# Patient Record
Sex: MALE | Race: WHITE | NOT HISPANIC OR LATINO | Employment: OTHER | URBAN - NONMETROPOLITAN AREA
[De-identification: names, ages, dates, MRNs, and addresses within clinical notes are randomized per-mention and may not be internally consistent; named-entity substitution may affect disease eponyms.]

---

## 2018-08-06 PROCEDURE — 99284 EMERGENCY DEPT VISIT MOD MDM: CPT

## 2018-08-06 PROCEDURE — 96361 HYDRATE IV INFUSION ADD-ON: CPT

## 2018-08-06 PROCEDURE — 96365 THER/PROPH/DIAG IV INF INIT: CPT

## 2018-08-07 ENCOUNTER — APPOINTMENT (OUTPATIENT)
Dept: GENERAL RADIOLOGY | Facility: HOSPITAL | Age: 68
End: 2018-08-07

## 2018-08-07 ENCOUNTER — HOSPITAL ENCOUNTER (EMERGENCY)
Facility: HOSPITAL | Age: 68
Discharge: HOME | End: 2018-08-07
Attending: EMERGENCY MEDICINE | Admitting: EMERGENCY MEDICINE

## 2018-08-07 VITALS
TEMPERATURE: 98.2 F | WEIGHT: 200 LBS | SYSTOLIC BLOOD PRESSURE: 130 MMHG | RESPIRATION RATE: 18 BRPM | DIASTOLIC BLOOD PRESSURE: 74 MMHG | HEART RATE: 82 BPM | BODY MASS INDEX: 29.62 KG/M2 | HEIGHT: 69 IN | OXYGEN SATURATION: 97 %

## 2018-08-07 DIAGNOSIS — J18.9 PNEUMONIA OF LEFT LOWER LOBE DUE TO INFECTIOUS ORGANISM: Primary | ICD-10-CM

## 2018-08-07 LAB
ALBUMIN SERPL-MCNC: 4.4 G/DL (ref 3.4–4.8)
ALBUMIN/GLOB SERPL: 1.6 G/DL (ref 1.5–2.5)
ALP SERPL-CCNC: 67 U/L (ref 40–129)
ALT SERPL W P-5'-P-CCNC: 18 U/L (ref 10–44)
ANION GAP SERPL CALCULATED.3IONS-SCNC: 10.5 MMOL/L (ref 3.6–11.2)
AST SERPL-CCNC: 19 U/L (ref 10–34)
BASOPHILS # BLD AUTO: 0.02 10*3/MM3 (ref 0–0.3)
BASOPHILS NFR BLD AUTO: 0.1 % (ref 0–2)
BILIRUB SERPL-MCNC: 0.5 MG/DL (ref 0.2–1.8)
BILIRUB UR QL STRIP: NEGATIVE
BNP SERPL-MCNC: 29 PG/ML (ref 0–100)
BUN BLD-MCNC: 18 MG/DL (ref 7–21)
BUN/CREAT SERPL: 15.8 (ref 7–25)
CALCIUM SPEC-SCNC: 9.8 MG/DL (ref 7.7–10)
CHLORIDE SERPL-SCNC: 104 MMOL/L (ref 99–112)
CLARITY UR: CLEAR
CO2 SERPL-SCNC: 23.5 MMOL/L (ref 24.3–31.9)
COLOR UR: YELLOW
CREAT BLD-MCNC: 1.14 MG/DL (ref 0.43–1.29)
DEPRECATED RDW RBC AUTO: 39.5 FL (ref 37–54)
EOSINOPHIL # BLD AUTO: 0.05 10*3/MM3 (ref 0–0.7)
EOSINOPHIL NFR BLD AUTO: 0.3 % (ref 0–7)
ERYTHROCYTE [DISTWIDTH] IN BLOOD BY AUTOMATED COUNT: 12.9 % (ref 11.5–14.5)
FLUAV AG NPH QL: NEGATIVE
FLUBV AG NPH QL IA: NEGATIVE
GFR SERPL CREATININE-BSD FRML MDRD: 64 ML/MIN/1.73
GLOBULIN UR ELPH-MCNC: 2.8 GM/DL
GLUCOSE BLD-MCNC: 229 MG/DL (ref 70–110)
GLUCOSE UR STRIP-MCNC: ABNORMAL MG/DL
HCT VFR BLD AUTO: 40 % (ref 42–52)
HGB BLD-MCNC: 13.7 G/DL (ref 14–18)
HGB UR QL STRIP.AUTO: NEGATIVE
IMM GRANULOCYTES # BLD: 0.05 10*3/MM3 (ref 0–0.03)
IMM GRANULOCYTES NFR BLD: 0.3 % (ref 0–0.5)
KETONES UR QL STRIP: ABNORMAL
LEUKOCYTE ESTERASE UR QL STRIP.AUTO: NEGATIVE
LYMPHOCYTES # BLD AUTO: 2.37 10*3/MM3 (ref 1–3)
LYMPHOCYTES NFR BLD AUTO: 15.2 % (ref 16–46)
MAGNESIUM SERPL-MCNC: 1.7 MG/DL (ref 1.7–2.6)
MCH RBC QN AUTO: 29.5 PG (ref 27–33)
MCHC RBC AUTO-ENTMCNC: 34.3 G/DL (ref 33–37)
MCV RBC AUTO: 86 FL (ref 80–94)
MONOCYTES # BLD AUTO: 1.4 10*3/MM3 (ref 0.1–0.9)
MONOCYTES NFR BLD AUTO: 9 % (ref 0–12)
NEUTROPHILS # BLD AUTO: 11.7 10*3/MM3 (ref 1.4–6.5)
NEUTROPHILS NFR BLD AUTO: 75.1 % (ref 40–75)
NITRITE UR QL STRIP: NEGATIVE
OSMOLALITY SERPL CALC.SUM OF ELEC: 284.8 MOSM/KG (ref 273–305)
PH UR STRIP.AUTO: <=5 [PH] (ref 5–8)
PLATELET # BLD AUTO: 315 10*3/MM3 (ref 130–400)
PMV BLD AUTO: 8.8 FL (ref 6–10)
POTASSIUM BLD-SCNC: 4.6 MMOL/L (ref 3.5–5.3)
PROT SERPL-MCNC: 7.2 G/DL (ref 6–8)
PROT UR QL STRIP: NEGATIVE
RBC # BLD AUTO: 4.65 10*6/MM3 (ref 4.7–6.1)
SODIUM BLD-SCNC: 138 MMOL/L (ref 135–153)
SP GR UR STRIP: >1.03 (ref 1–1.03)
TROPONIN I SERPL-MCNC: <0.006 NG/ML
UROBILINOGEN UR QL STRIP: ABNORMAL
WBC NRBC COR # BLD: 15.59 10*3/MM3 (ref 4.5–12.5)

## 2018-08-07 PROCEDURE — 81003 URINALYSIS AUTO W/O SCOPE: CPT | Performed by: FAMILY MEDICINE

## 2018-08-07 PROCEDURE — 83735 ASSAY OF MAGNESIUM: CPT | Performed by: EMERGENCY MEDICINE

## 2018-08-07 PROCEDURE — 83880 ASSAY OF NATRIURETIC PEPTIDE: CPT | Performed by: EMERGENCY MEDICINE

## 2018-08-07 PROCEDURE — 71046 X-RAY EXAM CHEST 2 VIEWS: CPT | Performed by: RADIOLOGY

## 2018-08-07 PROCEDURE — 71046 X-RAY EXAM CHEST 2 VIEWS: CPT

## 2018-08-07 PROCEDURE — 25010000002 CEFTRIAXONE: Performed by: EMERGENCY MEDICINE

## 2018-08-07 PROCEDURE — 80053 COMPREHEN METABOLIC PANEL: CPT | Performed by: EMERGENCY MEDICINE

## 2018-08-07 PROCEDURE — 87804 INFLUENZA ASSAY W/OPTIC: CPT | Performed by: EMERGENCY MEDICINE

## 2018-08-07 PROCEDURE — 84484 ASSAY OF TROPONIN QUANT: CPT | Performed by: EMERGENCY MEDICINE

## 2018-08-07 PROCEDURE — 72072 X-RAY EXAM THORAC SPINE 3VWS: CPT | Performed by: RADIOLOGY

## 2018-08-07 PROCEDURE — 85025 COMPLETE CBC W/AUTO DIFF WBC: CPT | Performed by: EMERGENCY MEDICINE

## 2018-08-07 PROCEDURE — 96361 HYDRATE IV INFUSION ADD-ON: CPT

## 2018-08-07 PROCEDURE — 72072 X-RAY EXAM THORAC SPINE 3VWS: CPT

## 2018-08-07 PROCEDURE — 96365 THER/PROPH/DIAG IV INF INIT: CPT

## 2018-08-07 RX ORDER — ACETAMINOPHEN 500 MG
1000 TABLET ORAL ONCE
Status: COMPLETED | OUTPATIENT
Start: 2018-08-07 | End: 2018-08-07

## 2018-08-07 RX ORDER — LEVOFLOXACIN 500 MG/1
500 TABLET, FILM COATED ORAL DAILY
Qty: 7 TABLET | Refills: 0 | Status: SHIPPED | OUTPATIENT
Start: 2018-08-07 | End: 2020-11-09

## 2018-08-07 RX ADMIN — CEFTRIAXONE 1 G: 1 INJECTION, POWDER, FOR SOLUTION INTRAMUSCULAR; INTRAVENOUS at 01:15

## 2018-08-07 RX ADMIN — ACETAMINOPHEN 1000 MG: 500 TABLET ORAL at 00:37

## 2018-08-07 RX ADMIN — SODIUM CHLORIDE 1000 ML: 9 INJECTION, SOLUTION INTRAVENOUS at 00:39

## 2018-08-07 RX ADMIN — SODIUM CHLORIDE 500 ML: 9 INJECTION, SOLUTION INTRAVENOUS at 01:16

## 2018-08-07 NOTE — ED PROVIDER NOTES
Subjective   Patient presents to ER with weakness, chills and fever.        History provided by:  Patient  Weakness - Generalized   Severity:  Moderate  Onset quality:  Gradual  Timing:  Constant  Progression:  Worsening  Chronicity:  New  Context: dehydration    Relieved by:  Nothing  Worsened by:  Nothing  Ineffective treatments:  None tried  Associated symptoms: cough and shortness of breath        Review of Systems   Constitutional: Positive for activity change and appetite change.   HENT: Negative.    Eyes: Negative.    Respiratory: Positive for cough and shortness of breath.    Cardiovascular: Negative.    Gastrointestinal: Negative.    Endocrine: Negative.    Genitourinary: Negative.    Musculoskeletal: Negative.    Skin: Negative.    Allergic/Immunologic: Negative.    Hematological: Negative.    Psychiatric/Behavioral: Negative.    All other systems reviewed and are negative.      History reviewed. No pertinent past medical history.    No Known Allergies    History reviewed. No pertinent surgical history.    History reviewed. No pertinent family history.    Social History     Social History   • Marital status:      Social History Main Topics   • Drug use: Unknown     Other Topics Concern   • Not on file           Objective   Physical Exam   Constitutional: He appears well-developed and well-nourished.   HENT:   Head: Normocephalic and atraumatic.   Eyes: EOM are normal.   Neck: Normal range of motion.   Cardiovascular: Normal rate and regular rhythm.    Pulmonary/Chest: He has wheezes.   Musculoskeletal: Normal range of motion.   Neurological: He is alert.   Skin: Skin is warm.   Psychiatric: He has a normal mood and affect.   Nursing note and vitals reviewed.      Procedures           ED Course  ED Course as of Aug 07 0153   Tue Aug 07, 2018   0057 CXR LLL infiltrate  [OBI]      ED Course User Index  [OBI] Washington Velasco MD                  [facility]      Final diagnoses:   Pneumonia of left lower lobe  due to infectious organism (CMS/[facility])            Washington Velasco MD  08/07/18 0153

## 2020-11-09 ENCOUNTER — TELEPHONE (OUTPATIENT)
Dept: FAMILY MEDICINE CLINIC | Facility: CLINIC | Age: 70
End: 2020-11-09

## 2020-11-09 ENCOUNTER — OFFICE VISIT (OUTPATIENT)
Dept: FAMILY MEDICINE CLINIC | Facility: CLINIC | Age: 70
End: 2020-11-09

## 2020-11-09 VITALS
TEMPERATURE: 96.9 F | SYSTOLIC BLOOD PRESSURE: 140 MMHG | OXYGEN SATURATION: 98 % | HEART RATE: 70 BPM | DIASTOLIC BLOOD PRESSURE: 79 MMHG | HEIGHT: 69 IN | WEIGHT: 216.6 LBS | BODY MASS INDEX: 32.08 KG/M2

## 2020-11-09 DIAGNOSIS — K64.8 OTHER HEMORRHOIDS: ICD-10-CM

## 2020-11-09 DIAGNOSIS — M1A.09X0 IDIOPATHIC CHRONIC GOUT OF MULTIPLE SITES WITHOUT TOPHUS: Primary | ICD-10-CM

## 2020-11-09 DIAGNOSIS — E11.69 HYPERLIPIDEMIA ASSOCIATED WITH TYPE 2 DIABETES MELLITUS (HCC): ICD-10-CM

## 2020-11-09 DIAGNOSIS — N40.0 BENIGN PROSTATIC HYPERPLASIA WITHOUT LOWER URINARY TRACT SYMPTOMS: ICD-10-CM

## 2020-11-09 DIAGNOSIS — E78.5 HYPERLIPIDEMIA ASSOCIATED WITH TYPE 2 DIABETES MELLITUS (HCC): ICD-10-CM

## 2020-11-09 DIAGNOSIS — K21.9 GASTROESOPHAGEAL REFLUX DISEASE WITHOUT ESOPHAGITIS: ICD-10-CM

## 2020-11-09 DIAGNOSIS — I15.2 HYPERTENSION ASSOCIATED WITH DIABETES (HCC): ICD-10-CM

## 2020-11-09 DIAGNOSIS — E11.59 HYPERTENSION ASSOCIATED WITH DIABETES (HCC): ICD-10-CM

## 2020-11-09 DIAGNOSIS — E11.42 TYPE 2 DIABETES MELLITUS WITH DIABETIC POLYNEUROPATHY, WITHOUT LONG-TERM CURRENT USE OF INSULIN (HCC): ICD-10-CM

## 2020-11-09 DIAGNOSIS — J30.89 SEASONAL ALLERGIC RHINITIS DUE TO OTHER ALLERGIC TRIGGER: ICD-10-CM

## 2020-11-09 PROCEDURE — 84550 ASSAY OF BLOOD/URIC ACID: CPT | Performed by: FAMILY MEDICINE

## 2020-11-09 PROCEDURE — 82043 UR ALBUMIN QUANTITATIVE: CPT | Performed by: FAMILY MEDICINE

## 2020-11-09 PROCEDURE — 83036 HEMOGLOBIN GLYCOSYLATED A1C: CPT | Performed by: FAMILY MEDICINE

## 2020-11-09 PROCEDURE — 80053 COMPREHEN METABOLIC PANEL: CPT | Performed by: FAMILY MEDICINE

## 2020-11-09 PROCEDURE — 99204 OFFICE O/P NEW MOD 45 MIN: CPT | Performed by: FAMILY MEDICINE

## 2020-11-09 RX ORDER — LUTEIN 10 MG
10 TABLET ORAL 2 TIMES DAILY
COMMUNITY
End: 2021-03-15 | Stop reason: HOSPADM

## 2020-11-09 RX ORDER — GABAPENTIN 300 MG/1
300 CAPSULE ORAL 2 TIMES DAILY
COMMUNITY
End: 2020-12-29 | Stop reason: SDUPTHER

## 2020-11-09 RX ORDER — CYANOCOBALAMIN (VITAMIN B-12) 500 MCG
LOZENGE ORAL
COMMUNITY
End: 2021-03-14

## 2020-11-09 RX ORDER — FOLIC ACID 1 MG/1
800 TABLET ORAL 2 TIMES DAILY
COMMUNITY
End: 2021-09-10

## 2020-11-09 RX ORDER — DAPAGLIFLOZIN 10 MG/1
1 TABLET, FILM COATED ORAL DAILY
COMMUNITY
End: 2021-03-15 | Stop reason: HOSPADM

## 2020-11-09 RX ORDER — FINASTERIDE 5 MG/1
5 TABLET, FILM COATED ORAL DAILY
COMMUNITY
End: 2021-01-06 | Stop reason: SDUPTHER

## 2020-11-09 RX ORDER — AMLODIPINE BESYLATE AND ATORVASTATIN CALCIUM 10; 10 MG/1; MG/1
1 TABLET, FILM COATED ORAL DAILY
COMMUNITY
End: 2021-01-06 | Stop reason: SDUPTHER

## 2020-11-09 RX ORDER — POTASSIUM GLUCONATE 595(99)MG
1 TABLET ORAL 2 TIMES DAILY
COMMUNITY
End: 2021-03-15 | Stop reason: HOSPADM

## 2020-11-09 RX ORDER — ASPIRIN 81 MG/1
81 TABLET ORAL DAILY
COMMUNITY

## 2020-11-09 RX ORDER — LORATADINE 10 MG/1
10 CAPSULE, LIQUID FILLED ORAL
COMMUNITY
End: 2021-03-15 | Stop reason: HOSPADM

## 2020-11-09 RX ORDER — TRIAMCINOLONE ACETONIDE 1 MG/G
1 CREAM TOPICAL 2 TIMES DAILY PRN
COMMUNITY
End: 2021-03-15 | Stop reason: HOSPADM

## 2020-11-09 RX ORDER — ACETAMINOPHEN 500 MG
500 TABLET ORAL EVERY 6 HOURS PRN
COMMUNITY
End: 2022-06-24

## 2020-11-09 RX ORDER — OMEPRAZOLE 20 MG/1
20 CAPSULE, DELAYED RELEASE ORAL DAILY
COMMUNITY
End: 2021-01-06 | Stop reason: SDUPTHER

## 2020-11-09 RX ORDER — ATORVASTATIN CALCIUM 10 MG/1
10 TABLET, FILM COATED ORAL DAILY
COMMUNITY
End: 2021-01-06 | Stop reason: SDUPTHER

## 2020-11-09 RX ORDER — CHLORAL HYDRATE 500 MG
1000 CAPSULE ORAL 2 TIMES DAILY
COMMUNITY
End: 2021-03-15 | Stop reason: HOSPADM

## 2020-11-09 RX ORDER — GLIPIZIDE 5 MG/1
5 TABLET ORAL
COMMUNITY
End: 2020-11-27 | Stop reason: SDUPTHER

## 2020-11-09 RX ORDER — YOHIMBE BARK 500 MG
100 CAPSULE ORAL DAILY
COMMUNITY
End: 2021-03-27 | Stop reason: HOSPADM

## 2020-11-09 RX ORDER — AMPICILLIN TRIHYDRATE 250 MG
500 CAPSULE ORAL 2 TIMES DAILY
COMMUNITY
End: 2021-03-15 | Stop reason: HOSPADM

## 2020-11-09 RX ORDER — CEPHRADINE 500 MG
1 CAPSULE ORAL 2 TIMES DAILY
COMMUNITY
End: 2021-03-15 | Stop reason: HOSPADM

## 2020-11-09 RX ORDER — ALLOPURINOL 300 MG/1
300 TABLET ORAL DAILY
COMMUNITY
End: 2021-01-04 | Stop reason: SDUPTHER

## 2020-11-09 RX ORDER — FLUCONAZOLE 200 MG/1
200 TABLET ORAL DAILY
Qty: 90 TABLET | Refills: 1 | Status: SHIPPED | OUTPATIENT
Start: 2020-11-09 | End: 2021-03-15 | Stop reason: HOSPADM

## 2020-11-09 RX ORDER — DOXAZOSIN MESYLATE 4 MG/1
4 TABLET ORAL DAILY
COMMUNITY
End: 2021-01-06 | Stop reason: SDUPTHER

## 2020-11-09 RX ORDER — UBIDECARENONE 100 MG
100 CAPSULE ORAL DAILY
COMMUNITY
End: 2021-03-15 | Stop reason: HOSPADM

## 2020-11-09 RX ORDER — ENALAPRIL MALEATE 20 MG/1
20 TABLET ORAL DAILY
COMMUNITY
End: 2021-01-06 | Stop reason: SDUPTHER

## 2020-11-09 RX ORDER — PRAVASTATIN SODIUM 40 MG
40 TABLET ORAL DAILY
COMMUNITY
End: 2021-01-28 | Stop reason: DRUGHIGH

## 2020-11-09 RX ORDER — FLUCONAZOLE 200 MG/1
200 TABLET ORAL DAILY
COMMUNITY
End: 2020-11-09 | Stop reason: SDUPTHER

## 2020-11-10 LAB
ALBUMIN SERPL-MCNC: 4.1 G/DL (ref 3.5–5.2)
ALBUMIN UR-MCNC: 1.9 MG/DL
ALBUMIN/GLOB SERPL: 1.5 G/DL
ALP SERPL-CCNC: 57 U/L (ref 39–117)
ALT SERPL W P-5'-P-CCNC: 17 U/L (ref 1–41)
ANION GAP SERPL CALCULATED.3IONS-SCNC: 11.8 MMOL/L (ref 5–15)
AST SERPL-CCNC: 16 U/L (ref 1–40)
BILIRUB SERPL-MCNC: 0.3 MG/DL (ref 0–1.2)
BUN SERPL-MCNC: 15 MG/DL (ref 8–23)
BUN/CREAT SERPL: 15.6 (ref 7–25)
CALCIUM SPEC-SCNC: 9 MG/DL (ref 8.6–10.5)
CHLORIDE SERPL-SCNC: 101 MMOL/L (ref 98–107)
CO2 SERPL-SCNC: 23.2 MMOL/L (ref 22–29)
CREAT SERPL-MCNC: 0.96 MG/DL (ref 0.76–1.27)
GFR SERPL CREATININE-BSD FRML MDRD: 77 ML/MIN/1.73
GLOBULIN UR ELPH-MCNC: 2.7 GM/DL
GLUCOSE SERPL-MCNC: 187 MG/DL (ref 65–99)
HBA1C MFR BLD: 8.5 % (ref 4.8–5.6)
POTASSIUM SERPL-SCNC: 4.5 MMOL/L (ref 3.5–5.2)
PROT SERPL-MCNC: 6.8 G/DL (ref 6–8.5)
SODIUM SERPL-SCNC: 136 MMOL/L (ref 136–145)
URATE SERPL-MCNC: 4 MG/DL (ref 3.4–7)

## 2020-11-23 ENCOUNTER — TELEPHONE (OUTPATIENT)
Dept: FAMILY MEDICINE CLINIC | Facility: CLINIC | Age: 70
End: 2020-11-23

## 2020-11-23 NOTE — TELEPHONE ENCOUNTER
----- Message from Jacqueline Gatica MD sent at 11/22/2020 10:47 PM EST -----  Please call William. Labs are okay, but his A1C remains a little high. He is on glipizide 5 mg BID. Would he be able to do glipizide 10 mg in the AM And 5 mg in the PM? Thanks.    MADE PATIENT AWARE AND HE VOICED UNDERSTANDING, HE IS AGREEABLE  TO INCREASING GLIPIZIDE.

## 2020-11-27 RX ORDER — GLIPIZIDE 5 MG/1
TABLET ORAL
Qty: 270 TABLET | Refills: 1 | Status: SHIPPED | OUTPATIENT
Start: 2020-11-27 | End: 2021-01-06 | Stop reason: SDUPTHER

## 2020-12-04 ENCOUNTER — OFFICE VISIT (OUTPATIENT)
Dept: FAMILY MEDICINE CLINIC | Facility: CLINIC | Age: 70
End: 2020-12-04

## 2020-12-04 VITALS
BODY MASS INDEX: 32.08 KG/M2 | HEIGHT: 69 IN | OXYGEN SATURATION: 96 % | DIASTOLIC BLOOD PRESSURE: 76 MMHG | TEMPERATURE: 96.6 F | HEART RATE: 75 BPM | WEIGHT: 216.6 LBS | SYSTOLIC BLOOD PRESSURE: 132 MMHG

## 2020-12-04 DIAGNOSIS — E11.42 TYPE 2 DIABETES MELLITUS WITH DIABETIC POLYNEUROPATHY, WITHOUT LONG-TERM CURRENT USE OF INSULIN (HCC): Primary | ICD-10-CM

## 2020-12-04 DIAGNOSIS — E11.59 HYPERTENSION ASSOCIATED WITH DIABETES (HCC): ICD-10-CM

## 2020-12-04 DIAGNOSIS — E11.69 HYPERLIPIDEMIA ASSOCIATED WITH TYPE 2 DIABETES MELLITUS (HCC): ICD-10-CM

## 2020-12-04 DIAGNOSIS — I15.2 HYPERTENSION ASSOCIATED WITH DIABETES (HCC): ICD-10-CM

## 2020-12-04 DIAGNOSIS — M1A.09X0 IDIOPATHIC CHRONIC GOUT OF MULTIPLE SITES WITHOUT TOPHUS: ICD-10-CM

## 2020-12-04 DIAGNOSIS — E78.5 HYPERLIPIDEMIA ASSOCIATED WITH TYPE 2 DIABETES MELLITUS (HCC): ICD-10-CM

## 2020-12-04 PROCEDURE — 99214 OFFICE O/P EST MOD 30 MIN: CPT | Performed by: FAMILY MEDICINE

## 2020-12-04 NOTE — PATIENT INSTRUCTIONS
Increase the glipizide to 10 mg twice a day.     Add an extra enalapril to the other side of the day (for example if you are taking it in the AM, take another one in the PM or vice versa). You can also try 1/2 tablet of the enalapril.

## 2020-12-07 PROBLEM — E78.5 HYPERLIPIDEMIA ASSOCIATED WITH TYPE 2 DIABETES MELLITUS: Status: ACTIVE | Noted: 2020-12-07

## 2020-12-07 PROBLEM — M1A.09X0 IDIOPATHIC CHRONIC GOUT OF MULTIPLE SITES WITHOUT TOPHUS: Status: ACTIVE | Noted: 2020-12-07

## 2020-12-07 PROBLEM — E11.69 HYPERLIPIDEMIA ASSOCIATED WITH TYPE 2 DIABETES MELLITUS (HCC): Status: ACTIVE | Noted: 2020-12-07

## 2020-12-07 PROBLEM — E11.59 HYPERTENSION ASSOCIATED WITH DIABETES: Status: ACTIVE | Noted: 2020-12-07

## 2020-12-07 PROBLEM — N40.0 BENIGN PROSTATIC HYPERPLASIA WITHOUT LOWER URINARY TRACT SYMPTOMS: Status: ACTIVE | Noted: 2020-12-07

## 2020-12-07 PROBLEM — I15.2 HYPERTENSION ASSOCIATED WITH DIABETES: Status: ACTIVE | Noted: 2020-12-07

## 2020-12-07 PROBLEM — E11.65 TYPE 2 DIABETES MELLITUS WITH HYPERGLYCEMIA, WITHOUT LONG-TERM CURRENT USE OF INSULIN: Status: ACTIVE | Noted: 2020-12-07

## 2020-12-07 NOTE — PROGRESS NOTES
"William Villasenor     VITALS: Blood pressure 140/79, pulse 70, temperature 96.9 °F (36.1 °C), temperature source Temporal, height 175.3 cm (69\"), weight 98.2 kg (216 lb 9.6 oz), SpO2 98 %.    Subjective  Chief Complaint:   Chief Complaint   Patient presents with   • Fatigue   • Hernia        History of Present Illness:  Patient is a 70 y.o.  male with chronic medical conditions significant for type 2 diabetes, hypertension, hyperlipidemia, and gout who presents to clinic secondary to establishment of care.  No new or acute concerns today.  Patient is doing well.  Of note, he comes in with a suitcase of medications, 18 of which are vitamins.  He takes these regularly.  Patient also brings in his last labs that were done in August 2020.  PSA was within normal limits at 0.1.  Hemoglobin A1c was 9.4.  Lipid panel had total cholesterol 261, HDL 33, LDL too high, and triglycerides 610.  CMP was within normal limits.    Patient has gout and is currently on allopurinol 300 mg orally daily without any side effects.  He denies any exacerbations.    Patient has type 2 diabetes and is currently on metformin 1000 mg orally twice a day, glipizide 5 mg orally twice a day, and Farxiga 10 mg orally daily without any side effects.  He denies any polydipsia, polyuria, or hypoglycemic episodes.  He does have neuropathy and is currently on gabapentin 300 mg orally twice a day for it.  He denies any retinopathy or nephropathy.  He is on enalapril for kidney protection and aspirin for CV protection.  He tries to follow a diabetic diet, but he states that his wife does not like to cook and they eat out more often than they eat in.    Patient has hypertension and is currently on enalapril 20 mg orally twice a day along with amlodipine 10 mg orally daily and doxazosin 4 mg orally daily.  He denies any side effects of the medications.  He does check his blood pressures regularly and they are within normal limits at home.  He states that they are in " the 120s/90s.  He tries to follow a low-salt diet.    Patient has hyperlipidemia and is currently on atorvastatin 10 mg orally daily and pravastatin 40 mg orally daily.  He denies any side effects of the medications.  He is not sure why he is on both statins.  He denies any abdominal pain, nausea, or vomiting.  He tries to follow a low-cholesterol diet.    Patient has BPH and is currently on doxazosin 4 mg orally daily and finasteride 5 mg orally daily without any side effects.  He continues to have some symptoms, but the symptoms have greatly improved.    Patient has GERD and is currently on omeprazole 20 mg orally twice a day.  He denies any side effects of the medications.  He denies any abdominal pain, nausea, or vomiting.  He denies any nighttime coughing.    Patient has allergic rhinitis and is currently on Claritin 10 mg orally daily.  He denies any side effects of the medication.  He denies any exacerbations.  He denies any congestion, rhinorrhea, or coughing.    Patient has hemorrhoids and utilizes Proctofoam when he needs to.  He has not had an outbreak in quite a while.    Patient has chronic back pain and he states it is because of degenerative disc disease.  He has had this for years.  He currently is not on any prescription medications.  He does take NSAIDs, Tylenol, and glucoasmine as needed.  He tries to decrease the pain by being as active as he can.    Last colonoscopy was 3 to 4 years ago by Dr. Garduno.  He did have polyps.  He declines a flu shot.  Last PSA was checked August 2020.    No complaints about any of the medications.    The following portions of the patient's history were reviewed and updated as appropriate: allergies, current medications, past family history, past medical history, past social history, past surgical history and problem list.    Past Medical History  Past Medical History:   Diagnosis Date   • Arthritis    • Back pain    • Diabetes mellitus (CMS/HCC)    • Erectile  dysfunction    • GERD (gastroesophageal reflux disease)    • High cholesterol    • Hypertension        Review of Systems   Constitutional: Positive for fatigue. Negative for chills and fever.   HENT: Negative for congestion and rhinorrhea.    Eyes: Negative for discharge and itching.   Respiratory: Negative for shortness of breath and wheezing.    Cardiovascular: Negative for chest pain and palpitations.   Gastrointestinal: Negative for abdominal pain, constipation, diarrhea, nausea and vomiting.   Endocrine: Negative for cold intolerance and heat intolerance.   Genitourinary: Negative for dysuria and hematuria.   Musculoskeletal: Positive for arthralgias, back pain and myalgias.   Skin: Negative for rash and wound.   Neurological: Negative for dizziness and light-headedness.   Psychiatric/Behavioral: Negative for suicidal ideas. The patient is nervous/anxious.        Surgical History  Past Surgical History:   Procedure Laterality Date   • COLONOSCOPY W/ BIOPSIES         Family History  Family History   Problem Relation Age of Onset   • Heart disease Mother    • Hypertension Mother    • Depression Mother    • Alcohol abuse Maternal Uncle    • Heart disease Maternal Grandmother    • Hypertension Maternal Grandmother    • Diabetes Maternal Grandmother    • Diabetes Paternal Grandmother        Social History  Social History     Socioeconomic History   • Marital status:      Spouse name: Not on file   • Number of children: Not on file   • Years of education: Not on file   • Highest education level: Not on file   Tobacco Use   • Smoking status: Former Smoker     Packs/day: 1.00     Years: 5.00     Pack years: 5.00     Quit date: 1974     Years since quittin.8   • Smokeless tobacco: Never Used   Substance and Sexual Activity   • Alcohol use: Never     Frequency: Never     Binge frequency: Never   • Drug use: Never   • Sexual activity: Defer       Objective  Physical Exam  Cardiovascular:      Pulses:            Dorsalis pedis pulses are 2+ on the right side and 2+ on the left side.        Posterior tibial pulses are 1+ on the right side and 1+ on the left side.   Musculoskeletal:        Feet:    Feet:      Right foot:      Protective Sensation: 5 sites tested. 2 sites sensed.      Skin integrity: Callus and dry skin present. No warmth.      Toenail Condition: Right toenails are normal.      Left foot:      Protective Sensation: 5 sites tested. 3 sites sensed.      Skin integrity: Callus and dry skin present. No warmth.      Toenail Condition: Left toenails are normal.      Comments: Diabetic Foot Exam Performed and Monofilament Test Performed          Gen: Patient in NAD. Pleasant and answers appropriately. A&Ox3.    Skin: Warm and dry with normal turgor. No purpura, rashes, or unusual pigmentation noted. Hair is normal in appearance and distribution.    HEENT: NC/AT. No lesions noted. Conjunctiva clear, sclera nonicteric. PERRL. EOMI without nystagmus or strabismus. Fundi appear benign. No hemorrhages or exudates of eyes. Auditory canals are patent bilaterally without lesions. TMs intact,  nonerythematous, nonbulging without lesions. Nasal mucosa pink, nonerythematous, and nonedematous. Frontal and maxillary sinuses are nontender. O/P nonerythematous and moist without exudate.    Neck: Supple without lymph nodes palpated. FROM.     Lungs: Slightly decreased B/L without rales, rhonchi, crackles, or wheezes.    Heart: RRR. S1 and S2 normal. No S3 or S4. No MRGT.    Abd: Soft, nontender, obese. (+)BSx4 quadrants.  No HSM or masses.    Extrem: No CC.  Trace edema in bilateral lower extremities.  Radial pulses 2+/4 and equal B/L. FROMx4.    Neuro: No focal motor/sensory deficits.    Procedures    Assessment/Plan  William Villasenor is a 70 y.o. here for establishment of care.    Diagnoses and all orders for this visit:    1. Idiopathic chronic gout of multiple sites without tophus (Primary)  -     Uric Acid; Future  -     Uric  Acid  Will check levels.  Patient currently on allopurinol 300 mg orally daily.    2. Type 2 diabetes mellitus with diabetic polyneuropathy, without long-term current use of insulin (CMS/[facility])  -     Comprehensive Metabolic Panel; Future  -     MicroAlbumin, Urine, Random - Urine, Clean Catch; Future  -     Hemoglobin A1c; Future  -     Comprehensive Metabolic Panel  -     MicroAlbumin, Urine, Random - Urine, Clean Catch  -     Hemoglobin A1c  Check levels.  Patient currently on metformin 1000 mg orally twice a day, glipizide 5 mg orally twice a day, Farxiga 10 mg orally daily.    3. Hypertension associated with diabetes (CMS/HCC)  Elevated today.  Will need to monitor.  Patient currently on enalapril 20 mg orally twice a day and amlodipine 10 mg orally daily.    4. Hyperlipidemia associated with type 2 diabetes mellitus (CMS/[facility])  We will discontinue pravastatin as his lipid panel is elevated.  Continue atorvastatin 10 mg orally daily.  May need to increase atorvastatin.  Recheck in February 2020.    5. Benign prostatic hyperplasia without lower urinary tract symptoms  Currently on finasteride 5 mg orally daily and doxazosin 4 mg orally daily.    6. Gastroesophageal reflux disease without esophagitis  Patient currently on omeprazole 20 mg orally twice a day.    7. Seasonal allergic rhinitis due to other allergic trigger  Patient currently on Claritin 10 mg orally daily.    8. Other hemorrhoids  Patient has Proctofoam at home.    Other orders  -     fluconazole (DIFLUCAN) 200 MG tablet; Take 1 tablet by mouth Daily.  Dispense: 90 tablet; Refill: 1        Patient's Body mass index is 31.99 kg/m². BMI is above normal parameters. Recommendations include: exercise counseling and nutrition counseling.     Findings and plans discussed with patient who verbalizes understanding and agreement. Will followup with patient once results are in. Patient to followup at clinic PRN or in 3 weeks for further medical  followup.    MD KENJI Chan/Transcription Disclaimer:  Much of this encounter note is an electronic transcription/translation of spoken language to printed text.  The electronic translation of spoken language may permit erroneous, or at times, nonsensical words or phrases to be inadvertently transcribed.  Although I have reviewed the note for such errors, some may still exist.

## 2020-12-18 ENCOUNTER — TELEPHONE (OUTPATIENT)
Dept: FAMILY MEDICINE CLINIC | Facility: CLINIC | Age: 70
End: 2020-12-18

## 2020-12-18 NOTE — TELEPHONE ENCOUNTER
Caller: William Villasenor    Relationship: Self    Best call back number: [phone]     THE PATIENT STATES THAT HE WENT TO [facility] AND THEY STATE THEY DID NOT GET A COPY OF WHERE HE GOT HIS FEET EXAMINED. HE REPORTS THEY NEED THIS INFORMATION BEFORE HE CAN GET HIS DIABETIC SHOES.  THE PATIENT IS REQUESTING THE OFFICE VISIT CLINICAL NOTES, WHERE THE DR EXAMINED HIS FEET, TO BE FAXED TO   [facility] AT Fax: [phone]      THE PHONE NUMBER FOR [facility] [phone].    PLEASE CALL PATIENT [phone]  TO CONFIRM WHEN SENT PLEASE.

## 2020-12-18 NOTE — TELEPHONE ENCOUNTER
Caller: Hamilton William    Relationship: Self    Best call back number: [phone]     THE PATIENT STATES THAT HE WENT TO [facility] AND THEY STATE THEY DID NOT GET A COPY OF WHERE HE GOT HIS FEET EXAMINED. HE REPORTS THEY NEED THIS INFORMATION BEFORE HE CAN GET HIS DIABETIC SHOES.  THE PATIENT IS REQUESTING THE OFFICE VISIT CLINICAL NOTES, WHERE THE DR EXAMINED HIS FEET, TO BE FAXED TO   [facility] AT Fax: [phone]      THE PHONE NUMBER FOR [facility] [phone].    PLEASE CALL PATIENT [phone]  TO CONFIRM WHEN SENT PLEASE.    Foot exam faxed & left a detailed message for him.

## 2020-12-21 NOTE — PROGRESS NOTES
"William Villasenor     VITALS: Blood pressure 132/76, pulse 75, temperature 96.6 °F (35.9 °C), height 175.3 cm (69.02\"), weight 98.2 kg (216 lb 9.6 oz), SpO2 96 %.    Subjective  Chief Complaint:   Chief Complaint   Patient presents with   • Gout     follow up   • Diabetes     follow up        History of Present Illness:  Patient is a 70 y.o.  male with chronic medical conditions significant for gout, hypertension, type 2 diabetes, and BPH who presents to clinic secondary to medical followup.  No new or acute concerns.  Patient is doing well.  He comes in today after establishing care to review his labs.    Patient has chronic conditions including type 2 diabetes, gout, hypertension, and hyperlipidemia.  His chronic conditions are stable and unchanged.  Medications associated with his chronic conditions are not giving him any side effects.    No complaints about any of the medications.    The following portions of the patient's history were reviewed and updated as appropriate: allergies, current medications, past family history, past medical history, past social history, past surgical history and problem list.    Past Medical History  Past Medical History:   Diagnosis Date   • Arthritis    • Back pain    • Diabetes mellitus (CMS/HCC)    • Erectile dysfunction    • GERD (gastroesophageal reflux disease)    • High cholesterol    • Hypertension        Review of Systems   Respiratory: Negative for shortness of breath and wheezing.    Cardiovascular: Negative for chest pain and palpitations.       Surgical History  Past Surgical History:   Procedure Laterality Date   • COLONOSCOPY W/ BIOPSIES  2015       Family History  Family History   Problem Relation Age of Onset   • Heart disease Mother    • Hypertension Mother    • Depression Mother    • Alcohol abuse Maternal Uncle    • Heart disease Maternal Grandmother    • Hypertension Maternal Grandmother    • Diabetes Maternal Grandmother    • Diabetes Paternal Grandmother  "       Social History  Social History     Socioeconomic History   • Marital status:      Spouse name: Not on file   • Number of children: Not on file   • Years of education: Not on file   • Highest education level: Not on file   Tobacco Use   • Smoking status: Former Smoker     Packs/day: 1.00     Years: 5.00     Pack years: 5.00     Quit date: 1974     Years since quittin.9   • Smokeless tobacco: Never Used   Substance and Sexual Activity   • Alcohol use: Never     Frequency: Never     Binge frequency: Never   • Drug use: Never   • Sexual activity: Defer       Objective  Physical Exam    Gen: Patient in NAD. Pleasant and answers appropriately. A&Ox3.    Skin: Warm and dry with normal turgor. No purpura, rashes, or unusual pigmentation noted. Hair is normal in appearance and distribution.    HEENT: NC/AT. No lesions noted. Conjunctiva clear, sclera nonicteric. PERRL. EOMI without nystagmus or strabismus. Fundi appear benign. No hemorrhages or exudates of eyes. Auditory canals are patent bilaterally without lesions. TMs intact,  nonerythematous, nonbulging without lesions. Nasal mucosa pink, nonerythematous, and nonedematous. Frontal and maxillary sinuses are nontender. O/P nonerythematous and moist without exudate.    Neck: Supple without lymph nodes palpated. FROM.     Lungs: Coarse B/L without rales, rhonchi, crackles, or wheezes.    Heart: RRR. S1 and S2 normal. No S3 or S4. No MRGT.    Abd: Soft, nontender,nondistended. (+)BSx4 quadrants.     Extrem: No CCE. Radial pulses 2+/4 and equal B/L. FROMx4.     Neuro: No focal motor/sensory deficits.    Procedures    Assessment/Plan  William Villasenor is a 70 y.o. here for medical followup.    Diagnoses and all orders for this visit:    1. Type 2 diabetes mellitus with diabetic polyneuropathy, without long-term current use of insulin (CMS/[facility]) (Primary)  Secondary to elevated A1c, will increase glipizide to 10 mg orally twice a day.  Patient to continue Farxiga  10 mg orally daily along with Metformin 1000 mg orally twice a day.    2. Idiopathic chronic gout of multiple sites without tophus  Within normal limits.  Patient to continue allopurinol 300 mg orally daily.    3. Hypertension associated with diabetes (CMS/HCC)  Slightly elevated today.  Will need to monitor.  Patient currently on amlodipine 10 mg orally daily doxazosin 4 mg orally daily, enalapril 20 mg orally daily.      4. Hyperlipidemia associated with type 2 diabetes mellitus (CMS/HCC)  Discussed at length with patient.  On his medication reconciliation sheet, he is on both atorvastatin 20 mg orally daily and pravastatin 40 mg orally daily.  He is taking both.  Discussed with patient that he needs to discontinue the pravastatin.  We will continue to monitor.      Patient's Body mass index is 31.97 kg/m². BMI is above normal parameters. Recommendations include: exercise counseling and nutrition counseling.             Findings and plans discussed with patient who verbalizes understanding and agreement. Will followup with patient once results are in. Patient to followup at clinic PRN or in three months for further medical followup.    Jacqueline Gatica MD    EMR Dragon/Transcription Disclaimer:  Much of this encounter note is an electronic transcription/translation of spoken language to printed text.  The electronic translation of spoken language may permit erroneous, or at times, nonsensical words or phrases to be inadvertently transcribed.  Although I have reviewed the note for such errors, some may still exist.

## 2020-12-28 ENCOUNTER — TELEPHONE (OUTPATIENT)
Dept: FAMILY MEDICINE CLINIC | Facility: CLINIC | Age: 70
End: 2020-12-28

## 2020-12-29 DIAGNOSIS — E11.42 TYPE 2 DIABETES MELLITUS WITH DIABETIC POLYNEUROPATHY, WITHOUT LONG-TERM CURRENT USE OF INSULIN (HCC): ICD-10-CM

## 2020-12-29 RX ORDER — GABAPENTIN 300 MG/1
300 CAPSULE ORAL 2 TIMES DAILY
Qty: 60 CAPSULE | Refills: 0 | Status: SHIPPED | OUTPATIENT
Start: 2020-12-29 | End: 2021-02-03 | Stop reason: SDUPTHER

## 2020-12-29 NOTE — PROGRESS NOTES
Benjamin # in epic  Reviewed and is consistent.   UDS 11/2020 reviewed and is consistent.  Patient comfort assessment guide reviewed and updated today.    As part of the patient's treatment plan they are being prescribed a controlled substance/ substances with potential for abuse.  This patient has been made aware of the appropriate use of such medications, including potential risk of somnolence, limited ability to drive and/or work safely, and potential for overdose.  It has also been made clear these medications are for use by the patient only, without concomitant use of alcohol or other substances unless prescribed/advised by medical provider.    Patient has completed prescribing agreement detailing terms of continued prescribing of controlled substances including monitoring BENJAMIN reports, urine drug screens, and pill counts.  The patient is aware BENJAMIN reports are reviewed on a regular basis and scanned into the chart.    History and physical exam exhibit continued safe and appropriate use of controlled substances.

## 2021-01-04 DIAGNOSIS — M1A.09X0 IDIOPATHIC CHRONIC GOUT OF MULTIPLE SITES WITHOUT TOPHUS: ICD-10-CM

## 2021-01-04 DIAGNOSIS — E78.5 HYPERLIPIDEMIA ASSOCIATED WITH TYPE 2 DIABETES MELLITUS (HCC): ICD-10-CM

## 2021-01-04 DIAGNOSIS — E11.69 HYPERLIPIDEMIA ASSOCIATED WITH TYPE 2 DIABETES MELLITUS (HCC): ICD-10-CM

## 2021-01-04 DIAGNOSIS — N40.0 BENIGN PROSTATIC HYPERPLASIA WITHOUT LOWER URINARY TRACT SYMPTOMS: ICD-10-CM

## 2021-01-04 DIAGNOSIS — E11.42 TYPE 2 DIABETES MELLITUS WITH DIABETIC POLYNEUROPATHY, WITHOUT LONG-TERM CURRENT USE OF INSULIN (HCC): ICD-10-CM

## 2021-01-04 DIAGNOSIS — I15.2 HYPERTENSION ASSOCIATED WITH DIABETES (HCC): ICD-10-CM

## 2021-01-04 DIAGNOSIS — E11.59 HYPERTENSION ASSOCIATED WITH DIABETES (HCC): ICD-10-CM

## 2021-01-04 DIAGNOSIS — K21.9 GASTROESOPHAGEAL REFLUX DISEASE WITHOUT ESOPHAGITIS: ICD-10-CM

## 2021-01-04 RX ORDER — ALLOPURINOL 300 MG/1
300 TABLET ORAL DAILY
Qty: 90 TABLET | Refills: 1 | Status: SHIPPED | OUTPATIENT
Start: 2021-01-04 | End: 2021-06-08

## 2021-01-04 NOTE — TELEPHONE ENCOUNTER
----- Message from Jacqueline Gatica MD sent at 1/4/2021  1:47 PM EST -----  Please let William know that this has been sent to [facility]. We never got a request for an allopurinol request - wonder if it was sent to Dr. Monge's office?      Spoke with patient he reports he needs all maintenance meds sent to [facility].

## 2021-01-07 RX ORDER — AMLODIPINE BESYLATE AND ATORVASTATIN CALCIUM 10; 10 MG/1; MG/1
1 TABLET, FILM COATED ORAL DAILY
Qty: 90 TABLET | Refills: 1 | Status: SHIPPED | OUTPATIENT
Start: 2021-01-07 | End: 2021-01-28 | Stop reason: DRUGHIGH

## 2021-01-07 RX ORDER — DOXAZOSIN MESYLATE 4 MG/1
4 TABLET ORAL DAILY
Qty: 90 TABLET | Refills: 1 | Status: ON HOLD | OUTPATIENT
Start: 2021-01-07 | End: 2021-03-23

## 2021-01-07 RX ORDER — ENALAPRIL MALEATE 20 MG/1
20 TABLET ORAL DAILY
Qty: 90 TABLET | Refills: 1 | Status: SHIPPED | OUTPATIENT
Start: 2021-01-07 | End: 2021-03-22 | Stop reason: HOSPADM

## 2021-01-07 RX ORDER — FINASTERIDE 5 MG/1
5 TABLET, FILM COATED ORAL DAILY
Qty: 90 TABLET | Refills: 1 | Status: SHIPPED | OUTPATIENT
Start: 2021-01-07 | End: 2021-06-16 | Stop reason: SDUPTHER

## 2021-01-07 RX ORDER — OMEPRAZOLE 20 MG/1
20 CAPSULE, DELAYED RELEASE ORAL DAILY
Qty: 90 CAPSULE | Refills: 1 | Status: SHIPPED | OUTPATIENT
Start: 2021-01-07 | End: 2021-03-05 | Stop reason: SDUPTHER

## 2021-01-07 RX ORDER — GLIPIZIDE 5 MG/1
TABLET ORAL
Qty: 270 TABLET | Refills: 1 | Status: SHIPPED | OUTPATIENT
Start: 2021-01-07 | End: 2021-03-14

## 2021-01-07 RX ORDER — PRAVASTATIN SODIUM 40 MG
40 TABLET ORAL DAILY
Qty: 90 TABLET | Refills: 1 | OUTPATIENT
Start: 2021-01-07

## 2021-01-07 RX ORDER — ATORVASTATIN CALCIUM 10 MG/1
10 TABLET, FILM COATED ORAL DAILY
Qty: 90 TABLET | Refills: 1 | Status: SHIPPED | OUTPATIENT
Start: 2021-01-07 | End: 2021-01-28 | Stop reason: DRUGHIGH

## 2021-01-19 ENCOUNTER — TELEPHONE (OUTPATIENT)
Dept: FAMILY MEDICINE CLINIC | Facility: CLINIC | Age: 71
End: 2021-01-19

## 2021-01-19 NOTE — TELEPHONE ENCOUNTER
Called [facility] pharmacy and spoke with Rena (pharmacist) I advised that Dr. Gatica stated pt is on both Amlodipine 10mg and Amlodipine-atorvastatin 10/10mg also. She wants to know is pt can have script for Amlodipine-atorvastatin 10/20mg? She stated its less pills for the pt to take and cheaper co pays on the patient. Is script for this ok?  If so 90 supply to [facility] pharmacy.

## 2021-01-19 NOTE — TELEPHONE ENCOUNTER
BARTOLOME CALLED FROM [facility] REGARDING NEEDING CLARIFICATION ON PATIENTS AMLODIPINE-ATORVASTAIN; SHE SAID THEY ALSO RECEIVED A FAX FOR JUST THE ATORVASTATIN; PLEASE CALL TO ADVISE    [facility]: [phone]

## 2021-01-28 RX ORDER — AMLODIPINE BESYLATE AND ATORVASTATIN CALCIUM 10; 20 MG/1; MG/1
1 TABLET, FILM COATED ORAL DAILY
Qty: 90 TABLET | Refills: 1 | Status: SHIPPED | OUTPATIENT
Start: 2021-01-28 | End: 2021-03-05 | Stop reason: SDUPTHER

## 2021-01-28 NOTE — TELEPHONE ENCOUNTER
Amlodipine/Atorvastatin 10/20 sent to pharmacy. Please make sure that William is aware of this change. Thanks. Also make sure he has d/ranjith the pravastatin.

## 2021-01-28 NOTE — TELEPHONE ENCOUNTER
Amlodipine/Atorvastatin 10/20 sent to pharmacy. Please make sure that William is aware of this change. Thanks. Also make sure he has d/ranjith the pravastatin.    Patient notified & verbalized understanding.

## 2021-02-01 ENCOUNTER — TELEPHONE (OUTPATIENT)
Dept: FAMILY MEDICINE CLINIC | Facility: CLINIC | Age: 71
End: 2021-02-01

## 2021-02-01 NOTE — TELEPHONE ENCOUNTER
Patient called for a refill on his Gabapentin,also is out of [facility] samples needs this sent to the pharmacy  as well if covered.

## 2021-02-03 DIAGNOSIS — E11.42 TYPE 2 DIABETES MELLITUS WITH DIABETIC POLYNEUROPATHY, WITHOUT LONG-TERM CURRENT USE OF INSULIN (HCC): ICD-10-CM

## 2021-02-03 RX ORDER — GABAPENTIN 300 MG/1
300 CAPSULE ORAL 2 TIMES DAILY
Qty: 60 CAPSULE | Refills: 0 | Status: SHIPPED | OUTPATIENT
Start: 2021-02-03 | End: 2021-03-05 | Stop reason: SDUPTHER

## 2021-02-03 NOTE — TELEPHONE ENCOUNTER
I need a clarification. Gabapentin is going to be sent to [facility]. We have no idea what is going to be covered or not - does he want the Farxiga to be sent to [facility] too or does he want [facility] for that? He can call them and if it is too expensive, can deny it. I have samples of Farxiga waiting for him up front.    Spoke with patient,he wants the Gabapentin sent to [facility],he called them the Farxiga is $500.00 so he will  the samples.

## 2021-02-03 NOTE — TELEPHONE ENCOUNTER
I need a clarification. Gabapentin is going to be sent to [facility]. We have no idea what is going to be covered or not - does he want the Farxiga to be sent to [facility] too or does he want [employer] for that? He can call them and if it is too expensive, can deny it. I have samples of Farxiga waiting for him up front.

## 2021-03-03 ENCOUNTER — TELEPHONE (OUTPATIENT)
Dept: FAMILY MEDICINE CLINIC | Facility: CLINIC | Age: 71
End: 2021-03-03

## 2021-03-05 ENCOUNTER — OFFICE VISIT (OUTPATIENT)
Dept: FAMILY MEDICINE CLINIC | Facility: CLINIC | Age: 71
End: 2021-03-05

## 2021-03-05 VITALS
OXYGEN SATURATION: 98 % | TEMPERATURE: 96.9 F | BODY MASS INDEX: 32.73 KG/M2 | HEIGHT: 69 IN | WEIGHT: 221 LBS | HEART RATE: 69 BPM | SYSTOLIC BLOOD PRESSURE: 128 MMHG | DIASTOLIC BLOOD PRESSURE: 78 MMHG

## 2021-03-05 DIAGNOSIS — K21.9 GASTROESOPHAGEAL REFLUX DISEASE WITHOUT ESOPHAGITIS: ICD-10-CM

## 2021-03-05 DIAGNOSIS — E11.42 TYPE 2 DIABETES MELLITUS WITH DIABETIC POLYNEUROPATHY, WITHOUT LONG-TERM CURRENT USE OF INSULIN (HCC): ICD-10-CM

## 2021-03-05 DIAGNOSIS — M1A.09X0 IDIOPATHIC CHRONIC GOUT OF MULTIPLE SITES WITHOUT TOPHUS: ICD-10-CM

## 2021-03-05 DIAGNOSIS — R07.89 MIDSTERNAL CHEST PAIN: Primary | ICD-10-CM

## 2021-03-05 DIAGNOSIS — E11.69 HYPERLIPIDEMIA ASSOCIATED WITH TYPE 2 DIABETES MELLITUS (HCC): ICD-10-CM

## 2021-03-05 DIAGNOSIS — E78.5 HYPERLIPIDEMIA ASSOCIATED WITH TYPE 2 DIABETES MELLITUS (HCC): ICD-10-CM

## 2021-03-05 PROCEDURE — 86677 HELICOBACTER PYLORI ANTIBODY: CPT | Performed by: FAMILY MEDICINE

## 2021-03-05 PROCEDURE — 80061 LIPID PANEL: CPT | Performed by: FAMILY MEDICINE

## 2021-03-05 PROCEDURE — 84484 ASSAY OF TROPONIN QUANT: CPT | Performed by: FAMILY MEDICINE

## 2021-03-05 PROCEDURE — 83735 ASSAY OF MAGNESIUM: CPT | Performed by: FAMILY MEDICINE

## 2021-03-05 PROCEDURE — 84550 ASSAY OF BLOOD/URIC ACID: CPT | Performed by: FAMILY MEDICINE

## 2021-03-05 PROCEDURE — 99214 OFFICE O/P EST MOD 30 MIN: CPT | Performed by: FAMILY MEDICINE

## 2021-03-05 PROCEDURE — 83036 HEMOGLOBIN GLYCOSYLATED A1C: CPT | Performed by: FAMILY MEDICINE

## 2021-03-05 PROCEDURE — 36415 COLL VENOUS BLD VENIPUNCTURE: CPT | Performed by: FAMILY MEDICINE

## 2021-03-05 PROCEDURE — 80053 COMPREHEN METABOLIC PANEL: CPT | Performed by: FAMILY MEDICINE

## 2021-03-05 PROCEDURE — 93005 ELECTROCARDIOGRAM TRACING: CPT | Performed by: FAMILY MEDICINE

## 2021-03-05 RX ORDER — OMEPRAZOLE 20 MG/1
20 CAPSULE, DELAYED RELEASE ORAL 2 TIMES DAILY
Qty: 180 CAPSULE | Refills: 1 | Status: SHIPPED | OUTPATIENT
Start: 2021-03-05 | End: 2021-03-15 | Stop reason: HOSPADM

## 2021-03-05 RX ORDER — ATORVASTATIN CALCIUM 10 MG/1
10 TABLET, FILM COATED ORAL DAILY
Qty: 30 TABLET | Refills: 3 | Status: CANCELLED | OUTPATIENT
Start: 2021-03-05

## 2021-03-05 RX ORDER — ATORVASTATIN CALCIUM 10 MG/1
10 TABLET, FILM COATED ORAL DAILY
COMMUNITY
End: 2021-03-14

## 2021-03-05 RX ORDER — AMLODIPINE BESYLATE AND ATORVASTATIN CALCIUM 10; 20 MG/1; MG/1
1 TABLET, FILM COATED ORAL DAILY
Qty: 90 TABLET | Refills: 1 | Status: SHIPPED | OUTPATIENT
Start: 2021-03-05 | End: 2021-03-15 | Stop reason: HOSPADM

## 2021-03-05 RX ORDER — GABAPENTIN 300 MG/1
300 CAPSULE ORAL 2 TIMES DAILY
Qty: 60 CAPSULE | Refills: 0 | Status: SHIPPED | OUTPATIENT
Start: 2021-03-05 | End: 2021-05-28 | Stop reason: SDUPTHER

## 2021-03-05 NOTE — PATIENT INSTRUCTIONS
Try Trelegy.     Double the omeprazole.    You should be on caduet - it is amlodipine/atorvastatin (blood pressure/high cholesterol).    Will call you with lab results.

## 2021-03-06 LAB
ALBUMIN SERPL-MCNC: 4.4 G/DL (ref 3.5–5.2)
ALBUMIN/GLOB SERPL: 1.4 G/DL
ALP SERPL-CCNC: 72 U/L (ref 39–117)
ALT SERPL W P-5'-P-CCNC: 8 U/L (ref 1–41)
ANION GAP SERPL CALCULATED.3IONS-SCNC: 11.4 MMOL/L (ref 5–15)
AST SERPL-CCNC: 14 U/L (ref 1–40)
BILIRUB SERPL-MCNC: 0.5 MG/DL (ref 0–1.2)
BUN SERPL-MCNC: 14 MG/DL (ref 8–23)
BUN/CREAT SERPL: 13.7 (ref 7–25)
CALCIUM SPEC-SCNC: 9.6 MG/DL (ref 8.6–10.5)
CHLORIDE SERPL-SCNC: 103 MMOL/L (ref 98–107)
CHOLEST SERPL-MCNC: 215 MG/DL (ref 0–200)
CO2 SERPL-SCNC: 24.6 MMOL/L (ref 22–29)
CREAT SERPL-MCNC: 1.02 MG/DL (ref 0.76–1.27)
GFR SERPL CREATININE-BSD FRML MDRD: 72 ML/MIN/1.73
GLOBULIN UR ELPH-MCNC: 3.1 GM/DL
GLUCOSE SERPL-MCNC: 198 MG/DL (ref 65–99)
HBA1C MFR BLD: 8.75 % (ref 4.8–5.6)
HDLC SERPL-MCNC: 37 MG/DL (ref 40–60)
LDLC SERPL CALC-MCNC: 118 MG/DL (ref 0–100)
LDLC/HDLC SERPL: 2.97 {RATIO}
MAGNESIUM SERPL-MCNC: 2.1 MG/DL (ref 1.6–2.4)
POTASSIUM SERPL-SCNC: 5 MMOL/L (ref 3.5–5.2)
PROT SERPL-MCNC: 7.5 G/DL (ref 6–8.5)
SODIUM SERPL-SCNC: 139 MMOL/L (ref 136–145)
TRIGL SERPL-MCNC: 340 MG/DL (ref 0–150)
TROPONIN T SERPL-MCNC: <0.01 NG/ML (ref 0–0.03)
URATE SERPL-MCNC: 3.8 MG/DL (ref 3.4–7)
VLDLC SERPL-MCNC: 60 MG/DL (ref 5–40)

## 2021-03-08 LAB — H PYLORI IGM SER-ACNC: <9 UNITS (ref 0–8.9)

## 2021-03-13 ENCOUNTER — APPOINTMENT (OUTPATIENT)
Dept: GENERAL RADIOLOGY | Facility: HOSPITAL | Age: 71
End: 2021-03-13

## 2021-03-13 ENCOUNTER — HOSPITAL ENCOUNTER (OUTPATIENT)
Facility: HOSPITAL | Age: 71
Discharge: ANOTHER HOSPITAL | End: 2021-03-15
Attending: FAMILY MEDICINE | Admitting: HOSPITALIST

## 2021-03-13 DIAGNOSIS — R07.9 CHEST PAIN, UNSPECIFIED TYPE: Primary | ICD-10-CM

## 2021-03-13 LAB
ALBUMIN SERPL-MCNC: 4.04 G/DL (ref 3.5–5.2)
ALBUMIN/GLOB SERPL: 1.5 G/DL
ALP SERPL-CCNC: 74 U/L (ref 39–117)
ALT SERPL W P-5'-P-CCNC: 8 U/L (ref 1–41)
ANION GAP SERPL CALCULATED.3IONS-SCNC: 16.6 MMOL/L (ref 5–15)
AST SERPL-CCNC: 16 U/L (ref 1–40)
BASOPHILS # BLD AUTO: 0.04 10*3/MM3 (ref 0–0.2)
BASOPHILS NFR BLD AUTO: 0.5 % (ref 0–1.5)
BILIRUB SERPL-MCNC: 0.2 MG/DL (ref 0–1.2)
BUN SERPL-MCNC: 21 MG/DL (ref 8–23)
BUN/CREAT SERPL: 16.7 (ref 7–25)
CALCIUM SPEC-SCNC: 9.2 MG/DL (ref 8.6–10.5)
CHLORIDE SERPL-SCNC: 103 MMOL/L (ref 98–107)
CO2 SERPL-SCNC: 20.4 MMOL/L (ref 22–29)
CREAT SERPL-MCNC: 1.26 MG/DL (ref 0.76–1.27)
D DIMER PPP FEU-MCNC: 0.29 MCGFEU/ML (ref 0–0.5)
DEPRECATED RDW RBC AUTO: 40.5 FL (ref 37–54)
EOSINOPHIL # BLD AUTO: 0.21 10*3/MM3 (ref 0–0.4)
EOSINOPHIL NFR BLD AUTO: 2.6 % (ref 0.3–6.2)
ERYTHROCYTE [DISTWIDTH] IN BLOOD BY AUTOMATED COUNT: 12.7 % (ref 12.3–15.4)
GFR SERPL CREATININE-BSD FRML MDRD: 56 ML/MIN/1.73
GLOBULIN UR ELPH-MCNC: 2.8 GM/DL
GLUCOSE SERPL-MCNC: 248 MG/DL (ref 65–99)
HCT VFR BLD AUTO: 42.4 % (ref 37.5–51)
HGB BLD-MCNC: 14.3 G/DL (ref 13–17.7)
HOLD SPECIMEN: NORMAL
HOLD SPECIMEN: NORMAL
IMM GRANULOCYTES # BLD AUTO: 0.05 10*3/MM3 (ref 0–0.05)
IMM GRANULOCYTES NFR BLD AUTO: 0.6 % (ref 0–0.5)
LYMPHOCYTES # BLD AUTO: 2.72 10*3/MM3 (ref 0.7–3.1)
LYMPHOCYTES NFR BLD AUTO: 33.3 % (ref 19.6–45.3)
MCH RBC QN AUTO: 29.6 PG (ref 26.6–33)
MCHC RBC AUTO-ENTMCNC: 33.7 G/DL (ref 31.5–35.7)
MCV RBC AUTO: 87.8 FL (ref 79–97)
MONOCYTES # BLD AUTO: 0.62 10*3/MM3 (ref 0.1–0.9)
MONOCYTES NFR BLD AUTO: 7.6 % (ref 5–12)
NEUTROPHILS NFR BLD AUTO: 4.54 10*3/MM3 (ref 1.7–7)
NEUTROPHILS NFR BLD AUTO: 55.4 % (ref 42.7–76)
NRBC BLD AUTO-RTO: 0 /100 WBC (ref 0–0.2)
NT-PROBNP SERPL-MCNC: 140.1 PG/ML (ref 0–900)
PLATELET # BLD AUTO: 310 10*3/MM3 (ref 140–450)
PMV BLD AUTO: 8.7 FL (ref 6–12)
POTASSIUM SERPL-SCNC: 4.1 MMOL/L (ref 3.5–5.2)
PROT SERPL-MCNC: 6.8 G/DL (ref 6–8.5)
RBC # BLD AUTO: 4.83 10*6/MM3 (ref 4.14–5.8)
SODIUM SERPL-SCNC: 140 MMOL/L (ref 136–145)
TROPONIN T SERPL-MCNC: <0.01 NG/ML (ref 0–0.03)
WBC # BLD AUTO: 8.18 10*3/MM3 (ref 3.4–10.8)
WHOLE BLOOD HOLD SPECIMEN: NORMAL
WHOLE BLOOD HOLD SPECIMEN: NORMAL

## 2021-03-13 PROCEDURE — 84443 ASSAY THYROID STIM HORMONE: CPT | Performed by: PHYSICIAN ASSISTANT

## 2021-03-13 PROCEDURE — 99284 EMERGENCY DEPT VISIT MOD MDM: CPT

## 2021-03-13 PROCEDURE — 85025 COMPLETE CBC W/AUTO DIFF WBC: CPT | Performed by: FAMILY MEDICINE

## 2021-03-13 PROCEDURE — 93010 ELECTROCARDIOGRAM REPORT: CPT | Performed by: INTERNAL MEDICINE

## 2021-03-13 PROCEDURE — 83735 ASSAY OF MAGNESIUM: CPT | Performed by: PHYSICIAN ASSISTANT

## 2021-03-13 PROCEDURE — 93005 ELECTROCARDIOGRAM TRACING: CPT | Performed by: PHYSICIAN ASSISTANT

## 2021-03-13 PROCEDURE — 87636 SARSCOV2 & INF A&B AMP PRB: CPT | Performed by: FAMILY MEDICINE

## 2021-03-13 PROCEDURE — C9803 HOPD COVID-19 SPEC COLLECT: HCPCS

## 2021-03-13 PROCEDURE — 71046 X-RAY EXAM CHEST 2 VIEWS: CPT

## 2021-03-13 PROCEDURE — 85379 FIBRIN DEGRADATION QUANT: CPT | Performed by: FAMILY MEDICINE

## 2021-03-13 PROCEDURE — 84484 ASSAY OF TROPONIN QUANT: CPT | Performed by: FAMILY MEDICINE

## 2021-03-13 PROCEDURE — 83880 ASSAY OF NATRIURETIC PEPTIDE: CPT | Performed by: FAMILY MEDICINE

## 2021-03-13 PROCEDURE — 80053 COMPREHEN METABOLIC PANEL: CPT | Performed by: FAMILY MEDICINE

## 2021-03-13 RX ORDER — ASPIRIN 81 MG/1
324 TABLET, CHEWABLE ORAL ONCE
Status: COMPLETED | OUTPATIENT
Start: 2021-03-13 | End: 2021-03-14

## 2021-03-14 ENCOUNTER — APPOINTMENT (OUTPATIENT)
Dept: ULTRASOUND IMAGING | Facility: HOSPITAL | Age: 71
End: 2021-03-14

## 2021-03-14 ENCOUNTER — APPOINTMENT (OUTPATIENT)
Dept: NUCLEAR MEDICINE | Facility: HOSPITAL | Age: 71
End: 2021-03-14

## 2021-03-14 ENCOUNTER — APPOINTMENT (OUTPATIENT)
Dept: CARDIOLOGY | Facility: HOSPITAL | Age: 71
End: 2021-03-14

## 2021-03-14 PROBLEM — K21.9 GERD (GASTROESOPHAGEAL REFLUX DISEASE): Chronic | Status: ACTIVE | Noted: 2021-03-14

## 2021-03-14 PROBLEM — Z87.39 HISTORY OF GOUT: Chronic | Status: ACTIVE | Noted: 2021-03-14

## 2021-03-14 PROBLEM — I15.2 HYPERTENSION ASSOCIATED WITH DIABETES (HCC): Chronic | Status: ACTIVE | Noted: 2020-12-07

## 2021-03-14 PROBLEM — E11.65 TYPE 2 DIABETES MELLITUS WITH HYPERGLYCEMIA, WITHOUT LONG-TERM CURRENT USE OF INSULIN: Chronic | Status: ACTIVE | Noted: 2020-12-07

## 2021-03-14 PROBLEM — E11.40 DIABETIC NEUROPATHY (HCC): Chronic | Status: ACTIVE | Noted: 2021-03-14

## 2021-03-14 PROBLEM — Z87.891 FORMER SMOKER: Chronic | Status: ACTIVE | Noted: 2021-03-14

## 2021-03-14 PROBLEM — E11.69 HYPERLIPIDEMIA ASSOCIATED WITH TYPE 2 DIABETES MELLITUS: Chronic | Status: ACTIVE | Noted: 2020-12-07

## 2021-03-14 PROBLEM — E66.9 OBESITY (BMI 30-39.9): Chronic | Status: ACTIVE | Noted: 2021-03-14

## 2021-03-14 PROBLEM — E78.5 HYPERLIPIDEMIA ASSOCIATED WITH TYPE 2 DIABETES MELLITUS: Chronic | Status: ACTIVE | Noted: 2020-12-07

## 2021-03-14 PROBLEM — E11.59 HYPERTENSION ASSOCIATED WITH DIABETES (HCC): Chronic | Status: ACTIVE | Noted: 2020-12-07

## 2021-03-14 PROBLEM — N40.0 BENIGN PROSTATIC HYPERPLASIA WITHOUT LOWER URINARY TRACT SYMPTOMS: Chronic | Status: ACTIVE | Noted: 2020-12-07

## 2021-03-14 PROBLEM — M1A.09X0 IDIOPATHIC CHRONIC GOUT OF MULTIPLE SITES WITHOUT TOPHUS: Chronic | Status: ACTIVE | Noted: 2020-12-07

## 2021-03-14 PROBLEM — R07.9 CHEST PAIN: Status: ACTIVE | Noted: 2021-03-14

## 2021-03-14 LAB
6-ACETYL MORPHINE: NEGATIVE
AMPHET+METHAMPHET UR QL: NEGATIVE
BARBITURATES UR QL SCN: NEGATIVE
BASOPHILS # BLD AUTO: 0.06 10*3/MM3 (ref 0–0.2)
BASOPHILS NFR BLD AUTO: 0.7 % (ref 0–1.5)
BENZODIAZ UR QL SCN: NEGATIVE
BH CV ECHO MEAS - % IVS THICK: 55.3 %
BH CV ECHO MEAS - % LVPW THICK: 42.3 %
BH CV ECHO MEAS - ACS: 1.8 CM
BH CV ECHO MEAS - AO MAX PG: 5.4 MMHG
BH CV ECHO MEAS - AO MEAN PG: 3 MMHG
BH CV ECHO MEAS - AO ROOT AREA (BSA CORRECTED): 1.8
BH CV ECHO MEAS - AO ROOT AREA: 11.9 CM^2
BH CV ECHO MEAS - AO ROOT DIAM: 3.9 CM
BH CV ECHO MEAS - AO V2 MAX: 116 CM/SEC
BH CV ECHO MEAS - AO V2 MEAN: 83.4 CM/SEC
BH CV ECHO MEAS - AO V2 VTI: 25.2 CM
BH CV ECHO MEAS - BSA(HAYCOCK): 2.2 M^2
BH CV ECHO MEAS - BSA: 2.1 M^2
BH CV ECHO MEAS - BZI_BMI: 31.6 KILOGRAMS/M^2
BH CV ECHO MEAS - BZI_METRIC_HEIGHT: 175.3 CM
BH CV ECHO MEAS - BZI_METRIC_WEIGHT: 97.1 KG
BH CV ECHO MEAS - EDV(CUBED): 94.2 ML
BH CV ECHO MEAS - EDV(MOD-SP4): 62.6 ML
BH CV ECHO MEAS - EDV(TEICH): 94.9 ML
BH CV ECHO MEAS - EF(CUBED): 75.3 %
BH CV ECHO MEAS - EF(MOD-SP4): 57.8 %
BH CV ECHO MEAS - EF(TEICH): 67.3 %
BH CV ECHO MEAS - ESV(CUBED): 23.3 ML
BH CV ECHO MEAS - ESV(MOD-SP4): 26.4 ML
BH CV ECHO MEAS - ESV(TEICH): 31 ML
BH CV ECHO MEAS - FS: 37.3 %
BH CV ECHO MEAS - IVS/LVPW: 0.7
BH CV ECHO MEAS - IVSD: 0.86 CM
BH CV ECHO MEAS - IVSS: 1.3 CM
BH CV ECHO MEAS - LA DIMENSION: 4 CM
BH CV ECHO MEAS - LA/AO: 1
BH CV ECHO MEAS - LV DIASTOLIC VOL/BSA (35-75): 29.4 ML/M^2
BH CV ECHO MEAS - LV MASS(C)D: 166.1 GRAMS
BH CV ECHO MEAS - LV MASS(C)DI: 78.1 GRAMS/M^2
BH CV ECHO MEAS - LV MASS(C)S: 156.1 GRAMS
BH CV ECHO MEAS - LV MASS(C)SI: 73.4 GRAMS/M^2
BH CV ECHO MEAS - LV SYSTOLIC VOL/BSA (12-30): 12.4 ML/M^2
BH CV ECHO MEAS - LVIDD: 4.6 CM
BH CV ECHO MEAS - LVIDS: 2.9 CM
BH CV ECHO MEAS - LVLD AP4: 6.8 CM
BH CV ECHO MEAS - LVLS AP4: 6.8 CM
BH CV ECHO MEAS - LVOT AREA (M): 3.1 CM^2
BH CV ECHO MEAS - LVOT AREA: 3.1 CM^2
BH CV ECHO MEAS - LVOT DIAM: 2 CM
BH CV ECHO MEAS - LVPWD: 1.2 CM
BH CV ECHO MEAS - LVPWS: 1.8 CM
BH CV ECHO MEAS - MV A MAX VEL: 75 CM/SEC
BH CV ECHO MEAS - MV E MAX VEL: 76.7 CM/SEC
BH CV ECHO MEAS - MV E/A: 1
BH CV ECHO MEAS - PA ACC TIME: 0.11 SEC
BH CV ECHO MEAS - PA PR(ACCEL): 28.2 MMHG
BH CV ECHO MEAS - RAP SYSTOLE: 10 MMHG
BH CV ECHO MEAS - RVSP: 21.8 MMHG
BH CV ECHO MEAS - SI(AO): 141.6 ML/M^2
BH CV ECHO MEAS - SI(CUBED): 33.4 ML/M^2
BH CV ECHO MEAS - SI(MOD-SP4): 17 ML/M^2
BH CV ECHO MEAS - SI(TEICH): 30 ML/M^2
BH CV ECHO MEAS - SV(AO): 301 ML
BH CV ECHO MEAS - SV(CUBED): 70.9 ML
BH CV ECHO MEAS - SV(MOD-SP4): 36.2 ML
BH CV ECHO MEAS - SV(TEICH): 63.9 ML
BH CV ECHO MEAS - TR MAX VEL: 172 CM/SEC
BH CV NUCLEAR PRIOR STUDY: 2
BH CV REST NUCLEAR ISOTOPE DOSE: 10.9 MCI
BH CV STRESS BP STAGE 1: NORMAL
BH CV STRESS BP STAGE 2: NORMAL
BH CV STRESS COMMENTS STAGE 1: NORMAL
BH CV STRESS COMMENTS STAGE 2: NORMAL
BH CV STRESS DOSE REGADENOSON STAGE 1: 0.4
BH CV STRESS DURATION MIN STAGE 1: 0
BH CV STRESS DURATION MIN STAGE 2: 4
BH CV STRESS DURATION SEC STAGE 1: 10
BH CV STRESS DURATION SEC STAGE 2: 0
BH CV STRESS HR STAGE 1: 101
BH CV STRESS HR STAGE 2: 90
BH CV STRESS NUCLEAR ISOTOPE DOSE: 33 MCI
BH CV STRESS PROTOCOL 1: NORMAL
BH CV STRESS RECOVERY BP: NORMAL MMHG
BH CV STRESS RECOVERY HR: 81 BPM
BH CV STRESS STAGE 1: 1
BH CV STRESS STAGE 2: 2
BILIRUB UR QL STRIP: NEGATIVE
BUPRENORPHINE SERPL-MCNC: NEGATIVE NG/ML
CANNABINOIDS SERPL QL: NEGATIVE
CLARITY UR: CLEAR
COCAINE UR QL: NEGATIVE
COLOR UR: YELLOW
DEPRECATED RDW RBC AUTO: 42.1 FL (ref 37–54)
EOSINOPHIL # BLD AUTO: 0.22 10*3/MM3 (ref 0–0.4)
EOSINOPHIL NFR BLD AUTO: 2.6 % (ref 0.3–6.2)
ERYTHROCYTE [DISTWIDTH] IN BLOOD BY AUTOMATED COUNT: 12.7 % (ref 12.3–15.4)
FLUAV RNA RESP QL NAA+PROBE: NOT DETECTED
FLUBV RNA RESP QL NAA+PROBE: NOT DETECTED
GLUCOSE BLDC GLUCOMTR-MCNC: 194 MG/DL (ref 70–130)
GLUCOSE BLDC GLUCOMTR-MCNC: 224 MG/DL (ref 70–130)
GLUCOSE BLDC GLUCOMTR-MCNC: 246 MG/DL (ref 70–130)
GLUCOSE BLDC GLUCOMTR-MCNC: 256 MG/DL (ref 70–130)
GLUCOSE BLDC GLUCOMTR-MCNC: 273 MG/DL (ref 70–130)
GLUCOSE UR STRIP-MCNC: ABNORMAL MG/DL
HCT VFR BLD AUTO: 43.6 % (ref 37.5–51)
HGB BLD-MCNC: 14.1 G/DL (ref 13–17.7)
HGB UR QL STRIP.AUTO: NEGATIVE
IMM GRANULOCYTES # BLD AUTO: 0.05 10*3/MM3 (ref 0–0.05)
IMM GRANULOCYTES NFR BLD AUTO: 0.6 % (ref 0–0.5)
KETONES UR QL STRIP: ABNORMAL
LEUKOCYTE ESTERASE UR QL STRIP.AUTO: NEGATIVE
LIPASE SERPL-CCNC: 37 U/L (ref 13–60)
LV EF NUC BP: 57 %
LYMPHOCYTES # BLD AUTO: 3.13 10*3/MM3 (ref 0.7–3.1)
LYMPHOCYTES NFR BLD AUTO: 36.4 % (ref 19.6–45.3)
MAGNESIUM SERPL-MCNC: 1.8 MG/DL (ref 1.6–2.4)
MAGNESIUM SERPL-MCNC: 1.9 MG/DL (ref 1.6–2.4)
MAXIMAL PREDICTED HEART RATE: 149 BPM
MAXIMAL PREDICTED HEART RATE: 149 BPM
MCH RBC QN AUTO: 29.5 PG (ref 26.6–33)
MCHC RBC AUTO-ENTMCNC: 32.3 G/DL (ref 31.5–35.7)
MCV RBC AUTO: 91.2 FL (ref 79–97)
METHADONE UR QL SCN: NEGATIVE
MONOCYTES # BLD AUTO: 0.66 10*3/MM3 (ref 0.1–0.9)
MONOCYTES NFR BLD AUTO: 7.7 % (ref 5–12)
NEUTROPHILS NFR BLD AUTO: 4.47 10*3/MM3 (ref 1.7–7)
NEUTROPHILS NFR BLD AUTO: 52 % (ref 42.7–76)
NITRITE UR QL STRIP: NEGATIVE
NRBC BLD AUTO-RTO: 0 /100 WBC (ref 0–0.2)
OPIATES UR QL: NEGATIVE
OXYCODONE UR QL SCN: NEGATIVE
PCP UR QL SCN: NEGATIVE
PERCENT MAX PREDICTED HR: 67.79 %
PH UR STRIP.AUTO: 5.5 [PH] (ref 5–8)
PLATELET # BLD AUTO: 332 10*3/MM3 (ref 140–450)
PMV BLD AUTO: 9 FL (ref 6–12)
PROT UR QL STRIP: NEGATIVE
QT INTERVAL: 354 MS
QT INTERVAL: 368 MS
QT INTERVAL: 380 MS
QTC INTERVAL: 437 MS
QTC INTERVAL: 447 MS
QTC INTERVAL: 452 MS
RBC # BLD AUTO: 4.78 10*6/MM3 (ref 4.14–5.8)
SARS-COV-2 RNA RESP QL NAA+PROBE: NOT DETECTED
SP GR UR STRIP: >=1.03 (ref 1–1.03)
STRESS BASELINE BP: NORMAL MMHG
STRESS BASELINE HR: 76 BPM
STRESS PERCENT HR: 80 %
STRESS POST PEAK BP: NORMAL MMHG
STRESS POST PEAK HR: 101 BPM
STRESS TARGET HR: 127 BPM
STRESS TARGET HR: 127 BPM
TROPONIN T SERPL-MCNC: <0.01 NG/ML (ref 0–0.03)
TSH SERPL DL<=0.05 MIU/L-ACNC: 2.4 UIU/ML (ref 0.27–4.2)
UROBILINOGEN UR QL STRIP: ABNORMAL
WBC # BLD AUTO: 8.59 10*3/MM3 (ref 3.4–10.8)

## 2021-03-14 PROCEDURE — 85025 COMPLETE CBC W/AUTO DIFF WBC: CPT | Performed by: PHYSICIAN ASSISTANT

## 2021-03-14 PROCEDURE — 99220 PR INITIAL OBSERVATION CARE/DAY 70 MINUTES: CPT | Performed by: PHYSICIAN ASSISTANT

## 2021-03-14 PROCEDURE — G0378 HOSPITAL OBSERVATION PER HR: HCPCS

## 2021-03-14 PROCEDURE — 0 TECHNETIUM SESTAMIBI: Performed by: STUDENT IN AN ORGANIZED HEALTH CARE EDUCATION/TRAINING PROGRAM

## 2021-03-14 PROCEDURE — 99204 OFFICE O/P NEW MOD 45 MIN: CPT | Performed by: INTERNAL MEDICINE

## 2021-03-14 PROCEDURE — 80307 DRUG TEST PRSMV CHEM ANLYZR: CPT | Performed by: PHYSICIAN ASSISTANT

## 2021-03-14 PROCEDURE — 83735 ASSAY OF MAGNESIUM: CPT | Performed by: PHYSICIAN ASSISTANT

## 2021-03-14 PROCEDURE — 81003 URINALYSIS AUTO W/O SCOPE: CPT | Performed by: PHYSICIAN ASSISTANT

## 2021-03-14 PROCEDURE — 93005 ELECTROCARDIOGRAM TRACING: CPT | Performed by: FAMILY MEDICINE

## 2021-03-14 PROCEDURE — 82962 GLUCOSE BLOOD TEST: CPT

## 2021-03-14 PROCEDURE — 63710000001 INSULIN ASPART PER 5 UNITS: Performed by: PHYSICIAN ASSISTANT

## 2021-03-14 PROCEDURE — 83690 ASSAY OF LIPASE: CPT | Performed by: PHYSICIAN ASSISTANT

## 2021-03-14 PROCEDURE — 93005 ELECTROCARDIOGRAM TRACING: CPT | Performed by: PHYSICIAN ASSISTANT

## 2021-03-14 PROCEDURE — A9500 TC99M SESTAMIBI: HCPCS | Performed by: STUDENT IN AN ORGANIZED HEALTH CARE EDUCATION/TRAINING PROGRAM

## 2021-03-14 PROCEDURE — 25010000002 REGADENOSON 0.4 MG/5ML SOLUTION: Performed by: STUDENT IN AN ORGANIZED HEALTH CARE EDUCATION/TRAINING PROGRAM

## 2021-03-14 PROCEDURE — 94799 UNLISTED PULMONARY SVC/PX: CPT

## 2021-03-14 PROCEDURE — 93306 TTE W/DOPPLER COMPLETE: CPT | Performed by: INTERNAL MEDICINE

## 2021-03-14 PROCEDURE — 76700 US EXAM ABDOM COMPLETE: CPT | Performed by: RADIOLOGY

## 2021-03-14 PROCEDURE — 93018 CV STRESS TEST I&R ONLY: CPT | Performed by: INTERNAL MEDICINE

## 2021-03-14 PROCEDURE — 78452 HT MUSCLE IMAGE SPECT MULT: CPT

## 2021-03-14 PROCEDURE — 93017 CV STRESS TEST TRACING ONLY: CPT

## 2021-03-14 PROCEDURE — 78452 HT MUSCLE IMAGE SPECT MULT: CPT | Performed by: INTERNAL MEDICINE

## 2021-03-14 PROCEDURE — 76700 US EXAM ABDOM COMPLETE: CPT

## 2021-03-14 PROCEDURE — 93010 ELECTROCARDIOGRAM REPORT: CPT | Performed by: INTERNAL MEDICINE

## 2021-03-14 PROCEDURE — 93306 TTE W/DOPPLER COMPLETE: CPT

## 2021-03-14 PROCEDURE — 84484 ASSAY OF TROPONIN QUANT: CPT | Performed by: FAMILY MEDICINE

## 2021-03-14 RX ORDER — PANTOPRAZOLE SODIUM 40 MG/1
40 TABLET, DELAYED RELEASE ORAL EVERY MORNING
Refills: 1 | Status: CANCELLED | OUTPATIENT
Start: 2021-03-14

## 2021-03-14 RX ORDER — ASPIRIN 81 MG/1
81 TABLET, CHEWABLE ORAL DAILY
Status: DISCONTINUED | OUTPATIENT
Start: 2021-03-14 | End: 2021-03-15 | Stop reason: SDUPTHER

## 2021-03-14 RX ORDER — YOHIMBE BARK 500 MG
100 CAPSULE ORAL DAILY
Status: CANCELLED | OUTPATIENT
Start: 2021-03-14

## 2021-03-14 RX ORDER — DEXTROSE MONOHYDRATE 25 G/50ML
25 INJECTION, SOLUTION INTRAVENOUS
Status: DISCONTINUED | OUTPATIENT
Start: 2021-03-14 | End: 2021-03-15 | Stop reason: HOSPADM

## 2021-03-14 RX ORDER — NITROGLYCERIN 0.4 MG/1
0.4 TABLET SUBLINGUAL
Status: DISCONTINUED | OUTPATIENT
Start: 2021-03-14 | End: 2021-03-15 | Stop reason: HOSPADM

## 2021-03-14 RX ORDER — FINASTERIDE 5 MG/1
5 TABLET, FILM COATED ORAL DAILY
Status: DISCONTINUED | OUTPATIENT
Start: 2021-03-14 | End: 2021-03-15 | Stop reason: HOSPADM

## 2021-03-14 RX ORDER — ACETAMINOPHEN 325 MG/1
650 TABLET ORAL EVERY 6 HOURS PRN
Status: DISCONTINUED | OUTPATIENT
Start: 2021-03-14 | End: 2021-03-15 | Stop reason: SDUPTHER

## 2021-03-14 RX ORDER — L.ACID,PARA/B.BIFIDUM/S.THERM 8B CELL
1 CAPSULE ORAL DAILY
Status: DISCONTINUED | OUTPATIENT
Start: 2021-03-14 | End: 2021-03-15 | Stop reason: HOSPADM

## 2021-03-14 RX ORDER — PANTOPRAZOLE SODIUM 40 MG/1
40 TABLET, DELAYED RELEASE ORAL
Status: DISCONTINUED | OUTPATIENT
Start: 2021-03-14 | End: 2021-03-15 | Stop reason: HOSPADM

## 2021-03-14 RX ORDER — GLIPIZIDE 5 MG/1
5 TABLET ORAL EVERY EVENING
Status: CANCELLED | OUTPATIENT
Start: 2021-03-14

## 2021-03-14 RX ORDER — ENALAPRIL MALEATE 10 MG/1
20 TABLET ORAL DAILY
Status: DISCONTINUED | OUTPATIENT
Start: 2021-03-14 | End: 2021-03-15 | Stop reason: HOSPADM

## 2021-03-14 RX ORDER — ACETAMINOPHEN 500 MG
500 TABLET ORAL EVERY 6 HOURS PRN
Status: CANCELLED | OUTPATIENT
Start: 2021-03-14

## 2021-03-14 RX ORDER — SODIUM CHLORIDE 0.9 % (FLUSH) 0.9 %
3 SYRINGE (ML) INJECTION EVERY 12 HOURS SCHEDULED
Status: DISCONTINUED | OUTPATIENT
Start: 2021-03-14 | End: 2021-03-15 | Stop reason: HOSPADM

## 2021-03-14 RX ORDER — NICOTINE POLACRILEX 4 MG
15 LOZENGE BUCCAL
Status: DISCONTINUED | OUTPATIENT
Start: 2021-03-14 | End: 2021-03-15 | Stop reason: HOSPADM

## 2021-03-14 RX ORDER — CETIRIZINE HYDROCHLORIDE 10 MG/1
10 TABLET ORAL DAILY
Status: DISCONTINUED | OUTPATIENT
Start: 2021-03-14 | End: 2021-03-15 | Stop reason: HOSPADM

## 2021-03-14 RX ORDER — GABAPENTIN 300 MG/1
300 CAPSULE ORAL 2 TIMES DAILY
Status: DISCONTINUED | OUTPATIENT
Start: 2021-03-14 | End: 2021-03-15 | Stop reason: HOSPADM

## 2021-03-14 RX ORDER — AMLODIPINE BESYLATE AND ATORVASTATIN CALCIUM 10; 20 MG/1; MG/1
1 TABLET, FILM COATED ORAL DAILY
Status: CANCELLED | OUTPATIENT
Start: 2021-03-14

## 2021-03-14 RX ORDER — ASPIRIN 81 MG/1
81 TABLET ORAL DAILY
Status: CANCELLED | OUTPATIENT
Start: 2021-03-14

## 2021-03-14 RX ORDER — TERAZOSIN 5 MG/1
5 CAPSULE ORAL NIGHTLY
Status: DISCONTINUED | OUTPATIENT
Start: 2021-03-14 | End: 2021-03-15 | Stop reason: HOSPADM

## 2021-03-14 RX ORDER — FOLIC ACID 1 MG/1
1 TABLET ORAL 2 TIMES DAILY
Status: DISCONTINUED | OUTPATIENT
Start: 2021-03-14 | End: 2021-03-15 | Stop reason: HOSPADM

## 2021-03-14 RX ORDER — ATORVASTATIN CALCIUM 40 MG/1
40 TABLET, FILM COATED ORAL NIGHTLY
Status: DISCONTINUED | OUTPATIENT
Start: 2021-03-14 | End: 2021-03-15 | Stop reason: HOSPADM

## 2021-03-14 RX ORDER — GLIPIZIDE 10 MG/1
10 TABLET ORAL EVERY MORNING
COMMUNITY
End: 2021-03-15 | Stop reason: HOSPADM

## 2021-03-14 RX ORDER — DIPHENOXYLATE HYDROCHLORIDE AND ATROPINE SULFATE 2.5; .025 MG/1; MG/1
1 TABLET ORAL DAILY
COMMUNITY
End: 2021-03-15 | Stop reason: HOSPADM

## 2021-03-14 RX ORDER — DIPHENOXYLATE HYDROCHLORIDE AND ATROPINE SULFATE 2.5; .025 MG/1; MG/1
1 TABLET ORAL DAILY
Status: DISCONTINUED | OUTPATIENT
Start: 2021-03-14 | End: 2021-03-15 | Stop reason: HOSPADM

## 2021-03-14 RX ORDER — SODIUM CHLORIDE 0.9 % (FLUSH) 0.9 %
3-10 SYRINGE (ML) INJECTION AS NEEDED
Status: DISCONTINUED | OUTPATIENT
Start: 2021-03-14 | End: 2021-03-15 | Stop reason: HOSPADM

## 2021-03-14 RX ORDER — GLIPIZIDE 5 MG/1
5 TABLET ORAL EVERY EVENING
COMMUNITY
End: 2021-03-15 | Stop reason: HOSPADM

## 2021-03-14 RX ORDER — GLIPIZIDE 5 MG/1
10 TABLET ORAL EVERY MORNING
Status: CANCELLED | OUTPATIENT
Start: 2021-03-14

## 2021-03-14 RX ORDER — ALLOPURINOL 300 MG/1
300 TABLET ORAL DAILY
Status: DISCONTINUED | OUTPATIENT
Start: 2021-03-14 | End: 2021-03-15 | Stop reason: HOSPADM

## 2021-03-14 RX ORDER — FLUCONAZOLE 200 MG/1
200 TABLET ORAL DAILY
Status: CANCELLED | OUTPATIENT
Start: 2021-03-14 | End: 2022-03-14

## 2021-03-14 RX ORDER — VITAMIN E 268 MG
400 CAPSULE ORAL DAILY
Status: DISCONTINUED | OUTPATIENT
Start: 2021-03-14 | End: 2021-03-15 | Stop reason: HOSPADM

## 2021-03-14 RX ORDER — AMLODIPINE BESYLATE 10 MG/1
10 TABLET ORAL
Status: DISCONTINUED | OUTPATIENT
Start: 2021-03-14 | End: 2021-03-15 | Stop reason: HOSPADM

## 2021-03-14 RX ORDER — TRIAMCINOLONE ACETONIDE 1 MG/G
1 CREAM TOPICAL 2 TIMES DAILY PRN
Status: CANCELLED | OUTPATIENT
Start: 2021-03-14

## 2021-03-14 RX ADMIN — TECHNETIUM TC 99M SESTAMIBI 1 DOSE: 1 INJECTION INTRAVENOUS at 09:27

## 2021-03-14 RX ADMIN — SODIUM CHLORIDE, PRESERVATIVE FREE 3 ML: 5 INJECTION INTRAVENOUS at 20:58

## 2021-03-14 RX ADMIN — Medication 1 TABLET: at 11:37

## 2021-03-14 RX ADMIN — REGADENOSON 0.4 MG: 0.08 INJECTION, SOLUTION INTRAVENOUS at 09:27

## 2021-03-14 RX ADMIN — FINASTERIDE 5 MG: 5 TABLET, FILM COATED ORAL at 11:35

## 2021-03-14 RX ADMIN — ENALAPRIL MALEATE 20 MG: 10 TABLET ORAL at 11:34

## 2021-03-14 RX ADMIN — CETIRIZINE HYDROCHLORIDE 10 MG: 10 TABLET ORAL at 11:34

## 2021-03-14 RX ADMIN — ASPIRIN 81 MG: 81 TABLET, CHEWABLE ORAL at 11:33

## 2021-03-14 RX ADMIN — ATORVASTATIN CALCIUM 40 MG: 40 TABLET, FILM COATED ORAL at 04:03

## 2021-03-14 RX ADMIN — ALLOPURINOL 300 MG: 300 TABLET ORAL at 11:31

## 2021-03-14 RX ADMIN — ASPIRIN 324 MG: 81 TABLET, CHEWABLE ORAL at 00:10

## 2021-03-14 RX ADMIN — METOPROLOL TARTRATE 25 MG: 25 TABLET, FILM COATED ORAL at 20:57

## 2021-03-14 RX ADMIN — Medication 1 CAPSULE: at 11:37

## 2021-03-14 RX ADMIN — TERAZOSIN HYDROCHLORIDE 5 MG: 5 CAPSULE ORAL at 20:57

## 2021-03-14 RX ADMIN — METOPROLOL TARTRATE 12.5 MG: 25 TABLET, FILM COATED ORAL at 13:54

## 2021-03-14 RX ADMIN — GABAPENTIN 300 MG: 300 CAPSULE ORAL at 20:57

## 2021-03-14 RX ADMIN — INSULIN ASPART 3 UNITS: 100 INJECTION, SOLUTION INTRAVENOUS; SUBCUTANEOUS at 17:08

## 2021-03-14 RX ADMIN — TECHNETIUM TC 99M SESTAMIBI 1 DOSE: 1 INJECTION INTRAVENOUS at 08:07

## 2021-03-14 RX ADMIN — GABAPENTIN 300 MG: 300 CAPSULE ORAL at 11:35

## 2021-03-14 RX ADMIN — ATORVASTATIN CALCIUM 40 MG: 40 TABLET, FILM COATED ORAL at 20:57

## 2021-03-14 RX ADMIN — FOLIC ACID 1 MG: 1 TABLET ORAL at 11:35

## 2021-03-14 RX ADMIN — FOLIC ACID 1 MG: 1 TABLET ORAL at 20:57

## 2021-03-14 RX ADMIN — VITAMIN E CAP 400 UNIT 400 UNITS: 400 CAP at 11:37

## 2021-03-14 RX ADMIN — AMLODIPINE BESYLATE 10 MG: 10 TABLET ORAL at 11:31

## 2021-03-14 RX ADMIN — PANTOPRAZOLE SODIUM 40 MG: 40 TABLET, DELAYED RELEASE ORAL at 04:03

## 2021-03-14 NOTE — H&P
[facility] Medicine Services  HISTORY & PHYSICAL    Patient Identification:  Name:  William Villasenor  Age:  70 y.o.  Sex:  male  :  1950  MRN:  [id number]   Visit Number:  [id number]  Admit Date: 3/13/2021   Primary Care Physician:  Jacqueline Gatica MD     Subjective     Chief complaint:   Chief Complaint   Patient presents with   • Chest Pain     History of presenting illness:   Patient is a 70 y.o. male with past medical history significant for essential hypertension, hyperlipidemia, non insulin dependent type II diabetes mellitus, diabetic neuropathy, hx of gout, GERD, BPH, former tobacco use, and obesity that presented to the [facility] emergency department for evaluation of chest pain.  Patient states onset of symptoms was approximately 1 week ago.  When asked to locate pain, he points to the epigastric region.  Patient denies any radiating pain.  Patient describes the pain as heaviness.  Patient reports pain worse with exertion or lying flat.  Patient reports associated dyspnea.  Dyspnea also worse with exertion, significantly worse with lying flat positive for orthopnea.  He denies any associated nausea, vomiting or diaphoresis.  He states he took over-the-counter antacids and ibuprofen that did give him some minimal relief.  He denies any increase in abdominal distention or lower extremity edema.  He denies similar symptoms in the past.  Denies personal history of cardiac history, no stenting or left heart catheterizations in the past.  He does report having a stress test in the past, unsure of the results.  He does report longstanding history of gastroesophageal reflux disease.  He reports having endoscopic evaluation in the past by Dr. Garduno with gastroenterology, was told that he had a hiatal hernia and esophageal tear.  He denies having to have any surgical intervention.  He denies any headache or dizziness, no loss of consciousness or syncope.  He denies any  urinary symptoms.  Denies any fevers or chills, no cough.    Upon arrival to the ED, vitals were temperature 98.3, heart rate 98, respiratory rate 18, blood pressure 147/81, and oxygen saturation 95% on room air.  Troponin T negative.  proBNP 140.1.  CMP with glucose 241, CO2 20.4, anion gap 16.6, and EGFR 56.  D-dimer negative.  CBC grossly unremarkable.  COVID-19 and influenza screen negative.  Chest x-ray with no evidence of acute cardiopulmonary disease.  While in the emergency department, patient was administered 324 mg p.o. aspirin.    Patient has been placed in observation status on the telemetry floor for further evaluation and treatment.     Present during exam: Kelly ARMENDARIZ   ---------------------------------------------------------------------------------------------------------------------   Review of Systems   Constitutional: Negative for activity change, appetite change, chills, diaphoresis, fatigue and fever.   HENT: Negative for congestion, postnasal drip, rhinorrhea, sinus pain, sore throat and trouble swallowing.    Eyes: Negative for discharge and visual disturbance.   Respiratory: Positive for shortness of breath. Negative for cough, chest tightness and wheezing.    Cardiovascular: Positive for chest pain. Negative for palpitations and leg swelling.   Gastrointestinal: Negative for abdominal pain, constipation, diarrhea, nausea and vomiting.   Endocrine: Negative for cold intolerance and heat intolerance.   Genitourinary: Negative for decreased urine volume, dysuria, frequency and urgency.   Musculoskeletal: Negative for arthralgias, gait problem and myalgias.   Skin: Negative for color change, rash and wound.   Allergic/Immunologic: Negative for environmental allergies and immunocompromised state.   Neurological: Negative for dizziness, syncope, weakness, light-headedness and headaches.   Hematological: Negative for adenopathy. Does not bruise/bleed easily.   Psychiatric/Behavioral: Negative for  confusion and decreased concentration. The patient is not nervous/anxious.       ---------------------------------------------------------------------------------------------------------------------   Past Medical History:   Diagnosis Date   • Arthritis    • Back pain    • BPH (benign prostatic hyperplasia)    • Diabetes mellitus (CMS/HCC)    • Diabetic peripheral neuropathy (CMS/HCC)    • Erectile dysfunction    • Former smoker    • GERD (gastroesophageal reflux disease)    • GERD (gastroesophageal reflux disease)    • Gout    • High cholesterol    • Hypertension    • Obesity      Past Surgical History:   Procedure Laterality Date   • COLONOSCOPY W/ BIOPSIES       Family History   Problem Relation Age of Onset   • Heart disease Mother    • Hypertension Mother    • Depression Mother    • Alcohol abuse Maternal Uncle    • Heart disease Maternal Grandmother    • Hypertension Maternal Grandmother    • Diabetes Maternal Grandmother    • Diabetes Paternal Grandmother      Social History     Socioeconomic History   • Marital status:      Spouse name: Not on file   • Number of children: Not on file   • Years of education: Not on file   • Highest education level: Not on file   Tobacco Use   • Smoking status: Former Smoker     Packs/day: 1.00     Years: 5.00     Pack years: 5.00     Quit date: 1974     Years since quittin.1   • Smokeless tobacco: Never Used   Substance and Sexual Activity   • Alcohol use: Never   • Drug use: Never   • Sexual activity: Defer     ---------------------------------------------------------------------------------------------------------------------   Allergies:  Patient has no known allergies.  ---------------------------------------------------------------------------------------------------------------------   Medications below are reported home medications pulling from within the system; at this time, these medications have not been reconciled unless otherwise specified and  are in the verification process for further verifcation as current home medications.    Prior to Admission Medications     Prescriptions Last Dose Informant Patient Reported? Taking?    acetaminophen (TYLENOL) 500 MG tablet   Yes No    Take 500 mg by mouth Every 6 (Six) Hours As Needed for Mild Pain .    allopurinol (ZYLOPRIM) 300 MG tablet   No No    Take 1 tablet by mouth Daily.    Alpha-Lipoic Acid 200 MG capsule   Yes No    Take  by mouth.    amLODIPine-atorvastatin (CADUET) 10-20 MG per tablet   No No    Take 1 tablet by mouth Daily.    Apple Cider Vinegar 300 MG tablet   Yes No    Take  by mouth.    aspirin (Aspirin Adult Low Dose) 81 MG EC tablet   Yes No    Take 81 mg by mouth Daily.    Calcium-Magnesium-Zinc 333-133-5 MG tablet   Yes No    Take  by mouth.    Cinnamon 500 MG capsule   Yes No    Take 500 mg by mouth Daily.    Cod Liver Oil 10 MINIM capsule   Yes No    Take  by mouth.    coenzyme Q10 100 MG capsule   Yes No    Take 100 mg by mouth 2 (two) times a day.    Cranberry 1000 MG capsule   Yes No    Take  by mouth.    Dapagliflozin Propanediol (Farxiga) 10 MG tablet   Yes No    Take  by mouth Daily.    doxazosin (CARDURA) 4 MG tablet   No No    Take 1 tablet by mouth Daily.    enalapril (VASOTEC) 20 MG tablet   No No    Take 1 tablet by mouth Daily.    finasteride (PROSCAR) 5 MG tablet   No No    Take 1 tablet by mouth Daily.    fluconazole (DIFLUCAN) 200 MG tablet   No No    Take 1 tablet by mouth Daily.    folic acid (FOLVITE) 1 MG tablet   Yes No    Take 1 mg by mouth Daily.    gabapentin (NEURONTIN) 300 MG capsule   No No    Take 1 capsule by mouth 2 (two) times a day.    Garlic 1000 MG capsule   Yes No    Take 1,000 mg by mouth Daily.    glipizide (GLUCOTROL) 5 MG tablet   No No    Take 10 mg (2 tablets) orally in the AM and 5 mg (1 tablet) orally in the PM.    Glucosamine-Chondroit-Vit C-Mn (GLUCOSAMINE 1500 COMPLEX PO)   Yes No    Take  by mouth.    Hydrocort-Pramoxine, Perianal,  (PROCTOFOAM-HS) 1-1 % rectal foam   Yes No    Insert 1 applicator into the rectum As Needed for Hemorrhoids.    Lactobacillus (Acidophilus) 100 MG capsule   Yes No    Take 100 mg by mouth Daily.    Loratadine (Claritin) 10 MG capsule   Yes No    Take 10 mg by mouth.    Lutein 10 MG tablet   Yes No    Take 10 mg by mouth Daily.    metFORMIN (GLUCOPHAGE) 1000 MG tablet   No No    Take 1 tablet by mouth 2 (Two) Times a Day With Meals.    methylcellulose, Laxative, (CITRUCEL) 500 MG tablet tablet   Yes No    Take 2 tablets by mouth Every 4 (Four) Hours As Needed.    Omega-3 Fatty Acids (fish oil) 1000 MG capsule capsule   Yes No    Take 1,000 mg by mouth Daily.    omeprazole (priLOSEC) 20 MG capsule   No No    Take 1 capsule by mouth 2 (two) times a day.    Ped Multivitamins-Fl-Iron (multivitamin with fluoride/iron) 0.25-10 MG/ML solution solution   Yes No    Take  by mouth Daily.    saw palmetto 500 MG capsule   Yes No    Take 500 mg by mouth Daily.    Sennosides 15 MG tablet   Yes No    Take 15 mg by mouth As Needed.    triamcinolone (KENALOG) 0.1 % cream   Yes No    Apply  topically to the appropriate area as directed 2 (Two) Times a Day.    vitamin E 100 UNIT capsule   Yes No    Take 100 Units by mouth Daily.    Vitamin E 400 units tablet   Yes No    Take  by mouth.        ---------------------------------------------------------------------------------------------------------------------    Objective     Hospital Scheduled Meds:    No current facility-administered medications for this encounter.      Current listed hospital scheduled medications may not yet reflect those currently placed in orders that are signed and held, awaiting patient's arrival to floor/unit.    ---------------------------------------------------------------------------------------------------------------------   Vital Signs:  Temp:  [98.3 °F (36.8 °C)] 98.3 °F (36.8 °C)  Heart Rate:  [98] 98  Resp:  [18] 18  BP: (142-156)/(81-95) 151/95  Mean  Arterial Pressure (Non-Invasive) for the past 24 hrs (Last 3 readings):   Noninvasive MAP (mmHg)   03/14/21 0103 109   03/14/21 0046 103   03/14/21 0032 106     SpO2 Percentage    03/14/21 0032 03/14/21 0046 03/14/21 0103   SpO2: 97% 95% 94%     SpO2:  [94 %-99 %] 94 %  on   ;   Device (Oxygen Therapy): room air    Body mass index is 31.43 kg/m².  Wt Readings from Last 3 Encounters:   03/13/21 96.5 kg (212 lb 12.8 oz)   03/05/21 100 kg (221 lb)   12/04/20 98.2 kg (216 lb 9.6 oz)       ---------------------------------------------------------------------------------------------------------------------   Physical Exam:  Physical Exam  Nursing note reviewed. Exam conducted with a chaperone present (RN).   Constitutional:       General: He is awake. He is not in acute distress.     Appearance: He is well-developed. He is obese. He is not toxic-appearing.      Comments: Sitting up in bed, no acute distress noted.  On room air.  No family present at bedside.  RN present.   HENT:      Head: Normocephalic and atraumatic.      Right Ear: External ear normal.      Left Ear: External ear normal.      Nose: Nose normal.      Mouth/Throat:      Mouth: Mucous membranes are moist.      Pharynx: Oropharynx is clear.   Eyes:      Extraocular Movements: Extraocular movements intact.      Conjunctiva/sclera: Conjunctivae normal.      Pupils: Pupils are equal, round, and reactive to light.   Neck:      Vascular: No carotid bruit or JVD.   Cardiovascular:      Rate and Rhythm: Normal rate and regular rhythm.      Pulses:           Dorsalis pedis pulses are 2+ on the right side and 2+ on the left side.        Posterior tibial pulses are 2+ on the right side and 2+ on the left side.      Heart sounds: Normal heart sounds. No murmur. No friction rub. No gallop.    Pulmonary:      Effort: Pulmonary effort is normal. No tachypnea, accessory muscle usage or respiratory distress.      Breath sounds: Normal breath sounds and air entry. No  wheezing, rhonchi or rales.      Comments: Speaks in full sentences without dyspnea, on room air.  Chest:      Chest wall: No tenderness.   Abdominal:      General: Abdomen is protuberant. Bowel sounds are normal. There is distension.      Palpations: Abdomen is soft. There is no hepatomegaly or splenomegaly.      Tenderness: There is abdominal tenderness in the epigastric area. There is no guarding or rebound.      Hernia: No hernia is present.   Genitourinary:     Comments: No stewart catheter in place.  Musculoskeletal:      Cervical back: Normal range of motion and neck supple.      Right lower leg: Edema (Trace ) present.      Left lower leg: Edema (Trace) present.   Skin:     General: Skin is warm and dry.      Capillary Refill: Capillary refill takes less than 2 seconds.      Findings: No abscess, erythema, lesion or wound.   Neurological:      General: No focal deficit present.      Mental Status: He is alert and oriented to person, place, and time.      Cranial Nerves: Cranial nerves are intact.      Sensory: Sensation is intact.      Motor: Motor function is intact.      Comments: Awake and alert. Follows commands. Answers questions appropriately. Moves all extremities equally. Strength and sensation intact. No focal neuro deficit on exam.   Psychiatric:         Attention and Perception: Attention normal.         Mood and Affect: Mood and affect normal.         Speech: Speech normal.         Behavior: Behavior normal. Behavior is cooperative.         Thought Content: Thought content normal.         Cognition and Memory: Cognition and memory normal.         Judgment: Judgment normal.     ---------------------------------------------------------------------------------------------------------------------  EKG:  Pending cardiology read. Per my interpretation: Sinus rhythm, PAC present, HR 96 BPM, QTc stable at 447 m/s, poor R wave progression noted. There is some possible mild ST elevation in III and aVF, ? In  II. Compared to EKG on file from 3/5/2021 no changes noted. No reciprocal changes appreciated. Await final cardiology read.     Telemetry:    Sinus 80s    I have personally reviewed the EKG/Telemetry strip  ---------------------------------------------------------------------------------------------------------------------   Results from last 7 days   Lab Units 03/13/21 2239   TROPONIN T ng/mL <0.010         Results from last 7 days   Lab Units 03/13/21 2239   PROBNP pg/mL 140.1         Results from last 7 days   Lab Units 03/13/21 2239   WBC 10*3/mm3 8.18   HEMOGLOBIN g/dL 14.3   HEMATOCRIT % 42.4   MCV fL 87.8   MCHC g/dL 33.7   PLATELETS 10*3/mm3 310     Results from last 7 days   Lab Units 03/13/21 2239   SODIUM mmol/L 140   POTASSIUM mmol/L 4.1   CHLORIDE mmol/L 103   CO2 mmol/L 20.4*   BUN mg/dL 21   CREATININE mg/dL 1.26   EGFR IF NONAFRICN AM mL/min/1.73 56*   CALCIUM mg/dL 9.2   GLUCOSE mg/dL 248*   ALBUMIN g/dL 4.04   BILIRUBIN mg/dL 0.2   ALK PHOS U/L 74   AST (SGOT) U/L 16   ALT (SGPT) U/L 8   Estimated Creatinine Clearance: 61.6 mL/min (by C-G formula based on SCr of 1.26 mg/dL).    Lab Results   Component Value Date    HGBA1C 8.75 (H) 03/05/2021     Microbiology Results (last 10 days)     Procedure Component Value - Date/Time    COVID-19 and FLU A/B PCR - Swab, Nasopharynx [[id number]]  (Normal) Collected: 03/13/21 2209    Lab Status: Final result Specimen: Swab from Nasopharynx Updated: 03/14/21 0006     COVID19 Not Detected     Influenza A PCR Not Detected     Influenza B PCR Not Detected    Narrative:      Fact sheet for providers: https://www.fda.gov/media/666502/download    Fact sheet for patients: https://www.fda.gov/media/588621/download    Test performed by PCR.         Pain Management Panel    There is no flowsheet data to display.       I have personally reviewed the above laboratory results.      ---------------------------------------------------------------------------------------------------------------------  Imaging Results (Last 7 Days)     Procedure Component Value Units Date/Time    XR Chest 2 View [[id number]] Collected: 03/13/21 2256     Updated: 03/13/21 2258    Narrative:      FINDINGS:   PA and lateral views of the chest.  Heart and mediastinal contours are normal. The lungs are clear. No pneumothorax or pleural effusion.      Impression:      No acute cardiopulmonary findings.    Signer Name: Boaz Bowman MD   Signed: 3/13/2021 10:56 PM   Workstation Name: [email or web address]    [facility]      I have personally reviewed the above radiology results.   ---------------------------------------------------------------------------------------------------------------------    Assessment & Plan      -Chest pain, rule out MI, mixed features   -Orthopnea, dyspnea on exertion  -Essential hypertension   -Hyperlipidemia   • Troponin T negative   • EKG with questionable ST elevation in 3 and aVF.  Stable compared to previous EKG on file.  No reciprocal changes appreciated.  • HEART Score = 3-4   • Continue aspirin daily   • Patient with lipid panel recently, pt with elevated total cholesterol and significant hypertriglyceridemia. Start statin.   • Trend troponin T, serial EKG   • Continuous telemetry monitoring.  • Obtain transthoracic echocardiogram to evaluate LVEF and cardiac wall motion   • NPO in preparation for stress test with myocardial perfusion imaging   • Systolic blood pressure appears slightly above goal, plan to resume home antihypertensive regimen once reconciled per pharmacy.  Closely monitor blood pressure hospital protocol and titrate medications as necessary.  • Patient does have some epigastric abdominal tenderness, reports history of hiatal hernia.  Reports history of EGD/colon with gastroenterology in the past.  His symptoms did slightly improve with antacids and  ibuprofen.  Will obtain a lipase.  Continue Protonix. Concern for underlying GI etiology. Obtain abdominal US.    -Type II diabetes mellitus, non insulin dependent  -Diabetic polyneuropathy  • HgbA1C recently 8.7  • Review home medication regimen once reconciled per pharmacy   • Hold home oral DM medications for now.  • Start low dose SSI, titrate dosage as necessary   • Closely monitor blood glucose levels with accuchecks QAC and QHS  • Hypoglycemia protocol in place should it be necessary  • Resume home Neurontin once reconciled per pharmacy.     -GERD  -History of hiatal hernia   • PPI    -History of gout   · Resume home medication once reconciled per pharmacy     -BPH  · Supportive care   · Monitor urine output   · Resume home medication once reconciled per pharmacy     -Obesity, BMI 31.43  · Affecting all aspects of care     -Former smoker     -F/E/N  • No IV fluids. Replace electrolytes per protocol as necessary. NPO diet.     ---------------------------------------------------  DVT Prophylaxis: Lovenox   GI Prophylaxis: PPI  Activity: Up with assistance as tolerated     The patient is considered to be a high risk patient due to: Chest pain, essential hypertension, hyperlipidemia, diabetes mellitus, obesity, and former tobacco abuse    OBSERVATION status, however if further evaluation or treatment plans warrant, status may change.  Based upon current information, I predict patient's care encounter to be less than or equal to 2 midnights.    Code Status: FULL CODE     I have discussed the patient's assessment and plan with the patient, nursing staff, and attending physician Dr. Nikunj Sanches MD.     Mirna Stark PA-C  Hospitalist Service -- [facility]   Pager: [phone]    03/14/21  01:17 EST    Attending Physician: Nikunj Sanches MD      ---------------------------------------------------------------------------------------------------------------------

## 2021-03-14 NOTE — PLAN OF CARE
Problem: Adult Inpatient Plan of Care  Goal: Plan of Care Review  Outcome: Ongoing, Progressing  Flowsheets (Taken 3/14/2021 0152)  Plan of Care Reviewed With: patient  Goal: Patient-Specific Goal (Individualized)  Outcome: Ongoing, Progressing  Goal: Absence of Hospital-Acquired Illness or Injury  Outcome: Ongoing, Progressing  Intervention: Identify and Manage Fall Risk  Recent Flowsheet Documentation  Taken 3/14/2021 0135 by Kelly Bello RN  Safety Promotion/Fall Prevention:   room organization consistent   safety round/check completed   assistive device/personal items within reach   clutter free environment maintained   nonskid shoes/slippers when out of bed  Goal: Optimal Comfort and Wellbeing  Outcome: Ongoing, Progressing  Intervention: Provide Person-Centered Care  Recent Flowsheet Documentation  Taken 3/14/2021 0135 by Kelly Belol RN  Trust Relationship/Rapport:   care explained   emotional support provided   choices provided   empathic listening provided   questions answered   questions encouraged   reassurance provided   thoughts/feelings acknowledged  Goal: Readiness for Transition of Care  Outcome: Ongoing, Progressing  Intervention: Mutually Develop Transition Plan  Recent Flowsheet Documentation  Taken 3/14/2021 0125 by Klely Bello RN  Equipment Currently Used at Home: none  Taken 3/14/2021 0124 by Kelly Bello RN  Transportation Anticipated: family or friend will provide  Patient/Family Anticipated Services at Transition: none  Patient/Family Anticipates Transition to: home with family  Taken 3/14/2021 0123 by Kelly Bello RN  Equipment Currently Used at Home: none     Problem: Chest Pain  Goal: Resolution of Chest Pain Symptoms  Outcome: Ongoing, Progressing     Problem: Fall Injury Risk  Goal: Absence of Fall and Fall-Related Injury  Outcome: Ongoing, Progressing  Intervention: Promote Injury-Free Environment  Recent Flowsheet Documentation  Taken 3/14/2021 0135 by Kelly Bello  RN  Safety Promotion/Fall Prevention:   room organization consistent   safety round/check completed   assistive device/personal items within reach   clutter free environment maintained   nonskid shoes/slippers when out of bed     Problem: Diabetes Comorbidity  Goal: Blood Glucose Level Within Desired Range  Outcome: Ongoing, Progressing     Problem: Hypertension Comorbidity  Goal: Blood Pressure in Desired Range  Outcome: Ongoing, Progressing

## 2021-03-14 NOTE — CONSULTS
Consults    Patient Identification:    Name:  William Villasenor  Age:  70 y.o.  Sex:  male  :  1950  MRN:  [id number]  Visit Number:  [id number]  Primary care provider:  Jacqueline Gatica MD    Chief complaint:   Chest pain    History of presenting illness:   Patient is a 70-year-old gentleman with known history of hypertension, diabetes mellitus type 2, dyslipidemia, obesity, gastroesophageal reflux disease, presented to [facility] with chest pain, onset about a week ago with intermittent worsening episodes, he presented to the hospital yesterday, on presentation his EKG showed sinus rhythm with nonspecific ST-T changes with occasional PACs and rare PVCs, his troponin has been negative x2, he then subsequently had a stress test which showed ischemia involving the entire apex also extending into anteroapical and inferoapical territory.  She has been relatively chest pain-free.  He did had an episode of her typical presenting type chest pain during the stress test.  He has been hemodynamically stable.  Reviewed echocardiogram showed overall preserved LV systolic function and no significant valvular abnormalities.  ---------------------------------------------------------------------------------------------------------------------  Review of Systems   Constitutional: Negative for activity change, appetite change, diaphoresis and fever.   HENT: Negative for congestion, nosebleeds and sore throat.    Respiratory: Positive for shortness of breath. Negative for cough.    Cardiovascular: Positive for chest pain. Negative for palpitations and leg swelling.   Gastrointestinal: Negative for abdominal distention, abdominal pain, blood in stool, constipation, diarrhea and vomiting.   Endocrine: Negative for cold intolerance and heat intolerance.   Genitourinary: Negative for hematuria.   Musculoskeletal: Negative for back pain and myalgias.   Neurological: Negative for dizziness, syncope and weakness.      Psychiatric/Behavioral: Negative for confusion and suicidal ideas. The patient is not nervous/anxious.       ---------------------------------------------------------------------------------------------------------------------   Past History:   Family History   Problem Relation Age of Onset   • Heart disease Mother    • Hypertension Mother    • Depression Mother    • Alcohol abuse Maternal Uncle    • Heart disease Maternal Grandmother    • Hypertension Maternal Grandmother    • Diabetes Maternal Grandmother    • Diabetes Paternal Grandmother      Past Medical History:   Diagnosis Date   • Arthritis    • Back pain    • BPH (benign prostatic hyperplasia)    • Diabetes mellitus (CMS/HCC)    • Diabetic peripheral neuropathy (CMS/HCC)    • Erectile dysfunction    • Former smoker    • GERD (gastroesophageal reflux disease)    • GERD (gastroesophageal reflux disease)    • Gout    • High cholesterol    • Hypertension    • Obesity      Past Surgical History:   Procedure Laterality Date   • COLONOSCOPY W/ 2015     Social History     Socioeconomic History   • Marital status:      Spouse name: Not on file   • Number of children: Not on file   • Years of education: Not on file   • Highest education level: Not on file   Tobacco Use   • Smoking status: Former Smoker     Packs/day: 1.00     Years: 5.00     Pack years: 5.00     Quit date: 1974     Years since quittin.1   • Smokeless tobacco: Never Used   Substance and Sexual Activity   • Alcohol use: Never   • Drug use: Never   • Sexual activity: Defer     ---------------------------------------------------------------------------------------------------------------------   Allergies:  Patient has no known allergies.  ---------------------------------------------------------------------------------------------------------------------   Prior to Admission Medications     Prescriptions Last Dose Informant Patient Reported? Taking?    allopurinol (ZYLOPRIM)  300 MG tablet 3/13/2021 Self No Yes    Take 1 tablet by mouth Daily.    Alpha-Lipoic Acid 200 MG capsule 3/13/2021 Self Yes Yes    Take 1 capsule by mouth 2 (two) times a day.    amLODIPine-atorvastatin (CADUET) 10-20 MG per tablet 3/13/2021 Self No Yes    Take 1 tablet by mouth Daily.    Apple Cider Vinegar 300 MG tablet 3/13/2021 Self Yes Yes    Take 1 tablet by mouth 2 (two) times a day.    aspirin (Aspirin Adult Low Dose) 81 MG EC tablet 3/13/2021 Self Yes Yes    Take 81 mg by mouth Daily.    Calcium-Magnesium-Zinc 333-133-5 MG tablet 3/13/2021 Self Yes Yes    Take 1 tablet by mouth Daily.    Cinnamon 500 MG capsule 3/13/2021 Self Yes Yes    Take 500 mg by mouth 2 (two) times a day.    Cod Liver Oil 10 MINIM capsule 3/13/2021 Self Yes Yes    Take 1 tablet by mouth 2 (two) times a day.    coenzyme Q10 100 MG capsule 3/13/2021 Self Yes Yes    Take 100 mg by mouth Daily.    Cranberry 1000 MG capsule 3/13/2021 Self Yes Yes    Take 1 capsule by mouth 2 (two) times a day.    Dapagliflozin Propanediol (Farxiga) 10 MG tablet 3/13/2021 Self Yes Yes    Take 1 tablet by mouth Daily.    doxazosin (CARDURA) 4 MG tablet 3/13/2021 Self No Yes    Take 1 tablet by mouth Daily.    enalapril (VASOTEC) 20 MG tablet 3/13/2021 Self No Yes    Take 1 tablet by mouth Daily.    finasteride (PROSCAR) 5 MG tablet 3/13/2021 Self No Yes    Take 1 tablet by mouth Daily.    fluconazole (DIFLUCAN) 200 MG tablet 3/13/2021 Self No Yes    Take 1 tablet by mouth Daily.    Fluticasone-Umeclidin-Vilant (TRELEGY ELLIPTA IN) 3/13/2021 Self Yes Yes    Inhale 1 puff Daily.    folic acid (FOLVITE) 1 MG tablet 3/13/2021 Self Yes Yes    Take 1 mg by mouth 2 (two) times a day.    gabapentin (NEURONTIN) 300 MG capsule 3/13/2021 Self No Yes    Take 1 capsule by mouth 2 (two) times a day.    Garlic 1000 MG capsule 3/13/2021 Self Yes Yes    Take 1,000 mg by mouth 2 (two) times a day.    glipizide (GLUCOTROL) 10 MG tablet 3/13/2021 Self Yes Yes    Take 10 mg by  mouth Every Morning.    glipizide (GLUCOTROL) 5 MG tablet 3/13/2021 Self Yes Yes    Take 5 mg by mouth Every Evening.    Glucosamine-Chondroit-Vit C-Mn (GLUCOSAMINE 1500 COMPLEX PO) 3/13/2021 Self Yes Yes    Take 1 tablet by mouth 2 (two) times a day.    Lactobacillus (Acidophilus) 100 MG capsule 3/13/2021 Self Yes Yes    Take 100 mg by mouth Daily.    Loratadine (Claritin) 10 MG capsule 3/13/2021 Self Yes Yes    Take 10 mg by mouth.    Lutein 10 MG tablet 3/13/2021 Self Yes Yes    Take 10 mg by mouth 2 (two) times a day.    metFORMIN (GLUCOPHAGE) 1000 MG tablet 3/13/2021 Self No Yes    Take 1 tablet by mouth 2 (Two) Times a Day With Meals.    methylcellulose, Laxative, (CITRUCEL) 500 MG tablet tablet 3/13/2021 Self Yes Yes    Take 1 tablet by mouth 2 (two) times a day.    multivitamin (multivitamin) tablet tablet 3/13/2021 Self Yes Yes    Take 1 tablet by mouth Daily.    Omega-3 Fatty Acids (fish oil) 1000 MG capsule capsule 3/13/2021 Self Yes Yes    Take 1,000 mg by mouth 2 (two) times a day.    omeprazole (priLOSEC) 20 MG capsule 3/13/2021 Self No Yes    Take 1 capsule by mouth 2 (two) times a day.    saw palmetto 500 MG capsule 3/13/2021 Self Yes Yes    Take 500 mg by mouth 2 (Two) Times a Day.    vitamin E 100 UNIT capsule 3/13/2021 Self Yes Yes    Take 100 Units by mouth 2 (two) times a day.    acetaminophen (TYLENOL) 500 MG tablet Unknown Self Yes No    Take 500 mg by mouth Every 6 (Six) Hours As Needed for Mild Pain .    Hydrocort-Pramoxine, Perianal, (PROCTOFOAM-HS) 1-1 % rectal foam Unknown Self Yes No    Insert 1 applicator into the rectum As Needed for Hemorrhoids.    Sennosides 15 MG tablet Unknown Self Yes No    Take 15 mg by mouth Daily As Needed (Constipation).    triamcinolone (KENALOG) 0.1 % cream Unknown Self Yes No    Apply 1 application topically to the appropriate area as directed 2 (Two) Times a Day As Needed for Rash.        Hospital Meds:  allopurinol, 300 mg, Oral, Daily  amLODIPine, 10 mg,  Oral, Q24H  aspirin, 81 mg, Oral, Daily  atorvastatin, 40 mg, Oral, Nightly  cetirizine, 10 mg, Oral, Daily  enalapril, 20 mg, Oral, Daily  enoxaparin, 40 mg, Subcutaneous, Daily  finasteride, 5 mg, Oral, Daily  folic acid, 1 mg, Oral, BID  gabapentin, 300 mg, Oral, BID  insulin aspart, 0-7 Units, Subcutaneous, TID AC  lactobacillus acidophilus, 1 capsule, Oral, Daily  metoprolol tartrate, 25 mg, Oral, Q12H  multivitamin, 1 tablet, Oral, Daily  pantoprazole, 40 mg, Oral, Q AM  sodium chloride, 3 mL, Intravenous, Q12H  terazosin, 5 mg, Oral, Nightly  vitamin E, 400 Units, Oral, Daily      Pharmacy to Dose enoxaparin (LOVENOX),       ---------------------------------------------------------------------------------------------------------------------   Vital Signs:  Temp:  [97.8 °F (36.6 °C)-98.3 °F (36.8 °C)] 97.8 °F (36.6 °C)  Heart Rate:  [71-98] 71  Resp:  [17-20] 18  BP: (134-156)/(69-95) 136/84      03/13/21  2221 03/14/21  0130   Weight: 96.5 kg (212 lb 12.8 oz) 97.4 kg (214 lb 12.8 oz)     Body mass index is 31.72 kg/m².  ---------------------------------------------------------------------------------------------------------------------   Physical exam:   Constitutional:  Well-developed and well-nourished.  No respiratory distress.      HENT:  Head: Normocephalic and atraumatic.  Mouth:  Moist mucous membranes.    Eyes:  Conjunctivae and EOM are normal.  Pupils are equal, round, and reactive to light.  No scleral icterus.  Neck:  Neck supple.  No JVD present.    Cardiovascular:  Normal rate, regular rhythm and normal heart sounds with no murmur.  Pulmonary/Chest:  No respiratory distress, no wheezes, no crackles, with normal breath sounds and good air movement.  Abdominal:  Soft.  Bowel sounds are normal.  No distension and no tenderness.   Musculoskeletal:  No edema, no tenderness, and no deformity.  No red or swollen joints anywhere.    Neurological:  Alert and oriented to person, place, and time.  No  cranial nerve deficit.  No tongue deviation.  No facial droop.  No slurred speech.   Skin:  Skin is warm and dry.  No rash noted.  No pallor.   Psychiatric:  Normal mood and affect.  Behavior is normal.  Judgment and thought content normal.   Peripheral vascular:  No edema and strong pulses on all 4 extremities.    ---------------------------------------------------------------------------------------------------------------------   EKG: Sinus rhythm, nonspecific ST-T changes  Telemetry: Sinus, occasional PACs, rare PVCs  I have personally looked at both the EKG and the telemetry strips.  Echo:  Results for orders placed during the hospital encounter of 03/13/21    Adult Transthoracic Echo Complete W/ Cont if Necessary Per Protocol    Interpretation Summary  · Left ventricular ejection fraction appears to be 51 - 55%. Left ventricular systolic function is normal. Wall motion not assessed due to poor endocardial visualization  · Left ventricular diastolic function was normal.  · There is no evidence of pericardial effusion  · No significant functional valvular abnormalities    ---------------------------------------------------------------------------------------------------------------------   Results from last 7 days   Lab Units 03/14/21 0144 03/13/21  2239   WBC 10*3/mm3 8.59 8.18   HEMOGLOBIN g/dL 14.1 14.3   HEMATOCRIT % 43.6 42.4   MCV fL 91.2 87.8   MCHC g/dL 32.3 33.7   PLATELETS 10*3/mm3 332 310         Results from last 7 days   Lab Units 03/14/21 0144 03/13/21  2239   SODIUM mmol/L  --  140   POTASSIUM mmol/L  --  4.1   MAGNESIUM mg/dL 1.9 1.8   CHLORIDE mmol/L  --  103   CO2 mmol/L  --  20.4*   BUN mg/dL  --  21   CREATININE mg/dL  --  1.26   EGFR IF NONAFRICN AM mL/min/1.73  --  56*   CALCIUM mg/dL  --  9.2   GLUCOSE mg/dL  --  248*   ALBUMIN g/dL  --  4.04   BILIRUBIN mg/dL  --  0.2   ALK PHOS U/L  --  74   AST (SGOT) U/L  --  16   ALT (SGPT) U/L  --  8   Estimated Creatinine Clearance: 61.9 mL/min  (by C-G formula based on SCr of 1.26 mg/dL).  No results found for: AMMONIA  Results from last 7 days   Lab Units 03/14/21  0144 03/13/21 2239   TROPONIN T ng/mL <0.010 <0.010     Results from last 7 days   Lab Units 03/13/21 2239   PROBNP pg/mL 140.1     Lab Results   Component Value Date    HGBA1C 8.75 (H) 03/05/2021     Lab Results   Component Value Date    TSH 2.400 03/13/2021     No results found for: PREGTESTUR, PREGSERUM, HCG, HCGQUANT  Pain Management Panel     Pain Management Panel Latest Ref Rng & Units 3/14/2021    AMPHETAMINES SCREEN, URINE Negative Negative    BARBITURATES SCREEN Negative Negative    BENZODIAZEPINE SCREEN, URINE Negative Negative    BUPRENORPHINEUR Negative Negative    COCAINE SCREEN, URINE Negative Negative    METHADONE SCREEN, URINE Negative Negative        No results found for: BLOODCX  No results found for: URINECX  No results found for: WOUNDCX  No results found for: STOOLCX        ---------------------------------------------------------------------------------------------------------------------   Imaging Results (Last 7 Days)     Procedure Component Value Units Date/Time    US Abdomen Complete [[id number]] Collected: 03/14/21 1109     Updated: 03/14/21 1112    Narrative:      EXAM:    US Abdomen Complete     EXAM DATE:    3/14/2021 7:50 AM     CLINICAL HISTORY:    epigastric abdominal pain; R07.9-Chest pain, unspecified     TECHNIQUE:    Real-time ultrasound of the abdomen with image documentation.     COMPARISON:    No relevant prior studies available.     FINDINGS:    Liver:  Unremarkable.  No mass.  No intrahepatic bile duct dilation.    Gallbladder:  Unremarkable.  No gallstones.    Common bile duct:  The CBD measures 3.97 mm  No stones.  No dilation.    Pancreas:  Unremarkable as visualized.    Kidneys:  RIGHT kidney measures  10.66 cm  in length.  LEFT kidney  measures  11.57 cm in length.  No stones.  No hydronephrosis.    Spleen:  Spleen measures  9.97 cm in length.     Aorta:  Unremarkable.  No aneurysm.    Inferior vena cava:  Unremarkable.       Impression:        No acute findings in the abdomen.     This report was finalized on 3/14/2021 11:10 AM by Dr. Catrachito Majano MD.       XR Chest 2 View [[id number]] Collected: 03/13/21 2256     Updated: 03/13/21 2258    Narrative:      CR Chest 2 Vws    INDICATION:    Chest pain tonight    COMPARISON:    None.    FINDINGS:   PA and lateral views of the chest.  Heart and mediastinal contours are normal. The lungs are clear. No pneumothorax or pleural effusion.        Impression:      No acute cardiopulmonary findings.    Signer Name: Boaz Bowman MD   Signed: 3/13/2021 10:56 PM   Workstation Name: [email or web address]    [facility]        ----------------------------------------------------------------------------------------------------------------------  Assessment:   Chest pain, typical suggestive of CCS class II-III angina, abnormal stress test with apical partially reversible perfusion defect suggestive of moderate ischemia in the anterior apex with mild infarction.  Diabetes mellitus type 2  Hypertension  Dyslipidemia  Gastroesophageal reflux disease  Obesity        Plan:   Have discussed the findings of the stress test with the patient, given his multiple risk factors and ongoing anginal type chest pain recommend further evaluation with coronary angiogram and patient was agreeable.  Patient is currently on aspirin, low-dose beta-blocker, amlodipine, ACE inhibitor.  N.p.o. after midnight  Plan for cath in a.m.  I have explained the risks associated with the procedure to the patient including but not limited to an allergic reaction to the contrast material or medications used during the procedure bleeding, infection, and bruising at the catheter insertion site blood clots, which may trigger heart attack, stroke,   damage to the artery where the catheter was inserted, or damage to the arteries as the catheter travels  through your body, irregular heart rhythm arrhythmias, kidney damage caused by the contrast material.      Thank you for the consult.          Tariq Dallas MD, [facility]  Interventional Cardiology      03/14/21  14:26 EDT

## 2021-03-14 NOTE — PLAN OF CARE
Goal Outcome Evaluation:        Patient resting in bed with no complaints, patient refused insulin and lovenox injection.  Patient back from stress and awaiting interventional cardiology recs regarding plan.  Will monitor and follow plan of care.

## 2021-03-14 NOTE — PROGRESS NOTES
[facility] HOSPITALIST PROGRESS NOTE     Patient Identification:  Name:  William Villasenor  Age:  70 y.o.  Sex:  male  :  1950  MRN:  [id number]  Visit Number:  [id number]  ROOM: [address] [place] [address]     Primary Care Provider:  Jacqueline Gatica MD    Length of stay in inpatient status:  0    Subjective     Chief Compliant:    Chief Complaint   Patient presents with   • Chest Pain       History of Presenting Illness: Patient seen and evaluated in follow-up for chest pain with associated orthopnea and dyspnea on exertion admitted earlier this morning.  Patient this morning with stress testing revealing large area of ischemia involving the entire apex and extending into the anterior apical inferior and apical territories.  Cardiology was consulted and patient is tentatively planned for left heart cath tomorrow.    Objective     Current Hospital Meds:allopurinol, 300 mg, Oral, Daily  amLODIPine, 10 mg, Oral, Q24H  aspirin, 81 mg, Oral, Daily  atorvastatin, 40 mg, Oral, Nightly  cetirizine, 10 mg, Oral, Daily  enalapril, 20 mg, Oral, Daily  enoxaparin, 40 mg, Subcutaneous, Daily  finasteride, 5 mg, Oral, Daily  folic acid, 1 mg, Oral, BID  gabapentin, 300 mg, Oral, BID  insulin aspart, 0-7 Units, Subcutaneous, TID AC  lactobacillus acidophilus, 1 capsule, Oral, Daily  metoprolol tartrate, 25 mg, Oral, Q12H  multivitamin, 1 tablet, Oral, Daily  pantoprazole, 40 mg, Oral, Q AM  sodium chloride, 3 mL, Intravenous, Q12H  terazosin, 5 mg, Oral, Nightly  vitamin E, 400 Units, Oral, Daily    Pharmacy to Dose enoxaparin (LOVENOX),         Current Antimicrobial Therapy:  Anti-Infectives (From admission, onward)    None        Current Diuretic Therapy:  Diuretics (From admission, onward)    None        ----------------------------------------------------------------------------------------------------------------------  Vital Signs:  Temp:  [97.8 °F (36.6 °C)-98.3 °F (36.8 °C)] 98 °F (36.7 °C)  Heart Rate:  [62-98]  62  Resp:  [17-20] 18  BP: (120-156)/(69-95) 120/74  SpO2:  [94 %-99 %] 97 %  on   ;   Device (Oxygen Therapy): room air  Body mass index is 31.72 kg/m².    Wt Readings from Last 3 Encounters:   03/14/21 97.4 kg (214 lb 12.8 oz)   03/05/21 100 kg (221 lb)   12/04/20 98.2 kg (216 lb 9.6 oz)     Intake & Output (last 3 days)       03/11 0701 - 03/12 0700 03/12 0701 - 03/13 0700 03/13 0701 - 03/14 0700 03/14 0701 - 03/15 0700    P.O.    480    Total Intake(mL/kg)    480 (4.9)    Urine (mL/kg/hr)   550     Total Output   550     Net   -550 +480            Urine Unmeasured Occurrence    2 x        Diet Regular; Cardiac, Consistent Carbohydrate  NPO Diet  ----------------------------------------------------------------------------------------------------------------------  Physical exam:  Constitutional:  Well-developed and well-nourished.  No acute distress.      HENT:  Head:  Normocephalic and atraumatic.  Mouth:  Moist mucous membranes.    Eyes:  Conjunctivae and EOM are normal. No scleral icterus.    Neck:  Neck supple.  No JVD present.    Cardiovascular:  Normal rate, regular rhythm and normal heart sounds with no murmur.  Pulmonary/Chest:  No respiratory distress, no wheezes, there are faint bibasilar crackles, with normal breath sounds and good air movement.  Abdominal:  Soft.  Bowel sounds are normal.  No distension and no tenderness.   Musculoskeletal:  No edema, no tenderness, and no deformity.  No red or swollen joints anywhere.  Functional ROM intact.   Neurological:  Alert and oriented to person, place, and time.  No cranial nerve deficit.  No tongue deviation.  No facial droop.  No slurred speech. Intact Sensation throughout  Skin:  Skin is warm and dry. No rash or lesion noted. No pallor.   Peripheral vascular:  Pulses in all 4 extremities with no clubbing, no cyanosis, but there is trace edema of the lower extremities.  Psychiatric: Appropriate mood and affect, pleasant.    ----------------------------------------------------------------------------------------------------------------------  Tele:    ----------------------------------------------------------------------------------------------------------------------  Results from last 7 days   Lab Units 03/14/21 0144 03/13/21 2239   WBC 10*3/mm3 8.59 8.18   HEMOGLOBIN g/dL 14.1 14.3   HEMATOCRIT % 43.6 42.4   MCV fL 91.2 87.8   MCHC g/dL 32.3 33.7   PLATELETS 10*3/mm3 332 310         Results from last 7 days   Lab Units 03/14/21 0144 03/13/21 2239   SODIUM mmol/L  --  140   POTASSIUM mmol/L  --  4.1   MAGNESIUM mg/dL 1.9 1.8   CHLORIDE mmol/L  --  103   CO2 mmol/L  --  20.4*   BUN mg/dL  --  21   CREATININE mg/dL  --  1.26   EGFR IF NONAFRICN AM mL/min/1.73  --  56*   CALCIUM mg/dL  --  9.2   GLUCOSE mg/dL  --  248*   ALBUMIN g/dL  --  4.04   BILIRUBIN mg/dL  --  0.2   ALK PHOS U/L  --  74   AST (SGOT) U/L  --  16   ALT (SGPT) U/L  --  8   Estimated Creatinine Clearance: 61.9 mL/min (by C-G formula based on SCr of 1.26 mg/dL).  No results found for: AMMONIA  Results from last 7 days   Lab Units 03/14/21  0144 03/13/21  2239   TROPONIN T ng/mL <0.010 <0.010     Results from last 7 days   Lab Units 03/13/21  2239   PROBNP pg/mL 140.1         Glucose   Date/Time Value Ref Range Status   03/14/2021 1114 256 (H) 70 - 130 mg/dL Final   03/14/2021 0653 194 (H) 70 - 130 mg/dL Final   03/14/2021 0130 273 (H) 70 - 130 mg/dL Final     Lab Results   Component Value Date    TSH 2.400 03/13/2021     No results found for: PREGTESTUR, PREGSERUM, HCG, HCGQUANT  Pain Management Panel     Pain Management Panel Latest Ref Rng & Units 3/14/2021    AMPHETAMINES SCREEN, URINE Negative Negative    BARBITURATES SCREEN Negative Negative    BENZODIAZEPINE SCREEN, URINE Negative Negative    BUPRENORPHINEUR Negative Negative    COCAINE SCREEN, URINE Negative Negative    METHADONE SCREEN, URINE Negative Negative        Brief Urine Lab Results  (Last result  in the past 365 days)      Color   Clarity   Blood   Leuk Est   Nitrite   Protein   CREAT   Urine HCG        03/14/21 0445 Yellow Clear Negative Negative Negative Negative             No results found for: BLOODCX  No results found for: URINECX  No results found for: WOUNDCX  No results found for: STOOLCX  No results found for: RESPCX  No results found for: AFBCX        I have personally looked at the labs and they are summarized above.  ----------------------------------------------------------------------------------------------------------------------  Detailed radiology reports for the last 24 hours:    Imaging Results (Last 24 Hours)     Procedure Component Value Units Date/Time    US Abdomen Complete [[id number]] Collected: 03/14/21 1109     Updated: 03/14/21 1112    Narrative:      EXAM:    US Abdomen Complete     EXAM DATE:    3/14/2021 7:50 AM     CLINICAL HISTORY:    epigastric abdominal pain; R07.9-Chest pain, unspecified     TECHNIQUE:    Real-time ultrasound of the abdomen with image documentation.     COMPARISON:    No relevant prior studies available.     FINDINGS:    Liver:  Unremarkable.  No mass.  No intrahepatic bile duct dilation.    Gallbladder:  Unremarkable.  No gallstones.    Common bile duct:  The CBD measures 3.97 mm  No stones.  No dilation.    Pancreas:  Unremarkable as visualized.    Kidneys:  RIGHT kidney measures  10.66 cm  in length.  LEFT kidney  measures  11.57 cm in length.  No stones.  No hydronephrosis.    Spleen:  Spleen measures  9.97 cm in length.    Aorta:  Unremarkable.  No aneurysm.    Inferior vena cava:  Unremarkable.       Impression:        No acute findings in the abdomen.     This report was finalized on 3/14/2021 11:10 AM by Dr. Catrachito Majano MD.       XR Chest 2 View [[id number]] Collected: 03/13/21 2256     Updated: 03/13/21 2258    Narrative:      CR Chest 2 Vws    INDICATION:    Chest pain tonight    COMPARISON:    None.    FINDINGS:   PA and lateral views of the  chest.  Heart and mediastinal contours are normal. The lungs are clear. No pneumothorax or pleural effusion.        Impression:      No acute cardiopulmonary findings.    Signer Name: Boaz Bowman MD   Signed: 3/13/2021 10:56 PM   Workstation Name: [email or web address]    [facility]        Assessment & Plan      Chest pain  Abnormal stress test  Orthopnea  Hypertension  Hyperlipidemia  Type 2 diabetes mellitus  Diabetic neuropathy  GERD  Hiatal hernia  History of gout  BPH  Obesity, BMI 31  Former smoker    Patient admitted earlier this morning by overnight hospitalist for chest pain in the setting of chronic history of hypertension, hyperlipidemia and type 2 diabetes mellitus.  Patient underwent stress test this morning which showed a medium sized moderately severe area of ischemia located already at the apex and distal anterior apical area with mild infarction in the apex.  Patient also had echocardiogram which revealed an EF of 51 to 55% normal LV systolic function but normal LV diastolic function with no evidence of pericardial effusion and no significant valvular abnormalities.  Cardiology was consulted given abnormal stress test and has seen the patient is planning for left heart cath tomorrow.  Patient started on low-dose beta-blockade, already on aspirin, statin, and ACE inhibitor.  No Plavix loading at this time as patient is high risk for likely multivessel disease and may need CABG if left heart cath concerning for this.  Patient will be n.p.o. after midnight for heart cath.      VTE Prophylaxis:   Mechanical Order History:     None      Pharmalogical Order History:      Ordered     Dose Route Frequency Stop    03/14/21 0148  enoxaparin (LOVENOX) syringe 40 mg      40 mg SC Daily --    03/14/21 0132  Pharmacy to Dose enoxaparin (LOVENOX)     Question:  Indication of use  Answer:  Prophylaxis    -- XX Continuous PRN --                Disposition pending left heart cath results.    Dany  DO Ryan  [facility] Hospitalist  03/14/21  16:43 EDT

## 2021-03-14 NOTE — ED PROVIDER NOTES
Subjective   70-year-old male with a history of hypertension hyperlipidemia diabetes presents the emergency room complaints of shortness of breath and chest pain for the past week.  Patient reports that he has shortness of breath is made worse with activity as well as lying flat.  He states his chest discomfort described as heaviness occurs as well.  He states that occasionally takes antacids that minimally improves symptoms.  He states that he had a stress test multiple years ago but does not know where it was performed and is unable to recall the results.  He states that symptoms progressed getting worse and more frequent he therefore came to emergency room for evaluation.      Chest Pain  Pain location:  Substernal area  Pain quality: pressure    Pain radiates to:  Does not radiate  Timing:  Intermittent  Progression:  Waxing and waning  Relieved by:  Rest and certain positions  Worsened by:  Exertion, movement and certain positions  Associated symptoms: fatigue, nausea and shortness of breath    Associated symptoms: no abdominal pain, no anxiety, no back pain, no cough, no dysphagia, no fever, no headache, no heartburn, no lower extremity edema, no palpitations, no syncope and no vomiting    Risk factors: diabetes mellitus, high cholesterol, hypertension, male sex and obesity        Review of Systems   Constitutional: Positive for fatigue. Negative for fever.   HENT: Negative for trouble swallowing.    Respiratory: Positive for shortness of breath. Negative for cough.    Cardiovascular: Positive for chest pain. Negative for palpitations and syncope.   Gastrointestinal: Positive for nausea. Negative for abdominal pain, heartburn and vomiting.   Musculoskeletal: Negative for back pain.   Neurological: Negative for headaches.   All other systems reviewed and are negative.      Past Medical History:   Diagnosis Date   • Arthritis    • Back pain    • BPH (benign prostatic hyperplasia)    • Diabetes mellitus (CMS/[facility])     • Diabetic peripheral neuropathy (CMS/HCC)    • Erectile dysfunction    • Former smoker    • GERD (gastroesophageal reflux disease)    • GERD (gastroesophageal reflux disease)    • Gout    • High cholesterol    • Hypertension    • Obesity        No Known Allergies    Past Surgical History:   Procedure Laterality Date   • COLONOSCOPY W/ BIOPSIES         Family History   Problem Relation Age of Onset   • Heart disease Mother    • Hypertension Mother    • Depression Mother    • Alcohol abuse Maternal Uncle    • Heart disease Maternal Grandmother    • Hypertension Maternal Grandmother    • Diabetes Maternal Grandmother    • Diabetes Paternal Grandmother        Social History     Socioeconomic History   • Marital status:      Spouse name: Not on file   • Number of children: Not on file   • Years of education: Not on file   • Highest education level: Not on file   Tobacco Use   • Smoking status: Former Smoker     Packs/day: 1.00     Years: 5.00     Pack years: 5.00     Quit date: 1974     Years since quittin.1   • Smokeless tobacco: Never Used   Substance and Sexual Activity   • Alcohol use: Never   • Drug use: Never   • Sexual activity: Defer           Objective   Physical Exam  Vitals and nursing note reviewed.   Constitutional:       Appearance: He is obese. He is not ill-appearing.   HENT:      Head: Normocephalic and atraumatic.      Comments: Moist mucous membranes  Eyes:      Extraocular Movements: Extraocular movements intact.      Pupils: Pupils are equal, round, and reactive to light.   Neck:      Comments: No JVD  Cardiovascular:      Rate and Rhythm: Normal rate and regular rhythm.      Pulses:           Radial pulses are 2+ on the right side and 2+ on the left side.      Heart sounds: No murmur.      Comments: No murmur rubs or gallops.  2+ radial pulse bilaterally.  No extrasystoles.  Pulmonary:      Effort: Pulmonary effort is normal.      Breath sounds: Normal breath sounds.       Comments: No accessory muscle use unlabored breathing.  Abdominal:      General: Bowel sounds are normal.      Palpations: Abdomen is soft.      Tenderness: There is no abdominal tenderness. There is no guarding.      Comments: Obese.  Normoactive bowel sounds no abdominal bruit.     Musculoskeletal:      Cervical back: Neck supple.      Right lower leg: No tenderness.      Left lower leg: No tenderness.   Skin:     General: Skin is warm and dry.   Neurological:      General: No focal deficit present.      Mental Status: He is alert.      Cranial Nerves: No cranial nerve deficit.      Motor: No weakness.   Psychiatric:         Mood and Affect: Mood normal.         Procedures           ED Course  ED Course as of Mar 14 0645   Sat Mar 13, 2021   2313 Patient CMP unremarkable troponin negative.    [BB]   2313 EKG shows normal sinus rhythm ventricular 88  QRS 1224  left axis deviation.    [BB]   2326 XR Chest 2 View    Result Date: 3/13/2021  No acute cardiopulmonary findings. Signer Name: Boaz Bowman MD  Signed: 3/13/2021 10:56 PM  Workstation Name: [facility]  [facility]        [BB]   2337 D-dimer unremarkable    [BB]   2342 BNP unremarkable    [BB]   2347 XR Chest 2 View    Result Date: 3/13/2021  No acute cardiopulmonary findings. Signer Name: Boaz Bowman MD  Signed: 3/13/2021 10:56 PM  Workstation Name: [facility]  [facility]        [BB]      ED Course User Index  [BB] Bairon Caraballo MD                                           [facility]    Final diagnoses:   Chest pain, unspecified type            Bairon Caraballo MD  03/14/21 0645

## 2021-03-14 NOTE — H&P (VIEW-ONLY)
Consults    Patient Identification:    Name:  William Villasenor  Age:  70 y.o.  Sex:  male  :  1950  MRN:  [id number]  Visit Number:  [id number]  Primary care provider:  Jacqueline Gatica MD    Chief complaint:   Chest pain    History of presenting illness:   Patient is a 70-year-old gentleman with known history of hypertension, diabetes mellitus type 2, dyslipidemia, obesity, gastroesophageal reflux disease, presented to [facility] with chest pain, onset about a week ago with intermittent worsening episodes, he presented to the hospital yesterday, on presentation his EKG showed sinus rhythm with nonspecific ST-T changes with occasional PACs and rare PVCs, his troponin has been negative x2, he then subsequently had a stress test which showed ischemia involving the entire apex also extending into anteroapical and inferoapical territory.  She has been relatively chest pain-free.  He did had an episode of her typical presenting type chest pain during the stress test.  He has been hemodynamically stable.  Reviewed echocardiogram showed overall preserved LV systolic function and no significant valvular abnormalities.  ---------------------------------------------------------------------------------------------------------------------  Review of Systems   Constitutional: Negative for activity change, appetite change, diaphoresis and fever.   HENT: Negative for congestion, nosebleeds and sore throat.    Respiratory: Positive for shortness of breath. Negative for cough.    Cardiovascular: Positive for chest pain. Negative for palpitations and leg swelling.   Gastrointestinal: Negative for abdominal distention, abdominal pain, blood in stool, constipation, diarrhea and vomiting.   Endocrine: Negative for cold intolerance and heat intolerance.   Genitourinary: Negative for hematuria.   Musculoskeletal: Negative for back pain and myalgias.   Neurological: Negative for dizziness, syncope and weakness.      Psychiatric/Behavioral: Negative for confusion and suicidal ideas. The patient is not nervous/anxious.       ---------------------------------------------------------------------------------------------------------------------   Past History:   Family History   Problem Relation Age of Onset   • Heart disease Mother    • Hypertension Mother    • Depression Mother    • Alcohol abuse Maternal Uncle    • Heart disease Maternal Grandmother    • Hypertension Maternal Grandmother    • Diabetes Maternal Grandmother    • Diabetes Paternal Grandmother      Past Medical History:   Diagnosis Date   • Arthritis    • Back pain    • BPH (benign prostatic hyperplasia)    • Diabetes mellitus (CMS/HCC)    • Diabetic peripheral neuropathy (CMS/HCC)    • Erectile dysfunction    • Former smoker    • GERD (gastroesophageal reflux disease)    • GERD (gastroesophageal reflux disease)    • Gout    • High cholesterol    • Hypertension    • Obesity      Past Surgical History:   Procedure Laterality Date   • COLONOSCOPY W/ 2015     Social History     Socioeconomic History   • Marital status:      Spouse name: Not on file   • Number of children: Not on file   • Years of education: Not on file   • Highest education level: Not on file   Tobacco Use   • Smoking status: Former Smoker     Packs/day: 1.00     Years: 5.00     Pack years: 5.00     Quit date: 1974     Years since quittin.1   • Smokeless tobacco: Never Used   Substance and Sexual Activity   • Alcohol use: Never   • Drug use: Never   • Sexual activity: Defer     ---------------------------------------------------------------------------------------------------------------------   Allergies:  Patient has no known allergies.  ---------------------------------------------------------------------------------------------------------------------   Prior to Admission Medications     Prescriptions Last Dose Informant Patient Reported? Taking?    allopurinol (ZYLOPRIM)  300 MG tablet 3/13/2021 Self No Yes    Take 1 tablet by mouth Daily.    Alpha-Lipoic Acid 200 MG capsule 3/13/2021 Self Yes Yes    Take 1 capsule by mouth 2 (two) times a day.    amLODIPine-atorvastatin (CADUET) 10-20 MG per tablet 3/13/2021 Self No Yes    Take 1 tablet by mouth Daily.    Apple Cider Vinegar 300 MG tablet 3/13/2021 Self Yes Yes    Take 1 tablet by mouth 2 (two) times a day.    aspirin (Aspirin Adult Low Dose) 81 MG EC tablet 3/13/2021 Self Yes Yes    Take 81 mg by mouth Daily.    Calcium-Magnesium-Zinc 333-133-5 MG tablet 3/13/2021 Self Yes Yes    Take 1 tablet by mouth Daily.    Cinnamon 500 MG capsule 3/13/2021 Self Yes Yes    Take 500 mg by mouth 2 (two) times a day.    Cod Liver Oil 10 MINIM capsule 3/13/2021 Self Yes Yes    Take 1 tablet by mouth 2 (two) times a day.    coenzyme Q10 100 MG capsule 3/13/2021 Self Yes Yes    Take 100 mg by mouth Daily.    Cranberry 1000 MG capsule 3/13/2021 Self Yes Yes    Take 1 capsule by mouth 2 (two) times a day.    Dapagliflozin Propanediol (Farxiga) 10 MG tablet 3/13/2021 Self Yes Yes    Take 1 tablet by mouth Daily.    doxazosin (CARDURA) 4 MG tablet 3/13/2021 Self No Yes    Take 1 tablet by mouth Daily.    enalapril (VASOTEC) 20 MG tablet 3/13/2021 Self No Yes    Take 1 tablet by mouth Daily.    finasteride (PROSCAR) 5 MG tablet 3/13/2021 Self No Yes    Take 1 tablet by mouth Daily.    fluconazole (DIFLUCAN) 200 MG tablet 3/13/2021 Self No Yes    Take 1 tablet by mouth Daily.    Fluticasone-Umeclidin-Vilant (TRELEGY ELLIPTA IN) 3/13/2021 Self Yes Yes    Inhale 1 puff Daily.    folic acid (FOLVITE) 1 MG tablet 3/13/2021 Self Yes Yes    Take 1 mg by mouth 2 (two) times a day.    gabapentin (NEURONTIN) 300 MG capsule 3/13/2021 Self No Yes    Take 1 capsule by mouth 2 (two) times a day.    Garlic 1000 MG capsule 3/13/2021 Self Yes Yes    Take 1,000 mg by mouth 2 (two) times a day.    glipizide (GLUCOTROL) 10 MG tablet 3/13/2021 Self Yes Yes    Take 10 mg by  mouth Every Morning.    glipizide (GLUCOTROL) 5 MG tablet 3/13/2021 Self Yes Yes    Take 5 mg by mouth Every Evening.    Glucosamine-Chondroit-Vit C-Mn (GLUCOSAMINE 1500 COMPLEX PO) 3/13/2021 Self Yes Yes    Take 1 tablet by mouth 2 (two) times a day.    Lactobacillus (Acidophilus) 100 MG capsule 3/13/2021 Self Yes Yes    Take 100 mg by mouth Daily.    Loratadine (Claritin) 10 MG capsule 3/13/2021 Self Yes Yes    Take 10 mg by mouth.    Lutein 10 MG tablet 3/13/2021 Self Yes Yes    Take 10 mg by mouth 2 (two) times a day.    metFORMIN (GLUCOPHAGE) 1000 MG tablet 3/13/2021 Self No Yes    Take 1 tablet by mouth 2 (Two) Times a Day With Meals.    methylcellulose, Laxative, (CITRUCEL) 500 MG tablet tablet 3/13/2021 Self Yes Yes    Take 1 tablet by mouth 2 (two) times a day.    multivitamin (multivitamin) tablet tablet 3/13/2021 Self Yes Yes    Take 1 tablet by mouth Daily.    Omega-3 Fatty Acids (fish oil) 1000 MG capsule capsule 3/13/2021 Self Yes Yes    Take 1,000 mg by mouth 2 (two) times a day.    omeprazole (priLOSEC) 20 MG capsule 3/13/2021 Self No Yes    Take 1 capsule by mouth 2 (two) times a day.    saw palmetto 500 MG capsule 3/13/2021 Self Yes Yes    Take 500 mg by mouth 2 (Two) Times a Day.    vitamin E 100 UNIT capsule 3/13/2021 Self Yes Yes    Take 100 Units by mouth 2 (two) times a day.    acetaminophen (TYLENOL) 500 MG tablet Unknown Self Yes No    Take 500 mg by mouth Every 6 (Six) Hours As Needed for Mild Pain .    Hydrocort-Pramoxine, Perianal, (PROCTOFOAM-HS) 1-1 % rectal foam Unknown Self Yes No    Insert 1 applicator into the rectum As Needed for Hemorrhoids.    Sennosides 15 MG tablet Unknown Self Yes No    Take 15 mg by mouth Daily As Needed (Constipation).    triamcinolone (KENALOG) 0.1 % cream Unknown Self Yes No    Apply 1 application topically to the appropriate area as directed 2 (Two) Times a Day As Needed for Rash.        Hospital Meds:  allopurinol, 300 mg, Oral, Daily  amLODIPine, 10 mg,  Oral, Q24H  aspirin, 81 mg, Oral, Daily  atorvastatin, 40 mg, Oral, Nightly  cetirizine, 10 mg, Oral, Daily  enalapril, 20 mg, Oral, Daily  enoxaparin, 40 mg, Subcutaneous, Daily  finasteride, 5 mg, Oral, Daily  folic acid, 1 mg, Oral, BID  gabapentin, 300 mg, Oral, BID  insulin aspart, 0-7 Units, Subcutaneous, TID AC  lactobacillus acidophilus, 1 capsule, Oral, Daily  metoprolol tartrate, 25 mg, Oral, Q12H  multivitamin, 1 tablet, Oral, Daily  pantoprazole, 40 mg, Oral, Q AM  sodium chloride, 3 mL, Intravenous, Q12H  terazosin, 5 mg, Oral, Nightly  vitamin E, 400 Units, Oral, Daily      Pharmacy to Dose enoxaparin (LOVENOX),       ---------------------------------------------------------------------------------------------------------------------   Vital Signs:  Temp:  [97.8 °F (36.6 °C)-98.3 °F (36.8 °C)] 97.8 °F (36.6 °C)  Heart Rate:  [71-98] 71  Resp:  [17-20] 18  BP: (134-156)/(69-95) 136/84      03/13/21  2221 03/14/21  0130   Weight: 96.5 kg (212 lb 12.8 oz) 97.4 kg (214 lb 12.8 oz)     Body mass index is 31.72 kg/m².  ---------------------------------------------------------------------------------------------------------------------   Physical exam:   Constitutional:  Well-developed and well-nourished.  No respiratory distress.      HENT:  Head: Normocephalic and atraumatic.  Mouth:  Moist mucous membranes.    Eyes:  Conjunctivae and EOM are normal.  Pupils are equal, round, and reactive to light.  No scleral icterus.  Neck:  Neck supple.  No JVD present.    Cardiovascular:  Normal rate, regular rhythm and normal heart sounds with no murmur.  Pulmonary/Chest:  No respiratory distress, no wheezes, no crackles, with normal breath sounds and good air movement.  Abdominal:  Soft.  Bowel sounds are normal.  No distension and no tenderness.   Musculoskeletal:  No edema, no tenderness, and no deformity.  No red or swollen joints anywhere.    Neurological:  Alert and oriented to person, place, and time.  No  cranial nerve deficit.  No tongue deviation.  No facial droop.  No slurred speech.   Skin:  Skin is warm and dry.  No rash noted.  No pallor.   Psychiatric:  Normal mood and affect.  Behavior is normal.  Judgment and thought content normal.   Peripheral vascular:  No edema and strong pulses on all 4 extremities.    ---------------------------------------------------------------------------------------------------------------------   EKG: Sinus rhythm, nonspecific ST-T changes  Telemetry: Sinus, occasional PACs, rare PVCs  I have personally looked at both the EKG and the telemetry strips.  Echo:  Results for orders placed during the hospital encounter of 03/13/21    Adult Transthoracic Echo Complete W/ Cont if Necessary Per Protocol    Interpretation Summary  · Left ventricular ejection fraction appears to be 51 - 55%. Left ventricular systolic function is normal. Wall motion not assessed due to poor endocardial visualization  · Left ventricular diastolic function was normal.  · There is no evidence of pericardial effusion  · No significant functional valvular abnormalities    ---------------------------------------------------------------------------------------------------------------------   Results from last 7 days   Lab Units 03/14/21 0144 03/13/21  2239   WBC 10*3/mm3 8.59 8.18   HEMOGLOBIN g/dL 14.1 14.3   HEMATOCRIT % 43.6 42.4   MCV fL 91.2 87.8   MCHC g/dL 32.3 33.7   PLATELETS 10*3/mm3 332 310         Results from last 7 days   Lab Units 03/14/21 0144 03/13/21  2239   SODIUM mmol/L  --  140   POTASSIUM mmol/L  --  4.1   MAGNESIUM mg/dL 1.9 1.8   CHLORIDE mmol/L  --  103   CO2 mmol/L  --  20.4*   BUN mg/dL  --  21   CREATININE mg/dL  --  1.26   EGFR IF NONAFRICN AM mL/min/1.73  --  56*   CALCIUM mg/dL  --  9.2   GLUCOSE mg/dL  --  248*   ALBUMIN g/dL  --  4.04   BILIRUBIN mg/dL  --  0.2   ALK PHOS U/L  --  74   AST (SGOT) U/L  --  16   ALT (SGPT) U/L  --  8   Estimated Creatinine Clearance: 61.9 mL/min  (by C-G formula based on SCr of 1.26 mg/dL).  No results found for: AMMONIA  Results from last 7 days   Lab Units 03/14/21  0144 03/13/21 2239   TROPONIN T ng/mL <0.010 <0.010     Results from last 7 days   Lab Units 03/13/21 2239   PROBNP pg/mL 140.1     Lab Results   Component Value Date    HGBA1C 8.75 (H) 03/05/2021     Lab Results   Component Value Date    TSH 2.400 03/13/2021     No results found for: PREGTESTUR, PREGSERUM, HCG, HCGQUANT  Pain Management Panel     Pain Management Panel Latest Ref Rng & Units 3/14/2021    AMPHETAMINES SCREEN, URINE Negative Negative    BARBITURATES SCREEN Negative Negative    BENZODIAZEPINE SCREEN, URINE Negative Negative    BUPRENORPHINEUR Negative Negative    COCAINE SCREEN, URINE Negative Negative    METHADONE SCREEN, URINE Negative Negative        No results found for: BLOODCX  No results found for: URINECX  No results found for: WOUNDCX  No results found for: STOOLCX        ---------------------------------------------------------------------------------------------------------------------   Imaging Results (Last 7 Days)     Procedure Component Value Units Date/Time    US Abdomen Complete [[id number]] Collected: 03/14/21 1109     Updated: 03/14/21 1112    Narrative:      EXAM:    US Abdomen Complete     EXAM DATE:    3/14/2021 7:50 AM     CLINICAL HISTORY:    epigastric abdominal pain; R07.9-Chest pain, unspecified     TECHNIQUE:    Real-time ultrasound of the abdomen with image documentation.     COMPARISON:    No relevant prior studies available.     FINDINGS:    Liver:  Unremarkable.  No mass.  No intrahepatic bile duct dilation.    Gallbladder:  Unremarkable.  No gallstones.    Common bile duct:  The CBD measures 3.97 mm  No stones.  No dilation.    Pancreas:  Unremarkable as visualized.    Kidneys:  RIGHT kidney measures  10.66 cm  in length.  LEFT kidney  measures  11.57 cm in length.  No stones.  No hydronephrosis.    Spleen:  Spleen measures  9.97 cm in length.     Aorta:  Unremarkable.  No aneurysm.    Inferior vena cava:  Unremarkable.       Impression:        No acute findings in the abdomen.     This report was finalized on 3/14/2021 11:10 AM by Dr. Catrachito Majano MD.       XR Chest 2 View [[id number]] Collected: 03/13/21 2256     Updated: 03/13/21 2258    Narrative:      CR Chest 2 Vws    INDICATION:    Chest pain tonight    COMPARISON:    None.    FINDINGS:   PA and lateral views of the chest.  Heart and mediastinal contours are normal. The lungs are clear. No pneumothorax or pleural effusion.        Impression:      No acute cardiopulmonary findings.    Signer Name: Boaz Bowman MD   Signed: 3/13/2021 10:56 PM   Workstation Name: [email or web address]    [facility]        ----------------------------------------------------------------------------------------------------------------------  Assessment:   Chest pain, typical suggestive of CCS class II-III angina, abnormal stress test with apical partially reversible perfusion defect suggestive of moderate ischemia in the anterior apex with mild infarction.  Diabetes mellitus type 2  Hypertension  Dyslipidemia  Gastroesophageal reflux disease  Obesity        Plan:   Have discussed the findings of the stress test with the patient, given his multiple risk factors and ongoing anginal type chest pain recommend further evaluation with coronary angiogram and patient was agreeable.  Patient is currently on aspirin, low-dose beta-blocker, amlodipine, ACE inhibitor.  N.p.o. after midnight  Plan for cath in a.m.  I have explained the risks associated with the procedure to the patient including but not limited to an allergic reaction to the contrast material or medications used during the procedure bleeding, infection, and bruising at the catheter insertion site blood clots, which may trigger heart attack, stroke,   damage to the artery where the catheter was inserted, or damage to the arteries as the catheter travels  through your body, irregular heart rhythm arrhythmias, kidney damage caused by the contrast material.      Thank you for the consult.          Tariq Dallas MD, [facility]  Interventional Cardiology      03/14/21  14:26 EDT

## 2021-03-15 ENCOUNTER — HOSPITAL ENCOUNTER (INPATIENT)
Facility: HOSPITAL | Age: 71
LOS: 7 days | Discharge: HOME HEALTH | End: 2021-03-22
Attending: THORACIC SURGERY (CARDIOTHORACIC VASCULAR SURGERY) | Admitting: THORACIC SURGERY (CARDIOTHORACIC VASCULAR SURGERY)

## 2021-03-15 ENCOUNTER — APPOINTMENT (OUTPATIENT)
Dept: GENERAL RADIOLOGY | Facility: HOSPITAL | Age: 71
End: 2021-03-15

## 2021-03-15 ENCOUNTER — PREP FOR SURGERY (OUTPATIENT)
Dept: OTHER | Facility: HOSPITAL | Age: 71
End: 2021-03-15

## 2021-03-15 VITALS
OXYGEN SATURATION: 97 % | RESPIRATION RATE: 18 BRPM | WEIGHT: 213.8 LBS | TEMPERATURE: 97.8 F | BODY MASS INDEX: 31.67 KG/M2 | HEART RATE: 66 BPM | HEIGHT: 69 IN | SYSTOLIC BLOOD PRESSURE: 110 MMHG | DIASTOLIC BLOOD PRESSURE: 59 MMHG

## 2021-03-15 DIAGNOSIS — I25.119 CORONARY ARTERY DISEASE INVOLVING NATIVE HEART WITH ANGINA PECTORIS, UNSPECIFIED VESSEL OR LESION TYPE (HCC): ICD-10-CM

## 2021-03-15 DIAGNOSIS — N40.0 BENIGN PROSTATIC HYPERPLASIA WITHOUT LOWER URINARY TRACT SYMPTOMS: ICD-10-CM

## 2021-03-15 DIAGNOSIS — K21.9 GASTROESOPHAGEAL REFLUX DISEASE WITHOUT ESOPHAGITIS: ICD-10-CM

## 2021-03-15 DIAGNOSIS — M1A.09X0 IDIOPATHIC CHRONIC GOUT OF MULTIPLE SITES WITHOUT TOPHUS: ICD-10-CM

## 2021-03-15 DIAGNOSIS — J30.89 SEASONAL ALLERGIC RHINITIS DUE TO OTHER ALLERGIC TRIGGER: ICD-10-CM

## 2021-03-15 DIAGNOSIS — E11.59 HYPERTENSION ASSOCIATED WITH DIABETES (HCC): ICD-10-CM

## 2021-03-15 DIAGNOSIS — E11.69 HYPERLIPIDEMIA ASSOCIATED WITH TYPE 2 DIABETES MELLITUS (HCC): ICD-10-CM

## 2021-03-15 DIAGNOSIS — N17.9 AKI (ACUTE KIDNEY INJURY) (HCC): ICD-10-CM

## 2021-03-15 DIAGNOSIS — E66.9 OBESITY (BMI 30-39.9): ICD-10-CM

## 2021-03-15 DIAGNOSIS — E78.5 HYPERLIPIDEMIA ASSOCIATED WITH TYPE 2 DIABETES MELLITUS (HCC): ICD-10-CM

## 2021-03-15 DIAGNOSIS — Z74.09 IMPAIRED MOBILITY AND ACTIVITIES OF DAILY LIVING: ICD-10-CM

## 2021-03-15 DIAGNOSIS — I15.2 HYPERTENSION ASSOCIATED WITH DIABETES (HCC): ICD-10-CM

## 2021-03-15 DIAGNOSIS — Z78.9 IMPAIRED MOBILITY AND ACTIVITIES OF DAILY LIVING: ICD-10-CM

## 2021-03-15 DIAGNOSIS — E11.65 TYPE 2 DIABETES MELLITUS WITH HYPERGLYCEMIA, WITHOUT LONG-TERM CURRENT USE OF INSULIN (HCC): ICD-10-CM

## 2021-03-15 DIAGNOSIS — I25.119 CORONARY ARTERY DISEASE INVOLVING NATIVE CORONARY ARTERY OF NATIVE HEART WITH ANGINA PECTORIS (HCC): Primary | ICD-10-CM

## 2021-03-15 DIAGNOSIS — E11.49 OTHER DIABETIC NEUROLOGICAL COMPLICATION ASSOCIATED WITH TYPE 2 DIABETES MELLITUS (HCC): ICD-10-CM

## 2021-03-15 DIAGNOSIS — Z87.39 HISTORY OF GOUT: ICD-10-CM

## 2021-03-15 PROBLEM — I25.10 CAD (CORONARY ARTERY DISEASE): Status: ACTIVE | Noted: 2021-03-15

## 2021-03-15 LAB
ABO GROUP BLD: NORMAL
ABO GROUP BLD: NORMAL
ALBUMIN SERPL-MCNC: 3.49 G/DL (ref 3.5–5.2)
ALBUMIN SERPL-MCNC: 4 G/DL (ref 3.5–5.2)
ALBUMIN/GLOB SERPL: 1.4 G/DL
ALBUMIN/GLOB SERPL: 1.5 G/DL
ALP SERPL-CCNC: 66 U/L (ref 39–117)
ALP SERPL-CCNC: 69 U/L (ref 39–117)
ALT SERPL W P-5'-P-CCNC: 11 U/L (ref 1–41)
ALT SERPL W P-5'-P-CCNC: 12 U/L (ref 1–41)
AMPHET+METHAMPHET UR QL: NEGATIVE
AMPHETAMINES UR QL: NEGATIVE
ANION GAP SERPL CALCULATED.3IONS-SCNC: 14 MMOL/L (ref 5–15)
ANION GAP SERPL CALCULATED.3IONS-SCNC: 9 MMOL/L (ref 5–15)
APTT PPP: 24.1 SECONDS (ref 24–37)
AST SERPL-CCNC: 15 U/L (ref 1–40)
AST SERPL-CCNC: 17 U/L (ref 1–40)
BARBITURATES UR QL SCN: NEGATIVE
BASOPHILS # BLD AUTO: 0.05 10*3/MM3 (ref 0–0.2)
BASOPHILS NFR BLD AUTO: 0.6 % (ref 0–1.5)
BENZODIAZ UR QL SCN: NEGATIVE
BILIRUB SERPL-MCNC: 0.2 MG/DL (ref 0–1.2)
BILIRUB SERPL-MCNC: 0.3 MG/DL (ref 0–1.2)
BILIRUB UR QL STRIP: NEGATIVE
BLD GP AB SCN SERPL QL: NEGATIVE
BUN SERPL-MCNC: 20 MG/DL (ref 8–23)
BUN SERPL-MCNC: 20 MG/DL (ref 8–23)
BUN/CREAT SERPL: 14.8 (ref 7–25)
BUN/CREAT SERPL: 15.4 (ref 7–25)
BUPRENORPHINE SERPL-MCNC: NEGATIVE NG/ML
CALCIUM SPEC-SCNC: 8.7 MG/DL (ref 8.6–10.5)
CALCIUM SPEC-SCNC: 8.8 MG/DL (ref 8.6–10.5)
CANNABINOIDS SERPL QL: NEGATIVE
CHLORIDE SERPL-SCNC: 101 MMOL/L (ref 98–107)
CHLORIDE SERPL-SCNC: 99 MMOL/L (ref 98–107)
CHOLEST SERPL-MCNC: 223 MG/DL (ref 0–200)
CLARITY UR: CLEAR
CO2 SERPL-SCNC: 20 MMOL/L (ref 22–29)
CO2 SERPL-SCNC: 25 MMOL/L (ref 22–29)
COCAINE UR QL: NEGATIVE
COLOR UR: YELLOW
CREAT SERPL-MCNC: 1.3 MG/DL (ref 0.76–1.27)
CREAT SERPL-MCNC: 1.35 MG/DL (ref 0.76–1.27)
DEPRECATED RDW RBC AUTO: 40.5 FL (ref 37–54)
DEPRECATED RDW RBC AUTO: 41.4 FL (ref 37–54)
EOSINOPHIL # BLD AUTO: 0.26 10*3/MM3 (ref 0–0.4)
EOSINOPHIL NFR BLD AUTO: 3.1 % (ref 0.3–6.2)
ERYTHROCYTE [DISTWIDTH] IN BLOOD BY AUTOMATED COUNT: 12.4 % (ref 12.3–15.4)
ERYTHROCYTE [DISTWIDTH] IN BLOOD BY AUTOMATED COUNT: 12.6 % (ref 12.3–15.4)
GFR SERPL CREATININE-BSD FRML MDRD: 52 ML/MIN/1.73
GFR SERPL CREATININE-BSD FRML MDRD: 54 ML/MIN/1.73
GLOBULIN UR ELPH-MCNC: 2.4 GM/DL
GLOBULIN UR ELPH-MCNC: 2.6 GM/DL
GLUCOSE BLDC GLUCOMTR-MCNC: 241 MG/DL (ref 70–130)
GLUCOSE BLDC GLUCOMTR-MCNC: 259 MG/DL (ref 70–130)
GLUCOSE BLDC GLUCOMTR-MCNC: 392 MG/DL (ref 70–130)
GLUCOSE SERPL-MCNC: 231 MG/DL (ref 65–99)
GLUCOSE SERPL-MCNC: 323 MG/DL (ref 65–99)
GLUCOSE UR STRIP-MCNC: ABNORMAL MG/DL
HBA1C MFR BLD: 8.7 % (ref 4.8–5.6)
HCT VFR BLD AUTO: 39.6 % (ref 37.5–51)
HCT VFR BLD AUTO: 43.2 % (ref 37.5–51)
HDLC SERPL-MCNC: 30 MG/DL (ref 40–60)
HGB BLD-MCNC: 13 G/DL (ref 13–17.7)
HGB BLD-MCNC: 14.1 G/DL (ref 13–17.7)
HGB UR QL STRIP.AUTO: NEGATIVE
IMM GRANULOCYTES # BLD AUTO: 0.05 10*3/MM3 (ref 0–0.05)
IMM GRANULOCYTES NFR BLD AUTO: 0.6 % (ref 0–0.5)
INR PPP: 0.93 (ref 0.85–1.16)
KETONES UR QL STRIP: NEGATIVE
LDLC SERPL CALC-MCNC: 91 MG/DL (ref 0–100)
LDLC/HDLC SERPL: 2.31 {RATIO}
LEUKOCYTE ESTERASE UR QL STRIP.AUTO: NEGATIVE
LYMPHOCYTES # BLD AUTO: 3.34 10*3/MM3 (ref 0.7–3.1)
LYMPHOCYTES NFR BLD AUTO: 40.3 % (ref 19.6–45.3)
MCH RBC QN AUTO: 29.2 PG (ref 26.6–33)
MCH RBC QN AUTO: 29.4 PG (ref 26.6–33)
MCHC RBC AUTO-ENTMCNC: 32.6 G/DL (ref 31.5–35.7)
MCHC RBC AUTO-ENTMCNC: 32.8 G/DL (ref 31.5–35.7)
MCV RBC AUTO: 89.4 FL (ref 79–97)
MCV RBC AUTO: 89.6 FL (ref 79–97)
METHADONE UR QL SCN: NEGATIVE
MONOCYTES # BLD AUTO: 0.62 10*3/MM3 (ref 0.1–0.9)
MONOCYTES NFR BLD AUTO: 7.5 % (ref 5–12)
NEUTROPHILS NFR BLD AUTO: 3.96 10*3/MM3 (ref 1.7–7)
NEUTROPHILS NFR BLD AUTO: 47.9 % (ref 42.7–76)
NITRITE UR QL STRIP: NEGATIVE
NRBC BLD AUTO-RTO: 0 /100 WBC (ref 0–0.2)
OPIATES UR QL: NEGATIVE
OXYCODONE UR QL SCN: NEGATIVE
PA ADP PRP-ACNC: 218 PRU
PCP UR QL SCN: NEGATIVE
PH UR STRIP.AUTO: <=5 [PH] (ref 5–8)
PHOSPHATE SERPL-MCNC: 3.3 MG/DL (ref 2.5–4.5)
PLATELET # BLD AUTO: 297 10*3/MM3 (ref 140–450)
PLATELET # BLD AUTO: 307 10*3/MM3 (ref 140–450)
PMV BLD AUTO: 9 FL (ref 6–12)
PMV BLD AUTO: 9 FL (ref 6–12)
POTASSIUM SERPL-SCNC: 4.1 MMOL/L (ref 3.5–5.2)
POTASSIUM SERPL-SCNC: 4.5 MMOL/L (ref 3.5–5.2)
PROPOXYPH UR QL: NEGATIVE
PROT SERPL-MCNC: 5.9 G/DL (ref 6–8.5)
PROT SERPL-MCNC: 6.6 G/DL (ref 6–8.5)
PROT UR QL STRIP: NEGATIVE
PROTHROMBIN TIME: 12.2 SECONDS (ref 11.5–14)
RBC # BLD AUTO: 4.42 10*6/MM3 (ref 4.14–5.8)
RBC # BLD AUTO: 4.83 10*6/MM3 (ref 4.14–5.8)
RH BLD: POSITIVE
RH BLD: POSITIVE
SARS-COV-2 RDRP RESP QL NAA+PROBE: NORMAL
SODIUM SERPL-SCNC: 133 MMOL/L (ref 136–145)
SODIUM SERPL-SCNC: 135 MMOL/L (ref 136–145)
SP GR UR STRIP: >=1.03 (ref 1–1.03)
T&S EXPIRATION DATE: NORMAL
TRICYCLICS UR QL SCN: NEGATIVE
TRIGL SERPL-MCNC: 618 MG/DL (ref 0–150)
UROBILINOGEN UR QL STRIP: ABNORMAL
VLDLC SERPL-MCNC: 102 MG/DL (ref 5–40)
WBC # BLD AUTO: 7.34 10*3/MM3 (ref 3.4–10.8)
WBC # BLD AUTO: 8.28 10*3/MM3 (ref 3.4–10.8)

## 2021-03-15 PROCEDURE — 25010000002 FENTANYL CITRATE (PF) 100 MCG/2ML SOLUTION: Performed by: INTERNAL MEDICINE

## 2021-03-15 PROCEDURE — 63710000001 INSULIN LISPRO (HUMAN) PER 5 UNITS: Performed by: PHYSICIAN ASSISTANT

## 2021-03-15 PROCEDURE — 93005 ELECTROCARDIOGRAM TRACING: CPT | Performed by: THORACIC SURGERY (CARDIOTHORACIC VASCULAR SURGERY)

## 2021-03-15 PROCEDURE — 63710000001 INSULIN ASPART PER 5 UNITS: Performed by: INTERNAL MEDICINE

## 2021-03-15 PROCEDURE — 80306 DRUG TEST PRSMV INSTRMNT: CPT | Performed by: PHYSICIAN ASSISTANT

## 2021-03-15 PROCEDURE — 82962 GLUCOSE BLOOD TEST: CPT

## 2021-03-15 PROCEDURE — 0 IOPAMIDOL PER 1 ML: Performed by: INTERNAL MEDICINE

## 2021-03-15 PROCEDURE — 99223 1ST HOSP IP/OBS HIGH 75: CPT | Performed by: PHYSICIAN ASSISTANT

## 2021-03-15 PROCEDURE — 85025 COMPLETE CBC W/AUTO DIFF WBC: CPT | Performed by: STUDENT IN AN ORGANIZED HEALTH CARE EDUCATION/TRAINING PROGRAM

## 2021-03-15 PROCEDURE — A9270 NON-COVERED ITEM OR SERVICE: HCPCS | Performed by: INTERNAL MEDICINE

## 2021-03-15 PROCEDURE — 85576 BLOOD PLATELET AGGREGATION: CPT | Performed by: PHYSICIAN ASSISTANT

## 2021-03-15 PROCEDURE — 99217 PR OBSERVATION CARE DISCHARGE MANAGEMENT: CPT | Performed by: HOSPITALIST

## 2021-03-15 PROCEDURE — 93458 L HRT ARTERY/VENTRICLE ANGIO: CPT | Performed by: INTERNAL MEDICINE

## 2021-03-15 PROCEDURE — 63710000001 ENALAPRIL 10 MG TABLET: Performed by: INTERNAL MEDICINE

## 2021-03-15 PROCEDURE — 80061 LIPID PANEL: CPT | Performed by: PHYSICIAN ASSISTANT

## 2021-03-15 PROCEDURE — 86901 BLOOD TYPING SEROLOGIC RH(D): CPT | Performed by: PHYSICIAN ASSISTANT

## 2021-03-15 PROCEDURE — 85027 COMPLETE CBC AUTOMATED: CPT | Performed by: PHYSICIAN ASSISTANT

## 2021-03-15 PROCEDURE — 93010 ELECTROCARDIOGRAM REPORT: CPT | Performed by: INTERNAL MEDICINE

## 2021-03-15 PROCEDURE — 80053 COMPREHEN METABOLIC PANEL: CPT | Performed by: PHYSICIAN ASSISTANT

## 2021-03-15 PROCEDURE — 86923 COMPATIBILITY TEST ELECTRIC: CPT

## 2021-03-15 PROCEDURE — 86900 BLOOD TYPING SEROLOGIC ABO: CPT | Performed by: PHYSICIAN ASSISTANT

## 2021-03-15 PROCEDURE — 63710000001 METOPROLOL TARTRATE 25 MG TABLET: Performed by: INTERNAL MEDICINE

## 2021-03-15 PROCEDURE — C1769 GUIDE WIRE: HCPCS | Performed by: INTERNAL MEDICINE

## 2021-03-15 PROCEDURE — 63710000001 AMLODIPINE 10 MG TABLET: Performed by: INTERNAL MEDICINE

## 2021-03-15 PROCEDURE — 87635 SARS-COV-2 COVID-19 AMP PRB: CPT | Performed by: PHYSICIAN ASSISTANT

## 2021-03-15 PROCEDURE — 85730 THROMBOPLASTIN TIME PARTIAL: CPT | Performed by: PHYSICIAN ASSISTANT

## 2021-03-15 PROCEDURE — 86850 RBC ANTIBODY SCREEN: CPT | Performed by: PHYSICIAN ASSISTANT

## 2021-03-15 PROCEDURE — 84100 ASSAY OF PHOSPHORUS: CPT | Performed by: PHYSICIAN ASSISTANT

## 2021-03-15 PROCEDURE — 63710000001 GABAPENTIN 300 MG CAPSULE: Performed by: INTERNAL MEDICINE

## 2021-03-15 PROCEDURE — 85610 PROTHROMBIN TIME: CPT | Performed by: PHYSICIAN ASSISTANT

## 2021-03-15 PROCEDURE — G0378 HOSPITAL OBSERVATION PER HR: HCPCS

## 2021-03-15 PROCEDURE — 83036 HEMOGLOBIN GLYCOSYLATED A1C: CPT | Performed by: PHYSICIAN ASSISTANT

## 2021-03-15 PROCEDURE — 86900 BLOOD TYPING SEROLOGIC ABO: CPT

## 2021-03-15 PROCEDURE — 81003 URINALYSIS AUTO W/O SCOPE: CPT | Performed by: PHYSICIAN ASSISTANT

## 2021-03-15 PROCEDURE — 86901 BLOOD TYPING SEROLOGIC RH(D): CPT

## 2021-03-15 PROCEDURE — 25010000002 MIDAZOLAM PER 1 MG: Performed by: INTERNAL MEDICINE

## 2021-03-15 PROCEDURE — 71045 X-RAY EXAM CHEST 1 VIEW: CPT

## 2021-03-15 PROCEDURE — C1894 INTRO/SHEATH, NON-LASER: HCPCS | Performed by: INTERNAL MEDICINE

## 2021-03-15 PROCEDURE — 25010000002 HEPARIN (PORCINE) PER 1000 UNITS: Performed by: INTERNAL MEDICINE

## 2021-03-15 RX ORDER — DEXTROSE MONOHYDRATE 25 G/50ML
25 INJECTION, SOLUTION INTRAVENOUS
Status: DISCONTINUED | OUTPATIENT
Start: 2021-03-15 | End: 2021-03-22 | Stop reason: HOSPADM

## 2021-03-15 RX ORDER — SODIUM CHLORIDE 0.9 % (FLUSH) 0.9 %
10 SYRINGE (ML) INJECTION EVERY 12 HOURS SCHEDULED
Status: DISCONTINUED | OUTPATIENT
Start: 2021-03-15 | End: 2021-03-22 | Stop reason: HOSPADM

## 2021-03-15 RX ORDER — NITROGLYCERIN 0.4 MG/1
0.4 TABLET SUBLINGUAL
Status: DISCONTINUED | OUTPATIENT
Start: 2021-03-15 | End: 2021-03-22 | Stop reason: HOSPADM

## 2021-03-15 RX ORDER — AMLODIPINE BESYLATE 10 MG/1
10 TABLET ORAL
Status: DISCONTINUED | OUTPATIENT
Start: 2021-03-16 | End: 2021-03-17

## 2021-03-15 RX ORDER — SODIUM CHLORIDE 0.9 % (FLUSH) 0.9 %
10 SYRINGE (ML) INJECTION AS NEEDED
Status: DISCONTINUED | OUTPATIENT
Start: 2021-03-15 | End: 2021-03-20

## 2021-03-15 RX ORDER — SODIUM CHLORIDE 0.9 % (FLUSH) 0.9 %
3 SYRINGE (ML) INJECTION EVERY 12 HOURS SCHEDULED
Status: DISCONTINUED | OUTPATIENT
Start: 2021-03-15 | End: 2021-03-20

## 2021-03-15 RX ORDER — ACETAMINOPHEN 325 MG/1
650 TABLET ORAL EVERY 4 HOURS PRN
Status: DISCONTINUED | OUTPATIENT
Start: 2021-03-15 | End: 2021-03-15 | Stop reason: HOSPADM

## 2021-03-15 RX ORDER — ASPIRIN 81 MG/1
81 TABLET ORAL DAILY
Status: DISCONTINUED | OUTPATIENT
Start: 2021-03-16 | End: 2021-03-17

## 2021-03-15 RX ORDER — ATORVASTATIN CALCIUM 40 MG/1
40 TABLET, FILM COATED ORAL NIGHTLY
Status: DISCONTINUED | OUTPATIENT
Start: 2021-03-15 | End: 2021-03-22 | Stop reason: HOSPADM

## 2021-03-15 RX ORDER — PANTOPRAZOLE SODIUM 40 MG/1
40 TABLET, DELAYED RELEASE ORAL DAILY
Status: DISCONTINUED | OUTPATIENT
Start: 2021-03-16 | End: 2021-03-22 | Stop reason: HOSPADM

## 2021-03-15 RX ORDER — CHLORHEXIDINE GLUCONATE 0.12 MG/ML
15 RINSE ORAL EVERY 12 HOURS
Status: COMPLETED | OUTPATIENT
Start: 2021-03-15 | End: 2021-03-16

## 2021-03-15 RX ORDER — ENALAPRIL MALEATE 10 MG/1
20 TABLET ORAL DAILY
Status: DISCONTINUED | OUTPATIENT
Start: 2021-03-16 | End: 2021-03-17

## 2021-03-15 RX ORDER — LIDOCAINE HYDROCHLORIDE 20 MG/ML
INJECTION, SOLUTION INFILTRATION; PERINEURAL AS NEEDED
Status: DISCONTINUED | OUTPATIENT
Start: 2021-03-15 | End: 2021-03-15 | Stop reason: HOSPADM

## 2021-03-15 RX ORDER — FOLIC ACID 1 MG/1
1 TABLET ORAL 2 TIMES DAILY
Status: DISCONTINUED | OUTPATIENT
Start: 2021-03-15 | End: 2021-03-22 | Stop reason: HOSPADM

## 2021-03-15 RX ORDER — VITAMIN E 268 MG
400 CAPSULE ORAL DAILY
Start: 2021-03-15

## 2021-03-15 RX ORDER — DIPHENOXYLATE HYDROCHLORIDE AND ATROPINE SULFATE 2.5; .025 MG/1; MG/1
1 TABLET ORAL DAILY
Status: DISCONTINUED | OUTPATIENT
Start: 2021-03-16 | End: 2021-03-22 | Stop reason: HOSPADM

## 2021-03-15 RX ORDER — AMLODIPINE BESYLATE 10 MG/1
10 TABLET ORAL
Status: ON HOLD
Start: 2021-03-16 | End: 2021-03-16

## 2021-03-15 RX ORDER — VITAMIN E 268 MG
400 CAPSULE ORAL DAILY
Status: DISCONTINUED | OUTPATIENT
Start: 2021-03-16 | End: 2021-03-22 | Stop reason: HOSPADM

## 2021-03-15 RX ORDER — FINASTERIDE 5 MG/1
5 TABLET, FILM COATED ORAL DAILY
Status: DISCONTINUED | OUTPATIENT
Start: 2021-03-16 | End: 2021-03-22 | Stop reason: HOSPADM

## 2021-03-15 RX ORDER — NITROGLYCERIN 0.4 MG/1
0.4 TABLET SUBLINGUAL
Refills: 12 | Status: ON HOLD
Start: 2021-03-15 | End: 2021-03-16

## 2021-03-15 RX ORDER — ATORVASTATIN CALCIUM 40 MG/1
40 TABLET, FILM COATED ORAL NIGHTLY
Status: ON HOLD
Start: 2021-03-15 | End: 2021-03-16

## 2021-03-15 RX ORDER — MIDAZOLAM HYDROCHLORIDE 1 MG/ML
INJECTION INTRAMUSCULAR; INTRAVENOUS AS NEEDED
Status: DISCONTINUED | OUTPATIENT
Start: 2021-03-15 | End: 2021-03-15 | Stop reason: HOSPADM

## 2021-03-15 RX ORDER — ACETAMINOPHEN 325 MG/1
650 TABLET ORAL EVERY 4 HOURS PRN
Status: DISCONTINUED | OUTPATIENT
Start: 2021-03-15 | End: 2021-03-22 | Stop reason: HOSPADM

## 2021-03-15 RX ORDER — SODIUM CHLORIDE 9 MG/ML
INJECTION, SOLUTION INTRAVENOUS CONTINUOUS PRN
Status: DISCONTINUED | OUTPATIENT
Start: 2021-03-15 | End: 2021-03-15 | Stop reason: HOSPADM

## 2021-03-15 RX ORDER — TERAZOSIN 5 MG/1
5 CAPSULE ORAL NIGHTLY
Status: DISCONTINUED | OUTPATIENT
Start: 2021-03-15 | End: 2021-03-22 | Stop reason: HOSPADM

## 2021-03-15 RX ORDER — ASPIRIN 81 MG/1
81 TABLET ORAL ONCE
Status: COMPLETED | OUTPATIENT
Start: 2021-03-15 | End: 2021-03-15

## 2021-03-15 RX ORDER — SODIUM CHLORIDE 9 MG/ML
100 INJECTION, SOLUTION INTRAVENOUS ONCE
Status: COMPLETED | OUTPATIENT
Start: 2021-03-15 | End: 2021-03-15

## 2021-03-15 RX ORDER — SODIUM CHLORIDE 0.9 % (FLUSH) 0.9 %
3-10 SYRINGE (ML) INJECTION AS NEEDED
Status: DISCONTINUED | OUTPATIENT
Start: 2021-03-15 | End: 2021-03-17

## 2021-03-15 RX ORDER — FENTANYL CITRATE 50 UG/ML
INJECTION, SOLUTION INTRAMUSCULAR; INTRAVENOUS AS NEEDED
Status: DISCONTINUED | OUTPATIENT
Start: 2021-03-15 | End: 2021-03-15 | Stop reason: HOSPADM

## 2021-03-15 RX ORDER — CETIRIZINE HYDROCHLORIDE 10 MG/1
10 TABLET ORAL DAILY
Status: DISCONTINUED | OUTPATIENT
Start: 2021-03-16 | End: 2021-03-22 | Stop reason: HOSPADM

## 2021-03-15 RX ORDER — CHLORHEXIDINE GLUCONATE 500 MG/1
1 CLOTH TOPICAL EVERY 12 HOURS PRN
Status: DISCONTINUED | OUTPATIENT
Start: 2021-03-15 | End: 2021-03-16

## 2021-03-15 RX ORDER — PANTOPRAZOLE SODIUM 40 MG/1
40 TABLET, DELAYED RELEASE ORAL DAILY
Status: ON HOLD
Start: 2021-03-15 | End: 2021-03-16

## 2021-03-15 RX ORDER — NICOTINE POLACRILEX 4 MG
15 LOZENGE BUCCAL
Status: DISCONTINUED | OUTPATIENT
Start: 2021-03-15 | End: 2021-03-22 | Stop reason: HOSPADM

## 2021-03-15 RX ORDER — ASPIRIN 81 MG/1
81 TABLET ORAL DAILY
Status: DISCONTINUED | OUTPATIENT
Start: 2021-03-15 | End: 2021-03-15 | Stop reason: HOSPADM

## 2021-03-15 RX ORDER — IPRATROPIUM BROMIDE AND ALBUTEROL SULFATE 2.5; .5 MG/3ML; MG/3ML
3 SOLUTION RESPIRATORY (INHALATION) EVERY 4 HOURS PRN
Status: DISCONTINUED | OUTPATIENT
Start: 2021-03-15 | End: 2021-03-22 | Stop reason: HOSPADM

## 2021-03-15 RX ORDER — ALLOPURINOL 300 MG/1
300 TABLET ORAL DAILY
Status: DISCONTINUED | OUTPATIENT
Start: 2021-03-16 | End: 2021-03-22 | Stop reason: HOSPADM

## 2021-03-15 RX ORDER — L.ACID,PARA/B.BIFIDUM/S.THERM 8B CELL
1 CAPSULE ORAL DAILY
Status: DISCONTINUED | OUTPATIENT
Start: 2021-03-16 | End: 2021-03-22 | Stop reason: HOSPADM

## 2021-03-15 RX ADMIN — GABAPENTIN 300 MG: 300 CAPSULE ORAL at 10:22

## 2021-03-15 RX ADMIN — AMLODIPINE BESYLATE 10 MG: 10 TABLET ORAL at 10:22

## 2021-03-15 RX ADMIN — ENALAPRIL MALEATE 20 MG: 10 TABLET ORAL at 10:22

## 2021-03-15 RX ADMIN — MUPIROCIN 1 APPLICATION: 20 OINTMENT TOPICAL at 23:05

## 2021-03-15 RX ADMIN — METOPROLOL TARTRATE 25 MG: 25 TABLET, FILM COATED ORAL at 20:38

## 2021-03-15 RX ADMIN — ATORVASTATIN CALCIUM 40 MG: 40 TABLET, FILM COATED ORAL at 20:38

## 2021-03-15 RX ADMIN — INSULIN ASPART 6 UNITS: 100 INJECTION, SOLUTION INTRAVENOUS; SUBCUTANEOUS at 12:11

## 2021-03-15 RX ADMIN — FOLIC ACID 1 MG: 1 TABLET ORAL at 20:38

## 2021-03-15 RX ADMIN — METOPROLOL TARTRATE 25 MG: 25 TABLET, FILM COATED ORAL at 10:22

## 2021-03-15 RX ADMIN — SODIUM CHLORIDE, PRESERVATIVE FREE 10 ML: 5 INJECTION INTRAVENOUS at 20:40

## 2021-03-15 RX ADMIN — SODIUM CHLORIDE 100 ML/HR: 9 INJECTION, SOLUTION INTRAVENOUS at 10:23

## 2021-03-15 RX ADMIN — ASPIRIN 81 MG: 81 TABLET, COATED ORAL at 20:38

## 2021-03-15 RX ADMIN — TERAZOSIN HYDROCHLORIDE 5 MG: 5 CAPSULE ORAL at 20:38

## 2021-03-15 RX ADMIN — INSULIN LISPRO 3 UNITS: 100 INJECTION, SOLUTION INTRAVENOUS; SUBCUTANEOUS at 23:31

## 2021-03-15 RX ADMIN — CHLORHEXIDINE GLUCONATE 0.12% ORAL RINSE 15 ML: 1.2 LIQUID ORAL at 20:37

## 2021-03-15 RX ADMIN — SODIUM CHLORIDE, PRESERVATIVE FREE 3 ML: 5 INJECTION INTRAVENOUS at 20:40

## 2021-03-15 NOTE — RESEARCH
[facility] Adult Cardiac Surgery Database Version 4.20  RISK SCORES  Procedure: Isolated CAB  Risk of Mortality:  0.951%  Renal Failure:  1.347%  Permanent Stroke:  1.295%  Prolonged Ventilation:  3.865%  DSW Infection:  0.226%  Reoperation:  1.338%  Morbidity or Mortality:  6.418%  Short Length of Stay:  54.010%  Long Length of Stay:  2.571%

## 2021-03-15 NOTE — PLAN OF CARE
Goal Outcome Evaluation:   Patient resting well during shift. He has no complaints or concerns at this time. He remains NPO at midnight for heart cath in the AM. No distress noted will continue to follow plan of care.

## 2021-03-15 NOTE — NURSING NOTE
Patient just arrived. Patient settled afebrile, room air, VSS, NSR on tele.  No chest pain or SOA.  Paged on call for admission orders.

## 2021-03-15 NOTE — DISCHARGE SUMMARY
[facility] MEDICINE DISCHARGE SUMMARY    Patient Identification:  Name:  William Villasenor  Age:  70 y.o.  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    HOSPITAL COURSE  For detailed history and physical exam I would refer you to the admission note that was done on 3/14/2021.  Patient presented emergency room with chief complaints of increasing shortness of breath and chest pain for the past 1 week chest pain described as heaviness epigastric can occur with rest or activity.  Because of his risk factors he was admitted, after ruling out for MI by cardiac enzymes he underwent stress test which was grossly abnormal.  After the abnormal stress test patient proceed with diagnostic cardiac cath with three-vessel disease noted, for detailed result I would refer you to the cardiac cath report.  Because of this Dr. Zaragoza recommended the patient to have CABG, he was able to discuss the findings with Dr. Simms at [facility] in [place] and agreed to accept the patient hence this transfer.  The rest of his stay was uneventful, telemetry failed to reveal arrhythmia, he was chest pain-free during his entire stay treated medically.    VITAL SIGNS:      21  2221 21  0130 03/15/21  0503   Weight: 96.5 kg (212 lb 12.8 oz) 97.4 kg (214 lb 12.8 oz) 97 kg (213 lb 12.8 oz)     Body mass index is 31.57 kg/m².  Vitals:    03/15/21 1150   BP: 109/58   Pulse: 64   Resp: 18   Temp:    SpO2: 96%     PHYSICAL EXAM:  General: Comfortable,awake, alert, oriented to self, place, and  time, well-developed, he is sitting on his bed.  No respiratory distress.    Skin:  Skin is warm and dry. No rash noted. No pallor.    HENT:  Head:  Normocephalic and atraumatic.  Mouth:  Moist mucous membranes.    Eyes:  Conjunctivae and EOM are normal.  Pupils are equal, round, and reactive to light.  No scleral icterus.    Neck:  Neck supple.  No JVD present.    No bruit  Pulmonary/Chest:  No respiratory distress, no wheezes, no crackles, with normal breath sounds and good air movement.  Cardiovascular:  Normal rate, regular rhythm and normal heart sounds with no murmur.  Abdominal:  Soft.  Bowel sounds are normal.  No distension and no tenderness.   Extremities:  No edema, no tenderness, and no deformity.  No red or swollen joints anywhere.  Strong pulses in all 4 extremities with no clubbing, no cyanosis, no edema.  Neurological:  Motor strength equal no obvious deficit, sensory grossly intact.   No cranial nerve deficit.  No tongue deviation.  No facial droop.  No slurred speech.    Genitourinary: No Santos catheter    ----------    DISCHARGE MEDICATIONS:     Discharge Medications      New Medications      Instructions Start Date   amLODIPine 10 MG tablet  Commonly known as: NORVASC   10 mg, Oral, Every 24 Hours Scheduled   Start Date: March 16, 2021     atorvastatin 40 MG tablet  Commonly known as: LIPITOR   40 mg, Oral, Nightly      metoprolol tartrate 25 MG tablet  Commonly known as: LOPRESSOR   25 mg, Oral, Every 12 Hours Scheduled      nitroglycerin 0.4 MG SL tablet  Commonly known as: NITROSTAT   0.4 mg, Sublingual, Every 5 Minutes PRN, Take no more than 3 doses in 15 minutes.      pantoprazole 40 MG EC tablet  Commonly known as: PROTONIX   40 mg, Oral, Daily      PHARMACY TO DOSE ENOXAPARIN (LOVENOX)   Does not apply, Daily         Changes to Medications      Instructions Start Date   vitamin E 400 UNIT capsule  What changed:   · medication strength  · how much to take  · when to take this   400 Units,  Oral, Daily         Continue These Medications      Instructions Start Date   acetaminophen 500 MG tablet  Commonly known as: TYLENOL   500 mg, Oral, Every 6 Hours PRN      Acidophilus 100 MG capsule   100 mg, Oral, Daily      allopurinol 300 MG tablet  Commonly known as: ZYLOPRIM   300 mg, Oral, Daily      Aspirin Adult Low Dose 81 MG EC tablet  Generic drug: aspirin   81 mg, Oral, Daily      doxazosin 4 MG tablet  Commonly known as: CARDURA   4 mg, Oral, Daily      enalapril 20 MG tablet  Commonly known as: VASOTEC   20 mg, Oral, Daily      finasteride 5 MG tablet  Commonly known as: PROSCAR   5 mg, Oral, Daily      folic acid 1 MG tablet  Commonly known as: FOLVITE   1 mg, Oral, 2 times daily      gabapentin 300 MG capsule  Commonly known as: NEURONTIN   300 mg, Oral, 2 times daily         Stop These Medications    Alpha-Lipoic Acid 200 MG capsule     amLODIPine-atorvastatin 10-20 MG per tablet  Commonly known as: CADUET     Apple Cider Vinegar 300 MG tablet     Calcium-Magnesium-Zinc 333-133-5 MG tablet     Cinnamon 500 MG capsule     Claritin 10 MG capsule  Generic drug: Loratadine     Cod Liver Oil 10 MINIM capsule     coenzyme Q10 100 MG capsule     Cranberry 1000 MG capsule     Farxiga 10 MG tablet  Generic drug: Dapagliflozin Propanediol     fish oil 1000 MG capsule capsule     fluconazole 200 MG tablet  Commonly known as: DIFLUCAN     Garlic 1000 MG capsule     glipizide 10 MG tablet  Commonly known as: GLUCOTROL     glipizide 5 MG tablet  Commonly known as: GLUCOTROL     GLUCOSAMINE 1500 COMPLEX PO     Hydrocort-Pramoxine (Perianal) 1-1 % rectal foam  Commonly known as: PROCTOFOAM-HS     Lutein 10 MG tablet     metFORMIN 1000 MG tablet  Commonly known as: GLUCOPHAGE     methylcellulose (Laxative) 500 MG tablet tablet  Commonly known as: CITRUCEL     multivitamin tablet tablet     omeprazole 20 MG capsule  Commonly known as: priLOSEC     saw palmetto 500 MG capsule     Sennosides 15 MG tablet     TRELEGY  ELLIPTA IN     triamcinolone 0.1 % cream  Commonly known as: KENALOG              Your Scheduled Appointments    Apr 02, 2021  9:45 AM  Office Visit with Jacqueline Gatica MD  [facility] FAMILY [facility] ([place]) [address]  [facility] [address]  [phone]   Arrive 15 minutes prior to appointment.  If you are in need of a language or hearing  please call the Department.            Follow-up Information     Jacqueline Gatica MD .    Specialty: Family Medicine  Contact information:  [address]  [address]  [facility] [address]  [phone]                   Priya Ruiz MD  03/15/21  15:21 EDT    Please note that this discharge summary required more than 30 minutes to complete.

## 2021-03-15 NOTE — PROGRESS NOTES
Discharge Planning Assessment  [facility]     Patient Name: William Villasenor  MRN: [id number]  Today's Date: 3/15/2021    Admit Date: 3/13/2021        Discharge Plan     Row Name 03/15/21 1209       Plan    Plan  Pt admitted on 3/13/21.  SS recieved consult per nursing for discharge planning.  SS spoke with pt on this date.  Pt refused [facility]  consult.  SS can be reconsulted if pt requests.        Yrsi Kennedy, KEVINW

## 2021-03-15 NOTE — PLAN OF CARE
Goal Outcome Evaluation:    Patient has been very pleasant today. Compliant with all medication and care today, however did refuse a full skin assessment at shift change. Patient did has a Heart Cath this AM. Returned to floor with TR Band in place, removed air per protocol, see charting. Patient remains on room air, saturations maintaining well above 90%. Patient is to be transferred to [facility] in [place] on this date. Bed is ready and report given to KALA Hernandez. Family has been at bedside during visiting hours. Updated on patient status and plan of care. Informed family that patient would be going to room 626 on [place]. MD Ruiz made aware that MD Simms accepted patient. Patient currently resting in bed. No complaints or concerns at this time. Will continue to monitor and follow plan of care.

## 2021-03-15 NOTE — PROGRESS NOTES
[facility] HOSPITALIST PROGRESS NOTE     Patient Identification:  Name:  William Villasenor  Age:  70 y.o.  Sex:  male  :  1950  MRN:  [id number]  Visit Number:  [id number]  Primary Care Provider:  Jacqueline Gatica MD    Length of stay:  0    Subjective: Patient seen and examined assisted by Vernell Byers RN.  Patient with daughter at bedside, no chest pain, no complaints at this time, plan to transfer to [facility] for possible CABG noted.  Also help appreciated.  Cardiac cath report noted.    Chief Complaint: Chest pain  ----------------------------------------------------------------------------------------------------------------------  Current Hospital Meds:  allopurinol, 300 mg, Oral, Daily  amLODIPine, 10 mg, Oral, Q24H  aspirin, 81 mg, Oral, Daily  atorvastatin, 40 mg, Oral, Nightly  cetirizine, 10 mg, Oral, Daily  enalapril, 20 mg, Oral, Daily  enoxaparin, 40 mg, Subcutaneous, Daily  finasteride, 5 mg, Oral, Daily  folic acid, 1 mg, Oral, BID  gabapentin, 300 mg, Oral, BID  insulin aspart, 0-7 Units, Subcutaneous, TID AC  lactobacillus acidophilus, 1 capsule, Oral, Daily  metoprolol tartrate, 25 mg, Oral, Q12H  multivitamin, 1 tablet, Oral, Daily  pantoprazole, 40 mg, Oral, Q AM  sodium chloride, 3 mL, Intravenous, Q12H  terazosin, 5 mg, Oral, Nightly  vitamin E, 400 Units, Oral, Daily      Pharmacy to Dose enoxaparin (LOVENOX),       ----------------------------------------------------------------------------------------------------------------------  Vital Signs:  Temp:  [98 °F (36.7 °C)-99.1 °F (37.3 °C)] 99 °F (37.2 °C)  Heart Rate:  [59-82] 64  Resp:  [1422] 18  BP: ()/(55-87) 109/58       Tele: Sinus rhythm 81 bpm      21  2221 21  0130 03/15/21  0503   Weight: 96.5 kg (212 lb 12.8 oz) 97.4 kg (214 lb 12.8 oz) 97 kg (213 lb 12.8 oz)     Body mass index is 31.57 kg/m².    Intake/Output Summary (Last 24 hours) at 3/15/2021 1335  Last data filed at 3/15/2021  0934  Gross per 24 hour   Intake 1300 ml   Output 975 ml   Net 325 ml     Diet Regular; Cardiac  ----------------------------------------------------------------------------------------------------------------------  Physical exam:  General: Comfortable,awake, alert, oriented to self, place, and time, well-developed, he is sitting on his bed.  No respiratory distress.    Skin:  Skin is warm and dry. No rash noted. No pallor.    HENT:  Head:  Normocephalic and atraumatic.  Mouth:  Moist mucous membranes.    Eyes:  Conjunctivae and EOM are normal.  Pupils are equal, round, and reactive to light.  No scleral icterus.    Neck:  Neck supple.  No JVD present.    No bruit  Pulmonary/Chest:  No respiratory distress, no wheezes, no crackles, with normal breath sounds and good air movement.  Cardiovascular:  Normal rate, regular rhythm and normal heart sounds with no murmur.  Abdominal:  Soft.  Bowel sounds are normal.  No distension and no tenderness.   Extremities:  No edema, no tenderness, and no deformity.  No red or swollen joints anywhere.  Strong pulses in all 4 extremities with no clubbing, no cyanosis, no edema.  Neurological:  Motor strength equal no obvious deficit, sensory grossly intact.   No cranial nerve deficit.  No tongue deviation.  No facial droop.  No slurred speech.    Genitourinary: No Santos catheter  Back:  ----------------------------------------------------------------------------------------------------------------------  ----------------------------------------------------------------------------------------------------------------------  Results from last 7 days   Lab Units 03/14/21  0144 03/13/21 2239   TROPONIN T ng/mL <0.010 <0.010     Results from last 7 days   Lab Units 03/15/21  0043 03/14/21 0144 03/13/21 2239   WBC 10*3/mm3 8.28 8.59 8.18   HEMOGLOBIN g/dL 13.0 14.1 14.3   HEMATOCRIT % 39.6 43.6 42.4   MCV fL 89.6 91.2 87.8   MCHC g/dL 32.8 32.3 33.7   PLATELETS 10*3/mm3 297 332 310          Results from last 7 days   Lab Units 03/15/21  0043 03/14/21  0144 03/13/21  2239   SODIUM mmol/L 133*  --  140   POTASSIUM mmol/L 4.1  --  4.1   MAGNESIUM mg/dL  --  1.9 1.8   CHLORIDE mmol/L 99  --  103   CO2 mmol/L 20.0*  --  20.4*   BUN mg/dL 20  --  21   CREATININE mg/dL 1.30*  --  1.26   EGFR IF NONAFRICN AM mL/min/1.73 54*  --  56*   CALCIUM mg/dL 8.7  --  9.2   GLUCOSE mg/dL 323*  --  248*   ALBUMIN g/dL 3.49*  --  4.04   BILIRUBIN mg/dL 0.2  --  0.2   ALK PHOS U/L 66  --  74   AST (SGOT) U/L 17  --  16   ALT (SGPT) U/L 11  --  8   Estimated Creatinine Clearance: 59.9 mL/min (A) (by C-G formula based on SCr of 1.3 mg/dL (H)).    No results found for: AMMONIA      No results found for: BLOODCX  No results found for: URINECX  No results found for: WOUNDCX  No results found for: STOOLCX    I have personally looked at the labs and they are summarized above.  ----------------------------------------------------------------------------------------------------------------------  Imaging Results (Last 24 Hours)     ** No results found for the last 24 hours. **        ----------------------------------------------------------------------------------------------------------------------  Assessment and Plan:  -Chest pain MI was ruled out  -Diabetes type 2 with hyperglycemia  -Essential hypertension  -Obesity with a BMI of 32  -Dyslipidemia  -History of GERD  -Atherosclerotic heart disease with findings as above  -Acute kidney injury  -BPH    Continue Accu-Chek and sliding scale, monitor renal function, hydration, continue beta-blocker, aspirin, Norvasc, statins.  Patient is for transfer to [facility] for CABG      Disposition transfer to [facility] per cardiology      Priya Ruiz MD  03/15/21  13:35 EDT

## 2021-03-15 NOTE — CONSULTS
"Diabetes Education  Assessment/Teaching    Patient Name:  William Villasenor  YOB: 1950  MRN: [id number]  Admit Date:  3/13/2021      Assessment Date:  3/15/2021    Most Recent Value   General Information    Height  175.3 cm (69\")   Height Method  Stated   Weight  97 kg (213 lb 12.8 oz)   Weight Method  Bed scale   Pregnancy Assessment   Diabetes History   Education Preferences   Nutrition Information   Assessment Topics   DM Goals            Most Recent Value   DM Education Needs   Meter  Has own   Frequency of Testing  Daily   Medication  Oral   Problem Solving  Hypoglycemia, Hyperglycemia, Sick days, Signs, Symptoms, Treatment   Reducing Risks  Retinopathy, Neuropathy, Cardiovascular, Immunizations, Foot care, Eye exam, Blood pressure, Lipids, A1C testing   Healthy Eating  Basic meal plan provided   Physical Activity  Walking   Physical Activity Frequency  Occasionally   Healthy Coping  Appropriate   Discharge Plan  Home, Follow-up with PCP   Motivation  Moderate   Teaching Method  Explanation, Discussion, Demonstration, Handouts   Patient Response  Verbalized understanding            Other Comments:  A1C 8.75 patient did not wear mask, but wife wore mask. Patient received education on diet, activity, blood glucose monitoring, activity, taking medication as prescribed, and checking feet daily. Patient stated that he has not been as active as usual due to heart problems. Patient and wife stated once that is taking care of than he will be more active. Patient stated that he has been trying to watch what he eats, but sometimes its hard being on the road a lot. Patient received handouts and all his questions were answered about diet and the importance of checking his blood glucose regularly. If any questions or concerns please re-consult or call. Thank you.         Electronically signed by:  Melissa Wu RN  03/15/21 11:12 EDT  "

## 2021-03-15 NOTE — PROGRESS NOTES
Discharge Planning Assessment  [facility]     Patient Name: William Villasenor  MRN: [id number]  Today's Date: 3/15/2021    Admit Date: 3/13/2021        Discharge Plan     Row Name 03/15/21 1529       Plan    Final Discharge Disposition Code  02 - short term hospital for  care    Final Note  Pt to be transferred to [facility] for further intervention.        RUSLAN Rojo

## 2021-03-15 NOTE — NURSING NOTE
[facility] contacted for transportation to [facility].  MD states patient can travel via BLS, and [facility] states they will be here in about 30-45 minutes.

## 2021-03-15 NOTE — NURSING NOTE
"  ACC REVIEW REPORT: [facility]        PATIENT NAME: William Villasenor    PATIENT ID: [id number]        COVID-19 ACC SCREENING       DOES THE PATIENT HAVE A FEVER GREATER THAN OR EQUAL .4: NO    IS THE PATIENT EXPERIENCING SHORTNESS OF BREATH: NO    DOES THE PATIENT HAVE A COUGH: NO  DOES THE PATIENT HAVE ANY OF THE FOLLOWING RISK FACTORS:    EXPOSURE TO SUSPECTED OR KNOWN COVID-19: NO    RECENT TRAVEL HISTORY TO ENDEMIC AREA (DOMESTIC/LOCAL): NO    IS THE PATIENT A HEALTHCARE WORKER: NO    HAS THE PATIENT EXPERIENCED A LOSS OF SENSE OF TASTE OR SMELL: NO    HAS THE PATIENT BEEN TESTED FOR COVID-19: YES, IT WAS NEGATIVE    DATE TESTED: 03/13/2021    LAB TESTING SENT TO: \"IN HOUSE\"          BED: N626    BED TYPE: TELE    BED GIVEN TO: MARLY SORIANO RN     TIME BED GIVEN: 1445    TODAY'S DATE: 3/15/2021    TRANSFER DATE: 03/15/2021    ETA:     TRANSFERRING FACILITY: [facility]    TRANSFERRING FACILITY PHONE # : [phone]    TRANSFERRING MD:     DATE/TIME REQUEST RECEIVED: 03/15/2021 @ [facility]    [facility] RN: DION ARMENDARIZ    REPORT FROM:     TIME REPORT TAKEN: 1445    DIAGNOSIS: CAD    REASON FOR TRANSFER TO [facility]: CABG    TRANSPORTATION: GROUND    CLINICAL REASON FOR TRANSFER TO [facility]:  ADMITTED ON THE 13TH WITH CHEST PAIN.  HE HAD A STRESS TEST YESTERDAY.  HIS EF IS BETWEEN 51-55%.  HE HAD A HEART CATH TODAY AND RESULTS ARE LISTED BELOW.  HE HAD A TR BAND THAT WAS REMOVED AT 1400.  THEY WENT THROUGH HIS RIGHT RADIAL.   THE SITE LOOKS GOOD, IT'S COVERED WITH A BAND-AID AND HIS PULSES ARE GOOD.  HE'S 96% ON RMA.     HE IS A&O X 3, AMBULATORY AND A FULL CODE.        CLINICAL INFORMATION    HEIGHT:     WEIGHT:     ALLERGIES: NKDA    INFECTIOUS DISEASE:     ISOLATION:     VITAL SIGNS:   TIME: 1150  TEMP: 99.0  PULSE: 64  B/P: 109/58  RESP: 18  96% ON RMA        LAB INFORMATION: SODIUM-133, GLUCOSE-392, CREAT-1.30    CULTURE INFORMATION:     MEDS/IV FLUIDS: # 20 G LEFT UPPER ARM WITH NS @ 100 ML/HR      CARDIAC " SYSTEM:    CHEST PAIN:     RATE:     SCALE:     RHYTHM: SR WITH A RATE BETWEEN 60'S AND 70'S    Is patient taking or has patient been given any drugs that could increase bleeding?     DRUG:  ASA 81 MG HE'S NOT HAD IT FOR THE PAST                                LOVENOX SQ  NOT HAD FOR THE PAST TWO DAYS  TROPONIN:    DATE:   TIME:   TROP:     DATE:   TIME:   TROP:       HEART CATH: YES    HEART CATH DATE: 03/15/2021    SITE: RIGHT RADIAL, TR BAND WAS REMOVED AT 1400. SITE LOOKS CLEAN, DRY AND INTACT.  PULSES ARE GOOD.      HEART CATH RESULTS:       · Prox RCA lesion is 99% stenosed.  · Mid LAD lesion is 100% stenosed.  · Ost Cx lesion is 50% stenosed.  · Prox LAD lesion is 85% stenosed.  · Mid Cx lesion is 75% stenosed.  · 3rd Mrg lesion is 90% stenosed.  · 1st Diag lesion is 100% stenosed.  · 1st LPL lesion is 90% stenosed.  · Normal systolic function.  · The ejection fraction was greater than 55% by visual estimate.  · LV pressures (S/D/E) : 120/80/15 mmHg  · THREE VESSEL CAD WITH PRESERVED LV FUNCTION  · CT SURGERY CONSULT FOR CABG           CARDIAC NOTES:   ECHO FINDINGS  Echocardiogram Findings    Left Ventricle Left ventricular ejection fraction appears to be 51 - 55%. Left ventricular systolic function is normal.   Septal wall motion is normal. Normal left ventricular cavity size and wall thickness noted. Left ventricular diastolic function was normal.   Right Ventricle Normal right ventricular cavity size noted.   Left Atrium Normal left atrial cavity size noted.   Right Atrium Normal right atrial cavity size noted.   Aortic Valve The aortic valve is not well visualized. The aortic valve is abnormal in structure. The aortic valve exhibits sclerosis. No significant aortic valve regurgitation is present. No hemodynamically significant aortic valve stenosis is present.   Mitral Valve The mitral valve is grossly normal in structure. Trace mitral valve regurgitation is present. No significant mitral valve stenosis  is present.   Tricuspid Valve Estimated right ventricular systolic pressure from tricuspid regurgitation is normal (<35 mmHg). No evidence of pulmonary hypertension is present.   Pulmonic Valve The pulmonic valve is not well visualized. The pulmonic valve is grossly normal in structure. There is no significant pulmonic valve regurgitation present.   Greater Vessels No dilation of the aortic root is present. No dilation of the sinuses of Valsalva is present.   Pericardium There is no evidence of pericardial effusion. .      LV Measurements          RESPIRATORY SYSTEM:    LUNG SOUNDS: CLEAR     ABG DATE:         ABG TIME:     ABG RESULTS:    PH:   PCO2:   PO2:   HCO3:   O2 SAT:       OXYGEN:     O2 SAT:     CXR  FINDINGS:   PA and lateral views of the chest.  Heart and mediastinal contours are normal. The lungs are clear. No pneumothorax or pleural effusion.      PNEUMO CHEST TUBE SEAL TYPE:     RESPIRATORY STATUS:       CNS/MUSCULOSKELETAL    ALERT AND ORIENTED:YES X 3     PERSON:   PLACE:   TIME:   CAT SCAN RESULTS:     MRI RESULTS:     CNS/MUSCULOSKELETAL NOTES:       GI//GY    LAST BM- THIS MORNING, 03/15.2021    ABDOMINAL PAIN:     VOMITING:     DIARRHEA:     NAUSEA:     BOWEL SOUNDS:     OCCULT STOOL:     HEMATURIA:     NG TUBE:    SIZE:     DATE INSERTED:       ULTRASOUND RESULTS:     ACUTE ABDOMEN RESULTS:     CT SCAN RESULTS:       GI//GY NOTES:     US ABDOMEN  IMPRESSION:    No acute findings in the abdomen.         TOUSSAINT:     PAST MEDICAL HISTORY:   Coronary artery disease involving native coronary artery of native heart with angina pectoris   Noted: 3/15/2021   Non-Hospital Problem List    Chronic gout (Chronic)   Noted: 12/7/2020   Type 2 diabetes mellitus with hyperglycemia, without long-term current use of insulin (Chronic)   Noted: 12/7/2020   High blood pressure associated with diabetes (Chronic)   Noted: 12/7/2020   Hyperlipidemia associated with type 2 diabetes mellitus (Chronic)   Noted:  12/7/2020   Enlarged prostate (Chronic)   Noted: 12/7/2020   Chest pain   Noted: 3/14/2021   Obesity (BMI 30-39.9) (Chronic)   Noted: 3/14/2021   Acid reflux disease (Chronic)   Noted: 3/14/2021   Diabetic nerve disorder (Chronic)   Noted: 3/14/2021   Former smoker (Chronic)   Noted: 3/14/2021   Personal history of gout (Chronic)   Noted: 3/14/2021         OTHER SYMPTOM NOTES:     ADDITIONAL NOTES:           Mary Sanders RN  3/15/2021  14:35 EDT

## 2021-03-15 NOTE — NURSING NOTE
Thank you for your referral to Cardiac Rehab.   Referral noted.  Will see patient today when Cardiac Rehab patient load allows.  If patient was to be discharged before being seen we will follow up with them at home.    Patient to be transferred to [facility] if not seen we will wait for referral from [facility] if patient qualifies for cardiac rehab

## 2021-03-16 ENCOUNTER — ANESTHESIA (OUTPATIENT)
Dept: PERIOP | Facility: HOSPITAL | Age: 71
End: 2021-03-16

## 2021-03-16 ENCOUNTER — APPOINTMENT (OUTPATIENT)
Dept: CARDIOLOGY | Facility: HOSPITAL | Age: 71
End: 2021-03-16

## 2021-03-16 ENCOUNTER — ANESTHESIA EVENT (OUTPATIENT)
Dept: PERIOP | Facility: HOSPITAL | Age: 71
End: 2021-03-16

## 2021-03-16 ENCOUNTER — APPOINTMENT (OUTPATIENT)
Dept: GENERAL RADIOLOGY | Facility: HOSPITAL | Age: 71
End: 2021-03-16

## 2021-03-16 ENCOUNTER — APPOINTMENT (OUTPATIENT)
Dept: PULMONOLOGY | Facility: HOSPITAL | Age: 71
End: 2021-03-16

## 2021-03-16 LAB
ABO GROUP BLD: NORMAL
ACT BLD: 109 SECONDS (ref 82–152)
ALBUMIN SERPL-MCNC: 4.1 G/DL (ref 3.5–5.2)
ALBUMIN SERPL-MCNC: 4.3 G/DL (ref 3.5–5.2)
ANION GAP SERPL CALCULATED.3IONS-SCNC: 10 MMOL/L (ref 5–15)
ANION GAP SERPL CALCULATED.3IONS-SCNC: 10 MMOL/L (ref 5–15)
ANION GAP SERPL CALCULATED.3IONS-SCNC: 9 MMOL/L (ref 5–15)
APTT PPP: 28.2 SECONDS (ref 24–37)
ARTERIAL PATENCY WRIST A: ABNORMAL
ARTERIAL PATENCY WRIST A: ABNORMAL
ATMOSPHERIC PRESS: ABNORMAL MM[HG]
ATMOSPHERIC PRESS: ABNORMAL MM[HG]
BASE EXCESS BLDA CALC-SCNC: 0.8 MMOL/L (ref 0–2)
BASE EXCESS BLDA CALC-SCNC: 1.4 MMOL/L (ref 0–2)
BASOPHILS # BLD AUTO: 0.03 10*3/MM3 (ref 0–0.2)
BASOPHILS NFR BLD AUTO: 0.4 % (ref 0–1.5)
BDY SITE: ABNORMAL
BDY SITE: ABNORMAL
BH CV ECHO MEAS - BSA(HAYCOCK): 2.1 M^2
BH CV ECHO MEAS - BSA: 2.1 M^2
BH CV ECHO MEAS - BZI_BMI: 29.7 KILOGRAMS/M^2
BH CV ECHO MEAS - BZI_METRIC_HEIGHT: 175.3 CM
BH CV ECHO MEAS - BZI_METRIC_WEIGHT: 91.2 KG
BH CV XLRA MEAS LEFT DIST CCA EDV: 12.6 CM/SEC
BH CV XLRA MEAS LEFT DIST CCA PSV: 67.6 CM/SEC
BH CV XLRA MEAS LEFT DIST ICA EDV: 13 CM/SEC
BH CV XLRA MEAS LEFT DIST ICA PSV: 37.7 CM/SEC
BH CV XLRA MEAS LEFT ICA/CCA RATIO: 0.79
BH CV XLRA MEAS LEFT MID CCA EDV: 12.6 CM/SEC
BH CV XLRA MEAS LEFT MID CCA PSV: 89.6 CM/SEC
BH CV XLRA MEAS LEFT MID ICA EDV: 17.1 CM/SEC
BH CV XLRA MEAS LEFT MID ICA PSV: 58.7 CM/SEC
BH CV XLRA MEAS LEFT PROX CCA EDV: 14.1 CM/SEC
BH CV XLRA MEAS LEFT PROX CCA PSV: 103.9 CM/SEC
BH CV XLRA MEAS LEFT PROX ECA EDV: 8.6 CM/SEC
BH CV XLRA MEAS LEFT PROX ECA PSV: 99 CM/SEC
BH CV XLRA MEAS LEFT PROX ICA EDV: 21.2 CM/SEC
BH CV XLRA MEAS LEFT PROX ICA PSV: 70.7 CM/SEC
BH CV XLRA MEAS LEFT PROX SCLA EDV: 0.61 CM/SEC
BH CV XLRA MEAS LEFT PROX SCLA PSV: 100.8 CM/SEC
BH CV XLRA MEAS LEFT VERTEBRAL A EDV: 9.8 CM/SEC
BH CV XLRA MEAS LEFT VERTEBRAL A PSV: 40.3 CM/SEC
BH CV XLRA MEAS RIGHT DIST CCA EDV: 11.9 CM/SEC
BH CV XLRA MEAS RIGHT DIST CCA PSV: 64.2 CM/SEC
BH CV XLRA MEAS RIGHT DIST ICA EDV: 18.2 CM/SEC
BH CV XLRA MEAS RIGHT DIST ICA PSV: 61.5 CM/SEC
BH CV XLRA MEAS RIGHT ICA/CCA RATIO: 1.21
BH CV XLRA MEAS RIGHT MID CCA EDV: 13.4 CM/SEC
BH CV XLRA MEAS RIGHT MID CCA PSV: 80.9 CM/SEC
BH CV XLRA MEAS RIGHT MID ICA EDV: 16.1 CM/SEC
BH CV XLRA MEAS RIGHT MID ICA PSV: 97.8 CM/SEC
BH CV XLRA MEAS RIGHT PROX CCA EDV: 11 CM/SEC
BH CV XLRA MEAS RIGHT PROX CCA PSV: 87.2 CM/SEC
BH CV XLRA MEAS RIGHT PROX ECA EDV: 7 CM/SEC
BH CV XLRA MEAS RIGHT PROX ECA PSV: 115.9 CM/SEC
BH CV XLRA MEAS RIGHT PROX ICA EDV: 17.5 CM/SEC
BH CV XLRA MEAS RIGHT PROX ICA PSV: 86.6 CM/SEC
BH CV XLRA MEAS RIGHT PROX SCLA EDV: 1.1 CM/SEC
BH CV XLRA MEAS RIGHT PROX SCLA PSV: 114.5 CM/SEC
BH CV XLRA MEAS RIGHT VERTEBRAL A EDV: 10.5 CM/SEC
BH CV XLRA MEAS RIGHT VERTEBRAL A PSV: 36 CM/SEC
BLD GP AB SCN SERPL QL: NEGATIVE
BODY TEMPERATURE: 37 C
BODY TEMPERATURE: 37 C
BUN SERPL-MCNC: 15 MG/DL (ref 8–23)
BUN SERPL-MCNC: 15 MG/DL (ref 8–23)
BUN SERPL-MCNC: 18 MG/DL (ref 8–23)
BUN/CREAT SERPL: 14.3 (ref 7–25)
BUN/CREAT SERPL: 15.6 (ref 7–25)
BUN/CREAT SERPL: 18.6 (ref 7–25)
CA-I SERPL ISE-MCNC: 1.36 MMOL/L (ref 1.12–1.32)
CALCIUM SPEC-SCNC: 8.3 MG/DL (ref 8.6–10.5)
CALCIUM SPEC-SCNC: 8.5 MG/DL (ref 8.6–10.5)
CALCIUM SPEC-SCNC: 9.1 MG/DL (ref 8.6–10.5)
CHLORIDE SERPL-SCNC: 104 MMOL/L (ref 98–107)
CHLORIDE SERPL-SCNC: 109 MMOL/L (ref 98–107)
CHLORIDE SERPL-SCNC: 110 MMOL/L (ref 98–107)
CO2 BLDA-SCNC: 24.3 MMOL/L (ref 22–33)
CO2 BLDA-SCNC: 27.9 MMOL/L (ref 22–33)
CO2 SERPL-SCNC: 20 MMOL/L (ref 22–29)
CO2 SERPL-SCNC: 23 MMOL/L (ref 22–29)
CO2 SERPL-SCNC: 25 MMOL/L (ref 22–29)
COHGB MFR BLD: 0.3 % (ref 0–2)
COHGB MFR BLD: 1 % (ref 0–2)
CREAT SERPL-MCNC: 0.96 MG/DL (ref 0.76–1.27)
CREAT SERPL-MCNC: 0.97 MG/DL (ref 0.76–1.27)
CREAT SERPL-MCNC: 1.05 MG/DL (ref 0.76–1.27)
DEPRECATED RDW RBC AUTO: 39.4 FL (ref 37–54)
DEPRECATED RDW RBC AUTO: 40.1 FL (ref 37–54)
DEPRECATED RDW RBC AUTO: 41.5 FL (ref 37–54)
EOSINOPHIL # BLD AUTO: 0.19 10*3/MM3 (ref 0–0.4)
EOSINOPHIL NFR BLD AUTO: 2.8 % (ref 0.3–6.2)
EPAP: 0
EPAP: 0
ERYTHROCYTE [DISTWIDTH] IN BLOOD BY AUTOMATED COUNT: 12.4 % (ref 12.3–15.4)
ERYTHROCYTE [DISTWIDTH] IN BLOOD BY AUTOMATED COUNT: 12.6 % (ref 12.3–15.4)
ERYTHROCYTE [DISTWIDTH] IN BLOOD BY AUTOMATED COUNT: 12.8 % (ref 12.3–15.4)
GFR SERPL CREATININE-BSD FRML MDRD: 70 ML/MIN/1.73
GFR SERPL CREATININE-BSD FRML MDRD: 76 ML/MIN/1.73
GFR SERPL CREATININE-BSD FRML MDRD: 77 ML/MIN/1.73
GLUCOSE BLDC GLUCOMTR-MCNC: 117 MG/DL (ref 70–130)
GLUCOSE BLDC GLUCOMTR-MCNC: 120 MG/DL (ref 70–130)
GLUCOSE BLDC GLUCOMTR-MCNC: 135 MG/DL (ref 70–130)
GLUCOSE BLDC GLUCOMTR-MCNC: 142 MG/DL (ref 70–130)
GLUCOSE BLDC GLUCOMTR-MCNC: 146 MG/DL (ref 70–130)
GLUCOSE BLDC GLUCOMTR-MCNC: 203 MG/DL (ref 70–130)
GLUCOSE BLDC GLUCOMTR-MCNC: 210 MG/DL (ref 70–130)
GLUCOSE SERPL-MCNC: 126 MG/DL (ref 65–99)
GLUCOSE SERPL-MCNC: 144 MG/DL (ref 65–99)
GLUCOSE SERPL-MCNC: 208 MG/DL (ref 65–99)
HCO3 BLDA-SCNC: 23.3 MMOL/L (ref 20–26)
HCO3 BLDA-SCNC: 26.6 MMOL/L (ref 20–26)
HCT VFR BLD AUTO: 31.3 % (ref 37.5–51)
HCT VFR BLD AUTO: 36.3 % (ref 37.5–51)
HCT VFR BLD AUTO: 40.1 % (ref 37.5–51)
HCT VFR BLD CALC: 36.3 %
HCT VFR BLD CALC: 43.5 %
HGB BLD-MCNC: 10.4 G/DL (ref 13–17.7)
HGB BLD-MCNC: 12.2 G/DL (ref 13–17.7)
HGB BLD-MCNC: 13.3 G/DL (ref 13–17.7)
HGB BLDA-MCNC: 11.8 G/DL (ref 13.5–17.5)
HGB BLDA-MCNC: 14.2 G/DL (ref 13.5–17.5)
IMM GRANULOCYTES # BLD AUTO: 0.05 10*3/MM3 (ref 0–0.05)
IMM GRANULOCYTES NFR BLD AUTO: 0.7 % (ref 0–0.5)
INHALED O2 CONCENTRATION: 100 %
INHALED O2 CONCENTRATION: 32 %
INR PPP: 1.41 (ref 0.85–1.16)
IPAP: 0
IPAP: 0
LYMPHOCYTES # BLD AUTO: 2.61 10*3/MM3 (ref 0.7–3.1)
LYMPHOCYTES NFR BLD AUTO: 38.5 % (ref 19.6–45.3)
MAGNESIUM SERPL-MCNC: 1.8 MG/DL (ref 1.6–2.4)
MCH RBC QN AUTO: 29.1 PG (ref 26.6–33)
MCH RBC QN AUTO: 29.4 PG (ref 26.6–33)
MCH RBC QN AUTO: 29.5 PG (ref 26.6–33)
MCHC RBC AUTO-ENTMCNC: 33.2 G/DL (ref 31.5–35.7)
MCHC RBC AUTO-ENTMCNC: 33.2 G/DL (ref 31.5–35.7)
MCHC RBC AUTO-ENTMCNC: 33.6 G/DL (ref 31.5–35.7)
MCV RBC AUTO: 87.7 FL (ref 79–97)
MCV RBC AUTO: 87.9 FL (ref 79–97)
MCV RBC AUTO: 88.4 FL (ref 79–97)
METHGB BLD QL: 0.5 % (ref 0–1.5)
METHGB BLD QL: 0.9 % (ref 0–1.5)
MODALITY: ABNORMAL
MODALITY: ABNORMAL
MONOCYTES # BLD AUTO: 0.52 10*3/MM3 (ref 0.1–0.9)
MONOCYTES NFR BLD AUTO: 7.7 % (ref 5–12)
NEUTROPHILS NFR BLD AUTO: 3.38 10*3/MM3 (ref 1.7–7)
NEUTROPHILS NFR BLD AUTO: 49.9 % (ref 42.7–76)
NOTE: ABNORMAL
NOTE: ABNORMAL
NRBC BLD AUTO-RTO: 0 /100 WBC (ref 0–0.2)
OXYHGB MFR BLDV: 94.9 % (ref 94–99)
OXYHGB MFR BLDV: 98 % (ref 94–99)
PA ADP PRP-ACNC: 216 PRU
PAW @ PEAK INSP FLOW SETTING VENT: 0 CMH2O
PAW @ PEAK INSP FLOW SETTING VENT: 0 CMH2O
PCO2 BLDA: 30.1 MM HG (ref 35–45)
PCO2 BLDA: 43.2 MM HG (ref 35–45)
PCO2 TEMP ADJ BLD: 30.1 MM HG (ref 35–48)
PCO2 TEMP ADJ BLD: 43.2 MM HG (ref 35–48)
PH BLDA: 7.4 PH UNITS (ref 7.35–7.45)
PH BLDA: 7.5 PH UNITS (ref 7.35–7.45)
PH, TEMP CORRECTED: 7.4 PH UNITS
PH, TEMP CORRECTED: 7.5 PH UNITS
PHOSPHATE SERPL-MCNC: 2.3 MG/DL (ref 2.5–4.5)
PHOSPHATE SERPL-MCNC: 2.7 MG/DL (ref 2.5–4.5)
PLATELET # BLD AUTO: 201 10*3/MM3 (ref 140–450)
PLATELET # BLD AUTO: 201 10*3/MM3 (ref 140–450)
PLATELET # BLD AUTO: 263 10*3/MM3 (ref 140–450)
PMV BLD AUTO: 8.7 FL (ref 6–12)
PMV BLD AUTO: 8.8 FL (ref 6–12)
PMV BLD AUTO: 9 FL (ref 6–12)
PO2 BLDA: 216 MM HG (ref 83–108)
PO2 BLDA: 83.1 MM HG (ref 83–108)
PO2 TEMP ADJ BLD: 216 MM HG (ref 83–108)
PO2 TEMP ADJ BLD: 83.1 MM HG (ref 83–108)
POTASSIUM SERPL-SCNC: 3.6 MMOL/L (ref 3.5–5.2)
POTASSIUM SERPL-SCNC: 4 MMOL/L (ref 3.5–5.2)
POTASSIUM SERPL-SCNC: 4.4 MMOL/L (ref 3.5–5.2)
PROTHROMBIN TIME: 16.9 SECONDS (ref 11.5–14)
QT INTERVAL: 360 MS
QTC INTERVAL: 396 MS
RBC # BLD AUTO: 3.54 10*6/MM3 (ref 4.14–5.8)
RBC # BLD AUTO: 4.13 10*6/MM3 (ref 4.14–5.8)
RBC # BLD AUTO: 4.57 10*6/MM3 (ref 4.14–5.8)
RH BLD: POSITIVE
RIGHT ARM BP: NORMAL MMHG
SODIUM SERPL-SCNC: 134 MMOL/L (ref 136–145)
SODIUM SERPL-SCNC: 143 MMOL/L (ref 136–145)
SODIUM SERPL-SCNC: 143 MMOL/L (ref 136–145)
T&S EXPIRATION DATE: NORMAL
TOTAL RATE: 0 BREATHS/MINUTE
TOTAL RATE: 0 BREATHS/MINUTE
VENTILATOR MODE: ABNORMAL
WBC # BLD AUTO: 11.14 10*3/MM3 (ref 3.4–10.8)
WBC # BLD AUTO: 6.78 10*3/MM3 (ref 3.4–10.8)
WBC # BLD AUTO: 9.53 10*3/MM3 (ref 3.4–10.8)

## 2021-03-16 PROCEDURE — 86900 BLOOD TYPING SEROLOGIC ABO: CPT

## 2021-03-16 PROCEDURE — 25010000002 FENTANYL CITRATE (PF) 100 MCG/2ML SOLUTION: Performed by: THORACIC SURGERY (CARDIOTHORACIC VASCULAR SURGERY)

## 2021-03-16 PROCEDURE — 36430 TRANSFUSION BLD/BLD COMPNT: CPT

## 2021-03-16 PROCEDURE — 83735 ASSAY OF MAGNESIUM: CPT | Performed by: PHYSICIAN ASSISTANT

## 2021-03-16 PROCEDURE — C1894 INTRO/SHEATH, NON-LASER: HCPCS | Performed by: THORACIC SURGERY (CARDIOTHORACIC VASCULAR SURGERY)

## 2021-03-16 PROCEDURE — 25010000002 HEPARIN (PORCINE) PER 1000 UNITS: Performed by: ANESTHESIOLOGY

## 2021-03-16 PROCEDURE — 25010000002 PROPOFOL 10 MG/ML EMULSION: Performed by: THORACIC SURGERY (CARDIOTHORACIC VASCULAR SURGERY)

## 2021-03-16 PROCEDURE — 94010 BREATHING CAPACITY TEST: CPT

## 2021-03-16 PROCEDURE — C1751 CATH, INF, PER/CENT/MIDLINE: HCPCS | Performed by: THORACIC SURGERY (CARDIOTHORACIC VASCULAR SURGERY)

## 2021-03-16 PROCEDURE — 99291 CRITICAL CARE FIRST HOUR: CPT | Performed by: INTERNAL MEDICINE

## 2021-03-16 PROCEDURE — 85347 COAGULATION TIME ACTIVATED: CPT

## 2021-03-16 PROCEDURE — 93880 EXTRACRANIAL BILAT STUDY: CPT | Performed by: INTERNAL MEDICINE

## 2021-03-16 PROCEDURE — 33508 ENDOSCOPIC VEIN HARVEST: CPT | Performed by: PHYSICIAN ASSISTANT

## 2021-03-16 PROCEDURE — 86901 BLOOD TYPING SEROLOGIC RH(D): CPT | Performed by: THORACIC SURGERY (CARDIOTHORACIC VASCULAR SURGERY)

## 2021-03-16 PROCEDURE — 94799 UNLISTED PULMONARY SVC/PX: CPT

## 2021-03-16 PROCEDURE — 93005 ELECTROCARDIOGRAM TRACING: CPT | Performed by: PHYSICIAN ASSISTANT

## 2021-03-16 PROCEDURE — 82803 BLOOD GASES ANY COMBINATION: CPT

## 2021-03-16 PROCEDURE — 25010000003 CEFUROXIME SODIUM 1.5 G RECONSTITUTED SOLUTION: Performed by: THORACIC SURGERY (CARDIOTHORACIC VASCULAR SURGERY)

## 2021-03-16 PROCEDURE — 80048 BASIC METABOLIC PNL TOTAL CA: CPT | Performed by: PHYSICIAN ASSISTANT

## 2021-03-16 PROCEDURE — 85730 THROMBOPLASTIN TIME PARTIAL: CPT | Performed by: PHYSICIAN ASSISTANT

## 2021-03-16 PROCEDURE — 25010000002 ALBUMIN HUMAN 5% PER 50 ML: Performed by: PHYSICIAN ASSISTANT

## 2021-03-16 PROCEDURE — 33519 CABG ARTERY-VEIN THREE: CPT | Performed by: PHYSICIAN ASSISTANT

## 2021-03-16 PROCEDURE — 63710000001 INSULIN REGULAR HUMAN PER 5 UNITS: Performed by: ANESTHESIOLOGY

## 2021-03-16 PROCEDURE — 25010000002 CEFUROXIME: Performed by: PHYSICIAN ASSISTANT

## 2021-03-16 PROCEDURE — 33519 CABG ARTERY-VEIN THREE: CPT | Performed by: THORACIC SURGERY (CARDIOTHORACIC VASCULAR SURGERY)

## 2021-03-16 PROCEDURE — 82330 ASSAY OF CALCIUM: CPT

## 2021-03-16 PROCEDURE — 85576 BLOOD PLATELET AGGREGATION: CPT | Performed by: PHYSICIAN ASSISTANT

## 2021-03-16 PROCEDURE — 84295 ASSAY OF SERUM SODIUM: CPT

## 2021-03-16 PROCEDURE — 25010000003 MAGNESIUM SULFATE 4 GM/100ML SOLUTION: Performed by: THORACIC SURGERY (CARDIOTHORACIC VASCULAR SURGERY)

## 2021-03-16 PROCEDURE — C1751 CATH, INF, PER/CENT/MIDLINE: HCPCS | Performed by: ANESTHESIOLOGY

## 2021-03-16 PROCEDURE — 85610 PROTHROMBIN TIME: CPT | Performed by: PHYSICIAN ASSISTANT

## 2021-03-16 PROCEDURE — 06BP4ZZ EXCISION OF RIGHT SAPHENOUS VEIN, PERCUTANEOUS ENDOSCOPIC APPROACH: ICD-10-PCS | Performed by: THORACIC SURGERY (CARDIOTHORACIC VASCULAR SURGERY)

## 2021-03-16 PROCEDURE — 85027 COMPLETE CBC AUTOMATED: CPT | Performed by: PHYSICIAN ASSISTANT

## 2021-03-16 PROCEDURE — 94002 VENT MGMT INPAT INIT DAY: CPT

## 2021-03-16 PROCEDURE — 33508 ENDOSCOPIC VEIN HARVEST: CPT | Performed by: THORACIC SURGERY (CARDIOTHORACIC VASCULAR SURGERY)

## 2021-03-16 PROCEDURE — 93880 EXTRACRANIAL BILAT STUDY: CPT

## 2021-03-16 PROCEDURE — 85014 HEMATOCRIT: CPT

## 2021-03-16 PROCEDURE — 82947 ASSAY GLUCOSE BLOOD QUANT: CPT

## 2021-03-16 PROCEDURE — 82805 BLOOD GASES W/O2 SATURATION: CPT

## 2021-03-16 PROCEDURE — 25010000002 PROTAMINE SULFATE PER 10 MG: Performed by: ANESTHESIOLOGY

## 2021-03-16 PROCEDURE — 021209W BYPASS CORONARY ARTERY, THREE ARTERIES FROM AORTA WITH AUTOLOGOUS VENOUS TISSUE, OPEN APPROACH: ICD-10-PCS | Performed by: THORACIC SURGERY (CARDIOTHORACIC VASCULAR SURGERY)

## 2021-03-16 PROCEDURE — 33533 CABG ARTERIAL SINGLE: CPT | Performed by: THORACIC SURGERY (CARDIOTHORACIC VASCULAR SURGERY)

## 2021-03-16 PROCEDURE — 25010000002 PROTAMINE SULFATE PER 10 MG

## 2021-03-16 PROCEDURE — 84132 ASSAY OF SERUM POTASSIUM: CPT

## 2021-03-16 PROCEDURE — 25010000002 PAPAVERINE PER 60 MG: Performed by: THORACIC SURGERY (CARDIOTHORACIC VASCULAR SURGERY)

## 2021-03-16 PROCEDURE — P9016 RBC LEUKOCYTES REDUCED: HCPCS

## 2021-03-16 PROCEDURE — P9041 ALBUMIN (HUMAN),5%, 50ML: HCPCS | Performed by: PHYSICIAN ASSISTANT

## 2021-03-16 PROCEDURE — 85025 COMPLETE CBC W/AUTO DIFF WBC: CPT | Performed by: PHYSICIAN ASSISTANT

## 2021-03-16 PROCEDURE — 36600 WITHDRAWAL OF ARTERIAL BLOOD: CPT

## 2021-03-16 PROCEDURE — 83050 HGB METHEMOGLOBIN QUAN: CPT

## 2021-03-16 PROCEDURE — 80069 RENAL FUNCTION PANEL: CPT | Performed by: PHYSICIAN ASSISTANT

## 2021-03-16 PROCEDURE — P9041 ALBUMIN (HUMAN),5%, 50ML: HCPCS

## 2021-03-16 PROCEDURE — 25010000002 HEPARIN (PORCINE) PER 1000 UNITS: Performed by: THORACIC SURGERY (CARDIOTHORACIC VASCULAR SURGERY)

## 2021-03-16 PROCEDURE — 25010000002 MIDAZOLAM PER 1 MG: Performed by: ANESTHESIOLOGY

## 2021-03-16 PROCEDURE — 82330 ASSAY OF CALCIUM: CPT | Performed by: PHYSICIAN ASSISTANT

## 2021-03-16 PROCEDURE — 82375 ASSAY CARBOXYHB QUANT: CPT

## 2021-03-16 PROCEDURE — 99024 POSTOP FOLLOW-UP VISIT: CPT | Performed by: PHYSICIAN ASSISTANT

## 2021-03-16 PROCEDURE — 5A1221Z PERFORMANCE OF CARDIAC OUTPUT, CONTINUOUS: ICD-10-PCS | Performed by: THORACIC SURGERY (CARDIOTHORACIC VASCULAR SURGERY)

## 2021-03-16 PROCEDURE — 86850 RBC ANTIBODY SCREEN: CPT | Performed by: THORACIC SURGERY (CARDIOTHORACIC VASCULAR SURGERY)

## 2021-03-16 PROCEDURE — 33533 CABG ARTERIAL SINGLE: CPT | Performed by: PHYSICIAN ASSISTANT

## 2021-03-16 PROCEDURE — 86900 BLOOD TYPING SEROLOGIC ABO: CPT | Performed by: THORACIC SURGERY (CARDIOTHORACIC VASCULAR SURGERY)

## 2021-03-16 PROCEDURE — 25810000003 DEXTROSE 5 % WITH KCL 20 MEQ 20-5 MEQ/L-% SOLUTION: Performed by: PHYSICIAN ASSISTANT

## 2021-03-16 PROCEDURE — 86923 COMPATIBILITY TEST ELECTRIC: CPT

## 2021-03-16 PROCEDURE — 94010 BREATHING CAPACITY TEST: CPT | Performed by: INTERNAL MEDICINE

## 2021-03-16 PROCEDURE — 25010000003 MEPERIDINE PER 100 MG: Performed by: THORACIC SURGERY (CARDIOTHORACIC VASCULAR SURGERY)

## 2021-03-16 PROCEDURE — P9035 PLATELET PHERES LEUKOREDUCED: HCPCS

## 2021-03-16 PROCEDURE — 25010000003 POTASSIUM CHLORIDE PER 2 MEQ: Performed by: PHYSICIAN ASSISTANT

## 2021-03-16 PROCEDURE — 71045 X-RAY EXAM CHEST 1 VIEW: CPT

## 2021-03-16 PROCEDURE — 25010000002 ALBUMIN HUMAN 5% PER 50 ML

## 2021-03-16 PROCEDURE — 02100Z9 BYPASS CORONARY ARTERY, ONE ARTERY FROM LEFT INTERNAL MAMMARY, OPEN APPROACH: ICD-10-PCS | Performed by: THORACIC SURGERY (CARDIOTHORACIC VASCULAR SURGERY)

## 2021-03-16 RX ORDER — PROTAMINE SULFATE 10 MG/ML
INJECTION, SOLUTION INTRAVENOUS AS NEEDED
Status: DISCONTINUED | OUTPATIENT
Start: 2021-03-16 | End: 2021-03-16 | Stop reason: SURG

## 2021-03-16 RX ORDER — FAMOTIDINE 10 MG/ML
20 INJECTION, SOLUTION INTRAVENOUS EVERY 12 HOURS SCHEDULED
Status: DISCONTINUED | OUTPATIENT
Start: 2021-03-16 | End: 2021-03-16

## 2021-03-16 RX ORDER — MIDAZOLAM HYDROCHLORIDE 1 MG/ML
INJECTION INTRAMUSCULAR; INTRAVENOUS AS NEEDED
Status: DISCONTINUED | OUTPATIENT
Start: 2021-03-16 | End: 2021-03-16 | Stop reason: SURG

## 2021-03-16 RX ORDER — DOPAMINE HYDROCHLORIDE 160 MG/100ML
2-20 INJECTION, SOLUTION INTRAVENOUS CONTINUOUS PRN
Status: DISCONTINUED | OUTPATIENT
Start: 2021-03-16 | End: 2021-03-17

## 2021-03-16 RX ORDER — AMINOCAPROIC ACID 250 MG/ML
INJECTION, SOLUTION INTRAVENOUS AS NEEDED
Status: DISCONTINUED | OUTPATIENT
Start: 2021-03-16 | End: 2021-03-16 | Stop reason: SURG

## 2021-03-16 RX ORDER — MAGNESIUM SULFATE HEPTAHYDRATE 40 MG/ML
2 INJECTION, SOLUTION INTRAVENOUS AS NEEDED
Status: DISCONTINUED | OUTPATIENT
Start: 2021-03-16 | End: 2021-03-22 | Stop reason: HOSPADM

## 2021-03-16 RX ORDER — PAPAVERINE HYDROCHLORIDE 30 MG/ML
INJECTION INTRAMUSCULAR; INTRAVENOUS AS NEEDED
Status: DISCONTINUED | OUTPATIENT
Start: 2021-03-16 | End: 2021-03-16 | Stop reason: HOSPADM

## 2021-03-16 RX ORDER — DEXMEDETOMIDINE HYDROCHLORIDE 4 UG/ML
.2-1.5 INJECTION, SOLUTION INTRAVENOUS CONTINUOUS PRN
Status: DISCONTINUED | OUTPATIENT
Start: 2021-03-16 | End: 2021-03-17

## 2021-03-16 RX ORDER — OXYCODONE HYDROCHLORIDE AND ACETAMINOPHEN 5; 325 MG/1; MG/1
2 TABLET ORAL EVERY 4 HOURS PRN
Status: DISCONTINUED | OUTPATIENT
Start: 2021-03-16 | End: 2021-03-22 | Stop reason: HOSPADM

## 2021-03-16 RX ORDER — POTASSIUM CHLORIDE 750 MG/1
40 CAPSULE, EXTENDED RELEASE ORAL AS NEEDED
Status: DISCONTINUED | OUTPATIENT
Start: 2021-03-16 | End: 2021-03-22 | Stop reason: HOSPADM

## 2021-03-16 RX ORDER — ALBUTEROL SULFATE 2.5 MG/3ML
2.5 SOLUTION RESPIRATORY (INHALATION) EVERY 4 HOURS PRN
Status: ACTIVE | OUTPATIENT
Start: 2021-03-16 | End: 2021-03-17

## 2021-03-16 RX ORDER — MAGNESIUM SULFATE HEPTAHYDRATE 40 MG/ML
4 INJECTION, SOLUTION INTRAVENOUS AS NEEDED
Status: DISCONTINUED | OUTPATIENT
Start: 2021-03-16 | End: 2021-03-22 | Stop reason: HOSPADM

## 2021-03-16 RX ORDER — WATER 1000 ML/1000ML
INJECTION, SOLUTION INTRAVENOUS
Status: COMPLETED
Start: 2021-03-16 | End: 2021-03-16

## 2021-03-16 RX ORDER — SODIUM CHLORIDE, SODIUM LACTATE, POTASSIUM CHLORIDE, CALCIUM CHLORIDE 600; 310; 30; 20 MG/100ML; MG/100ML; MG/100ML; MG/100ML
INJECTION, SOLUTION INTRAVENOUS CONTINUOUS PRN
Status: DISCONTINUED | OUTPATIENT
Start: 2021-03-16 | End: 2021-03-16 | Stop reason: SURG

## 2021-03-16 RX ORDER — POTASSIUM CHLORIDE 29.8 MG/ML
20 INJECTION INTRAVENOUS
Status: DISCONTINUED | OUTPATIENT
Start: 2021-03-16 | End: 2021-03-20

## 2021-03-16 RX ORDER — POTASSIUM CHLORIDE, DEXTROSE MONOHYDRATE 150; 5 MG/100ML; G/100ML
30 INJECTION, SOLUTION INTRAVENOUS CONTINUOUS
Status: DISCONTINUED | OUTPATIENT
Start: 2021-03-16 | End: 2021-03-17

## 2021-03-16 RX ORDER — NITROGLYCERIN 20 MG/100ML
5-200 INJECTION INTRAVENOUS CONTINUOUS PRN
Status: DISCONTINUED | OUTPATIENT
Start: 2021-03-16 | End: 2021-03-20

## 2021-03-16 RX ORDER — POTASSIUM CHLORIDE 1.5 G/1.77G
40 POWDER, FOR SOLUTION ORAL AS NEEDED
Status: DISCONTINUED | OUTPATIENT
Start: 2021-03-16 | End: 2021-03-22 | Stop reason: HOSPADM

## 2021-03-16 RX ORDER — HYDROCODONE BITARTRATE AND ACETAMINOPHEN 7.5; 325 MG/1; MG/1
1 TABLET ORAL EVERY 4 HOURS PRN
Status: DISCONTINUED | OUTPATIENT
Start: 2021-03-16 | End: 2021-03-22 | Stop reason: HOSPADM

## 2021-03-16 RX ORDER — OMEPRAZOLE 20 MG/1
20 CAPSULE, DELAYED RELEASE ORAL 2 TIMES DAILY
COMMUNITY
End: 2021-04-28 | Stop reason: SDUPTHER

## 2021-03-16 RX ORDER — ONDANSETRON 2 MG/ML
4 INJECTION INTRAMUSCULAR; INTRAVENOUS EVERY 6 HOURS PRN
Status: DISCONTINUED | OUTPATIENT
Start: 2021-03-16 | End: 2021-03-22 | Stop reason: HOSPADM

## 2021-03-16 RX ORDER — SODIUM CHLORIDE 0.9 % (FLUSH) 0.9 %
10 SYRINGE (ML) INJECTION EVERY 12 HOURS SCHEDULED
Status: DISCONTINUED | OUTPATIENT
Start: 2021-03-16 | End: 2021-03-16 | Stop reason: HOSPADM

## 2021-03-16 RX ORDER — SODIUM CHLORIDE 0.9 % (FLUSH) 0.9 %
30 SYRINGE (ML) INJECTION ONCE AS NEEDED
Status: DISCONTINUED | OUTPATIENT
Start: 2021-03-16 | End: 2021-03-17

## 2021-03-16 RX ORDER — SUFENTANIL CITRATE 50 UG/ML
INJECTION EPIDURAL; INTRAVENOUS AS NEEDED
Status: DISCONTINUED | OUTPATIENT
Start: 2021-03-16 | End: 2021-03-16 | Stop reason: SURG

## 2021-03-16 RX ORDER — VECURONIUM BROMIDE 1 MG/ML
INJECTION, POWDER, LYOPHILIZED, FOR SOLUTION INTRAVENOUS AS NEEDED
Status: DISCONTINUED | OUTPATIENT
Start: 2021-03-16 | End: 2021-03-16 | Stop reason: SURG

## 2021-03-16 RX ORDER — ETOMIDATE 2 MG/ML
INJECTION INTRAVENOUS AS NEEDED
Status: DISCONTINUED | OUTPATIENT
Start: 2021-03-16 | End: 2021-03-16 | Stop reason: SURG

## 2021-03-16 RX ORDER — PROTAMINE SULFATE 10 MG/ML
50 INJECTION, SOLUTION INTRAVENOUS ONCE
Status: COMPLETED | OUTPATIENT
Start: 2021-03-16 | End: 2021-03-16

## 2021-03-16 RX ORDER — SODIUM CHLORIDE 9 MG/ML
9 INJECTION, SOLUTION INTRAVENOUS CONTINUOUS PRN
Status: DISCONTINUED | OUTPATIENT
Start: 2021-03-16 | End: 2021-03-16 | Stop reason: HOSPADM

## 2021-03-16 RX ORDER — NOREPINEPHRINE BIT/0.9 % NACL 8 MG/250ML
.02-.3 INFUSION BOTTLE (ML) INTRAVENOUS CONTINUOUS PRN
Status: DISCONTINUED | OUTPATIENT
Start: 2021-03-16 | End: 2021-03-20

## 2021-03-16 RX ORDER — CHLORHEXIDINE GLUCONATE 0.12 MG/ML
15 RINSE ORAL EVERY 12 HOURS SCHEDULED
Status: DISCONTINUED | OUTPATIENT
Start: 2021-03-16 | End: 2021-03-17

## 2021-03-16 RX ORDER — PROTAMINE SULFATE 10 MG/ML
INJECTION, SOLUTION INTRAVENOUS
Status: COMPLETED
Start: 2021-03-16 | End: 2021-03-16

## 2021-03-16 RX ORDER — FENTANYL CITRATE 50 UG/ML
25 INJECTION, SOLUTION INTRAMUSCULAR; INTRAVENOUS
Status: DISCONTINUED | OUTPATIENT
Start: 2021-03-16 | End: 2021-03-17

## 2021-03-16 RX ORDER — PHENYLEPHRINE HCL IN 0.9% NACL 0.5 MG/5ML
.5-3 SYRINGE (ML) INTRAVENOUS CONTINUOUS PRN
Status: DISCONTINUED | OUTPATIENT
Start: 2021-03-16 | End: 2021-03-20

## 2021-03-16 RX ORDER — SODIUM CHLORIDE 9 MG/ML
30 INJECTION, SOLUTION INTRAVENOUS CONTINUOUS PRN
Status: DISCONTINUED | OUTPATIENT
Start: 2021-03-16 | End: 2021-03-20

## 2021-03-16 RX ORDER — ALBUMIN, HUMAN INJ 5% 5 %
SOLUTION INTRAVENOUS
Status: COMPLETED
Start: 2021-03-16 | End: 2021-03-16

## 2021-03-16 RX ORDER — VECURONIUM BROMIDE 1 MG/ML
10 INJECTION, POWDER, LYOPHILIZED, FOR SOLUTION INTRAVENOUS ONCE
Status: COMPLETED | OUTPATIENT
Start: 2021-03-16 | End: 2021-03-16

## 2021-03-16 RX ORDER — CALCIUM CHLORIDE 100 MG/ML
INJECTION INTRAVENOUS; INTRAVENTRICULAR AS NEEDED
Status: DISCONTINUED | OUTPATIENT
Start: 2021-03-16 | End: 2021-03-16 | Stop reason: SURG

## 2021-03-16 RX ORDER — HEPARIN SODIUM 1000 [USP'U]/ML
INJECTION, SOLUTION INTRAVENOUS; SUBCUTANEOUS AS NEEDED
Status: DISCONTINUED | OUTPATIENT
Start: 2021-03-16 | End: 2021-03-16 | Stop reason: SURG

## 2021-03-16 RX ORDER — ALBUMIN, HUMAN INJ 5% 5 %
500 SOLUTION INTRAVENOUS AS NEEDED
Status: COMPLETED | OUTPATIENT
Start: 2021-03-16 | End: 2021-03-16

## 2021-03-16 RX ORDER — THROMBIN HUMAN AND FIBRINOGEN 2; 5.5 [USP'U]/1; MG/1
PATCH TOPICAL AS NEEDED
Status: DISCONTINUED | OUTPATIENT
Start: 2021-03-16 | End: 2021-03-16 | Stop reason: HOSPADM

## 2021-03-16 RX ORDER — MORPHINE SULFATE 2 MG/ML
2 INJECTION, SOLUTION INTRAMUSCULAR; INTRAVENOUS
Status: DISCONTINUED | OUTPATIENT
Start: 2021-03-16 | End: 2021-03-17

## 2021-03-16 RX ORDER — METOPROLOL TARTRATE 5 MG/5ML
2.5 INJECTION INTRAVENOUS EVERY 6 HOURS SCHEDULED
Status: DISPENSED | OUTPATIENT
Start: 2021-03-16 | End: 2021-03-17

## 2021-03-16 RX ORDER — DOBUTAMINE HYDROCHLORIDE 100 MG/100ML
2-20 INJECTION INTRAVENOUS CONTINUOUS PRN
Status: DISCONTINUED | OUTPATIENT
Start: 2021-03-16 | End: 2021-03-17

## 2021-03-16 RX ORDER — ROCURONIUM BROMIDE 10 MG/ML
INJECTION, SOLUTION INTRAVENOUS AS NEEDED
Status: DISCONTINUED | OUTPATIENT
Start: 2021-03-16 | End: 2021-03-16 | Stop reason: SURG

## 2021-03-16 RX ORDER — MEPERIDINE HYDROCHLORIDE 25 MG/ML
25 INJECTION INTRAMUSCULAR; INTRAVENOUS; SUBCUTANEOUS EVERY 4 HOURS PRN
Status: DISCONTINUED | OUTPATIENT
Start: 2021-03-16 | End: 2021-03-17

## 2021-03-16 RX ORDER — FAMOTIDINE 10 MG/ML
20 INJECTION, SOLUTION INTRAVENOUS EVERY 12 HOURS SCHEDULED
Status: DISCONTINUED | OUTPATIENT
Start: 2021-03-16 | End: 2021-03-17

## 2021-03-16 RX ORDER — ASPIRIN 325 MG
325 TABLET ORAL ONCE
Status: DISCONTINUED | OUTPATIENT
Start: 2021-03-16 | End: 2021-03-17

## 2021-03-16 RX ORDER — SODIUM CHLORIDE 9 MG/ML
INJECTION, SOLUTION INTRAVENOUS AS NEEDED
Status: DISCONTINUED | OUTPATIENT
Start: 2021-03-16 | End: 2021-03-16 | Stop reason: HOSPADM

## 2021-03-16 RX ORDER — GLIPIZIDE 5 MG/1
10 TABLET ORAL EVERY MORNING
COMMUNITY
End: 2021-03-27 | Stop reason: HOSPADM

## 2021-03-16 RX ORDER — GLIPIZIDE 5 MG/1
5 TABLET ORAL EVERY EVENING
COMMUNITY
End: 2021-03-27 | Stop reason: HOSPADM

## 2021-03-16 RX ORDER — NALOXONE HCL 0.4 MG/ML
0.4 VIAL (ML) INJECTION
Status: DISCONTINUED | OUTPATIENT
Start: 2021-03-16 | End: 2021-03-22 | Stop reason: HOSPADM

## 2021-03-16 RX ORDER — NOREPINEPHRINE BITARTRATE 1 MG/ML
INJECTION, SOLUTION INTRAVENOUS CONTINUOUS PRN
Status: DISCONTINUED | OUTPATIENT
Start: 2021-03-16 | End: 2021-03-16 | Stop reason: SURG

## 2021-03-16 RX ADMIN — Medication 400 UNITS: at 08:16

## 2021-03-16 RX ADMIN — SODIUM CHLORIDE, POTASSIUM CHLORIDE, SODIUM LACTATE AND CALCIUM CHLORIDE: 600; 310; 30; 20 INJECTION, SOLUTION INTRAVENOUS at 14:41

## 2021-03-16 RX ADMIN — ALLOPURINOL 300 MG: 300 TABLET ORAL at 08:17

## 2021-03-16 RX ADMIN — AMINOCAPROIC ACID 5 G: 250 INJECTION, SOLUTION INTRAVENOUS at 15:06

## 2021-03-16 RX ADMIN — POTASSIUM CHLORIDE AND DEXTROSE MONOHYDRATE 30 ML/HR: 150; 5 INJECTION, SOLUTION INTRAVENOUS at 19:02

## 2021-03-16 RX ADMIN — MUPIROCIN 1 APPLICATION: 20 OINTMENT TOPICAL at 08:15

## 2021-03-16 RX ADMIN — MULTIVITAMIN TABLET 1 TABLET: TABLET at 08:18

## 2021-03-16 RX ADMIN — CALCIUM CHLORIDE 1 G: 100 INJECTION INTRAVENOUS; INTRAVENTRICULAR at 18:01

## 2021-03-16 RX ADMIN — PROTAMINE SULFATE 450 MG: 10 INJECTION, SOLUTION INTRAVENOUS at 18:01

## 2021-03-16 RX ADMIN — PROTAMINE SULFATE 50 MG: 10 INJECTION, SOLUTION INTRAVENOUS at 19:34

## 2021-03-16 RX ADMIN — SUFENTANIL CITRATE 100 MCG: 50 INJECTION EPIDURAL; INTRAVENOUS at 14:41

## 2021-03-16 RX ADMIN — ENALAPRIL MALEATE 20 MG: 10 TABLET ORAL at 08:17

## 2021-03-16 RX ADMIN — FENTANYL CITRATE 25 MCG: 50 INJECTION, SOLUTION INTRAMUSCULAR; INTRAVENOUS at 21:27

## 2021-03-16 RX ADMIN — PROTAMINE SULFATE 50 MG: 10 INJECTION, SOLUTION INTRAVENOUS at 18:18

## 2021-03-16 RX ADMIN — VECURONIUM BROMIDE 10 MG: 1 INJECTION, POWDER, LYOPHILIZED, FOR SOLUTION INTRAVENOUS at 23:02

## 2021-03-16 RX ADMIN — MEPERIDINE HYDROCHLORIDE 25 MG: 25 INJECTION INTRAMUSCULAR; INTRAVENOUS; SUBCUTANEOUS at 21:00

## 2021-03-16 RX ADMIN — ALBUMIN HUMAN 500 ML: 0.05 INJECTION, SOLUTION INTRAVENOUS at 19:10

## 2021-03-16 RX ADMIN — PANTOPRAZOLE SODIUM 40 MG: 40 TABLET, DELAYED RELEASE ORAL at 08:17

## 2021-03-16 RX ADMIN — ROCURONIUM BROMIDE 100 MG: 10 INJECTION INTRAVENOUS at 14:41

## 2021-03-16 RX ADMIN — SUFENTANIL CITRATE 50 MCG: 50 INJECTION EPIDURAL; INTRAVENOUS at 15:12

## 2021-03-16 RX ADMIN — FENTANYL CITRATE 25 MCG: 50 INJECTION, SOLUTION INTRAMUSCULAR; INTRAVENOUS at 22:10

## 2021-03-16 RX ADMIN — WATER 10 ML: 1 INJECTION INTRAMUSCULAR; INTRAVENOUS; SUBCUTANEOUS at 23:02

## 2021-03-16 RX ADMIN — HEPARIN SODIUM 36000 UNITS: 1000 INJECTION, SOLUTION INTRAVENOUS; SUBCUTANEOUS at 15:56

## 2021-03-16 RX ADMIN — VECURONIUM BROMIDE 10 MG: 1 INJECTION, POWDER, LYOPHILIZED, FOR SOLUTION INTRAVENOUS at 17:38

## 2021-03-16 RX ADMIN — VECURONIUM BROMIDE 10 MG: 1 INJECTION, POWDER, LYOPHILIZED, FOR SOLUTION INTRAVENOUS at 16:20

## 2021-03-16 RX ADMIN — NOREPINEPHRINE BITARTRATE 0.02 MCG/KG/MIN: 1 INJECTION, SOLUTION, CONCENTRATE INTRAVENOUS at 15:51

## 2021-03-16 RX ADMIN — SODIUM CHLORIDE 2 UNITS/HR: 9 INJECTION, SOLUTION INTRAVENOUS at 15:06

## 2021-03-16 RX ADMIN — PROPOFOL 25 MCG/KG/MIN: 10 INJECTION, EMULSION INTRAVENOUS at 22:26

## 2021-03-16 RX ADMIN — MAGNESIUM SULFATE HEPTAHYDRATE 4 G: 40 INJECTION, SOLUTION INTRAVENOUS at 21:01

## 2021-03-16 RX ADMIN — CEFUROXIME SODIUM 1.5 G: 1.5 INJECTION, POWDER, FOR SOLUTION INTRAVENOUS at 23:51

## 2021-03-16 RX ADMIN — AMLODIPINE BESYLATE 10 MG: 10 TABLET ORAL at 08:18

## 2021-03-16 RX ADMIN — SUFENTANIL CITRATE 100 MCG: 50 INJECTION EPIDURAL; INTRAVENOUS at 17:38

## 2021-03-16 RX ADMIN — MIDAZOLAM 3 MG: 1 INJECTION INTRAMUSCULAR; INTRAVENOUS at 14:41

## 2021-03-16 RX ADMIN — MIDAZOLAM 2 MG: 1 INJECTION INTRAMUSCULAR; INTRAVENOUS at 17:38

## 2021-03-16 RX ADMIN — SODIUM CHLORIDE, PRESERVATIVE FREE 10 ML: 5 INJECTION INTRAVENOUS at 08:16

## 2021-03-16 RX ADMIN — CHLORHEXIDINE GLUCONATE 0.12% ORAL RINSE 15 ML: 1.2 LIQUID ORAL at 08:14

## 2021-03-16 RX ADMIN — SODIUM CHLORIDE, PRESERVATIVE FREE 10 ML: 5 INJECTION INTRAVENOUS at 20:29

## 2021-03-16 RX ADMIN — SODIUM CHLORIDE 9 ML/HR: 9 INJECTION, SOLUTION INTRAVENOUS at 14:28

## 2021-03-16 RX ADMIN — SODIUM CHLORIDE, PRESERVATIVE FREE 3 ML: 5 INJECTION INTRAVENOUS at 20:29

## 2021-03-16 RX ADMIN — ALBUMIN HUMAN 500 ML: 0.05 INJECTION, SOLUTION INTRAVENOUS at 20:15

## 2021-03-16 RX ADMIN — POTASSIUM CHLORIDE 20 MEQ: 29.8 INJECTION, SOLUTION INTRAVENOUS at 22:10

## 2021-03-16 RX ADMIN — METOPROLOL TARTRATE 25 MG: 25 TABLET, FILM COATED ORAL at 08:16

## 2021-03-16 RX ADMIN — FINASTERIDE 5 MG: 5 TABLET, FILM COATED ORAL at 08:17

## 2021-03-16 RX ADMIN — POTASSIUM CHLORIDE 20 MEQ: 29.8 INJECTION, SOLUTION INTRAVENOUS at 21:01

## 2021-03-16 RX ADMIN — ETOMIDATE 24 MG: 2 INJECTION, SOLUTION INTRAVENOUS at 14:41

## 2021-03-16 RX ADMIN — CHLORHEXIDINE GLUCONATE 15 ML: 1.2 SOLUTION ORAL at 20:28

## 2021-03-16 RX ADMIN — Medication 1 CAPSULE: at 08:17

## 2021-03-16 RX ADMIN — FOLIC ACID 1 MG: 1 TABLET ORAL at 08:17

## 2021-03-16 RX ADMIN — CETIRIZINE HYDROCHLORIDE 10 MG: 10 TABLET, FILM COATED ORAL at 08:18

## 2021-03-16 RX ADMIN — FAMOTIDINE 20 MG: 10 INJECTION, SOLUTION INTRAVENOUS at 20:33

## 2021-03-16 RX ADMIN — CEFUROXIME 1.5 G: 1.5 INJECTION, POWDER, FOR SOLUTION INTRAVENOUS at 15:05

## 2021-03-16 NOTE — ANESTHESIA POSTPROCEDURE EVALUATION
Patient: William Villasenor    Procedure Summary     Date: 03/16/21 Room / Location:  [facility] 14 /  [facility]    Anesthesia Start: 1430 Anesthesia Stop: 1902    Procedure: MEDIAN STERNOTOMY, CORONARY ARTERY BYPASS GRAFT X4, UTILIZATION OF LEFT INTERNAL MAMMARY ARTERY, AND ENDOSCOPIC VEIN HARVEST OF RIGHT GREATER SAPHENOUS VEIN (N/A Chest) Diagnosis:       Coronary artery disease involving native coronary artery of native heart with angina pectoris (CMS/HCC)      (Coronary artery disease involving native coronary artery of native heart with angina pectoris (CMS/HCC) [I25.119])    Surgeons: Jorge Simms MD Provider: Gage Hutton MD    Anesthesia Type: general ASA Status: 4          Anesthesia Type: general    Vitals  No vitals data found for the desired time range.          Post Anesthesia Care and Evaluation    Patient location during evaluation: ICU  Patient participation: complete - patient cannot participate  Level of consciousness: obtunded/minimal responses  Anesthetic complications: No anesthetic complications  PONV Status: none  Cardiovascular status: stable  Respiratory status: ETT

## 2021-03-16 NOTE — ANESTHESIA PROCEDURE NOTES
Central Line      Patient reassessed immediately prior to procedure    Patient location during procedure: OR  Indications: vascular access  Preanesthetic Checklist  Completed: patient identified, IV checked, site marked, risks and benefits discussed, surgical consent, monitors and equipment checked, pre-op evaluation and timeout performed  Central Line Prep  Sterile Tech:cap, gloves, gown, mask and sterile barriers  Prep: chloraprep  Patient monitoring: blood pressure monitoring, continuous pulse oximetry and EKG  Central Line Procedure  Laterality:right  Location:internal jugular  Catheter Type:MAC  Catheter Size:9 Fr  Guidance:ultrasound guided  PROCEDURE NOTE/ULTRASOUND INTERPRETATION.  Using ultrasound guidance the potential vascular sites for insertion of the catheter were visualized to determine the patency of the vessel to be used for vascular access.  After selecting the appropriate site for insertion, the needle was visualized under ultrasound being inserted into the internal jugular vein, followed by ultrasound confirmation of wire and catheter placement. There were no abnormalities seen on ultrasound; an image was taken; and the patient tolerated the procedure with no complications. Images: still images obtained, printed/placed on chart  Assessment  Post procedure:biopatch applied, line sutured, occlusive dressing applied and secured with tape  Assessement:blood return through all ports, free fluid flow, chest x-ray ordered and [place] Test  Complications:no  Patient Tolerance:patient tolerated the procedure well with no apparent complications

## 2021-03-16 NOTE — PLAN OF CARE
Goal Outcome Evaluation:  Plan of Care Reviewed With: patient     Outcome Summary: Pt's VSS. On room air while awake, needed 3L NC when sleeping. The pt. has been prepared for the possibility of having a CABG today. Consents have been signed and are in his chart. 18g PIV in R. forearm. Blood braclet is on and 2 units have been prepared. NPO since midnight. Pt has had 2 chlorihexidine baths. Pt. has been very agitated having to have COVID swab and labs collected.    Kevin Casanova RN

## 2021-03-16 NOTE — CONSULTS
Chart reviewed for diabetes education. Noted CABG potentially this afternoons, we will continue to follow. Thank you for this referral

## 2021-03-16 NOTE — ANESTHESIA PROCEDURE NOTES
Arterial Line      Patient reassessed immediately prior to procedure    Patient location during procedure: pre-op   Line placed for hemodynamic monitoring.  Preanesthetic Checklist  Completed: patient identified, IV checked, site marked, risks and benefits discussed, surgical consent, monitors and equipment checked, pre-op evaluation and timeout performed  Arterial Line Prep   Sterile Tech: cap, gloves and sterile barriers  Prep: ChloraPrep  Patient monitoring: blood pressure monitoring, continuous pulse oximetry and EKG  Arterial Line Procedure   Laterality:left  Location:  radial artery  Catheter size: 20 G   Guidance: ultrasound guided  Number of attempts: 1  Successful placement: yes  Post Assessment   Dressing Type: line sutured, occlusive dressing applied, secured with tape and wrist guard applied.   Complications no  Circ/Move/Sens Assessment: normal and unchanged.   Patient Tolerance: patient tolerated the procedure well with no apparent complications

## 2021-03-16 NOTE — H&P
2        CTS Consult    Patient Care Team:  Jacqueline Gatica MD as PCP - General (Family Medicine)      Reason for Admission: Severe multivessel coronary disease    Chief complaint: Chest pain    HPI  The patient is a 70-year-old male with a past medical history significant for hypertension, type 2 diabetes mellitus with peripheral neuropathy, dyslipidemia, obesity and a hiatal hernia who presented to [facility] on 3/13/2021 with a one week history of progressively worsening substernal chest pain with associated dyspnea.  It was determined the patient was experiencing what appeared to be unstable angina.  He was admitted and underwent a stress test which was abnormal.  The patient subsequently underwent a left heart catheterization which revealed severe multivessel coronary disease with preserved left ventricular function with a left ventricular ejection fraction of approximately 55%.  The patient was subsequently transferred to [facility] for further evaluation and possible surgical revascularization.  Currently, the patient denies any chest pain or dyspnea.      Review of Systems    A comprehensive review of systems was negative except for:   Constitutional: Denies fever, chills or recent weight loss or weight gain  Respiratory: Has recent cough, pneumonia, bronchitis or history of chronic obstructive pulmonary disease  Cardiovascular: Denies pedal edema, orthopnea, paroxysmal nocturnal dyspnea or palpitations  Gastrointestinal: Denies abdominal pain, nausea or vomiting, hematemesis, hematochezia, melena history peptic ulcer disease  Hematologic/lymphatic: Denies history of coagulopathy, thrombogenic disorder or history of deep vein thrombosis or pulmonary embolus  Neurological: Denies history of headache, cerebrovascular accident, transient ischemic attack or seizure disorder      History  Past Medical History:   Diagnosis Date   • Arthritis    • Back pain    • BPH (benign prostatic  hyperplasia)    • Diabetes mellitus (CMS/HCC)    • Diabetic peripheral neuropathy (CMS/HCC)    • Erectile dysfunction    • Former smoker    • GERD (gastroesophageal reflux disease)    • GERD (gastroesophageal reflux disease)    • Gout    • High cholesterol    • Hypertension    • Obesity      Past Surgical History:   Procedure Laterality Date   • COLONOSCOPY W/ 2015     Family History   Problem Relation Age of Onset   • Heart disease Mother    • Hypertension Mother    • Depression Mother    • Alcohol abuse Maternal Uncle    • Heart disease Maternal Grandmother    • Hypertension Maternal Grandmother    • Diabetes Maternal Grandmother    • Diabetes Paternal Grandmother      Social History     Tobacco Use   • Smoking status: Former Smoker     Packs/day: 1.00     Years: 5.00     Pack years: 5.00     Quit date: 1974     Years since quittin.1   • Smokeless tobacco: Never Used   Substance Use Topics   • Alcohol use: Never   • Drug use: Never     Medications Prior to Admission   Medication Sig Dispense Refill Last Dose   • acetaminophen (TYLENOL) 500 MG tablet Take 500 mg by mouth Every 6 (Six) Hours As Needed for Mild Pain .      • allopurinol (ZYLOPRIM) 300 MG tablet Take 1 tablet by mouth Daily. 90 tablet 1    • [START ON 3/16/2021] amLODIPine (NORVASC) 10 MG tablet Take 1 tablet by mouth Daily.      • aspirin (Aspirin Adult Low Dose) 81 MG EC tablet Take 81 mg by mouth Daily.      • atorvastatin (LIPITOR) 40 MG tablet Take 1 tablet by mouth Every Night.      • doxazosin (CARDURA) 4 MG tablet Take 1 tablet by mouth Daily. 90 tablet 1    • enalapril (VASOTEC) 20 MG tablet Take 1 tablet by mouth Daily. 90 tablet 1    • Enoxaparin Sodium (PHARMACY TO DOSE ENOXAPARIN, LOVENOX,) Daily. Indications: Prophylaxis of Venous Thromboembolism      • finasteride (PROSCAR) 5 MG tablet Take 1 tablet by mouth Daily. 90 tablet 1    • folic acid (FOLVITE) 1 MG tablet Take 1 mg by mouth 2 (two) times a day.      •  gabapentin (NEURONTIN) 300 MG capsule Take 1 capsule by mouth 2 (two) times a day. 60 capsule 0    • Lactobacillus (Acidophilus) 100 MG capsule Take 100 mg by mouth Daily.      • metoprolol tartrate (LOPRESSOR) 25 MG tablet Take 1 tablet by mouth Every 12 (Twelve) Hours.      • nitroglycerin (NITROSTAT) 0.4 MG SL tablet Place 1 tablet under the tongue Every 5 (Five) Minutes As Needed for Chest Pain (Only if SBP Greater Than 100). Take no more than 3 doses in 15 minutes.  12    • pantoprazole (PROTONIX) 40 MG EC tablet Take 1 tablet by mouth Daily.      • vitamin E 400 UNIT capsule Take 1 capsule by mouth Daily.        Allergies:  Patient has no known allergies.    Objective    Vital Signs  Temp:  [96.9 °F (36.1 °C)-99 °F (37.2 °C)] 96.9 °F (36.1 °C)  Heart Rate:  [59-82] 68  Resp:  [14-22] 18  BP: ()/(55-87) 136/81    Physical Exam:  General Appearance: Well-developed, well-nourished no acute distress  HEENT: Normocephalic, atraumatic, PERRLA, EOMs intact mucous membranes moist no scleral icterus  Neck: Supple without bruit no thyromegaly or lymphadenopathy  Lungs: Respirations even and unlabored and clear to auscultation bilaterally no wheezes, rales or rhonchi  Heart: Regular rate and rhythm no murmurs, rubs or gallops.  Normal S1-S2.  Abdomen: Soft, nontender/nondistended with normoactive bowel sounds no rebound or guarding no organomegaly appreciated  Extremities: Warm with good color well-perfused no bilateral lower extremity edema  Pulses: 2+ carotid pulses bilaterally, 2+ radial pulses bilaterally, 2+ femoral pulses bilaterally, 2+ dorsalis pedis and posterior tibialis pulses bilaterally  Skin: No clubbing cyanosis no lesions or rashes appreciated  Neurologic: Alert and orient x3 with cranial nerves II through XII grossly intact no motor or sensory deficits appreciated          Data Review:  Results from last 7 days   Lab Units 03/15/21  0043   WBC 10*3/mm3 8.28   HEMOGLOBIN g/dL 13.0   HEMATOCRIT %  39.6   PLATELETS 10*3/mm3 297     Results from last 7 days   Lab Units 03/15/21  0043   SODIUM mmol/L 133*   POTASSIUM mmol/L 4.1   CHLORIDE mmol/L 99   CO2 mmol/L 20.0*   BUN mg/dL 20   CREATININE mg/dL 1.30*   GLUCOSE mg/dL 323*   CALCIUM mg/dL 8.7     Coagulation: No results found for: INR, APTT      Left heart catheterization:  Conclusion    · Prox RCA lesion is 99% stenosed.  · Mid LAD lesion is 100% stenosed.  · Ost Cx lesion is 50% stenosed.  · Prox LAD lesion is 85% stenosed.  · Mid Cx lesion is 75% stenosed.  · 3rd Mrg lesion is 90% stenosed.  · 1st Diag lesion is 100% stenosed.  · 1st LPL lesion is 90% stenosed.  · Normal systolic function.  · The ejection fraction was greater than 55% by visual estimate.  · LV pressures (S/D/E) : 120/80/15 mmHg           Imaging Results (Last 72 Hours)     Procedure Component Value Units Date/Time    XR Chest 1 View [[id number]] Resulted: 03/15/21 1944     Updated: 03/15/21 1944            Assessment:        Coronary artery disease involving native coronary artery of native heart with angina pectoris (CMS/[facility])    CAD (coronary artery disease)  Severe multivessel coronary disease  Hypertension  Type 2 diabetes mellitus  Hiatal hernia  GERD  Obesity  Dyslipidemia  History of gout  History tobacco abuse        Plan:  The patient would greatly benefit from surgical revascularization.  We will order routine preoperative testing in addition to a bilateral carotid artery duplex and pulmonary function testing.  There is a question as to whether the patient received a bolus of Plavix at the outside facility, therefore we will closely monitor platelet function with a P2Y12 inhibition assay and allow for platelet normalization if there is evidence of the patient receiving a P2Y12 inhibitor.  If platelet function is within normal limits, we will tentatively plan on proceeding with coronary artery bypass grafting tomorrow, Tuesday, 3/16/2021.  The surgery has been discussed in detail  with the patient who understands and wishes to proceed.    I saw the patient myself today and examined his films and had a long discussion with him.  We will plan to proceed with coronary bypass grafting surgery today.  He is aware of surgery has a risk of stroke bleeding infection death renal failure and he is agreeable to proceed.    JADA Andrade  03/15/21  20:08 EDT

## 2021-03-16 NOTE — OP NOTE
Operative Report    Preop Diagnosis: Coronary artery disease.  Diabetes.  Hypertension.  Hyperlipidemia.  Diabetic neuropathy.      Postoperative Diagnosis: Same      Procedure: Coronary artery bypass grafting x4 with endoscopic harvesting of the right great saphenous vein.  Left internal mammary artery to left anterior descending.  Saphenous vein graft to the diagonal branch of the LAD.    saphenous vein graft to obtuse marginal 1.  In the third saphenous vein graft to the second obtuse marginal branch which was a posterior lateral branch equivalent on the inferior surface of the heart.          Surgeons: Jorge Simms MD      Assistant: DAISY Hobbs PA-C    The Assistant provided services of suctioning, irrigation and retraction.  Also, assisted in suture closure of parts of the skin incision.      Indication: Referred with the above listed medical problems.  He understood surgery had risk of stroke bleeding infection death and no guarantees were made as to outcome.  He was transferred here from the hospital.        Description: Supine position sterile prep and drape and antibiotics given.  General endotracheal anesthesia.  Right great saphenous vein was harvested endoscopically and it was very satisfactory quality conduit.  Median sternotomy performed in the left internal mammary artery was harvested as a pedicle graft.  The patient fully heparinized.  Cannulas the ascending aorta and right atrial appendage and the patient begun on cardiopulmonary bypass.  Aorta crossclamped and antegrade cardioplegia given.  For saphenous vein graft was sutured to the diagonal branch of the LAD which was a heavily calcified vessel.  All of the vessels on this patient's heart were extremely calcified and diffusely diseased.  Second saphenous vein graft sutured to the obtuse marginal branch of the circumflex.  Third saphenous vein graft sutured to the second obtuse marginal branch which was actually a  posterior lateral branch equivalent on the inferior surface of the heart.  The true right itself had a posterior descending coronary that was diffusely diseased heavily calcified and no discernible lumen and nongraftable.  The left internal mammary was sutured to the similarly heavily calcified left anterior descending coronary which was occluded proximally.  Reoperation on any of this patient's target vessel would be treacherous if at all doable.  3 proximal vein graft sutured to the ascending aorta patient was weaned from bypass with out difficulty protamine was given reverse the heparin the sternum was closed with wire the fashion skin was suture and the sponge and needle count reported as correct      Please note that portions of this note were completed with a voice recognition program. Efforts were made to edit the dictations, but occasionally words are mistranscribed.

## 2021-03-16 NOTE — PROGRESS NOTES
CTS Progress Note    Preop      Chief Complaint: Severe multivessel coronary disease    Subjective  No acute events overnight.  Patient is doing well and denies complaints of chest pain or dyspnea.      Objective    Physical Exam:   Vital Signs   Temp:  [96.8 °F (36 °C)-97.8 °F (36.6 °C)] 96.8 °F (36 °C)  Heart Rate:  [56-85] 65  Resp:  [14-20] 18  BP: (109-172)/(58-87) 134/71   GEN: NAD   RESP: Respirations even and unlabored and clear to auscultation bilaterally no wheezes, rales or rhonchi    CV: Regular rate and rhythm no murmurs, rubs or gallops   ABD: Soft, nontender/nondistended with normoactive bowel sounds    EXT: Warm with good color and well-perfused and no bilateral lower extremity edema   INT: No clubbing cyanosis no lesions or rashes appreciated      Intake/Output Summary (Last 24 hours) at 3/16/2021 0838  Last data filed at 3/16/2021 0429  Gross per 24 hour   Intake --   Output 1550 ml   Net -1550 ml     Results     Results from last 7 days   Lab Units 03/16/21  0719   WBC 10*3/mm3 6.78   HEMOGLOBIN g/dL 13.3   HEMATOCRIT % 40.1   PLATELETS 10*3/mm3 263     Results from last 7 days   Lab Units 03/16/21  0719   SODIUM mmol/L 134*   POTASSIUM mmol/L 4.0   CHLORIDE mmol/L 104   CO2 mmol/L 20.0*   BUN mg/dL 18   CREATININE mg/dL 0.97   GLUCOSE mg/dL 208*   CALCIUM mg/dL 8.3*     Results from last 7 days   Lab Units 03/15/21  2037   INR  0.93   APTT seconds 24.1       P2Y12: 216      Assessment/Plan       Type 2 diabetes mellitus with hyperglycemia, without long-term current use of insulin (CMS/[facility])    Hypertension associated with diabetes (CMS/[facility])    Hyperlipidemia associated with type 2 diabetes mellitus (CMS/[facility])    Benign prostatic hyperplasia without lower urinary tract symptoms    GERD (gastroesophageal reflux disease)    Diabetic neuropathy (CMS/[facility])    Former smoker    History of gout    Coronary artery disease involving native coronary artery of native heart with angina pectoris (CMS/[facility])    CAD  (coronary artery disease)        Plan   Continue with cardiac work-up and medical optimization  Plan is for coronary artery bypass grafting later this afternoon.  Patient is well apprised of the risk  JADA Andrade  03/16/21  08:38 EDT

## 2021-03-16 NOTE — CONSULTS
[facility] Medicine Services  CONSULT NOTE      Patient Name: William Villasenor  : 1950  MRN: [id number]    Primary Care Physician: Jacqueline Gatica MD  Provider requesting consultation: Jorge Simms MD    Subjective   Subjective     Reason for Consultation:  hyperglycemia    HPI:  William Villasenor is a 70 y.o. male with a history of HLD, HTN, GERD, DM2 with neuropathy, gout, former tobacco abuse, obesity who was transferred from [facility] to [facility] to be evaluated for possible CABG.   He was admitted to the outside facility after presenting with one week of chest pain. At that time, he had a abnormal stress test and subsequent left heart catheterization showing severe multivessel coronary disease with preserved left ventricular function.      Pt has a longstanding history of diabetes (over 30 years) and has been on Farxiga, glucotrol and neurontin.  His HgA1c is currently 8.75. He states he has had good control of his diabetes until recently.  He is on home oral medication was held on admission and he has been on low dose SSI.      Pt currently denies chest pain and SOB.  Hospital medicine has been consulted for diabetes management prior to surgery.     Review of Systems   Constitutional: Negative.    HENT: Negative.    Eyes: Negative.    Respiratory: Positive for shortness of breath.    Cardiovascular: Positive for chest pain.   Gastrointestinal: Negative.    Endocrine: Negative.    Genitourinary: Negative.    Musculoskeletal: Negative.    Skin: Negative.    Allergic/Immunologic: Negative.    Neurological: Negative.    Hematological: Negative.    Psychiatric/Behavioral: Negative.          Otherwise complete ROS reviewed and is negative except as mentioned in the HPI.    Past Medical History:   Diagnosis Date   • Arthritis    • Back pain    • BPH (benign prostatic hyperplasia)    • Diabetes mellitus (CMS/HCC)    • Diabetic peripheral neuropathy (CMS/HCC)    • Erectile dysfunction    •  Former smoker    • GERD (gastroesophageal reflux disease)    • GERD (gastroesophageal reflux disease)    • Gout    • High cholesterol    • Hypertension    • Obesity        Past Surgical History:   Procedure Laterality Date   • COLONOSCOPY W/ BIOPSIES 2015       Family History: family history includes Alcohol abuse in his maternal uncle; Depression in his mother; Diabetes in his maternal grandmother and paternal grandmother; Heart disease in his maternal grandmother and mother; Hypertension in his maternal grandmother and mother. Otherwise pertinent FHx was reviewed and unremarkable.     Social History:  reports that he quit smoking about 47 years ago. He has a 5.00 pack-year smoking history. He has never used smokeless tobacco. He reports that he does not drink alcohol and does not use drugs.    Medications:  Medications Prior to Admission   Medication Sig Dispense Refill Last Dose   • acetaminophen (TYLENOL) 500 MG tablet Take 500 mg by mouth Every 6 (Six) Hours As Needed for Mild Pain .      • allopurinol (ZYLOPRIM) 300 MG tablet Take 1 tablet by mouth Daily. 90 tablet 1    • [START ON 3/16/2021] amLODIPine (NORVASC) 10 MG tablet Take 1 tablet by mouth Daily.      • aspirin (Aspirin Adult Low Dose) 81 MG EC tablet Take 81 mg by mouth Daily.      • atorvastatin (LIPITOR) 40 MG tablet Take 1 tablet by mouth Every Night.      • doxazosin (CARDURA) 4 MG tablet Take 1 tablet by mouth Daily. 90 tablet 1    • enalapril (VASOTEC) 20 MG tablet Take 1 tablet by mouth Daily. 90 tablet 1    • Enoxaparin Sodium (PHARMACY TO DOSE ENOXAPARIN, LOVENOX,) Daily. Indications: Prophylaxis of Venous Thromboembolism      • finasteride (PROSCAR) 5 MG tablet Take 1 tablet by mouth Daily. 90 tablet 1    • folic acid (FOLVITE) 1 MG tablet Take 1 mg by mouth 2 (two) times a day.      • gabapentin (NEURONTIN) 300 MG capsule Take 1 capsule by mouth 2 (two) times a day. 60 capsule 0    • Lactobacillus (Acidophilus) 100 MG capsule Take 100 mg  by mouth Daily.      • metoprolol tartrate (LOPRESSOR) 25 MG tablet Take 1 tablet by mouth Every 12 (Twelve) Hours.      • nitroglycerin (NITROSTAT) 0.4 MG SL tablet Place 1 tablet under the tongue Every 5 (Five) Minutes As Needed for Chest Pain (Only if SBP Greater Than 100). Take no more than 3 doses in 15 minutes.  12    • pantoprazole (PROTONIX) 40 MG EC tablet Take 1 tablet by mouth Daily.      • vitamin E 400 UNIT capsule Take 1 capsule by mouth Daily.          Scheduled Meds:[START ON 3/16/2021] allopurinol, 300 mg, Oral, Daily  [START ON 3/16/2021] amLODIPine, 10 mg, Oral, Q24H  [START ON 3/16/2021] aspirin, 81 mg, Oral, Daily  atorvastatin, 40 mg, Oral, Nightly  [START ON 3/16/2021] cefuroxime, 1.5 g, Intravenous, Once  [START ON 3/16/2021] cetirizine, 10 mg, Oral, Daily  chlorhexidine, 15 mL, Mouth/Throat, Q12H  [START ON 3/16/2021] enalapril, 20 mg, Oral, Daily  [START ON 3/16/2021] finasteride, 5 mg, Oral, Daily  folic acid, 1 mg, Oral, BID  [START ON 3/16/2021] insulin lispro, 0-7 Units, Subcutaneous, 4x Daily With Meals & Nightly  [START ON 3/16/2021] lactobacillus acidophilus, 1 capsule, Oral, Daily  metoprolol tartrate, 25 mg, Oral, Q12H  [START ON 3/16/2021] multivitamin, 1 tablet, Oral, Daily  mupirocin, 1 application, Each Nare, Q12H  [START ON 3/16/2021] pantoprazole, 40 mg, Oral, Daily  sodium chloride, 10 mL, Intravenous, Q12H  sodium chloride, 3 mL, Intravenous, Q12H  terazosin, 5 mg, Oral, Nightly  [START ON 3/16/2021] vitamin E, 400 Units, Oral, Daily      Continuous Infusions:   PRN Meds:.•  acetaminophen  •  Chlorhexidine Gluconate Cloth  •  dextrose  •  dextrose  •  glucagon (human recombinant)  •  ipratropium-albuterol  •  nitroglycerin  •  sodium chloride  •  sodium chloride    No Known Allergies    Objective   Objective     Vital Signs:   Temp:  [96.9 °F (36.1 °C)-99 °F (37.2 °C)] 97.7 °F (36.5 °C)  Heart Rate:  [59-85] 78  Resp:  [14-22] 18  BP: ()/(55-87) 132/83     Physical  Exam  Constitutional: sleeping but arousable  Eyes: PERRLA, sclerae anicteric, no conjunctival injection  HENT: NCAT, mucous membranes moist  Neck: Supple, no thyromegaly, no lymphadenopathy, trachea midline  Respiratory: Clear to auscultation bilaterally, nonlabored respirations   Cardiovascular: RRR, no murmurs, rubs, or gallops, palpable pedal pulses bilaterally  Gastrointestinal: Positive bowel sounds, soft, nontender, nondistended  Musculoskeletal: No bilateral ankle edema, no clubbing or cyanosis to extremities  Psychiatric: Appropriate affect, cooperative  Neurologic: Oriented x 3, strength symmetric in all extremities, Cranial Nerves grossly intact to confrontation, speech clear  Skin: No rashes      Results Reviewed:  I have personally reviewed current lab, radiology, and data and agree.    Results from last 7 days   Lab Units 03/15/21  2037   WBC 10*3/mm3 7.34   HEMOGLOBIN g/dL 14.1   HEMATOCRIT % 43.2   PLATELETS 10*3/mm3 307   INR  0.93     Results from last 7 days   Lab Units 03/15/21  2037   SODIUM mmol/L 135*   POTASSIUM mmol/L 4.5   CHLORIDE mmol/L 101   CO2 mmol/L 25.0   BUN mg/dL 20   CREATININE mg/dL 1.35*   GLUCOSE mg/dL 231*   CALCIUM mg/dL 8.8   ALT (SGPT) U/L 12   AST (SGOT) U/L 15     Estimated Creatinine Clearance: 56.1 mL/min (A) (by C-G formula based on SCr of 1.35 mg/dL (H)).  Brief Urine Lab Results  (Last result in the past 365 days)      Color   Clarity   Blood   Leuk Est   Nitrite   Protein   CREAT   Urine HCG        03/14/21 0445 Yellow Clear Negative Negative Negative Negative             No results found for: BNP    Microbiology Results Abnormal     Procedure Component Value - Date/Time    COVID PRE-OP / PRE-PROCEDURE SCREENING ORDER (NO ISOLATION) - Swab, Nasopharynx [[id number]]  (Normal) Collected: 03/15/21 2039    Lab Status: Final result Specimen: Swab from Nasopharynx Updated: 03/15/21 2127    Narrative:      The following orders were created for panel order COVID PRE-OP /  PRE-PROCEDURE SCREENING ORDER (NO ISOLATION) - Swab, Nasopharynx.  Procedure                               Abnormality         Status                     ---------                               -----------         ------                     COVID-19, ABBOTT IN-HOUS...[[id number]]  Normal              Final result                 Please view results for these tests on the individual orders.    COVID-19, ABBOTT IN-HOUSE,NASAL Swab (NO TRANSPORT MEDIA) 2 HR TAT - Swab, Nasopharynx [[id number]]  (Normal) Collected: 03/15/21 2039    Lab Status: Final result Specimen: Swab from Nasopharynx Updated: 03/15/21 2127     COVID19 Presumptive Negative    Narrative:      Fact sheet for providers: https://www.fda.gov/media/019404/download     Fact sheet for patients: https://www.fda.gov/media/163517/download    Test performed by PCR.  If inconsistent with clinical signs and symptoms patient should be tested with different authorized molecular test.          Imaging Results (Last 24 Hours)     Procedure Component Value Units Date/Time    XR Chest 1 View [[id number]] Collected: 03/15/21 2038     Updated: 03/15/21 2040    Narrative:      CR Chest 1 Vw    INDICATION:   Preoperative     COMPARISON:    Chest x-ray from 5/13/2021, Chest x-ray from 8/7/2018    FINDINGS:  Two portable AP view(s) of the chest.    The heart and mediastinal contours are normal. The lungs are clear apart from tiny nodular densities in the right lung base that are stable since 2018 and are thought to most likely represent small granuloma. No pneumothorax or pleural effusion.       Impression:      No acute cardiopulmonary findings.    Signer Name: Roseann Feliz MD   Signed: 3/15/2021 8:38 PM   Workstation Name: [id number]    [facility]        Results for orders placed during the hospital encounter of 03/13/21    Adult Transthoracic Echo Complete W/ Cont if Necessary Per Protocol    Interpretation Summary  · Left ventricular ejection fraction  appears to be 51 - 55%. Left ventricular systolic function is normal. Wall motion not assessed due to poor endocardial visualization  · Left ventricular diastolic function was normal.  · There is no evidence of pericardial effusion  · No significant functional valvular abnormalities      Assessment/Plan   Assessment / Plan     Active Hospital Problems    Diagnosis  POA   • **Type 2 diabetes mellitus with hyperglycemia, without long-term current use of insulin (CMS/[facility]) [E11.65]  Yes   • Coronary artery disease involving native coronary artery of native heart with angina pectoris (CMS/[facility]) [I25.119]  Unknown     Added automatically from request for surgery [id number]     • CAD (coronary artery disease) [I25.10]  Yes   • Diabetic neuropathy (CMS/[facility]) [E11.40]  Yes   • GERD (gastroesophageal reflux disease) [K21.9]  Yes   • History of gout [Z87.39]  Yes   • Former smoker [Z87.891]  Not Applicable   • Benign prostatic hyperplasia without lower urinary tract symptoms [N40.0]  Yes   • Hypertension associated with diabetes (CMS/[facility]) [E11.59, I15.2]  Yes   • Hyperlipidemia associated with type 2 diabetes mellitus (CMS/[facility]) [E11.69, E78.5]  Yes         1. CAD, severe multivessel  - per CT surgery, going to OR in AM  - currently NPO    2. DM2  - HgA1c 8.70  - low dose SSI with accuchecks AC/HS  - hold PO meds  - will need tight glycemic control post operatively, probably start with levemir 10U BID initially to avoid hypoglycemia    3. IZA  - Creatinine 1.35  - avoid nephrotoxins  - renally dose medications  - consider holding ACEI    4. HLD  - lipitor 40 mg per CTSx    5. GERD  - PPI    6. BPH  - cont proscar, hytrin    7. Gout  - cont allopurinol    8. HTN  -  Stable  - norvasc, lopressor, vasotec per CTSx      Thank you for allowing [facility] Service to provide consultative care for your patient, we will continue to follow while clinically appropriate.    Electronically signed by JADA Wilcox, 03/15/21,  10:51 PM EDT.    Seen and billed independently by apc.

## 2021-03-16 NOTE — ANESTHESIA PROCEDURE NOTES
Airway  Urgency: elective    Date/Time: 3/16/2021 2:41 PM  Airway not difficult    General Information and Staff    Patient location during procedure: OR    Indications and Patient Condition  Indications for airway management: airway protection    Preoxygenated: yes  MILS not maintained throughout  Mask difficulty assessment: 1 - vent by mask    Final Airway Details  Final airway type: endotracheal airway      Successful airway: ETT  Cuffed: yes   Successful intubation technique: direct laryngoscopy  Endotracheal tube insertion site: oral  Blade: Yanira  Blade size: 4  ETT size (mm): 8.0  Cormack-Lehane Classification: grade I - full view of glottis  Placement verified by: chest auscultation and capnometry   Measured from: lips  ETT/EBT  to lips (cm): 20  Number of attempts at approach: 1  Assessment: lips, teeth, and gum same as pre-op and atraumatic intubation    Additional Comments  Negative epigastric sounds, Breath sound equal bilaterally with symmetric chest rise and fall

## 2021-03-16 NOTE — ANESTHESIA PREPROCEDURE EVALUATION
Anesthesia Evaluation     Patient summary reviewed and Nursing notes reviewed                Airway   Mallampati: II  TM distance: >3 FB  Neck ROM: full  No difficulty expected  Dental          Pulmonary     breath sounds clear to auscultation  Cardiovascular     Rhythm: regular  Rate: normal    (+) hypertension, CAD, angina, hyperlipidemia,       Neuro/Psych  (+) numbness,     GI/Hepatic/Renal/Endo    (+) obesity,   diabetes mellitus,     Musculoskeletal     (+) back pain,   Abdominal    Substance History      OB/GYN          Other   arthritis,      ROS/Med Hx Other: · Left ventricular ejection fraction appears to be 51 - 55%. Left ventricular systolic function is normal. Wall motion not assessed due to poor endocardial visualization  · Left ventricular diastolic function was normal.  · There is no evidence of pericardial effusion  · No significant functional valvular abnormalities                Anesthesia Plan    ASA 4     general   ([place], PAC,SAVANNAH)  intravenous induction     Anesthetic plan, all risks, benefits, and alternatives have been provided, discussed and informed consent has been obtained with: patient.    Plan discussed with CRNA.

## 2021-03-16 NOTE — PROGRESS NOTES
Critical Care Note     LOS: 1 day   Patient Care Team:  Jacqueline Gatica MD as PCP - General (Family Medicine)    Chief Complaint/Reason for visit:  No chief complaint on file.      Subjective      William Villasenor is a 70 year old male, former smoker,  with PMH significant for DMT2 with diabetic neuropathy (HgbA1C 8.7 3/15), HTN, HLD, Gout, CAD transferred to [facility] on 3/15. He was admitted to [facility] on 3/14 with complaints of intermittent, worsening episodes of chest pain that had been occurring for approximately one week. EKG and troponins were negative but a stress test showed ischemia into the entire apex extending into anterolateral and inferoapical territory. He did have the same type of chest pain during the stress test. Heart cath on 3/15 revealed severe multivessel CAD with preserved EF of 55%.     Upon arrival to [facility] he was evaluated by Dr. Simms who had previously looked at his films and agreed that the patient would benefit from surgical revascularization. Carotid duplex was negative for stenosis. PFT's on 3/16: FVC 2.74 (65%), FEV1 2.33 (76%), FEV1/FVC 84.79%.      Interval History:     Patient underwent CABG x4, LIMA to LAD, saphenous vein to diagonal, saphenous vein to OM1 and saphenous vein to OM 2.  I am verbally told that he received platelets and packed cells in surgery but I cannot confirm in epic.  He arrived in the ICU on some low-dose norepinephrine.  This has been discontinued since arrival.  He was also sedated on propofol and on an insulin drip at 4 units/h.  He has a right IJ introducer and a left radial arterial line in place.  Noninvasive monitoring indicates his cardiac output is 6.3, cardiac index 3, stroke-volume 70.  He is currently on a rate of 16 tidal volume 700 PEEP of 5 pressure support 10 and 100% FiO2 with a saturation of 100%.    Review of Systems:    All systems were reviewed and negative except as noted in subjective.    Medical history, surgical history, social history, family  "history reviewed    Objective     Intake/Output:    Intake/Output Summary (Last 24 hours) at 3/16/2021 1932  Last data filed at 3/16/2021 1902  Gross per 24 hour   Intake 563 ml   Output 2370 ml   Net -1807 ml       Nutrition:  NPO Diet    Infusions:  dexmedetomidine, 0.2-1.5 mcg/kg/hr  dextrose 5 % with KCl 20 mEq, 30 mL/hr  DOBUTamine, 2-20 mcg/kg/min  DOPamine, 2-20 mcg/kg/min  EPINEPHrine, 0.02-0.3 mcg/kg/min  insulin, 0-50 Units/hr, Last Rate: 4 Units/hr (03/16/21 1902)  niCARdipine, 5-15 mg/hr  nitroglycerin, 5-200 mcg/min  norepinephrine, 0.02-0.3 mcg/kg/min, Last Rate: 0.02 mcg/kg/min (03/16/21 1902)  phenylephrine, 0.5-3 mcg/kg/min  propofol, 5-50 mcg/kg/min  sodium chloride, 30 mL/hr        Mechanical Ventilator Settings:            Resp Rate (Set): 16  Pressure Support (cm H2O): 10 cm H20  FiO2 (%): 100 %  PEEP/CPAP (cm H2O): 5 cm H20    Minute Ventilation (L/min) (Obs): 12.3 L/min  Resp Rate (Observed) Vent: 18  I:E Ratio (Set): 1:2.61  I:E Ratio (Obs): 1:2.20    PIP Observed (cm H2O): 26 cm H2O  Plateau Pressure (cm H2O): 24 cm H2O    Telemetry: Sinus rhythm             Vital Signs  Blood pressure 124/76, pulse 86, temperature 95.4 °F (35.2 °C), temperature source Core, resp. rate 16, height 175.3 cm (69.02\"), weight 91.4 kg (201 lb 8 oz), SpO2 100 %.    Physical Exam:  General Appearance:   Overweight older gentleman sedated   Head:   Normocephalic, atraumatic   Eyes:          Pupils are small and equal   Ears:     Throat:  Orally intubated   Neck:  Trachea midline, right IJ line in place.  No crepitus   Back:      Lungs:    Sternotomy incision intact.  Mediastinal tubes with scant bloody output.  Breath sounds are bilateral without wheeze or rhonchi    Heart:   Regular rhythm, S1, S2 auscultated, no rub   Abdomen:    Protuberant, hypoactive bowel sounds, slightly distended but soft   Rectal:   Deferred   Extremities:  Ace wrap right lower extremity.  No pretibial edema left leg.  Left radial arterial " line in place.   Pulses:    Skin:  Cool and dry   Lymph nodes:    Neurologic:  Sedated      Results Review:     I reviewed the patient's new clinical results.   Results from last 7 days   Lab Units 03/16/21  0719 03/15/21  2037 03/15/21  0043 03/13/21 2239   SODIUM mmol/L 134* 135* 133* 140   POTASSIUM mmol/L 4.0 4.5 4.1 4.1   CHLORIDE mmol/L 104 101 99 103   CO2 mmol/L 20.0* 25.0 20.0* 20.4*   BUN mg/dL 18 20 20 21   CREATININE mg/dL 0.97 1.35* 1.30* 1.26   CALCIUM mg/dL 8.3* 8.8 8.7 9.2   BILIRUBIN mg/dL  --  0.3 0.2 0.2   ALK PHOS U/L  --  69 66 74   ALT (SGPT) U/L  --  12 11 8   AST (SGOT) U/L  --  15 17 16   GLUCOSE mg/dL 208* 231* 323* 248*     Results from last 7 days   Lab Units 03/16/21  0719 03/15/21  2037 03/15/21  0043   WBC 10*3/mm3 6.78 7.34 8.28   HEMOGLOBIN g/dL 13.3 14.1 13.0   HEMATOCRIT % 40.1 43.2 39.6   PLATELETS 10*3/mm3 263 307 297     Results from last 7 days   Lab Units 03/16/21  0801   PH, ARTERIAL pH units 7.398   PO2 ART mm Hg 83.1   PCO2, ARTERIAL mm Hg 43.2   HCO3 ART mmol/L 26.6*     No results found for: BLOODCX  No results found for: URINECX    I reviewed the patient's new imaging including images and reports.    Postoperative x-ray not completed yet    All medications reviewed.   albumin human, , ,   allopurinol, 300 mg, Oral, Daily  amLODIPine, 10 mg, Oral, Q24H  aspirin, 81 mg, Oral, Daily  aspirin, 325 mg, Oral, Once  atorvastatin, 40 mg, Oral, Nightly  cefuroxime, 1.5 g, Intravenous, Q8H  cetirizine, 10 mg, Oral, Daily  chlorhexidine, 15 mL, Mouth/Throat, Q12H  enalapril, 20 mg, Oral, Daily  finasteride, 5 mg, Oral, Daily  folic acid, 1 mg, Oral, BID  insulin regular infusion 1 unit/mL, 1-20 Units/hr, Intravenous, Once  lactobacillus acidophilus, 1 capsule, Oral, Daily  metoprolol tartrate, 2.5 mg, Intravenous, Q6H  [START ON 3/17/2021] metoprolol tartrate, 25 mg, Oral, Q12H  multivitamin, 1 tablet, Oral, Daily  norepinephrine (LEVOPHED) 8 mg in 250 mL infusion, 0.02-0.3  mcg/kg/min, Intravenous, Once  pantoprazole, 40 mg, Oral, Daily  pharmacy consult - MTM, , Does not apply, Daily  protamine, , ,   protamine, 50 mg, Intravenous, Once  sodium chloride, 10 mL, Intravenous, Q12H  sodium chloride, 3 mL, Intravenous, Q12H  terazosin, 5 mg, Oral, Nightly  vitamin E, 400 Units, Oral, Daily          Assessment/Plan       Type 2 diabetes mellitus with hyperglycemia, without long-term current use of insulin (CMS/[facility])    Hypertension associated with diabetes (CMS/[facility])    Hyperlipidemia associated with type 2 diabetes mellitus (CMS/[facility])    Benign prostatic hyperplasia without lower urinary tract symptoms    GERD (gastroesophageal reflux disease)    Diabetic neuropathy (CMS/[facility])    Former smoker    History of gout    Coronary artery disease involving native coronary artery of native heart with angina pectoris (CMS/[facility])    CAD (coronary artery disease)      #1 coronary artery disease with angina and multivessel disease now status post CABG x4 today.  He initially was on some low-dose norepinephrine but that is currently been discontinued.  Cardiac output is excellent at 6.3 and he is in sinus rhythm.  Mediastinal tubes have scant output.  He is making clear urine.    #2 diabetes mellitus with no neuropathy.  He is currently on an insulin drip at 4 units/h    #3 underlying hypertension and dyslipidemia, risk factors for his coronary disease.  Blood pressure is 124/76 and heart rate is 86.    #4 previous tobacco use with near normal PFTs preoperatively.  He has no wheezing on examination    PLAN:    Wean FiO2 as able  Follow-up ABG and postoperative labs  Monitor cardiac output  Monitor rhythm  Monitor hourly urine output  Monitor hourly mediastinal tube output  Tight glycemic control with insulin drip  Aspirin, statin, beta-blocker  Home medications already ordered, finasteride, Hytrin, enalapril, amlodipine  Anticipate that he will wean per protocol  Start Pepcid    VTE Prophylaxis: Foot  pumps    Stress Ulcer Prophylaxis: None        Nina Brown MD  03/16/21  19:32 EDT      Time: Critical care 32 min  I personally provided care to this critically ill patient as documented above.  Critical care time does not include time spent on separately billed procedures.  Non of my critical care time was concurrent with other critical care providers.

## 2021-03-16 NOTE — PROGRESS NOTES
Discharge Planning Assessment  [facility]     Patient Name: William Villasenor  MRN: [id number]  Today's Date: 3/16/2021    Admit Date: 3/15/2021    Discharge Needs Assessment     Row Name 03/16/21 1122       Living Environment    Lives With  spouse    Current Living Arrangements  home/apartment/condo    Primary Care Provided by  self    Provides Primary Care For  no one    Family Caregiver if Needed  spouse    Quality of Family Relationships  helpful;involved    Able to Return to Prior Arrangements  yes       Resource/Environmental Concerns    Resource/Environmental Concerns  none    Transportation Concerns  car, none       Transition Planning    Patient/Family Anticipates Transition to  home    Patient/Family Anticipated Services at Transition  none    Transportation Anticipated  family or friend will provide       Discharge Needs Assessment    Readmission Within the Last 30 Days  no previous admission in last 30 days    Equipment Currently Used at Home  none    Concerns to be Addressed  discharge planning    Anticipated Changes Related to Illness  none    Equipment Needed After Discharge  none        Discharge Plan     Row Name 03/16/21 1122       Plan    Plan  Initial CM eval    Patient/Family in Agreement with Plan  yes    Plan Comments  Talked to patient briefly at bedside regarding discharge planning - patient says he lives in [facility] with his spouse. States he is independent of ADLs and denies the use of any DME at home. Will have a CABG later today, CM will continue to follow for discharge planning - goal is to return home upon discharge - diony [phone]    Final Discharge Disposition Code  30 - still a patient        Continued Care and Services - Admitted Since 3/15/2021    Coordination has not been started for this encounter.         Demographic Summary     Row Name 03/16/21 1120       General Information    Admission Type  inpatient    Arrived From  home    Referral Source  admission list    Reason for  Consult  discharge planning    Preferred Language  English     Used During This Interaction  no       Contact Information    Permission Granted to Share Info With          Functional Status     Row Name 03/16/21 1122       Functional Status    Usual Activity Tolerance  good    Current Activity Tolerance  moderate       Functional Status, IADL    Medications  independent    Meal Preparation  independent    Housekeeping  independent    Laundry  independent    Shopping  independent        Psychosocial    No documentation.       Abuse/Neglect    No documentation.       Legal    No documentation.       Substance Abuse    No documentation.       Patient Forms    No documentation.           Fatimah Hill RN

## 2021-03-17 ENCOUNTER — APPOINTMENT (OUTPATIENT)
Dept: GENERAL RADIOLOGY | Facility: HOSPITAL | Age: 71
End: 2021-03-17

## 2021-03-17 LAB
ALBUMIN SERPL-MCNC: 4.4 G/DL (ref 3.5–5.2)
ANION GAP SERPL CALCULATED.3IONS-SCNC: 11 MMOL/L (ref 5–15)
ARTERIAL PATENCY WRIST A: ABNORMAL
ATMOSPHERIC PRESS: ABNORMAL MM[HG]
BASE EXCESS BLDA CALC-SCNC: -2 MMOL/L (ref -5–5)
BASE EXCESS BLDA CALC-SCNC: -2 MMOL/L (ref 0–2)
BASE EXCESS BLDA CALC-SCNC: -3 MMOL/L (ref -5–5)
BASE EXCESS BLDA CALC-SCNC: -6 MMOL/L (ref -5–5)
BASE EXCESS BLDA CALC-SCNC: 0 MMOL/L (ref -5–5)
BASE EXCESS BLDA CALC-SCNC: 0 MMOL/L (ref -5–5)
BASE EXCESS BLDA CALC-SCNC: 2 MMOL/L (ref -5–5)
BASE EXCESS BLDA CALC-SCNC: 3 MMOL/L (ref -5–5)
BASE EXCESS BLDA CALC-SCNC: 4 MMOL/L (ref -5–5)
BASOPHILS # BLD AUTO: 0.04 10*3/MM3 (ref 0–0.2)
BASOPHILS NFR BLD AUTO: 0.3 % (ref 0–1.5)
BDY SITE: ABNORMAL
BH BB BLOOD EXPIRATION DATE: NORMAL
BH BB BLOOD TYPE BARCODE: 6200
BH BB BLOOD TYPE BARCODE: 7300
BH BB BLOOD TYPE BARCODE: 7300
BH BB DISPENSE STATUS: NORMAL
BH BB PRODUCT CODE: NORMAL
BH BB UNIT NUMBER: NORMAL
BODY TEMPERATURE: 37 C
BUN SERPL-MCNC: 15 MG/DL (ref 8–23)
BUN/CREAT SERPL: 13.2 (ref 7–25)
CA-I BLDA-SCNC: 1 MMOL/L (ref 1.2–1.32)
CA-I BLDA-SCNC: 1.01 MMOL/L (ref 1.2–1.32)
CA-I BLDA-SCNC: 1.02 MMOL/L (ref 1.2–1.32)
CA-I BLDA-SCNC: 1.04 MMOL/L (ref 1.2–1.32)
CA-I BLDA-SCNC: 1.05 MMOL/L (ref 1.2–1.32)
CA-I BLDA-SCNC: 1.12 MMOL/L (ref 1.2–1.32)
CA-I BLDA-SCNC: 1.13 MMOL/L (ref 1.2–1.32)
CA-I BLDA-SCNC: 1.37 MMOL/L (ref 1.2–1.32)
CALCIUM SPEC-SCNC: 8.6 MG/DL (ref 8.6–10.5)
CHLORIDE SERPL-SCNC: 109 MMOL/L (ref 98–107)
CO2 BLDA-SCNC: 21 MMOL/L (ref 24–29)
CO2 BLDA-SCNC: 23 MMOL/L (ref 24–29)
CO2 BLDA-SCNC: 24.6 MMOL/L (ref 22–33)
CO2 BLDA-SCNC: 25 MMOL/L (ref 24–29)
CO2 BLDA-SCNC: 27 MMOL/L (ref 24–29)
CO2 BLDA-SCNC: 28 MMOL/L (ref 24–29)
CO2 BLDA-SCNC: 29 MMOL/L (ref 24–29)
CO2 BLDA-SCNC: 30 MMOL/L (ref 24–29)
CO2 BLDA-SCNC: 30 MMOL/L (ref 24–29)
CO2 SERPL-SCNC: 22 MMOL/L (ref 22–29)
COHGB MFR BLD: 1 % (ref 0–2)
CREAT SERPL-MCNC: 1.14 MG/DL (ref 0.76–1.27)
CROSSMATCH INTERPRETATION: NORMAL
CROSSMATCH INTERPRETATION: NORMAL
DEPRECATED RDW RBC AUTO: 43.1 FL (ref 37–54)
EOSINOPHIL # BLD AUTO: 0.02 10*3/MM3 (ref 0–0.4)
EOSINOPHIL NFR BLD AUTO: 0.2 % (ref 0.3–6.2)
EPAP: 0
ERYTHROCYTE [DISTWIDTH] IN BLOOD BY AUTOMATED COUNT: 13.2 % (ref 12.3–15.4)
GFR SERPL CREATININE-BSD FRML MDRD: 63 ML/MIN/1.73
GLUCOSE BLDC GLUCOMTR-MCNC: 101 MG/DL (ref 70–130)
GLUCOSE BLDC GLUCOMTR-MCNC: 110 MG/DL (ref 70–130)
GLUCOSE BLDC GLUCOMTR-MCNC: 114 MG/DL (ref 70–130)
GLUCOSE BLDC GLUCOMTR-MCNC: 114 MG/DL (ref 70–130)
GLUCOSE BLDC GLUCOMTR-MCNC: 116 MG/DL (ref 70–130)
GLUCOSE BLDC GLUCOMTR-MCNC: 120 MG/DL (ref 70–130)
GLUCOSE BLDC GLUCOMTR-MCNC: 126 MG/DL (ref 70–130)
GLUCOSE BLDC GLUCOMTR-MCNC: 126 MG/DL (ref 70–130)
GLUCOSE BLDC GLUCOMTR-MCNC: 127 MG/DL (ref 70–130)
GLUCOSE BLDC GLUCOMTR-MCNC: 129 MG/DL (ref 70–130)
GLUCOSE BLDC GLUCOMTR-MCNC: 131 MG/DL (ref 70–130)
GLUCOSE BLDC GLUCOMTR-MCNC: 133 MG/DL (ref 70–130)
GLUCOSE BLDC GLUCOMTR-MCNC: 137 MG/DL (ref 70–130)
GLUCOSE BLDC GLUCOMTR-MCNC: 139 MG/DL (ref 70–130)
GLUCOSE BLDC GLUCOMTR-MCNC: 143 MG/DL (ref 70–130)
GLUCOSE BLDC GLUCOMTR-MCNC: 144 MG/DL (ref 70–130)
GLUCOSE BLDC GLUCOMTR-MCNC: 155 MG/DL (ref 70–130)
GLUCOSE BLDC GLUCOMTR-MCNC: 157 MG/DL (ref 70–130)
GLUCOSE BLDC GLUCOMTR-MCNC: 160 MG/DL (ref 70–130)
GLUCOSE BLDC GLUCOMTR-MCNC: 162 MG/DL (ref 70–130)
GLUCOSE BLDC GLUCOMTR-MCNC: 164 MG/DL (ref 70–130)
GLUCOSE BLDC GLUCOMTR-MCNC: 164 MG/DL (ref 70–130)
GLUCOSE BLDC GLUCOMTR-MCNC: 167 MG/DL (ref 70–130)
GLUCOSE BLDC GLUCOMTR-MCNC: 172 MG/DL (ref 70–130)
GLUCOSE BLDC GLUCOMTR-MCNC: 173 MG/DL (ref 70–130)
GLUCOSE BLDC GLUCOMTR-MCNC: 194 MG/DL (ref 70–130)
GLUCOSE BLDC GLUCOMTR-MCNC: 91 MG/DL (ref 70–130)
GLUCOSE BLDC GLUCOMTR-MCNC: 93 MG/DL (ref 70–130)
GLUCOSE BLDC GLUCOMTR-MCNC: 97 MG/DL (ref 70–130)
GLUCOSE SERPL-MCNC: 135 MG/DL (ref 65–99)
HCO3 BLDA-SCNC: 19.7 MMOL/L (ref 22–26)
HCO3 BLDA-SCNC: 22.3 MMOL/L (ref 22–26)
HCO3 BLDA-SCNC: 23.4 MMOL/L (ref 20–26)
HCO3 BLDA-SCNC: 23.7 MMOL/L (ref 22–26)
HCO3 BLDA-SCNC: 25.8 MMOL/L (ref 22–26)
HCO3 BLDA-SCNC: 26.3 MMOL/L (ref 22–26)
HCO3 BLDA-SCNC: 27.7 MMOL/L (ref 22–26)
HCO3 BLDA-SCNC: 28.5 MMOL/L (ref 22–26)
HCO3 BLDA-SCNC: 28.8 MMOL/L (ref 22–26)
HCT VFR BLD AUTO: 33.4 % (ref 37.5–51)
HCT VFR BLD CALC: 33.4 %
HCT VFR BLDA CALC: 22 % (ref 38–51)
HCT VFR BLDA CALC: 27 % (ref 38–51)
HCT VFR BLDA CALC: 29 % (ref 38–51)
HCT VFR BLDA CALC: 30 % (ref 38–51)
HCT VFR BLDA CALC: 34 % (ref 38–51)
HCT VFR BLDA CALC: 36 % (ref 38–51)
HGB BLD-MCNC: 10.9 G/DL (ref 13–17.7)
HGB BLDA-MCNC: 10.2 G/DL (ref 12–17)
HGB BLDA-MCNC: 10.9 G/DL (ref 13.5–17.5)
HGB BLDA-MCNC: 11.6 G/DL (ref 12–17)
HGB BLDA-MCNC: 12.2 G/DL (ref 12–17)
HGB BLDA-MCNC: 7.5 G/DL (ref 12–17)
HGB BLDA-MCNC: 9.2 G/DL (ref 12–17)
HGB BLDA-MCNC: 9.9 G/DL (ref 12–17)
IMM GRANULOCYTES # BLD AUTO: 0.08 10*3/MM3 (ref 0–0.05)
IMM GRANULOCYTES NFR BLD AUTO: 0.7 % (ref 0–0.5)
INHALED O2 CONCENTRATION: 40 %
INR PPP: 1.2 (ref 0.85–1.16)
IPAP: 0
LYMPHOCYTES # BLD AUTO: 0.58 10*3/MM3 (ref 0.7–3.1)
LYMPHOCYTES NFR BLD AUTO: 4.9 % (ref 19.6–45.3)
MAGNESIUM SERPL-MCNC: 2.4 MG/DL (ref 1.6–2.4)
MCH RBC QN AUTO: 29.5 PG (ref 26.6–33)
MCHC RBC AUTO-ENTMCNC: 32.6 G/DL (ref 31.5–35.7)
MCV RBC AUTO: 90.3 FL (ref 79–97)
METHGB BLD QL: 0.8 % (ref 0–1.5)
MODALITY: ABNORMAL
MONOCYTES # BLD AUTO: 1 10*3/MM3 (ref 0.1–0.9)
MONOCYTES NFR BLD AUTO: 8.5 % (ref 5–12)
NEUTROPHILS NFR BLD AUTO: 10.03 10*3/MM3 (ref 1.7–7)
NEUTROPHILS NFR BLD AUTO: 85.4 % (ref 42.7–76)
NOTE: ABNORMAL
NRBC BLD AUTO-RTO: 0 /100 WBC (ref 0–0.2)
OXYHGB MFR BLDV: 91 % (ref 94–99)
PAW @ PEAK INSP FLOW SETTING VENT: 0 CMH2O
PCO2 BLDA: 33.7 MM HG (ref 35–45)
PCO2 BLDA: 38.6 MM HG (ref 35–45)
PCO2 BLDA: 41.2 MM HG (ref 35–45)
PCO2 BLDA: 45 MM HG (ref 35–45)
PCO2 BLDA: 48.2 MM HG (ref 35–45)
PCO2 BLDA: 48.3 MM HG (ref 35–45)
PCO2 BLDA: 48.6 MM HG (ref 35–45)
PCO2 BLDA: 48.7 MM HG (ref 35–45)
PCO2 BLDA: 49.4 MM HG (ref 35–45)
PCO2 TEMP ADJ BLD: 41.2 MM HG (ref 35–48)
PH BLDA: 7.33 PH UNITS (ref 7.35–7.6)
PH BLDA: 7.33 PH UNITS (ref 7.35–7.6)
PH BLDA: 7.34 PH UNITS (ref 7.35–7.6)
PH BLDA: 7.36 PH UNITS (ref 7.35–7.45)
PH BLDA: 7.36 PH UNITS (ref 7.35–7.6)
PH BLDA: 7.37 PH UNITS (ref 7.35–7.6)
PH BLDA: 7.38 PH UNITS (ref 7.35–7.6)
PH, TEMP CORRECTED: 7.36 PH UNITS
PHOSPHATE SERPL-MCNC: 2.6 MG/DL (ref 2.5–4.5)
PLATELET # BLD AUTO: 227 10*3/MM3 (ref 140–450)
PMV BLD AUTO: 8.9 FL (ref 6–12)
PO2 BLDA: 137 MMHG (ref 80–105)
PO2 BLDA: 187 MMHG (ref 80–105)
PO2 BLDA: 216 MMHG (ref 80–105)
PO2 BLDA: 339 MMHG (ref 80–105)
PO2 BLDA: 36 MMHG (ref 80–105)
PO2 BLDA: 387 MMHG (ref 80–105)
PO2 BLDA: 40 MMHG (ref 80–105)
PO2 BLDA: 416 MMHG (ref 80–105)
PO2 BLDA: 62.9 MM HG (ref 83–108)
PO2 TEMP ADJ BLD: 62.9 MM HG (ref 83–108)
POTASSIUM BLDA-SCNC: 2.9 MMOL/L (ref 3.5–4.9)
POTASSIUM BLDA-SCNC: 3.7 MMOL/L (ref 3.5–4.9)
POTASSIUM BLDA-SCNC: 3.9 MMOL/L (ref 3.5–4.9)
POTASSIUM BLDA-SCNC: 3.9 MMOL/L (ref 3.5–4.9)
POTASSIUM BLDA-SCNC: 4.2 MMOL/L (ref 3.5–4.9)
POTASSIUM BLDA-SCNC: 4.3 MMOL/L (ref 3.5–4.9)
POTASSIUM BLDA-SCNC: 4.3 MMOL/L (ref 3.5–4.9)
POTASSIUM BLDA-SCNC: 4.5 MMOL/L (ref 3.5–4.9)
POTASSIUM SERPL-SCNC: 4.4 MMOL/L (ref 3.5–5.2)
PROTHROMBIN TIME: 14.9 SECONDS (ref 11.5–14)
QT INTERVAL: 348 MS
QT INTERVAL: 426 MS
QTC INTERVAL: 430 MS
QTC INTERVAL: 491 MS
RBC # BLD AUTO: 3.7 10*6/MM3 (ref 4.14–5.8)
SAO2 % BLDA: 100 % (ref 95–98)
SAO2 % BLDA: 64 % (ref 95–98)
SAO2 % BLDA: 71 % (ref 95–98)
SAO2 % BLDA: 99 % (ref 95–98)
SODIUM BLD-SCNC: 139 MMOL/L (ref 138–146)
SODIUM BLD-SCNC: 139 MMOL/L (ref 138–146)
SODIUM BLD-SCNC: 140 MMOL/L (ref 138–146)
SODIUM BLD-SCNC: 141 MMOL/L (ref 138–146)
SODIUM BLD-SCNC: 146 MMOL/L (ref 138–146)
SODIUM SERPL-SCNC: 142 MMOL/L (ref 136–145)
TOTAL RATE: 0 BREATHS/MINUTE
UNIT  ABO: NORMAL
UNIT  RH: NORMAL
WBC # BLD AUTO: 11.75 10*3/MM3 (ref 3.4–10.8)

## 2021-03-17 PROCEDURE — 82375 ASSAY CARBOXYHB QUANT: CPT

## 2021-03-17 PROCEDURE — 25010000003 CEFUROXIME SODIUM 1.5 G RECONSTITUTED SOLUTION: Performed by: THORACIC SURGERY (CARDIOTHORACIC VASCULAR SURGERY)

## 2021-03-17 PROCEDURE — 25010000002 MORPHINE PER 10 MG: Performed by: THORACIC SURGERY (CARDIOTHORACIC VASCULAR SURGERY)

## 2021-03-17 PROCEDURE — 99024 POSTOP FOLLOW-UP VISIT: CPT | Performed by: PHYSICIAN ASSISTANT

## 2021-03-17 PROCEDURE — 85025 COMPLETE CBC W/AUTO DIFF WBC: CPT | Performed by: PHYSICIAN ASSISTANT

## 2021-03-17 PROCEDURE — 82962 GLUCOSE BLOOD TEST: CPT

## 2021-03-17 PROCEDURE — 80069 RENAL FUNCTION PANEL: CPT | Performed by: THORACIC SURGERY (CARDIOTHORACIC VASCULAR SURGERY)

## 2021-03-17 PROCEDURE — 94799 UNLISTED PULMONARY SVC/PX: CPT

## 2021-03-17 PROCEDURE — 93005 ELECTROCARDIOGRAM TRACING: CPT | Performed by: PHYSICIAN ASSISTANT

## 2021-03-17 PROCEDURE — 25010000002 ONDANSETRON PER 1 MG: Performed by: PHYSICIAN ASSISTANT

## 2021-03-17 PROCEDURE — 85610 PROTHROMBIN TIME: CPT | Performed by: PHYSICIAN ASSISTANT

## 2021-03-17 PROCEDURE — 63710000001 INSULIN DETEMIR PER 5 UNITS: Performed by: INTERNAL MEDICINE

## 2021-03-17 PROCEDURE — 71045 X-RAY EXAM CHEST 1 VIEW: CPT

## 2021-03-17 PROCEDURE — 97162 PT EVAL MOD COMPLEX 30 MIN: CPT

## 2021-03-17 PROCEDURE — 94003 VENT MGMT INPAT SUBQ DAY: CPT

## 2021-03-17 PROCEDURE — 82805 BLOOD GASES W/O2 SATURATION: CPT

## 2021-03-17 PROCEDURE — 99232 SBSQ HOSP IP/OBS MODERATE 35: CPT | Performed by: INTERNAL MEDICINE

## 2021-03-17 PROCEDURE — 25010000002 FUROSEMIDE PER 20 MG: Performed by: INTERNAL MEDICINE

## 2021-03-17 PROCEDURE — 83050 HGB METHEMOGLOBIN QUAN: CPT

## 2021-03-17 PROCEDURE — 83735 ASSAY OF MAGNESIUM: CPT | Performed by: PHYSICIAN ASSISTANT

## 2021-03-17 PROCEDURE — 94660 CPAP INITIATION&MGMT: CPT

## 2021-03-17 RX ORDER — MORPHINE SULFATE 2 MG/ML
2 INJECTION, SOLUTION INTRAMUSCULAR; INTRAVENOUS
Status: DISCONTINUED | OUTPATIENT
Start: 2021-03-17 | End: 2021-03-22 | Stop reason: HOSPADM

## 2021-03-17 RX ORDER — ASPIRIN 325 MG
325 TABLET ORAL DAILY
Status: DISCONTINUED | OUTPATIENT
Start: 2021-03-18 | End: 2021-03-19

## 2021-03-17 RX ORDER — FUROSEMIDE 10 MG/ML
40 INJECTION INTRAMUSCULAR; INTRAVENOUS ONCE
Status: COMPLETED | OUTPATIENT
Start: 2021-03-17 | End: 2021-03-17

## 2021-03-17 RX ADMIN — SODIUM CHLORIDE, PRESERVATIVE FREE 3 ML: 5 INJECTION INTRAVENOUS at 20:35

## 2021-03-17 RX ADMIN — OXYCODONE AND ACETAMINOPHEN 2 TABLET: 5; 325 TABLET ORAL at 20:39

## 2021-03-17 RX ADMIN — ALLOPURINOL 300 MG: 300 TABLET ORAL at 09:05

## 2021-03-17 RX ADMIN — TERAZOSIN HYDROCHLORIDE 5 MG: 5 CAPSULE ORAL at 20:34

## 2021-03-17 RX ADMIN — INSULIN DETEMIR 25 UNITS: 100 INJECTION, SOLUTION SUBCUTANEOUS at 18:56

## 2021-03-17 RX ADMIN — PANTOPRAZOLE SODIUM 40 MG: 40 TABLET, DELAYED RELEASE ORAL at 09:04

## 2021-03-17 RX ADMIN — MORPHINE SULFATE 2 MG: 2 INJECTION, SOLUTION INTRAMUSCULAR; INTRAVENOUS at 03:00

## 2021-03-17 RX ADMIN — FOLIC ACID 1 MG: 1 TABLET ORAL at 09:04

## 2021-03-17 RX ADMIN — FUROSEMIDE 40 MG: 10 INJECTION, SOLUTION INTRAVENOUS at 10:08

## 2021-03-17 RX ADMIN — SODIUM CHLORIDE, PRESERVATIVE FREE 10 ML: 5 INJECTION INTRAVENOUS at 09:11

## 2021-03-17 RX ADMIN — METOPROLOL TARTRATE 2.5 MG: 5 INJECTION INTRAVENOUS at 09:04

## 2021-03-17 RX ADMIN — FOLIC ACID 1 MG: 1 TABLET ORAL at 20:34

## 2021-03-17 RX ADMIN — FINASTERIDE 5 MG: 5 TABLET, FILM COATED ORAL at 09:04

## 2021-03-17 RX ADMIN — ASPIRIN 81 MG: 81 TABLET, COATED ORAL at 09:05

## 2021-03-17 RX ADMIN — MORPHINE SULFATE 2 MG: 2 INJECTION, SOLUTION INTRAMUSCULAR; INTRAVENOUS at 21:23

## 2021-03-17 RX ADMIN — Medication 400 UNITS: at 09:05

## 2021-03-17 RX ADMIN — METOPROLOL TARTRATE 2.5 MG: 5 INJECTION INTRAVENOUS at 15:22

## 2021-03-17 RX ADMIN — ATORVASTATIN CALCIUM 40 MG: 40 TABLET, FILM COATED ORAL at 20:34

## 2021-03-17 RX ADMIN — SODIUM CHLORIDE, PRESERVATIVE FREE 10 ML: 5 INJECTION INTRAVENOUS at 20:34

## 2021-03-17 RX ADMIN — CEFUROXIME SODIUM 1.5 G: 1.5 INJECTION, POWDER, FOR SOLUTION INTRAVENOUS at 22:33

## 2021-03-17 RX ADMIN — METOPROLOL TARTRATE 12.5 MG: 25 TABLET, FILM COATED ORAL at 20:34

## 2021-03-17 RX ADMIN — CHLORHEXIDINE GLUCONATE 15 ML: 1.2 SOLUTION ORAL at 09:04

## 2021-03-17 RX ADMIN — INSULIN HUMAN 2.8 UNITS/HR: 1 INJECTION, SOLUTION INTRAVENOUS at 09:03

## 2021-03-17 RX ADMIN — MORPHINE SULFATE 2 MG: 2 INJECTION, SOLUTION INTRAMUSCULAR; INTRAVENOUS at 10:08

## 2021-03-17 RX ADMIN — CEFUROXIME SODIUM 1.5 G: 1.5 INJECTION, POWDER, FOR SOLUTION INTRAVENOUS at 15:22

## 2021-03-17 RX ADMIN — MULTIVITAMIN TABLET 1 TABLET: TABLET at 09:04

## 2021-03-17 RX ADMIN — ONDANSETRON 4 MG: 2 INJECTION INTRAMUSCULAR; INTRAVENOUS at 01:22

## 2021-03-17 RX ADMIN — CEFUROXIME SODIUM 1.5 G: 1.5 INJECTION, POWDER, FOR SOLUTION INTRAVENOUS at 06:32

## 2021-03-17 RX ADMIN — OXYCODONE AND ACETAMINOPHEN 2 TABLET: 5; 325 TABLET ORAL at 13:17

## 2021-03-17 RX ADMIN — MORPHINE SULFATE 2 MG: 2 INJECTION, SOLUTION INTRAMUSCULAR; INTRAVENOUS at 05:49

## 2021-03-17 RX ADMIN — Medication 1 CAPSULE: at 09:04

## 2021-03-17 RX ADMIN — CETIRIZINE HYDROCHLORIDE 10 MG: 10 TABLET, FILM COATED ORAL at 09:05

## 2021-03-17 NOTE — PLAN OF CARE
Goal Outcome Evaluation:     Progress: improving  Outcome Summary: Pt arrived on unit at 1902 postoperatively. Sedation weaning started at 2200 but patient shivering, vital signs became labile. Patient still shivering after demerol and fentanyl, patient re-sedated and vecc given. Patient able to tolerate extubation at 0140 but could not maintain saturation on non-rebreather, bipap started and patient sustained sats on bipap until 1758. Pt O2 greater than 92% on 6L NC at this time. Levo gtt to maintain BP, insulin gtt titrated per order. A&Ox4. NSR to sinus arrythmia per monitor. Unable to get to chair or ambulate at this time r/t O2 sats and drowsiness. Wife and daughter updated following surgery. 650 out of MT/pleural tubes. UOP almost 2L. All pulses palpable. RENO equally. Will continue to closely monitor.     Correction---bipap until 0558

## 2021-03-17 NOTE — PROGRESS NOTES
CTS Progress Note       LOS: 2 days   Patient Care Team:  Jacqueline Gatica MD as PCP - General (Family Medicine)    Chief Complaint: Type 2 diabetes mellitus with hyperglycemia, without long-term current use of insulin (CMS/[facility])    Vital Signs:  Temp:  [95.4 °F (35.2 °C)-100.6 °F (38.1 °C)] 100.6 °F (38.1 °C)  Heart Rate:  [62-98] 93  Resp:  [16-18] 16  BP: ()/(55-80) 106/63  Arterial Line BP: ()/() 108/51  FiO2 (%):  [40 %-100 %] 40 %    Physical Exam: Extubated neuro intact       Results:   Results from last 7 days   Lab Units 03/17/21  0350   WBC 10*3/mm3 11.75*   HEMOGLOBIN g/dL 10.9*   HEMATOCRIT % 33.4*   PLATELETS 10*3/mm3 227     Results from last 7 days   Lab Units 03/17/21  0350   SODIUM mmol/L 142   POTASSIUM mmol/L 4.4   CHLORIDE mmol/L 109*   CO2 mmol/L 22.0   BUN mg/dL 15   CREATININE mg/dL 1.14   GLUCOSE mg/dL 135*   CALCIUM mg/dL 8.6           Imaging Results (Last 24 Hours)     Procedure Component Value Units Date/Time    XR Chest 1 View [[id number]] Resulted: 03/17/21 0449     Updated: 03/17/21 0449    XR Chest 1 View [[id number]] Collected: 03/16/21 1947     Updated: 03/16/21 1950    Narrative:      CR Chest 1 Vw    INDICATION:   70-year-old male for postoperative tube and line check.     COMPARISON:    Chest 3/15/2021    FINDINGS:  Single portable AP view(s) of the chest.    Endotracheal tube tip projects 3 cm above the oziel. Right IJ vascular sheath tip projects over the upper SVC. Mediastinal drains and left-sided chest tube are in satisfactory position.    Low lung volumes with mild bibasilar atelectasis. No focal pulmonary infiltrates. No pneumothorax. Cardiomediastinal silhouette is mildly prominent, but within expected postoperative limits. Median sternotomy wires. Mild central vascular congestion.      Impression:        1. Tubes and lines in satisfactory position, detailed above. No pneumothorax.  2. Stable postoperative cardiomediastinal silhouette.  3. Mild central  vascular congestion.    Signer Name: Santiago Samaniego MD   Signed: 3/16/2021 7:47 PM   Workstation Name: [email or web address]    [facility]          Assessment      Type 2 diabetes mellitus with hyperglycemia, without long-term current use of insulin (CMS/HCC)    Hypertension associated with diabetes (CMS/HCC)    Hyperlipidemia associated with type 2 diabetes mellitus (CMS/HCC)    Benign prostatic hyperplasia without lower urinary tract symptoms    GERD (gastroesophageal reflux disease)    Diabetic neuropathy (CMS/HCC)    Former smoker    History of gout    Coronary artery disease involving native coronary artery of native heart with angina pectoris (CMS/HCC)    CAD (coronary artery disease)    Extubated is on 6 L nasal cannula but is primarily breathing through his mouth.  Overall satisfactory in the early postoperative period    Plan   As above    Please note that portions of this note were completed with a voice recognition program. Efforts were made to edit the dictations, but occasionally words are mistranscribed.    Jorge Simms MD  03/17/21  06:52 EDT

## 2021-03-17 NOTE — CONSULTS
[facility] Consult Note       Referring Provider: Jorge Simms MD  Reason for Consultation:      Patient Care Team:  Jacqueline Gatica MD as PCP - General (Family Medicine)    Chief complaint:   Chest pain    Subjective .     History of present illness: 70-year-old man with hypertension, type 2 diabetes mellitus, hyperlipidemia, and GERD admitted to [facility] with chest pain 1 week ago.  He ruled out for acute myocardial infarction but subsequently had a stress test showing multiple areas of ischemia.  He underwent cardiac catheterization thereafter which showed three-vessel coronary disease with normal LV function.  Yesterday he underwent CABG which was uneventful.  At the present time he has been extubated and is sleepy but in no acute distress.    He has no prior history of myocardial infarction stroke or TIA.  There is no history of congestive heart failure    Review of Systems  A 14 point review of systems was negative except as was stated in the HPI    History  Past Medical History:   Diagnosis Date   • Arthritis    • Back pain    • BPH (benign prostatic hyperplasia)    • Diabetes mellitus (CMS/HCC)    • Diabetic peripheral neuropathy (CMS/HCC)    • Erectile dysfunction    • Former smoker    • GERD (gastroesophageal reflux disease)    • GERD (gastroesophageal reflux disease)    • Gout    • High cholesterol    • Hypertension    • Obesity      Past Surgical History:   Procedure Laterality Date   • CARDIAC CATHETERIZATION N/A 3/15/2021    Procedure: Left Heart Cath;  Surgeon: Trent Zaragoza MD;  Location:  [facility] CATH INVASIVE LOCATION;  Service: Cardiology;  Laterality: N/A;   • CARDIAC CATHETERIZATION N/A 3/15/2021    Procedure: Coronary angiography;  Surgeon: Trent Zaragoza MD;  Location:  [facility] CATH INVASIVE LOCATION;  Service: Cardiology;  Laterality: N/A;   • COLONOSCOPY W/ BIOPSIES  2015     Family History   Problem Relation Age of Onset   • Heart disease Mother    •  Hypertension Mother    • Depression Mother    • Alcohol abuse Maternal Uncle    • Heart disease Maternal Grandmother    • Hypertension Maternal Grandmother    • Diabetes Maternal Grandmother    • Diabetes Paternal Grandmother      Social History     Tobacco Use   • Smoking status: Former Smoker     Packs/day: 1.00     Years: 5.00     Pack years: 5.00     Quit date: 1974     Years since quittin.1   • Smokeless tobacco: Never Used   Substance Use Topics   • Alcohol use: Never   • Drug use: Never     Medications Prior to Admission   Medication Sig Dispense Refill Last Dose   • glipizide (GLUCOTROL) 5 MG tablet Take 10 mg by mouth Every Morning.      • glipizide (GLUCOTROL) 5 MG tablet Take 5 mg by mouth Every Evening.      • metFORMIN (GLUCOPHAGE) 1000 MG tablet Take 1,000 mg by mouth 2 (Two) Times a Day With Meals.      • omeprazole (priLOSEC) 20 MG capsule Take 20 mg by mouth 2 (two) times a day.      • acetaminophen (TYLENOL) 500 MG tablet Take 500 mg by mouth Every 6 (Six) Hours As Needed for Mild Pain .      • allopurinol (ZYLOPRIM) 300 MG tablet Take 1 tablet by mouth Daily. 90 tablet 1    • aspirin (Aspirin Adult Low Dose) 81 MG EC tablet Take 81 mg by mouth Daily.      • doxazosin (CARDURA) 4 MG tablet Take 1 tablet by mouth Daily. 90 tablet 1    • enalapril (VASOTEC) 20 MG tablet Take 1 tablet by mouth Daily. 90 tablet 1    • finasteride (PROSCAR) 5 MG tablet Take 1 tablet by mouth Daily. 90 tablet 1 More than a month at Unknown time   • folic acid (FOLVITE) 1 MG tablet Take 1 mg by mouth 2 (two) times a day.      • gabapentin (NEURONTIN) 300 MG capsule Take 1 capsule by mouth 2 (two) times a day. 60 capsule 0    • Lactobacillus (Acidophilus) 100 MG capsule Take 100 mg by mouth Daily.      • vitamin E 400 UNIT capsule Take 1 capsule by mouth Daily.        Scheduled Meds:  allopurinol, 300 mg, Oral, Daily  amLODIPine, 10 mg, Oral, Q24H  aspirin, 81 mg, Oral, Daily  aspirin, 325 mg, Oral,  Once  atorvastatin, 40 mg, Oral, Nightly  cefuroxime, 1.5 g, Intravenous, Q8H  cetirizine, 10 mg, Oral, Daily  chlorhexidine, 15 mL, Mouth/Throat, Q12H  enalapril, 20 mg, Oral, Daily  famotidine, 20 mg, Intravenous, Q12H  finasteride, 5 mg, Oral, Daily  folic acid, 1 mg, Oral, BID  insulin regular infusion 1 unit/mL, 1-20 Units/hr, Intravenous, Once  lactobacillus acidophilus, 1 capsule, Oral, Daily  metoprolol tartrate, 2.5 mg, Intravenous, Q6H  metoprolol tartrate, 25 mg, Oral, Q12H  multivitamin, 1 tablet, Oral, Daily  norepinephrine (LEVOPHED) 8 mg in 250 mL infusion, 0.02-0.3 mcg/kg/min, Intravenous, Once  pantoprazole, 40 mg, Oral, Daily  pharmacy consult - MTM, , Does not apply, Daily  sodium chloride, 10 mL, Intravenous, Q12H  sodium chloride, 3 mL, Intravenous, Q12H  terazosin, 5 mg, Oral, Nightly  vitamin E, 400 Units, Oral, Daily      Continuous Infusions:  dexmedetomidine, 0.2-1.5 mcg/kg/hr  dextrose 5 % with KCl 20 mEq, 30 mL/hr, Last Rate: 30 mL/hr (03/16/21 1902)  DOBUTamine, 2-20 mcg/kg/min  DOPamine, 2-20 mcg/kg/min  EPINEPHrine, 0.02-0.3 mcg/kg/min  insulin, 0-50 Units/hr, Last Rate: 4.2 Units/hr (03/17/21 0713)  niCARdipine, 5-15 mg/hr  nitroglycerin, 5-200 mcg/min, Last Rate: Stopped (03/17/21 0100)  norepinephrine, 0.02-0.3 mcg/kg/min, Last Rate: Stopped (03/17/21 0620)  phenylephrine, 0.5-3 mcg/kg/min  propofol, 5-50 mcg/kg/min, Last Rate: 25 mcg/kg/min (03/17/21 0030)  sodium chloride, 30 mL/hr      PRN Meds:  •  acetaminophen  •  albuterol  •  calcium gluconate IVPB **AND** calcium gluconate IVPB  •  calcium gluconate IVPB  •  dexmedetomidine  •  dextrose  •  dextrose  •  DOBUTamine  •  DOPamine  •  EPINEPHrine  •  fentaNYL citrate (PF) **AND** naloxone  •  glucagon (human recombinant)  •  HYDROcodone-acetaminophen  •  insulin  •  ipratropium-albuterol  •  magnesium sulfate **OR** magnesium sulfate **OR** magnesium sulfate  •  Morphine  •  niCARdipine  •  nitroglycerin  •  nitroglycerin  •   "norepinephrine  •  ondansetron  •  oxyCODONE-acetaminophen  •  phenylephrine  •  potassium chloride **OR** potassium chloride  •  potassium chloride **OR** potassium chloride  •  potassium phosphate infusion greater than 15 mMoles **OR** potassium phosphate infusion greater than 15 mMoles **OR** potassium phosphate infusion 15 mMol or less **OR** sodium phosphate IVPB **OR** sodium phosphate IVPB **OR** sodium phosphate IVPB  •  propofol  •  racemic epinephrine  •  sodium bicarbonate  •  sodium chloride  •  sodium chloride  •  sodium chloride  •  sodium chloride   Allergies:  Patient has no known allergies.    Objective     Vital Sign Min/Max for last 24 hours  Temp  Min: 95.4 °F (35.2 °C)  Max: 100.6 °F (38.1 °C)   BP  Min: 89/58  Max: 134/71   Pulse  Min: 62  Max: 98   Resp  Min: 16  Max: 18   SpO2  Min: 86 %  Max: 100 %   Flow (L/min)  Min: 3  Max: 15   Weight  Min: 91.4 kg (201 lb 8 oz)  Max: 91.4 kg (201 lb 8 oz)     Flowsheet Rows      First Filed Value   Admission Height  175.3 cm (69\") Documented at 03/15/2021 1839   Admission Weight  91.4 kg (201 lb 8 oz) Documented at 03/15/2021 1839               Physical Exam:  General Appearance: Alert, appears stated age and cooperative  Lungs: Clear to auscultation  Heart:: RRR  No Murmurs, Rubs or Gallops  Abdomen: Soft and nontender with adequate bowel sounds.  No organomegaly  Extremities: No cyanosis, clubbing or edema  Pulses: Pulses palpable and equal bilaterally  Skin: Warm and dry with no rash  Psych: Normal  Results Review:         Type 2 diabetes mellitus with hyperglycemia, without long-term current use of insulin (CMS/HCC)    Hypertension associated with diabetes (CMS/HCC)    Hyperlipidemia associated with type 2 diabetes mellitus (CMS/HCC)    Benign prostatic hyperplasia without lower urinary tract symptoms    GERD (gastroesophageal reflux disease)    Diabetic neuropathy (CMS/HCC)    Former smoker    History of gout    Coronary artery disease involving " native coronary artery of native heart with angina pectoris (CMS/[facility])    CAD (coronary artery disease)              Impression      CABG 3/16/2021  Normal preop LV function  Hypertension  Diabetes mellitus  Gout      Plan     Continue present antihypertensive regimen  Continue aspirin and high intensity statin  Pulmonary toilet  Mobilization  Stable early course      I discussed the patient's findings and my recommendations with patient and family    Bennie Weeks MD   03/17/21  07:27 EDT    Time:

## 2021-03-17 NOTE — PROGRESS NOTES
Clinical Nutrition     Reason for Visit: MDR, Need for education    Patient Name: William Villasenor  YOB: 1950  MRN: [id number]  Date of Encounter: 03/17/21 12:42 EDT  Admission date: 3/15/2021    Nutrition Assessment   Admission Problem List:  CAD  s/p CABG x4 (3-16-21)    h/o: DM, CAD, HTN, HLP, Obesity, GERD/HH, Gout, BPH    PMH:   He  has a past medical history of Arthritis, Back pain, BPH (benign prostatic hyperplasia), Diabetes mellitus (CMS/HCC), Diabetic peripheral neuropathy (CMS/HCC), Erectile dysfunction, Former smoker, GERD (gastroesophageal reflux disease), GERD (gastroesophageal reflux disease), Gout, High cholesterol, Hypertension, and Obesity.  PSxH:  He  has a past surgical history that includes Colonoscopy w/ biopsies (2015); Cardiac catheterization (N/A, 3/15/2021); Cardiac catheterization (N/A, 3/15/2021); and Coronary artery bypass graft (N/A, 3/16/2021).    Substance history: Tobacco remote    Reported/Observed/Food/Nutrition Related History:     Pt sitting up in chair, hungry,  is having his first clear liquid meal, reports he does not follow a diabetic diet at home because his wife likes to go out to eat all the time    Anthropometrics   Height: 69in  Weight: 213lb  BMI: 31  BMI classification: Obese Class I: 30-34.9kg/m2       Labs reviewed     Results from last 7 days   Lab Units 03/17/21  0350 03/15/21  2037   SODIUM mmol/L 142 135*   POTASSIUM mmol/L 4.4 4.5   CHLORIDE mmol/L 109* 101   CO2 mmol/L 22.0 25.0   BUN mg/dL 15 20   CREATININE mg/dL 1.14 1.35*   CALCIUM mg/dL 8.6 8.8   BILIRUBIN mg/dL  --  0.3   ALK PHOS U/L  --  69   ALT (SGPT) U/L  --  12   AST (SGOT) U/L  --  15   GLUCOSE mg/dL 135* 231*       Results from last 7 days   Lab Units 03/17/21  1208 03/17/21  1119 03/17/21  1000 03/17/21  0854 03/17/21  0750 03/17/21  0712   GLUCOSE mg/dL 114 126 91 97 194* 110     Lab Results   Lab Value Date/Time    HGBA1C 8.70 (H) 03/15/2021 2037    HGBA1C 8.75 (H)  03/05/2021 1026       Medications reviewed   Pertinent: abx, allopurinol, folate, lasix, probiotic, MVI, protonix, vitamin E    Intake/Ouptut 24 hrs (7:00AM - 6:59 AM)     Intake & Output (last day)       03/16 0701 - 03/17 0700 03/17 0701 - 03/18 0700    I.V. (mL/kg) 2001 (21.9) 198.3 (2.2)    Blood 563     IV Piggyback 300     Total Intake(mL/kg) 2864 (31.3) 198.3 (2.2)    Urine (mL/kg/hr) 2290 (1) 520 (1)    Chest Tube 655 190    Total Output 2945 710    Net -81 -511.7                     GI:wnl    SKIN: surgical sites    Current Nutrition Prescription     PO: Diet Clear Liquid; Thin; Consistent Carbohydrate, Cardiac    Intake: 65% of meals pre-op    Nutrition Diagnosis   Date:3-17-21  Problem Food and nutrition knowledge deficit   Etiology Uncontrolled DM   Signs/Symptoms Ha1c = 8.75     Nutrition Intervention   1.  Follow treatment progress, Interview for preferences, Menu provided, Education provided    DM Education provided      Goal:   General: Nutrition support treatment  PO: Maintain intake    Additional goals:Education    Monitoring/Evaluation:   Per protocol    Katya Ribeiro RD  Time Spent: 45min

## 2021-03-17 NOTE — NURSING NOTE
Patient successfully extubated at 0140 and placed on 6L but transitioned to non-rebreather to support sats. All other VSS at this time.

## 2021-03-17 NOTE — PLAN OF CARE
Goal Outcome Evaluation:  Plan of Care Reviewed With: patient     Outcome Summary: PT initial evaluation completed for pt s/p CABG x4 presenting with generalized weakness, impaired balance, and decreased functional mobility. Pt ambulated 40ft with Blessing x2 +1, chair follow for safety. Pt's decreased independence warrants PT skilled care. Recommend D/C home with assistance.

## 2021-03-17 NOTE — THERAPY EVALUATION
Patient Name: William Villasenor  : 1950    MRN: [id number]                              Today's Date: 3/17/2021       Admit Date: 3/15/2021    Visit Dx: No diagnosis found.  Patient Active Problem List   Diagnosis   • Idiopathic chronic gout of multiple sites without tophus   • Type 2 diabetes mellitus with hyperglycemia, without long-term current use of insulin (CMS/HCC)   • Hypertension associated with diabetes (CMS/HCC)   • Hyperlipidemia associated with type 2 diabetes mellitus (CMS/HCC)   • Benign prostatic hyperplasia without lower urinary tract symptoms   • Chest pain   • Obesity (BMI 30-39.9)   • GERD (gastroesophageal reflux disease)   • Diabetic neuropathy (CMS/HCC)   • Former smoker   • History of gout   • Coronary artery disease involving native coronary artery of native heart with angina pectoris (CMS/HCC)   • CAD (coronary artery disease)     Past Medical History:   Diagnosis Date   • Arthritis    • Back pain    • BPH (benign prostatic hyperplasia)    • Diabetes mellitus (CMS/HCC)    • Diabetic peripheral neuropathy (CMS/HCC)    • Erectile dysfunction    • Former smoker    • GERD (gastroesophageal reflux disease)    • GERD (gastroesophageal reflux disease)    • Gout    • High cholesterol    • Hypertension    • Obesity      Past Surgical History:   Procedure Laterality Date   • CARDIAC CATHETERIZATION N/A 3/15/2021    Procedure: Left Heart Cath;  Surgeon: Trent Zaragoza MD;  Location:  [facility];  Service: Cardiology;  Laterality: N/A;   • CARDIAC CATHETERIZATION N/A 3/15/2021    Procedure: Coronary angiography;  Surgeon: Trent Zaragoza MD;  Location:  [facility];  Service: Cardiology;  Laterality: N/A;   • COLONOSCOPY W/ BIOPSIES     • CORONARY ARTERY BYPASS GRAFT N/A 3/16/2021    Procedure: MEDIAN STERNOTOMY, CORONARY ARTERY BYPASS GRAFT X4, UTILIZATION OF LEFT INTERNAL MAMMARY ARTERY, AND ENDOSCOPIC VEIN HARVEST OF RIGHT GREATER SAPHENOUS VEIN;  Surgeon:  Jorge Simms MD;  Location: [facility];  Service: Cardiothoracic;  Laterality: N/A;  vein out- 1614  vein ready- 1628             General Information     Row Name 03/17/21 1458          Physical Therapy Time and Intention    Document Type  evaluation  -KG     Mode of Treatment  physical therapy  -KG     Row Name 03/17/21 1458          General Information    Patient Profile Reviewed  yes  -KG     Prior Level of Function  independent:;all household mobility;gait;transfer;ADL's;dressing;bathing  -KG     Existing Precautions/Restrictions  cardiac;fall;oxygen therapy device and L/min;sternal  -KG     Barriers to Rehab  none identified  -KG     Row Name 03/17/21 1458          Living Environment    Lives With  spouse  -KG     Row Name 03/17/21 1458          Home Main Entrance    Number of Stairs, Main Entrance  two  -KG     Stair Railings, Main Entrance  none  -KG     Row Name 03/17/21 1458          Stairs Within Home, Primary    Number of Stairs, Within Home, Primary  none  -KG     Row Name 03/17/21 1458          Cognition    Orientation Status (Cognition)  oriented x 3  -KG     Row Name 03/17/21 1458          Safety Issues, Functional Mobility    Safety Issues Affecting Function (Mobility)  awareness of need for assistance;insight into deficits/self-awareness;safety precaution awareness;safety precautions follow-through/compliance  -KG     Impairments Affecting Function (Mobility)  balance;coordination;endurance/activity tolerance;postural/trunk control;pain;shortness of breath;strength  -KG     Comment, Safety Issues/Impairments (Mobility)  pt very lethargic; difficulty keeping eyes open  -KG       User Key  (r) = Recorded By, (t) = Taken By, (c) = Cosigned By    Initials Name Provider Type    KG Adwoa Otto, PT Physical Therapist        Mobility     Row Name 03/17/21 1459          Bed Mobility    Bed Mobility  supine-sit  -KG     Supine-Sit [place] (Bed Mobility)  maximum assist (25% patient  effort);2 person assist;verbal cues  -KG     Assistive Device (Bed Mobility)  draw sheet;head of bed elevated  -KG     Comment (Bed Mobility)  VC's for sequencing. Pt required increased assistance at trunk and BLEs.  -KG     Row Name 03/17/21 1459          Transfers    Comment (Transfers)  VC's for sequencing and hand placement to maintain sternal precautions.  -KG     Row Name 03/17/21 1459          Sit-Stand Transfer    Sit-Stand [place] (Transfers)  minimum assist (75% patient effort);2 person assist;verbal cues  -KG     Row Name 03/17/21 1459          Gait/Stairs (Locomotion)    [place] Level (Gait)  minimum assist (75% patient effort);2 person assist;1 person to manage equipment;verbal cues chair follow  -KG     Assistive Device (Gait)  other (see comments) B UE support  -KG     Distance in Feet (Gait)  40  -KG     Deviations/Abnormal Patterns (Gait)  bilateral deviations;base of support, narrow;uma decreased;stride length decreased  -KG     Bilateral Gait Deviations  forward flexed posture;heel strike decreased  -KG     Comment (Gait/Stairs)  Pt demonstrated step through gait pattern with slow uma and decreased step length. Max cueing for upright posture and to keep eyes open with forward gaze. Pt required one standing rest break. Max encouragement for continued forward ambulation. Pt increasingly unsteady with onset of fatigue. Returned to room in chair. Mobility significantly limited by increased lethargy.  -KG       User Key  (r) = Recorded By, (t) = Taken By, (c) = Cosigned By    Initials Name Provider Type    KG Adwoa Otto, PT Physical Therapist        Obj/Interventions     Row Name 03/17/21 1502          Range of Motion Comprehensive    Comment, General Range of Motion  BLE WFL  -KG     Row Name 03/17/21 1502          Strength Comprehensive (MMT)    Comment, General Manual Muscle Testing (MMT) Assessment  BLE grossly 3+/5  -KG     Row Name 03/17/21 1502          Balance     Balance Assessment  sitting static balance;standing static balance;standing dynamic balance  -KG     Static Sitting Balance  mild impairment;supported;sitting, edge of bed  -KG     Static Standing Balance  mild impairment;supported;standing  -KG     Dynamic Standing Balance  moderate impairment;supported;standing  -KG     Row Name 03/17/21 1502          Sensory Assessment (Somatosensory)    Sensory Assessment (Somatosensory)  LE sensation intact  -KG       User Key  (r) = Recorded By, (t) = Taken By, (c) = Cosigned By    Initials Name Provider Type    KG Adwoa Otto N, PT Physical Therapist        Goals/Plan     Row Name 03/17/21 1507          Bed Mobility Goal 1 (PT)    Activity/Assistive Device (Bed Mobility Goal 1, PT)  sit to supine;supine to sit  -KG     [place] Level/Cues Needed (Bed Mobility Goal 1, PT)  independent  -KG     Time Frame (Bed Mobility Goal 1, PT)  2 weeks  -KG     Progress/Outcomes (Bed Mobility Goal 1, PT)  goal ongoing  -KG     Row Name 03/17/21 1507          Transfer Goal 1 (PT)    Activity/Assistive Device (Transfer Goal 1, PT)  sit-to-stand/stand-to-sit;bed-to-chair/chair-to-bed  -KG     [place] Level/Cues Needed (Transfer Goal 1, PT)  independent  -KG     Time Frame (Transfer Goal 1, PT)  2 weeks  -KG     Progress/Outcome (Transfer Goal 1, PT)  goal ongoing  -KG     Row Name 03/17/21 1507          Gait Training Goal 1 (PT)    Activity/Assistive Device (Gait Training Goal 1, PT)  gait (walking locomotion)  -KG     [place] Level (Gait Training Goal 1, PT)  independent  -KG     Distance (Gait Training Goal 1, PT)  400 feet  -KG     Time Frame (Gait Training Goal 1, PT)  2 weeks  -KG     Progress/Outcome (Gait Training Goal 1, PT)  goal ongoing  -KG     Row Name 03/17/21 1507          Stairs Goal 1 (PT)    Activity/Assistive Device (Stairs Goal 1, PT)  ascending stairs;descending stairs;step-to-step  -KG     [place] Level/Cues Needed (Stairs Goal 1, PT)  standby  assist  -KG     Number of Stairs (Stairs Goal 1, PT)  2  -KG     Time Frame (Stairs Goal 1, PT)  2 weeks  -KG     Progress/Outcome (Stairs Goal 1, PT)  goal ongoing  -KG       User Key  (r) = Recorded By, (t) = Taken By, (c) = Cosigned By    Initials Name Provider Type    KG Adwoa fofana N, PT Physical Therapist        Clinical Impression     Row Name 03/17/21 1503          Pain    Additional Documentation  Pain Scale: Numbers Pre/Post-Treatment (Group)  -KG     Row Name 03/17/21 1503          Pain Scale: Numbers Pre/Post-Treatment    Pretreatment Pain Rating  6/10  -KG     Posttreatment Pain Rating  8/10  -KG     Pain Location - Orientation  incisional  -KG     Pain Location  chest  -KG     Pain Intervention(s)  Repositioned;Ambulation/increased activity  -KG     Row Name 03/17/21 1503          Plan of Care Review    Plan of Care Reviewed With  patient  -KG     Outcome Summary  PT initial evaluation completed for pt s/p CABG x4 presenting with generalized weakness, impaired balance, and decreased functional mobility. Pt ambulated 40ft with [place] x2 +1, chair follow for safety. Pt's decreased independence warrants PT skilled care. Recommend D/C home with assistance.  -KG     Row Name 03/17/21 1503          Therapy Assessment/Plan (PT)    Patient/Family Therapy Goals Statement (PT)  return to PLOF  -KG     Rehab Potential (PT)  good, to achieve stated therapy goals  -KG     Criteria for Skilled Interventions Met (PT)  yes;skilled treatment is necessary  -KG     Row Name 03/17/21 1503          Vital Signs    Pre Systolic BP Rehab  98  -KG     Pre Treatment Diastolic BP  61  -KG     Post Systolic BP Rehab  100  -KG     Post Treatment Diastolic BP  62  -KG     Pretreatment Heart Rate (beats/min)  84  -KG     Posttreatment Heart Rate (beats/min)  87  -KG     Pre SpO2 (%)  92  -KG     O2 Delivery Pre Treatment  supplemental O2  -KG     Post SpO2 (%)  93  -KG     O2 Delivery Post Treatment  supplemental O2  -KG      Pre Patient Position  Supine  -KG     Intra Patient Position  Standing  -KG     Post Patient Position  Sitting  -KG     Row Name 03/17/21 1503          Positioning and Restraints    Pre-Treatment Position  in bed  -KG     Post Treatment Position  chair  -KG     In Chair  notified nsg;reclined;call light within reach;encouraged to call for assist;RUE elevated;LUE elevated;legs elevated  -KG       User Key  (r) = Recorded By, (t) = Taken By, (c) = Cosigned By    Initials Name Provider Type    Adwoa Garrett PT Physical Therapist        Outcome Measures     Row Name 03/17/21 1508          How much help from another person do you currently need...    Turning from your back to your side while in flat bed without using bedrails?  2  -KG     Moving from lying on back to sitting on the side of a flat bed without bedrails?  2  -KG     Moving to and from a bed to a chair (including a wheelchair)?  3  -KG     Standing up from a chair using your arms (e.g., wheelchair, bedside chair)?  3  -KG     Climbing 3-5 steps with a railing?  2  -KG     To walk in hospital room?  2  -KG     AM-PAC 6 Clicks Score (PT)  14  -KG     Row Name 03/17/21 1508          Functional Assessment    Outcome Measure Options  AM-PAC 6 Clicks Basic Mobility (PT)  -KG       User Key  (r) = Recorded By, (t) = Taken By, (c) = Cosigned By    Initials Name Provider Type    Adwoa Garrett PT Physical Therapist        Physical Therapy Education                 Title: PT OT SLP Therapies (In Progress)     Topic: Physical Therapy (In Progress)     Point: Mobility training (In Progress)     Learning Progress Summary           Patient Acceptance, E, NR by KG at 3/17/2021 1006                   Point: Home exercise program (In Progress)     Learning Progress Summary           Patient Acceptance, E, NR by KG at 3/17/2021 1006                   Point: Body mechanics (In Progress)     Learning Progress Summary           Patient Acceptance, E, NR by  KG at 3/17/2021 1006                   Point: Precautions (In Progress)     Learning Progress Summary           Patient Acceptance, E, NR by KG at 3/17/2021 1006                               User Key     Initials Effective Dates Name Provider Type Discipline    KG 05/22/20 -  Adwoa Otto, PT Physical Therapist PT              PT Recommendation and Plan  Planned Therapy Interventions (PT): balance training, bed mobility training, gait training, stair training, strengthening, transfer training  Plan of Care Reviewed With: patient  Outcome Summary: PT initial evaluation completed for pt s/p CABG x4 presenting with generalized weakness, impaired balance, and decreased functional mobility. Pt ambulated 40ft with [place] x2 +1, chair follow for safety. Pt's decreased independence warrants PT skilled care. Recommend D/C home with assistance.     Time Calculation:   PT Charges     Row Name 03/17/21 1006             Time Calculation    Start Time  1006  -KG      PT Received On  03/17/21  -KG      PT Goal Re-Cert Due Date  03/27/21  -KG        User Key  (r) = Recorded By, (t) = Taken By, (c) = Cosigned By    Initials Name Provider Type    KG Adwoa Otto, PT Physical Therapist        Therapy Charges for Today     Code Description Service Date Service Provider Modifiers Qty    44840046540 HC PT EVAL MOD COMPLEXITY 4 3/17/2021 Adwoa Otto, PT GP 1    28394571496 HC PT THER SUPP EA 15 MIN 3/17/2021 Adwoa Otto, PT GP 2          PT G-Codes  Outcome Measure Options: AM-PAC 6 Clicks Basic Mobility (PT)  AM-PAC 6 Clicks Score (PT): 14    Toshia Otto PT  3/17/2021

## 2021-03-17 NOTE — PROGRESS NOTES
Continued Stay Note  [facility]     Patient Name: William Villasenor  MRN: [id number]  Today's Date: 3/17/2021    Admit Date: 3/15/2021    Discharge Plan     Row Name 03/17/21 1236       Plan    Plan  TBD    Patient/Family in Agreement with Plan  unable to assess    Plan Comments  Patient sleeping in bed with O2 at 6L/NC.  Had 4 vessel CABG on 3-16-21.  History of HTN and DM.  Therapy has not yet ambulated him and their recommendations are pending.  Following for discharge needs.    Final Discharge Disposition Code  30 - still a patient        Discharge Codes    No documentation.             Anita Lara RN

## 2021-03-17 NOTE — PROGRESS NOTES
INTENSIVIST / PULMONARY FOLLOW UP NOTE     Hospital:  LOS: 2 days   Mr. William Villasenor, 70 y.o. male is followed for:     Type 2 diabetes mellitus with hyperglycemia, without long-term current use of insulin (CMS/HCC)    Hypertension associated with diabetes (CMS/HCC)    Hyperlipidemia associated with type 2 diabetes mellitus (CMS/HCC)    Benign prostatic hyperplasia without lower urinary tract symptoms    GERD (gastroesophageal reflux disease)    Diabetic neuropathy (CMS/HCC)    Former smoker    History of gout    Coronary artery disease involving native coronary artery of native heart with angina pectoris (CMS/HCC)    CAD (coronary artery disease)          SUBJECTIVE   Extubated overnight    The patient's relevant past medical, surgical, family, and social history were reviewed    Allergies and medications were reviewed    ROS:  Per subjective, all other systems were reviewed and were negative        OBJECTIVE     Vital Sign Min/Max for last 24 hours:  Temp  Min: 95.4 °F (35.2 °C)  Max: 100.6 °F (38.1 °C)   BP  Min: 89/58  Max: 127/74   Pulse  Min: 62  Max: 98   Resp  Min: 16  Max: 18   SpO2  Min: 86 %  Max: 100 %   No data recorded     Physical Exam:  General Appearance:  Conversant, in no acute distress  Eyes:  No scleral icterus or pallor, pupils normal  Ears, Nose, Mouth, Throat:  Atraumatic, oropharynx clear  Neck:  Trachea midline, thyroid normal  Respiratory:  Clear to auscultation bilaterally, normal effort, no tenderness to palpation  Cardiovascular:  Regular rate and rhythm, no murmurs, no peripheral edema, no thrill  Gastrointestinal:  Soft, nontender, nondistended, no hepatosplenomegaly  Skin:  Normal temperature, no rash  Psychiatric:  Alert and oriented x 3, normal judgement and insight  Neuro:  No new focal neurologic deficits observed    Telemetry:                SpO2: 90 % SpO2  Min: 86 %  Max: 100 %   Device:      Flow Rate:   No data recorded     Mechanical Ventilator Settings:                                 Intake/Ouptut 24 hrs (7:00AM - 6:59 AM)  Intake & Output (last 3 days)       03/14 0701 - 03/15 0700 03/15 0701 - 03/16 0700 03/16 0701 - 03/17 0700 03/17 0701 - 03/18 0700    I.V. (mL/kg)   2001 (21.9) 198.3 (2.2)    Blood   563     IV Piggyback   300     Total Intake(mL/kg)   2864 (31.3) 198.3 (2.2)    Urine (mL/kg/hr)  1550 2290 (1) 120 (0.3)    Chest Tube   655 110    Total Output  1550 2945 230    Net  -1550 -81 -31.7            Urine Unmeasured Occurrence  1 x            Lines, Drains & Airways    Active LDAs     Name:   Placement date:   Placement time:   Site:   Days:    Peripheral IV 03/14/21 0045 Left;Upper Arm   03/14/21    0045    Arm   3    Urethral Catheter Double-lumen 16 Fr.   03/16/21    1445     less than 1    Y Chest Tube 1 and 2 1 Right Mediastinal 32 Fr. 2 Right Mediastinal 32 Fr.   03/16/21    1838     less than 1    Y Chest Tube 3 and 4 3 Left Mediastinal 32 Fr. 4 Left Mediastinal 32 Fr.   03/16/21    1845     less than 1    Arterial Line 03/16/21 Left Radial   03/16/21    1421    Radial   less than 1    Introducer- Double Lumen 03/16/21 Internal jugular Right   03/16/21    -- placed in OR    Internal jugular   1    Pacer Wires   03/16/21    -- placed in OR    Ventricular   1                Hematology:  Results from last 7 days   Lab Units 03/17/21  0350 03/16/21  2306 03/16/21  1922 03/16/21  1810 03/16/21  1734 03/16/21  1703 03/16/21  1656 03/16/21  0719 03/15/21  2037 03/15/21  2037 03/15/21  0043 03/14/21  0144   WBC 10*3/mm3 11.75* 11.14* 9.53  --   --   --   --  6.78  --  7.34 8.28 8.59   HEMOGLOBIN g/dL 10.9* 10.4* 12.2*  --   --   --   --  13.3  --  14.1 13.0 14.1   HEMOGLOBIN, POC g/dL  --   --   --  7.5* 10.2* 9.9* 9.9*  --    < >  --   --   --    HEMATOCRIT % 33.4* 31.3* 36.3*  --   --   --   --  40.1  --  43.2 39.6 43.6   HEMATOCRIT POC %  --   --   --  22* 30* 29* 29*  --    < >  --   --   --    PLATELETS 10*3/mm3 227 201 201  --   --   --   --  263  --  307 297 332     < > = values in this interval not displayed.     Electrolytes, Magnesium and Phosphorus:  Results from last 7 days   Lab Units 03/17/21  0350 03/16/21  2306 03/16/21  1922 03/16/21  0719 03/15/21  2037 03/15/21  0043 03/14/21  0144 03/13/21  2239   SODIUM mmol/L 142 143 143 134* 135* 133*  --  140   CHLORIDE mmol/L 109* 109* 110* 104 101 99  --  103   POTASSIUM mmol/L 4.4 4.4 3.6 4.0 4.5 4.1  --  4.1   CO2 mmol/L 22.0 25.0 23.0 20.0* 25.0 20.0*  --  20.4*   MAGNESIUM mg/dL 2.4  --  1.8  --   --   --  1.9 1.8   PHOSPHORUS mg/dL 2.6 2.7 2.3*  --   --  3.3  --   --      Renal:  Results from last 7 days   Lab Units 03/17/21 0350 03/16/21 2306 03/16/21 1922 03/16/21 0719 03/15/21  2037 03/15/21  0043 03/13/21  2239   CREATININE mg/dL 1.14 1.05 0.96 0.97 1.35* 1.30* 1.26   BUN mg/dL 15 15 15 18 20 20 21     Estimated Creatinine Clearance: 66.4 mL/min (by C-G formula based on SCr of 1.14 mg/dL).  Hepatic:  Results from last 7 days   Lab Units 03/15/21  2037 03/15/21  0043 03/13/21  2239   ALK PHOS U/L 69 66 74   BILIRUBIN mg/dL 0.3 0.2 0.2   ALT (SGPT) U/L 12 11 8   AST (SGOT) U/L 15 17 16     Arterial Blood Gases:  Results from last 7 days   Lab Units 03/17/21  0137 03/16/21  1948 03/16/21  1810 03/16/21  1734 03/16/21  1703 03/16/21  1656 03/16/21  1638 03/16/21  0801   PH, ARTERIAL pH units 7.362 7.498* 7.38 7.38 7.38 7.33* 7.36 7.398   PCO2, ARTERIAL mm Hg 41.2 30.1*  --   --   --   --   --  43.2   PO2 ART mm Hg 62.9* 216.0*  --   --   --   --   --  83.1   FIO2 % 40 100  --   --   --   --   --  32       Results from last 7 days   Lab Units 03/15/21  2037   HEMOGLOBIN A1C % 8.70*       No results found for: LACTATE    Relevant imaging studies and labs from 03/17/21 were reviewed and interpreted by me    Medications (drips):  insulin, Last Rate: 2.1 Units/hr (03/17/21 1007)  niCARdipine  nitroglycerin, Last Rate: Stopped (03/17/21 0100)  norepinephrine, Last Rate: Stopped (03/17/21 0620)  phenylephrine  propofol,  Last Rate: 25 mcg/kg/min (03/17/21 0030)  sodium chloride        allopurinol, 300 mg, Oral, Daily  [START ON 3/18/2021] aspirin, 325 mg, Oral, Daily  atorvastatin, 40 mg, Oral, Nightly  cefuroxime, 1.5 g, Intravenous, Q8H  cetirizine, 10 mg, Oral, Daily  finasteride, 5 mg, Oral, Daily  folic acid, 1 mg, Oral, BID  insulin regular infusion 1 unit/mL, 1-20 Units/hr, Intravenous, Once  lactobacillus acidophilus, 1 capsule, Oral, Daily  metoprolol tartrate, 2.5 mg, Intravenous, Q6H  metoprolol tartrate, 25 mg, Oral, Q12H  multivitamin, 1 tablet, Oral, Daily  pantoprazole, 40 mg, Oral, Daily  pharmacy consult - MTM, , Does not apply, Daily  sodium chloride, 10 mL, Intravenous, Q12H  sodium chloride, 3 mL, Intravenous, Q12H  terazosin, 5 mg, Oral, Nightly  vitamin E, 400 Units, Oral, Daily        Assessment/Plan   IMPRESSION / PLAN     Inpatient Problem List:  70 y.o.male:  Active Hospital Problems    Diagnosis    • **Type 2 diabetes mellitus with hyperglycemia, without long-term current use of insulin (CMS/[facility])    • Coronary artery disease involving native coronary artery of native heart with angina pectoris (CMS/[facility])      Added automatically from request for surgery [id number]     • CAD (coronary artery disease)    • Diabetic neuropathy (CMS/[facility])    • GERD (gastroesophageal reflux disease)    • History of gout    • Former smoker    • Benign prostatic hyperplasia without lower urinary tract symptoms    • Hypertension associated with diabetes (CMS/[facility])    • Hyperlipidemia associated with type 2 diabetes mellitus (CMS/[facility])         Impression:  70 y.o.male with relevant PMH of DMT2 with diabetic neuropathy (HgbA1C 8.7 3/15), HTN, HLD, Gout, CAD, cath on 3/15 w/ severe MVCAD admitted 3/15/2021 and underwent 4vCABG on 3/16 by Dr. Simms.    Plan:  Post op care - per cts    DM A1c 8.7 - transition insulin gtt to sq, d/c dextrose in IVF    Gout - home meds    GERD - home meds    Resume / continue appropriate home meds    D/c  lydia / pat / sharath    DVT / PUD prophylaxis    Nutrition - Diet Clear Liquid; Thin; Consistent Carbohydrate, Cardiac    Plan of care and goals reviewed with multidisciplinary team at daily rounds         Lorenzo Castanon MD  Intensive Care Medicine  03/17/21 11:13 EDT

## 2021-03-17 NOTE — NURSING NOTE
At 2200 patient's sedation stopped to assess readiness to wean. With respiratory present, pt unable to pull appropriate tidal volumes above 200, sats dropped to 88% and rate increased into 40s. Patient re-sedated and will reassess within one hour.

## 2021-03-18 ENCOUNTER — APPOINTMENT (OUTPATIENT)
Dept: GENERAL RADIOLOGY | Facility: HOSPITAL | Age: 71
End: 2021-03-18

## 2021-03-18 LAB
ANION GAP SERPL CALCULATED.3IONS-SCNC: 11 MMOL/L (ref 5–15)
BASOPHILS # BLD AUTO: 0.02 10*3/MM3 (ref 0–0.2)
BASOPHILS NFR BLD AUTO: 0.2 % (ref 0–1.5)
BUN SERPL-MCNC: 23 MG/DL (ref 8–23)
BUN/CREAT SERPL: 16.9 (ref 7–25)
CALCIUM SPEC-SCNC: 8.6 MG/DL (ref 8.6–10.5)
CHLORIDE SERPL-SCNC: 100 MMOL/L (ref 98–107)
CO2 SERPL-SCNC: 21 MMOL/L (ref 22–29)
CREAT SERPL-MCNC: 1.36 MG/DL (ref 0.76–1.27)
DEPRECATED RDW RBC AUTO: 45.5 FL (ref 37–54)
EOSINOPHIL # BLD AUTO: 0.02 10*3/MM3 (ref 0–0.4)
EOSINOPHIL NFR BLD AUTO: 0.2 % (ref 0.3–6.2)
ERYTHROCYTE [DISTWIDTH] IN BLOOD BY AUTOMATED COUNT: 13.3 % (ref 12.3–15.4)
GFR SERPL CREATININE-BSD FRML MDRD: 52 ML/MIN/1.73
GLUCOSE BLDC GLUCOMTR-MCNC: 264 MG/DL (ref 70–130)
GLUCOSE BLDC GLUCOMTR-MCNC: 445 MG/DL (ref 70–130)
GLUCOSE BLDC GLUCOMTR-MCNC: 450 MG/DL (ref 70–130)
GLUCOSE BLDC GLUCOMTR-MCNC: 490 MG/DL (ref 70–130)
GLUCOSE BLDC GLUCOMTR-MCNC: 548 MG/DL (ref 70–130)
GLUCOSE SERPL-MCNC: 230 MG/DL (ref 65–99)
HCT VFR BLD AUTO: 33.9 % (ref 37.5–51)
HGB BLD-MCNC: 10.8 G/DL (ref 13–17.7)
IMM GRANULOCYTES # BLD AUTO: 0.06 10*3/MM3 (ref 0–0.05)
IMM GRANULOCYTES NFR BLD AUTO: 0.5 % (ref 0–0.5)
LYMPHOCYTES # BLD AUTO: 1.25 10*3/MM3 (ref 0.7–3.1)
LYMPHOCYTES NFR BLD AUTO: 11.1 % (ref 19.6–45.3)
MCH RBC QN AUTO: 29.6 PG (ref 26.6–33)
MCHC RBC AUTO-ENTMCNC: 31.9 G/DL (ref 31.5–35.7)
MCV RBC AUTO: 92.9 FL (ref 79–97)
MONOCYTES # BLD AUTO: 0.88 10*3/MM3 (ref 0.1–0.9)
MONOCYTES NFR BLD AUTO: 7.8 % (ref 5–12)
NEUTROPHILS NFR BLD AUTO: 80.2 % (ref 42.7–76)
NEUTROPHILS NFR BLD AUTO: 9.02 10*3/MM3 (ref 1.7–7)
NRBC BLD AUTO-RTO: 0 /100 WBC (ref 0–0.2)
PLATELET # BLD AUTO: 196 10*3/MM3 (ref 140–450)
PMV BLD AUTO: 9.3 FL (ref 6–12)
POTASSIUM SERPL-SCNC: 5 MMOL/L (ref 3.5–5.2)
QT INTERVAL: 364 MS
QTC INTERVAL: 435 MS
RBC # BLD AUTO: 3.65 10*6/MM3 (ref 4.14–5.8)
SODIUM SERPL-SCNC: 132 MMOL/L (ref 136–145)
WBC # BLD AUTO: 11.25 10*3/MM3 (ref 3.4–10.8)

## 2021-03-18 PROCEDURE — 63710000001 INSULIN LISPRO (HUMAN) PER 5 UNITS: Performed by: NURSE PRACTITIONER

## 2021-03-18 PROCEDURE — 99232 SBSQ HOSP IP/OBS MODERATE 35: CPT | Performed by: INTERNAL MEDICINE

## 2021-03-18 PROCEDURE — 94799 UNLISTED PULMONARY SVC/PX: CPT

## 2021-03-18 PROCEDURE — 93005 ELECTROCARDIOGRAM TRACING: CPT | Performed by: PHYSICIAN ASSISTANT

## 2021-03-18 PROCEDURE — 25010000002 FUROSEMIDE PER 20 MG: Performed by: INTERNAL MEDICINE

## 2021-03-18 PROCEDURE — 94640 AIRWAY INHALATION TREATMENT: CPT

## 2021-03-18 PROCEDURE — 85025 COMPLETE CBC W/AUTO DIFF WBC: CPT | Performed by: PHYSICIAN ASSISTANT

## 2021-03-18 PROCEDURE — 82962 GLUCOSE BLOOD TEST: CPT

## 2021-03-18 PROCEDURE — 63710000001 INSULIN LISPRO (HUMAN) PER 5 UNITS: Performed by: INTERNAL MEDICINE

## 2021-03-18 PROCEDURE — 94660 CPAP INITIATION&MGMT: CPT

## 2021-03-18 PROCEDURE — 97530 THERAPEUTIC ACTIVITIES: CPT

## 2021-03-18 PROCEDURE — 71045 X-RAY EXAM CHEST 1 VIEW: CPT

## 2021-03-18 PROCEDURE — 80048 BASIC METABOLIC PNL TOTAL CA: CPT | Performed by: PHYSICIAN ASSISTANT

## 2021-03-18 PROCEDURE — 99024 POSTOP FOLLOW-UP VISIT: CPT | Performed by: PHYSICIAN ASSISTANT

## 2021-03-18 PROCEDURE — 63710000001 INSULIN DETEMIR PER 5 UNITS

## 2021-03-18 PROCEDURE — 25010000003 CEFUROXIME SODIUM 1.5 G RECONSTITUTED SOLUTION: Performed by: THORACIC SURGERY (CARDIOTHORACIC VASCULAR SURGERY)

## 2021-03-18 PROCEDURE — 25010000002 MORPHINE PER 10 MG: Performed by: THORACIC SURGERY (CARDIOTHORACIC VASCULAR SURGERY)

## 2021-03-18 RX ORDER — FUROSEMIDE 10 MG/ML
40 INJECTION INTRAMUSCULAR; INTRAVENOUS ONCE
Status: COMPLETED | OUTPATIENT
Start: 2021-03-18 | End: 2021-03-18

## 2021-03-18 RX ORDER — CLOPIDOGREL BISULFATE 75 MG/1
75 TABLET ORAL DAILY
Status: DISCONTINUED | OUTPATIENT
Start: 2021-03-18 | End: 2021-03-22 | Stop reason: HOSPADM

## 2021-03-18 RX ORDER — GABAPENTIN 300 MG/1
300 CAPSULE ORAL 2 TIMES DAILY
Status: DISCONTINUED | OUTPATIENT
Start: 2021-03-18 | End: 2021-03-22 | Stop reason: HOSPADM

## 2021-03-18 RX ORDER — IPRATROPIUM BROMIDE AND ALBUTEROL SULFATE 2.5; .5 MG/3ML; MG/3ML
3 SOLUTION RESPIRATORY (INHALATION)
Status: DISCONTINUED | OUTPATIENT
Start: 2021-03-18 | End: 2021-03-20

## 2021-03-18 RX ADMIN — GABAPENTIN 300 MG: 300 CAPSULE ORAL at 20:29

## 2021-03-18 RX ADMIN — HYDROCODONE BITARTRATE AND ACETAMINOPHEN 1 TABLET: 7.5; 325 TABLET ORAL at 00:32

## 2021-03-18 RX ADMIN — ATORVASTATIN CALCIUM 40 MG: 40 TABLET, FILM COATED ORAL at 20:29

## 2021-03-18 RX ADMIN — FUROSEMIDE 40 MG: 10 INJECTION, SOLUTION INTRAVENOUS at 15:02

## 2021-03-18 RX ADMIN — FOLIC ACID 1 MG: 1 TABLET ORAL at 08:41

## 2021-03-18 RX ADMIN — MORPHINE SULFATE 2 MG: 2 INJECTION, SOLUTION INTRAMUSCULAR; INTRAVENOUS at 13:33

## 2021-03-18 RX ADMIN — PANTOPRAZOLE SODIUM 40 MG: 40 TABLET, DELAYED RELEASE ORAL at 08:41

## 2021-03-18 RX ADMIN — Medication 1 CAPSULE: at 08:41

## 2021-03-18 RX ADMIN — FINASTERIDE 5 MG: 5 TABLET, FILM COATED ORAL at 08:40

## 2021-03-18 RX ADMIN — INSULIN LISPRO 9 UNITS: 100 INJECTION, SOLUTION INTRAVENOUS; SUBCUTANEOUS at 16:21

## 2021-03-18 RX ADMIN — MORPHINE SULFATE 2 MG: 2 INJECTION, SOLUTION INTRAMUSCULAR; INTRAVENOUS at 04:04

## 2021-03-18 RX ADMIN — CEFUROXIME SODIUM 1.5 G: 1.5 INJECTION, POWDER, FOR SOLUTION INTRAVENOUS at 06:06

## 2021-03-18 RX ADMIN — ASPIRIN 325 MG ORAL TABLET 325 MG: 325 PILL ORAL at 08:41

## 2021-03-18 RX ADMIN — IPRATROPIUM BROMIDE AND ALBUTEROL SULFATE 3 ML: 2.5; .5 SOLUTION RESPIRATORY (INHALATION) at 19:17

## 2021-03-18 RX ADMIN — METOPROLOL TARTRATE 12.5 MG: 25 TABLET, FILM COATED ORAL at 20:29

## 2021-03-18 RX ADMIN — NITROGLYCERIN 5 MCG/MIN: 20 INJECTION INTRAVENOUS at 17:25

## 2021-03-18 RX ADMIN — INSULIN DETEMIR 35 UNITS: 100 INJECTION, SOLUTION SUBCUTANEOUS at 16:20

## 2021-03-18 RX ADMIN — CETIRIZINE HYDROCHLORIDE 10 MG: 10 TABLET, FILM COATED ORAL at 08:41

## 2021-03-18 RX ADMIN — INSULIN LISPRO 9 UNITS: 100 INJECTION, SOLUTION INTRAVENOUS; SUBCUTANEOUS at 11:17

## 2021-03-18 RX ADMIN — TERAZOSIN HYDROCHLORIDE 5 MG: 5 CAPSULE ORAL at 20:29

## 2021-03-18 RX ADMIN — MORPHINE SULFATE 2 MG: 2 INJECTION, SOLUTION INTRAMUSCULAR; INTRAVENOUS at 06:19

## 2021-03-18 RX ADMIN — OXYCODONE AND ACETAMINOPHEN 2 TABLET: 5; 325 TABLET ORAL at 15:02

## 2021-03-18 RX ADMIN — INSULIN LISPRO 4 UNITS: 100 INJECTION, SOLUTION INTRAVENOUS; SUBCUTANEOUS at 08:40

## 2021-03-18 RX ADMIN — CLOPIDOGREL BISULFATE 75 MG: 75 TABLET ORAL at 08:41

## 2021-03-18 RX ADMIN — SODIUM CHLORIDE, PRESERVATIVE FREE 10 ML: 5 INJECTION INTRAVENOUS at 20:30

## 2021-03-18 RX ADMIN — Medication 400 UNITS: at 08:48

## 2021-03-18 RX ADMIN — HYDROCODONE BITARTRATE AND ACETAMINOPHEN 1 TABLET: 7.5; 325 TABLET ORAL at 08:41

## 2021-03-18 RX ADMIN — HYDROCODONE BITARTRATE AND ACETAMINOPHEN 1 TABLET: 7.5; 325 TABLET ORAL at 21:30

## 2021-03-18 RX ADMIN — OXYCODONE AND ACETAMINOPHEN 2 TABLET: 5; 325 TABLET ORAL at 06:11

## 2021-03-18 RX ADMIN — MORPHINE SULFATE 2 MG: 2 INJECTION, SOLUTION INTRAMUSCULAR; INTRAVENOUS at 21:30

## 2021-03-18 RX ADMIN — SODIUM CHLORIDE, PRESERVATIVE FREE 3 ML: 5 INJECTION INTRAVENOUS at 20:30

## 2021-03-18 RX ADMIN — IPRATROPIUM BROMIDE AND ALBUTEROL SULFATE 3 ML: 2.5; .5 SOLUTION RESPIRATORY (INHALATION) at 16:39

## 2021-03-18 RX ADMIN — ALLOPURINOL 300 MG: 300 TABLET ORAL at 08:41

## 2021-03-18 RX ADMIN — GABAPENTIN 300 MG: 300 CAPSULE ORAL at 10:38

## 2021-03-18 RX ADMIN — FOLIC ACID 1 MG: 1 TABLET ORAL at 20:29

## 2021-03-18 RX ADMIN — INSULIN LISPRO 14 UNITS: 100 INJECTION, SOLUTION INTRAVENOUS; SUBCUTANEOUS at 21:04

## 2021-03-18 RX ADMIN — MULTIVITAMIN TABLET 1 TABLET: TABLET at 08:41

## 2021-03-18 RX ADMIN — IPRATROPIUM BROMIDE AND ALBUTEROL SULFATE 3 ML: 2.5; .5 SOLUTION RESPIRATORY (INHALATION) at 23:12

## 2021-03-18 NOTE — THERAPY TREATMENT NOTE
Patient Name: William Villasenor  : 1950    MRN: [id number]                              Today's Date: 3/18/2021       Admit Date: 3/15/2021    Visit Dx: No diagnosis found.  Patient Active Problem List   Diagnosis   • Idiopathic chronic gout of multiple sites without tophus   • Type 2 diabetes mellitus with hyperglycemia, without long-term current use of insulin (CMS/HCC)   • Hypertension associated with diabetes (CMS/HCC)   • Hyperlipidemia associated with type 2 diabetes mellitus (CMS/HCC)   • Benign prostatic hyperplasia without lower urinary tract symptoms   • Chest pain   • Obesity (BMI 30-39.9)   • GERD (gastroesophageal reflux disease)   • Diabetic neuropathy (CMS/HCC)   • Former smoker   • History of gout   • Coronary artery disease involving native coronary artery of native heart with angina pectoris (CMS/HCC)   • CAD (coronary artery disease)     Past Medical History:   Diagnosis Date   • Arthritis    • Back pain    • BPH (benign prostatic hyperplasia)    • Diabetes mellitus (CMS/HCC)    • Diabetic peripheral neuropathy (CMS/HCC)    • Erectile dysfunction    • Former smoker    • GERD (gastroesophageal reflux disease)    • GERD (gastroesophageal reflux disease)    • Gout    • High cholesterol    • Hypertension    • Obesity      Past Surgical History:   Procedure Laterality Date   • CARDIAC CATHETERIZATION N/A 3/15/2021    Procedure: Left Heart Cath;  Surgeon: Trent Zaragoza MD;  Location:  [facility];  Service: Cardiology;  Laterality: N/A;   • CARDIAC CATHETERIZATION N/A 3/15/2021    Procedure: Coronary angiography;  Surgeon: Trent Zaragoza MD;  Location:  [facility];  Service: Cardiology;  Laterality: N/A;   • COLONOSCOPY W/ BIOPSIES     • CORONARY ARTERY BYPASS GRAFT N/A 3/16/2021    Procedure: MEDIAN STERNOTOMY, CORONARY ARTERY BYPASS GRAFT X4, UTILIZATION OF LEFT INTERNAL MAMMARY ARTERY, AND ENDOSCOPIC VEIN HARVEST OF RIGHT GREATER SAPHENOUS VEIN;  Surgeon:  Jorge Simms MD;  Location: [facility];  Service: Cardiothoracic;  Laterality: N/A;  vein out- 1614  vein ready- 1628             General Information     Row Name 03/18/21 1544          Physical Therapy Time and Intention    Document Type  therapy note (daily note)  -KG     Mode of Treatment  physical therapy  -KG     Row Name 03/18/21 1544          General Information    Existing Precautions/Restrictions  cardiac;fall;oxygen therapy device and L/min;sternal  -KG     Row Name 03/18/21 1544          Cognition    Orientation Status (Cognition)  oriented x 3  -KG     Row Name 03/18/21 1544          Safety Issues, Functional Mobility    Safety Issues Affecting Function (Mobility)  insight into deficits/self-awareness;safety precaution awareness;safety precautions follow-through/compliance  -KG     Impairments Affecting Function (Mobility)  balance;coordination;endurance/activity tolerance;pain;postural/trunk control;shortness of breath;strength  -KG       User Key  (r) = Recorded By, (t) = Taken By, (c) = Cosigned By    Initials Name Provider Type    KG Adwoa Otto, PT Physical Therapist        Mobility     Row Name 03/18/21 1546          Bed Mobility    Comment (Bed Mobility)  UIC  -KG     Row Name 03/18/21 1546          Transfers    Comment (Transfers)  VC's for sequencing and safe hand placement to maintain sternal precautions.  -KG     Row Name 03/18/21 1546          Sit-Stand Transfer    Sit-Stand [place] (Transfers)  minimum assist (75% patient effort);2 person assist;verbal cues  -KG     Row Name 03/18/21 1546          Gait/Stairs (Locomotion)    [place] Level (Gait)  minimum assist (75% patient effort);verbal cues  -KG     Assistive Device (Gait)  other (see comments) B UE support on tele monitor  -KG     Distance in Feet (Gait)  220  -KG     Deviations/Abnormal Patterns (Gait)  base of support, wide;uma decreased;stride length decreased  -KG     Bilateral Gait Deviations  forward  flexed posture;heel strike decreased  -KG     Comment (Gait/Stairs)  Pt demonstrated step through gait pattern with slow uma and wide ASHANTI. Increased R foot eversion which pt states is baseline. VC's for upright posture. Pt required two standing rest breaks due to increased SOA. Mobility limited by fatigue and SOA.  -KG       User Key  (r) = Recorded By, (t) = Taken By, (c) = Cosigned By    Initials Name Provider Type    Adwoa Garrett PT Physical Therapist        Obj/Interventions     Row Name 03/18/21 3739          Balance    Balance Assessment  sitting static balance;standing static balance;standing dynamic balance  -KG     Static Sitting Balance  WFL;sitting in chair  -KG     Static Standing Balance  mild impairment;supported;standing  -KG     Dynamic Standing Balance  mild impairment;supported;standing  -KG       User Key  (r) = Recorded By, (t) = Taken By, (c) = Cosigned By    Initials Name Provider Type    Adwoa Garrett PT Physical Therapist        Goals/Plan    No documentation.       Clinical Impression     Row Name 03/18/21 1550          Pain    Additional Documentation  Pain Scale: Numbers Pre/Post-Treatment (Group)  -KG     Row Name 03/18/21 1550          Pain Scale: Numbers Pre/Post-Treatment    Pretreatment Pain Rating  3/10  -KG     Posttreatment Pain Rating  5/10  -KG     Pain Location - Orientation  incisional  -KG     Pain Location  chest  -KG     Pain Intervention(s)  Repositioned;Ambulation/increased activity  -KG     Row Name 03/18/21 1550          Plan of Care Review    Plan of Care Reviewed With  patient  -KG     Progress  improving  -KG     Outcome Summary  Pt increased ambulation distance to 220ft with Blessing and B UE support on tele monitor. Pt demonstrated slow uma with wide ASHANTI. VC's for upright posture and increased stride length. Pt required two standing rest breaks. Mobility limited by increased SOA and fatigue. Continue to progress as appropriate.  -KG      Row Name 03/18/21 1550          Vital Signs    Pre Systolic BP Rehab  122  -KG     Pre Treatment Diastolic BP  59  -KG     Post Systolic BP Rehab  136  -KG     Post Treatment Diastolic BP  72  -KG     Pretreatment Heart Rate (beats/min)  91  -KG     Posttreatment Heart Rate (beats/min)  98  -KG     Pre SpO2 (%)  91  -KG     O2 Delivery Pre Treatment  supplemental O2  -KG     Post SpO2 (%)  93  -KG     O2 Delivery Post Treatment  supplemental O2  -KG     Pre Patient Position  Sitting  -KG     Intra Patient Position  Standing  -KG     Post Patient Position  Sitting  -KG     Row Name 03/18/21 1550          Positioning and Restraints    Pre-Treatment Position  sitting in chair/recliner  -KG     Post Treatment Position  chair  -KG     In Chair  notified nsg;reclined;call light within reach;encouraged to call for assist;with family/caregiver;RUE elevated;LUE elevated;legs elevated  -KG       User Key  (r) = Recorded By, (t) = Taken By, (c) = Cosigned By    Initials Name Provider Type    Adwoa Garrett, PT Physical Therapist        Outcome Measures     Row Name 03/18/21 1552          How much help from another person do you currently need...    Turning from your back to your side while in flat bed without using bedrails?  2  -KG     Moving from lying on back to sitting on the side of a flat bed without bedrails?  2  -KG     Moving to and from a bed to a chair (including a wheelchair)?  3  -KG     Standing up from a chair using your arms (e.g., wheelchair, bedside chair)?  3  -KG     Climbing 3-5 steps with a railing?  2  -KG     To walk in hospital room?  3  -KG     AM-PAC 6 Clicks Score (PT)  15  -KG     Row Name 03/18/21 1552          Functional Assessment    Outcome Measure Options  AM-PAC 6 Clicks Basic Mobility (PT)  -KG       User Key  (r) = Recorded By, (t) = Taken By, (c) = Cosigned By    Initials Name Provider Type    Adwoa Garrett, PT Physical Therapist        Physical Therapy Education                  Title: PT OT SLP Therapies (In Progress)     Topic: Physical Therapy (In Progress)     Point: Mobility training (In Progress)     Learning Progress Summary           Patient Acceptance, E, NR by KG at 3/18/2021 1024    Acceptance, E, NR by KG at 3/17/2021 1006                   Point: Home exercise program (In Progress)     Learning Progress Summary           Patient Acceptance, E, NR by KG at 3/18/2021 1024    Acceptance, E, NR by KG at 3/17/2021 1006                   Point: Body mechanics (In Progress)     Learning Progress Summary           Patient Acceptance, E, NR by KG at 3/18/2021 1024    Acceptance, E, NR by KG at 3/17/2021 1006                   Point: Precautions (In Progress)     Learning Progress Summary           Patient Acceptance, E, NR by KG at 3/18/2021 1024    Acceptance, E, NR by KG at 3/17/2021 1006                               User Key     Initials Effective Dates Name Provider Type Discipline    KG 05/22/20 -  Adwoa Otto, PT Physical Therapist PT              PT Recommendation and Plan  Planned Therapy Interventions (PT): balance training, bed mobility training, gait training, stair training, strengthening, transfer training  Plan of Care Reviewed With: patient  Progress: improving  Outcome Summary: Pt increased ambulation distance to 220ft with Blessing and B UE support on tele monitor. Pt demonstrated slow uma with wide ASHANTI. VC's for upright posture and increased stride length. Pt required two standing rest breaks. Mobility limited by increased SOA and fatigue. Continue to progress as appropriate.     Time Calculation:   PT Charges     Row Name 03/18/21 1024             Time Calculation    Start Time  1024  -KG      PT Received On  03/18/21  -KG      PT Goal Re-Cert Due Date  03/27/21  -KG         Time Calculation- PT    Total Timed Code Minutes- PT  23 minute(s)  -KG         Timed Charges    05602 - PT Therapeutic Activity Minutes  23  -KG        User Key  (r) =  Recorded By, (t) = Taken By, (c) = Cosigned By    Initials Name Provider Type    KG Adwoa Otto, PT Physical Therapist        Therapy Charges for Today     Code Description Service Date Service Provider Modifiers Qty    20491718795 HC PT EVAL MOD COMPLEXITY 4 3/17/2021 Adwoa Otto, PT GP 1    38203244835 HC PT THER SUPP EA 15 MIN 3/17/2021 Adwoa Otto, PT GP 2    07950431630 HC PT THERAPEUTIC ACT EA 15 MIN 3/18/2021 Adwoa Otto, PT GP 2          PT G-Codes  Outcome Measure Options: AM-PAC 6 Clicks Basic Mobility (PT)  AM-PAC 6 Clicks Score (PT): 15    Toshia Otto, JOVANNA  3/18/2021

## 2021-03-18 NOTE — PROGRESS NOTES
"                                 [facility] Progress Note      LOS: 3 days   Patient Care Team:  Jacqueline Gatica MD as PCP - General (Family Medicine)    Chief Complaint: Chest pain    Subjective     Interval History: Quiet night    Patient Complaints: Complains of chest soreness.  No angina      Review of Systems:   A 14 point review of systems was negative except as was stated in the HPI      Objective     Vital Sign Min/Max for last 24 hours  Temp  Min: 98.3 °F (36.8 °C)  Max: 100.4 °F (38 °C)   BP  Min: 94/55  Max: 136/87   Pulse  Min: 78  Max: 90   Resp  Min: 13  Max: 24   SpO2  Min: 90 %  Max: 96 %   Flow (L/min)  Min: 6  Max: 6   No data recorded     Flowsheet Rows      First Filed Value   Admission Height  175.3 cm (69\") Documented at 03/15/2021 1839   Admission Weight  91.4 kg (201 lb 8 oz) Documented at 03/15/2021 1839          Physical Exam:  General Appearance: Alert, appears stated age and cooperative  Lungs: Clear to auscultation  Heart:: RRR   No Murmurs, Rubs or Gallops  Abdomen: Soft and nontender with adequate bowel sounds.  No organomegaly  Extremities: No cyanosis, clubbing or edema  Pulses: Pulses palpable and equal bilaterally  Skin: Warm and dry with no rash  Psych: Normal     Results Review:     I reviewed the patient's new clinical results.  Results from last 7 days   Lab Units 03/18/21  0351 03/17/21 0350 03/16/21  2306   SODIUM mmol/L 132* 142 143   POTASSIUM mmol/L 5.0 4.4 4.4   CHLORIDE mmol/L 100 109* 109*   CO2 mmol/L 21.0* 22.0 25.0   BUN mg/dL 23 15 15   CREATININE mg/dL 1.36* 1.14 1.05   GLUCOSE mg/dL 230* 135* 144*   CALCIUM mg/dL 8.6 8.6 8.5*     Results from last 7 days   Lab Units 03/18/21  0351 03/17/21  0350 03/16/21  2306   WBC 10*3/mm3 11.25* 11.75* 11.14*   HEMOGLOBIN g/dL 10.8* 10.9* 10.4*   HEMATOCRIT % 33.9* 33.4* 31.3*   PLATELETS 10*3/mm3 196 227 201     Lab Results   Lab Value Date/Time    TROPONINT <0.010 03/14/2021 0144    TROPONINT <0.010 " 03/13/2021 2239    TROPONINT <0.010 03/05/2021 1026     Results from last 7 days   Lab Units 03/15/21  2037   CHOLESTEROL mg/dL 223*   TRIGLYCERIDES mg/dL 618*   HDL CHOL mg/dL 30*   LDL CHOL mg/dL 91     Results from last 7 days   Lab Units 03/17/21  0350 03/16/21  1922 03/15/21  2037   INR  1.20* 1.41* 0.93     Results from last 7 days   Lab Units 03/17/21  0137   PH, ARTERIAL pH units 7.362   PO2 ART mm Hg 62.9*   PCO2, ARTERIAL mm Hg 41.2   HCO3 ART mmol/L 23.4           Medication Review: yes  Current Facility-Administered Medications   Medication Dose Route Frequency Provider Last Rate Last Admin   • acetaminophen (TYLENOL) tablet 650 mg  650 mg Oral Q4H PRN Jyotsna Ricks PA-C       • allopurinol (ZYLOPRIM) tablet 300 mg  300 mg Oral Daily Jyotsna Ricks PA-C   300 mg at 03/17/21 0905   • aspirin tablet 325 mg  325 mg Oral Daily Jo Pollard       • atorvastatin (LIPITOR) tablet 40 mg  40 mg Oral Nightly Jyotsna Ricks PA-C   40 mg at 03/17/21 2034   • calcium gluconate 1 g in sodium chloride 0.9 % 100 mL IVPB  1 g Intravenous Once PRN Jyotsna Ricks PA-C        And   • calcium gluconate 6 g in sodium chloride 0.9 % 500 mL IVPB  6 g Intravenous Once PRN Jyotsna Ricks PA-C       • calcium gluconate 2 g in sodium chloride 0.9 % 100 mL IVPB  2 g Intravenous Once PRN Jyotsna Ricks PA-C       • cetirizine (zyrTEC) tablet 10 mg  10 mg Oral Daily Jyotsna Ricks PA-C   10 mg at 03/17/21 0905   • clopidogrel (PLAVIX) tablet 75 mg  75 mg Oral Daily Vernell Galan PA-C       • dextrose (D50W) 25 g/ 50mL Intravenous Solution 25 g  25 g Intravenous Q15 Min PRN Jyotsna Ricks PA-C       • dextrose (GLUTOSE) oral gel 15 g  15 g Oral Q15 Min PRN Jyotsna Ricks PA-C       • finasteride (PROSCAR) tablet 5 mg  5 mg Oral Daily Jyotsna Ricks PA-C   5 mg at 03/17/21 0904   • folic acid (FOLVITE) tablet 1 mg  1 mg Oral BID Jyotsna Ricks PA-C   1 mg at 03/17/21 2034   • glucagon (human recombinant)  (GLUCAGEN DIAGNOSTIC) injection 1 mg  1 mg Subcutaneous Q15 Min PRN Jyotsna Ricks PA-C       • HYDROcodone-acetaminophen (NORCO) 7.5-325 MG per tablet 1 tablet  1 tablet Oral Q4H PRN Jorge Simms MD   1 tablet at 03/18/21 0032   • insulin detemir (LEVEMIR) injection 25 Units  25 Units Subcutaneous Nightly Lorenzo Castanon MD   25 Units at 03/17/21 1856   • insulin lispro (humaLOG) injection 0-9 Units  0-9 Units Subcutaneous 4x Daily PC & at Bedtime Lorenzo Castanon MD       • ipratropium-albuterol (DUO-NEB) nebulizer solution 3 mL  3 mL Nebulization Q4H PRN Jyotsna Ricks PA-C       • lactobacillus acidophilus (RISAQUAD) capsule 1 capsule  1 capsule Oral Daily Jyotsna Ricks PA-C   1 capsule at 03/17/21 0904   • Magnesium Sulfate 2 gram Bolus, followed by 8 gram infusion (total Mg dose 10 grams)- Mg less than or equal to 1mg/dL  2 g Intravenous PRN Jorge Simms MD        Or   • Magnesium Sulfate 2 gram / 50mL Infusion (GIVE X 3 BAGS TO EQUAL 6GM TOTAL DOSE) - Mg 1.1 - 1.5 mg/dl  2 g Intravenous PRN Jorge Simms MD        Or   • Magnesium Sulfate 4 gram infusion- Mg 1.6-1.9 mg/dL  4 g Intravenous PRN Jorge Simms MD 25 mL/hr at 03/16/21 2101 4 g at 03/16/21 2101   • metoprolol tartrate (LOPRESSOR) half tablet 12.5 mg  12.5 mg Oral Q12H Jorge Simms MD   12.5 mg at 03/17/21 2034   • morphine injection 2 mg  2 mg Intravenous Q2H PRN Jorge Simms MD   2 mg at 03/18/21 0619   • multivitamin (THERAGRAN) tablet 1 tablet  1 tablet Oral Daily Jyotsna Ricks PA-C   1 tablet at 03/17/21 0904   • naloxone (NARCAN) injection 0.4 mg  0.4 mg Intravenous Q5 Min PRN Jorge Simms MD       • niCARdipine (CARDENE) 40 mg in 200 mL NS infusion  5-15 mg/hr Intravenous Continuous PRN Jyotsna Ricks PA-C       • nitroglycerin (NITROSTAT) SL tablet 0.4 mg  0.4 mg Sublingual Q5 Min PRN Jyotsna Ricks PA-C       • nitroglycerin (TRIDIL) 200 mcg/ml infusion   5-200 mcg/min Intravenous Continuous PRN Jyotsna Ricks PA-C   Stopped at 03/17/21 0100   • norepinephrine (LEVOPHED) 8 mg in 250 mL NS infusion (premix)  0.02-0.3 mcg/kg/min Intravenous Continuous PRN Jyotsna Ricks PA-C   Stopped at 03/17/21 0620   • ondansetron (ZOFRAN) injection 4 mg  4 mg Intravenous Q6H PRN Jyotsna Ricks PA-C   4 mg at 03/17/21 0122   • oxyCODONE-acetaminophen (PERCOCET) 5-325 MG per tablet 2 tablet  2 tablet Oral Q4H PRN Jorge Simms MD   2 tablet at 03/18/21 0611   • pantoprazole (PROTONIX) EC tablet 40 mg  40 mg Oral Daily Jyotsna Ricks PA-C   40 mg at 03/17/21 0904   • Pharmacy Consult - MTM   Does not apply Daily Jyotsna Ricks PA-C       • phenylephrine (TRACIE-SYNEPHRINE) 50 mg in 250 mL NS infusion  0.5-3 mcg/kg/min Intravenous Continuous PRN Jyotsna Ricks PA-C       • potassium chloride (MICRO-K) CR capsule 40 mEq  40 mEq Oral PRN Jyotsna Ricks PA-C        Or   • potassium chloride (KLOR-CON) packet 40 mEq  40 mEq Oral PRN Jyotsna Ricks PA-C       • potassium chloride 20 mEq in 50 mL IVPB  20 mEq Intravenous Q1H PRN Jyotsna Ricks PA-C        Or   • potassium chloride 20 mEq in 50 mL IVPB  20 mEq Intravenous Q1H PRN Jyotsna Ricks PA-C 50 mL/hr at 03/16/21 2210 20 mEq at 03/16/21 2210   • potassium phosphate 45 mmol in sodium chloride 0.9 % 250 mL infusion  45 mmol Intravenous PRN Jorge Simms MD        Or   • potassium phosphate 30 mmol in sodium chloride 0.9 % 100 mL infusion  30 mmol Intravenous PRN Jorge Simms MD        Or   • potassium phosphate 15 mmol in sodium chloride 0.9 % 100 mL infusion  15 mmol Intravenous PRN Jorge Simms MD        Or   • sodium phosphates 45 mmol in sodium chloride 0.9 % 250 mL IVPB  45 mmol Intravenous PRN Jorge Simms MD        Or   • sodium phosphates 30 mmol in sodium chloride 0.9 % 100 mL IVPB  30 mmol Intravenous PRN Jorge Simms MD        Or   • sodium phosphates 15 mmol in sodium  chloride 0.9 % 100 mL IVPB  15 mmol Intravenous PRN Jorge Simms MD       • sodium chloride 0.9 % flush 10 mL  10 mL Intravenous Q12H Jyotsna Ricks PA-C   10 mL at 03/17/21 2034   • sodium chloride 0.9 % flush 10 mL  10 mL Intravenous PRN Jyotsna Ricks PA-C       • sodium chloride 0.9 % flush 3 mL  3 mL Intravenous Q12H Jyotsna Ricks PA-C   3 mL at 03/17/21 2035   • sodium chloride 0.9 % infusion  30 mL/hr Intravenous Continuous PRN Jyotsna Ricks PA-C       • terazosin (HYTRIN) capsule 5 mg  5 mg Oral Nightly Jyotsna Ricks PA-C   5 mg at 03/17/21 2034   • vitamin E capsule 400 Units  400 Units Oral Daily Jyotsna Ricks PA-C   400 Units at 03/17/21 0905         Type 2 diabetes mellitus with hyperglycemia, without long-term current use of insulin (CMS/[facility])    Hypertension associated with diabetes (CMS/[facility])    Hyperlipidemia associated with type 2 diabetes mellitus (CMS/[facility])    Benign prostatic hyperplasia without lower urinary tract symptoms    GERD (gastroesophageal reflux disease)    Diabetic neuropathy (CMS/[facility])    Former smoker    History of gout    Coronary artery disease involving native coronary artery of native heart with angina pectoris (CMS/[facility])    CAD (coronary artery disease)        Impression      CABG 3/16/2021  Normal preop LV function  Hypertension  Diabetes mellitus  Gout      Plan     Continue DAPT, beta-blocker and high intensity statin  Monitor renal function closely        Bennie Weeks MD   03/18/21  07:23 EDT

## 2021-03-18 NOTE — PROGRESS NOTES
INTENSIVIST / PULMONARY FOLLOW UP NOTE     Hospital:  LOS: 3 days   Mr. William Villasenor, 70 y.o. male is followed for:     Type 2 diabetes mellitus with hyperglycemia, without long-term current use of insulin (CMS/HCC)    Hypertension associated with diabetes (CMS/HCC)    Hyperlipidemia associated with type 2 diabetes mellitus (CMS/HCC)    Benign prostatic hyperplasia without lower urinary tract symptoms    GERD (gastroesophageal reflux disease)    Diabetic neuropathy (CMS/HCC)    Former smoker    History of gout    Coronary artery disease involving native coronary artery of native heart with angina pectoris (CMS/HCC)    CAD (coronary artery disease)          SUBJECTIVE   Complains of pain    The patient's relevant past medical, surgical, family, and social history were reviewed    Allergies and medications were reviewed    ROS:  Per subjective, all other systems were reviewed and were negative        OBJECTIVE     Vital Sign Min/Max for last 24 hours:  Temp  Min: 98.3 °F (36.8 °C)  Max: 100.4 °F (38 °C)   BP  Min: 94/55  Max: 136/87   Pulse  Min: 78  Max: 97   Resp  Min: 13  Max: 24   SpO2  Min: 92 %  Max: 96 %   No data recorded     Physical Exam:  General Appearance:  Conversant, in no acute distress  Eyes:  No scleral icterus or pallor, pupils normal  Ears, Nose, Mouth, Throat:  Atraumatic, oropharynx clear  Neck:  Trachea midline, thyroid normal  Respiratory:  Clear to auscultation bilaterally, normal effort, no tenderness to palpation  Cardiovascular:  Regular rate and rhythm, no murmurs, no peripheral edema, no thrill  Gastrointestinal:  Soft, nontender, nondistended, no hepatosplenomegaly  Skin:  Normal temperature, no rash  Psychiatric:  Alert and oriented x 3, normal judgement and insight  Neuro:  No new focal neurologic deficits observed    Telemetry:                SpO2: 94 % SpO2  Min: 92 %  Max: 96 %   Device:      Flow Rate:   No data recorded     Mechanical Ventilator Settings:                                 Intake/Ouptut 24 hrs (7:00AM - 6:59 AM)  Intake & Output (last 3 days)       03/15 0701 - 03/16 0700 03/16 0701 - 03/17 0700 03/17 0701 - 03/18 0700 03/18 0701 - 03/19 0700    P.O.   2010 480    I.V. (mL/kg)  2001 (21.9) 539.4 (5.9)     Blood  563      IV Piggyback  300 100     Total Intake(mL/kg)  2864 (31.3) 2649.4 (29) 480 (5.3)    Urine (mL/kg/hr) 1550 2290 (1) 1350 (0.6)     Chest Tube  655 590 40    Total Output 1550 2945 1940 40    Net -1550 -81 +709.4 +440            Urine Unmeasured Occurrence 1 x             Lines, Drains & Airways    Active LDAs     Name:   Placement date:   Placement time:   Site:   Days:    Peripheral IV 03/14/21 0045 Left;Upper Arm   03/14/21    0045    Arm   3    Urethral Catheter Double-lumen 16 Fr.   03/16/21    1445     less than 1    Y Chest Tube 1 and 2 1 Right Mediastinal 32 Fr. 2 Right Mediastinal 32 Fr.   03/16/21    1838     less than 1    Y Chest Tube 3 and 4 3 Left Mediastinal 32 Fr. 4 Left Mediastinal 32 Fr.   03/16/21    1845     less than 1    Arterial Line 03/16/21 Left Radial   03/16/21    1421    Radial   less than 1    Introducer- Double Lumen 03/16/21 Internal jugular Right   03/16/21    -- placed in OR    Internal jugular   1    Pacer Wires   03/16/21    -- placed in OR    Ventricular   1                Hematology:  Results from last 7 days   Lab Units 03/18/21  0351 03/17/21  0350 03/16/21  2306 03/16/21  1922 03/16/21  1810 03/16/21  1734 03/16/21  1703 03/16/21  0719 03/15/21  2037 03/15/21  2037 03/15/21  0043   WBC 10*3/mm3 11.25* 11.75* 11.14* 9.53  --   --   --  6.78  --  7.34 8.28   HEMOGLOBIN g/dL 10.8* 10.9* 10.4* 12.2*  --   --   --  13.3  --  14.1 13.0   HEMOGLOBIN, POC g/dL  --   --   --   --  7.5* 10.2* 9.9*  --    < >  --   --    HEMATOCRIT % 33.9* 33.4* 31.3* 36.3*  --   --   --  40.1  --  43.2 39.6   HEMATOCRIT POC %  --   --   --   --  22* 30* 29*  --    < >  --   --    PLATELETS 10*3/mm3 196 227 201 201  --   --   --  263  --  307 297    < >  = values in this interval not displayed.     Electrolytes, Magnesium and Phosphorus:  Results from last 7 days   Lab Units 03/18/21  0351 03/17/21  0350 03/16/21 2306 03/16/21 1922 03/16/21  0719 03/15/21  2037 03/15/21  0043 03/14/21  0144 03/13/21  2239   SODIUM mmol/L 132* 142 143 143 134* 135* 133*  --  140   CHLORIDE mmol/L 100 109* 109* 110* 104 101 99  --  103   POTASSIUM mmol/L 5.0 4.4 4.4 3.6 4.0 4.5 4.1  --  4.1   CO2 mmol/L 21.0* 22.0 25.0 23.0 20.0* 25.0 20.0*  --  20.4*   MAGNESIUM mg/dL  --  2.4  --  1.8  --   --   --  1.9 1.8   PHOSPHORUS mg/dL  --  2.6 2.7 2.3*  --   --  3.3  --   --      Renal:  Results from last 7 days   Lab Units 03/18/21  0351 03/17/21 0350 03/16/21 2306 03/16/21 1922 03/16/21  0719 03/15/21  2037 03/15/21  0043   CREATININE mg/dL 1.36* 1.14 1.05 0.96 0.97 1.35* 1.30*   BUN mg/dL 23 15 15 15 18 20 20     Estimated Creatinine Clearance: 55.7 mL/min (A) (by C-G formula based on SCr of 1.36 mg/dL (H)).  Hepatic:  Results from last 7 days   Lab Units 03/15/21  2037 03/15/21  0043 03/13/21 2239   ALK PHOS U/L 69 66 74   BILIRUBIN mg/dL 0.3 0.2 0.2   ALT (SGPT) U/L 12 11 8   AST (SGOT) U/L 15 17 16     Arterial Blood Gases:  Results from last 7 days   Lab Units 03/17/21  0137 03/16/21  1948 03/16/21  1810 03/16/21  1734 03/16/21  1703 03/16/21  1656 03/16/21  1638 03/16/21  0801   PH, ARTERIAL pH units 7.362 7.498* 7.38 7.38 7.38 7.33* 7.36 7.398   PCO2, ARTERIAL mm Hg 41.2 30.1*  --   --   --   --   --  43.2   PO2 ART mm Hg 62.9* 216.0*  --   --   --   --   --  83.1   FIO2 % 40 100  --   --   --   --   --  32       Results from last 7 days   Lab Units 03/15/21  2037   HEMOGLOBIN A1C % 8.70*       No results found for: LACTATE    Relevant imaging studies and labs from 03/18/21 were reviewed and interpreted by me    Medications (drips):  niCARdipine  nitroglycerin, Last Rate: Stopped (03/17/21 0100)  norepinephrine, Last Rate: Stopped (03/17/21 0620)  phenylephrine  sodium  chloride        allopurinol, 300 mg, Oral, Daily  aspirin, 325 mg, Oral, Daily  atorvastatin, 40 mg, Oral, Nightly  cetirizine, 10 mg, Oral, Daily  clopidogrel, 75 mg, Oral, Daily  finasteride, 5 mg, Oral, Daily  folic acid, 1 mg, Oral, BID  gabapentin, 300 mg, Oral, BID  insulin detemir, 25 Units, Subcutaneous, Nightly  insulin lispro, 0-9 Units, Subcutaneous, 4x Daily PC & at Bedtime  lactobacillus acidophilus, 1 capsule, Oral, Daily  metoprolol tartrate, 12.5 mg, Oral, Q12H  multivitamin, 1 tablet, Oral, Daily  pantoprazole, 40 mg, Oral, Daily  pharmacy consult - MTM, , Does not apply, Daily  sodium chloride, 10 mL, Intravenous, Q12H  sodium chloride, 3 mL, Intravenous, Q12H  terazosin, 5 mg, Oral, Nightly  vitamin E, 400 Units, Oral, Daily        Assessment/Plan   IMPRESSION / PLAN     Inpatient Problem List:  70 y.o.male:  Active Hospital Problems    Diagnosis    • **Type 2 diabetes mellitus with hyperglycemia, without long-term current use of insulin (CMS/[facility])    • Coronary artery disease involving native coronary artery of native heart with angina pectoris (CMS/[facility])      Added automatically from request for surgery [id number]     • CAD (coronary artery disease)    • Diabetic neuropathy (CMS/[facility])    • GERD (gastroesophageal reflux disease)    • History of gout    • Former smoker    • Benign prostatic hyperplasia without lower urinary tract symptoms    • Hypertension associated with diabetes (CMS/[facility])    • Hyperlipidemia associated with type 2 diabetes mellitus (CMS/[facility])         Impression:  70 y.o.male with relevant PMH of DMT2 with diabetic neuropathy (HgbA1C 8.7 3/15), HTN, HLD, Gout, CAD, cath on 3/15 w/ severe MVCAD admitted 3/15/2021 and underwent 4vCABG on 3/16 by Dr. Simms.    Plan:  Post op care - per cts    DM A1c 8.7 - titrate SQ insulin    Pain - titrate narcotics.  Resume home neurontin.  Stool softeners.    Gout - home meds    GERD - home meds    Resume / continue appropriate home meds    DVT /  PUD prophylaxis    Nutrition - Diet Regular; Consistent Carbohydrate, Cardiac    Plan of care and goals reviewed with multidisciplinary team at daily rounds         Lorenzo Castanon MD  Intensive Care Medicine  03/18/21 10:58 EDT

## 2021-03-18 NOTE — PROGRESS NOTES
CTS Progress Note       LOS: 3 days   Patient Care Team:  Jacqueline Gatica MD as PCP - General (Family Medicine)    Chief Complaint: Type 2 diabetes mellitus with hyperglycemia, without long-term current use of insulin (CMS/HCC)    Vital Signs:  Temp:  [98.3 °F (36.8 °C)-100.4 °F (38 °C)] 98.3 °F (36.8 °C)  Heart Rate:  [78-92] 87  Resp:  [13-24] 20  BP: ()/(55-87) 118/58  Arterial Line BP: ()/(55-88) 118/61    Physical Exam: Breathing unlabored sternum is stable       Results:   Results from last 7 days   Lab Units 03/18/21  0351   WBC 10*3/mm3 11.25*   HEMOGLOBIN g/dL 10.8*   HEMATOCRIT % 33.9*   PLATELETS 10*3/mm3 196     Results from last 7 days   Lab Units 03/18/21  0351   SODIUM mmol/L 132*   POTASSIUM mmol/L 5.0   CHLORIDE mmol/L 100   CO2 mmol/L 21.0*   BUN mg/dL 23   CREATININE mg/dL 1.36*   GLUCOSE mg/dL 230*   CALCIUM mg/dL 8.6           Imaging Results (Last 24 Hours)     Procedure Component Value Units Date/Time    XR Chest 1 View [[id number]] Resulted: 03/18/21 0438     Updated: 03/18/21 0510          Assessment      Type 2 diabetes mellitus with hyperglycemia, without long-term current use of insulin (CMS/HCC)    Hypertension associated with diabetes (CMS/HCC)    Hyperlipidemia associated with type 2 diabetes mellitus (CMS/[facility])    Benign prostatic hyperplasia without lower urinary tract symptoms    GERD (gastroesophageal reflux disease)    Diabetic neuropathy (CMS/HCC)    Former smoker    History of gout    Coronary artery disease involving native coronary artery of native heart with angina pectoris (CMS/HCC)    CAD (coronary artery disease)    Still with too much drainage to discontinue chest tubes.  We will discontinue pacer wires.    Plan   Discontinue pacer wires but not chest tubes.  Plavix 75 mg p.o. today and daily.  CBC BMP chest x-ray in the a.m.    Please note that portions of this note were completed with a voice recognition program. Efforts were made to edit the dictations,  but occasionally words are mistranscribed.    Jorge Simms MD  03/18/21  06:46 EDT

## 2021-03-18 NOTE — PROGRESS NOTES
Clinical Nutrition     Multidisciplinary Rounds      Patient Name: William Villasenor  Date of Encounter: 03/18/21 09:08 EDT  MRN: [id number]  Admission date: 3/15/2021      Reason for visit: MDR. RD to continue to follow per protocol.     Current diet: Diet Regular; Consistent Carbohydrate, Cardiac  No active supplement orders      EMR reviewed   MST =0 on admission  Nutrition education noted to have been provided by RD 3/17    Intervention:  Follow treatment plan  Care plan reviewed    Follow up:   Per protocol      Raisa Keith RDN, LD, CNSC  09:08 EDT  Time: 10min

## 2021-03-18 NOTE — PLAN OF CARE
Goal Outcome Evaluation:  Plan of Care Reviewed With: patient  Progress: no change  Outcome Summary: PT remains on 4-6L. Work of breathing increased with abdominal/accessory muscle use. Audible wheezing with exertion. Pulmonary toilet, Nebs ordered, and 1 dose of lasix. Approx 1250 UOP. NTG gtt drip started towards end of shift d/t SBP 149mmhg. Stable otherwise.

## 2021-03-18 NOTE — PLAN OF CARE
Goal Outcome Evaluation:  Plan of Care Reviewed With: patient  Progress: improving  Outcome Summary: Pt increased ambulation distance to 220ft with Blessing and B UE support on tele monitor. Pt demonstrated slow uma with wide ASHANTI. VC's for upright posture and increased stride length. Pt required two standing rest breaks. Mobility limited by increased SOA and fatigue. Continue to progress as appropriate.

## 2021-03-18 NOTE — PLAN OF CARE
Goal Outcome Evaluation:    Patient AOX4/4, VSS on 4-6Lpm. Bipap over night at 50% FiO2. Patient walked 60ft with x1 assistance. CT outut approx 25mL/hr; UO approx 25-30mL/jhr. Pain seems easy to manage.

## 2021-03-19 ENCOUNTER — APPOINTMENT (OUTPATIENT)
Dept: GENERAL RADIOLOGY | Facility: HOSPITAL | Age: 71
End: 2021-03-19

## 2021-03-19 LAB
ACT BLD: 109 SECONDS (ref 82–152)
ACT BLD: 351 SECONDS (ref 82–152)
ACT BLD: 395 SECONDS (ref 82–152)
ACT BLD: 472 SECONDS (ref 82–152)
ACT BLD: 477 SECONDS (ref 82–152)
ACT BLD: 92 SECONDS (ref 82–152)
ALBUMIN SERPL-MCNC: 3.9 G/DL (ref 3.5–5.2)
ALBUMIN/GLOB SERPL: 1.4 G/DL
ALP SERPL-CCNC: 83 U/L (ref 39–117)
ALT SERPL W P-5'-P-CCNC: 9 U/L (ref 1–41)
ANION GAP SERPL CALCULATED.3IONS-SCNC: 11 MMOL/L (ref 5–15)
AST SERPL-CCNC: 9 U/L (ref 1–40)
BASOPHILS # BLD AUTO: 0.02 10*3/MM3 (ref 0–0.2)
BASOPHILS NFR BLD AUTO: 0.2 % (ref 0–1.5)
BILIRUB SERPL-MCNC: 0.5 MG/DL (ref 0–1.2)
BUN SERPL-MCNC: 33 MG/DL (ref 8–23)
BUN/CREAT SERPL: 24.8 (ref 7–25)
CALCIUM SPEC-SCNC: 8.9 MG/DL (ref 8.6–10.5)
CHLORIDE SERPL-SCNC: 97 MMOL/L (ref 98–107)
CO2 SERPL-SCNC: 25 MMOL/L (ref 22–29)
CREAT SERPL-MCNC: 1.33 MG/DL (ref 0.76–1.27)
DEPRECATED RDW RBC AUTO: 43.6 FL (ref 37–54)
EOSINOPHIL # BLD AUTO: 0.04 10*3/MM3 (ref 0–0.4)
EOSINOPHIL NFR BLD AUTO: 0.3 % (ref 0.3–6.2)
ERYTHROCYTE [DISTWIDTH] IN BLOOD BY AUTOMATED COUNT: 13.1 % (ref 12.3–15.4)
GFR SERPL CREATININE-BSD FRML MDRD: 53 ML/MIN/1.73
GLOBULIN UR ELPH-MCNC: 2.7 GM/DL
GLUCOSE BLDC GLUCOMTR-MCNC: 254 MG/DL (ref 70–130)
GLUCOSE BLDC GLUCOMTR-MCNC: 256 MG/DL (ref 70–130)
GLUCOSE BLDC GLUCOMTR-MCNC: 314 MG/DL (ref 70–130)
GLUCOSE BLDC GLUCOMTR-MCNC: 438 MG/DL (ref 70–130)
GLUCOSE BLDC GLUCOMTR-MCNC: 472 MG/DL (ref 70–130)
GLUCOSE SERPL-MCNC: 247 MG/DL (ref 65–99)
HCT VFR BLD AUTO: 32.7 % (ref 37.5–51)
HGB BLD-MCNC: 10.4 G/DL (ref 13–17.7)
IMM GRANULOCYTES # BLD AUTO: 0.1 10*3/MM3 (ref 0–0.05)
IMM GRANULOCYTES NFR BLD AUTO: 0.8 % (ref 0–0.5)
LYMPHOCYTES # BLD AUTO: 1.99 10*3/MM3 (ref 0.7–3.1)
LYMPHOCYTES NFR BLD AUTO: 16.4 % (ref 19.6–45.3)
MAGNESIUM SERPL-MCNC: 2.4 MG/DL (ref 1.6–2.4)
MCH RBC QN AUTO: 29.5 PG (ref 26.6–33)
MCHC RBC AUTO-ENTMCNC: 31.8 G/DL (ref 31.5–35.7)
MCV RBC AUTO: 92.6 FL (ref 79–97)
MONOCYTES # BLD AUTO: 1.05 10*3/MM3 (ref 0.1–0.9)
MONOCYTES NFR BLD AUTO: 8.7 % (ref 5–12)
NEUTROPHILS NFR BLD AUTO: 73.6 % (ref 42.7–76)
NEUTROPHILS NFR BLD AUTO: 8.91 10*3/MM3 (ref 1.7–7)
NRBC BLD AUTO-RTO: 0 /100 WBC (ref 0–0.2)
PHOSPHATE SERPL-MCNC: 3.5 MG/DL (ref 2.5–4.5)
PLATELET # BLD AUTO: 217 10*3/MM3 (ref 140–450)
PMV BLD AUTO: 9.3 FL (ref 6–12)
POTASSIUM SERPL-SCNC: 4.1 MMOL/L (ref 3.5–5.2)
PROT SERPL-MCNC: 6.6 G/DL (ref 6–8.5)
RBC # BLD AUTO: 3.53 10*6/MM3 (ref 4.14–5.8)
SODIUM SERPL-SCNC: 133 MMOL/L (ref 136–145)
WBC # BLD AUTO: 12.11 10*3/MM3 (ref 3.4–10.8)

## 2021-03-19 PROCEDURE — 94799 UNLISTED PULMONARY SVC/PX: CPT

## 2021-03-19 PROCEDURE — G0108 DIAB MANAGE TRN  PER INDIV: HCPCS

## 2021-03-19 PROCEDURE — 83735 ASSAY OF MAGNESIUM: CPT | Performed by: INTERNAL MEDICINE

## 2021-03-19 PROCEDURE — 63710000001 INSULIN DETEMIR PER 5 UNITS

## 2021-03-19 PROCEDURE — 25010000002 MORPHINE PER 10 MG: Performed by: THORACIC SURGERY (CARDIOTHORACIC VASCULAR SURGERY)

## 2021-03-19 PROCEDURE — 63710000001 INSULIN LISPRO (HUMAN) PER 5 UNITS: Performed by: NURSE PRACTITIONER

## 2021-03-19 PROCEDURE — 71045 X-RAY EXAM CHEST 1 VIEW: CPT

## 2021-03-19 PROCEDURE — 99024 POSTOP FOLLOW-UP VISIT: CPT | Performed by: PHYSICIAN ASSISTANT

## 2021-03-19 PROCEDURE — 84100 ASSAY OF PHOSPHORUS: CPT | Performed by: INTERNAL MEDICINE

## 2021-03-19 PROCEDURE — 63710000001 INSULIN LISPRO (HUMAN) PER 5 UNITS: Performed by: INTERNAL MEDICINE

## 2021-03-19 PROCEDURE — 99232 SBSQ HOSP IP/OBS MODERATE 35: CPT | Performed by: INTERNAL MEDICINE

## 2021-03-19 PROCEDURE — 82962 GLUCOSE BLOOD TEST: CPT

## 2021-03-19 PROCEDURE — 97530 THERAPEUTIC ACTIVITIES: CPT

## 2021-03-19 PROCEDURE — 80053 COMPREHEN METABOLIC PANEL: CPT | Performed by: INTERNAL MEDICINE

## 2021-03-19 PROCEDURE — 85025 COMPLETE CBC W/AUTO DIFF WBC: CPT | Performed by: PHYSICIAN ASSISTANT

## 2021-03-19 PROCEDURE — 94660 CPAP INITIATION&MGMT: CPT

## 2021-03-19 RX ORDER — AMOXICILLIN 250 MG
2 CAPSULE ORAL 2 TIMES DAILY
Status: DISCONTINUED | OUTPATIENT
Start: 2021-03-19 | End: 2021-03-22 | Stop reason: HOSPADM

## 2021-03-19 RX ORDER — BUDESONIDE 0.5 MG/2ML
0.5 INHALANT ORAL
Status: DISCONTINUED | OUTPATIENT
Start: 2021-03-19 | End: 2021-03-22 | Stop reason: HOSPADM

## 2021-03-19 RX ORDER — ASPIRIN 81 MG/1
81 TABLET, CHEWABLE ORAL DAILY
Status: DISCONTINUED | OUTPATIENT
Start: 2021-03-19 | End: 2021-03-22 | Stop reason: HOSPADM

## 2021-03-19 RX ADMIN — OXYCODONE AND ACETAMINOPHEN 2 TABLET: 5; 325 TABLET ORAL at 11:29

## 2021-03-19 RX ADMIN — Medication 1 CAPSULE: at 09:33

## 2021-03-19 RX ADMIN — INSULIN LISPRO 10 UNITS: 100 INJECTION, SOLUTION INTRAVENOUS; SUBCUTANEOUS at 11:36

## 2021-03-19 RX ADMIN — OXYCODONE AND ACETAMINOPHEN 2 TABLET: 5; 325 TABLET ORAL at 03:40

## 2021-03-19 RX ADMIN — CETIRIZINE HYDROCHLORIDE 10 MG: 10 TABLET, FILM COATED ORAL at 09:33

## 2021-03-19 RX ADMIN — MORPHINE SULFATE 2 MG: 2 INJECTION, SOLUTION INTRAMUSCULAR; INTRAVENOUS at 03:40

## 2021-03-19 RX ADMIN — IPRATROPIUM BROMIDE AND ALBUTEROL SULFATE 3 ML: 2.5; .5 SOLUTION RESPIRATORY (INHALATION) at 07:35

## 2021-03-19 RX ADMIN — GABAPENTIN 300 MG: 300 CAPSULE ORAL at 09:33

## 2021-03-19 RX ADMIN — INSULIN LISPRO 12 UNITS: 100 INJECTION, SOLUTION INTRAVENOUS; SUBCUTANEOUS at 11:48

## 2021-03-19 RX ADMIN — IPRATROPIUM BROMIDE AND ALBUTEROL SULFATE 3 ML: 2.5; .5 SOLUTION RESPIRATORY (INHALATION) at 23:42

## 2021-03-19 RX ADMIN — TERAZOSIN HYDROCHLORIDE 5 MG: 5 CAPSULE ORAL at 20:09

## 2021-03-19 RX ADMIN — MULTIVITAMIN TABLET 1 TABLET: TABLET at 09:33

## 2021-03-19 RX ADMIN — INSULIN LISPRO 5 UNITS: 100 INJECTION, SOLUTION INTRAVENOUS; SUBCUTANEOUS at 20:51

## 2021-03-19 RX ADMIN — IPRATROPIUM BROMIDE AND ALBUTEROL SULFATE 3 ML: 2.5; .5 SOLUTION RESPIRATORY (INHALATION) at 12:00

## 2021-03-19 RX ADMIN — METOPROLOL TARTRATE 25 MG: 25 TABLET, FILM COATED ORAL at 20:09

## 2021-03-19 RX ADMIN — METOPROLOL TARTRATE 25 MG: 25 TABLET, FILM COATED ORAL at 09:33

## 2021-03-19 RX ADMIN — SODIUM CHLORIDE, PRESERVATIVE FREE 10 ML: 5 INJECTION INTRAVENOUS at 20:09

## 2021-03-19 RX ADMIN — Medication 400 UNITS: at 09:33

## 2021-03-19 RX ADMIN — FINASTERIDE 5 MG: 5 TABLET, FILM COATED ORAL at 09:33

## 2021-03-19 RX ADMIN — HYDROCODONE BITARTRATE AND ACETAMINOPHEN 1 TABLET: 7.5; 325 TABLET ORAL at 07:51

## 2021-03-19 RX ADMIN — IPRATROPIUM BROMIDE AND ALBUTEROL SULFATE 3 ML: 2.5; .5 SOLUTION RESPIRATORY (INHALATION) at 20:03

## 2021-03-19 RX ADMIN — INSULIN DETEMIR 25 UNITS: 100 INJECTION, SOLUTION SUBCUTANEOUS at 20:51

## 2021-03-19 RX ADMIN — PANTOPRAZOLE SODIUM 40 MG: 40 TABLET, DELAYED RELEASE ORAL at 09:33

## 2021-03-19 RX ADMIN — HYDROCODONE BITARTRATE AND ACETAMINOPHEN 1 TABLET: 7.5; 325 TABLET ORAL at 15:13

## 2021-03-19 RX ADMIN — INSULIN DETEMIR 35 UNITS: 100 INJECTION, SOLUTION SUBCUTANEOUS at 09:33

## 2021-03-19 RX ADMIN — OXYCODONE AND ACETAMINOPHEN 2 TABLET: 5; 325 TABLET ORAL at 20:09

## 2021-03-19 RX ADMIN — SODIUM CHLORIDE, PRESERVATIVE FREE 10 ML: 5 INJECTION INTRAVENOUS at 09:34

## 2021-03-19 RX ADMIN — ATORVASTATIN CALCIUM 40 MG: 40 TABLET, FILM COATED ORAL at 20:09

## 2021-03-19 RX ADMIN — INSULIN LISPRO 8 UNITS: 100 INJECTION, SOLUTION INTRAVENOUS; SUBCUTANEOUS at 17:07

## 2021-03-19 RX ADMIN — BUDESONIDE 0.5 MG: 0.5 INHALANT RESPIRATORY (INHALATION) at 20:03

## 2021-03-19 RX ADMIN — CLOPIDOGREL BISULFATE 75 MG: 75 TABLET ORAL at 09:33

## 2021-03-19 RX ADMIN — FOLIC ACID 1 MG: 1 TABLET ORAL at 09:33

## 2021-03-19 RX ADMIN — IPRATROPIUM BROMIDE AND ALBUTEROL SULFATE 3 ML: 2.5; .5 SOLUTION RESPIRATORY (INHALATION) at 15:49

## 2021-03-19 RX ADMIN — SODIUM CHLORIDE, PRESERVATIVE FREE 3 ML: 5 INJECTION INTRAVENOUS at 09:34

## 2021-03-19 RX ADMIN — INSULIN LISPRO 8 UNITS: 100 INJECTION, SOLUTION INTRAVENOUS; SUBCUTANEOUS at 07:51

## 2021-03-19 RX ADMIN — INSULIN LISPRO 7 UNITS: 100 INJECTION, SOLUTION INTRAVENOUS; SUBCUTANEOUS at 07:51

## 2021-03-19 RX ADMIN — ASPIRIN 81 MG: 81 TABLET, CHEWABLE ORAL at 09:33

## 2021-03-19 RX ADMIN — SODIUM CHLORIDE, PRESERVATIVE FREE 3 ML: 5 INJECTION INTRAVENOUS at 20:09

## 2021-03-19 RX ADMIN — DOCUSATE SODIUM 50MG AND SENNOSIDES 8.6MG 2 TABLET: 8.6; 5 TABLET, FILM COATED ORAL at 20:09

## 2021-03-19 RX ADMIN — INSULIN LISPRO 12 UNITS: 100 INJECTION, SOLUTION INTRAVENOUS; SUBCUTANEOUS at 17:07

## 2021-03-19 RX ADMIN — GABAPENTIN 300 MG: 300 CAPSULE ORAL at 20:09

## 2021-03-19 RX ADMIN — MORPHINE SULFATE 2 MG: 2 INJECTION, SOLUTION INTRAMUSCULAR; INTRAVENOUS at 19:47

## 2021-03-19 RX ADMIN — BUDESONIDE 0.5 MG: 0.5 INHALANT RESPIRATORY (INHALATION) at 12:00

## 2021-03-19 RX ADMIN — FOLIC ACID 1 MG: 1 TABLET ORAL at 20:09

## 2021-03-19 RX ADMIN — IPRATROPIUM BROMIDE AND ALBUTEROL SULFATE 3 ML: 2.5; .5 SOLUTION RESPIRATORY (INHALATION) at 03:30

## 2021-03-19 RX ADMIN — ALLOPURINOL 300 MG: 300 TABLET ORAL at 09:32

## 2021-03-19 NOTE — PROGRESS NOTES
Pt. Referred for Phase II Cardiac Rehab. Staff discussed benefits of exercise, program protocol, and educational material provided. Teach back verified.  Permission granted from patient for staff to fax referral information to outlying program at this time.  Staff faxed referral info to [facility].

## 2021-03-19 NOTE — PLAN OF CARE
Goal Outcome Evaluation:    Patient AOX4/4, VSS on 4-6Lpm of oxygen during the day, Bipap at night; no drips. Patient walked 200ft with x1 assistance. Patient CT output approx 20mL/hr of serosanguinous drainage; adequate UO. Pain is easy to manage. Patient appears to be doing well post CABG.

## 2021-03-19 NOTE — CONSULTS
"After obtaining permission, seen Mr. Villasenor for diabetes education.  We reviewed his current A1c of 8.7 and discussed its significance.  Specifically discussed sticky cells and the possibility of interfering with grafting post CABG.  Also, discussed risk of stroke and MI associated with elevated A1c, BP, and cholesterol.  Mr. Villasenor is currently prescribed Metformin and Glipizide at home.  He denies episodes of hypoglycemia and denies drinking sugary sodas.  Mr. Villasenor states his last A1c was \"9 something\".  He was encouraged to establish care with a PCP and have A1c redrawn in 3 months.  He has a working meter and is comfortable using.  We reviewed the link between heart disease and diabetes. He was encouraged to be as active as tolerated and allowed by MD.  He will participating in cardiac rehab at discharge.  He was encouraged to limit carbs to 45 grams per meal and to eat three meals per day.  He was encouraged to take medications as ordered.  We discussed yearly dilated eye exams, daily feet exams, and seeing PCP and dentist regularly.  He was given a handout on A1c and glucose goals per ADA. He was given a handout from [employer] on the link between diabetes and heart disease. He was given my card for future questions. Thank you.  "

## 2021-03-19 NOTE — SIGNIFICANT NOTE
Notified by nurse that patients FSBS remains > 400 despite 35 units Levemir daily, Lispro Moderate dose and 5 units Lispro scheduled TID. Will increase sliding scale to Moderate-Severe and increase scheduled Lispro to 7 units TID. Will follow.

## 2021-03-19 NOTE — PROGRESS NOTES
Clinical Nutrition     Multidisciplinary Rounds      Patient Name: William Villasenor  Date of Encounter: 03/19/21 09:51 EDT  MRN: [id number]  Admission date: 3/15/2021      Reason for visit: MDR. RD to continue to follow per protocol.     Additional information obtained during MDR: Insulin regimen adjusted overnight.     Current diet: Diet Regular; Consistent Carbohydrate, Cardiac  No active supplement orders      EMR reviewed     Intervention:  Follow treatment plan  Care plan reviewed    Follow up:   Per protocol      Raisa Keith RDN, JA  09:51 EDT  Time: 10min

## 2021-03-19 NOTE — PROGRESS NOTES
"                                 [facility] Progress Note      LOS: 4 days   Patient Care Team:  Jacqueline Gatica MD as PCP - General (Family Medicine)    Chief Complaint: Chest pain    Subjective     Interval History: Quiet night    Patient Complaints: Complains of chest soreness.  No angina      Review of Systems:   A 14 point review of systems was negative except as was stated in the HPI      Objective     Vital Sign Min/Max for last 24 hours  Temp  Min: 99 °F (37.2 °C)  Max: 99.6 °F (37.6 °C)   BP  Min: 103/72  Max: 148/91   Pulse  Min: 84  Max: 102   Resp  Min: 12  Max: 26   SpO2  Min: 88 %  Max: 100 %   Flow (L/min)  Min: 4  Max: 6   No data recorded     Flowsheet Rows      First Filed Value   Admission Height  175.3 cm (69\") Documented at 03/15/2021 1839   Admission Weight  91.4 kg (201 lb 8 oz) Documented at 03/15/2021 1839          Physical Exam:  General Appearance: Alert, appears stated age and cooperative  Lungs: Clear to auscultation  Heart:: RRR   No Murmurs, Rubs or Gallops  Abdomen: Soft and nontender with adequate bowel sounds.  No organomegaly  Extremities: No cyanosis, clubbing or edema  Pulses: Pulses palpable and equal bilaterally  Skin: Warm and dry with no rash  Psych: Normal     Results Review:     I reviewed the patient's new clinical results.  Results from last 7 days   Lab Units 03/19/21  0405 03/18/21 0351 03/17/21  0350   SODIUM mmol/L 133* 132* 142   POTASSIUM mmol/L 4.1 5.0 4.4   CHLORIDE mmol/L 97* 100 109*   CO2 mmol/L 25.0 21.0* 22.0   BUN mg/dL 33* 23 15   CREATININE mg/dL 1.33* 1.36* 1.14   GLUCOSE mg/dL 247* 230* 135*   CALCIUM mg/dL 8.9 8.6 8.6     Results from last 7 days   Lab Units 03/19/21  0405 03/18/21  0351 03/17/21  0350   WBC 10*3/mm3 12.11* 11.25* 11.75*   HEMOGLOBIN g/dL 10.4* 10.8* 10.9*   HEMATOCRIT % 32.7* 33.9* 33.4*   PLATELETS 10*3/mm3 217 196 227     Lab Results   Lab Value Date/Time    TROPONINT <0.010 03/14/2021 0144    TROPONINT <0.010 " 03/13/2021 2239    TROPONINT <0.010 03/05/2021 1026     Results from last 7 days   Lab Units 03/15/21  2037   CHOLESTEROL mg/dL 223*   TRIGLYCERIDES mg/dL 618*   HDL CHOL mg/dL 30*   LDL CHOL mg/dL 91     Results from last 7 days   Lab Units 03/17/21  0350 03/16/21  1922 03/15/21  2037   INR  1.20* 1.41* 0.93     Results from last 7 days   Lab Units 03/17/21  0137   PH, ARTERIAL pH units 7.362   PO2 ART mm Hg 62.9*   PCO2, ARTERIAL mm Hg 41.2   HCO3 ART mmol/L 23.4           Medication Review: yes  Current Facility-Administered Medications   Medication Dose Route Frequency Provider Last Rate Last Admin   • acetaminophen (TYLENOL) tablet 650 mg  650 mg Oral Q4H PRN Jyotsna Ricks PA-C       • allopurinol (ZYLOPRIM) tablet 300 mg  300 mg Oral Daily Jyotsna Ricks PA-C   300 mg at 03/18/21 0841   • aspirin chewable tablet 81 mg  81 mg Oral Daily Jo Pollard       • atorvastatin (LIPITOR) tablet 40 mg  40 mg Oral Nightly Jyotsna Ricks PA-C   40 mg at 03/18/21 2029   • calcium gluconate 1 g in sodium chloride 0.9 % 100 mL IVPB  1 g Intravenous Once PRN Jyotsna Ricks PA-C        And   • calcium gluconate 6 g in sodium chloride 0.9 % 500 mL IVPB  6 g Intravenous Once PRN Jyotsna Ricks PA-C       • calcium gluconate 2 g in sodium chloride 0.9 % 100 mL IVPB  2 g Intravenous Once PRN Jyotsna Ricks PA-C       • cetirizine (zyrTEC) tablet 10 mg  10 mg Oral Daily Jyotsna Ricks PA-C   10 mg at 03/18/21 0841   • clopidogrel (PLAVIX) tablet 75 mg  75 mg Oral Daily Vernell Galan PA-C   75 mg at 03/18/21 0841   • dextrose (D50W) 25 g/ 50mL Intravenous Solution 25 g  25 g Intravenous Q15 Min PRN Jyotsna Ricks PA-C       • dextrose (GLUTOSE) oral gel 15 g  15 g Oral Q15 Min PRN Jyotsna Ricks PA-C       • finasteride (PROSCAR) tablet 5 mg  5 mg Oral Daily Jyotsna Ricks PA-C   5 mg at 03/18/21 0840   • folic acid (FOLVITE) tablet 1 mg  1 mg Oral BID Jyotsna Ricks PA-C   1 mg at 03/18/21 2029   •  gabapentin (NEURONTIN) capsule 300 mg  300 mg Oral BID Lorenzo Castanon MD   300 mg at 03/18/21 2029   • glucagon (human recombinant) (GLUCAGEN DIAGNOSTIC) injection 1 mg  1 mg Subcutaneous Q15 Min PRN Jyotsna Ricks PA-C       • HYDROcodone-acetaminophen (NORCO) 7.5-325 MG per tablet 1 tablet  1 tablet Oral Q4H PRN Jorge Simms MD   1 tablet at 03/19/21 0751   • insulin detemir (LEVEMIR) injection 35 Units  35 Units Subcutaneous Daily Jo Pollard   35 Units at 03/18/21 1620   • insulin lispro (humaLOG) injection 0-14 Units  0-14 Units Subcutaneous 4x Daily AC & at Bedtime Soledad Chowdhury APRN   8 Units at 03/19/21 0751   • insulin lispro (humaLOG) injection 7 Units  7 Units Subcutaneous TID With Meals Soledad Chowdhury APRN   7 Units at 03/19/21 0751   • ipratropium-albuterol (DUO-NEB) nebulizer solution 3 mL  3 mL Nebulization Q4H PRN Jyotsna Ricks PA-C       • ipratropium-albuterol (DUO-NEB) nebulizer solution 3 mL  3 mL Nebulization Q4H - RT Lorenzo Castanon MD   3 mL at 03/19/21 0735   • lactobacillus acidophilus (RISAQUAD) capsule 1 capsule  1 capsule Oral Daily Jyotsna Ricks PA-C   1 capsule at 03/18/21 0841   • Magnesium Sulfate 2 gram Bolus, followed by 8 gram infusion (total Mg dose 10 grams)- Mg less than or equal to 1mg/dL  2 g Intravenous PRN Jorge Simms MD        Or   • Magnesium Sulfate 2 gram / 50mL Infusion (GIVE X 3 BAGS TO EQUAL 6GM TOTAL DOSE) - Mg 1.1 - 1.5 mg/dl  2 g Intravenous PRN Jorge Simms MD        Or   • Magnesium Sulfate 4 gram infusion- Mg 1.6-1.9 mg/dL  4 g Intravenous PRN Jorge Simms MD 25 mL/hr at 03/16/21 2101 4 g at 03/16/21 2101   • metoprolol tartrate (LOPRESSOR) tablet 25 mg  25 mg Oral Q12H Maxi Sprague PA       • morphine injection 2 mg  2 mg Intravenous Q2H PRN Jorge Simms MD   2 mg at 03/19/21 0340   • multivitamin (THERAGRAN) tablet 1 tablet  1 tablet Oral Daily Jyotsna Ricks PA-C   1 tablet  at 03/18/21 0841   • naloxone (NARCAN) injection 0.4 mg  0.4 mg Intravenous Q5 Min PRN Jorge Simms MD       • niCARdipine (CARDENE) 40 mg in 200 mL NS infusion  5-15 mg/hr Intravenous Continuous PRN Jyotsna Ricks PA-C       • nitroglycerin (NITROSTAT) SL tablet 0.4 mg  0.4 mg Sublingual Q5 Min PRN Jyotsna Ricks PA-C       • nitroglycerin (TRIDIL) 200 mcg/ml infusion  5-200 mcg/min Intravenous Continuous PRN Jyotsna Ricks PA-C   Stopped at 03/18/21 2030   • norepinephrine (LEVOPHED) 8 mg in 250 mL NS infusion (premix)  0.02-0.3 mcg/kg/min Intravenous Continuous PRN Jyotsna Ricks PA-C   Stopped at 03/17/21 0620   • ondansetron (ZOFRAN) injection 4 mg  4 mg Intravenous Q6H PRN Jyotsna Ricks PA-C   4 mg at 03/17/21 0122   • oxyCODONE-acetaminophen (PERCOCET) 5-325 MG per tablet 2 tablet  2 tablet Oral Q4H PRN Jorge Simms MD   2 tablet at 03/19/21 0340   • pantoprazole (PROTONIX) EC tablet 40 mg  40 mg Oral Daily Jyotsna Ricks PA-C   40 mg at 03/18/21 0841   • Pharmacy Consult - MTM   Does not apply Daily Jyotsna Ricks PA-C       • phenylephrine (TRACIE-SYNEPHRINE) 50 mg in 250 mL NS infusion  0.5-3 mcg/kg/min Intravenous Continuous PRN Jyotsna Ricks PA-C       • pneumococcal polysaccharide 23-valent (PNEUMOVAX-23) vaccine 0.5 mL  0.5 mL Intramuscular During Hospitalization Soledad Chowdhury APRN       • potassium chloride (MICRO-K) CR capsule 40 mEq  40 mEq Oral PRN Jyotsna Ricks PA-C        Or   • potassium chloride (KLOR-CON) packet 40 mEq  40 mEq Oral PRN Jyotsna Ricks PA-C       • potassium chloride 20 mEq in 50 mL IVPB  20 mEq Intravenous Q1H PRN Jyotsna Ricks PA-C        Or   • potassium chloride 20 mEq in 50 mL IVPB  20 mEq Intravenous Q1H PRN Jyotsna Ricks PA-C 50 mL/hr at 03/16/21 2210 20 mEq at 03/16/21 2210   • potassium phosphate 45 mmol in sodium chloride 0.9 % 250 mL infusion  45 mmol Intravenous PRN Jorge Simms MD        Or   • potassium phosphate 30  mmol in sodium chloride 0.9 % 100 mL infusion  30 mmol Intravenous PRN Jorge Simms MD        Or   • potassium phosphate 15 mmol in sodium chloride 0.9 % 100 mL infusion  15 mmol Intravenous PRN Jorge Simms MD        Or   • sodium phosphates 45 mmol in sodium chloride 0.9 % 250 mL IVPB  45 mmol Intravenous PRN Jorge iSmms MD        Or   • sodium phosphates 30 mmol in sodium chloride 0.9 % 100 mL IVPB  30 mmol Intravenous PRN Jorge Simms MD        Or   • sodium phosphates 15 mmol in sodium chloride 0.9 % 100 mL IVPB  15 mmol Intravenous PRN Jorge Simms MD       • sodium chloride 0.9 % flush 10 mL  10 mL Intravenous Q12H Jyotsna Ricks PA-C   10 mL at 03/18/21 2030   • sodium chloride 0.9 % flush 10 mL  10 mL Intravenous PRN Jyotsna Ricks PA-C       • sodium chloride 0.9 % flush 3 mL  3 mL Intravenous Q12H Jyotsna Ricks PA-C   3 mL at 03/18/21 2030   • sodium chloride 0.9 % infusion  30 mL/hr Intravenous Continuous PRN Jyotsna Ricks PA-C       • terazosin (HYTRIN) capsule 5 mg  5 mg Oral Nightly Jyotsna Ricks PA-C   5 mg at 03/18/21 2029   • vitamin E capsule 400 Units  400 Units Oral Daily Jyotsna Ricks PA-C   400 Units at 03/18/21 0848         Type 2 diabetes mellitus with hyperglycemia, without long-term current use of insulin (CMS/[facility])    Hypertension associated with diabetes (CMS/[facility])    Hyperlipidemia associated with type 2 diabetes mellitus (CMS/[facility])    Benign prostatic hyperplasia without lower urinary tract symptoms    GERD (gastroesophageal reflux disease)    Diabetic neuropathy (CMS/[facility])    Former smoker    History of gout    Coronary artery disease involving native coronary artery of native heart with angina pectoris (CMS/[facility])    CAD (coronary artery disease)        Impression      CABG 3/16/2021  Normal preop LV function  Hypertension  Diabetes mellitus  Gout      Plan     Continue DAPT, increase beta-blocker and high intensity statin  Monitor renal  function closely  Mobilize       Bennie Weeks MD   03/19/21  08:55 EDT

## 2021-03-19 NOTE — PLAN OF CARE
Problem: Adult Inpatient Plan of Care  Goal: Plan of Care Review  Recent Flowsheet Documentation  Taken 3/19/2021 1124 by Mariah Fulton, PT  Progress: improving  Plan of Care Reviewed With: patient  Outcome Summary: patient is able to ambulate 220 ft with CGA with rolling walker. patient requires 3 standing rest breaks for SOA he has audible wheezing with ambulation. patient requiring less assist for activity but still limited by fatigue and SOA. continue to progress as tolerated   Goal Outcome Evaluation:  Plan of Care Reviewed With: patient  Progress: improving  Outcome Summary: patient is able to ambulate 220 ft with CGA with rolling walker. patient requires 3 standing rest breaks for SOA he has audible wheezing with ambulation. patient requiring less assist for activity but still limited by fatigue and SOA. continue to progress as tolerated

## 2021-03-19 NOTE — PROGRESS NOTES
INTENSIVIST / PULMONARY FOLLOW UP NOTE     Hospital:  LOS: 4 days   Mr. William Villasenor, 70 y.o. male is followed for:     Type 2 diabetes mellitus with hyperglycemia, without long-term current use of insulin (CMS/HCC)    Hypertension associated with diabetes (CMS/HCC)    Hyperlipidemia associated with type 2 diabetes mellitus (CMS/HCC)    Benign prostatic hyperplasia without lower urinary tract symptoms    GERD (gastroesophageal reflux disease)    Diabetic neuropathy (CMS/HCC)    Former smoker    History of gout    Coronary artery disease involving native coronary artery of native heart with angina pectoris (CMS/HCC)    CAD (coronary artery disease)          SUBJECTIVE   Having some wheezing    The patient's relevant past medical, surgical, family, and social history were reviewed    Allergies and medications were reviewed    ROS:  Per subjective, all other systems were reviewed and were negative        OBJECTIVE     Vital Sign Min/Max for last 24 hours:  Temp  Min: 99 °F (37.2 °C)  Max: 99.6 °F (37.6 °C)   BP  Min: 103/72  Max: 148/91   Pulse  Min: 84  Max: 102   Resp  Min: 12  Max: 26   SpO2  Min: 88 %  Max: 100 %   No data recorded     Physical Exam:  General Appearance:  Conversant, in no acute distress  Eyes:  No scleral icterus or pallor, pupils normal  Ears, Nose, Mouth, Throat:  Atraumatic, oropharynx clear  Neck:  Trachea midline, thyroid normal  Respiratory:  wheezing to auscultation bilaterally, normal effort, no tenderness to palpation  Cardiovascular:  Regular rate and rhythm, no murmurs, no peripheral edema, no thrill  Gastrointestinal:  Soft, nontender, nondistended, no hepatosplenomegaly  Skin:  Normal temperature, no rash  Psychiatric:  Alert and oriented x 3, normal judgement and insight  Neuro:  No new focal neurologic deficits observed    Telemetry:                SpO2: 96 % SpO2  Min: 88 %  Max: 100 %   Device:      Flow Rate:   No data recorded     Mechanical Ventilator Settings:                                 Intake/Ouptut 24 hrs (7:00AM - 6:59 AM)  Intake & Output (last 3 days)       03/16 0701 - 03/17 0700 03/17 0701 - 03/18 0700 03/18 0701 - 03/19 0700 03/19 0701 - 03/20 0700    P.O.  2010 1560 240    I.V. (mL/kg) 2001 (21.9) 539.4 (5.9) 517 (5.7)     Blood 563       IV Piggyback 300 100      Total Intake(mL/kg) 2864 (31.3) 2649.4 (29) 2077 (22.7) 240 (2.6)    Urine (mL/kg/hr) 2290 (1) 1350 (0.6) 1875 (0.9) 325 (0.8)    Chest Tube 655 590 300     Total Output 2945 1940 2175 325    Net -81 +709.4 -98 -85                  Lines, Drains & Airways    Active LDAs     Name:   Placement date:   Placement time:   Site:   Days:    Peripheral IV 03/14/21 0045 Left;Upper Arm   03/14/21    0045    Arm   3    Urethral Catheter Double-lumen 16 Fr.   03/16/21    1445     less than 1    Y Chest Tube 1 and 2 1 Right Mediastinal 32 Fr. 2 Right Mediastinal 32 Fr.   03/16/21    1838     less than 1    Y Chest Tube 3 and 4 3 Left Mediastinal 32 Fr. 4 Left Mediastinal 32 Fr.   03/16/21    1845     less than 1    Arterial Line 03/16/21 Left Radial   03/16/21    1421    Radial   less than 1    Introducer- Double Lumen 03/16/21 Internal jugular Right   03/16/21    -- placed in OR    Internal jugular   1    Pacer Wires   03/16/21    -- placed in OR    Ventricular   1                Hematology:  Results from last 7 days   Lab Units 03/19/21  0405 03/18/21  0351 03/17/21  0350 03/16/21  2306 03/16/21  1922 03/16/21  1810 03/16/21  1734 03/16/21  0719 03/15/21  2037 03/15/21  2037   WBC 10*3/mm3 12.11* 11.25* 11.75* 11.14* 9.53  --   --  6.78  --  7.34   HEMOGLOBIN g/dL 10.4* 10.8* 10.9* 10.4* 12.2*  --   --  13.3  --  14.1   HEMOGLOBIN, POC g/dL  --   --   --   --   --  7.5* 10.2*  --    < >  --    HEMATOCRIT % 32.7* 33.9* 33.4* 31.3* 36.3*  --   --  40.1  --  43.2   HEMATOCRIT POC %  --   --   --   --   --  22* 30*  --    < >  --    PLATELETS 10*3/mm3 217 196 227 201 201  --   --  263  --  307    < > = values in this interval not  displayed.     Electrolytes, Magnesium and Phosphorus:  Results from last 7 days   Lab Units 03/19/21  0405 03/18/21  0351 03/17/21  0350 03/16/21 2306 03/16/21 1922 03/16/21  0719 03/15/21  2037 03/15/21  0043 03/14/21  0144 03/13/21 2239   SODIUM mmol/L 133* 132* 142 143 143 134* 135* 133*  --  140   CHLORIDE mmol/L 97* 100 109* 109* 110* 104 101 99  --  103   POTASSIUM mmol/L 4.1 5.0 4.4 4.4 3.6 4.0 4.5 4.1  --  4.1   CO2 mmol/L 25.0 21.0* 22.0 25.0 23.0 20.0* 25.0 20.0*  --  20.4*   MAGNESIUM mg/dL 2.4  --  2.4  --  1.8  --   --   --  1.9 1.8   PHOSPHORUS mg/dL 3.5  --  2.6 2.7 2.3*  --   --  3.3  --   --      Renal:  Results from last 7 days   Lab Units 03/19/21  0405 03/18/21  0351 03/17/21  0350 03/16/21 2306 03/16/21 1922 03/16/21 0719 03/15/21  2037   CREATININE mg/dL 1.33* 1.36* 1.14 1.05 0.96 0.97 1.35*   BUN mg/dL 33* 23 15 15 15 18 20     Estimated Creatinine Clearance: 56.9 mL/min (A) (by C-G formula based on SCr of 1.33 mg/dL (H)).  Hepatic:  Results from last 7 days   Lab Units 03/19/21  0405 03/15/21  2037 03/15/21  0043 03/13/21 2239   ALK PHOS U/L 83 69 66 74   BILIRUBIN mg/dL 0.5 0.3 0.2 0.2   ALT (SGPT) U/L 9 12 11 8   AST (SGOT) U/L 9 15 17 16     Arterial Blood Gases:  Results from last 7 days   Lab Units 03/17/21  0137 03/16/21  1948 03/16/21  1810 03/16/21  1734 03/16/21  1703 03/16/21  1656 03/16/21  1638 03/16/21  0801   PH, ARTERIAL pH units 7.362 7.498* 7.38 7.38 7.38 7.33* 7.36 7.398   PCO2, ARTERIAL mm Hg 41.2 30.1*  --   --   --   --   --  43.2   PO2 ART mm Hg 62.9* 216.0*  --   --   --   --   --  83.1   FIO2 % 40 100  --   --   --   --   --  32       Results from last 7 days   Lab Units 03/15/21  2037   HEMOGLOBIN A1C % 8.70*       No results found for: LACTATE    Relevant imaging studies and labs from 03/19/21 were reviewed and interpreted by me    Medications (drips):  niCARdipine  nitroglycerin, Last Rate: Stopped (03/18/21 2030)  norepinephrine, Last Rate: Stopped  (03/17/21 0620)  phenylephrine  sodium chloride        allopurinol, 300 mg, Oral, Daily  aspirin, 81 mg, Oral, Daily  atorvastatin, 40 mg, Oral, Nightly  budesonide, 0.5 mg, Nebulization, BID - RT  cetirizine, 10 mg, Oral, Daily  clopidogrel, 75 mg, Oral, Daily  finasteride, 5 mg, Oral, Daily  folic acid, 1 mg, Oral, BID  gabapentin, 300 mg, Oral, BID  insulin detemir, 35 Units, Subcutaneous, Daily  insulin lispro, 0-14 Units, Subcutaneous, 4x Daily AC & at Bedtime  insulin lispro, 7 Units, Subcutaneous, TID With Meals  ipratropium-albuterol, 3 mL, Nebulization, Q4H - RT  lactobacillus acidophilus, 1 capsule, Oral, Daily  metoprolol tartrate, 25 mg, Oral, Q12H  multivitamin, 1 tablet, Oral, Daily  pantoprazole, 40 mg, Oral, Daily  pharmacy consult - MTM, , Does not apply, Daily  sodium chloride, 10 mL, Intravenous, Q12H  sodium chloride, 3 mL, Intravenous, Q12H  terazosin, 5 mg, Oral, Nightly  vitamin E, 400 Units, Oral, Daily        Assessment/Plan   IMPRESSION / PLAN     Inpatient Problem List:  70 y.o.male:  Active Hospital Problems    Diagnosis    • **Type 2 diabetes mellitus with hyperglycemia, without long-term current use of insulin (CMS/[facility])    • Coronary artery disease involving native coronary artery of native heart with angina pectoris (CMS/[facility])      Added automatically from request for surgery [id number]     • CAD (coronary artery disease)    • Diabetic neuropathy (CMS/[facility])    • GERD (gastroesophageal reflux disease)    • History of gout    • Former smoker    • Benign prostatic hyperplasia without lower urinary tract symptoms    • Hypertension associated with diabetes (CMS/[facility])    • Hyperlipidemia associated with type 2 diabetes mellitus (CMS/[facility])         Impression:  70 y.o.male with relevant PMH of DMT2 with diabetic neuropathy (HgbA1C 8.7 3/15), HTN, HLD, Gout, CAD, cath on 3/15 w/ severe MVCAD admitted 3/15/2021 and underwent 4vCABG on 3/16 by Dr. Simms.    Plan:  Post op care - per cts    DM A1c 8.7  - titrate SQ insulin    Pain - titrate narcotics.  Resumed home neurontin.  Stool softeners.    Hypoxemia - pulmonary toilet, scheduled nebs / pulmicort, IS    Gout - home meds    GERD - home meds    Resume / continue appropriate home meds    DVT / PUD prophylaxis    Nutrition - Diet Regular; Consistent Carbohydrate, Cardiac    Plan of care and goals reviewed with multidisciplinary team at daily rounds         Lorenzo Castanon MD  Intensive Care Medicine  03/19/21 11:17 EDT

## 2021-03-19 NOTE — THERAPY TREATMENT NOTE
Patient Name: William Villasenor  : 1950    MRN: [id number]                              Today's Date: 3/19/2021       Admit Date: 3/15/2021    Visit Dx: No diagnosis found.  Patient Active Problem List   Diagnosis   • Idiopathic chronic gout of multiple sites without tophus   • Type 2 diabetes mellitus with hyperglycemia, without long-term current use of insulin (CMS/HCC)   • Hypertension associated with diabetes (CMS/HCC)   • Hyperlipidemia associated with type 2 diabetes mellitus (CMS/HCC)   • Benign prostatic hyperplasia without lower urinary tract symptoms   • Chest pain   • Obesity (BMI 30-39.9)   • GERD (gastroesophageal reflux disease)   • Diabetic neuropathy (CMS/HCC)   • Former smoker   • History of gout   • Coronary artery disease involving native coronary artery of native heart with angina pectoris (CMS/HCC)   • CAD (coronary artery disease)     Past Medical History:   Diagnosis Date   • Arthritis    • Back pain    • BPH (benign prostatic hyperplasia)    • Diabetes mellitus (CMS/HCC)    • Diabetic peripheral neuropathy (CMS/HCC)    • Erectile dysfunction    • Former smoker    • GERD (gastroesophageal reflux disease)    • GERD (gastroesophageal reflux disease)    • Gout    • High cholesterol    • Hypertension    • Obesity      Past Surgical History:   Procedure Laterality Date   • CARDIAC CATHETERIZATION N/A 3/15/2021    Procedure: Left Heart Cath;  Surgeon: Trent Zaragoza MD;  Location:  [facility];  Service: Cardiology;  Laterality: N/A;   • CARDIAC CATHETERIZATION N/A 3/15/2021    Procedure: Coronary angiography;  Surgeon: Trent Zaragoza MD;  Location:  [facility];  Service: Cardiology;  Laterality: N/A;   • COLONOSCOPY W/ BIOPSIES     • CORONARY ARTERY BYPASS GRAFT N/A 3/16/2021    Procedure: MEDIAN STERNOTOMY, CORONARY ARTERY BYPASS GRAFT X4, UTILIZATION OF LEFT INTERNAL MAMMARY ARTERY, AND ENDOSCOPIC VEIN HARVEST OF RIGHT GREATER SAPHENOUS VEIN;  Surgeon:  Jorge Simms MD;  Location: [facility];  Service: Cardiothoracic;  Laterality: N/A;  vein out- 1614  vein ready- 1628             General Information     Row Name 03/19/21 1120          Physical Therapy Time and Intention    Document Type  therapy note (daily note)  -MCKENZIE     Mode of Treatment  physical therapy  -MCKENZIE     Row Name 03/19/21 1120          General Information    Patient Profile Reviewed  yes  -MCKENZIE     Prior Level of Function  independent:;gait;transfer;bed mobility;ADL's  -MCKENZIE     Existing Precautions/Restrictions  cardiac;fall;sternal  -MCKENZIE     Barriers to Rehab  none identified  -MCKENZIE     Row Name 03/19/21 1120          Living Environment    Lives With  spouse  -MCKENZIE     Row Name 03/19/21 1120          Home Main Entrance    Number of Stairs, Main Entrance  two  -MCKENZIE     Row Name 03/19/21 1120          Cognition    Orientation Status (Cognition)  oriented x 4  -MCKENZIE     Row Name 03/19/21 1120          Safety Issues, Functional Mobility    Safety Issues Affecting Function (Mobility)  safety precautions follow-through/compliance;safety precaution awareness  -MCKENZIE     Impairments Affecting Function (Mobility)  balance;endurance/activity tolerance;shortness of breath;strength  -MCKENZIE       User Key  (r) = Recorded By, (t) = Taken By, (c) = Cosigned By    Initials Name Provider Type    MCKENZIE Mariah Fulton PT Physical Therapist        Mobility     Row Name 03/19/21 1121          Bed Mobility    Comment (Bed Mobility)  patient is OOB and returns to the chair  -MCKENZIE     Row Name 03/19/21 1121          Sit-Stand Transfer    Sit-Stand [place] (Transfers)  minimum assist (75% patient effort)  -MCKENZIE     Row Name 03/19/21 1121          Gait/Stairs (Locomotion)    [place] Level (Gait)  contact guard  -MCKENZIE     Assistive Device (Gait)  walker, front-wheeled  -MCKENZIE     Distance in Feet (Gait)  220  -MCKENZIE     Deviations/Abnormal Patterns (Gait)  stride length decreased  -MCKENZIE     Bilateral Gait Deviations  forward flexed posture   -MCKENZIE     Comment (Gait/Stairs)  patient required 3 standing rest breaks due to SOA he has audible wheeze during ambulation today.  -MCKENZIE       User Key  (r) = Recorded By, (t) = Taken By, (c) = Cosigned By    Initials Name Provider Type    Mariah Hernandez PT Physical Therapist        Obj/Interventions     Row Name 03/19/21 1123          Motor Skills    Therapeutic Exercise  hip;knee;ankle  -     Row Name 03/19/21 1123          Hip (Therapeutic Exercise)    Hip (Therapeutic Exercise)  AROM (active range of motion)  -     Hip AROM (Therapeutic Exercise)  bilateral;flexion;extension;aBduction;aDduction;sitting;10 repetitions  -     Row Name 03/19/21 1123          Knee (Therapeutic Exercise)    Knee (Therapeutic Exercise)  AROM (active range of motion)  -     Knee AROM (Therapeutic Exercise)  bilateral;flexion;extension;sitting;10 repetitions  -     Row Name 03/19/21 1123          Ankle (Therapeutic Exercise)    Ankle (Therapeutic Exercise)  AROM (active range of motion)  -     Ankle AROM (Therapeutic Exercise)  bilateral;dorsiflexion;plantarflexion;10 repetitions;sitting  -     Row Name 03/19/21 1123          Balance    Balance Assessment  sitting static balance;sitting dynamic balance;standing dynamic balance;standing static balance  -MCKENZIE     Static Sitting Balance  WFL  -MCKENZIE     Dynamic Sitting Balance  WFL  -MCKENZIE     Static Standing Balance  mild impairment  -MCKENZIE     Dynamic Standing Balance  mild impairment  -MCKENZIE     Balance Interventions  standing;dynamic;weight shifting activity  -       User Key  (r) = Recorded By, (t) = Taken By, (c) = Cosigned By    Initials Name Provider Type    Mariah Hernandez PT Physical Therapist        Goals/Plan    No documentation.       Clinical Impression     Row Name 03/19/21 1124          Pain Scale: Numbers Pre/Post-Treatment    Pretreatment Pain Rating  2/10  -MCKENZIE     Posttreatment Pain Rating  3/10  -MCKENZIE     Pain Location - Orientation  incisional  -MCKENZIE     Pain  Location  chest  -MCKENZIE     Pain Intervention(s)  Splinting;Repositioned;Ambulation/increased activity  -MCKENZIE     Row Name 03/19/21 1124          Plan of Care Review    Plan of Care Reviewed With  patient  -MCKENZIE     Progress  improving  -MCKENZIE     Outcome Summary  patient is able to ambulate 220 ft with CGA with rolling walker. patient requires 3 standing rest breaks for SOA he has audible wheezing with ambulation. patient requiring less assist for activity but still limited by fatigue and SOA. continue to progress as tolerated  -MCKENZIE     Row Name 03/19/21 1124          Therapy Assessment/Plan (PT)    Patient/Family Therapy Goals Statement (PT)  return to PLOF  -MCKENZIE     Rehab Potential (PT)  good, to achieve stated therapy goals  -MCKENZIE     Criteria for Skilled Interventions Met (PT)  yes;skilled treatment is necessary  -MCKENZIE     Row Name 03/19/21 1124          Vital Signs    Pre Systolic BP Rehab  140  -MCKENZIE     Pre Treatment Diastolic BP  80  -MCKENZIE     Post Systolic BP Rehab  157  -MCKENZIE     Post Treatment Diastolic BP  90  -MCKENZIE     Pretreatment Heart Rate (beats/min)  98  -MCKENZIE     Intratreatment Heart Rate (beats/min)  114  -MCKENZIE     Posttreatment Heart Rate (beats/min)  100  -MCKENZIE     Pre SpO2 (%)  96  -MCKENZIE     O2 Delivery Pre Treatment  nasal cannula  -MCKENZIE     Post SpO2 (%)  98  -MCKENZIE     O2 Delivery Post Treatment  nasal cannula  -MCKENZIE     Pre Patient Position  Sitting  -MCKENZIE     Intra Patient Position  Standing  -MCKENZIE     Post Patient Position  Sitting  -MCKENZIE     Row Name 03/19/21 1124          Positioning and Restraints    Pre-Treatment Position  sitting in chair/recliner  -MCKENZIE     Post Treatment Position  chair  -MCKENZIE     In Chair  notified nsg;reclined;sitting;call light within reach;encouraged to call for assist  -MCKENZIE       User Key  (r) = Recorded By, (t) = Taken By, (c) = Cosigned By    Initials Name Provider Type    Mariah Hernandez, PT Physical Therapist        Outcome Measures     Row Name 03/19/21 1127          How much help from another  person do you currently need...    Turning from your back to your side while in flat bed without using bedrails?  3  -MCKENZIE     Moving from lying on back to sitting on the side of a flat bed without bedrails?  2  -MCKNEZIE     Moving to and from a bed to a chair (including a wheelchair)?  3  -MCKENZIE     Standing up from a chair using your arms (e.g., wheelchair, bedside chair)?  3  -MCKENZIE     Climbing 3-5 steps with a railing?  2  -MCKENZIE     To walk in hospital room?  3  -MCKENZIE     AM-PAC 6 Clicks Score (PT)  16  -MCKENZIE     Row Name 03/19/21 1127          Functional Assessment    Outcome Measure Options  AM-PAC 6 Clicks Basic Mobility (PT)  -MCKENZIE       User Key  (r) = Recorded By, (t) = Taken By, (c) = Cosigned By    Initials Name Provider Type    Mariah Hernandez PT Physical Therapist        Physical Therapy Education                 Title: PT OT SLP Therapies (In Progress)     Topic: Physical Therapy (In Progress)     Point: Mobility training (In Progress)     Learning Progress Summary           Patient Acceptance, E, NR by MCKENZIE at 3/19/2021 0920    Acceptance, E, NR by KG at 3/18/2021 1024    Acceptance, E, NR by KG at 3/17/2021 1006                   Point: Home exercise program (In Progress)     Learning Progress Summary           Patient Acceptance, E, NR by MCKENZIE at 3/19/2021 0920    Acceptance, E, NR by KG at 3/18/2021 1024    Acceptance, E, NR by KG at 3/17/2021 1006                   Point: Body mechanics (In Progress)     Learning Progress Summary           Patient Acceptance, E, NR by MCKENZIE at 3/19/2021 0920    Acceptance, E, NR by KG at 3/18/2021 1024    Acceptance, E, NR by KG at 3/17/2021 1006                   Point: Precautions (In Progress)     Learning Progress Summary           Patient Acceptance, E, NR by MCKENZIE at 3/19/2021 0920    Acceptance, E, NR by KG at 3/18/2021 1024    Acceptance, E, NR by KG at 3/17/2021 1006                               User Key     Initials Effective Dates Name Provider Type Discipline    MCKENZIE  06/19/15 -  Mariah Fulton PT Physical Therapist PT    KG 05/22/20 -  Adwoa Otto PT Physical Therapist PT              PT Recommendation and Plan  Planned Therapy Interventions (PT): balance training, bed mobility training, gait training, home exercise program, transfer training, strengthening  Plan of Care Reviewed With: patient  Progress: improving  Outcome Summary: patient is able to ambulate 220 ft with CGA with rolling walker. patient requires 3 standing rest breaks for SOA he has audible wheezing with ambulation. patient requiring less assist for activity but still limited by fatigue and SOA. continue to progress as tolerated     Time Calculation:   PT Charges     Row Name 03/19/21 1128             Time Calculation    Start Time  0920  -MCKENZIE      PT Received On  03/19/21  -MCKENZIE      PT Goal Re-Cert Due Date  03/27/21  -MCKENZIE         Time Calculation- PT    Total Timed Code Minutes- PT  24 minute(s)  -MCKENZIE         Timed Charges    19985 - PT Therapeutic Activity Minutes  24  -MCKENZIE        User Key  (r) = Recorded By, (t) = Taken By, (c) = Cosigned By    Initials Name Provider Type    Mariah Hernandez, PT Physical Therapist        Therapy Charges for Today     Code Description Service Date Service Provider Modifiers Qty    28769286888 HC PT THERAPEUTIC ACT EA 15 MIN 3/19/2021 Mariah Fulton PT GP 2          PT G-Codes  Outcome Measure Options: AM-PAC 6 Clicks Basic Mobility (PT)  AM-PAC 6 Clicks Score (PT): 16    Mariah Fulton, PT  3/19/2021

## 2021-03-19 NOTE — PROGRESS NOTES
CTS Progress Note       LOS: 4 days   Patient Care Team:  Jacqueline Gatica MD as PCP - General (Family Medicine)    Chief Complaint: Type 2 diabetes mellitus with hyperglycemia, without long-term current use of insulin (CMS/[facility])    Vital Signs:  Temp:  [99 °F (37.2 °C)-99.6 °F (37.6 °C)] 99 °F (37.2 °C)  Heart Rate:  [] 91  Resp:  [12-26] 20  BP: ()/(59-94) 127/75    Physical Exam: Sitting up in chair and mouth breathing unlabored.  Sternum stable       Results:   Results from last 7 days   Lab Units 03/19/21  0405   WBC 10*3/mm3 12.11*   HEMOGLOBIN g/dL 10.4*   HEMATOCRIT % 32.7*   PLATELETS 10*3/mm3 217     Results from last 7 days   Lab Units 03/19/21  0405   SODIUM mmol/L 133*   POTASSIUM mmol/L 4.1   CHLORIDE mmol/L 97*   CO2 mmol/L 25.0   BUN mg/dL 33*   CREATININE mg/dL 1.33*   GLUCOSE mg/dL 247*   CALCIUM mg/dL 8.9           Imaging Results (Last 24 Hours)     Procedure Component Value Units Date/Time    XR Chest 1 View [[id number]] Resulted: 03/19/21 0346     Updated: 03/19/21 0349    XR Chest 1 View [[id number]] Collected: 03/18/21 0821     Updated: 03/18/21 2213    Narrative:      EXAMINATION: XR CHEST 1 VW-     INDICATION: Postop heart surgery.      COMPARISON: 03/17/2021.     FINDINGS: Right IJ sheath is see in the SVC. Heart is enlarged.  Vasculature appears normal. Trace right effusion and mild left basilar  discoid atelectasis appear stable. No pneumothorax is seen.       Impression:      No new chest disease.     D:  03/18/2021  E:  03/18/2021     This report was finalized on 3/18/2021 10:10 PM by Dr. Walt Casas MD.       XR Chest 1 View [[id number]] Collected: 03/17/21 0809     Updated: 03/18/21 1438    Narrative:      EXAMINATION: XR CHEST 1 VW- 03/17/2021     INDICATION: Post-Op Heart Surgery      COMPARISON: Chest x-ray 03/16/2021     FINDINGS: Interval extubation with support hardware otherwise unchanged.  Cardiac size remains enlarged. No pneumothorax or large pleural  effusion  similar to prior with bibasilar atelectasis.       Impression:      Status post extubation with overall preservation of lung  volumes and support hardware otherwise noted.     D:  03/17/2021  E:  03/17/2021     This report was finalized on 3/18/2021 2:34 PM by Dr. Ronal Garvey.             Assessment      Type 2 diabetes mellitus with hyperglycemia, without long-term current use of insulin (CMS/[facility])    Hypertension associated with diabetes (CMS/HCC)    Hyperlipidemia associated with type 2 diabetes mellitus (CMS/[facility])    Benign prostatic hyperplasia without lower urinary tract symptoms    GERD (gastroesophageal reflux disease)    Diabetic neuropathy (CMS/HCC)    Former smoker    History of gout    Coronary artery disease involving native coronary artery of native heart with angina pectoris (CMS/HCC)    CAD (coronary artery disease)        Plan   Discontinue chest tubes and pacer wires    Please note that portions of this note were completed with a voice recognition program. Efforts were made to edit the dictations, but occasionally words are mistranscribed.    Jorge Simms MD  03/19/21  06:28 EDT

## 2021-03-19 NOTE — PROGRESS NOTES
Continued Stay Note  [facility]     Patient Name: William Villasenor  MRN: [id number]  Today's Date: 3/19/2021    Admit Date: 3/15/2021    Discharge Plan     Row Name 03/19/21 1527       Plan    Plan  Home    Patient/Family in Agreement with Plan  yes    Plan Comments  Met with patient in the room.  Now on O2 at 4L/NC.  Diabetes educator following.  Ambulated 220 ft with therapy.  Plan is home.  Following for discharge needs.    Final Discharge Disposition Code  01 - home or self-care        Discharge Codes    No documentation.       Expected Discharge Date and Time     Expected Discharge Date Expected Discharge Time    Mar 22, 2021             Anita Lara RN

## 2021-03-19 NOTE — PLAN OF CARE
Goal Outcome Evaluation:  Plan of Care Reviewed With: patient, spouse, family  Progress: improving  Outcome Summary: Pt up in chair all shift. Ambulated twice 220 ft today. Weaned oxygen from 6l/nc to 3l/nc. Using IS as directed w/ cueing. Beta blocker increased today. VSS. Afebrile. Glucose management is getting better. Levemir now BID w/ sliding scale and prandial.

## 2021-03-20 ENCOUNTER — APPOINTMENT (OUTPATIENT)
Dept: GENERAL RADIOLOGY | Facility: HOSPITAL | Age: 71
End: 2021-03-20

## 2021-03-20 LAB
ALBUMIN SERPL-MCNC: 3.6 G/DL (ref 3.5–5.2)
ALBUMIN/GLOB SERPL: 1.4 G/DL
ALP SERPL-CCNC: 67 U/L (ref 39–117)
ALT SERPL W P-5'-P-CCNC: 12 U/L (ref 1–41)
ANION GAP SERPL CALCULATED.3IONS-SCNC: 8 MMOL/L (ref 5–15)
AST SERPL-CCNC: 12 U/L (ref 1–40)
BASOPHILS # BLD AUTO: 0.01 10*3/MM3 (ref 0–0.2)
BASOPHILS NFR BLD AUTO: 0.1 % (ref 0–1.5)
BILIRUB SERPL-MCNC: 0.4 MG/DL (ref 0–1.2)
BUN SERPL-MCNC: 31 MG/DL (ref 8–23)
BUN/CREAT SERPL: 23.3 (ref 7–25)
CALCIUM SPEC-SCNC: 8.3 MG/DL (ref 8.6–10.5)
CHLORIDE SERPL-SCNC: 98 MMOL/L (ref 98–107)
CO2 SERPL-SCNC: 27 MMOL/L (ref 22–29)
CREAT SERPL-MCNC: 1.33 MG/DL (ref 0.76–1.27)
DEPRECATED RDW RBC AUTO: 43.2 FL (ref 37–54)
EOSINOPHIL # BLD AUTO: 0.21 10*3/MM3 (ref 0–0.4)
EOSINOPHIL NFR BLD AUTO: 2.1 % (ref 0.3–6.2)
ERYTHROCYTE [DISTWIDTH] IN BLOOD BY AUTOMATED COUNT: 13 % (ref 12.3–15.4)
GFR SERPL CREATININE-BSD FRML MDRD: 53 ML/MIN/1.73
GLOBULIN UR ELPH-MCNC: 2.6 GM/DL
GLUCOSE BLDC GLUCOMTR-MCNC: 235 MG/DL (ref 70–130)
GLUCOSE BLDC GLUCOMTR-MCNC: 246 MG/DL (ref 70–130)
GLUCOSE BLDC GLUCOMTR-MCNC: 303 MG/DL (ref 70–130)
GLUCOSE BLDC GLUCOMTR-MCNC: 337 MG/DL (ref 70–130)
GLUCOSE BLDC GLUCOMTR-MCNC: 353 MG/DL (ref 70–130)
GLUCOSE SERPL-MCNC: 185 MG/DL (ref 65–99)
HCT VFR BLD AUTO: 30 % (ref 37.5–51)
HGB BLD-MCNC: 9.5 G/DL (ref 13–17.7)
IMM GRANULOCYTES # BLD AUTO: 0.07 10*3/MM3 (ref 0–0.05)
IMM GRANULOCYTES NFR BLD AUTO: 0.7 % (ref 0–0.5)
LYMPHOCYTES # BLD AUTO: 1.99 10*3/MM3 (ref 0.7–3.1)
LYMPHOCYTES NFR BLD AUTO: 20.2 % (ref 19.6–45.3)
MAGNESIUM SERPL-MCNC: 2.2 MG/DL (ref 1.6–2.4)
MCH RBC QN AUTO: 29.1 PG (ref 26.6–33)
MCHC RBC AUTO-ENTMCNC: 31.7 G/DL (ref 31.5–35.7)
MCV RBC AUTO: 92 FL (ref 79–97)
MONOCYTES # BLD AUTO: 0.9 10*3/MM3 (ref 0.1–0.9)
MONOCYTES NFR BLD AUTO: 9.2 % (ref 5–12)
NEUTROPHILS NFR BLD AUTO: 6.65 10*3/MM3 (ref 1.7–7)
NEUTROPHILS NFR BLD AUTO: 67.7 % (ref 42.7–76)
NRBC BLD AUTO-RTO: 0 /100 WBC (ref 0–0.2)
PHOSPHATE SERPL-MCNC: 2.9 MG/DL (ref 2.5–4.5)
PLATELET # BLD AUTO: 215 10*3/MM3 (ref 140–450)
PMV BLD AUTO: 9.1 FL (ref 6–12)
POTASSIUM SERPL-SCNC: 4.9 MMOL/L (ref 3.5–5.2)
PROT SERPL-MCNC: 6.2 G/DL (ref 6–8.5)
RBC # BLD AUTO: 3.26 10*6/MM3 (ref 4.14–5.8)
SODIUM SERPL-SCNC: 133 MMOL/L (ref 136–145)
WBC # BLD AUTO: 9.83 10*3/MM3 (ref 3.4–10.8)

## 2021-03-20 PROCEDURE — 97530 THERAPEUTIC ACTIVITIES: CPT

## 2021-03-20 PROCEDURE — 63710000001 INSULIN LISPRO (HUMAN) PER 5 UNITS: Performed by: NURSE PRACTITIONER

## 2021-03-20 PROCEDURE — 84100 ASSAY OF PHOSPHORUS: CPT | Performed by: INTERNAL MEDICINE

## 2021-03-20 PROCEDURE — 63710000001 INSULIN LISPRO (HUMAN) PER 5 UNITS: Performed by: INTERNAL MEDICINE

## 2021-03-20 PROCEDURE — 71045 X-RAY EXAM CHEST 1 VIEW: CPT

## 2021-03-20 PROCEDURE — 94799 UNLISTED PULMONARY SVC/PX: CPT

## 2021-03-20 PROCEDURE — 63710000001 INSULIN DETEMIR PER 5 UNITS

## 2021-03-20 PROCEDURE — 99232 SBSQ HOSP IP/OBS MODERATE 35: CPT | Performed by: INTERNAL MEDICINE

## 2021-03-20 PROCEDURE — 85025 COMPLETE CBC W/AUTO DIFF WBC: CPT | Performed by: PHYSICIAN ASSISTANT

## 2021-03-20 PROCEDURE — 80053 COMPREHEN METABOLIC PANEL: CPT | Performed by: INTERNAL MEDICINE

## 2021-03-20 PROCEDURE — 82962 GLUCOSE BLOOD TEST: CPT

## 2021-03-20 PROCEDURE — 83735 ASSAY OF MAGNESIUM: CPT | Performed by: INTERNAL MEDICINE

## 2021-03-20 PROCEDURE — 94660 CPAP INITIATION&MGMT: CPT

## 2021-03-20 RX ORDER — IPRATROPIUM BROMIDE AND ALBUTEROL SULFATE 2.5; .5 MG/3ML; MG/3ML
3 SOLUTION RESPIRATORY (INHALATION) EVERY 4 HOURS PRN
Status: DISCONTINUED | OUTPATIENT
Start: 2021-03-20 | End: 2021-03-22 | Stop reason: HOSPADM

## 2021-03-20 RX ORDER — BUMETANIDE 0.25 MG/ML
2 INJECTION INTRAMUSCULAR; INTRAVENOUS ONCE
Status: COMPLETED | OUTPATIENT
Start: 2021-03-20 | End: 2021-03-20

## 2021-03-20 RX ADMIN — DOCUSATE SODIUM 50MG AND SENNOSIDES 8.6MG 2 TABLET: 8.6; 5 TABLET, FILM COATED ORAL at 08:34

## 2021-03-20 RX ADMIN — HYDROCODONE BITARTRATE AND ACETAMINOPHEN 1 TABLET: 7.5; 325 TABLET ORAL at 17:50

## 2021-03-20 RX ADMIN — GABAPENTIN 300 MG: 300 CAPSULE ORAL at 20:41

## 2021-03-20 RX ADMIN — ATORVASTATIN CALCIUM 40 MG: 40 TABLET, FILM COATED ORAL at 20:41

## 2021-03-20 RX ADMIN — INSULIN LISPRO 20 UNITS: 100 INJECTION, SOLUTION INTRAVENOUS; SUBCUTANEOUS at 17:46

## 2021-03-20 RX ADMIN — OXYCODONE AND ACETAMINOPHEN 2 TABLET: 5; 325 TABLET ORAL at 08:35

## 2021-03-20 RX ADMIN — CLOPIDOGREL BISULFATE 75 MG: 75 TABLET ORAL at 08:35

## 2021-03-20 RX ADMIN — INSULIN LISPRO 12 UNITS: 100 INJECTION, SOLUTION INTRAVENOUS; SUBCUTANEOUS at 07:20

## 2021-03-20 RX ADMIN — ASPIRIN 81 MG: 81 TABLET, CHEWABLE ORAL at 08:35

## 2021-03-20 RX ADMIN — INSULIN DETEMIR 25 UNITS: 100 INJECTION, SOLUTION SUBCUTANEOUS at 08:32

## 2021-03-20 RX ADMIN — METOPROLOL TARTRATE 25 MG: 25 TABLET, FILM COATED ORAL at 20:41

## 2021-03-20 RX ADMIN — IPRATROPIUM BROMIDE AND ALBUTEROL SULFATE 3 ML: 2.5; .5 SOLUTION RESPIRATORY (INHALATION) at 07:31

## 2021-03-20 RX ADMIN — FOLIC ACID 1 MG: 1 TABLET ORAL at 08:35

## 2021-03-20 RX ADMIN — FOLIC ACID 1 MG: 1 TABLET ORAL at 20:41

## 2021-03-20 RX ADMIN — INSULIN DETEMIR 25 UNITS: 100 INJECTION, SOLUTION SUBCUTANEOUS at 21:00

## 2021-03-20 RX ADMIN — BUMETANIDE 2 MG: 0.25 INJECTION, SOLUTION INTRAMUSCULAR; INTRAVENOUS at 08:34

## 2021-03-20 RX ADMIN — TERAZOSIN HYDROCHLORIDE 5 MG: 5 CAPSULE ORAL at 20:41

## 2021-03-20 RX ADMIN — CETIRIZINE HYDROCHLORIDE 10 MG: 10 TABLET, FILM COATED ORAL at 08:35

## 2021-03-20 RX ADMIN — INSULIN LISPRO 12 UNITS: 100 INJECTION, SOLUTION INTRAVENOUS; SUBCUTANEOUS at 13:55

## 2021-03-20 RX ADMIN — INSULIN LISPRO 10 UNITS: 100 INJECTION, SOLUTION INTRAVENOUS; SUBCUTANEOUS at 20:41

## 2021-03-20 RX ADMIN — BUDESONIDE 0.5 MG: 0.5 INHALANT RESPIRATORY (INHALATION) at 18:49

## 2021-03-20 RX ADMIN — SODIUM CHLORIDE, PRESERVATIVE FREE 10 ML: 5 INJECTION INTRAVENOUS at 21:00

## 2021-03-20 RX ADMIN — METOPROLOL TARTRATE 25 MG: 25 TABLET, FILM COATED ORAL at 08:35

## 2021-03-20 RX ADMIN — INSULIN LISPRO 5 UNITS: 100 INJECTION, SOLUTION INTRAVENOUS; SUBCUTANEOUS at 07:19

## 2021-03-20 RX ADMIN — SODIUM CHLORIDE, PRESERVATIVE FREE 10 ML: 5 INJECTION INTRAVENOUS at 08:36

## 2021-03-20 RX ADMIN — INSULIN LISPRO 12 UNITS: 100 INJECTION, SOLUTION INTRAVENOUS; SUBCUTANEOUS at 13:54

## 2021-03-20 RX ADMIN — BUDESONIDE 0.5 MG: 0.5 INHALANT RESPIRATORY (INHALATION) at 07:32

## 2021-03-20 RX ADMIN — IPRATROPIUM BROMIDE AND ALBUTEROL SULFATE 3 ML: 2.5; .5 SOLUTION RESPIRATORY (INHALATION) at 03:42

## 2021-03-20 RX ADMIN — DOCUSATE SODIUM 50MG AND SENNOSIDES 8.6MG 2 TABLET: 8.6; 5 TABLET, FILM COATED ORAL at 20:41

## 2021-03-20 RX ADMIN — FINASTERIDE 5 MG: 5 TABLET, FILM COATED ORAL at 08:33

## 2021-03-20 RX ADMIN — PANTOPRAZOLE SODIUM 40 MG: 40 TABLET, DELAYED RELEASE ORAL at 08:35

## 2021-03-20 RX ADMIN — INSULIN LISPRO 10 UNITS: 100 INJECTION, SOLUTION INTRAVENOUS; SUBCUTANEOUS at 17:46

## 2021-03-20 RX ADMIN — ALLOPURINOL 300 MG: 300 TABLET ORAL at 08:35

## 2021-03-20 RX ADMIN — Medication 1 CAPSULE: at 08:34

## 2021-03-20 RX ADMIN — Medication 400 UNITS: at 08:33

## 2021-03-20 RX ADMIN — GABAPENTIN 300 MG: 300 CAPSULE ORAL at 08:35

## 2021-03-20 RX ADMIN — HYDROCODONE BITARTRATE AND ACETAMINOPHEN 1 TABLET: 7.5; 325 TABLET ORAL at 01:57

## 2021-03-20 RX ADMIN — MULTIVITAMIN TABLET 1 TABLET: TABLET at 08:35

## 2021-03-20 NOTE — PROGRESS NOTES
INTENSIVIST / PULMONARY FOLLOW UP NOTE     Hospital:  LOS: 5 days   Mr. William Villasenor, 70 y.o. male is followed for:     Type 2 diabetes mellitus with hyperglycemia, without long-term current use of insulin (CMS/HCC)    Hypertension associated with diabetes (CMS/HCC)    Hyperlipidemia associated with type 2 diabetes mellitus (CMS/HCC)    Benign prostatic hyperplasia without lower urinary tract symptoms    GERD (gastroesophageal reflux disease)    Diabetic neuropathy (CMS/HCC)    Former smoker    History of gout    Coronary artery disease involving native coronary artery of native heart with angina pectoris (CMS/HCC)    CAD (coronary artery disease)          SUBJECTIVE     Currently on room air with a saturation of 92 to 94%  Remains in sinus rhythm  Voiding without a Santos catheter  Hemoglobin 9.5, creatinine 1.33  Ambulated 220 feet with physical therapy yesterday    The patient's relevant past medical, surgical, family, and social history were reviewed    Allergies and medications were reviewed    ROS:  Per subjective, all other systems were reviewed and were negative        OBJECTIVE     Vital Sign Min/Max for last 24 hours:  Temp  Min: 97.7 °F (36.5 °C)  Max: 99.5 °F (37.5 °C)   BP  Min: 94/64  Max: 152/101   Pulse  Min: 81  Max: 112   Resp  Min: 12  Max: 24   SpO2  Min: 84 %  Max: 98 %   No data recorded     Physical Exam:  General Appearance: Older gentleman, upright in the chair in no acute distress  Eyes: Conjunctiva pale, no jaundice  Ears, Nose, Mouth, Throat:  Atraumatic, oropharynx clear  Neck:  Trachea midline, thyroid normal, right IJ site without bleeding, no crepitus  Respiratory: Sternotomy incision intact.  Symmetric chest expansion.  Breath sounds are bilateral, clear anteriorly  Cardiovascular:  Regular rate and rhythm, no murmurs, no peripheral edema, no rub  Gastrointestinal:  Soft, nontender, nondistended, no hepatosplenomegaly  Skin:  Normal temperature, no rash.  Right lower extremity incision  intact without erythema or drainage  Psychiatric:  Alert and oriented x 3, normal judgement and insight  Neuro:  No new focal neurologic deficits observed    Telemetry: Sinus rhythm               SpO2: 94 % SpO2  Min: 84 %  Max: 98 %   Device:      Flow Rate:   No data recorded     Mechanical Ventilator Settings:                                Intake/Ouptut 24 hrs (7:00AM - 6:59 AM)  Intake & Output (last 3 days)       03/17 0701 - 03/18 0700 03/18 0701 - 03/19 0700 03/19 0701 - 03/20 0700 03/20 0701 - 03/21 0700    P.O. 2010 1560 1215 350    I.V. (mL/kg) 539.4 (5.9) 517 (5.7)      Blood        IV Piggyback 100       Total Intake(mL/kg) 2649.4 (29) 2077 (22.7) 1215 (13.3) 350 (3.8)    Urine (mL/kg/hr) 1350 (0.6) 1875 (0.9) 2000 (0.9) 575 (1.5)    Chest Tube 590 300      Total Output 1940 2175 2000 575    Net +709.4 -98 -785 -225                  Lines, Drains & Airways    Active LDAs     Name:   Placement date:   Placement time:   Site:   Days:    Peripheral IV 03/14/21 0045 Left;Upper Arm   03/14/21    0045    Arm   3    Urethral Catheter Double-lumen 16 Fr.   03/16/21    1445     less than 1    Y Chest Tube 1 and 2 1 Right Mediastinal 32 Fr. 2 Right Mediastinal 32 Fr.   03/16/21    1838     less than 1    Y Chest Tube 3 and 4 3 Left Mediastinal 32 Fr. 4 Left Mediastinal 32 Fr.   03/16/21    1845     less than 1    Arterial Line 03/16/21 Left Radial   03/16/21    1421    Radial   less than 1    Introducer- Double Lumen 03/16/21 Internal jugular Right   03/16/21    -- placed in OR    Internal jugular   1    Pacer Wires   03/16/21    -- placed in OR    Ventricular   1                Hematology:  Results from last 7 days   Lab Units 03/20/21  0336 03/19/21  0405 03/18/21  0351 03/17/21  0350 03/16/21  2306 03/16/21  1922 03/16/21  1810 03/16/21  0719   WBC 10*3/mm3 9.83 12.11* 11.25* 11.75* 11.14* 9.53  --  6.78   HEMOGLOBIN g/dL 9.5* 10.4* 10.8* 10.9* 10.4* 12.2*  --  13.3   HEMOGLOBIN, POC g/dL  --   --   --   --    --   --  7.5*  --    HEMATOCRIT % 30.0* 32.7* 33.9* 33.4* 31.3* 36.3*  --  40.1   HEMATOCRIT POC %  --   --   --   --   --   --  22*  --    PLATELETS 10*3/mm3 215 217 196 227 201 201  --  263     Electrolytes, Magnesium and Phosphorus:  Results from last 7 days   Lab Units 03/20/21  0336 03/19/21  0405 03/18/21 0351 03/17/21 0350 03/16/21 2306 03/16/21 1922 03/16/21 0719 03/15/21  0043 03/14/21  0144 03/13/21 2239   SODIUM mmol/L 133* 133* 132* 142 143 143 134* 133*  --  140   CHLORIDE mmol/L 98 97* 100 109* 109* 110* 104 99  --  103   POTASSIUM mmol/L 4.9 4.1 5.0 4.4 4.4 3.6 4.0 4.1  --  4.1   CO2 mmol/L 27.0 25.0 21.0* 22.0 25.0 23.0 20.0* 20.0*  --  20.4*   MAGNESIUM mg/dL 2.2 2.4  --  2.4  --  1.8  --   --  1.9 1.8   PHOSPHORUS mg/dL 2.9 3.5  --  2.6 2.7 2.3*  --  3.3  --   --      Renal:  Results from last 7 days   Lab Units 03/20/21  0336 03/19/21 0405 03/18/21 0351 03/17/21 0350 03/16/21 2306 03/16/21 1922 03/16/21  0719   CREATININE mg/dL 1.33* 1.33* 1.36* 1.14 1.05 0.96 0.97   BUN mg/dL 31* 33* 23 15 15 15 18     Estimated Creatinine Clearance: 56.9 mL/min (A) (by C-G formula based on SCr of 1.33 mg/dL (H)).  Hepatic:  Results from last 7 days   Lab Units 03/20/21  0336 03/19/21  0405 03/15/21  2037 03/15/21  0043 03/13/21  2239   ALK PHOS U/L 67 83 69 66 74   BILIRUBIN mg/dL 0.4 0.5 0.3 0.2 0.2   ALT (SGPT) U/L 12 9 12 11 8   AST (SGOT) U/L 12 9 15 17 16     Arterial Blood Gases:  Results from last 7 days   Lab Units 03/17/21  0137 03/16/21  1948 03/16/21  1810 03/16/21  1734 03/16/21  1703 03/16/21  1656 03/16/21  1638 03/16/21  0801   PH, ARTERIAL pH units 7.362 7.498* 7.38 7.38 7.38 7.33* 7.36 7.398   PCO2, ARTERIAL mm Hg 41.2 30.1*  --   --   --   --   --  43.2   PO2 ART mm Hg 62.9* 216.0*  --   --   --   --   --  83.1   FIO2 % 40 100  --   --   --   --   --  32       Results from last 7 days   Lab Units 03/15/21  2037   HEMOGLOBIN A1C % 8.70*       No results found for: LACTATE    Relevant  imaging studies and labs from 03/20/21 were reviewed and interpreted by me    Elevated left hemidiaphragm with some left lower lobe atelectasis, mediastinal and chest tubes have been removed, no visible pneumothorax, no pulmonary edema    Medications (drips):       allopurinol, 300 mg, Oral, Daily  aspirin, 81 mg, Oral, Daily  atorvastatin, 40 mg, Oral, Nightly  budesonide, 0.5 mg, Nebulization, BID - RT  cetirizine, 10 mg, Oral, Daily  clopidogrel, 75 mg, Oral, Daily  finasteride, 5 mg, Oral, Daily  folic acid, 1 mg, Oral, BID  gabapentin, 300 mg, Oral, BID  insulin detemir, 25 Units, Subcutaneous, Q12H  insulin lispro, 0-14 Units, Subcutaneous, 4x Daily AC & at Bedtime  insulin lispro, 12 Units, Subcutaneous, TID With Meals  ipratropium-albuterol, 3 mL, Nebulization, Q4H - RT  lactobacillus acidophilus, 1 capsule, Oral, Daily  metoprolol tartrate, 25 mg, Oral, Q12H  multivitamin, 1 tablet, Oral, Daily  pantoprazole, 40 mg, Oral, Daily  pharmacy consult - MTM, , Does not apply, Daily  senna-docusate sodium, 2 tablet, Oral, BID  sodium chloride, 10 mL, Intravenous, Q12H  terazosin, 5 mg, Oral, Nightly  vitamin E, 400 Units, Oral, Daily        Assessment/Plan   IMPRESSION / PLAN     Inpatient Problem List:  70 y.o.male:  Active Hospital Problems    Diagnosis    • **Type 2 diabetes mellitus with hyperglycemia, without long-term current use of insulin (CMS/[facility])    • Coronary artery disease involving native coronary artery of native heart with angina pectoris (CMS/[facility])      Added automatically from request for surgery [id number]     • CAD (coronary artery disease)    • Diabetic neuropathy (CMS/HCC)    • GERD (gastroesophageal reflux disease)    • History of gout    • Former smoker    • Benign prostatic hyperplasia without lower urinary tract symptoms    • Hypertension associated with diabetes (CMS/HCC)    • Hyperlipidemia associated with type 2 diabetes mellitus (CMS/HCC)         Impression:  70 y.o.male with relevant PMH  of DMT2 with diabetic neuropathy (HgbA1C 8.7 3/15), HTN, HLD, Gout, CAD, cath on 3/15 w/ severe MVCAD admitted 3/15/2021 and underwent 4vCABG on 3/16 by Dr. Simms.  He did not require transfusion postoperatively and today hemoglobin is 9.5.  Oxygen has been weaned off and he is on room air with a saturation of 92 to 94%.  He remains in sinus rhythm.    Plan:    Introducer removed  Aspirin, statin, Plavix, beta-blocker    DM A1c 8.7 - titrate SQ insulin, Levemir 25 units twice daily added as well as 12 units with meals plus sliding scale.  Glucose remains 180-250    Pain - titrate narcotics.  Resume home neurontin.  Stool softeners.    Gout - home meds, allopurinol    GERD - home meds    Renal insufficiency, creatinine is 1.33, unchanged from yesterday, elevated from baseline of 1.02 on March 5.  Urine output is adequate.  Proscar, Hytrin was restarted for BPH    DVT / PUD prophylaxis    Nutrition - Diet Regular; Consistent Carbohydrate, Cardiac    Plan of care and goals reviewed with multidisciplinary team at daily rounds         Nina Brown MD    Intensive Care Medicine  03/20/21 11:17 EDT

## 2021-03-20 NOTE — PLAN OF CARE
Problem: Adult Inpatient Plan of Care  Goal: Plan of Care Review  Recent Flowsheet Documentation  Taken 3/20/2021 1335 by Mariah Fulton, PT  Plan of Care Reviewed With: patient  Outcome Summary: patient is able to ambulate 220 ft with rolling walker and min assist. with fatigue patient tends to get weight going forward and gets walker too far from his body he also has increased gait speed and needs cues for safety. patient again has audible wheezing with activity.   Goal Outcome Evaluation:  Plan of Care Reviewed With: patient  Progress: improving  Outcome Summary: patient is able to ambulate 220 ft with rolling walker and min assist. with fatigue patient tends to get weight going forward and gets walker too far from his body he also has increased gait speed and needs cues for safety. patient again has audible wheezing with activity.

## 2021-03-20 NOTE — PLAN OF CARE
Goal Outcome Evaluation:  Plan of Care Reviewed With: patient, spouse, daughter     Pt amb in hallway 400 ft with 2 assist, requiring 2L of oxygen and 02 remained above 90%. Minimal complaints of pain.  Pt speech is clear clear/logical but is very slow to respond and RN had to repeat questions often to get a response. Updated multiple family members of POC per MD note.

## 2021-03-20 NOTE — PLAN OF CARE
Goal Outcome Evaluation:    Patient AOX4/4, VSS on 2lpm of oxygen and 28% B[place] during the night. Likely that O2 will be fully weaned during day. Patient walked 220ft with x1 assistance and little fatigue. Adequate UO. Patient appropriate to transfer to tele.

## 2021-03-20 NOTE — PROGRESS NOTES
"      Cardiac Electrophysiology Inpatient Follow Up Note         [facility]    Progress Note    INITIAL REASON FOR CONSULT: Coronary artery disease, bypass surgery, hypertension, dyslipidemia    CHIEF COMPLAINT: I needed heart surgery        Subjective   Mr. William Villasenor denies vomiting, abdominal pain, fussiness, diarrhea, cough, difficulty breathing and notes that he had a very hard time sleeping last night. He would like to take a nap today. His chest hurts a little bit better than this he is doing well. No new complaints..      REVIEW OF SYSTEMS  ROS no change from admission H&P except for: above    Objective   VITAL SIGNS  /79 (BP Location: Left arm, Patient Position: Lying)   Pulse 94   Temp 99.1 °F (37.3 °C) (Oral)   Resp 20   Ht 175.3 cm (69.02\")   Wt 91.4 kg (201 lb 8 oz)   SpO2 94%   BMI 29.74 kg/m²   [unfilled]  Pulse Ox: SpO2  Av.4 %  Min: 84 %  Max: 98 %  Supplemental O2:       Admit Weight  Weight: 91.4 kg (201 lb 8 oz)  Last 3 Weights  [unfilled]  Body mass index is 29.74 kg/m².    INTAKE/OUTPUT  I/O last 3 completed shifts:  In: 1815 [P.O.:1815]  Out: 2780 [Urine:2650; Chest Tube:130]    Intake/Output Summary (Last 24 hours) at 3/20/2021 1323  Last data filed at 3/20/2021 0900  Gross per 24 hour   Intake 1250 ml   Output 2250 ml   Net -1000 ml       PHYSICAL EXAM  General appearance: awake, alert, oriented, moves all extremities  Lungs: no rhonchi, no wheezes, no rales  Heart: Regular rate and rhythm  Abdomen: positive bowel sounds, no bruits, no masses  Extremities: warm and dry, no cyanosis, no clubbing    LABS  Results from last 7 days   Lab Units 21  0336 21  0405 21  0351   WBC 10*3/mm3 9.83 12.11* 11.25*   HEMOGLOBIN g/dL 9.5* 10.4* 10.8*   HEMATOCRIT % 30.0* 32.7* 33.9*   MCV fL 92.0 92.6 92.9   PLATELETS 10*3/mm3 215 217 196         Results from last 7 days   Lab Units 21  0336 21  0405 21  0351 " 03/17/21  0350   POTASSIUM mmol/L 4.9 4.1 5.0 4.4   CHLORIDE mmol/L 98 97* 100 109*   CO2 mmol/L 27.0 25.0 21.0* 22.0   BUN mg/dL 31* 33* 23 15   CREATININE mg/dL 1.33* 1.33* 1.36* 1.14   GLUCOSE mg/dL 185* 247* 230* 135*   CALCIUM mg/dL 8.3* 8.9 8.6 8.6   MAGNESIUM mg/dL 2.2 2.4  --  2.4     Results from last 7 days   Lab Units 03/17/21  0350 03/16/21  1922 03/15/21  2037   PROTIME Seconds 14.9* 16.9* 12.2   INR  1.20* 1.41* 0.93         Results from last 7 days   Lab Units 03/20/21  0336 03/19/21  0405 03/17/21  0350   MAGNESIUM mg/dL 2.2 2.4 2.4                 No results found for: CHOL, TRIG, HDL    CURRENT MEDICATIONS  allopurinol, 300 mg, Oral, Daily  aspirin, 81 mg, Oral, Daily  atorvastatin, 40 mg, Oral, Nightly  budesonide, 0.5 mg, Nebulization, BID - RT  cetirizine, 10 mg, Oral, Daily  clopidogrel, 75 mg, Oral, Daily  finasteride, 5 mg, Oral, Daily  folic acid, 1 mg, Oral, BID  gabapentin, 300 mg, Oral, BID  insulin detemir, 25 Units, Subcutaneous, Q12H  insulin lispro, 0-14 Units, Subcutaneous, 4x Daily AC & at Bedtime  insulin lispro, 12 Units, Subcutaneous, TID With Meals  lactobacillus acidophilus, 1 capsule, Oral, Daily  metoprolol tartrate, 25 mg, Oral, Q12H  multivitamin, 1 tablet, Oral, Daily  pantoprazole, 40 mg, Oral, Daily  pharmacy consult - MTM, , Does not apply, Daily  senna-docusate sodium, 2 tablet, Oral, BID  sodium chloride, 10 mL, Intravenous, Q12H  terazosin, 5 mg, Oral, Nightly  vitamin E, 400 Units, Oral, Daily      CONTINUOUS INFUSIONS           EKG: Noted and reviewed. Sinus  ECHO:REVIEWED   STRESS: REVIEWED  CATH: REVIEWED  CXR: Reviewed.    TELEMETRY: Sinus rhythm.  70-year-old gentleman status post coronary bypass graft surgery on this admission. Convalescing well. Making progress. Blood pressure well controlled. Transferred to the floor this morning.    Continue aspirin Plavix statin beta-blocker. Agree with diuresis. Will follow.      No diagnosis found.  Body mass index is  29.74 kg/m².    I spent 18 minutes in consultation with this patient which included more than 65% of this time in direct face-to-face counseling, physical examination and discussion of my assessment and findings and shared decision making with the patient.  The remainder of the time not spent face to face was performing one, some or all of the following actions:  preparing to see this patient ( eg. Review of tests),  ordering medications, tests or procedures ), care coordination, discussion of the plan with other healthcare providers, documenting clinical information in Epic well as independently interpreting results and communicating results to patient, family and or caregiver.  All time noted occurred on the date of service.        Kole Gr DO, FACC, RS  Cardiac Electrophysiologist  [facility] / [facility]

## 2021-03-20 NOTE — NURSING NOTE
Prior to central line removal, order for the removal of catheter was verified, patient was assessed, necessary materials were gathered and patient was educated regarding procedure .    Patient was positioned flat to ensure that the insertion site was at or below the level of the heart.    Hands were washed, clean gloves were applied and central line dressing was gently removed. Catheter exit site was not cultured.     A new pair of clean gloves were then applied. Insertion site was cleansed with 2% Chlorhexidine swab using a circular motion beginning at the insertion site and moving outward for 30 seconds and allowed to dry.     Clamp on line was present and clamped.     Patient was instructed to hold breath as catheter was withdrawn.     The central line was grasped at the insertion site and slowly pulled outward parallel to the skin. Resistance was not met.    After central line was completely removed, a sterile 4x4 gauze pad was used to apply light pressure until bleeding stopped. At that time, petroleum-based gauze and a sterile occlusive dressing was applied to exit site.     Patient was instructed to keep dressing in place for at least 24 hours and to remain in a flat or reclining position for 30 minutes post-removal.     Catheter was inspected after removal and was intact. Tip of central line was sent for culture. Patient tolerated procedure.

## 2021-03-20 NOTE — THERAPY TREATMENT NOTE
Patient Name: William Villasenor  : 1950    MRN: [id number]                              Today's Date: 3/20/2021       Admit Date: 3/15/2021    Visit Dx: No diagnosis found.  Patient Active Problem List   Diagnosis   • Idiopathic chronic gout of multiple sites without tophus   • Type 2 diabetes mellitus with hyperglycemia, without long-term current use of insulin (CMS/HCC)   • Hypertension associated with diabetes (CMS/HCC)   • Hyperlipidemia associated with type 2 diabetes mellitus (CMS/HCC)   • Benign prostatic hyperplasia without lower urinary tract symptoms   • Chest pain   • Obesity (BMI 30-39.9)   • GERD (gastroesophageal reflux disease)   • Diabetic neuropathy (CMS/HCC)   • Former smoker   • History of gout   • Coronary artery disease involving native coronary artery of native heart with angina pectoris (CMS/HCC)   • CAD (coronary artery disease)     Past Medical History:   Diagnosis Date   • Arthritis    • Back pain    • BPH (benign prostatic hyperplasia)    • Diabetes mellitus (CMS/HCC)    • Diabetic peripheral neuropathy (CMS/HCC)    • Erectile dysfunction    • Former smoker    • GERD (gastroesophageal reflux disease)    • GERD (gastroesophageal reflux disease)    • Gout    • High cholesterol    • Hypertension    • Obesity      Past Surgical History:   Procedure Laterality Date   • CARDIAC CATHETERIZATION N/A 3/15/2021    Procedure: Left Heart Cath;  Surgeon: Trent Zaragoza MD;  Location:  [facility];  Service: Cardiology;  Laterality: N/A;   • CARDIAC CATHETERIZATION N/A 3/15/2021    Procedure: Coronary angiography;  Surgeon: Trent Zaragoza MD;  Location:  [facility];  Service: Cardiology;  Laterality: N/A;   • COLONOSCOPY W/ BIOPSIES     • CORONARY ARTERY BYPASS GRAFT N/A 3/16/2021    Procedure: MEDIAN STERNOTOMY, CORONARY ARTERY BYPASS GRAFT X4, UTILIZATION OF LEFT INTERNAL MAMMARY ARTERY, AND ENDOSCOPIC VEIN HARVEST OF RIGHT GREATER SAPHENOUS VEIN;  Surgeon:  Jorge Simms MD;  Location: [facility];  Service: Cardiothoracic;  Laterality: N/A;  vein out- 1614  vein ready- 1628             General Information     Row Name 03/20/21 1331          Physical Therapy Time and Intention    Document Type  therapy note (daily note)  -MCKENZIE     Mode of Treatment  physical therapy  -MCKENZIE     Row Name 03/20/21 1331          General Information    Patient Profile Reviewed  yes  -MCKENZIE     Prior Level of Function  independent:;gait;transfer;bed mobility;ADL's  -MCKENZIE     Existing Precautions/Restrictions  cardiac;fall;sternal  -MCKENZIE     Barriers to Rehab  none identified  -MCKENZIE     Row Name 03/20/21 1331          Living Environment    Lives With  spouse  -MCKENZIE     Row Name 03/20/21 1331          Home Main Entrance    Number of Stairs, Main Entrance  two  -MCKENZIE     Stair Railings, Main Entrance  none  -MCKENZIE     Row Name 03/20/21 1331          Cognition    Orientation Status (Cognition)  oriented x 4  -MCKENZIE     Row Name 03/20/21 1331          Safety Issues, Functional Mobility    Safety Issues Affecting Function (Mobility)  safety precautions follow-through/compliance;safety precaution awareness  -MCKENZIE     Impairments Affecting Function (Mobility)  balance;endurance/activity tolerance;shortness of breath;strength  -MCKENZIE       User Key  (r) = Recorded By, (t) = Taken By, (c) = Cosigned By    Initials Name Provider Type    MCKENZIE Mariah Fulton PT Physical Therapist        Mobility     Row Name 03/20/21 1332          Bed Mobility    Comment (Bed Mobility)  patient is OOB and returns to the chair  -     Row Name 03/20/21 1332          Bed-Chair Transfer    Bed-Chair [place] (Transfers)  minimum assist (75% patient effort)  -MCKENZIE     Assistive Device (Bed-Chair Transfers)  walker, front-wheeled  -MCKENZIE     Row Name 03/20/21 1332          Sit-Stand Transfer    Sit-Stand [place] (Transfers)  minimum assist (75% patient effort)  -     Assistive Device (Sit-Stand Transfers)  walker, front-wheeled  -MCKENZIE      Row Name 03/20/21 1332          Gait/Stairs (Locomotion)    [place] (Gait)  minimum assist (75% patient effort)  -MCKENZIE     Assistive Device (Gait)  walker, front-wheeled  -MCKENZIE     Deviations/Abnormal Patterns (Gait)  stride length decreased;base of support, wide  -MCKENZIE     Bilateral Gait Deviations  forward flexed posture  -MCKENZIE     Comment (Gait/Stairs)  with fatigue patient tends to get weight shifted forward with increased gait speed with less control getting walker too far from his body. patient required 2 standing rests for SOA  -MCKENZIE       User Key  (r) = Recorded By, (t) = Taken By, (c) = Cosigned By    Initials Name Provider Type    Mariah Hernandez PT Physical Therapist        Obj/Interventions     Row Name 03/20/21 1334          Balance    Static Sitting Balance  WFL  -MCKENZIE     Dynamic Sitting Balance  WFL  -MCKENZIE     Static Standing Balance  mild impairment  -MCKENZIE     Dynamic Standing Balance  mild impairment  -MCKENZIE     Balance Interventions  standing;dynamic;weight shifting activity  -MCKENZIE       User Key  (r) = Recorded By, (t) = Taken By, (c) = Cosigned By    Initials Name Provider Type    Mariah Hernandez, PT Physical Therapist        Goals/Plan    No documentation.       Clinical Impression     Row Name 03/20/21 1526          Pain Scale: Numbers Pre/Post-Treatment    Pretreatment Pain Rating  2/10  -MCKENZIE     Posttreatment Pain Rating  3/10  -MCKENZIE     Pain Location - Orientation  incisional  -MCKENZIE     Pain Location  chest  -MCKENZIE     Pain Intervention(s)  Repositioned;Ambulation/increased activity;Splinting  -MCKENZIE     Row Name 03/20/21 1108          Plan of Care Review    Plan of Care Reviewed With  patient  -MCKENZIE     Outcome Summary  patient is able to ambulate 220 ft with rolling walker and min assist. with fatigue patient tends to get weight going forward and gets walker too far from his body he also has increased gait speed and needs cues for safety. patient again has audible wheezing with activity.  -MCKENZIE      Row Name 03/20/21 1335          Therapy Assessment/Plan (PT)    Patient/Family Therapy Goals Statement (PT)  return to PLOF  -MCKENZIE     Rehab Potential (PT)  good, to achieve stated therapy goals  -MCKENZIE     Criteria for Skilled Interventions Met (PT)  yes;skilled treatment is necessary  -MCKENZIE     Row Name 03/20/21 1335          Vital Signs    Pre Systolic BP Rehab  154  -MCKENZIE     Pre Treatment Diastolic BP  74  -MCKENZIE     Post Systolic BP Rehab  (S) 170  -MCKENZIE     Post Treatment Diastolic BP  (S) 93 RN notified  -MCKENZIE     Pretreatment Heart Rate (beats/min)  100  -MCKENZIE     Posttreatment Heart Rate (beats/min)  110  -MCKENZIE     Pre SpO2 (%)  91  -MCKENZIE     O2 Delivery Pre Treatment  room air  -MCKENZIE     Intra SpO2 (%)  88  -MCKENZIE     O2 Delivery Intra Treatment  room air  -MCKENZIE     Post SpO2 (%)  91  -MCKENZIE     O2 Delivery Post Treatment  room air  -MCKENZIE     Pre Patient Position  Sitting  -MCKENZIE     Intra Patient Position  Standing  -MCKENZIE     Post Patient Position  Sitting  -MCKENZIE     Row Name 03/20/21 1335          Positioning and Restraints    Pre-Treatment Position  sitting in chair/recliner  -MCKENZIE     Post Treatment Position  chair  -MCKENZIE     In Chair  notified nsg;reclined;sitting;call light within reach;with nsg  -MCKENZIE       User Key  (r) = Recorded By, (t) = Taken By, (c) = Cosigned By    Initials Name Provider Type    Mariah Hernandez, PT Physical Therapist        Outcome Measures     Row Name 03/20/21 9913          How much help from another person do you currently need...    Turning from your back to your side while in flat bed without using bedrails?  3  -MCKENZIE     Moving from lying on back to sitting on the side of a flat bed without bedrails?  2  -MCKENZIE     Moving to and from a bed to a chair (including a wheelchair)?  3  -MCKENZIE     Standing up from a chair using your arms (e.g., wheelchair, bedside chair)?  3  -MCKENZIE     Climbing 3-5 steps with a railing?  2  -MCKENZIE     To walk in hospital room?  3  -MCKENZIE     AM-PAC 6 Clicks Score (PT)  16  -MCKENZIE       User Key  (r) =  Recorded By, (t) = Taken By, (c) = Cosigned By    Initials Name Provider Type    Mariah Hernandez, PT Physical Therapist        Physical Therapy Education                 Title: PT OT SLP Therapies (In Progress)     Topic: Physical Therapy (In Progress)     Point: Mobility training (In Progress)     Learning Progress Summary           Patient Acceptance, E, NR by MCKENZIE at 3/20/2021 0905    Acceptance, E, NR by MCKENZIE at 3/19/2021 0920    Acceptance, E, NR by KG at 3/18/2021 1024    Acceptance, E, NR by KG at 3/17/2021 1006                   Point: Home exercise program (In Progress)     Learning Progress Summary           Patient Acceptance, E, NR by MCKENZIE at 3/20/2021 0905    Acceptance, E, NR by MCKENZIE at 3/19/2021 0920    Acceptance, E, NR by KG at 3/18/2021 1024    Acceptance, E, NR by KG at 3/17/2021 1006                   Point: Body mechanics (In Progress)     Learning Progress Summary           Patient Acceptance, E, NR by MCKENZIE at 3/20/2021 0905    Acceptance, E, NR by MCKENZIE at 3/19/2021 0920    Acceptance, E, NR by KG at 3/18/2021 1024    Acceptance, E, NR by KG at 3/17/2021 1006                   Point: Precautions (In Progress)     Learning Progress Summary           Patient Acceptance, E, NR by MCKENZIE at 3/20/2021 0905    Acceptance, E, NR by MCKENZIE at 3/19/2021 0920    Acceptance, E, NR by KG at 3/18/2021 1024    Acceptance, E, NR by KG at 3/17/2021 1006                               User Key     Initials Effective Dates Name Provider Type Discipline    MCKENZIE 06/19/15 -  Mariah Fulton PT Physical Therapist PT    KG 05/22/20 -  Adwoa Otto PT Physical Therapist PT              PT Recommendation and Plan  Planned Therapy Interventions (PT): balance training, bed mobility training, gait training, home exercise program, transfer training, strengthening  Plan of Care Reviewed With: patient  Progress: improving  Outcome Summary: patient is able to ambulate 220 ft with rolling walker and min assist. with fatigue  patient tends to get weight going forward and gets walker too far from his body he also has increased gait speed and needs cues for safety. patient again has audible wheezing with activity.     Time Calculation:   PT Charges     Row Name 03/20/21 1339             Time Calculation    Start Time  0905  -MCKENZIE      PT Received On  03/20/21  -MCKENZIE      PT Goal Re-Cert Due Date  03/27/21  -MCKENZIE         Time Calculation- PT    Total Timed Code Minutes- PT  17 minute(s)  -MCKENZIE         Timed Charges    47952 - PT Therapeutic Activity Minutes  17  -MCKENZIE        User Key  (r) = Recorded By, (t) = Taken By, (c) = Cosigned By    Initials Name Provider Type    Mariah Hernandez, PT Physical Therapist        Therapy Charges for Today     Code Description Service Date Service Provider Modifiers Qty    52708974637 HC PT THERAPEUTIC ACT EA 15 MIN 3/19/2021 Mariah Fulton, PT GP 2    69128764197 HC PT THERAPEUTIC ACT EA 15 MIN 3/20/2021 Mariah Fulton, PT GP 1          PT G-Codes  Outcome Measure Options: AM-PAC 6 Clicks Basic Mobility (PT)  AM-PAC 6 Clicks Score (PT): 16    Mariah Fulton PT  3/20/2021

## 2021-03-20 NOTE — PROGRESS NOTES
Cardiothoracic Surgery Progress Note      POD # 4 s/p CABG x 4     LOS: 5 days      Subjective:  No complaints    Objective:  Vital Signs  Temp:  [98.6 °F (37 °C)-99.5 °F (37.5 °C)] 99.5 °F (37.5 °C)  Heart Rate:  [] 88  Resp:  [12-24] 15  BP: ()/() 115/67    Physical Exam:   General Appearance: alert, appears stated age and cooperative   Lungs: clear to auscultation, respirations regular, respirations even and respirations unlabored   Heart: regular rhythm & normal rate, normal S1, S2 and no murmur, no gallop, no rub   Skin: Incision c/d/i   1+ pedal edema bilaterally  Results:  Results from last 7 days   Lab Units 03/20/21  0336   WBC 10*3/mm3 9.83   HEMOGLOBIN g/dL 9.5*   HEMATOCRIT % 30.0*   PLATELETS 10*3/mm3 215     Results from last 7 days   Lab Units 03/20/21  0336   SODIUM mmol/L 133*   POTASSIUM mmol/L 4.9   CHLORIDE mmol/L 98   CO2 mmol/L 27.0   BUN mg/dL 31*   CREATININE mg/dL 1.33*   GLUCOSE mg/dL 185*   CALCIUM mg/dL 8.3*       Assessment:  POD # 4 s/p CABG x 4    Plan:  D/C central line  Transfer to telemetry  Diuresis  Ambulate  Pulmonary toilet  ASA, plavix, statin, BB    Arie Charles MD  03/20/21  07:31 EDT

## 2021-03-21 PROBLEM — N17.9 AKI (ACUTE KIDNEY INJURY): Status: ACTIVE | Noted: 2021-03-21

## 2021-03-21 LAB
ANION GAP SERPL CALCULATED.3IONS-SCNC: 12 MMOL/L (ref 5–15)
BASOPHILS # BLD AUTO: 0.05 10*3/MM3 (ref 0–0.2)
BASOPHILS NFR BLD AUTO: 0.5 % (ref 0–1.5)
BUN SERPL-MCNC: 31 MG/DL (ref 8–23)
BUN/CREAT SERPL: 25.2 (ref 7–25)
CALCIUM SPEC-SCNC: 8.1 MG/DL (ref 8.6–10.5)
CHLORIDE SERPL-SCNC: 100 MMOL/L (ref 98–107)
CO2 SERPL-SCNC: 25 MMOL/L (ref 22–29)
CREAT SERPL-MCNC: 1.23 MG/DL (ref 0.76–1.27)
DEPRECATED RDW RBC AUTO: 43.8 FL (ref 37–54)
EOSINOPHIL # BLD AUTO: 0.23 10*3/MM3 (ref 0–0.4)
EOSINOPHIL NFR BLD AUTO: 2.4 % (ref 0.3–6.2)
ERYTHROCYTE [DISTWIDTH] IN BLOOD BY AUTOMATED COUNT: 13.2 % (ref 12.3–15.4)
GFR SERPL CREATININE-BSD FRML MDRD: 58 ML/MIN/1.73
GLUCOSE BLDC GLUCOMTR-MCNC: 135 MG/DL (ref 70–130)
GLUCOSE BLDC GLUCOMTR-MCNC: 232 MG/DL (ref 70–130)
GLUCOSE BLDC GLUCOMTR-MCNC: 257 MG/DL (ref 70–130)
GLUCOSE BLDC GLUCOMTR-MCNC: 337 MG/DL (ref 70–130)
GLUCOSE SERPL-MCNC: 140 MG/DL (ref 65–99)
HCT VFR BLD AUTO: 31 % (ref 37.5–51)
HGB BLD-MCNC: 9.9 G/DL (ref 13–17.7)
IMM GRANULOCYTES # BLD AUTO: 0.2 10*3/MM3 (ref 0–0.05)
IMM GRANULOCYTES NFR BLD AUTO: 2.1 % (ref 0–0.5)
LYMPHOCYTES # BLD AUTO: 1.79 10*3/MM3 (ref 0.7–3.1)
LYMPHOCYTES NFR BLD AUTO: 18.7 % (ref 19.6–45.3)
MCH RBC QN AUTO: 29.3 PG (ref 26.6–33)
MCHC RBC AUTO-ENTMCNC: 31.9 G/DL (ref 31.5–35.7)
MCV RBC AUTO: 91.7 FL (ref 79–97)
MONOCYTES # BLD AUTO: 0.93 10*3/MM3 (ref 0.1–0.9)
MONOCYTES NFR BLD AUTO: 9.7 % (ref 5–12)
NEUTROPHILS NFR BLD AUTO: 6.38 10*3/MM3 (ref 1.7–7)
NEUTROPHILS NFR BLD AUTO: 66.6 % (ref 42.7–76)
NRBC BLD AUTO-RTO: 0.2 /100 WBC (ref 0–0.2)
PLATELET # BLD AUTO: 285 10*3/MM3 (ref 140–450)
PMV BLD AUTO: 9.2 FL (ref 6–12)
POTASSIUM SERPL-SCNC: 4.1 MMOL/L (ref 3.5–5.2)
RBC # BLD AUTO: 3.38 10*6/MM3 (ref 4.14–5.8)
SODIUM SERPL-SCNC: 137 MMOL/L (ref 136–145)
WBC # BLD AUTO: 9.58 10*3/MM3 (ref 3.4–10.8)

## 2021-03-21 PROCEDURE — 94799 UNLISTED PULMONARY SVC/PX: CPT

## 2021-03-21 PROCEDURE — 82962 GLUCOSE BLOOD TEST: CPT

## 2021-03-21 PROCEDURE — 99233 SBSQ HOSP IP/OBS HIGH 50: CPT | Performed by: NURSE PRACTITIONER

## 2021-03-21 PROCEDURE — 63710000001 INSULIN DETEMIR PER 5 UNITS

## 2021-03-21 PROCEDURE — 85025 COMPLETE CBC W/AUTO DIFF WBC: CPT | Performed by: PHYSICIAN ASSISTANT

## 2021-03-21 PROCEDURE — 80048 BASIC METABOLIC PNL TOTAL CA: CPT | Performed by: PHYSICIAN ASSISTANT

## 2021-03-21 PROCEDURE — 63710000001 INSULIN LISPRO (HUMAN) PER 5 UNITS: Performed by: NURSE PRACTITIONER

## 2021-03-21 PROCEDURE — 25010000002 FUROSEMIDE PER 20 MG: Performed by: INTERNAL MEDICINE

## 2021-03-21 PROCEDURE — 99232 SBSQ HOSP IP/OBS MODERATE 35: CPT | Performed by: INTERNAL MEDICINE

## 2021-03-21 PROCEDURE — 63710000001 INSULIN LISPRO (HUMAN) PER 5 UNITS: Performed by: INTERNAL MEDICINE

## 2021-03-21 RX ORDER — MAGNESIUM CARB/ALUMINUM HYDROX 105-160MG
296 TABLET,CHEWABLE ORAL AS NEEDED
Status: DISCONTINUED | OUTPATIENT
Start: 2021-03-21 | End: 2021-03-22 | Stop reason: HOSPADM

## 2021-03-21 RX ORDER — CLOPIDOGREL BISULFATE 75 MG/1
75 TABLET ORAL DAILY
Qty: 30 TABLET | Refills: 5 | Status: SHIPPED | OUTPATIENT
Start: 2021-03-22 | End: 2021-03-22

## 2021-03-21 RX ORDER — DIPHENOXYLATE HYDROCHLORIDE AND ATROPINE SULFATE 2.5; .025 MG/1; MG/1
1 TABLET ORAL DAILY
Qty: 30 TABLET
Start: 2021-03-21

## 2021-03-21 RX ORDER — POLYETHYLENE GLYCOL 3350 17 G/17G
17 POWDER, FOR SOLUTION ORAL DAILY
Status: DISCONTINUED | OUTPATIENT
Start: 2021-03-21 | End: 2021-03-22 | Stop reason: HOSPADM

## 2021-03-21 RX ORDER — HYDROCODONE BITARTRATE AND ACETAMINOPHEN 7.5; 325 MG/1; MG/1
1 TABLET ORAL EVERY 4 HOURS PRN
Qty: 30 TABLET | Refills: 0 | Status: SHIPPED | OUTPATIENT
Start: 2021-03-21 | End: 2021-03-23 | Stop reason: SDUPTHER

## 2021-03-21 RX ORDER — FUROSEMIDE 10 MG/ML
60 INJECTION INTRAMUSCULAR; INTRAVENOUS ONCE
Status: COMPLETED | OUTPATIENT
Start: 2021-03-21 | End: 2021-03-21

## 2021-03-21 RX ORDER — BISACODYL 10 MG
10 SUPPOSITORY, RECTAL RECTAL DAILY
Status: DISCONTINUED | OUTPATIENT
Start: 2021-03-21 | End: 2021-03-22 | Stop reason: HOSPADM

## 2021-03-21 RX ADMIN — BISACODYL 10 MG: 10 SUPPOSITORY RECTAL at 13:35

## 2021-03-21 RX ADMIN — BUDESONIDE 0.5 MG: 0.5 INHALANT RESPIRATORY (INHALATION) at 08:38

## 2021-03-21 RX ADMIN — METOPROLOL TARTRATE 25 MG: 25 TABLET, FILM COATED ORAL at 21:32

## 2021-03-21 RX ADMIN — Medication 400 UNITS: at 08:22

## 2021-03-21 RX ADMIN — CLOPIDOGREL BISULFATE 75 MG: 75 TABLET ORAL at 08:22

## 2021-03-21 RX ADMIN — INSULIN DETEMIR 25 UNITS: 100 INJECTION, SOLUTION SUBCUTANEOUS at 21:00

## 2021-03-21 RX ADMIN — DOCUSATE SODIUM 50MG AND SENNOSIDES 8.6MG 2 TABLET: 8.6; 5 TABLET, FILM COATED ORAL at 21:33

## 2021-03-21 RX ADMIN — OXYCODONE AND ACETAMINOPHEN 2 TABLET: 5; 325 TABLET ORAL at 10:20

## 2021-03-21 RX ADMIN — FUROSEMIDE 60 MG: 10 INJECTION, SOLUTION INTRAMUSCULAR; INTRAVENOUS at 13:35

## 2021-03-21 RX ADMIN — GABAPENTIN 300 MG: 300 CAPSULE ORAL at 21:32

## 2021-03-21 RX ADMIN — DOCUSATE SODIUM 50MG AND SENNOSIDES 8.6MG 2 TABLET: 8.6; 5 TABLET, FILM COATED ORAL at 08:22

## 2021-03-21 RX ADMIN — FOLIC ACID 1 MG: 1 TABLET ORAL at 21:32

## 2021-03-21 RX ADMIN — Medication 1 CAPSULE: at 08:22

## 2021-03-21 RX ADMIN — INSULIN DETEMIR 25 UNITS: 100 INJECTION, SOLUTION SUBCUTANEOUS at 08:23

## 2021-03-21 RX ADMIN — FOLIC ACID 1 MG: 1 TABLET ORAL at 08:22

## 2021-03-21 RX ADMIN — ATORVASTATIN CALCIUM 40 MG: 40 TABLET, FILM COATED ORAL at 21:32

## 2021-03-21 RX ADMIN — ASPIRIN 81 MG: 81 TABLET, CHEWABLE ORAL at 08:22

## 2021-03-21 RX ADMIN — ALLOPURINOL 300 MG: 300 TABLET ORAL at 08:22

## 2021-03-21 RX ADMIN — INSULIN LISPRO 10 UNITS: 100 INJECTION, SOLUTION INTRAVENOUS; SUBCUTANEOUS at 21:32

## 2021-03-21 RX ADMIN — FINASTERIDE 5 MG: 5 TABLET, FILM COATED ORAL at 08:22

## 2021-03-21 RX ADMIN — HYDROCODONE BITARTRATE AND ACETAMINOPHEN 1 TABLET: 7.5; 325 TABLET ORAL at 21:36

## 2021-03-21 RX ADMIN — MULTIVITAMIN TABLET 1 TABLET: TABLET at 08:22

## 2021-03-21 RX ADMIN — PANTOPRAZOLE SODIUM 40 MG: 40 TABLET, DELAYED RELEASE ORAL at 08:22

## 2021-03-21 RX ADMIN — HYDROCODONE BITARTRATE AND ACETAMINOPHEN 1 TABLET: 7.5; 325 TABLET ORAL at 07:32

## 2021-03-21 RX ADMIN — INSULIN LISPRO 8 UNITS: 100 INJECTION, SOLUTION INTRAVENOUS; SUBCUTANEOUS at 11:39

## 2021-03-21 RX ADMIN — INSULIN LISPRO 5 UNITS: 100 INJECTION, SOLUTION INTRAVENOUS; SUBCUTANEOUS at 08:23

## 2021-03-21 RX ADMIN — INSULIN LISPRO 20 UNITS: 100 INJECTION, SOLUTION INTRAVENOUS; SUBCUTANEOUS at 08:22

## 2021-03-21 RX ADMIN — BUDESONIDE 0.5 MG: 0.5 INHALANT RESPIRATORY (INHALATION) at 20:06

## 2021-03-21 RX ADMIN — METOPROLOL TARTRATE 25 MG: 25 TABLET, FILM COATED ORAL at 08:22

## 2021-03-21 RX ADMIN — TERAZOSIN HYDROCHLORIDE 5 MG: 5 CAPSULE ORAL at 21:32

## 2021-03-21 RX ADMIN — GABAPENTIN 300 MG: 300 CAPSULE ORAL at 08:22

## 2021-03-21 RX ADMIN — INSULIN LISPRO 25 UNITS: 100 INJECTION, SOLUTION INTRAVENOUS; SUBCUTANEOUS at 11:40

## 2021-03-21 RX ADMIN — CETIRIZINE HYDROCHLORIDE 10 MG: 10 TABLET, FILM COATED ORAL at 08:22

## 2021-03-21 RX ADMIN — POLYETHYLENE GLYCOL 3350 17 G: 17 POWDER, FOR SOLUTION ORAL at 13:34

## 2021-03-21 NOTE — PROGRESS NOTES
[facility] Medicine Services  PROGRESS NOTE    Patient Name: William Villasenor  : 1950  MRN: [id number]    Date of Admission: 3/15/2021  Primary Care Physician: Jacqueline Gatica MD    Subjective   Subjective   CC:  Medical managemetn for T2DM, Gout, GERD, IZA    HPI:  Patient sitting up in a chair in NAD.  Patient states he still very weak, but did walk with PT for 220 feet yesterday.  Explained patient that he usually should be discharged home on insulin, but he is a  and states he would lose his job.  Encourage patient to get appointment with endocrinologist to help get his blood sugar under control.  Encourage patient to eat a low-carb diet and exercise more and attempt to lose weight.  Patient concerned about his previous PCP, because he states he does not trust her and does not want to see her anymore.    ROS:  Gen- No fevers, chills  CV- No chest pain, palpitations  Chest- +postsurgical pain  Resp- No cough, dyspnea  GI- No N/V/D, abd pain  MS- +EVH site incision pain  All other systems have been reviewed and the pertinent positives and negatives are listed above in the HPI or ROS    Objective   Objective   Vital Signs:   Temp:  [98.3 °F (36.8 °C)-99.1 °F (37.3 °C)] 98.3 °F (36.8 °C)  Heart Rate:  [] 99  Resp:  [18-20] 20  BP: (126-135)/(69-79) 126/69  Physical Exam:  Constitutional: Alert, WD, obese male sitting in a chair in NAD  Eyes: EOMI, sclerae anicteric, no conjunctival injection  Head: NCAT  ENT: [place], moist mucous membranes   Respiratory: Nonlabored, symmetrical chest expansion, CTAB  Cardiovascular: RRR, HR 84, no M/R/G, +DP pulses bilaterally  Gastrointestinal: Soft, NT, ND +BS  Musculoskeletal: [place]; +2 LE edema bilaterally  Neurologic: Oriented x4, strength symmetric in all extremities, follows all commands, speech clear  Skin: No rashes on exposed skin  Psychiatric: Pleasant and cooperative; normal affect    Results Reviewed:  Results from last 7  days   Lab Units 03/21/21  0548 03/20/21  0336 03/19/21  0405 03/17/21  0350 03/16/21  1922 03/16/21  0719 03/15/21  2037   WBC 10*3/mm3 9.58 9.83 12.11* 11.75* 9.53  --  7.34   HEMOGLOBIN g/dL 9.9* 9.5* 10.4* 10.9* 12.2*  --  14.1   HEMOGLOBIN, POC   --   --   --   --   --    < >  --    HEMATOCRIT % 31.0* 30.0* 32.7* 33.4* 36.3*  --  43.2   HEMATOCRIT POC   --   --   --   --   --    < >  --    PLATELETS 10*3/mm3 285 215 217 227 201  --  307   INR   --   --   --  1.20* 1.41*  --  0.93    < > = values in this interval not displayed.     Results from last 7 days   Lab Units 03/21/21  0548 03/20/21  0336 03/19/21  0405 03/15/21  2037   SODIUM mmol/L 137 133* 133* 135*   POTASSIUM mmol/L 4.1 4.9 4.1 4.5   CHLORIDE mmol/L 100 98 97* 101   CO2 mmol/L 25.0 27.0 25.0 25.0   BUN mg/dL 31* 31* 33* 20   CREATININE mg/dL 1.23 1.33* 1.33* 1.35*   GLUCOSE mg/dL 140* 185* 247* 231*   CALCIUM mg/dL 8.1* 8.3* 8.9 8.8   ALT (SGPT) U/L  --  12 9 12   AST (SGOT) U/L  --  12 9 15     Estimated Creatinine Clearance: 61.6 mL/min (by C-G formula based on SCr of 1.23 mg/dL).    Microbiology Results Abnormal     Procedure Component Value - Date/Time    COVID PRE-OP / PRE-PROCEDURE SCREENING ORDER (NO ISOLATION) - Swab, Nasopharynx [[id number]]  (Normal) Collected: 03/15/21 2039    Lab Status: Final result Specimen: Swab from Nasopharynx Updated: 03/15/21 2127    Narrative:      The following orders were created for panel order COVID PRE-OP / PRE-PROCEDURE SCREENING ORDER (NO ISOLATION) - Swab, Nasopharynx.  Procedure                               Abnormality         Status                     ---------                               -----------         ------                     COVID-19, ABBOTT IN-HOUS...[[id number]]  Normal              Final result                 Please view results for these tests on the individual orders.    COVID-19, ABBOTT IN-HOUSE,NASAL Swab (NO TRANSPORT MEDIA) 2 HR TAT - Swab, Nasopharynx [[id number]]  (Normal)  Collected: 03/15/21 2039    Lab Status: Final result Specimen: Swab from Nasopharynx Updated: 03/15/21 2127     COVID19 Presumptive Negative    Narrative:      Fact sheet for providers: https://www.fda.gov/media/394647/download     Fact sheet for patients: https://www.fda.gov/media/029749/download    Test performed by PCR.  If inconsistent with clinical signs and symptoms patient should be tested with different authorized molecular test.          Imaging Results (Last 24 Hours)     ** No results found for the last 24 hours. **          Results for orders placed during the hospital encounter of 03/13/21    Adult Transthoracic Echo Complete W/ Cont if Necessary Per Protocol    Interpretation Summary  · Left ventricular ejection fraction appears to be 51 - 55%. Left ventricular systolic function is normal. Wall motion not assessed due to poor endocardial visualization  · Left ventricular diastolic function was normal.  · There is no evidence of pericardial effusion  · No significant functional valvular abnormalities      I have reviewed the medications:  Scheduled Meds:allopurinol, 300 mg, Oral, Daily  aspirin, 81 mg, Oral, Daily  atorvastatin, 40 mg, Oral, Nightly  budesonide, 0.5 mg, Nebulization, BID - RT  cetirizine, 10 mg, Oral, Daily  clopidogrel, 75 mg, Oral, Daily  finasteride, 5 mg, Oral, Daily  folic acid, 1 mg, Oral, BID  gabapentin, 300 mg, Oral, BID  insulin detemir, 25 Units, Subcutaneous, Q12H  insulin lispro, 0-14 Units, Subcutaneous, 4x Daily AC & at Bedtime  insulin lispro, 20 Units, Subcutaneous, TID With Meals  lactobacillus acidophilus, 1 capsule, Oral, Daily  metoprolol tartrate, 25 mg, Oral, Q12H  multivitamin, 1 tablet, Oral, Daily  pantoprazole, 40 mg, Oral, Daily  pharmacy consult - MTM, , Does not apply, Daily  senna-docusate sodium, 2 tablet, Oral, BID  sodium chloride, 10 mL, Intravenous, Q12H  terazosin, 5 mg, Oral, Nightly  vitamin E, 400 Units, Oral, Daily      Continuous Infusions:    PRN Meds:.•  acetaminophen  •  calcium gluconate IVPB  •  dextrose  •  dextrose  •  glucagon (human recombinant)  •  HYDROcodone-acetaminophen  •  ipratropium-albuterol  •  ipratropium-albuterol  •  magnesium sulfate **OR** magnesium sulfate **OR** magnesium sulfate  •  Morphine  •  [DISCONTINUED] fentaNYL citrate (PF) **AND** naloxone  •  nitroglycerin  •  ondansetron  •  oxyCODONE-acetaminophen  •  pneumococcal polysaccharide 23-valent  •  potassium chloride **OR** potassium chloride    Assessment/Plan   Assessment & Plan     Active Hospital Problems    Diagnosis  POA   • **Type 2 diabetes mellitus with hyperglycemia, without long-term current use of insulin (CMS/[facility]) [E11.65]  Yes   • IZA (acute kidney injury) (CMS/[facility]) [N17.9]  Unknown   • Coronary artery disease involving native coronary artery of native heart with angina pectoris (CMS/[facility]) [I25.119]  Unknown   • CAD (coronary artery disease) [I25.10]  Yes   • Diabetic neuropathy (CMS/[facility]) [E11.40]  Yes   • GERD (gastroesophageal reflux disease) [K21.9]  Yes   • History of gout [Z87.39]  Yes   • Former smoker [Z87.891]  Not Applicable   • Benign prostatic hyperplasia without lower urinary tract symptoms [N40.0]  Yes   • Hypertension associated with diabetes (CMS/[facility]) [E11.59, I15.2]  Yes   • Hyperlipidemia associated with type 2 diabetes mellitus (CMS/[facility]) [E11.69, E78.5]  Yes      Resolved Hospital Problems   No resolved problems to display.     Brief Hospital Course to date:  William Villasenor is a 70 y.o. male with relevant PMH of DMT2 with diabetic neuropathy (HgbA1C 8.7 3/15), HTN, HLD, Gout, CAD, cath on 3/15 w/ severe MVCAD admitted 3/15/2021 and underwent 4vCABG on 3/16 by Dr. Simms.  He did not require transfusion postoperatively and today hemoglobin is 9.5.  Oxygen has been weaned off and he is on room air with a saturation of 92 to 94%.  He remains in sinus rhythm.  Hospital medicine service was asked to medically manage patient's other medial  issues    These problems are new to me today    CABG x 4 on 6/16/2021 by Dr Simms  --Followed by CTS    CAD  HTN  HLD  --continue ASA, plavix, statin, BB  --Cardiology following    T2DM w/A1c 8.7  --24H glucose 140-337  --continue basal, bolus and SSI  --Increased preprandial  --Patient requested not going home on insulin, because he could lose his  job  --Has never had anyone following his diabetes and requested endocrinology appointment  --Discussed eating a low carbohydrate diet that is high in protein and vegetables  --Encourage patient to exercise daily and attempt to lose weight to get his glucose under better control  --Endocrinology and new PCP (patient is satisfied with his current PCP) appointment requested    IZA  --Cr 1.23  --normal on arrival  --high as 1.36  --Decrease use of nephrotoxic medications    GERD  --continue home dose pantoprazole    Gout  --continue home dose allpurinol    BPH  --continue Proscar, Hytrin    DVT Prophylaxis:  Per CTS    Disposition: I expect the patient to be discharged per CTS    CODE STATUS:   Code Status and Medical Interventions:   Ordered at: 03/15/21 1932     Level Of Support Discussed With:    Patient     Code Status:    CPR     Medical Interventions (Level of Support Prior to Arrest):    Full       Gissel Bernabe, ANKUR  03/21/21

## 2021-03-21 NOTE — PLAN OF CARE
Problem: Adult Inpatient Plan of Care  Goal: Plan of Care Review  Outcome: Ongoing, Progressing  Flowsheets (Taken 3/21/2021 1616)  Progress: improving  Plan of Care Reviewed With: patient  Outcome Summary: VSS, complaints of pain alleviated with PRN meds. Walked in halls good today, AXO, down to RA, NSR. Plan was to go home today per CTS however Cardiology suggested another day. Pt. being diuresed and bowel managment plan started. Continue to monitor.  Goal: Patient-Specific Goal (Individualized)  Outcome: Ongoing, Progressing  Goal: Absence of Hospital-Acquired Illness or Injury  Outcome: Ongoing, Progressing  Intervention: Identify and Manage Fall Risk  Recent Flowsheet Documentation  Taken 3/21/2021 1600 by Anila Carlos, RN  Safety Promotion/Fall Prevention:   activity supervised   assistive device/personal items within reach   clutter free environment maintained   fall prevention program maintained   mobility aid in reach   muscle strengthening facilitated   nonskid shoes/slippers when out of bed   room organization consistent   safety round/check completed   toileting scheduled  Taken 3/21/2021 1400 by Anila Carlos, RN  Safety Promotion/Fall Prevention:   activity supervised   assistive device/personal items within reach   clutter free environment maintained   fall prevention program maintained   mobility aid in reach   muscle strengthening facilitated   nonskid shoes/slippers when out of bed   room organization consistent   safety round/check completed   toileting scheduled  Taken 3/21/2021 1200 by Anila Carlos, RN  Safety Promotion/Fall Prevention:   activity supervised   assistive device/personal items within reach   clutter free environment maintained   fall prevention program maintained   mobility aid in reach   muscle strengthening facilitated   nonskid shoes/slippers when out of bed   room organization consistent   safety round/check completed   toileting scheduled  Taken 3/21/2021 1000 by Terrance  Anila KOWALSKI, RN  Safety Promotion/Fall Prevention:   activity supervised   assistive device/personal items within reach   clutter free environment maintained   fall prevention program maintained   mobility aid in reach   muscle strengthening facilitated   nonskid shoes/slippers when out of bed   room organization consistent   safety round/check completed   toileting scheduled  Taken 3/21/2021 0800 by Anila Carlos RN  Safety Promotion/Fall Prevention:   activity supervised   assistive device/personal items within reach   clutter free environment maintained   fall prevention program maintained   mobility aid in reach   muscle strengthening facilitated   nonskid shoes/slippers when out of bed   room organization consistent   safety round/check completed   toileting scheduled  Intervention: Prevent and Manage VTE (venous thromboembolism) Risk  Recent Flowsheet Documentation  Taken 3/21/2021 0800 by Anila Carlos, RN  VTE Prevention/Management:   bilateral   dorsiflexion/plantar flexion performed   bleeding risk factor(s) identified  Goal: Optimal Comfort and Wellbeing  Outcome: Ongoing, Progressing  Intervention: Provide Person-Centered Care  Recent Flowsheet Documentation  Taken 3/21/2021 0800 by Anila Carlos, RN  Trust Relationship/Rapport:   care explained   choices provided  Goal: Readiness for Transition of Care  Outcome: Ongoing, Progressing   Goal Outcome Evaluation:  Plan of Care Reviewed With: patient  Progress: improving  Outcome Summary: VSS, complaints of pain alleviated with PRN meds. Walked in halls good today, AXO, down to RA, NSR. Plan was to go home today per CTS however Cardiology suggested another day. Pt. being diuresed and bowel managment plan started. Continue to monitor.

## 2021-03-21 NOTE — PROGRESS NOTES
"      Cardiac Electrophysiology Inpatient Follow Up Note         [facility]    Progress Note    INITIAL REASON FOR CONSULT: I needed heart surgery    CHIEF COMPLAINT: \"I needed heart surgery\"    Subjective   Mr. William Villasenor denies vomiting, abdominal pain, fussiness, diarrhea, cough and notes however some wheezing and difficulty breathing a little bit more short of breath today.  Was ambulating in the waterman..      REVIEW OF SYSTEMS  ROS no change from admission H&P except for: above    Objective   VITAL SIGNS  /69 (BP Location: Left arm, Patient Position: Lying)   Pulse 99   Temp 98.3 °F (36.8 °C) (Oral)   Resp 20   Ht 175.3 cm (69.02\")   Wt 91.4 kg (201 lb 8 oz)   SpO2 95%   BMI 29.74 kg/m²   [unfilled]  Pulse Ox: SpO2  Av %  Min: 92 %  Max: 95 %  Supplemental O2:       Admit Weight  Weight: 91.4 kg (201 lb 8 oz)  Last 3 Weights  [unfilled]  Body mass index is 29.74 kg/m².    INTAKE/OUTPUT  I/O last 3 completed shifts:  In: 1000 [P.O.:1000]  Out: 2825 [Urine:2825]    Intake/Output Summary (Last 24 hours) at 3/21/2021 1845  Last data filed at 3/21/2021 1800  Gross per 24 hour   Intake 960 ml   Output 1801 ml   Net -841 ml       PHYSICAL EXAM  General appearance: awake, alert, oriented, moves all extremities  Lungs: Rales bilaterally.  These were not present yesterday.  Heart: Regular rate and rhythm  Abdomen: positive bowel sounds, no bruits, no masses  Extremities: warm and dry, no cyanosis, no clubbing    LABS  Results from last 7 days   Lab Units 21  0548 21  0336 21  0405   WBC 10*3/mm3 9.58 9.83 12.11*   HEMOGLOBIN g/dL 9.9* 9.5* 10.4*   HEMATOCRIT % 31.0* 30.0* 32.7*   MCV fL 91.7 92.0 92.6   PLATELETS 10*3/mm3 285 215 217         Results from last 7 days   Lab Units 21  0548 21  0336 21  0405 21  0350   POTASSIUM mmol/L 4.1 4.9 4.1 4.4   CHLORIDE mmol/L 100 98 97* 109*   CO2 mmol/L 25.0 27.0 25.0 22.0   BUN " mg/dL 31* 31* 33* 15   CREATININE mg/dL 1.23 1.33* 1.33* 1.14   GLUCOSE mg/dL 140* 185* 247* 135*   CALCIUM mg/dL 8.1* 8.3* 8.9 8.6   MAGNESIUM mg/dL  --  2.2 2.4 2.4     Results from last 7 days   Lab Units 03/17/21  0350 03/16/21  1922 03/15/21  2037   PROTIME Seconds 14.9* 16.9* 12.2   INR  1.20* 1.41* 0.93         Results from last 7 days   Lab Units 03/20/21  0336 03/19/21  0405 03/17/21  0350   MAGNESIUM mg/dL 2.2 2.4 2.4                 No results found for: CHOL, TRIG, HDL    CURRENT MEDICATIONS  allopurinol, 300 mg, Oral, Daily  aspirin, 81 mg, Oral, Daily  atorvastatin, 40 mg, Oral, Nightly  bisacodyl, 10 mg, Rectal, Daily  budesonide, 0.5 mg, Nebulization, BID - RT  cetirizine, 10 mg, Oral, Daily  clopidogrel, 75 mg, Oral, Daily  finasteride, 5 mg, Oral, Daily  folic acid, 1 mg, Oral, BID  gabapentin, 300 mg, Oral, BID  insulin detemir, 25 Units, Subcutaneous, Q12H  insulin lispro, 0-14 Units, Subcutaneous, 4x Daily AC & at Bedtime  insulin lispro, 25 Units, Subcutaneous, TID With Meals  lactobacillus acidophilus, 1 capsule, Oral, Daily  metoprolol tartrate, 25 mg, Oral, Q12H  multivitamin, 1 tablet, Oral, Daily  pantoprazole, 40 mg, Oral, Daily  pharmacy consult - MTM, , Does not apply, Daily  polyethylene glycol, 17 g, Oral, Daily  senna-docusate sodium, 2 tablet, Oral, BID  sodium chloride, 10 mL, Intravenous, Q12H  terazosin, 5 mg, Oral, Nightly  vitamin E, 400 Units, Oral, Daily      CONTINUOUS INFUSIONS           EKG: Sinus rhythm.  ECHO:REVIEWED   STRESS: REVIEWED  CATH: REVIEWED  CXR: Reviewed.    TELEMETRY: Sinus rhythm.    70-year-old gentleman status post coronary bypass graft surgery in this admission.  He is has some modest pulmonary edema today.  Will require some diuresis.  I will give him 60 mg of intravenous Lasix.    He is not yet had a bowel movement yet since his open heart surgery.    Long conversation with him and his wife.    He wishes to follow with Dr. Kennedy as his cardiologist  in [place].    For these reasons he should stay the night.    Body mass index is 29.74 kg/m².    I spent 28 minutes in consultation with this patient which included more than 65% of this time in direct face-to-face counseling, physical examination and discussion of my assessment and findings and shared decision making with the patient.  The remainder of the time not spent face to face was performing one, some or all of the following actions:  preparing to see this patient ( eg. Review of tests),  ordering medications, tests or procedures ), care coordination, discussion of the plan with other healthcare providers, documenting clinical information in Epic well as independently interpreting results and communicating results to patient, family and or caregiver.  All time noted occurred on the date of service.        Kole Gr DO, FACC, RS  Cardiac Electrophysiologist  [facility] / [facility]

## 2021-03-21 NOTE — PROGRESS NOTES
Cardiothoracic Surgery Progress Note      POD 5 s/p CABG x 4     LOS: 6 days      Subjective:  No complaints    Objective:  Vital Signs  Temp:  [98.3 °F (36.8 °C)-99.1 °F (37.3 °C)] 98.3 °F (36.8 °C)  Heart Rate:  [] 99  Resp:  [18-20] 20  BP: (126-135)/(69-79) 126/69    Physical Exam:   General Appearance: alert, appears stated age and cooperative   Lungs: clear to auscultation, respirations regular, respirations even and respirations unlabored   Heart: regular rhythm & normal rate, normal S1, S2 and no murmur, no gallop, no rub   Skin: Incision c/d/i   1+ pedal edema bilaterally  Results:    Results from last 7 days   Lab Units 03/21/21  0548   WBC 10*3/mm3 9.58   HEMOGLOBIN g/dL 9.9*   HEMATOCRIT % 31.0*   PLATELETS 10*3/mm3 285     Results from last 7 days   Lab Units 03/21/21  0548   SODIUM mmol/L 137   POTASSIUM mmol/L 4.1   CHLORIDE mmol/L 100   CO2 mmol/L 25.0   BUN mg/dL 31*   CREATININE mg/dL 1.23   GLUCOSE mg/dL 140*   CALCIUM mg/dL 8.1*       Assessment:  POD # 5 s/p CABG x 4    Plan:  Discharge home  Mobilization  Diuresis  Ambulate  Pulmonary toilet  ASA, plavix, statin, BB  On Protonix    Ernie Malcolm PA-C  03/21/21  09:22 EDT

## 2021-03-22 ENCOUNTER — READMISSION MANAGEMENT (OUTPATIENT)
Dept: CALL CENTER | Facility: HOSPITAL | Age: 71
End: 2021-03-22

## 2021-03-22 VITALS
HEART RATE: 81 BPM | BODY MASS INDEX: 32.59 KG/M2 | WEIGHT: 220.06 LBS | OXYGEN SATURATION: 94 % | TEMPERATURE: 98.2 F | RESPIRATION RATE: 20 BRPM | DIASTOLIC BLOOD PRESSURE: 69 MMHG | SYSTOLIC BLOOD PRESSURE: 153 MMHG | HEIGHT: 69 IN

## 2021-03-22 LAB
BASOPHILS # BLD AUTO: 0.02 10*3/MM3 (ref 0–0.2)
BASOPHILS NFR BLD AUTO: 0.5 % (ref 0–1.5)
DEPRECATED RDW RBC AUTO: 43.6 FL (ref 37–54)
EOSINOPHIL # BLD AUTO: 0.17 10*3/MM3 (ref 0–0.4)
EOSINOPHIL NFR BLD AUTO: 4.4 % (ref 0.3–6.2)
ERYTHROCYTE [DISTWIDTH] IN BLOOD BY AUTOMATED COUNT: 14.5 % (ref 12.3–15.4)
GLUCOSE BLDC GLUCOMTR-MCNC: 282 MG/DL (ref 70–130)
GLUCOSE BLDC GLUCOMTR-MCNC: 290 MG/DL (ref 70–130)
HCT VFR BLD AUTO: 34.5 % (ref 37.5–51)
HGB BLD-MCNC: 10.1 G/DL (ref 13–17.7)
IMM GRANULOCYTES # BLD AUTO: 0.02 10*3/MM3 (ref 0–0.05)
IMM GRANULOCYTES NFR BLD AUTO: 0.5 % (ref 0–0.5)
LYMPHOCYTES # BLD AUTO: 0.98 10*3/MM3 (ref 0.7–3.1)
LYMPHOCYTES NFR BLD AUTO: 25.3 % (ref 19.6–45.3)
MCH RBC QN AUTO: 24.3 PG (ref 26.6–33)
MCHC RBC AUTO-ENTMCNC: 29.3 G/DL (ref 31.5–35.7)
MCV RBC AUTO: 82.9 FL (ref 79–97)
MONOCYTES # BLD AUTO: 0.63 10*3/MM3 (ref 0.1–0.9)
MONOCYTES NFR BLD AUTO: 16.3 % (ref 5–12)
NEUTROPHILS NFR BLD AUTO: 2.05 10*3/MM3 (ref 1.7–7)
NEUTROPHILS NFR BLD AUTO: 53 % (ref 42.7–76)
NRBC BLD AUTO-RTO: 0 /100 WBC (ref 0–0.2)
PLATELET # BLD AUTO: 176 10*3/MM3 (ref 140–450)
PMV BLD AUTO: 8.8 FL (ref 6–12)
RBC # BLD AUTO: 4.16 10*6/MM3 (ref 4.14–5.8)
WBC # BLD AUTO: 3.87 10*3/MM3 (ref 3.4–10.8)

## 2021-03-22 PROCEDURE — 99024 POSTOP FOLLOW-UP VISIT: CPT | Performed by: PHYSICIAN ASSISTANT

## 2021-03-22 PROCEDURE — 63710000001 INSULIN LISPRO (HUMAN) PER 5 UNITS: Performed by: NURSE PRACTITIONER

## 2021-03-22 PROCEDURE — 94799 UNLISTED PULMONARY SVC/PX: CPT

## 2021-03-22 PROCEDURE — 97166 OT EVAL MOD COMPLEX 45 MIN: CPT

## 2021-03-22 PROCEDURE — 85025 COMPLETE CBC W/AUTO DIFF WBC: CPT | Performed by: PHYSICIAN ASSISTANT

## 2021-03-22 PROCEDURE — 99232 SBSQ HOSP IP/OBS MODERATE 35: CPT | Performed by: INTERNAL MEDICINE

## 2021-03-22 PROCEDURE — 82962 GLUCOSE BLOOD TEST: CPT

## 2021-03-22 PROCEDURE — 63710000001 INSULIN DETEMIR PER 5 UNITS

## 2021-03-22 PROCEDURE — 97535 SELF CARE MNGMENT TRAINING: CPT

## 2021-03-22 RX ORDER — ATORVASTATIN CALCIUM 40 MG/1
40 TABLET, FILM COATED ORAL NIGHTLY
Qty: 30 TABLET | Refills: 11 | Status: SHIPPED | OUTPATIENT
Start: 2021-03-22 | End: 2021-03-22

## 2021-03-22 RX ORDER — NITROGLYCERIN 0.4 MG/1
0.4 TABLET SUBLINGUAL
Qty: 100 TABLET | Refills: 12 | Status: SHIPPED | OUTPATIENT
Start: 2021-03-22 | End: 2021-03-23

## 2021-03-22 RX ORDER — FUROSEMIDE 20 MG/1
20 TABLET ORAL DAILY
Qty: 30 TABLET | Refills: 5 | Status: SHIPPED | OUTPATIENT
Start: 2021-03-22 | End: 2021-03-22

## 2021-03-22 RX ORDER — ATORVASTATIN CALCIUM 40 MG/1
40 TABLET, FILM COATED ORAL NIGHTLY
Qty: 30 TABLET | Refills: 11 | Status: SHIPPED | OUTPATIENT
Start: 2021-03-22 | End: 2021-03-23

## 2021-03-22 RX ORDER — FUROSEMIDE 20 MG/1
20 TABLET ORAL DAILY
Qty: 30 TABLET | Refills: 5 | Status: SHIPPED | OUTPATIENT
Start: 2021-03-22 | End: 2021-03-23

## 2021-03-22 RX ORDER — NITROGLYCERIN 0.4 MG/1
0.4 TABLET SUBLINGUAL
Qty: 100 TABLET | Refills: 12 | Status: SHIPPED | OUTPATIENT
Start: 2021-03-22 | End: 2021-03-22

## 2021-03-22 RX ORDER — FUROSEMIDE 40 MG/1
40 TABLET ORAL DAILY
Status: DISCONTINUED | OUTPATIENT
Start: 2021-03-22 | End: 2021-03-22 | Stop reason: HOSPADM

## 2021-03-22 RX ORDER — CLOPIDOGREL BISULFATE 75 MG/1
75 TABLET ORAL DAILY
Qty: 30 TABLET | Refills: 5 | Status: SHIPPED | OUTPATIENT
Start: 2021-03-22 | End: 2021-03-23

## 2021-03-22 RX ADMIN — ALLOPURINOL 300 MG: 300 TABLET ORAL at 08:28

## 2021-03-22 RX ADMIN — SODIUM CHLORIDE, PRESERVATIVE FREE 10 ML: 5 INJECTION INTRAVENOUS at 08:30

## 2021-03-22 RX ADMIN — CLOPIDOGREL BISULFATE 75 MG: 75 TABLET ORAL at 08:27

## 2021-03-22 RX ADMIN — DOCUSATE SODIUM 50MG AND SENNOSIDES 8.6MG 2 TABLET: 8.6; 5 TABLET, FILM COATED ORAL at 08:28

## 2021-03-22 RX ADMIN — INSULIN LISPRO 8 UNITS: 100 INJECTION, SOLUTION INTRAVENOUS; SUBCUTANEOUS at 12:34

## 2021-03-22 RX ADMIN — HYDROCODONE BITARTRATE AND ACETAMINOPHEN 1 TABLET: 7.5; 325 TABLET ORAL at 04:14

## 2021-03-22 RX ADMIN — FUROSEMIDE 40 MG: 40 TABLET ORAL at 09:54

## 2021-03-22 RX ADMIN — Medication 1 CAPSULE: at 08:28

## 2021-03-22 RX ADMIN — MULTIVITAMIN TABLET 1 TABLET: TABLET at 08:27

## 2021-03-22 RX ADMIN — HYDROCODONE BITARTRATE AND ACETAMINOPHEN 1 TABLET: 7.5; 325 TABLET ORAL at 08:28

## 2021-03-22 RX ADMIN — INSULIN DETEMIR 25 UNITS: 100 INJECTION, SOLUTION SUBCUTANEOUS at 08:42

## 2021-03-22 RX ADMIN — Medication 400 UNITS: at 08:28

## 2021-03-22 RX ADMIN — CETIRIZINE HYDROCHLORIDE 10 MG: 10 TABLET, FILM COATED ORAL at 08:28

## 2021-03-22 RX ADMIN — HYDROCODONE BITARTRATE AND ACETAMINOPHEN 1 TABLET: 7.5; 325 TABLET ORAL at 16:18

## 2021-03-22 RX ADMIN — BUDESONIDE 0.5 MG: 0.5 INHALANT RESPIRATORY (INHALATION) at 10:33

## 2021-03-22 RX ADMIN — FOLIC ACID 1 MG: 1 TABLET ORAL at 08:28

## 2021-03-22 RX ADMIN — PANTOPRAZOLE SODIUM 40 MG: 40 TABLET, DELAYED RELEASE ORAL at 08:28

## 2021-03-22 RX ADMIN — METOPROLOL TARTRATE 25 MG: 25 TABLET, FILM COATED ORAL at 16:19

## 2021-03-22 RX ADMIN — FINASTERIDE 5 MG: 5 TABLET, FILM COATED ORAL at 08:27

## 2021-03-22 RX ADMIN — GABAPENTIN 300 MG: 300 CAPSULE ORAL at 08:28

## 2021-03-22 RX ADMIN — INSULIN LISPRO 25 UNITS: 100 INJECTION, SOLUTION INTRAVENOUS; SUBCUTANEOUS at 12:34

## 2021-03-22 RX ADMIN — INSULIN LISPRO 8 UNITS: 100 INJECTION, SOLUTION INTRAVENOUS; SUBCUTANEOUS at 08:29

## 2021-03-22 RX ADMIN — ASPIRIN 81 MG: 81 TABLET, CHEWABLE ORAL at 08:28

## 2021-03-22 RX ADMIN — INSULIN LISPRO 25 UNITS: 100 INJECTION, SOLUTION INTRAVENOUS; SUBCUTANEOUS at 08:29

## 2021-03-22 RX ADMIN — METOPROLOL TARTRATE 25 MG: 25 TABLET, FILM COATED ORAL at 08:28

## 2021-03-22 NOTE — PLAN OF CARE
Goal Outcome Evaluation:  Plan of Care Reviewed With: patient  Progress: improving  Outcome Summary: OT eval completed Pt presents with deficits in strength, safety awareness and compliance for sternal precautions, and balance for ADL completion, completed toileting tasks setup/SUP, completed transfers CGA with cues for sternal precautions, completed bed mob rolling to L side with Blessing and sidelying to sit with Blessing and moments CGA due to replication of home environment, recom IPOT d/c would benefit from home with HHOT and 24/7 assist with walker

## 2021-03-22 NOTE — OUTREACH NOTE
Prep Survey      Responses   [facility]ucian[facility] facility patient discharged from?  [place]   Is LACE score < 7 ?  No   Emergency Room discharge w/ pulse ox?  No   Eligibility  [facility]   Date of Admission  03/15/21   Date of Discharge  03/22/21   Discharge Disposition  Home or Self Care   Discharge diagnosis  CABGx4   Does the patient have one of the following disease processes/diagnoses(primary or secondary)?  Cardiothoracic surgery   Does the patient have Home health ordered?  Yes   What is the Home health agency?   [facility]   Is there a DME ordered?  Yes   What DME was ordered?  [employer] for DME   Prep survey completed?  Yes          Anahi Feliz RN

## 2021-03-22 NOTE — PROGRESS NOTES
[facility] Medicine Services  PROGRESS NOTE    Patient Name: William Villasenor  : 1950  MRN: [id number]    Date of Admission: 3/15/2021  Primary Care Physician: Jacqueline Gatica MD    Subjective   Subjective   CC:  Medical managemetn for T2DM, Gout, GERD, IZA    HPI:  Patient sitting up on bedside chair. States that he is somewhat short of air this morning. Says his leg is weeping at graft harvest site. Had BM last night.    ROS:  Gen- No fevers, chills  CV- No chest pain, palpitations  Resp- No cough, positive for dyspnea  GI- No N/V/D, abd pain    Objective   Objective   Vital Signs:   Temp:  [97.7 °F (36.5 °C)-98.4 °F (36.9 °C)] 98.2 °F (36.8 °C)  Heart Rate:  [] 90  Resp:  [18-20] 20  BP: (128-148)/(69-86) 137/69     Physical Exam:  Constitutional: No acute distress, awake, alert  HENT: NCAT, mucous membranes moist  Respiratory: Clear to auscultation bilaterally, respiratory effort normal on room air, no crackles  Cardiovascular: RRR, no murmurs, rubs, or gallops, incision c/d/i  Gastrointestinal: Positive bowel sounds, soft, nontender, nondistended  Musculoskeletal: 1+ bilateral ankle edema  Psychiatric: Appropriate affect, cooperative  Neurologic: Oriented x 3, strength symmetric in all extremities, Cranial Nerves grossly intact to confrontation, speech clear  Skin: No rashes    Results Reviewed:  Results from last 7 days   Lab Units 21  0503 21  0548 21  0336 21  0350 21  1922 21  0719 03/15/21  2037   WBC 10*3/mm3 3.87 9.58 9.83 11.75* 9.53  --  7.34   HEMOGLOBIN g/dL 10.1* 9.9* 9.5* 10.9* 12.2*  --  14.1   HEMOGLOBIN, POC   --   --   --   --   --    < >  --    HEMATOCRIT % 34.5* 31.0* 30.0* 33.4* 36.3*  --  43.2   HEMATOCRIT POC   --   --   --   --   --    < >  --    PLATELETS 10*3/mm3 176 285 215 227 201  --  307   INR   --   --   --  1.20* 1.41*  --  0.93    < > = values in this interval not displayed.     Results from last 7 days    Lab Units 03/21/21  0548 03/20/21  0336 03/19/21  0405 03/15/21  2037   SODIUM mmol/L 137 133* 133* 135*   POTASSIUM mmol/L 4.1 4.9 4.1 4.5   CHLORIDE mmol/L 100 98 97* 101   CO2 mmol/L 25.0 27.0 25.0 25.0   BUN mg/dL 31* 31* 33* 20   CREATININE mg/dL 1.23 1.33* 1.33* 1.35*   GLUCOSE mg/dL 140* 185* 247* 231*   CALCIUM mg/dL 8.1* 8.3* 8.9 8.8   ALT (SGPT) U/L  --  12 9 12   AST (SGOT) U/L  --  12 9 15     Estimated Creatinine Clearance: 64.1 mL/min (by C-G formula based on SCr of 1.23 mg/dL).    Microbiology Results Abnormal     Procedure Component Value - Date/Time    COVID PRE-OP / PRE-PROCEDURE SCREENING ORDER (NO ISOLATION) - Swab, Nasopharynx [[id number]]  (Normal) Collected: 03/15/21 2039    Lab Status: Final result Specimen: Swab from Nasopharynx Updated: 03/15/21 2127    Narrative:      The following orders were created for panel order COVID PRE-OP / PRE-PROCEDURE SCREENING ORDER (NO ISOLATION) - Swab, Nasopharynx.  Procedure                               Abnormality         Status                     ---------                               -----------         ------                     COVID-19, ABBOTT IN-HOUS...[[id number]]  Normal              Final result                 Please view results for these tests on the individual orders.    COVID-19, ABBOTT IN-HOUSE,NASAL Swab (NO TRANSPORT MEDIA) 2 HR TAT - Swab, Nasopharynx [[id number]]  (Normal) Collected: 03/15/21 2039    Lab Status: Final result Specimen: Swab from Nasopharynx Updated: 03/15/21 2127     COVID19 Presumptive Negative    Narrative:      Fact sheet for providers: https://www.fda.gov/media/718873/download     Fact sheet for patients: https://www.fda.gov/media/647919/download    Test performed by PCR.  If inconsistent with clinical signs and symptoms patient should be tested with different authorized molecular test.          Imaging Results (Last 24 Hours)     ** No results found for the last 24 hours. **          Results for orders placed  during the hospital encounter of 03/13/21    Adult Transthoracic Echo Complete W/ Cont if Necessary Per Protocol    Interpretation Summary  · Left ventricular ejection fraction appears to be 51 - 55%. Left ventricular systolic function is normal. Wall motion not assessed due to poor endocardial visualization  · Left ventricular diastolic function was normal.  · There is no evidence of pericardial effusion  · No significant functional valvular abnormalities      I have reviewed the medications:  Scheduled Meds:allopurinol, 300 mg, Oral, Daily  aspirin, 81 mg, Oral, Daily  atorvastatin, 40 mg, Oral, Nightly  bisacodyl, 10 mg, Rectal, Daily  budesonide, 0.5 mg, Nebulization, BID - RT  cetirizine, 10 mg, Oral, Daily  clopidogrel, 75 mg, Oral, Daily  finasteride, 5 mg, Oral, Daily  folic acid, 1 mg, Oral, BID  gabapentin, 300 mg, Oral, BID  insulin detemir, 25 Units, Subcutaneous, Q12H  insulin lispro, 0-14 Units, Subcutaneous, 4x Daily AC & at Bedtime  insulin lispro, 25 Units, Subcutaneous, TID With Meals  lactobacillus acidophilus, 1 capsule, Oral, Daily  metoprolol tartrate, 25 mg, Oral, Q12H  multivitamin, 1 tablet, Oral, Daily  pantoprazole, 40 mg, Oral, Daily  pharmacy consult - MTM, , Does not apply, Daily  polyethylene glycol, 17 g, Oral, Daily  senna-docusate sodium, 2 tablet, Oral, BID  sodium chloride, 10 mL, Intravenous, Q12H  terazosin, 5 mg, Oral, Nightly  vitamin E, 400 Units, Oral, Daily      Continuous Infusions:   PRN Meds:.•  acetaminophen  •  calcium gluconate IVPB  •  dextrose  •  dextrose  •  glucagon (human recombinant)  •  HYDROcodone-acetaminophen  •  ipratropium-albuterol  •  ipratropium-albuterol  •  magnesium citrate  •  magnesium sulfate **OR** magnesium sulfate **OR** magnesium sulfate  •  Morphine  •  [DISCONTINUED] fentaNYL citrate (PF) **AND** naloxone  •  nitroglycerin  •  ondansetron  •  oxyCODONE-acetaminophen  •  pneumococcal polysaccharide 23-valent  •  potassium chloride **OR**  potassium chloride    Assessment/Plan   Assessment & Plan     Active Hospital Problems    Diagnosis  POA   • **Type 2 diabetes mellitus with hyperglycemia, without long-term current use of insulin (CMS/[facility]) [E11.65]  Yes   • IZA (acute kidney injury) (CMS/[facility]) [N17.9]  Yes   • Coronary artery disease involving native coronary artery of native heart with angina pectoris (CMS/[facility]) [I25.119]  Yes   • CAD (coronary artery disease) [I25.10]  Yes   • Diabetic neuropathy (CMS/[facility]) [E11.40]  Yes   • GERD (gastroesophageal reflux disease) [K21.9]  Yes   • History of gout [Z87.39]  Yes   • Former smoker [Z87.891]  Not Applicable   • Benign prostatic hyperplasia without lower urinary tract symptoms [N40.0]  Yes   • Hypertension associated with diabetes (CMS/[facility]) [E11.59, I15.2]  Yes   • Hyperlipidemia associated with type 2 diabetes mellitus (CMS/[facility]) [E11.69, E78.5]  Yes      Resolved Hospital Problems   No resolved problems to display.     Brief Hospital Course to date:  William Villasenor is a 70 y.o. male with relevant PMH of DMT2 with diabetic neuropathy (HgbA1C 8.7 3/15), HTN, HLD, Gout, CAD, cath on 3/15 w/ severe MVCAD admitted 3/15/2021 and underwent 4vCABG on 3/16 by Dr. Simms.  He did not require transfusion postoperatively and today hemoglobin is 9.5.  Oxygen has been weaned off and he is on room air with a saturation of 92 to 94%.  He remains in sinus rhythm.  Hospital medicine service was asked to medically manage patient's other medial issues.    CABG x 4 on 6/16/2021 by Dr Simms  --Followed by CTS    CAD  HTN  HLD  --continue ASA, plavix, statin, BB  --Cardiology following    T2DM w/A1c 8.7  --continue basal, bolus and SSI  --Patient requested to not go home on insulin, because he will lose his  job  --Has never had anyone following his diabetes and requested endocrinology appointment  --Discussed eating a low carbohydrate diet that is high in protein and vegetables  --Encourage patient to exercise  daily and attempt to lose weight to get his glucose under better control  --Endocrinology and new PCP (patient is satisfied with his current PCP) appointment requested  --Metformin at discharge    IZA  --Decrease use of nephrotoxic medications    GERD  --continue home dose pantoprazole    Gout  --continue home dose allpurinol    BPH  --continue Proscar, Hytrin    DVT Prophylaxis:  Per CTS    Disposition: I expect the patient to be discharged per CTS, possibly today    CODE STATUS:   Code Status and Medical Interventions:   Ordered at: 03/15/21 1932     Level Of Support Discussed With:    Patient     Code Status:    CPR     Medical Interventions (Level of Support Prior to Arrest):    Full       Concepción Carpenter, DO  03/22/21

## 2021-03-22 NOTE — THERAPY EVALUATION
Patient Name: William Villasenor  : 1950    MRN: [id number]                              Today's Date: 3/22/2021       Admit Date: 3/15/2021    Visit Dx:     ICD-10-CM ICD-9-CM   1. Other diabetic neurological complication associated with type 2 diabetes mellitus (CMS/[facility])  E11.49 250.60   2. Type 2 diabetes mellitus with hyperglycemia, without long-term current use of insulin (CMS/[facility])  E11.65 250.00     790.29   3. Coronary artery disease involving native coronary artery of native heart with angina pectoris (CMS/[facility])  I25.119 414.01     413.9   4. History of gout  Z87.39 V12.29   5. Gastroesophageal reflux disease without esophagitis  K21.9 530.81   6. Obesity (BMI 30-39.9)  E66.9 278.00   7. Benign prostatic hyperplasia without lower urinary tract symptoms  N40.0 600.00   8. Hyperlipidemia associated with type 2 diabetes mellitus (CMS/[facility])  E11.69 250.80    E78.5 272.4   9. Hypertension associated with diabetes (CMS/[facility])  E11.59 250.80    I15.2 401.9   10. Idiopathic chronic gout of multiple sites without tophus  M1A.09X0 274.02   11. Coronary artery disease involving native heart with angina pectoris, unspecified vessel or lesion type (CMS/[facility])  I25.119 414.01     413.9   12. Seasonal allergic rhinitis due to other allergic trigger  J30.89 477.8     Patient Active Problem List   Diagnosis   • Idiopathic chronic gout of multiple sites without tophus   • Type 2 diabetes mellitus with hyperglycemia, without long-term current use of insulin (CMS/[facility])   • Hypertension associated with diabetes (CMS/[facility])   • Hyperlipidemia associated with type 2 diabetes mellitus (CMS/[facility])   • Benign prostatic hyperplasia without lower urinary tract symptoms   • Chest pain   • Obesity (BMI 30-39.9)   • GERD (gastroesophageal reflux disease)   • Diabetic neuropathy (CMS/[facility])   • Former smoker   • History of gout   • Coronary artery disease involving native coronary artery of native heart with angina pectoris (CMS/[facility])   • CAD (coronary  artery disease)   • IZA (acute kidney injury) (CMS/HCC)     Past Medical History:   Diagnosis Date   • Arthritis    • Back pain    • BPH (benign prostatic hyperplasia)    • Diabetes mellitus (CMS/HCC)    • Diabetic peripheral neuropathy (CMS/HCC)    • Erectile dysfunction    • Former smoker    • GERD (gastroesophageal reflux disease)    • GERD (gastroesophageal reflux disease)    • Gout    • High cholesterol    • Hypertension    • Obesity      Past Surgical History:   Procedure Laterality Date   • CARDIAC CATHETERIZATION N/A 3/15/2021    Procedure: Left Heart Cath;  Surgeon: Trent Zaragoza MD;  Location:  [facility];  Service: Cardiology;  Laterality: N/A;   • CARDIAC CATHETERIZATION N/A 3/15/2021    Procedure: Coronary angiography;  Surgeon: Trent Zaragoza MD;  Location:  [facility];  Service: Cardiology;  Laterality: N/A;   • COLONOSCOPY W/ BIOPSIES  2015   • CORONARY ARTERY BYPASS GRAFT N/A 3/16/2021    Procedure: MEDIAN STERNOTOMY, CORONARY ARTERY BYPASS GRAFT X4, UTILIZATION OF LEFT INTERNAL MAMMARY ARTERY, AND ENDOSCOPIC VEIN HARVEST OF RIGHT GREATER SAPHENOUS VEIN;  Surgeon: Jorge Simms MD;  Location: [facility];  Service: Cardiothoracic;  Laterality: N/A;  vein out- 1614  vein ready- 1628             General Information     Row Name 03/22/21 0825          OT Time and Intention    Document Type  evaluation  -KF     Mode of Treatment  occupational therapy  -KF     Row Name 03/22/21 0825          General Information    Patient Profile Reviewed  yes  -KF     Prior Level of Function  independent:;transfer;bed mobility;ADL's;using stairs;driving;all household mobility drives semi trucks  -KF     Existing Precautions/Restrictions  cardiac;fall;sternal  -KF     Barriers to Rehab  none identified  -KF     Row Name 03/22/21 0825          Occupational Profile    Occupational History/Life Experiences (Occupational Profile)  drives semi trucks for occupation  -KF     Row Name  03/22/21 0825          Living Environment    Lives With  spouse  -KF     Row Name 03/22/21 0825          Home Main Entrance    Number of Stairs, Main Entrance  two  -KF     Stair Railings, Main Entrance  none  -KF     Row Name 03/22/21 0825          Stairs Within Home, Primary    Stairs, Within Home, Primary  basement steps but states doesn't have to use, tub shower but has bench  -KF     Number of Stairs, Within Home, Primary  none  -KF     Row Name 03/22/21 0825          Cognition    Orientation Status (Cognition)  oriented x 4  -KF     Row Name 03/22/21 0825          Safety Issues, Functional Mobility    Safety Issues Affecting Function (Mobility)  awareness of need for assistance;problem-solving;safety precaution awareness;safety precautions follow-through/compliance  -KF     Impairments Affecting Function (Mobility)  balance;endurance/activity tolerance;shortness of breath;strength  -     Comment, Safety Issues/Impairments (Mobility)  despite cues and explanation of sternal precautions continues to place hands to push up with transfers, did demonstrate initially ability to stand without assist with hands on knees  -KF       User Key  (r) = Recorded By, (t) = Taken By, (c) = Cosigned By    Initials Name Provider Type    KF Liz Segura, OT Occupational Therapist          Mobility/ADL's     Row Name 03/22/21 0828          Bed Mobility    Bed Mobility  rolling left;scooting/bridging;sidelying-sit  -KF     Rolling Left [place] (Bed Mobility)  minimum assist (75% patient effort);verbal cues;nonverbal cues (demo/gesture)  -KF     Scooting/Bridging [place] (Bed Mobility)  minimum assist (75% patient effort);nonverbal cues (demo/gesture);verbal cues  -KF     Sidelying-Sit [place] (Bed Mobility)  contact guard;nonverbal cues (demo/gesture);verbal cues  -KF     Bed Mobility, Safety Issues  decreased use of arms for pushing/pulling  -KF     Comment (Bed Mobility)  education provided on sequencing to  complete this task from flat surface to replicate home environment overall Blessing and requires cues for problem solving and sequencing  -KF     Row Name 03/22/21 0828          Transfers    Transfers  bed-chair transfer;sit-stand transfer;toilet transfer  -KF     Comment (Transfers)  throws walker aside with transfer back from toilet and demonstrates impulsivity and poor safety compliance  -KF     Bed-Chair [place] (Transfers)  contact guard;verbal cues  -KF     Assistive Device (Bed-Chair Transfers)  -- no AD  -KF     Sit-Stand [place] (Transfers)  contact guard;verbal cues with cues to maintain sternal precautions  -KF     [place] Level (Toilet Transfer)  supervision;verbal cues cues required to appropriately maintain sternal precautions  -KF     Assistive Device (Toilet Transfer)  commode;walker, front-wheeled  -KF     Row Name 03/22/21 0828          Sit-Stand Transfer    Assistive Device (Sit-Stand Transfers)  walker, front-wheeled  -KF     Row Name 03/22/21 0828          Toilet Transfer    Type (Toilet Transfer)  sit-stand;stand-sit  -KF     Row Name 03/22/21 0828          Functional Mobility    Functional Mobility- Ind. Level  contact guard assist;verbal cues required  -KF     Functional Mobility- Device  rolling walker initially with AD  -KF     Functional Mobility-Distance (Feet)  30  -KF     Functional Mobility- Safety Issues  step length decreased;sequencing ability decreased  -     Row Name 03/22/21 0828          Activities of Daily Living    BADL Assessment/Intervention  grooming;toileting;lower body dressing;upper body dressing  -     Row Name 03/22/21 0828          Grooming Assessment/Training    [place] Level (Grooming)  wash face, hands;supervision  -KF     Position (Grooming)  unsupported standing;sink side  -     Row Name 03/22/21 0828          Toileting Assessment/Training    [place] Level (Toileting)  supervision;verbal cues  -KF     Assistive Devices (Toileting)   commode  -KF     Position (Toileting)  supported sitting;supported standing  -KF     Row Name 03/22/21 0828          Lower Body Dressing Assessment/Training    [place] Level (Lower Body Dressing)  don;pants/bottoms;set up  -KF     Position (Lower Body Dressing)  supported sitting;unsupported standing  -KF     Row Name 03/22/21 0828          Upper Body Dressing Assessment/Training    [place] Level (Upper Body Dressing)  don;front opening garment;set up  -KF     Position (Upper Body Dressing)  edge of bed sitting  -KF       User Key  (r) = Recorded By, (t) = Taken By, (c) = Cosigned By    Initials Name Provider Type    KF Liz Segura, OT Occupational Therapist        Obj/Interventions     Row Name 03/22/21 0848          Sensory Assessment (Somatosensory)    Sensory Assessment (Somatosensory)  UE sensation intact  -KF     Row Name 03/22/21 0848          Vision Assessment/Intervention    Visual Impairment/Limitations  WNL  -KF     Row Name 03/22/21 0848          Range of Motion Comprehensive    General Range of Motion  bilateral upper extremity ROM WFL  -KF     Row Name 03/22/21 0848          Strength Comprehensive (MMT)    Comment, General Manual Muscle Testing (MMT) Assessment  deferred due to sternal precautions  -KF     Row Name 03/22/21 0848          Balance    Balance Assessment  sitting static balance;sitting dynamic balance;standing static balance;standing dynamic balance  -KF     Static Sitting Balance  WNL;unsupported;sitting, edge of bed  -KF     Dynamic Sitting Balance  WNL;unsupported;sitting, edge of bed  -KF     Static Standing Balance  WFL;supported;unsupported;standing  -KF     Dynamic Standing Balance  mild impairment;supported;unsupported;standing  -KF     Balance Interventions  standing;supported;weight shifting activity;occupation based/functional task  -KF     Comment, Balance  minimal lean back during one transfer but Pt refused to use FWW  -KF       User Key  (r) = Recorded By,  (t) = Taken By, (c) = Cosigned By    Initials Name Provider Type    Liz Gregory OT Occupational Therapist        Goals/Plan     Row Name 03/22/21 0856          Transfer Goal 1 (OT)    Activity/Assistive Device (Transfer Goal 1, OT)  bed-to-chair/chair-to-bed;toilet  -KF     [facility] Level/Cues Needed (Transfer Goal 1, OT)  modified independence  -KF     Time Frame (Transfer Goal 1, OT)  long term goal (LTG);10 days  -KF     Progress/Outcome (Transfer Goal 1, OT)  goal ongoing  -KF     Row Name 03/22/21 0856          Bathing Goal 1 (OT)    Activity/Device (Bathing Goal 1, OT)  upper body bathing with sternal precautions  -KF     [facility] Level/Cues Needed (Bathing Goal 1, OT)  supervision required;verbal cues required;set-up required  -KF     Time Frame (Bathing Goal 1, OT)  long term goal (LTG);10 days  -KF     Row Name 03/22/21 0856          Therapy Assessment/Plan (OT)    Planned Therapy Interventions (OT)  activity tolerance training;patient/caregiver education/training;adaptive equipment training;BADL retraining;ROM/therapeutic exercise;occupation/activity based interventions;cognitive/visual perception retraining;strengthening exercise;transfer/mobility retraining;functional balance retraining  -KF       User Key  (r) = Recorded By, (t) = Taken By, (c) = Cosigned By    Initials Name Provider Type    Liz Gregory OT Occupational Therapist        Clinical Impression     Row Name 03/22/21 0852          Pain Assessment    Additional Documentation  Pain Scale: Numbers Pre/Post-Treatment (Group)  -     Row Name 03/22/21 0852          Pain Scale: Numbers Pre/Post-Treatment    Pretreatment Pain Rating  4/10  -KF     Posttreatment Pain Rating  4/10  -KF     Pain Location - Side  Right  -KF     Pain Location - Orientation  incisional  -KF     Pain Location  chest  -KF     Pre/Posttreatment Pain Comment  R LE and chest incisional  -KF     Pain Intervention(s)  Ambulation/increased  activity;Repositioned  -KF     Row Name 03/22/21 0852          Plan of Care Review    Plan of Care Reviewed With  patient  -KF     Progress  improving  -KF     Outcome Summary  OT eval completed Pt presents with deficits in strength, safety awareness and compliance for sternal precautions, and balance for ADL completion, completed toileting tasks setup/SUP, completed transfers CGA with cues for sternal precautions, completed bed mob rolling to L side with Blessing and sidelying to sit with Blessing and moments CGA due to replication of home environment, recom IPOT d/c would benefit from home with HHOT and 24/7 assist with walker  -KF     Row Name 03/22/21 0852          Therapy Assessment/Plan (OT)    Rehab Potential (OT)  fair, will monitor progress closely  -KF     Criteria for Skilled Therapeutic Interventions Met (OT)  yes;meets criteria;skilled treatment is necessary  -KF     Therapy Frequency (OT)  daily  -KF     Row Name 03/22/21 0852          Therapy Plan Review/Discharge Plan (OT)    Anticipated Discharge Disposition (OT)  home with 24/7 care;home with home health;home with assist  -KF     Row Name 03/22/21 0852          Vital Signs    Pre Systolic BP Rehab  137 RN cleared VSS  -KF     Pre Treatment Diastolic BP  69  -KF     Pre SpO2 (%)  92  -KF     O2 Delivery Pre Treatment  room air  -KF     Post SpO2 (%)  97  -KF     O2 Delivery Post Treatment  room air  -KF     Pre Patient Position  Supine  -KF     Intra Patient Position  Standing  -KF     Post Patient Position  Sitting  -KF     Rest Breaks   1  -KF     Row Name 03/22/21 0852          Positioning and Restraints    Pre-Treatment Position  in bed  -KF     Post Treatment Position  chair  -KF     In Chair  notified nsg;reclined;sitting;call light within reach;encouraged to call for assist;waffle cushion;legs elevated exit alarm waved by RN  -KF       User Key  (r) = Recorded By, (t) = Taken By, (c) = Cosigned By    Initials Name Provider Type    Liz Gregory  VLADIMIR, OT Occupational Therapist        Outcome Measures     Row Name 03/22/21 0858          How much help from another is currently needed...    Putting on and taking off regular lower body clothing?  4  -KF     Bathing (including washing, rinsing, and drying)  3  -KF     Toileting (which includes using toilet bed pan or urinal)  4  -KF     Putting on and taking off regular upper body clothing  3  -KF     Taking care of personal grooming (such as brushing teeth)  4  -KF     Eating meals  4  -KF     AM-PAC 6 Clicks Score (OT)  22  -KF     Row Name 03/22/21 0858          Functional Assessment    Outcome Measure Options  AM-PAC 6 Clicks Daily Activity (OT)  -KF       User Key  (r) = Recorded By, (t) = Taken By, (c) = Cosigned By    Initials Name Provider Type    KF Liz Segura OT Occupational Therapist        Occupational Therapy Education                 Title: PT OT SLP Therapies (In Progress)     Topic: Occupational Therapy (In Progress)     Point: ADL training (In Progress)     Description:   Instruct learner(s) on proper safety adaptation and remediation techniques during self care or transfers.   Instruct in proper use of assistive devices.              Learning Progress Summary           Patient Acceptance, E,TB,D, NR by  at 3/22/2021 0858                   Point: Home exercise program (Not Started)     Description:   Instruct learner(s) on appropriate technique for monitoring, assisting and/or progressing therapeutic exercises/activities.              Learner Progress:  Not documented in this visit.          Point: Precautions (In Progress)     Description:   Instruct learner(s) on prescribed precautions during self-care and functional transfers.              Learning Progress Summary           Patient Acceptance, E,TB,D, NR by  at 3/22/2021 0858                   Point: Body mechanics (In Progress)     Description:   Instruct learner(s) on proper positioning and spine alignment during self-care,  functional mobility activities and/or exercises.              Learning Progress Summary           Patient Acceptance, E,TB,D, NR by  at 3/22/2021 0858                               User Key     Initials Effective Dates Name Provider Type Discipline     04/03/18 -  Liz Segura OT Occupational Therapist OT              OT Recommendation and Plan  Planned Therapy Interventions (OT): activity tolerance training, patient/caregiver education/training, adaptive equipment training, BADL retraining, ROM/therapeutic exercise, occupation/activity based interventions, cognitive/visual perception retraining, strengthening exercise, transfer/mobility retraining, functional balance retraining  Therapy Frequency (OT): daily  Plan of Care Review  Plan of Care Reviewed With: patient  Progress: improving  Outcome Summary: OT eval completed Pt presents with deficits in strength, safety awareness and compliance for sternal precautions, and balance for ADL completion, completed toileting tasks setup/SUP, completed transfers CGA with cues for sternal precautions, completed bed mob rolling to L side with Blessing and sidelying to sit with Blessing and moments CGA due to replication of home environment, recom IPOT d/c would benefit from home with HHOT and 24/7 assist with walker     Time Calculation:   Time Calculation- OT     Row Name 03/22/21 0751             Time Calculation- OT    OT Start Time  0751  -KF      OT Received On  03/22/21  -      OT Goal Re-Cert Due Date  04/01/21  -         Timed Charges    17628 - OT Self Care/Mgmt Minutes  10  -KF        User Key  (r) = Recorded By, (t) = Taken By, (c) = Cosigned By    Initials Name Provider Type     Liz Segura OT Occupational Therapist        Therapy Charges for Today     Code Description Service Date Service Provider Modifiers Qty    [id number]  OT SELF CARE/MGMT/TRAIN EA 15 MIN 3/22/2021 Liz Segura OT GO 1    [id number]  OT EVAL MOD COMPLEXITY 3  3/22/2021 Liz Segura, OT GO 1               Liz Segura, OT  3/22/2021

## 2021-03-22 NOTE — PROGRESS NOTES
"William Villasenor     VITALS: Blood pressure 128/78, pulse 69, temperature 96.9 °F (36.1 °C), temperature source Temporal, height 175.3 cm (69.02\"), weight 100 kg (221 lb), SpO2 98 %.    Subjective  Chief Complaint  Diabetes    Subjective          History of Present Illness:  Patient is a 70 y.o.  male with medical conditions significant for hyperlipidemia, gout, GERD, type 2 diabetes who presents to clinic secondary to medical followup.  He has been having some epigastric pain that is occasionally bubbling up to his chest.    No complaints about any of the medications.    The following portions of the patient's history were reviewed and updated as appropriate: allergies, current medications, past family history, past medical history, past social history, past surgical history and problem list.    Past Medical History  Past Medical History:   Diagnosis Date   • Arthritis    • Back pain    • BPH (benign prostatic hyperplasia)    • Diabetes mellitus (CMS/HCC)    • Diabetic peripheral neuropathy (CMS/HCC)    • Erectile dysfunction    • Former smoker    • GERD (gastroesophageal reflux disease)    • GERD (gastroesophageal reflux disease)    • Gout    • High cholesterol    • Hypertension    • Obesity        Surgical History  Past Surgical History:   Procedure Laterality Date   • CARDIAC CATHETERIZATION N/A 3/15/2021    Procedure: Left Heart Cath;  Surgeon: Trent Zaragoza MD;  Location:  [facility];  Service: Cardiology;  Laterality: N/A;   • CARDIAC CATHETERIZATION N/A 3/15/2021    Procedure: Coronary angiography;  Surgeon: Trent Zaragoza MD;  Location:  [facility];  Service: Cardiology;  Laterality: N/A;   • COLONOSCOPY W/ BIOPSIES  2015   • CORONARY ARTERY BYPASS GRAFT N/A 3/16/2021    Procedure: MEDIAN STERNOTOMY, CORONARY ARTERY BYPASS GRAFT X4, UTILIZATION OF LEFT INTERNAL MAMMARY ARTERY, AND ENDOSCOPIC VEIN HARVEST OF RIGHT GREATER SAPHENOUS VEIN;  Surgeon: Jorge Simms MD;  " "Location: [facility] OR;  Service: Cardiothoracic;  Laterality: N/A;  vein out- 1614  vein ready- 1628               Family History  Family History   Problem Relation Age of Onset   • Heart disease Mother    • Hypertension Mother    • Depression Mother    • Alcohol abuse Maternal Uncle    • Heart disease Maternal Grandmother    • Hypertension Maternal Grandmother    • Diabetes Maternal Grandmother    • Diabetes Paternal Grandmother        Social History  Social History     Socioeconomic History   • Marital status:      Spouse name: Not on file   • Number of children: Not on file   • Years of education: Not on file   • Highest education level: Not on file   Tobacco Use   • Smoking status: Former Smoker     Packs/day: 1.00     Years: 5.00     Pack years: 5.00     Quit date: 1974     Years since quittin.1   • Smokeless tobacco: Never Used   Substance and Sexual Activity   • Alcohol use: Never   • Drug use: Never   • Sexual activity: Defer       Objective   Vital Signs:   /78 (BP Location: Left arm, Patient Position: Sitting)   Pulse 69   Temp 96.9 °F (36.1 °C) (Temporal)   Ht 175.3 cm (69.02\")   Wt 100 kg (221 lb)   SpO2 98%   BMI 32.62 kg/m²     Physical Exam     Gen: Patient in NAD. Pleasant and answers appropriately. A&Ox3.    Skin: Warm and dry with normal turgor. No purpura, rashes, or unusual pigmentation noted. Hair is normal in appearance and distribution.    HEENT: NC/AT. No lesions noted. Conjunctiva clear, sclera nonicteric. PERRL. EOMI without nystagmus or strabismus. Fundi appear benign. No hemorrhages or exudates of eyes. Auditory canals are patent bilaterally without lesions. TMs intact,  nonerythematous, nonbulging without lesions. Nasal mucosa pink, nonerythematous, and nonedematous. Frontal and maxillary sinuses are nontender. O/P nonerythematous and moist without exudate.    Neck: Supple without lymph nodes palpated. FROM.     Lungs: CTA B/L without rales, rhonchi, crackles, or " wheezes.    Heart: RRR. S1 and S2 normal. No S3 or S4. No MRGT.    Abd: Soft, nontender,nondistended. (+)BSx4 quadrants.     Extrem: No CCE. Radial pulses 2+/4 and equal B/L. FROMx4. No bone, joint, or muscle tenderness noted.    Neuro: No focal motor/sensory deficits.    Procedures    Result Review :   The following data was reviewed by: Jacqueline Gatica MD on 03/05/2021:                Assessment and Plan    William Villasenor is a 70 y.o. here for medical followup.    Problem List Items Addressed This Visit        Cardiac and Vasculature    Hyperlipidemia associated with type 2 diabetes mellitus (CMS/HCC) (Chronic)    Relevant Orders    Comprehensive Metabolic Panel (Completed)    Lipid Panel (Completed)       Gastrointestinal Abdominal     GERD (gastroesophageal reflux disease) (Chronic)    Relevant Orders    Hemoglobin A1c (Completed)    Comprehensive Metabolic Panel (Completed)       Musculoskeletal and Injuries    Idiopathic chronic gout of multiple sites without tophus (Chronic)    Relevant Orders    Comprehensive Metabolic Panel (Completed)    Uric Acid (Completed)      Other Visit Diagnoses     Midsternal chest pain    -  Primary    Relevant Orders    ECG 12 Lead    H. Pylori IgM, Blood (Completed)    Troponin (Completed)    Type 2 diabetes mellitus with diabetic polyneuropathy, without long-term current use of insulin (CMS/HCC)        Relevant Medications    gabapentin (NEURONTIN) 300 MG capsule    Other Relevant Orders    Hemoglobin A1c (Completed)    Comprehensive Metabolic Panel (Completed)    Magnesium (Completed)            Patient's Body mass index is 32.62 kg/m². BMI is above normal parameters. Recommendations include: exercise counseling and nutrition counseling.                 Follow Up   Return in about 4 weeks (around 4/2/2021).  Findings and plans discussed with patient who verbalizes understanding and agreement. Will followup with patient once results are in. Patient was given instructions and  counseling regarding his condition or for health maintenance advice. Please see specific information pulled into the AVS if appropriate.       Jacqueline Gatica MD    EMR Dragon/Transcription Disclaimer:  Much of this encounter note is an electronic transcription/translation of spoken language to printed text.

## 2021-03-22 NOTE — PROGRESS NOTES
Case Management Discharge Note      Final Note: Pt to be discharged home today with wife. CM spoke with pt and wife and pt has been ambulating with RW and does not have DME at home. CM has ordered walker and bedside commode for pt, no preference to DME provider. [employer] contacted and will deliver DME to bedside. Therapy recommends home health services and pt and wife are agreeable. CM has spoke with JADA Austin and he has approved home health order for therapy services. Pt and wife do not have a preference to HH provider, CM spoke with Juvenal at [facility] and orders have been faxed. Pt and wife deny further discharge needs at this time and pt will have private transportation home.    Provided Post Acute Provider List?: Yes  Post Acute Provider List: Home Health  Provided Post Acute Provider Quality & Resource List?: N/A  Delivered To: Support Person  Support Person: wife  Method of Delivery: Telephone    Selected Continued Care - Admitted Since 3/15/2021         Home Medical Care     Service Provider Selected Services Address Phone Fax Patient Preferred    [facility] [address] [facility] [id number] [phone] --                       Final Discharge Disposition Code: 06 - home with home health care

## 2021-03-22 NOTE — PROGRESS NOTES
Cardiothoracic Surgery Progress Note      POD 6 s/p CABG x 4     LOS: 7 days      Subjective:  VSS. Laying in bed. Admits to some incisional site tenderness, but otherwise no acute complaints. Looks as though he had discharge orders placed yesterday, but were cancelled due to wishes of cardiology, stating the need for bowel management and diuresis. He does admit to have had a bowel movement last night. Ambulating with PT.     Objective:  Vital Signs  Temp:  [97.7 °F (36.5 °C)-98.4 °F (36.9 °C)] 97.7 °F (36.5 °C)  Heart Rate:  [] 94  Resp:  [18-20] 18  BP: (128-148)/(70-86) 128/70    Physical Exam:   General Appearance: alert, appears stated age and cooperative   Lungs: clear to auscultation, respirations regular, respirations even and respirations unlabored   Heart: regular rhythm & normal rate, normal S1, S2 and no murmur, no gallop, no rub   Skin: Incision c/d/i   1+ pedal edema bilaterally  Results:    Results from last 7 days   Lab Units 03/22/21  0503   WBC 10*3/mm3 3.87   HEMOGLOBIN g/dL 10.1*   HEMATOCRIT % 34.5*   PLATELETS 10*3/mm3 176     Results from last 7 days   Lab Units 03/21/21  0548   SODIUM mmol/L 137   POTASSIUM mmol/L 4.1   CHLORIDE mmol/L 100   CO2 mmol/L 25.0   BUN mg/dL 31*   CREATININE mg/dL 1.23   GLUCOSE mg/dL 140*   CALCIUM mg/dL 8.1*       Assessment:  POD # 6 s/p CABG x 4    Plan:  Discharge home if ok with cardiology  Diuresis  Ambulate  Pulmonary toilet  ASA, plavix, statin, BB  On Protonix    Sam Albright PA-C  03/22/21  07:33 EDT

## 2021-03-22 NOTE — DISCHARGE PLACEMENT REQUEST
"Khalif Villasenor (70 y.o. Male)     Date of Birth Social Security Number Address Home Phone MRN    1950  [address] [place] [address] [phone] [id number]    [employer] Marital Status          Non-Holiness        Admission Date Admission Type Admitting Provider Attending Provider Department, Room/Bed    3/15/21 Urgent Jorge Simms MD Mitchell, Robert O, MD [address], [address]/1    Discharge Date Discharge Disposition Discharge Destination         Home or Self Care              Attending Provider: Jorge Simms MD    Allergies: No Known Allergies    Isolation: None   Infection: None   Code Status: CPR    Ht: 175.3 cm (69.02\")   Wt: 99.8 kg (220 lb 1 oz)    Admission Cmt: None   Principal Problem: Type 2 diabetes mellitus with hyperglycemia, without long-term current use of insulin (CMS/[facility]) [E11.65]                 Active Insurance as of 3/15/2021     Primary Coverage     Payor Plan Insurance Group Employer/Plan Group    MEDICARE MEDICARE A & B      Payor Plan Address Payor Plan Phone Number Payor Plan Fax Number Effective Dates    [address] [phone]  8/1/2012 - None Entered    [facility] [id number]       Subscriber Name Subscriber Birth Date Member ID       KHALIF VILLASENOR 1950 [id number]           Secondary Coverage     Payor Plan Insurance Group Employer/Plan Group    BANKERS FIDELITY BANKERS FIDELITY      Payor Plan Address Payor Plan Phone Number Payor Plan Fax Number Effective Dates    [address] [phone]  12/1/2015 - None Entered    [facility] [id number]       Subscriber Name Subscriber Birth Date Member ID       KHALIF VILLASENOR 1950 [id number]                 Emergency Contacts      (Rel.) Home Phone Work Phone Mobile Phone    Perri Villasenor (Spouse) [phone] -- [phone]               Physical Therapy Notes (last 72 hours) (Notes from 03/19/21 1410 through 03/22/21 1410)      Mariah Fulton, PT at 03/20/21 0905  " Version 1 of 1         Problem: Adult Inpatient Plan of Care  Goal: Plan of Care Review  Recent Flowsheet Documentation  Taken 3/20/2021 1335 by Mariah Fulton PT  Plan of Care Reviewed With: patient  Outcome Summary: patient is able to ambulate 220 ft with rolling walker and min assist. with fatigue patient tends to get weight going forward and gets walker too far from his body he also has increased gait speed and needs cues for safety. patient again has audible wheezing with activity.   Goal Outcome Evaluation:  Plan of Care Reviewed With: patient  Progress: improving  Outcome Summary: patient is able to ambulate 220 ft with rolling walker and min assist. with fatigue patient tends to get weight going forward and gets walker too far from his body he also has increased gait speed and needs cues for safety. patient again has audible wheezing with activity.    Electronically signed by Mariah Fulton PT at 21 1339     Mariah Fulton PT at 21 0905  Version 1 of          Patient Name: William Villasenor  : 1950    MRN: [id number]                              Today's Date: 3/20/2021       Admit Date: 3/15/2021    Visit Dx: No diagnosis found.  Patient Active Problem List   Diagnosis   • Idiopathic chronic gout of multiple sites without tophus   • Type 2 diabetes mellitus with hyperglycemia, without long-term current use of insulin (CMS/[facility])   • Hypertension associated with diabetes (CMS/[facility])   • Hyperlipidemia associated with type 2 diabetes mellitus (CMS/[facility])   • Benign prostatic hyperplasia without lower urinary tract symptoms   • Chest pain   • Obesity (BMI 30-39.9)   • GERD (gastroesophageal reflux disease)   • Diabetic neuropathy (CMS/[facility])   • Former smoker   • History of gout   • Coronary artery disease involving native coronary artery of native heart with angina pectoris (CMS/[facility])   • CAD (coronary artery disease)     Past Medical History:   Diagnosis Date   • Arthritis    • Back pain     • BPH (benign prostatic hyperplasia)    • Diabetes mellitus (CMS/HCC)    • Diabetic peripheral neuropathy (CMS/HCC)    • Erectile dysfunction    • Former smoker    • GERD (gastroesophageal reflux disease)    • GERD (gastroesophageal reflux disease)    • Gout    • High cholesterol    • Hypertension    • Obesity      Past Surgical History:   Procedure Laterality Date   • CARDIAC CATHETERIZATION N/A 3/15/2021    Procedure: Left Heart Cath;  Surgeon: Trent Zaragoza MD;  Location:  [facility];  Service: Cardiology;  Laterality: N/A;   • CARDIAC CATHETERIZATION N/A 3/15/2021    Procedure: Coronary angiography;  Surgeon: Trent Zaragoza MD;  Location:  [facility] LOCATION;  Service: Cardiology;  Laterality: N/A;   • COLONOSCOPY W/ BIOPSIES  2015   • CORONARY ARTERY BYPASS GRAFT N/A 3/16/2021    Procedure: MEDIAN STERNOTOMY, CORONARY ARTERY BYPASS GRAFT X4, UTILIZATION OF LEFT INTERNAL MAMMARY ARTERY, AND ENDOSCOPIC VEIN HARVEST OF RIGHT GREATER SAPHENOUS VEIN;  Surgeon: Jorge Simms MD;  Location: [facility] OR;  Service: Cardiothoracic;  Laterality: N/A;  vein out- 1614  vein ready- 1628             General Information     Row Name 03/20/21 1331          Physical Therapy Time and Intention    Document Type  therapy note (daily note)  -MCKENZIE     Mode of Treatment  physical therapy  -MCKENZIE     Row Name 03/20/21 1331          General Information    Patient Profile Reviewed  yes  -MCKENZIE     Prior Level of Function  independent:;gait;transfer;bed mobility;ADL's  -MCKENZIE     Existing Precautions/Restrictions  cardiac;fall;sternal  -MCKENZIE     Barriers to Rehab  none identified  -MCKENZIE     Row Name 03/20/21 1331          Living Environment    Lives With  spouse  -MCKENZIE     Row Name 03/20/21 1331          Home Main Entrance    Number of Stairs, Main Entrance  two  -MCKENZIE     Stair Railings, Main Entrance  none  -MCKENZIE     Row Name 03/20/21 1331          Cognition    Orientation Status (Cognition)  oriented x 4  -MCKENZIE     Row  Name 03/20/21 1331          Safety Issues, Functional Mobility    Safety Issues Affecting Function (Mobility)  safety precautions follow-through/compliance;safety precaution awareness  -MCKENZIE     Impairments Affecting Function (Mobility)  balance;endurance/activity tolerance;shortness of breath;strength  -MCKENZIE       User Key  (r) = Recorded By, (t) = Taken By, (c) = Cosigned By    Initials Name Provider Type    Mariah Hernandez PT Physical Therapist        Mobility     Row Name 03/20/21 1332          Bed Mobility    Comment (Bed Mobility)  patient is OOB and returns to the chair  -MCKENZIE     Row Name 03/20/21 1332          Bed-Chair Transfer    Bed-Chair [place] (Transfers)  minimum assist (75% patient effort)  -MCKENZIE     Assistive Device (Bed-Chair Transfers)  walker, front-wheeled  -MCKENZIE     Row Name 03/20/21 1332          Sit-Stand Transfer    Sit-Stand [place] (Transfers)  minimum assist (75% patient effort)  -MCKENZIE     Assistive Device (Sit-Stand Transfers)  walker, front-wheeled  -MCKENZIE     Row Name 03/20/21 1332          Gait/Stairs (Locomotion)    [place] Level (Gait)  minimum assist (75% patient effort)  -MCKENZIE     Assistive Device (Gait)  walker, front-wheeled  -MCKENZIE     Deviations/Abnormal Patterns (Gait)  stride length decreased;base of support, wide  -MCKENZIE     Bilateral Gait Deviations  forward flexed posture  -MCKENZIE     Comment (Gait/Stairs)  with fatigue patient tends to get weight shifted forward with increased gait speed with less control getting walker too far from his body. patient required 2 standing rests for SOA  -MCKENZIE       User Key  (r) = Recorded By, (t) = Taken By, (c) = Cosigned By    Initials Name Provider Type    Mariah Hernandez PT Physical Therapist        Obj/Interventions     Row Name 03/20/21 1334          Balance    Static Sitting Balance  WFL  -MCKENZIE     Dynamic Sitting Balance  WFL  -MCKENZIE     Static Standing Balance  mild impairment  -MCKENZIE     Dynamic Standing Balance  mild impairment  -MCKENZIE      Balance Interventions  standing;dynamic;weight shifting activity  -MCKENZIE       User Key  (r) = Recorded By, (t) = Taken By, (c) = Cosigned By    Initials Name Provider Type    Mariah Hernandez, PT Physical Therapist        Goals/Plan    No documentation.       Clinical Impression     Row Name 03/20/21 5940          Pain Scale: Numbers Pre/Post-Treatment    Pretreatment Pain Rating  2/10  -MCKENZIE     Posttreatment Pain Rating  3/10  -MCKENZIE     Pain Location - Orientation  incisional  -MCKENZIE     Pain Location  chest  -MCKENZIE     Pain Intervention(s)  Repositioned;Ambulation/increased activity;Splinting  -MCEKNZIE     Row Name 03/20/21 4624          Plan of Care Review    Plan of Care Reviewed With  patient  -MCKENZIE     Outcome Summary  patient is able to ambulate 220 ft with rolling walker and min assist. with fatigue patient tends to get weight going forward and gets walker too far from his body he also has increased gait speed and needs cues for safety. patient again has audible wheezing with activity.  -     Row Name 03/20/21 0396          Therapy Assessment/Plan (PT)    Patient/Family Therapy Goals Statement (PT)  return to OF  -MCKENZIE     Rehab Potential (PT)  good, to achieve stated therapy goals  -MCKENZIE     Criteria for Skilled Interventions Met (PT)  yes;skilled treatment is necessary  -MCKENZIE     Row Name 03/20/21 4134          Vital Signs    Pre Systolic BP Rehab  154  -MCKENZIE     Pre Treatment Diastolic BP  74  -MCKENZIE     Post Systolic BP Rehab  (S) 170  -MCKENZIE     Post Treatment Diastolic BP  (S) 93 RN notified  -MCKENZIE     Pretreatment Heart Rate (beats/min)  100  -MCKENZIE     Posttreatment Heart Rate (beats/min)  110  -MCKENZIE     Pre SpO2 (%)  91  -MCKENZIE     O2 Delivery Pre Treatment  room air  -MCKENZIE     Intra SpO2 (%)  88  -MCKENZIE     O2 Delivery Intra Treatment  room air  -MCKENZIE     Post SpO2 (%)  91  -MCKENZIE     O2 Delivery Post Treatment  room air  -MCKENZIE     Pre Patient Position  Sitting  -MCKENZIE     Intra Patient Position  Standing  -MCKENZIE     Post Patient Position   Sitting  -MCKENZIE     Row Name 03/20/21 1335          Positioning and Restraints    Pre-Treatment Position  sitting in chair/recliner  -MCKENZIE     Post Treatment Position  chair  -MCKENZIE     In Chair  notified nsg;reclined;sitting;call light within reach;with nsg  -MCKENZIE       User Key  (r) = Recorded By, (t) = Taken By, (c) = Cosigned By    Initials Name Provider Type    Mariah Hernandez PT Physical Therapist        Outcome Measures     Row Name 03/20/21 1338          How much help from another person do you currently need...    Turning from your back to your side while in flat bed without using bedrails?  3  -MCKENZIE     Moving from lying on back to sitting on the side of a flat bed without bedrails?  2  -MCKENZIE     Moving to and from a bed to a chair (including a wheelchair)?  3  -MCKENZIE     Standing up from a chair using your arms (e.g., wheelchair, bedside chair)?  3  -MCKENZIE     Climbing 3-5 steps with a railing?  2  -MCKENZIE     To walk in hospital room?  3  -MCKENZIE     AM-PAC 6 Clicks Score (PT)  16  -MCKENZIE       User Key  (r) = Recorded By, (t) = Taken By, (c) = Cosigned By    Initials Name Provider Type    Mariah Hernandez PT Physical Therapist        Physical Therapy Education                 Title: PT OT SLP Therapies (In Progress)     Topic: Physical Therapy (In Progress)     Point: Mobility training (In Progress)     Learning Progress Summary           Patient Acceptance, E, NR by MCKENZIE at 3/20/2021 0905    Acceptance, E, NR by MCKENZIE at 3/19/2021 0920    Acceptance, E, NR by KG at 3/18/2021 1024    Acceptance, E, NR by KG at 3/17/2021 1006                   Point: Home exercise program (In Progress)     Learning Progress Summary           Patient Acceptance, E, NR by MCKENZIE at 3/20/2021 0905    Acceptance, E, NR by MCKENZIE at 3/19/2021 0920    Acceptance, E, NR by KG at 3/18/2021 1024    Acceptance, E, NR by KG at 3/17/2021 1006                   Point: Body mechanics (In Progress)     Learning Progress Summary           Patient Acceptance, E, NR by  MCKENZIE at 3/20/2021 0905    Acceptance, E, NR by MCKENZIE at 3/19/2021 0920    Acceptance, E, NR by KG at 3/18/2021 1024    Acceptance, E, NR by KG at 3/17/2021 1006                   Point: Precautions (In Progress)     Learning Progress Summary           Patient Acceptance, E, NR by MCKENZIE at 3/20/2021 0905    Acceptance, E, NR by MCKENZIE at 3/19/2021 0920    Acceptance, E, NR by KG at 3/18/2021 1024    Acceptance, E, NR by KG at 3/17/2021 1006                               User Key     Initials Effective Dates Name Provider Type Discipline    MCKENZIE 06/19/15 -  Mariah Fulton, PT Physical Therapist PT    KG 05/22/20 -  Adwoa Otto PT Physical Therapist PT              PT Recommendation and Plan  Planned Therapy Interventions (PT): balance training, bed mobility training, gait training, home exercise program, transfer training, strengthening  Plan of Care Reviewed With: patient  Progress: improving  Outcome Summary: patient is able to ambulate 220 ft with rolling walker and min assist. with fatigue patient tends to get weight going forward and gets walker too far from his body he also has increased gait speed and needs cues for safety. patient again has audible wheezing with activity.     Time Calculation:   PT Charges     Row Name 03/20/21 1339             Time Calculation    Start Time  0905  -      PT Received On  03/20/21  -      PT Goal Re-Cert Due Date  03/27/21  -         Time Calculation- PT    Total Timed Code Minutes- PT  17 minute(s)  -         Timed Charges    26812 - PT Therapeutic Activity Minutes  17  -MCKENZIE        User Key  (r) = Recorded By, (t) = Taken By, (c) = Cosigned By    Initials Name Provider Type    Mariah Hernandez, PT Physical Therapist        Therapy Charges for Today     Code Description Service Date Service Provider Modifiers Qty    64611020443 HC PT THERAPEUTIC ACT EA 15 MIN 3/19/2021 Mariah Fulton, PT GP 2    57493539390 HC PT THERAPEUTIC ACT EA 15 MIN 3/20/2021 Kirstin  Mariah GRIGGS, PT GP 1          PT G-Codes  Outcome Measure Options: AM-PAC 6 Clicks Basic Mobility (PT)  AM-PAC 6 Clicks Score (PT): 16    Mariah Fulton PT  3/20/2021      Electronically signed by Mariah Fulton PT at 21 1340          Occupational Therapy Notes (last 24 hours) (Notes from 21 1410 through 21 1410)      Liz Segura, OT at 21 0751          Patient Name: William Villasenor  : 1950    MRN: [id number]                              Today's Date: 3/22/2021       Admit Date: 3/15/2021    Visit Dx:     ICD-10-CM ICD-9-CM   1. Other diabetic neurological complication associated with type 2 diabetes mellitus (CMS/[facility])  E11.49 250.60   2. Type 2 diabetes mellitus with hyperglycemia, without long-term current use of insulin (CMS/[facility])  E11.65 250.00     790.29   3. Coronary artery disease involving native coronary artery of native heart with angina pectoris (CMS/[facility])  I25.119 414.01     413.9   4. History of gout  Z87.39 V12.29   5. Gastroesophageal reflux disease without esophagitis  K21.9 530.81   6. Obesity (BMI 30-39.9)  E66.9 278.00   7. Benign prostatic hyperplasia without lower urinary tract symptoms  N40.0 600.00   8. Hyperlipidemia associated with type 2 diabetes mellitus (CMS/[facility])  E11.69 250.80    E78.5 272.4   9. Hypertension associated with diabetes (CMS/[facility])  E11.59 250.80    I15.2 401.9   10. Idiopathic chronic gout of multiple sites without tophus  M1A.09X0 274.02   11. Coronary artery disease involving native heart with angina pectoris, unspecified vessel or lesion type (CMS/[facility])  I25.119 414.01     413.9   12. Seasonal allergic rhinitis due to other allergic trigger  J30.89 477.8     Patient Active Problem List   Diagnosis   • Idiopathic chronic gout of multiple sites without tophus   • Type 2 diabetes mellitus with hyperglycemia, without long-term current use of insulin (CMS/[facility])   • Hypertension associated with diabetes (CMS/[facility])   • Hyperlipidemia  associated with type 2 diabetes mellitus (CMS/HCC)   • Benign prostatic hyperplasia without lower urinary tract symptoms   • Chest pain   • Obesity (BMI 30-39.9)   • GERD (gastroesophageal reflux disease)   • Diabetic neuropathy (CMS/HCC)   • Former smoker   • History of gout   • Coronary artery disease involving native coronary artery of native heart with angina pectoris (CMS/HCC)   • CAD (coronary artery disease)   • IZA (acute kidney injury) (CMS/HCC)     Past Medical History:   Diagnosis Date   • Arthritis    • Back pain    • BPH (benign prostatic hyperplasia)    • Diabetes mellitus (CMS/HCC)    • Diabetic peripheral neuropathy (CMS/HCC)    • Erectile dysfunction    • Former smoker    • GERD (gastroesophageal reflux disease)    • GERD (gastroesophageal reflux disease)    • Gout    • High cholesterol    • Hypertension    • Obesity      Past Surgical History:   Procedure Laterality Date   • CARDIAC CATHETERIZATION N/A 3/15/2021    Procedure: Left Heart Cath;  Surgeon: Trent Zaragoza MD;  Location:  [facility];  Service: Cardiology;  Laterality: N/A;   • CARDIAC CATHETERIZATION N/A 3/15/2021    Procedure: Coronary angiography;  Surgeon: Trent Zaragoza MD;  Location:  [facility];  Service: Cardiology;  Laterality: N/A;   • COLONOSCOPY W/ BIOPSIES  2015   • CORONARY ARTERY BYPASS GRAFT N/A 3/16/2021    Procedure: MEDIAN STERNOTOMY, CORONARY ARTERY BYPASS GRAFT X4, UTILIZATION OF LEFT INTERNAL MAMMARY ARTERY, AND ENDOSCOPIC VEIN HARVEST OF RIGHT GREATER SAPHENOUS VEIN;  Surgeon: Jorge Simms MD;  Location: [facility] hospital;  Service: Cardiothoracic;  Laterality: N/A;  vein out- 1614  vein ready- 1628             General Information     Row Name 03/22/21 0825          OT Time and Intention    Document Type  evaluation  -KF     Mode of Treatment  occupational therapy  -KF     Row Name 03/22/21 0825          General Information    Patient Profile Reviewed  yes  -KF     Prior Level of  Function  independent:;transfer;bed mobility;ADL's;using stairs;driving;all household mobility drives semi trucks  -KF     Existing Precautions/Restrictions  cardiac;fall;sternal  -KF     Barriers to Rehab  none identified  -KF     Row Name 03/22/21 0825          Occupational Profile    Occupational History/Life Experiences (Occupational Profile)  drives semi trucks for occupation  -KF     Row Name 03/22/21 0825          Living Environment    Lives With  spouse  -KF     Row Name 03/22/21 0825          Home Main Entrance    Number of Stairs, Main Entrance  two  -KF     Stair Railings, Main Entrance  none  -KF     Row Name 03/22/21 0825          Stairs Within Home, Primary    Stairs, Within Home, Primary  basement steps but states doesn't have to use, tub shower but has bench  -KF     Number of Stairs, Within Home, Primary  none  -KF     Row Name 03/22/21 0825          Cognition    Orientation Status (Cognition)  oriented x 4  -KF     Row Name 03/22/21 0825          Safety Issues, Functional Mobility    Safety Issues Affecting Function (Mobility)  awareness of need for assistance;problem-solving;safety precaution awareness;safety precautions follow-through/compliance  -KF     Impairments Affecting Function (Mobility)  balance;endurance/activity tolerance;shortness of breath;strength  -KF     Comment, Safety Issues/Impairments (Mobility)  despite cues and explanation of sternal precautions continues to place hands to push up with transfers, did demonstrate initially ability to stand without assist with hands on knees  -KF       User Key  (r) = Recorded By, (t) = Taken By, (c) = Cosigned By    Initials Name Provider Type    KF Liz Segura OT Occupational Therapist          Mobility/ADL's     Row Name 03/22/21 0828          Bed Mobility    Bed Mobility  rolling left;scooting/bridging;sidelying-sit  -KF     Rolling Left [place] (Bed Mobility)  minimum assist (75% patient effort);verbal cues;nonverbal cues  (demo/gesture)  -KF     Scooting/Bridging [place] (Bed Mobility)  minimum assist (75% patient effort);nonverbal cues (demo/gesture);verbal cues  -KF     Sidelying-Sit [place] (Bed Mobility)  contact guard;nonverbal cues (demo/gesture);verbal cues  -KF     Bed Mobility, Safety Issues  decreased use of arms for pushing/pulling  -KF     Comment (Bed Mobility)  education provided on sequencing to complete this task from flat surface to replicate home environment overall [place] and requires cues for problem solving and sequencing  -KF     Row Name 03/22/21 0828          Transfers    Transfers  bed-chair transfer;sit-stand transfer;toilet transfer  -KF     Comment (Transfers)  throws walker aside with transfer back from toilet and demonstrates impulsivity and poor safety compliance  -KF     Bed-Chair [place] (Transfers)  contact guard;verbal cues  -KF     Assistive Device (Bed-Chair Transfers)  -- no AD  -KF     Sit-Stand [place] (Transfers)  contact guard;verbal cues with cues to maintain sternal precautions  -KF     [place] Level (Toilet Transfer)  supervision;verbal cues cues required to appropriately maintain sternal precautions  -KF     Assistive Device (Toilet Transfer)  commode;walker, front-wheeled  -KF     Row Name 03/22/21 0828          Sit-Stand Transfer    Assistive Device (Sit-Stand Transfers)  walker, front-wheeled  -KF     Row Name 03/22/21 0828          Toilet Transfer    Type (Toilet Transfer)  sit-stand;stand-sit  -KF     Row Name 03/22/21 0828          Functional Mobility    Functional Mobility- Ind. Level  contact guard assist;verbal cues required  -KF     Functional Mobility- Device  rolling walker initially with AD  -KF     Functional Mobility-Distance (Feet)  30  -KF     Functional Mobility- Safety Issues  step length decreased;sequencing ability decreased  -KF     Row Name 03/22/21 0828          Activities of Daily Living    BADL Assessment/Intervention   grooming;toileting;lower body dressing;upper body dressing  -KF     Row Name 03/22/21 0828          Grooming Assessment/Training    [place] Level (Grooming)  wash face, hands;supervision  -KF     Position (Grooming)  unsupported standing;sink side  -KF     Row Name 03/22/21 0828          Toileting Assessment/Training    [place] Level (Toileting)  supervision;verbal cues  -     Assistive Devices (Toileting)  commode  -KF     Position (Toileting)  supported sitting;supported standing  -KF     Row Name 03/22/21 0828          Lower Body Dressing Assessment/Training    [place] Level (Lower Body Dressing)  don;pants/bottoms;set up  -KF     Position (Lower Body Dressing)  supported sitting;unsupported standing  -KF     Row Name 03/22/21 0828          Upper Body Dressing Assessment/Training    [place] Level (Upper Body Dressing)  don;front opening garment;set up  -KF     Position (Upper Body Dressing)  edge of bed sitting  -KF       User Key  (r) = Recorded By, (t) = Taken By, (c) = Cosigned By    Initials Name Provider Type    KF Liz Segura, OT Occupational Therapist        Obj/Interventions     Row Name 03/22/21 0848          Sensory Assessment (Somatosensory)    Sensory Assessment (Somatosensory)  UE sensation intact  -     Row Name 03/22/21 0848          Vision Assessment/Intervention    Visual Impairment/Limitations  WNL  -     Row Name 03/22/21 0848          Range of Motion Comprehensive    General Range of Motion  bilateral upper extremity ROM WFL  -     Row Name 03/22/21 0848          Strength Comprehensive (MMT)    Comment, General Manual Muscle Testing (MMT) Assessment  deferred due to sternal precautions  -     Row Name 03/22/21 0848          Balance    Balance Assessment  sitting static balance;sitting dynamic balance;standing static balance;standing dynamic balance  -KF     Static Sitting Balance  WNL;unsupported;sitting, edge of bed  -KF     Dynamic Sitting Balance   WNL;unsupported;sitting, edge of bed  -KF     Static Standing Balance  WFL;supported;unsupported;standing  -KF     Dynamic Standing Balance  mild impairment;supported;unsupported;standing  -KF     Balance Interventions  standing;supported;weight shifting activity;occupation based/functional task  -KF     Comment, Balance  minimal lean back during one transfer but Pt refused to use FWW  -KF       User Key  (r) = Recorded By, (t) = Taken By, (c) = Cosigned By    Initials Name Provider Type    Liz Gregory OT Occupational Therapist        Goals/Plan     Row Name 03/22/21 0856          Transfer Goal 1 (OT)    Activity/Assistive Device (Transfer Goal 1, OT)  bed-to-chair/chair-to-bed;toilet  -KF     [place] Level/Cues Needed (Transfer Goal 1, OT)  modified independence  -KF     Time Frame (Transfer Goal 1, OT)  long term goal (LTG);10 days  -KF     Progress/Outcome (Transfer Goal 1, OT)  goal ongoing  -KF     Row Name 03/22/21 0856          Bathing Goal 1 (OT)    Activity/Device (Bathing Goal 1, OT)  upper body bathing with sternal precautions  -KF     [place] Level/Cues Needed (Bathing Goal 1, OT)  supervision required;verbal cues required;set-up required  -KF     Time Frame (Bathing Goal 1, OT)  long term goal (LTG);10 days  -KF     Row Name 03/22/21 0856          Therapy Assessment/Plan (OT)    Planned Therapy Interventions (OT)  activity tolerance training;patient/caregiver education/training;adaptive equipment training;BADL retraining;ROM/therapeutic exercise;occupation/activity based interventions;cognitive/visual perception retraining;strengthening exercise;transfer/mobility retraining;functional balance retraining  -KF       User Key  (r) = Recorded By, (t) = Taken By, (c) = Cosigned By    Initials Name Provider Type    Liz Gregory OT Occupational Therapist        Clinical Impression     Row Name 03/22/21 0852          Pain Assessment    Additional Documentation  Pain Scale: Numbers  Pre/Post-Treatment (Group)  -KF     Row Name 03/22/21 0852          Pain Scale: Numbers Pre/Post-Treatment    Pretreatment Pain Rating  4/10  -KF     Posttreatment Pain Rating  4/10  -KF     Pain Location - Side  Right  -KF     Pain Location - Orientation  incisional  -KF     Pain Location  chest  -KF     Pre/Posttreatment Pain Comment  R LE and chest incisional  -KF     Pain Intervention(s)  Ambulation/increased activity;Repositioned  -KF     Row Name 03/22/21 0852          Plan of Care Review    Plan of Care Reviewed With  patient  -KF     Progress  improving  -KF     Outcome Summary  OT eval completed Pt presents with deficits in strength, safety awareness and compliance for sternal precautions, and balance for ADL completion, completed toileting tasks setup/SUP, completed transfers CGA with cues for sternal precautions, completed bed mob rolling to L side with Blessing and sidelying to sit with Blessing and moments CGA due to replication of home environment, recom IPOT d/c would benefit from home with HHOT and 24/7 assist with walker  -KF     Row Name 03/22/21 0852          Therapy Assessment/Plan (OT)    Rehab Potential (OT)  fair, will monitor progress closely  -KF     Criteria for Skilled Therapeutic Interventions Met (OT)  yes;meets criteria;skilled treatment is necessary  -KF     Therapy Frequency (OT)  daily  -KF     Row Name 03/22/21 0852          Therapy Plan Review/Discharge Plan (OT)    Anticipated Discharge Disposition (OT)  home with 24/7 care;home with home health;home with assist  -KF     Row Name 03/22/21 0852          Vital Signs    Pre Systolic BP Rehab  137 RN cleared VSS  -KF     Pre Treatment Diastolic BP  69  -KF     Pre SpO2 (%)  92  -KF     O2 Delivery Pre Treatment  room air  -KF     Post SpO2 (%)  97  -KF     O2 Delivery Post Treatment  room air  -KF     Pre Patient Position  Supine  -KF     Intra Patient Position  Standing  -KF     Post Patient Position  Sitting  -KF     Rest Breaks   1  -KF      Row Name 03/22/21 0852          Positioning and Restraints    Pre-Treatment Position  in bed  -KF     Post Treatment Position  chair  -KF     In Chair  notified nsg;reclined;sitting;call light within reach;encouraged to call for assist;waffle cushion;legs elevated exit alarm waved by RN  -KF       User Key  (r) = Recorded By, (t) = Taken By, (c) = Cosigned By    Initials Name Provider Type    Liz Gregory OT Occupational Therapist        Outcome Measures     Row Name 03/22/21 0858          How much help from another is currently needed...    Putting on and taking off regular lower body clothing?  4  -KF     Bathing (including washing, rinsing, and drying)  3  -KF     Toileting (which includes using toilet bed pan or urinal)  4  -KF     Putting on and taking off regular upper body clothing  3  -KF     Taking care of personal grooming (such as brushing teeth)  4  -KF     Eating meals  4  -KF     AM-PAC 6 Clicks Score (OT)  22  -KF     Row Name 03/22/21 0858          Functional Assessment    Outcome Measure Options  AM-PAC 6 Clicks Daily Activity (OT)  -KF       User Key  (r) = Recorded By, (t) = Taken By, (c) = Cosigned By    Initials Name Provider Type    Liz Gregory OT Occupational Therapist        Occupational Therapy Education                 Title: PT OT SLP Therapies (In Progress)     Topic: Occupational Therapy (In Progress)     Point: ADL training (In Progress)     Description:   Instruct learner(s) on proper safety adaptation and remediation techniques during self care or transfers.   Instruct in proper use of assistive devices.              Learning Progress Summary           Patient Acceptance, E,TB,D, NR by BRENDA at 3/22/2021 0858                   Point: Home exercise program (Not Started)     Description:   Instruct learner(s) on appropriate technique for monitoring, assisting and/or progressing therapeutic exercises/activities.              Learner Progress:  Not documented in this  visit.          Point: Precautions (In Progress)     Description:   Instruct learner(s) on prescribed precautions during self-care and functional transfers.              Learning Progress Summary           Patient Acceptance, E,TB,D, NR by  at 3/22/2021 0858                   Point: Body mechanics (In Progress)     Description:   Instruct learner(s) on proper positioning and spine alignment during self-care, functional mobility activities and/or exercises.              Learning Progress Summary           Patient Acceptance, E,TB,D, NR by  at 3/22/2021 0858                               User Key     Initials Effective Dates Name Provider Type Discipline     04/03/18 -  Liz Segura OT Occupational Therapist OT              OT Recommendation and Plan  Planned Therapy Interventions (OT): activity tolerance training, patient/caregiver education/training, adaptive equipment training, BADL retraining, ROM/therapeutic exercise, occupation/activity based interventions, cognitive/visual perception retraining, strengthening exercise, transfer/mobility retraining, functional balance retraining  Therapy Frequency (OT): daily  Plan of Care Review  Plan of Care Reviewed With: patient  Progress: improving  Outcome Summary: OT eval completed Pt presents with deficits in strength, safety awareness and compliance for sternal precautions, and balance for ADL completion, completed toileting tasks setup/SUP, completed transfers CGA with cues for sternal precautions, completed bed mob rolling to L side with Blessing and sidelying to sit with Blessing and moments CGA due to replication of home environment, recom IPOT d/c would benefit from home with HHOT and 24/7 assist with walker     Time Calculation:   Time Calculation- OT     Row Name 03/22/21 0751             Time Calculation- OT    OT Start Time  0751  -      OT Received On  03/22/21  -      OT Goal Re-Cert Due Date  04/01/21  -         Timed Charges    52478 - OT Self  Care/Mgmt Minutes  10  -KF        User Key  (r) = Recorded By, (t) = Taken By, (c) = Cosigned By    Initials Name Provider Type    Liz Gregory OT Occupational Therapist        Therapy Charges for Today     Code Description Service Date Service Provider Modifiers Qty    [id number]  OT SELF CARE/MGMT/TRAIN EA 15 MIN 3/22/2021 Liz Segura OT GO 1    [id number]  OT EVAL MOD COMPLEXITY 3 3/22/2021 Liz Segura OT GO 1               Liz Segura OT  3/22/2021    Electronically signed by Liz Segura OT at 03/22/21 0900     Liz Segura OT at 03/22/21 0751        Goal Outcome Evaluation:  Plan of Care Reviewed With: patient  Progress: improving  Outcome Summary: OT eval completed Pt presents with deficits in strength, safety awareness and compliance for sternal precautions, and balance for ADL completion, completed toileting tasks setup/SUP, completed transfers CGA with cues for sternal precautions, completed bed mob rolling to L side with Blessing and sidelying to sit with Blessing and moments CGA due to replication of home environment, recom IPOT d/c would benefit from home with HHOT and 24/7 assist with walker    Electronically signed by Liz Segura OT at 03/22/21 5642

## 2021-03-22 NOTE — PROGRESS NOTES
"                                 [facility] Progress Note      LOS: 7 days   Patient Care Team:  Jacqueline Gatica MD as PCP - General (Family Medicine)    Chief Complaint: Chest pain    Subjective     Interval History: Quiet night    Patient Complaints: Complains of chest soreness.  No angina      Review of Systems:   A 14 point review of systems was negative except as was stated in the HPI      Objective     Vital Sign Min/Max for last 24 hours  Temp  Min: 97.7 °F (36.5 °C)  Max: 98.4 °F (36.9 °C)   BP  Min: 128/70  Max: 148/86   Pulse  Min: 85  Max: 113   Resp  Min: 18  Max: 20   SpO2  Min: 91 %  Max: 98 %   Flow (L/min)  Min: 1  Max: 1   Weight  Min: 99.8 kg (220 lb 1 oz)  Max: 99.8 kg (220 lb 1 oz)     Flowsheet Rows      First Filed Value   Admission Height  175.3 cm (69\") Documented at 03/15/2021 1839   Admission Weight  91.4 kg (201 lb 8 oz) Documented at 03/15/2021 1839          Physical Exam:  General Appearance: Alert, appears stated age and cooperative  Lungs: Clear to auscultation  Heart:: RRR   No Murmurs, Rubs or Gallops  Abdomen: Soft and nontender with adequate bowel sounds.  No organomegaly  Extremities: Bilateral leg edema right leg worse than left  Pulses: Pulses palpable and equal bilaterally  Skin: Warm and dry with no rash  Psych: Normal     Results Review:     I reviewed the patient's new clinical results.  Results from last 7 days   Lab Units 03/21/21  0548 03/20/21  0336 03/19/21  0405   SODIUM mmol/L 137 133* 133*   POTASSIUM mmol/L 4.1 4.9 4.1   CHLORIDE mmol/L 100 98 97*   CO2 mmol/L 25.0 27.0 25.0   BUN mg/dL 31* 31* 33*   CREATININE mg/dL 1.23 1.33* 1.33*   GLUCOSE mg/dL 140* 185* 247*   CALCIUM mg/dL 8.1* 8.3* 8.9     Results from last 7 days   Lab Units 03/22/21  0503 03/21/21  0548 03/20/21  0336   WBC 10*3/mm3 3.87 9.58 9.83   HEMOGLOBIN g/dL 10.1* 9.9* 9.5*   HEMATOCRIT % 34.5* 31.0* 30.0*   PLATELETS 10*3/mm3 176 285 215     Lab Results   Lab Value Date/Time    " TROPONINT <0.010 03/14/2021 0144    TROPONINT <0.010 03/13/2021 2239    TROPONINT <0.010 03/05/2021 1026     Results from last 7 days   Lab Units 03/15/21  2037   CHOLESTEROL mg/dL 223*   TRIGLYCERIDES mg/dL 618*   HDL CHOL mg/dL 30*   LDL CHOL mg/dL 91     Results from last 7 days   Lab Units 03/17/21  0350 03/16/21  1922 03/15/21  2037   INR  1.20* 1.41* 0.93     Results from last 7 days   Lab Units 03/17/21  0137   PH, ARTERIAL pH units 7.362   PO2 ART mm Hg 62.9*   PCO2, ARTERIAL mm Hg 41.2   HCO3 ART mmol/L 23.4           Medication Review: yes  Current Facility-Administered Medications   Medication Dose Route Frequency Provider Last Rate Last Admin   • acetaminophen (TYLENOL) tablet 650 mg  650 mg Oral Q4H PRN Jyotsna Ricks PA-C       • allopurinol (ZYLOPRIM) tablet 300 mg  300 mg Oral Daily Jyotsna Ricks PA-C   300 mg at 03/21/21 0822   • aspirin chewable tablet 81 mg  81 mg Oral Daily Jo Pollard   81 mg at 03/21/21 0822   • atorvastatin (LIPITOR) tablet 40 mg  40 mg Oral Nightly Jyotsna Ricks PA-C   40 mg at 03/21/21 2132   • bisacodyl (DULCOLAX) suppository 10 mg  10 mg Rectal Daily Gissel Bernabe APRN   10 mg at 03/21/21 1335   • budesonide (PULMICORT) nebulizer solution 0.5 mg  0.5 mg Nebulization BID - RT Lorenzo Castanon MD   0.5 mg at 03/21/21 2006   • calcium gluconate 2 g in sodium chloride 0.9 % 100 mL IVPB  2 g Intravenous Once PRN Jyotsna Ricks PA-C       • cetirizine (zyrTEC) tablet 10 mg  10 mg Oral Daily Jyotsna Ricks PA-C   10 mg at 03/21/21 0822   • clopidogrel (PLAVIX) tablet 75 mg  75 mg Oral Daily Vernell Galan PA-C   75 mg at 03/21/21 0822   • dextrose (D50W) 25 g/ 50mL Intravenous Solution 25 g  25 g Intravenous Q15 Min PRN Jyotsna Ricks PA-C       • dextrose (GLUTOSE) oral gel 15 g  15 g Oral Q15 Min PRN Jyotsna Ricks PA-C       • finasteride (PROSCAR) tablet 5 mg  5 mg Oral Daily Jyotsna Ricks PA-C   5 mg at 03/21/21 0822   • folic acid  (FOLVITE) tablet 1 mg  1 mg Oral BID Jyotsna Ricks PA-C   1 mg at 03/21/21 2132   • gabapentin (NEURONTIN) capsule 300 mg  300 mg Oral BID Lorenzo Castanon MD   300 mg at 03/21/21 2132   • glucagon (human recombinant) (GLUCAGEN DIAGNOSTIC) injection 1 mg  1 mg Subcutaneous Q15 Min PRN Jyotsna Ricks PA-C       • HYDROcodone-acetaminophen (NORCO) 7.5-325 MG per tablet 1 tablet  1 tablet Oral Q4H PRN Jorge Simms MD   1 tablet at 03/22/21 0414   • insulin detemir (LEVEMIR) injection 25 Units  25 Units Subcutaneous Q12H Jo Pollard   25 Units at 03/21/21 2100   • insulin lispro (humaLOG) injection 0-14 Units  0-14 Units Subcutaneous 4x Daily AC & at Bedtime Soledad Chowdhury APRN   10 Units at 03/21/21 2132   • insulin lispro (humaLOG) injection 25 Units  25 Units Subcutaneous TID With Meals Gissel Bernabe APRN   Stopped at 03/21/21 1726   • ipratropium-albuterol (DUO-NEB) nebulizer solution 3 mL  3 mL Nebulization Q4H PRN Jyotsna Ricks PA-C       • ipratropium-albuterol (DUO-NEB) nebulizer solution 3 mL  3 mL Nebulization Q4H PRN Jorge Simms MD       • lactobacillus acidophilus (RISAQUAD) capsule 1 capsule  1 capsule Oral Daily Jyotsna Ricks PA-C   1 capsule at 03/21/21 0822   • magnesium citrate solution 296 mL  296 mL Oral PRN Gissel Bernabe APRN       • Magnesium Sulfate 2 gram Bolus, followed by 8 gram infusion (total Mg dose 10 grams)- Mg less than or equal to 1mg/dL  2 g Intravenous PRN Jorge Simms MD        Or   • Magnesium Sulfate 2 gram / 50mL Infusion (GIVE X 3 BAGS TO EQUAL 6GM TOTAL DOSE) - Mg 1.1 - 1.5 mg/dl  2 g Intravenous PRN Jorge Simms MD        Or   • Magnesium Sulfate 4 gram infusion- Mg 1.6-1.9 mg/dL  4 g Intravenous PRN Jorge Simms MD 25 mL/hr at 03/16/21 2101 4 g at 03/16/21 2101   • metoprolol tartrate (LOPRESSOR) tablet 25 mg  25 mg Oral Q12H Maxi Sprague PA   25 mg at 03/21/21 2132   • morphine injection 2 mg  2 mg  Intravenous Q2H PRN Jorge Simms MD   2 mg at 03/19/21 1947   • multivitamin (THERAGRAN) tablet 1 tablet  1 tablet Oral Daily Jyotsna Ricks PA-C   1 tablet at 03/21/21 0822   • naloxone (NARCAN) injection 0.4 mg  0.4 mg Intravenous Q5 Min PRN Jorge Simms MD       • nitroglycerin (NITROSTAT) SL tablet 0.4 mg  0.4 mg Sublingual Q5 Min PRN Jyotsna Ricks PA-C       • ondansetron (ZOFRAN) injection 4 mg  4 mg Intravenous Q6H PRN Jyotsna Ricks PA-C   4 mg at 03/17/21 0122   • oxyCODONE-acetaminophen (PERCOCET) 5-325 MG per tablet 2 tablet  2 tablet Oral Q4H PRN Jorge Simms MD   2 tablet at 03/21/21 1020   • pantoprazole (PROTONIX) EC tablet 40 mg  40 mg Oral Daily Jyotsna Ricks PA-C   40 mg at 03/21/21 0822   • Pharmacy Consult - MTM   Does not apply Daily Jyotsna Ricks PA-C   1 each at 03/20/21 0836   • pneumococcal polysaccharide 23-valent (PNEUMOVAX-23) vaccine 0.5 mL  0.5 mL Intramuscular During Hospitalization Soledad Chowdhury, APRN       • polyethylene glycol (MIRALAX) packet 17 g  17 g Oral Daily Gissel Bernabe APRN   17 g at 03/21/21 1334   • potassium chloride (MICRO-K) CR capsule 40 mEq  40 mEq Oral PRN Jyotsna Ricks PA-C        Or   • potassium chloride (KLOR-CON) packet 40 mEq  40 mEq Oral PRN Jyotsna Ricks PA-C       • sennosides-docusate (PERICOLACE) 8.6-50 MG per tablet 2 tablet  2 tablet Oral BID Jorge Simms MD   2 tablet at 03/21/21 2133   • sodium chloride 0.9 % flush 10 mL  10 mL Intravenous Q12H Jyotsna Ricks PA-C   10 mL at 03/20/21 2100   • terazosin (HYTRIN) capsule 5 mg  5 mg Oral Nightly Jyotsna Ricks PA-C   5 mg at 03/21/21 2132   • vitamin E capsule 400 Units  400 Units Oral Daily Jyotsna Ricks PA-C   400 Units at 03/21/21 0822         Type 2 diabetes mellitus with hyperglycemia, without long-term current use of insulin (CMS/[facility])    Hypertension associated with diabetes (CMS/[facility])    Hyperlipidemia associated with type 2 diabetes mellitus  (CMS/[facility])    Benign prostatic hyperplasia without lower urinary tract symptoms    GERD (gastroesophageal reflux disease)    Diabetic neuropathy (CMS/HCC)    Former smoker    History of gout    Coronary artery disease involving native coronary artery of native heart with angina pectoris (CMS/HCC)    CAD (coronary artery disease)    IZA (acute kidney injury) (CMS/[facility])        Impression      CABG 3/16/2021  Normal preop LV function  Hypertension  Diabetes mellitus  Gout      Plan     Continue DAPT, increase beta-blocker and high intensity statin  Add daily diuretic  Mobilize   Ok home    Bennie Weeks MD   03/22/21  08:01 EDT

## 2021-03-22 NOTE — PLAN OF CARE
Goal Outcome Evaluation:        Outcome Summary: Pts VSS on RA. Sats >95%. Alert and orientedx4. Rested well. Pain controlled with PO norco. Adequate urinary output. Pt had BM. Sinus on the monitor. Pt assisted to toliet with assist x1. Would benefit from a walker. No SOB reported. Will continue to [facility].

## 2021-03-22 NOTE — DISCHARGE PLACEMENT REQUEST
"Khalif Villasenor (70 y.o. Male)     - Annie cD,-[phone]    Discharge home today 3/22/2021      Date of Birth Social Security Number Address Home Phone MRN    1950  [address] [place] [address] [phone] [id number]    Episcopal Marital Status          Non-Hoahaoism        Admission Date Admission Type Admitting Provider Attending Provider Department, Room/Bed    3/15/21 Urgent Jorge Simms MD Mitchell, Robert O, MD [facility] 4H, S470/1    Discharge Date Discharge Disposition Discharge Destination         Home or Self Care              Attending Provider: Jorge Simms MD    Allergies: No Known Allergies    Isolation: None   Infection: None   Code Status: CPR    Ht: 175.3 cm (69.02\")   Wt: 99.8 kg (220 lb 1 oz)    Admission Cmt: None   Principal Problem: Type 2 diabetes mellitus with hyperglycemia, without long-term current use of insulin (CMS/[facility]) [E11.65]                 Active Insurance as of 3/15/2021     Primary Coverage     Payor Plan Insurance Group Employer/Plan Group    MEDICARE MEDICARE A & B      Payor Plan Address Payor Plan Phone Number Payor Plan Fax Number Effective Dates    [address] [phone]  8/1/2012 - None Entered    [facility] [id number]       Subscriber Name Subscriber Birth Date Member ID       KHALIF VILLASENOR 1950 [id number]           Secondary Coverage     Payor Plan Insurance Group Employer/Plan Group    [employer]      Payor Plan Address Payor Plan Phone Number Payor Plan Fax Number Effective Dates    [address] [phone]  12/1/2015 - None Entered    [facility] [address]       Subscriber Name Subscriber Birth Date Member ID       KHALIF VILLASENOR 1950 [id number]                 Emergency Contacts      (Rel.) Home Phone Work Phone Mobile Phone    Perri Villsaenor (Spouse) [phone] -- [phone]        [address] [facility] [facility] " [facility] [address]  Phone:  [phone]  Fax:  [phone] Date: Mar 22, 2021      Ambulatory Referral to Home Health     Patient:  William Villasenor MRN:  [id number]   [address] [place] [address] :  1950  SSN:    Phone: [phone] Sex:  M      INSURANCE PAYOR PLAN GROUP # SUBSCRIBER ID   Primary:  Secondary:    MEDICARE  [employer] [id number]  [id number]      [id number]  [id number]      Referring Provider Information:  JESIKA WANG Phone: [phone] Fax: [phone]      Referral Information:   # Visits:  1 Referral Type: Home Health [42]   Urgency:  Routine Referral Reason: Specialty Services Required   Start Date: Mar 22, 2021 End Date:  To be determined by Insurer   Diagnosis: Other diabetic neurological complication associated with type 2 diabetes mellitus (CMS/[facility]) (E11.49 [ICD-10-CM] 250.60 [ICD-9-CM])  Type 2 diabetes mellitus with hyperglycemia, without long-term current use of insulin (CMS/HCC) (E11.65 [ICD-10-CM] 250.00,790.29 [ICD-9-CM])  Coronary artery disease involving native coronary artery of native heart with angina pectoris (CMS/HCC) (I25.119 [ICD-10-CM] 414.01,413.9 [ICD-9-CM])  Impaired mobility and activities of daily living (Z74.09,Z78.9 [ICD-10-CM] V49.89 [ICD-9-CM])  IZA (acute kidney injury) (CMS/HCC) (N17.9 [ICD-10-CM] 584.9 [ICD-9-CM])      Refer to Dept:   Refer to Provider:   Refer to Facility:       Face to Face Visit Date: 3/22/2021  Follow-up provider for Plan of Care? I treated the patient in an acute care facility and will not continue treatment after discharge.  Follow-up provider: MICKY GRAJEDA [[id number]]  Reason/Clinical Findings: Coronary artery disease involving native coronary artery of native heart with angina pectoris s/p CABG x 4,  Type 2 diabetes mellitus with hyperglycemia, hypertension, IZA, impaired mobility and adls  Describe mobility limitations that make leaving home difficult: Coronary artery disease involving native  coronary artery of native heart with angina pectoris s/p CABG x 4,  Type 2 diabetes mellitus with hyperglycemia, hypertension, IZA, impaired mobility and adls  Nursing/Therapeutic Services Requested: Physical Therapy  Nursing/Therapeutic Services Requested: Occupational Therapy  PT orders: Strengthening  PT orders: Home safety assessment  Occupational orders: Activities of daily living  Occupational orders: Strengthening  Occupational orders: Home safety assessment  Frequency: Other     This document serves as a request of services and does not constitute Insurance authorization or approval of services.  To determine eligibility, please contact the members Insurance carrier to verify and review coverage.     If you have medical questions regarding this request for services. Please contact [address] at [phone] during normal business hours.       Authorizing Provider:Sam Albright PA-C  Authorizing Provider's NPI: [id number]  Order Entered By: Annie Dc RN 3/22/2021 11:58 AM     Electronically signed by: Sam Albright PA-C 3/22/2021 11:58 AM            Insurance Information                MEDICARE/MEDICARE A & B Phone: [phone]    Subscriber: William Villasenor Subscriber#: [id number]    Group#:  Precert#:         [employer] Phone: [phone]    Subscriber: William Villasenor Subscriber#: [id number]    Group#:  Precert#:              History & Physical      Jorge Simms MD at 03/15/21 2008          2        CTS Consult    Patient Care Team:  Jacqeuline Gatica MD as PCP - General (Family Medicine)      Reason for Admission: Severe multivessel coronary disease    Chief complaint: Chest pain    HPI  The patient is a 70-year-old male with a past medical history significant for hypertension, type 2 diabetes mellitus with peripheral neuropathy, dyslipidemia, obesity and a hiatal hernia who presented to [facility] on 3/13/2021 with a  one week history of progressively worsening substernal chest pain with associated dyspnea.  It was determined the patient was experiencing what appeared to be unstable angina.  He was admitted and underwent a stress test which was abnormal.  The patient subsequently underwent a left heart catheterization which revealed severe multivessel coronary disease with preserved left ventricular function with a left ventricular ejection fraction of approximately 55%.  The patient was subsequently transferred to [facility] for further evaluation and possible surgical revascularization.  Currently, the patient denies any chest pain or dyspnea.      Review of Systems    A comprehensive review of systems was negative except for:   Constitutional: Denies fever, chills or recent weight loss or weight gain  Respiratory: Has recent cough, pneumonia, bronchitis or history of chronic obstructive pulmonary disease  Cardiovascular: Denies pedal edema, orthopnea, paroxysmal nocturnal dyspnea or palpitations  Gastrointestinal: Denies abdominal pain, nausea or vomiting, hematemesis, hematochezia, melena history peptic ulcer disease  Hematologic/lymphatic: Denies history of coagulopathy, thrombogenic disorder or history of deep vein thrombosis or pulmonary embolus  Neurological: Denies history of headache, cerebrovascular accident, transient ischemic attack or seizure disorder      History  Past Medical History:   Diagnosis Date   • Arthritis    • Back pain    • BPH (benign prostatic hyperplasia)    • Diabetes mellitus (CMS/HCC)    • Diabetic peripheral neuropathy (CMS/HCC)    • Erectile dysfunction    • Former smoker    • GERD (gastroesophageal reflux disease)    • GERD (gastroesophageal reflux disease)    • Gout    • High cholesterol    • Hypertension    • Obesity      Past Surgical History:   Procedure Laterality Date   • COLONOSCOPY W/ BIOPSIES  2015     Family History   Problem Relation Age of Onset   • Heart disease  Mother    • Hypertension Mother    • Depression Mother    • Alcohol abuse Maternal Uncle    • Heart disease Maternal Grandmother    • Hypertension Maternal Grandmother    • Diabetes Maternal Grandmother    • Diabetes Paternal Grandmother      Social History     Tobacco Use   • Smoking status: Former Smoker     Packs/day: 1.00     Years: 5.00     Pack years: 5.00     Quit date: 1974     Years since quittin.1   • Smokeless tobacco: Never Used   Substance Use Topics   • Alcohol use: Never   • Drug use: Never     Medications Prior to Admission   Medication Sig Dispense Refill Last Dose   • acetaminophen (TYLENOL) 500 MG tablet Take 500 mg by mouth Every 6 (Six) Hours As Needed for Mild Pain .      • allopurinol (ZYLOPRIM) 300 MG tablet Take 1 tablet by mouth Daily. 90 tablet 1    • [START ON 3/16/2021] amLODIPine (NORVASC) 10 MG tablet Take 1 tablet by mouth Daily.      • aspirin (Aspirin Adult Low Dose) 81 MG EC tablet Take 81 mg by mouth Daily.      • atorvastatin (LIPITOR) 40 MG tablet Take 1 tablet by mouth Every Night.      • doxazosin (CARDURA) 4 MG tablet Take 1 tablet by mouth Daily. 90 tablet 1    • enalapril (VASOTEC) 20 MG tablet Take 1 tablet by mouth Daily. 90 tablet 1    • Enoxaparin Sodium (PHARMACY TO DOSE ENOXAPARIN, LOVENOX,) Daily. Indications: Prophylaxis of Venous Thromboembolism      • finasteride (PROSCAR) 5 MG tablet Take 1 tablet by mouth Daily. 90 tablet 1    • folic acid (FOLVITE) 1 MG tablet Take 1 mg by mouth 2 (two) times a day.      • gabapentin (NEURONTIN) 300 MG capsule Take 1 capsule by mouth 2 (two) times a day. 60 capsule 0    • Lactobacillus (Acidophilus) 100 MG capsule Take 100 mg by mouth Daily.      • metoprolol tartrate (LOPRESSOR) 25 MG tablet Take 1 tablet by mouth Every 12 (Twelve) Hours.      • nitroglycerin (NITROSTAT) 0.4 MG SL tablet Place 1 tablet under the tongue Every 5 (Five) Minutes As Needed for Chest Pain (Only if SBP Greater Than 100). Take no more than 3  doses in 15 minutes.  12    • pantoprazole (PROTONIX) 40 MG EC tablet Take 1 tablet by mouth Daily.      • vitamin E 400 UNIT capsule Take 1 capsule by mouth Daily.        Allergies:  Patient has no known allergies.    Objective    Vital Signs  Temp:  [96.9 °F (36.1 °C)-99 °F (37.2 °C)] 96.9 °F (36.1 °C)  Heart Rate:  [59-82] 68  Resp:  [14-22] 18  BP: ()/(55-87) 136/81    Physical Exam:  General Appearance: Well-developed, well-nourished no acute distress  HEENT: Normocephalic, atraumatic, PERRLA, EOMs intact mucous membranes moist no scleral icterus  Neck: Supple without bruit no thyromegaly or lymphadenopathy  Lungs: Respirations even and unlabored and clear to auscultation bilaterally no wheezes, rales or rhonchi  Heart: Regular rate and rhythm no murmurs, rubs or gallops.  Normal S1-S2.  Abdomen: Soft, nontender/nondistended with normoactive bowel sounds no rebound or guarding no organomegaly appreciated  Extremities: Warm with good color well-perfused no bilateral lower extremity edema  Pulses: 2+ carotid pulses bilaterally, 2+ radial pulses bilaterally, 2+ femoral pulses bilaterally, 2+ dorsalis pedis and posterior tibialis pulses bilaterally  Skin: No clubbing cyanosis no lesions or rashes appreciated  Neurologic: Alert and orient x3 with cranial nerves II through XII grossly intact no motor or sensory deficits appreciated          Data Review:  Results from last 7 days   Lab Units 03/15/21  0043   WBC 10*3/mm3 8.28   HEMOGLOBIN g/dL 13.0   HEMATOCRIT % 39.6   PLATELETS 10*3/mm3 297     Results from last 7 days   Lab Units 03/15/21  0043   SODIUM mmol/L 133*   POTASSIUM mmol/L 4.1   CHLORIDE mmol/L 99   CO2 mmol/L 20.0*   BUN mg/dL 20   CREATININE mg/dL 1.30*   GLUCOSE mg/dL 323*   CALCIUM mg/dL 8.7     Coagulation: No results found for: INR, APTT      Left heart catheterization:  Conclusion    · Prox RCA lesion is 99% stenosed.  · Mid LAD lesion is 100% stenosed.  · Ost Cx lesion is 50%  stenosed.  · Prox LAD lesion is 85% stenosed.  · Mid Cx lesion is 75% stenosed.  · 3rd Mrg lesion is 90% stenosed.  · 1st Diag lesion is 100% stenosed.  · 1st LPL lesion is 90% stenosed.  · Normal systolic function.  · The ejection fraction was greater than 55% by visual estimate.  · LV pressures (S/D/E) : 120/80/15 mmHg           Imaging Results (Last 72 Hours)     Procedure Component Value Units Date/Time    XR Chest 1 View [[id number]] Resulted: 03/15/21 1944     Updated: 03/15/21 1944            Assessment:        Coronary artery disease involving native coronary artery of native heart with angina pectoris (CMS/[facility])    CAD (coronary artery disease)  Severe multivessel coronary disease  Hypertension  Type 2 diabetes mellitus  Hiatal hernia  GERD  Obesity  Dyslipidemia  History of gout  History tobacco abuse        Plan:  The patient would greatly benefit from surgical revascularization.  We will order routine preoperative testing in addition to a bilateral carotid artery duplex and pulmonary function testing.  There is a question as to whether the patient received a bolus of Plavix at the outside facility, therefore we will closely monitor platelet function with a P2Y12 inhibition assay and allow for platelet normalization if there is evidence of the patient receiving a P2Y12 inhibitor.  If platelet function is within normal limits, we will tentatively plan on proceeding with coronary artery bypass grafting tomorrow, Tuesday, 3/16/2021.  The surgery has been discussed in detail with the patient who understands and wishes to proceed.    I saw the patient myself today and examined his films and had a long discussion with him.  We will plan to proceed with coronary bypass grafting surgery today.  He is aware of surgery has a risk of stroke bleeding infection death renal failure and he is agreeable to proceed.    JADA Andrade  03/15/21  20:08 EDT            Electronically signed by Jorge Simms MD at  03/16/21 1104          Operative/Procedure Notes (all)      Jorge Simms MD at 03/16/21 1525  Version 1 of 1       Operative Report    Preop Diagnosis: Coronary artery disease.  Diabetes.  Hypertension.  Hyperlipidemia.  Diabetic neuropathy.      Postoperative Diagnosis: Same      Procedure: Coronary artery bypass grafting x4 with endoscopic harvesting of the right great saphenous vein.  Left internal mammary artery to left anterior descending.  Saphenous vein graft to the diagonal branch of the LAD.    saphenous vein graft to obtuse marginal 1.  In the third saphenous vein graft to the second obtuse marginal branch which was a posterior lateral branch equivalent on the inferior surface of the heart.          Surgeons: Jorge Simms MD      Assistant: DAISY Hobbs PA-C    The Assistant provided services of suctioning, irrigation and retraction.  Also, assisted in suture closure of parts of the skin incision.      Indication: Referred with the above listed medical problems.  He understood surgery had risk of stroke bleeding infection death and no guarantees were made as to outcome.  He was transferred here from the hospital.        Description: Supine position sterile prep and drape and antibiotics given.  General endotracheal anesthesia.  Right great saphenous vein was harvested endoscopically and it was very satisfactory quality conduit.  Median sternotomy performed in the left internal mammary artery was harvested as a pedicle graft.  The patient fully heparinized.  Cannulas the ascending aorta and right atrial appendage and the patient begun on cardiopulmonary bypass.  Aorta crossclamped and antegrade cardioplegia given.  For saphenous vein graft was sutured to the diagonal branch of the LAD which was a heavily calcified vessel.  All of the vessels on this patient's heart were extremely calcified and diffusely diseased.  Second saphenous vein graft sutured to the obtuse marginal  branch of the circumflex.  Third saphenous vein graft sutured to the second obtuse marginal branch which was actually a posterior lateral branch equivalent on the inferior surface of the heart.  The true right itself had a posterior descending coronary that was diffusely diseased heavily calcified and no discernible lumen and nongraftable.  The left internal mammary was sutured to the similarly heavily calcified left anterior descending coronary which was occluded proximally.  Reoperation on any of this patient's target vessel would be treacherous if at all doable.  3 proximal vein graft sutured to the ascending aorta patient was weaned from bypass with out difficulty protamine was given reverse the heparin the sternum was closed with wire the fashion skin was suture and the sponge and needle count reported as correct      Please note that portions of this note were completed with a voice recognition program. Efforts were made to edit the dictations, but occasionally words are mistranscribed.      Electronically signed by Jorge Simms MD at 21 1843          Physician Progress Notes (last 24 hours) (Notes from 21 1201 through 21 1201)      Concepción Carpenter DO at 21 0921              [facility] Medicine Services  PROGRESS NOTE    Patient Name: William Villasenor  : 1950  MRN: [id number]    Date of Admission: 3/15/2021  Primary Care Physician: Jacqueline Gatica MD    Subjective   Subjective   CC:  Medical managemetn for T2DM, Gout, GERD, IZA    HPI:  Patient sitting up on bedside chair. States that he is somewhat short of air this morning. Says his leg is weeping at graft harvest site. Had BM last night.    ROS:  Gen- No fevers, chills  CV- No chest pain, palpitations  Resp- No cough, positive for dyspnea  GI- No N/V/D, abd pain    Objective   Objective   Vital Signs:   Temp:  [97.7 °F (36.5 °C)-98.4 °F (36.9 °C)] 98.2 °F (36.8 °C)  Heart Rate:  []  90  Resp:  [18-20] 20  BP: (128-148)/(69-86) 137/69     Physical Exam:  Constitutional: No acute distress, awake, alert  HENT: NCAT, mucous membranes moist  Respiratory: Clear to auscultation bilaterally, respiratory effort normal on room air, no crackles  Cardiovascular: RRR, no murmurs, rubs, or gallops, incision c/d/i  Gastrointestinal: Positive bowel sounds, soft, nontender, nondistended  Musculoskeletal: 1+ bilateral ankle edema  Psychiatric: Appropriate affect, cooperative  Neurologic: Oriented x 3, strength symmetric in all extremities, Cranial Nerves grossly intact to confrontation, speech clear  Skin: No rashes    Results Reviewed:  Results from last 7 days   Lab Units 03/22/21  0503 03/21/21  0548 03/20/21  0336 03/17/21  0350 03/16/21  1922 03/16/21  0719 03/15/21  2037   WBC 10*3/mm3 3.87 9.58 9.83 11.75* 9.53  --  7.34   HEMOGLOBIN g/dL 10.1* 9.9* 9.5* 10.9* 12.2*  --  14.1   HEMOGLOBIN, POC   --   --   --   --   --    < >  --    HEMATOCRIT % 34.5* 31.0* 30.0* 33.4* 36.3*  --  43.2   HEMATOCRIT POC   --   --   --   --   --    < >  --    PLATELETS 10*3/mm3 176 285 215 227 201  --  307   INR   --   --   --  1.20* 1.41*  --  0.93    < > = values in this interval not displayed.     Results from last 7 days   Lab Units 03/21/21  0548 03/20/21  0336 03/19/21  0405 03/15/21  2037   SODIUM mmol/L 137 133* 133* 135*   POTASSIUM mmol/L 4.1 4.9 4.1 4.5   CHLORIDE mmol/L 100 98 97* 101   CO2 mmol/L 25.0 27.0 25.0 25.0   BUN mg/dL 31* 31* 33* 20   CREATININE mg/dL 1.23 1.33* 1.33* 1.35*   GLUCOSE mg/dL 140* 185* 247* 231*   CALCIUM mg/dL 8.1* 8.3* 8.9 8.8   ALT (SGPT) U/L  --  12 9 12   AST (SGOT) U/L  --  12 9 15     Estimated Creatinine Clearance: 64.1 mL/min (by C-G formula based on SCr of 1.23 mg/dL).    Microbiology Results Abnormal     Procedure Component Value - Date/Time    COVID PRE-OP / PRE-PROCEDURE SCREENING ORDER (NO ISOLATION) - Swab, Nasopharynx [[id number]]  (Normal) Collected: 03/15/21 2039    Lab  Status: Final result Specimen: Swab from Nasopharynx Updated: 03/15/21 2127    Narrative:      The following orders were created for panel order COVID PRE-OP / PRE-PROCEDURE SCREENING ORDER (NO ISOLATION) - Swab, Nasopharynx.  Procedure                               Abnormality         Status                     ---------                               -----------         ------                     COVID-19, ABBOTT IN-HOUS...[[id number]]  Normal              Final result                 Please view results for these tests on the individual orders.    COVID-19, ABBOTT IN-HOUSE,NASAL Swab (NO TRANSPORT MEDIA) 2 HR TAT - Swab, Nasopharynx [[id number]]  (Normal) Collected: 03/15/21 2039    Lab Status: Final result Specimen: Swab from Nasopharynx Updated: 03/15/21 2127     COVID19 Presumptive Negative    Narrative:      Fact sheet for providers: https://www.fda.gov/media/022008/download     Fact sheet for patients: https://www.fda.gov/media/312056/download    Test performed by PCR.  If inconsistent with clinical signs and symptoms patient should be tested with different authorized molecular test.          Imaging Results (Last 24 Hours)     ** No results found for the last 24 hours. **          Results for orders placed during the hospital encounter of 03/13/21    Adult Transthoracic Echo Complete W/ Cont if Necessary Per Protocol    Interpretation Summary  · Left ventricular ejection fraction appears to be 51 - 55%. Left ventricular systolic function is normal. Wall motion not assessed due to poor endocardial visualization  · Left ventricular diastolic function was normal.  · There is no evidence of pericardial effusion  · No significant functional valvular abnormalities      I have reviewed the medications:  Scheduled Meds:allopurinol, 300 mg, Oral, Daily  aspirin, 81 mg, Oral, Daily  atorvastatin, 40 mg, Oral, Nightly  bisacodyl, 10 mg, Rectal, Daily  budesonide, 0.5 mg, Nebulization, BID - RT  cetirizine, 10 mg, Oral,  Daily  clopidogrel, 75 mg, Oral, Daily  finasteride, 5 mg, Oral, Daily  folic acid, 1 mg, Oral, BID  gabapentin, 300 mg, Oral, BID  insulin detemir, 25 Units, Subcutaneous, Q12H  insulin lispro, 0-14 Units, Subcutaneous, 4x Daily AC & at Bedtime  insulin lispro, 25 Units, Subcutaneous, TID With Meals  lactobacillus acidophilus, 1 capsule, Oral, Daily  metoprolol tartrate, 25 mg, Oral, Q12H  multivitamin, 1 tablet, Oral, Daily  pantoprazole, 40 mg, Oral, Daily  pharmacy consult - MTM, , Does not apply, Daily  polyethylene glycol, 17 g, Oral, Daily  senna-docusate sodium, 2 tablet, Oral, BID  sodium chloride, 10 mL, Intravenous, Q12H  terazosin, 5 mg, Oral, Nightly  vitamin E, 400 Units, Oral, Daily      Continuous Infusions:   PRN Meds:.•  acetaminophen  •  calcium gluconate IVPB  •  dextrose  •  dextrose  •  glucagon (human recombinant)  •  HYDROcodone-acetaminophen  •  ipratropium-albuterol  •  ipratropium-albuterol  •  magnesium citrate  •  magnesium sulfate **OR** magnesium sulfate **OR** magnesium sulfate  •  Morphine  •  [DISCONTINUED] fentaNYL citrate (PF) **AND** naloxone  •  nitroglycerin  •  ondansetron  •  oxyCODONE-acetaminophen  •  pneumococcal polysaccharide 23-valent  •  potassium chloride **OR** potassium chloride    Assessment/Plan   Assessment & Plan     Active Hospital Problems    Diagnosis  POA   • **Type 2 diabetes mellitus with hyperglycemia, without long-term current use of insulin (CMS/[facility]) [E11.65]  Yes   • IZA (acute kidney injury) (CMS/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) [I25.119]  Yes   • CAD (coronary artery disease) [I25.10]  Yes   • Diabetic neuropathy (CMS/[facility]) [E11.40]  Yes   • GERD (gastroesophageal reflux disease) [K21.9]  Yes   • History of gout [Z87.39]  Yes   • Former smoker [Z87.891]  Not Applicable   • Benign prostatic hyperplasia without lower urinary tract symptoms [N40.0]  Yes   •  Hypertension associated with diabetes (CMS/HCC) [E11.59, I15.2]  Yes   • Hyperlipidemia associated with type 2 diabetes mellitus (CMS/HCC) [E11.69, E78.5]  Yes      Resolved Hospital Problems   No resolved problems to display.     Brief Hospital Course to date:  William Villasenor is a 70 y.o. male with relevant PMH of DMT2 with diabetic neuropathy (HgbA1C 8.7 3/15), HTN, HLD, Gout, CAD, cath on 3/15 w/ severe MVCAD admitted 3/15/2021 and underwent 4vCABG on 3/16 by Dr. Simms.  He did not require transfusion postoperatively and today hemoglobin is 9.5.  Oxygen has been weaned off and he is on room air with a saturation of 92 to 94%.  He remains in sinus rhythm.  Hospital medicine service was asked to medically manage patient's other medial issues.    CABG x 4 on 6/16/2021 by Dr Simms  --Followed by CTS    CAD  HTN  HLD  --continue ASA, plavix, statin, BB  --Cardiology following    T2DM w/A1c 8.7  --continue basal, bolus and SSI  --Patient requested to not go home on insulin, because he will lose his  job  --Has never had anyone following his diabetes and requested endocrinology appointment  --Discussed eating a low carbohydrate diet that is high in protein and vegetables  --Encourage patient to exercise daily and attempt to lose weight to get his glucose under better control  --Endocrinology and new PCP (patient is satisfied with his current PCP) appointment requested  --Metformin at discharge    IZA  --Decrease use of nephrotoxic medications    GERD  --continue home dose pantoprazole    Gout  --continue home dose allpurinol    BPH  --continue Proscar, Hytrin    DVT Prophylaxis:  Per CTS    Disposition: I expect the patient to be discharged per CTS, possibly today    CODE STATUS:   Code Status and Medical Interventions:   Ordered at: 03/15/21 1932     Level Of Support Discussed With:    Patient     Code Status:    CPR     Medical Interventions (Level of Support Prior to Arrest):    Bari DEAN  "DO Pauline  03/22/21                  Electronically signed by Concepción Carpenter DO at 03/22/21 0925     Bennie Weeks MD at 03/22/21 0801                                           [facility] Progress Note      LOS: 7 days   Patient Care Team:  Jacqueline Gatica MD as PCP - General (Family Medicine)    Chief Complaint: Chest pain    Subjective     Interval History: Quiet night    Patient Complaints: Complains of chest soreness.  No angina      Review of Systems:   A 14 point review of systems was negative except as was stated in the HPI      Objective     Vital Sign Min/Max for last 24 hours  Temp  Min: 97.7 °F (36.5 °C)  Max: 98.4 °F (36.9 °C)   BP  Min: 128/70  Max: 148/86   Pulse  Min: 85  Max: 113   Resp  Min: 18  Max: 20   SpO2  Min: 91 %  Max: 98 %   Flow (L/min)  Min: 1  Max: 1   Weight  Min: 99.8 kg (220 lb 1 oz)  Max: 99.8 kg (220 lb 1 oz)     Flowsheet Rows      First Filed Value   Admission Height  175.3 cm (69\") Documented at 03/15/2021 1839   Admission Weight  91.4 kg (201 lb 8 oz) Documented at 03/15/2021 1839          Physical Exam:  General Appearance: Alert, appears stated age and cooperative  Lungs: Clear to auscultation  Heart:: RRR   No Murmurs, Rubs or Gallops  Abdomen: Soft and nontender with adequate bowel sounds.  No organomegaly  Extremities: Bilateral leg edema right leg worse than left  Pulses: Pulses palpable and equal bilaterally  Skin: Warm and dry with no rash  Psych: Normal     Results Review:     I reviewed the patient's new clinical results.  Results from last 7 days   Lab Units 03/21/21  0548 03/20/21  0336 03/19/21  0405   SODIUM mmol/L 137 133* 133*   POTASSIUM mmol/L 4.1 4.9 4.1   CHLORIDE mmol/L 100 98 97*   CO2 mmol/L 25.0 27.0 25.0   BUN mg/dL 31* 31* 33*   CREATININE mg/dL 1.23 1.33* 1.33*   GLUCOSE mg/dL 140* 185* 247*   CALCIUM mg/dL 8.1* 8.3* 8.9     Results from last 7 days   Lab Units 03/22/21  0503 03/21/21  0548 03/20/21  0336   WBC 10*3/mm3 " 3.87 9.58 9.83   HEMOGLOBIN g/dL 10.1* 9.9* 9.5*   HEMATOCRIT % 34.5* 31.0* 30.0*   PLATELETS 10*3/mm3 176 285 215     Lab Results   Lab Value Date/Time    TROPONINT <0.010 03/14/2021 0144    TROPONINT <0.010 03/13/2021 2239    TROPONINT <0.010 03/05/2021 1026     Results from last 7 days   Lab Units 03/15/21  2037   CHOLESTEROL mg/dL 223*   TRIGLYCERIDES mg/dL 618*   HDL CHOL mg/dL 30*   LDL CHOL mg/dL 91     Results from last 7 days   Lab Units 03/17/21  0350 03/16/21  1922 03/15/21  2037   INR  1.20* 1.41* 0.93     Results from last 7 days   Lab Units 03/17/21  0137   PH, ARTERIAL pH units 7.362   PO2 ART mm Hg 62.9*   PCO2, ARTERIAL mm Hg 41.2   HCO3 ART mmol/L 23.4           Medication Review: yes  Current Facility-Administered Medications   Medication Dose Route Frequency Provider Last Rate Last Admin   • acetaminophen (TYLENOL) tablet 650 mg  650 mg Oral Q4H PRN Jyotsna Ricks PA-C       • allopurinol (ZYLOPRIM) tablet 300 mg  300 mg Oral Daily Jyotsna Ricks PA-C   300 mg at 03/21/21 0822   • aspirin chewable tablet 81 mg  81 mg Oral Daily Jo Pollard   81 mg at 03/21/21 0822   • atorvastatin (LIPITOR) tablet 40 mg  40 mg Oral Nightly Jyotsna Ricks PA-C   40 mg at 03/21/21 2132   • bisacodyl (DULCOLAX) suppository 10 mg  10 mg Rectal Daily Gissel Bernabe APRN   10 mg at 03/21/21 1335   • budesonide (PULMICORT) nebulizer solution 0.5 mg  0.5 mg Nebulization BID - RT Lorenzo Castanon MD   0.5 mg at 03/21/21 2006   • calcium gluconate 2 g in sodium chloride 0.9 % 100 mL IVPB  2 g Intravenous Once PRN Joytsna Ricks PA-C       • cetirizine (zyrTEC) tablet 10 mg  10 mg Oral Daily Jyotsna Ricks PA-C   10 mg at 03/21/21 0822   • clopidogrel (PLAVIX) tablet 75 mg  75 mg Oral Daily Vernell Galan PA-C   75 mg at 03/21/21 0822   • dextrose (D50W) 25 g/ 50mL Intravenous Solution 25 g  25 g Intravenous Q15 Min PRN Jyotsna Ricks PA-C       • dextrose (GLUTOSE) oral gel 15 g  15 g Oral Q15  Min PRN Jyotsna Ricks PA-C       • finasteride (PROSCAR) tablet 5 mg  5 mg Oral Daily Jyotsna Ricks PA-C   5 mg at 03/21/21 0822   • folic acid (FOLVITE) tablet 1 mg  1 mg Oral BID Jyotsna Ricks PA-C   1 mg at 03/21/21 2132   • gabapentin (NEURONTIN) capsule 300 mg  300 mg Oral BID Lorenzo Castanon MD   300 mg at 03/21/21 2132   • glucagon (human recombinant) (GLUCAGEN DIAGNOSTIC) injection 1 mg  1 mg Subcutaneous Q15 Min PRN Jyotsna Ricks PA-C       • HYDROcodone-acetaminophen (NORCO) 7.5-325 MG per tablet 1 tablet  1 tablet Oral Q4H PRN Jorge Simms MD   1 tablet at 03/22/21 0414   • insulin detemir (LEVEMIR) injection 25 Units  25 Units Subcutaneous Q12H Jo Pollard   25 Units at 03/21/21 2100   • insulin lispro (humaLOG) injection 0-14 Units  0-14 Units Subcutaneous 4x Daily AC & at Bedtime Soledad Chowdhury APRN   10 Units at 03/21/21 2132   • insulin lispro (humaLOG) injection 25 Units  25 Units Subcutaneous TID With Meals Gissel Bernabe APRN   Stopped at 03/21/21 1726   • ipratropium-albuterol (DUO-NEB) nebulizer solution 3 mL  3 mL Nebulization Q4H PRN Jyotsna Ricks PA-C       • ipratropium-albuterol (DUO-NEB) nebulizer solution 3 mL  3 mL Nebulization Q4H PRN Jorge Simms MD       • lactobacillus acidophilus (RISAQUAD) capsule 1 capsule  1 capsule Oral Daily Jyotsna Ricks PA-C   1 capsule at 03/21/21 0822   • magnesium citrate solution 296 mL  296 mL Oral PRN Gissel Bernabe APRN       • Magnesium Sulfate 2 gram Bolus, followed by 8 gram infusion (total Mg dose 10 grams)- Mg less than or equal to 1mg/dL  2 g Intravenous PRN Jorge Simms MD        Or   • Magnesium Sulfate 2 gram / 50mL Infusion (GIVE X 3 BAGS TO EQUAL 6GM TOTAL DOSE) - Mg 1.1 - 1.5 mg/dl  2 g Intravenous PRN Jorge Simms MD        Or   • Magnesium Sulfate 4 gram infusion- Mg 1.6-1.9 mg/dL  4 g Intravenous PRN Jorge Simms MD 25 mL/hr at 03/16/21 2101 4 g at 03/16/21 2101   •  metoprolol tartrate (LOPRESSOR) tablet 25 mg  25 mg Oral Q12H Maxi Sprague PA   25 mg at 03/21/21 2132   • morphine injection 2 mg  2 mg Intravenous Q2H PRN Jorge Simms MD   2 mg at 03/19/21 1947   • multivitamin (THERAGRAN) tablet 1 tablet  1 tablet Oral Daily Jyotsna Ricks PA-C   1 tablet at 03/21/21 0822   • naloxone (NARCAN) injection 0.4 mg  0.4 mg Intravenous Q5 Min PRN Jorge Simms MD       • nitroglycerin (NITROSTAT) SL tablet 0.4 mg  0.4 mg Sublingual Q5 Min PRN Jyotsna Ricks PA-C       • ondansetron (ZOFRAN) injection 4 mg  4 mg Intravenous Q6H PRN Jyotsna Ricks PA-C   4 mg at 03/17/21 0122   • oxyCODONE-acetaminophen (PERCOCET) 5-325 MG per tablet 2 tablet  2 tablet Oral Q4H PRN Jorge Simms MD   2 tablet at 03/21/21 1020   • pantoprazole (PROTONIX) EC tablet 40 mg  40 mg Oral Daily Jyotsna Ricks PA-C   40 mg at 03/21/21 0822   • Pharmacy Consult - MTM   Does not apply Daily Jyotsna Ricks PA-C   1 each at 03/20/21 0836   • pneumococcal polysaccharide 23-valent (PNEUMOVAX-23) vaccine 0.5 mL  0.5 mL Intramuscular During Hospitalization Soledad Chowdhury APRN       • polyethylene glycol (MIRALAX) packet 17 g  17 g Oral Daily Gissel Bernabe APRN   17 g at 03/21/21 1334   • potassium chloride (MICRO-K) CR capsule 40 mEq  40 mEq Oral PRN Jyotsna Ricks PA-C        Or   • potassium chloride (KLOR-CON) packet 40 mEq  40 mEq Oral PRN Jyotsna Ricks PA-C       • sennosides-docusate (PERICOLACE) 8.6-50 MG per tablet 2 tablet  2 tablet Oral BID Jorge Simms MD   2 tablet at 03/21/21 2133   • sodium chloride 0.9 % flush 10 mL  10 mL Intravenous Q12H Jyotsna Ricks PA-C   10 mL at 03/20/21 2100   • terazosin (HYTRIN) capsule 5 mg  5 mg Oral Nightly Jyotsna Ricks PA-C   5 mg at 03/21/21 2132   • vitamin E capsule 400 Units  400 Units Oral Daily Jyotsna Ricks PA-C   400 Units at 03/21/21 0822         Type 2 diabetes mellitus with hyperglycemia, without long-term  current use of insulin (CMS/[facility])    Hypertension associated with diabetes (CMS/[facility])    Hyperlipidemia associated with type 2 diabetes mellitus (CMS/[facility])    Benign prostatic hyperplasia without lower urinary tract symptoms    GERD (gastroesophageal reflux disease)    Diabetic neuropathy (CMS/[facility])    Former smoker    History of gout    Coronary artery disease involving native coronary artery of native heart with angina pectoris (CMS/[facility])    CAD (coronary artery disease)    IZA (acute kidney injury) (CMS/[facility])        Impression      CABG 3/16/2021  Normal preop LV function  Hypertension  Diabetes mellitus  Gout      Plan     Continue DAPT, increase beta-blocker and high intensity statin  Add daily diuretic  Mobilize   Ok home    Bennie Weeks MD   03/22/21  08:01 EDT          Electronically signed by Bennie Weeks MD at 03/22/21 0994     Jorge Simms MD at 03/22/21 0787          Cardiothoracic Surgery Progress Note      POD 6 s/p CABG x 4     LOS: 7 days      Subjective:  VSS. Laying in bed. Admits to some incisional site tenderness, but otherwise no acute complaints. Looks as though he had discharge orders placed yesterday, but were cancelled due to wishes of cardiology, stating the need for bowel management and diuresis. He does admit to have had a bowel movement last night. Ambulating with PT.     Objective:  Vital Signs  Temp:  [97.7 °F (36.5 °C)-98.4 °F (36.9 °C)] 97.7 °F (36.5 °C)  Heart Rate:  [] 94  Resp:  [18-20] 18  BP: (128-148)/(70-86) 128/70    Physical Exam:   General Appearance: alert, appears stated age and cooperative   Lungs: clear to auscultation, respirations regular, respirations even and respirations unlabored   Heart: regular rhythm & normal rate, normal S1, S2 and no murmur, no gallop, no rub   Skin: Incision c/d/i   1+ pedal edema bilaterally  Results:    Results from last 7 days   Lab Units 03/22/21  0503   WBC 10*3/mm3 3.87   HEMOGLOBIN g/dL 10.1*   HEMATOCRIT % 34.5*  "  PLATELETS 10*3/mm3 176     Results from last 7 days   Lab Units 21  0548   SODIUM mmol/L 137   POTASSIUM mmol/L 4.1   CHLORIDE mmol/L 100   CO2 mmol/L 25.0   BUN mg/dL 31*   CREATININE mg/dL 1.23   GLUCOSE mg/dL 140*   CALCIUM mg/dL 8.1*       Assessment:  POD # 6 s/p CABG x 4    Plan:  Discharge home if ok with cardiology  Diuresis  Ambulate  Pulmonary toilet  ASA, plavix, statin, BB  On Protonix    Sam Albright PA-C  21  07:33 EDT          Electronically signed by Jorge Simms MD at 21 0917     Kole Gr DO at 21 1845                Cardiac Electrophysiology Inpatient Follow Up Note         [facility]    Progress Note    INITIAL REASON FOR CONSULT: I needed heart surgery    CHIEF COMPLAINT:  No chief complaint on file.    I needed heart surgery    Subjective   Mr. William Villasenor denies vomiting, abdominal pain, fussiness, diarrhea, cough and notes however some wheezing and difficulty breathing a little bit more short of breath today.  Was ambulating in the [place]..      REVIEW OF SYSTEMS  ROS no change from admission H&P except for: above    Objective   VITAL SIGNS  /69 (BP Location: Left arm, Patient Position: Lying)   Pulse 99   Temp 98.3 °F (36.8 °C) (Oral)   Resp 20   Ht 175.3 cm (69.02\")   Wt 91.4 kg (201 lb 8 oz)   SpO2 95%   BMI 29.74 kg/m²   [unfilled]  Pulse Ox: SpO2  Av %  Min: 92 %  Max: 95 %  Supplemental O2:       Admit Weight  Weight: 91.4 kg (201 lb 8 oz)  Last 3 Weights  [unfilled]  Body mass index is 29.74 kg/m².    INTAKE/OUTPUT  I/O last 3 completed shifts:  In: 1000 [P.O.:1000]  Out: 2825 [Urine:2825]    Intake/Output Summary (Last 24 hours) at 3/21/2021 1845  Last data filed at 3/21/2021 1800  Gross per 24 hour   Intake 960 ml   Output 1801 ml   Net -841 ml       PHYSICAL EXAM  General appearance: awake, alert, oriented, moves all extremities  Lungs: Rales bilaterally.  These were not present " yesterday.  Heart: Regular rate and rhythm  Abdomen: positive bowel sounds, no bruits, no masses  Extremities: warm and dry, no cyanosis, no clubbing    LABS  Results from last 7 days   Lab Units 03/21/21  0548 03/20/21  0336 03/19/21  0405   WBC 10*3/mm3 9.58 9.83 12.11*   HEMOGLOBIN g/dL 9.9* 9.5* 10.4*   HEMATOCRIT % 31.0* 30.0* 32.7*   MCV fL 91.7 92.0 92.6   PLATELETS 10*3/mm3 285 215 217         Results from last 7 days   Lab Units 03/21/21  0548 03/20/21  0336 03/19/21  0405 03/17/21  0350   POTASSIUM mmol/L 4.1 4.9 4.1 4.4   CHLORIDE mmol/L 100 98 97* 109*   CO2 mmol/L 25.0 27.0 25.0 22.0   BUN mg/dL 31* 31* 33* 15   CREATININE mg/dL 1.23 1.33* 1.33* 1.14   GLUCOSE mg/dL 140* 185* 247* 135*   CALCIUM mg/dL 8.1* 8.3* 8.9 8.6   MAGNESIUM mg/dL  --  2.2 2.4 2.4     Results from last 7 days   Lab Units 03/17/21  0350 03/16/21  1922 03/15/21  2037   PROTIME Seconds 14.9* 16.9* 12.2   INR  1.20* 1.41* 0.93         Results from last 7 days   Lab Units 03/20/21  0336 03/19/21  0405 03/17/21  0350   MAGNESIUM mg/dL 2.2 2.4 2.4                 No results found for: CHOL, TRIG, HDL    CURRENT MEDICATIONS  allopurinol, 300 mg, Oral, Daily  aspirin, 81 mg, Oral, Daily  atorvastatin, 40 mg, Oral, Nightly  bisacodyl, 10 mg, Rectal, Daily  budesonide, 0.5 mg, Nebulization, BID - RT  cetirizine, 10 mg, Oral, Daily  clopidogrel, 75 mg, Oral, Daily  finasteride, 5 mg, Oral, Daily  folic acid, 1 mg, Oral, BID  gabapentin, 300 mg, Oral, BID  insulin detemir, 25 Units, Subcutaneous, Q12H  insulin lispro, 0-14 Units, Subcutaneous, 4x Daily AC & at Bedtime  insulin lispro, 25 Units, Subcutaneous, TID With Meals  lactobacillus acidophilus, 1 capsule, Oral, Daily  metoprolol tartrate, 25 mg, Oral, Q12H  multivitamin, 1 tablet, Oral, Daily  pantoprazole, 40 mg, Oral, Daily  pharmacy consult - [facility], , Does not apply, Daily  polyethylene glycol, 17 g, Oral, Daily  senna-docusate sodium, 2 tablet, Oral, BID  sodium chloride, 10 mL,  Intravenous, Q12H  terazosin, 5 mg, Oral, Nightly  vitamin E, 400 Units, Oral, Daily      CONTINUOUS INFUSIONS           EKG: Sinus rhythm.  ECHO:REVIEWED   STRESS: REVIEWED  CATH: REVIEWED  CXR: Reviewed.    TELEMETRY: Sinus rhythm.    70-year-old gentleman status post coronary bypass graft surgery in this admission.  He is has some modest pulmonary edema today.  Will require some diuresis.  I will give him 60 mg of intravenous Lasix.    He is not yet had a bowel movement yet since his open heart surgery.    Long conversation with him and his wife.    He wishes to follow with Dr. Kennedy as his cardiologist in [place].    For these reasons he should stay the night.    Body mass index is 29.74 kg/m².    I spent 28 minutes in consultation with this patient which included more than 65% of this time in direct face-to-face counseling, physical examination and discussion of my assessment and findings and shared decision making with the patient.  The remainder of the time not spent face to face was performing one, some or all of the following actions:  preparing to see this patient ( eg. Review of tests),  ordering medications, tests or procedures ), care coordination, discussion of the plan with other healthcare providers, documenting clinical information in Epic well as independently interpreting results and communicating results to patient, family and or caregiver.  All time noted occurred on the date of service.        Kole Gr DO, Merged with [facility]  Cardiac Electrophysiologist  [facility] / [facility]      Electronically signed by Kole Gr DO at 21 1847       Consult Notes (last 24 hours) (Notes from 21 1201 through 21 1201)    No notes of this type exist for this encounter.              Occupational Therapy Notes (last 48 hours) (Notes from 21 1202 through 21 1202)      Liz Segura OT at 21 0751          Patient Name: William Villasenor  ROGERIO:  1950    MRN: [id number]                              Today's Date: 3/22/2021       Admit Date: 3/15/2021    Visit Dx:     ICD-10-CM ICD-9-CM   1. Other diabetic neurological complication associated with type 2 diabetes mellitus (CMS/HCC)  E11.49 250.60   2. Type 2 diabetes mellitus with hyperglycemia, without long-term current use of insulin (CMS/HCC)  E11.65 250.00     790.29   3. Coronary artery disease involving native coronary artery of native heart with angina pectoris (CMS/HCC)  I25.119 414.01     413.9   4. History of gout  Z87.39 V12.29   5. Gastroesophageal reflux disease without esophagitis  K21.9 530.81   6. Obesity (BMI 30-39.9)  E66.9 278.00   7. Benign prostatic hyperplasia without lower urinary tract symptoms  N40.0 600.00   8. Hyperlipidemia associated with type 2 diabetes mellitus (CMS/HCC)  E11.69 250.80    E78.5 272.4   9. Hypertension associated with diabetes (CMS/HCC)  E11.59 250.80    I15.2 401.9   10. Idiopathic chronic gout of multiple sites without tophus  M1A.09X0 274.02   11. Coronary artery disease involving native heart with angina pectoris, unspecified vessel or lesion type (CMS/HCC)  I25.119 414.01     413.9   12. Seasonal allergic rhinitis due to other allergic trigger  J30.89 477.8     Patient Active Problem List   Diagnosis   • Idiopathic chronic gout of multiple sites without tophus   • Type 2 diabetes mellitus with hyperglycemia, without long-term current use of insulin (CMS/HCC)   • Hypertension associated with diabetes (CMS/HCC)   • Hyperlipidemia associated with type 2 diabetes mellitus (CMS/[facility])   • Benign prostatic hyperplasia without lower urinary tract symptoms   • Chest pain   • Obesity (BMI 30-39.9)   • GERD (gastroesophageal reflux disease)   • Diabetic neuropathy (CMS/[facility])   • Former smoker   • History of gout   • Coronary artery disease involving native coronary artery of native heart with angina pectoris (CMS/[facility])   • CAD (coronary artery disease)   • IZA (acute  kidney injury) (CMS/HCC)     Past Medical History:   Diagnosis Date   • Arthritis    • Back pain    • BPH (benign prostatic hyperplasia)    • Diabetes mellitus (CMS/HCC)    • Diabetic peripheral neuropathy (CMS/HCC)    • Erectile dysfunction    • Former smoker    • GERD (gastroesophageal reflux disease)    • GERD (gastroesophageal reflux disease)    • Gout    • High cholesterol    • Hypertension    • Obesity      Past Surgical History:   Procedure Laterality Date   • CARDIAC CATHETERIZATION N/A 3/15/2021    Procedure: Left Heart Cath;  Surgeon: Trent Zaragoza MD;  Location:  [facility];  Service: Cardiology;  Laterality: N/A;   • CARDIAC CATHETERIZATION N/A 3/15/2021    Procedure: Coronary angiography;  Surgeon: Trent Zaragoza MD;  Location:  [facility]ATH [facility] LOCATION;  Service: Cardiology;  Laterality: N/A;   • COLONOSCOPY W/ BIOPSIES  2015   • CORONARY ARTERY BYPASS GRAFT N/A 3/16/2021    Procedure: MEDIAN STERNOTOMY, CORONARY ARTERY BYPASS GRAFT X4, UTILIZATION OF LEFT INTERNAL MAMMARY ARTERY, AND ENDOSCOPIC VEIN HARVEST OF RIGHT GREATER SAPHENOUS VEIN;  Surgeon: Jorge Simms MD;  [facility];  Service: Cardiothoracic;  Laterality: N/A;  vein out- 1614  vein ready- 1628             General Information     Row Name 03/22/21 0825          OT Time and Intention    Document Type  evaluation  -KF     Mode of Treatment  occupational therapy  -KF     Row Name 03/22/21 0825          General Information    Patient Profile Reviewed  yes  -KF     Prior Level of Function  independent:;transfer;bed mobility;ADL's;using stairs;driving;all household mobility drives semi trucks  -KF     Existing Precautions/Restrictions  cardiac;fall;sternal  -KF     Barriers to Rehab  none identified  -KF     Row Name 03/22/21 0825          Occupational Profile    Occupational History/Life Experiences (Occupational Profile)  drives semi trucks for occupation  -KF     Row Name 03/22/21 0825          Living  Environment    Lives With  spouse  -KF     Row Name 03/22/21 0825          Home Main Entrance    Number of Stairs, Main Entrance  two  -KF     Stair Railings, Main Entrance  none  -KF     Row Name 03/22/21 0825          Stairs Within Home, Primary    Stairs, Within Home, Primary  basement steps but states doesn't have to use, tub shower but has bench  -KF     Number of Stairs, Within Home, Primary  none  -KF     Row Name 03/22/21 0825          Cognition    Orientation Status (Cognition)  oriented x 4  -KF     Row Name 03/22/21 0825          Safety Issues, Functional Mobility    Safety Issues Affecting Function (Mobility)  awareness of need for assistance;problem-solving;safety precaution awareness;safety precautions follow-through/compliance  -KF     Impairments Affecting Function (Mobility)  balance;endurance/activity tolerance;shortness of breath;strength  -     Comment, Safety Issues/Impairments (Mobility)  despite cues and explanation of sternal precautions continues to place hands to push up with transfers, did demonstrate initially ability to stand without assist with hands on knees  -KF       User Key  (r) = Recorded By, (t) = Taken By, (c) = Cosigned By    Initials Name Provider Type    KF Liz Segura OT Occupational Therapist          Mobility/ADL's     Row Name 03/22/21 0828          Bed Mobility    Bed Mobility  rolling left;scooting/bridging;sidelying-sit  -KF     Rolling Left [place] (Bed Mobility)  minimum assist (75% patient effort);verbal cues;nonverbal cues (demo/gesture)  -KF     Scooting/Bridging [place] (Bed Mobility)  minimum assist (75% patient effort);nonverbal cues (demo/gesture);verbal cues  -KF     Sidelying-Sit [place] (Bed Mobility)  contact guard;nonverbal cues (demo/gesture);verbal cues  -KF     Bed Mobility, Safety Issues  decreased use of arms for pushing/pulling  -KF     Comment (Bed Mobility)  education provided on sequencing to complete this task from flat  surface to replicate home environment overall [place] and requires cues for problem solving and sequencing  -     Row Name 03/22/21 0828          Transfers    Transfers  bed-chair transfer;sit-stand transfer;toilet transfer  -KF     Comment (Transfers)  throws walker aside with transfer back from toilet and demonstrates impulsivity and poor safety compliance  -KF     Bed-Chair [place] (Transfers)  contact guard;verbal cues  -KF     Assistive Device (Bed-Chair Transfers)  -- no AD  -KF     Sit-Stand [place] (Transfers)  contact guard;verbal cues with cues to maintain sternal precautions  -KF     [place] Level (Toilet Transfer)  supervision;verbal cues cues required to appropriately maintain sternal precautions  -KF     Assistive Device (Toilet Transfer)  commode;walker, front-wheeled  -KF     Row Name 03/22/21 0828          Sit-Stand Transfer    Assistive Device (Sit-Stand Transfers)  walker, front-wheeled  -KF     Row Name 03/22/21 0828          Toilet Transfer    Type (Toilet Transfer)  sit-stand;stand-sit  -     Row Name 03/22/21 0828          Functional Mobility    Functional Mobility- Ind. Level  contact guard assist;verbal cues required  -KF     Functional Mobility- Device  rolling walker initially with AD  -KF     Functional Mobility-Distance (Feet)  30  -KF     Functional Mobility- Safety Issues  step length decreased;sequencing ability decreased  -     Row Name 03/22/21 0828          Activities of Daily Living    BADL Assessment/Intervention  grooming;toileting;lower body dressing;upper body dressing  -     Row Name 03/22/21 0828          Grooming Assessment/Training    [place] Level (Grooming)  wash face, hands;supervision  -     Position (Grooming)  unsupported standing;sink side  -     Row Name 03/22/21 0828          Toileting Assessment/Training    [place] Level (Toileting)  supervision;verbal cues  -KF     Assistive Devices (Toileting)  commode  -KF     Position  (Toileting)  supported sitting;supported standing  -KF     Row Name 03/22/21 0828          Lower Body Dressing Assessment/Training    [place] Level (Lower Body Dressing)  don;pants/bottoms;set up  -KF     Position (Lower Body Dressing)  supported sitting;unsupported standing  -KF     Row Name 03/22/21 0828          Upper Body Dressing Assessment/Training    [place] Level (Upper Body Dressing)  don;front opening garment;set up  -KF     Position (Upper Body Dressing)  edge of bed sitting  -KF       User Key  (r) = Recorded By, (t) = Taken By, (c) = Cosigned By    Initials Name Provider Type    KF Liz Segura, OT Occupational Therapist        Obj/Interventions     Row Name 03/22/21 0848          Sensory Assessment (Somatosensory)    Sensory Assessment (Somatosensory)  UE sensation intact  -KF     Row Name 03/22/21 0848          Vision Assessment/Intervention    Visual Impairment/Limitations  WNL  -KF     Row Name 03/22/21 0848          Range of Motion Comprehensive    General Range of Motion  bilateral upper extremity ROM WFL  -KF     Row Name 03/22/21 0848          Strength Comprehensive (MMT)    Comment, General Manual Muscle Testing (MMT) Assessment  deferred due to sternal precautions  -KF     Row Name 03/22/21 0848          Balance    Balance Assessment  sitting static balance;sitting dynamic balance;standing static balance;standing dynamic balance  -KF     Static Sitting Balance  WNL;unsupported;sitting, edge of bed  -KF     Dynamic Sitting Balance  WNL;unsupported;sitting, edge of bed  -KF     Static Standing Balance  WFL;supported;unsupported;standing  -KF     Dynamic Standing Balance  mild impairment;supported;unsupported;standing  -KF     Balance Interventions  standing;supported;weight shifting activity;occupation based/functional task  -KF     Comment, Balance  minimal lean back during one transfer but Pt refused to use FWW  -KF       User Key  (r) = Recorded By, (t) = Taken By, (c) =  Cosigned By    Initials Name Provider Type    Liz Gregory OT Occupational Therapist        Goals/Plan     Row Name 03/22/21 0856          Transfer Goal 1 (OT)    Activity/Assistive Device (Transfer Goal 1, OT)  bed-to-chair/chair-to-bed;toilet  -KF     [place] Level/Cues Needed (Transfer Goal 1, OT)  modified independence  -KF     Time Frame (Transfer Goal 1, OT)  long term goal (LTG);10 days  -KF     Progress/Outcome (Transfer Goal 1, OT)  goal ongoing  -KF     Row Name 03/22/21 0856          Bathing Goal 1 (OT)    Activity/Device (Bathing Goal 1, OT)  upper body bathing with sternal precautions  -KF     [place] Level/Cues Needed (Bathing Goal 1, OT)  supervision required;verbal cues required;set-up required  -KF     Time Frame (Bathing Goal 1, OT)  long term goal (LTG);10 days  -KF     Row Name 03/22/21 0856          Therapy Assessment/Plan (OT)    Planned Therapy Interventions (OT)  activity tolerance training;patient/caregiver education/training;adaptive equipment training;BADL retraining;ROM/therapeutic exercise;occupation/activity based interventions;cognitive/visual perception retraining;strengthening exercise;transfer/mobility retraining;functional balance retraining  -KF       User Key  (r) = Recorded By, (t) = Taken By, (c) = Cosigned By    Initials Name Provider Type    Liz Gregory OT Occupational Therapist        Clinical Impression     Row Name 03/22/21 0852          Pain Assessment    Additional Documentation  Pain Scale: Numbers Pre/Post-Treatment (Group)  -[facility] Name 03/22/21 0852          Pain Scale: Numbers Pre/Post-Treatment    Pretreatment Pain Rating  4/10  -KF     Posttreatment Pain Rating  4/10  -KF     Pain Location - Side  Right  -KF     Pain Location - Orientation  incisional  -KF     Pain Location  chest  -KF     Pre/Posttreatment Pain Comment  R LE and chest incisional  -KF     Pain Intervention(s)  Ambulation/increased activity;Repositioned  -KF      Row Name 03/22/21 0852          Plan of Care Review    Plan of Care Reviewed With  patient  -KF     Progress  improving  -KF     Outcome Summary  OT eval completed Pt presents with deficits in strength, safety awareness and compliance for sternal precautions, and balance for ADL completion, completed toileting tasks setup/SUP, completed transfers CGA with cues for sternal precautions, completed bed mob rolling to L side with Blessing and sidelying to sit with Blessing and moments CGA due to replication of home environment, recom IPOT d/c would benefit from home with HHOT and 24/7 assist with walker  -KF     Row Name 03/22/21 0852          Therapy Assessment/Plan (OT)    Rehab Potential (OT)  fair, will monitor progress closely  -KF     Criteria for Skilled Therapeutic Interventions Met (OT)  yes;meets criteria;skilled treatment is necessary  -KF     Therapy Frequency (OT)  daily  -KF     Row Name 03/22/21 0852          Therapy Plan Review/Discharge Plan (OT)    Anticipated Discharge Disposition (OT)  home with 24/7 care;home with home health;home with assist  -KF     Row Name 03/22/21 0852          Vital Signs    Pre Systolic BP Rehab  137 RN cleared VSS  -KF     Pre Treatment Diastolic BP  69  -KF     Pre SpO2 (%)  92  -KF     O2 Delivery Pre Treatment  room air  -KF     Post SpO2 (%)  97  -KF     O2 Delivery Post Treatment  room air  -KF     Pre Patient Position  Supine  -KF     Intra Patient Position  Standing  -KF     Post Patient Position  Sitting  -KF     Rest Breaks   1  -KF     Row Name 03/22/21 0852          Positioning and Restraints    Pre-Treatment Position  in bed  -KF     Post Treatment Position  chair  -KF     In Chair  notified nsg;reclined;sitting;call light within reach;encouraged to call for assist;waffle cushion;legs elevated exit alarm waved by RN  -KF       User Key  (r) = Recorded By, (t) = Taken By, (c) = Cosigned By    Initials Name Provider Type    KF Liz Segura, OT Occupational Therapist         Outcome Measures     Row Name 03/22/21 0858          How much help from another is currently needed...    Putting on and taking off regular lower body clothing?  4  -KF     Bathing (including washing, rinsing, and drying)  3  -KF     Toileting (which includes using toilet bed pan or urinal)  4  -KF     Putting on and taking off regular upper body clothing  3  -KF     Taking care of personal grooming (such as brushing teeth)  4  -KF     Eating meals  4  -KF     AM-PAC 6 Clicks Score (OT)  22  -KF     Row Name 03/22/21 0858          Functional Assessment    Outcome Measure Options  AM-PAC 6 Clicks Daily Activity (OT)  -KF       User Key  (r) = Recorded By, (t) = Taken By, (c) = Cosigned By    Initials Name Provider Type    Liz Gregory OT Occupational Therapist        Occupational Therapy Education                 Title: PT OT SLP Therapies (In Progress)     Topic: Occupational Therapy (In Progress)     Point: ADL training (In Progress)     Description:   Instruct learner(s) on proper safety adaptation and remediation techniques during self care or transfers.   Instruct in proper use of assistive devices.              Learning Progress Summary           Patient Acceptance, E,TB,D, NR by  at 3/22/2021 0858                   Point: Home exercise program (Not Started)     Description:   Instruct learner(s) on appropriate technique for monitoring, assisting and/or progressing therapeutic exercises/activities.              Learner Progress:  Not documented in this visit.          Point: Precautions (In Progress)     Description:   Instruct learner(s) on prescribed precautions during self-care and functional transfers.              Learning Progress Summary           Patient Acceptance, E,TB,D, NR by  at 3/22/2021 0858                   Point: Body mechanics (In Progress)     Description:   Instruct learner(s) on proper positioning and spine alignment during self-care, functional mobility activities  and/or exercises.              Learning Progress Summary           Patient Acceptance, E,TB,D, NR by  at 3/22/2021 0858                               User Key     Initials Effective Dates Name Provider Type Discipline     04/03/18 -  Liz Segura OT Occupational Therapist OT              OT Recommendation and Plan  Planned Therapy Interventions (OT): activity tolerance training, patient/caregiver education/training, adaptive equipment training, BADL retraining, ROM/therapeutic exercise, occupation/activity based interventions, cognitive/visual perception retraining, strengthening exercise, transfer/mobility retraining, functional balance retraining  Therapy Frequency (OT): daily  Plan of Care Review  Plan of Care Reviewed With: patient  Progress: improving  Outcome Summary: OT eval completed Pt presents with deficits in strength, safety awareness and compliance for sternal precautions, and balance for ADL completion, completed toileting tasks setup/SUP, completed transfers CGA with cues for sternal precautions, completed bed mob rolling to L side with Blessing and sidelying to sit with Blessing and moments CGA due to replication of home environment, recom IPOT d/c would benefit from home with HHOT and 24/7 assist with walker     Time Calculation:   Time Calculation- OT     Row Name 03/22/21 0751             Time Calculation- OT    OT Start Time  0751  -      OT Received On  03/22/21  -      OT Goal Re-Cert Due Date  04/01/21  -         Timed Charges    21454 - OT Self Care/Mgmt Minutes  10  -KF        User Key  (r) = Recorded By, (t) = Taken By, (c) = Cosigned By    Initials Name Provider Type     Liz Segura OT Occupational Therapist        Therapy Charges for Today     Code Description Service Date Service Provider Modifiers Qty    [id number]  OT SELF CARE/MGMT/TRAIN EA 15 MIN 3/22/2021 Liz Segura OT GO 1    [id number]  OT EVAL MOD COMPLEXITY 3 3/22/2021 Liz Segura OT GO  1               Liz Segura OT  3/22/2021    Electronically signed by Liz Segura OT at 03/22/21 0900     Liz Segura OT at 03/22/21 0751        Goal Outcome Evaluation:  Plan of Care Reviewed With: patient  Progress: improving  Outcome Summary: OT eval completed Pt presents with deficits in strength, safety awareness and compliance for sternal precautions, and balance for ADL completion, completed toileting tasks setup/SUP, completed transfers CGA with cues for sternal precautions, completed bed mob rolling to L side with Blessing and sidelying to sit with Blessing and moments CGA due to replication of home environment, recom IPOT d/c would benefit from home with HHOT and 24/7 assist with walker    Electronically signed by Liz Segura OT at 03/22/21 0837       Discharge Summary    No notes of this type exist for this encounter.

## 2021-03-22 NOTE — DISCHARGE PLACEMENT REQUEST
"Khalif Villasenor (70 y.o. Male)     Case Management- Annie [phone]    Room- S-470      Date of Birth Social Security Number Address Home Phone MRN    1950  [address]  [place] [address] [phone] [id number]    Worship Marital Status          Non-Adventism        Admission Date Admission Type Admitting Provider Attending Provider Department, Room/Bed    3/15/21 Urgent Jorge Simms MD Mitchell, Robert O, MD [facility] 4H, S470/1    Discharge Date Discharge Disposition Discharge Destination                       Attending Provider: Jorge Simms MD    Allergies: No Known Allergies    Isolation: None   Infection: None   Code Status: CPR    Ht: 175.3 cm (69.02\")   Wt: 99.8 kg (220 lb 1 oz)    Admission Cmt: None   Principal Problem: Type 2 diabetes mellitus with hyperglycemia, without long-term current use of insulin (CMS/[facility]) [E11.65]                 Active Insurance as of 3/15/2021     Primary Coverage     Payor Plan Insurance Group Employer/Plan Group    MEDICARE MEDICARE A & B      Payor Plan Address Payor Plan Phone Number Payor Plan Fax Number Effective Dates    [address] [phone]  8/1/2012 - None Entered    [facility] [id number]       Subscriber Name Subscriber Birth Date Member ID       KHALIF VILLASENOR 1950 [id number]           Secondary Coverage     Payor Plan Insurance Group Employer/Plan Group    BANKERS FIDELITY BANKERS FIDELITY      Payor Plan Address Payor Plan Phone Number Payor Plan Fax Number Effective Dates    [address] [phone]  12/1/2015 - None Entered    [facility] [address]       Subscriber Name Subscriber Birth Date Member ID       KHALIF VILLASENOR 1950 [id number]                 Emergency Contacts      (Rel.) Home Phone Work Phone Mobile Phone    HamiltonCaritoPerri (Spouse) [phone] -- [phone]        [place] [address] [facility] [address]  Dept. Phone:  " "[phone]  Dept. Fax:  [phone] Date Ordered: Mar 22, 2021         Patient:  William Villasenor MRN:  [id number]   [address] [place] [address] :  1950  SSN:    Phone: [phone] Sex:  M     Weight: 99.8 kg (220 lb 1 oz)         Ht Readings from Last 1 Encounters:   21 175.3 cm (69.02\")         Walker               (Order ID: [id number])    Diagnosis:  Other diabetic neurological complication associated with type 2 diabetes mellitus (CMS/HCC) (E11.49 [ICD-10-CM] 250.60 [ICD-9-CM])  Coronary artery disease involving native heart with angina pectoris, unspecified vessel or lesion type (CMS/HCC) (I25.119 [ICD-10-CM] 414.01,413.9 [ICD-9-CM])  Impaired mobility and activities of daily living (Z74.09,Z78.9 [ICD-10-CM] V49.89 [ICD-9-CM])   Quantity:  1     Equipment:  Walker Folding with Wheels  Length of Need (99 Months = Lifetime): 99 Months = Lifetime        Authorizing Provider's Phone: [phone]   Authorizing Provider:Concepción Carpenter DO  Authorizing Provider's NPI: [id number]  Order Entered By: Annie Dc RN 3/22/2021 10:28 AM     Electronically signed by: Concepción Carpenter DO 3/22/2021 10:28 AM          [place] [id number]  Dept. Phone:  [phone]  Dept. Fax:  [phone] Date Ordered: Mar 22, 2021         Patient:  William Villasenor MRN:  [id number]   [address] [place] [address] :  1950  SSN:    Phone: [phone] Sex:  M     Weight: 99.8 kg (220 lb 1 oz)         Ht Readings from Last 1 Encounters:   21 175.3 cm (69.02\")         Commode Chair          (Order ID: [id number])    Diagnosis:  Other diabetic neurological complication associated with type 2 diabetes mellitus (CMS/HCC) (E11.49 [ICD-10-CM] 250.60 [ICD-9-CM])  Impaired mobility and activities of daily living (Z74.09,Z78.9 [ICD-10-CM] V49.89 [ICD-9-CM])   Quantity:  1     Equipment:  Commode Chair w/ Detachable Arms  Length " of Need (99 Months = Lifetime): 99 Months = Lifetime        Authorizing Provider's Phone: [phone]   Authorizing Provider:Concepción Carpenter DO  Authorizing Provider's NPI: [id number]  Order Entered By: Annie Dc RN 3/22/2021 10:28 AM     Electronically signed by: Concepción Carpenter DO 3/22/2021 10:28 AM            Insurance Information                MEDICARE/MEDICARE A & B Phone: [phone]    Subscriber: William Villasenor Subscriber#: [id number]    Group#:  Precert#:         B[employer]ITY/[employer] Phone: [phone]    Subscriber: William Villasenor Subscriber#: [id number]    Group#:  Precert#:         Mirna Stark PA   Physician Assistant   Medicine   H&P   Attested   Date of Service:  21   Creation Time:  21      Related encounter: ED to Hosp-Admission (Discharged) from 3/13/2021 in [address] with Priya Ruiz MD and Bairon Caraballo MD         Attested        Attestation signed by Nikunj Sanches MD at 21 0624   I agree with the assessment and plan and I have discussed it with the advanced practice clinician (APC):  Mirna Stark PA-C.       Expand AllCollapse All      Show:Clear all  [x]Manual[x]Template[x]Copied    Added by:  [x]Mirna Stark PA    []Franci for details       [facility] Medicine Services  HISTORY & PHYSICAL     Patient Identification:  Name:  William Villasenor  Age:  70 y.o.  Sex:  male  :  1950  MRN:  [id number]   Visit Number:  [id number]  Admit Date: 3/13/2021   Primary Care Physician:  Jacqueline Gatica MD     Subjective      Chief complaint:   Chief Complaint   Patient presents with   • Chest Pain      History of presenting illness:   Patient is a 70 y.o. male with past medical history significant for essential hypertension, hyperlipidemia, non insulin dependent type II diabetes mellitus, diabetic neuropathy, hx of gout, GERD, BPH, former tobacco use, and obesity that  presented to the [facility] emergency department for evaluation of chest pain.  Patient states onset of symptoms was approximately 1 week ago.  When asked to locate pain, he points to the epigastric region.  Patient denies any radiating pain.  Patient describes the pain as heaviness.  Patient reports pain worse with exertion or lying flat.  Patient reports associated dyspnea.  Dyspnea also worse with exertion, significantly worse with lying flat positive for orthopnea.  He denies any associated nausea, vomiting or diaphoresis.  He states he took over-the-counter antacids and ibuprofen that did give him some minimal relief.  He denies any increase in abdominal distention or lower extremity edema.  He denies similar symptoms in the past.  Denies personal history of cardiac history, no stenting or left heart catheterizations in the past.  He does report having a stress test in the past, unsure of the results.  He does report longstanding history of gastroesophageal reflux disease.  He reports having endoscopic evaluation in the past by Dr. Garduno with gastroenterology, was told that he had a hiatal hernia and esophageal tear.  He denies having to have any surgical intervention.  He denies any headache or dizziness, no loss of consciousness or syncope.  He denies any urinary symptoms.  Denies any fevers or chills, no cough.     Upon arrival to the ED, vitals were temperature 98.3, heart rate 98, respiratory rate 18, blood pressure 147/81, and oxygen saturation 95% on room air.  Troponin T negative.  proBNP 140.1.  CMP with glucose 241, CO2 20.4, anion gap 16.6, and EGFR 56.  D-dimer negative.  CBC grossly unremarkable.  COVID-19 and influenza screen negative.  Chest x-ray with no evidence of acute cardiopulmonary disease.  While in the emergency department, patient was administered 324 mg p.o. aspirin.     Patient has been placed in observation status on the telemetry floor for further evaluation and  treatment.      Present during exam: Kelly ARMENDARIZ   ---------------------------------------------------------------------------------------------------------------------   Review of Systems   Constitutional: Negative for activity change, appetite change, chills, diaphoresis, fatigue and fever.   HENT: Negative for congestion, postnasal drip, rhinorrhea, sinus pain, sore throat and trouble swallowing.    Eyes: Negative for discharge and visual disturbance.   Respiratory: Positive for shortness of breath. Negative for cough, chest tightness and wheezing.    Cardiovascular: Positive for chest pain. Negative for palpitations and leg swelling.   Gastrointestinal: Negative for abdominal pain, constipation, diarrhea, nausea and vomiting.   Endocrine: Negative for cold intolerance and heat intolerance.   Genitourinary: Negative for decreased urine volume, dysuria, frequency and urgency.   Musculoskeletal: Negative for arthralgias, gait problem and myalgias.   Skin: Negative for color change, rash and wound.   Allergic/Immunologic: Negative for environmental allergies and immunocompromised state.   Neurological: Negative for dizziness, syncope, weakness, light-headedness and headaches.   Hematological: Negative for adenopathy. Does not bruise/bleed easily.   Psychiatric/Behavioral: Negative for confusion and decreased concentration. The patient is not nervous/anxious.       ---------------------------------------------------------------------------------------------------------------------   Medical History        Past Medical History:   Diagnosis Date   • Arthritis     • Back pain     • BPH (benign prostatic hyperplasia)     • Diabetes mellitus (CMS/HCC)     • Diabetic peripheral neuropathy (CMS/HCC)     • Erectile dysfunction     • Former smoker     • GERD (gastroesophageal reflux disease)     • GERD (gastroesophageal reflux disease)     • Gout     • High cholesterol     • Hypertension     • Obesity           Surgical History          Past Surgical History:   Procedure Laterality Date   • COLONOSCOPY / 2015               Family History   Problem Relation Age of Onset   • Heart disease Mother     • Hypertension Mother     • Depression Mother     • Alcohol abuse Maternal Uncle     • Heart disease Maternal Grandmother     • Hypertension Maternal Grandmother     • Diabetes Maternal Grandmother     • Diabetes Paternal Grandmother        Social History   Social History            Socioeconomic History   • Marital status:        Spouse name: Not on file   • Number of children: Not on file   • Years of education: Not on file   • Highest education level: Not on file   Tobacco Use   • Smoking status: Former Smoker       Packs/day: 1.00       Years: 5.00       Pack years: 5.00       Quit date: 1974       Years since quittin.1   • Smokeless tobacco: Never Used   Substance and Sexual Activity   • Alcohol use: Never   • Drug use: Never   • Sexual activity: Defer         ---------------------------------------------------------------------------------------------------------------------   Allergies:  Patient has no known allergies.  ---------------------------------------------------------------------------------------------------------------------   Medications below are reported home medications pulling from within the system; at this time, these medications have not been reconciled unless otherwise specified and are in the verification process for further verifcation as current home medications.              Prior to Admission Medications      Prescriptions Last Dose Informant Patient Reported? Taking?     acetaminophen (TYLENOL) 500 MG tablet     Yes No     Take 500 mg by mouth Every 6 (Six) Hours As Needed for Mild Pain .     allopurinol (ZYLOPRIM) 300 MG tablet     No No     Take 1 tablet by mouth Daily.     Alpha-Lipoic Acid 200 MG capsule     Yes No     Take  by mouth.     amLODIPine-atorvastatin (CADUET) 10-20 MG per  tablet     No No     Take 1 tablet by mouth Daily.     Apple Cider Vinegar 300 MG tablet     Yes No     Take  by mouth.     aspirin (Aspirin Adult Low Dose) 81 MG EC tablet     Yes No     Take 81 mg by mouth Daily.     Calcium-Magnesium-Zinc 333-133-5 MG tablet     Yes No     Take  by mouth.     Cinnamon 500 MG capsule     Yes No     Take 500 mg by mouth Daily.     Cod Liver Oil 10 MINIM capsule     Yes No     Take  by mouth.     coenzyme Q10 100 MG capsule     Yes No     Take 100 mg by mouth 2 (two) times a day.     Cranberry 1000 MG capsule     Yes No     Take  by mouth.     Dapagliflozin Propanediol (Farxiga) 10 MG tablet     Yes No     Take  by mouth Daily.     doxazosin (CARDURA) 4 MG tablet     No No     Take 1 tablet by mouth Daily.     enalapril (VASOTEC) 20 MG tablet     No No     Take 1 tablet by mouth Daily.     finasteride (PROSCAR) 5 MG tablet     No No     Take 1 tablet by mouth Daily.     fluconazole (DIFLUCAN) 200 MG tablet     No No     Take 1 tablet by mouth Daily.     folic acid (FOLVITE) 1 MG tablet     Yes No     Take 1 mg by mouth Daily.     gabapentin (NEURONTIN) 300 MG capsule     No No     Take 1 capsule by mouth 2 (two) times a day.     Garlic 1000 MG capsule     Yes No     Take 1,000 mg by mouth Daily.     glipizide (GLUCOTROL) 5 MG tablet     No No     Take 10 mg (2 tablets) orally in the AM and 5 mg (1 tablet) orally in the PM.     Glucosamine-Chondroit-Vit C-Mn (GLUCOSAMINE 1500 COMPLEX PO)     Yes No     Take  by mouth.     Hydrocort-Pramoxine, Perianal, (PROCTOFOAM-HS) 1-1 % rectal foam     Yes No     Insert 1 applicator into the rectum As Needed for Hemorrhoids.     Lactobacillus (Acidophilus) 100 MG capsule     Yes No     Take 100 mg by mouth Daily.     Loratadine (Claritin) 10 MG capsule     Yes No     Take 10 mg by mouth.     Lutein 10 MG tablet     Yes No     Take 10 mg by mouth Daily.     metFORMIN (GLUCOPHAGE) 1000 MG tablet     No No     Take 1 tablet by mouth 2 (Two) Times a  Day With Meals.     methylcellulose, Laxative, (CITRUCEL) 500 MG tablet tablet     Yes No     Take 2 tablets by mouth Every 4 (Four) Hours As Needed.     Omega-3 Fatty Acids (fish oil) 1000 MG capsule capsule     Yes No     Take 1,000 mg by mouth Daily.     omeprazole (priLOSEC) 20 MG capsule     No No     Take 1 capsule by mouth 2 (two) times a day.     Ped Multivitamins-Fl-Iron (multivitamin with fluoride/iron) 0.25-10 MG/ML solution solution     Yes No     Take  by mouth Daily.     saw palmetto 500 MG capsule     Yes No     Take 500 mg by mouth Daily.     Sennosides 15 MG tablet     Yes No     Take 15 mg by mouth As Needed.     triamcinolone (KENALOG) 0.1 % cream     Yes No     Apply  topically to the appropriate area as directed 2 (Two) Times a Day.     vitamin E 100 UNIT capsule     Yes No     Take 100 Units by mouth Daily.     Vitamin E 400 units tablet     Yes No     Take  by mouth.          ---------------------------------------------------------------------------------------------------------------------     Objective      Hospital Scheduled Meds:     No current facility-administered medications for this encounter.        Current listed hospital scheduled medications may not yet reflect those currently placed in orders that are signed and held, awaiting patient's arrival to floor/unit.    ---------------------------------------------------------------------------------------------------------------------   Vital Signs:  Temp:  [98.3 °F (36.8 °C)] 98.3 °F (36.8 °C)  Heart Rate:  [98] 98  Resp:  [18] 18  BP: (142-156)/(81-95) 151/95  Mean Arterial Pressure (Non-Invasive) for the past 24 hrs (Last 3 readings):    Noninvasive MAP (mmHg)   03/14/21 0103 109   03/14/21 0046 103   03/14/21 0032 106            SpO2 Percentage     03/14/21 0032 03/14/21 0046 03/14/21 0103   SpO2: 97% 95% 94%      SpO2:  [94 %-99 %] 94 %  on   ;   Device (Oxygen Therapy): room air     Body mass index is 31.43 kg/m².      Wt Readings  from Last 3 Encounters:   03/13/21 96.5 kg (212 lb 12.8 oz)   03/05/21 100 kg (221 lb)   12/04/20 98.2 kg (216 lb 9.6 oz)       ---------------------------------------------------------------------------------------------------------------------   Physical Exam:  Physical Exam  Nursing note reviewed. Exam conducted with a chaperone present (RN).   Constitutional:       General: He is awake. He is not in acute distress.     Appearance: He is well-developed. He is obese. He is not toxic-appearing.      Comments: Sitting up in bed, no acute distress noted.  On room air.  No family present at bedside.  RN present.   HENT:      Head: Normocephalic and atraumatic.      Right Ear: External ear normal.      Left Ear: External ear normal.      Nose: Nose normal.      Mouth/Throat:      Mouth: Mucous membranes are moist.      Pharynx: Oropharynx is clear.   Eyes:      Extraocular Movements: Extraocular movements intact.      Conjunctiva/sclera: Conjunctivae normal.      Pupils: Pupils are equal, round, and reactive to light.   Neck:      Vascular: No carotid bruit or JVD.   Cardiovascular:      Rate and Rhythm: Normal rate and regular rhythm.      Pulses:           Dorsalis pedis pulses are 2+ on the right side and 2+ on the left side.        Posterior tibial pulses are 2+ on the right side and 2+ on the left side.      Heart sounds: Normal heart sounds. No murmur. No friction rub. No gallop.    Pulmonary:      Effort: Pulmonary effort is normal. No tachypnea, accessory muscle usage or respiratory distress.      Breath sounds: Normal breath sounds and air entry. No wheezing, rhonchi or rales.      Comments: Speaks in full sentences without dyspnea, on room air.  Chest:      Chest wall: No tenderness.   Abdominal:      General: Abdomen is protuberant. Bowel sounds are normal. There is distension.      Palpations: Abdomen is soft. There is no hepatomegaly or splenomegaly.      Tenderness: There is abdominal tenderness in the  epigastric area. There is no guarding or rebound.      Hernia: No hernia is present.   Genitourinary:     Comments: No stewart catheter in place.  Musculoskeletal:      Cervical back: Normal range of motion and neck supple.      Right lower leg: Edema (Trace ) present.      Left lower leg: Edema (Trace) present.   Skin:     General: Skin is warm and dry.      Capillary Refill: Capillary refill takes less than 2 seconds.      Findings: No abscess, erythema, lesion or wound.   Neurological:      General: No focal deficit present.      Mental Status: He is alert and oriented to person, place, and time.      Cranial Nerves: Cranial nerves are intact.      Sensory: Sensation is intact.      Motor: Motor function is intact.      Comments: Awake and alert. Follows commands. Answers questions appropriately. Moves all extremities equally. Strength and sensation intact. No focal neuro deficit on exam.   Psychiatric:         Attention and Perception: Attention normal.         Mood and Affect: Mood and affect normal.         Speech: Speech normal.         Behavior: Behavior normal. Behavior is cooperative.         Thought Content: Thought content normal.         Cognition and Memory: Cognition and memory normal.         Judgment: Judgment normal.      ---------------------------------------------------------------------------------------------------------------------  EKG:  Pending cardiology read. Per my interpretation: Sinus rhythm, PAC present, HR 96 BPM, QTc stable at 447 m/s, poor R wave progression noted. There is some possible mild ST elevation in III and aVF, ? In II. Compared to EKG on file from 3/5/2021 no changes noted. No reciprocal changes appreciated. Await final cardiology read.     Telemetry:    Sinus 80s     I have personally reviewed the EKG/Telemetry strip  ---------------------------------------------------------------------------------------------------------------------        Results from last 7 days   Lab  Units 03/13/21 2239   TROPONIN T ng/mL <0.010                Results from last 7 days   Lab Units 03/13/21 2239   PROBNP pg/mL 140.1              Results from last 7 days   Lab Units 03/13/21 2239   WBC 10*3/mm3 8.18   HEMOGLOBIN g/dL 14.3   HEMATOCRIT % 42.4   MCV fL 87.8   MCHC g/dL 33.7   PLATELETS 10*3/mm3 310           Results from last 7 days   Lab Units 03/13/21 2239   SODIUM mmol/L 140   POTASSIUM mmol/L 4.1   CHLORIDE mmol/L 103   CO2 mmol/L 20.4*   BUN mg/dL 21   CREATININE mg/dL 1.26   EGFR IF NONAFRICN AM mL/min/1.73 56*   CALCIUM mg/dL 9.2   GLUCOSE mg/dL 248*   ALBUMIN g/dL 4.04   BILIRUBIN mg/dL 0.2   ALK PHOS U/L 74   AST (SGOT) U/L 16   ALT (SGPT) U/L 8   Estimated Creatinine Clearance: 61.6 mL/min (by C-G formula based on SCr of 1.26 mg/dL).           Lab Results   Component Value Date     HGBA1C 8.75 (H) 03/05/2021               Microbiology Results (last 10 days)      Procedure Component Value - Date/Time     COVID-19 and FLU A/B PCR - Swab, Nasopharynx [[id number]]  (Normal) Collected: 03/13/21 2339     Lab Status: Final result Specimen: Swab from Nasopharynx Updated: 03/14/21 0006       COVID19 Not Detected       Influenza A PCR Not Detected       Influenza B PCR Not Detected     Narrative:       Fact sheet for providers: https://www.fda.gov/media/903463/download     Fact sheet for patients: https://www.fda.gov/media/066861/download     Test performed by PCR.          Pain Management Panel       There is no flowsheet data to display.            I have personally reviewed the above laboratory results.   ---------------------------------------------------------------------------------------------------------------------           Imaging Results (Last 7 Days)      Procedure Component Value Units Date/Time     XR Chest 2 View [[id number]] Collected: 03/13/21 2256       Updated: 03/13/21 2258     Narrative:       FINDINGS:   PA and lateral views of the chest.  Heart and mediastinal contours are  normal. The lungs are clear. No pneumothorax or pleural effusion.       Impression:       No acute cardiopulmonary findings.     Signer Name: Boaz Bowman MD   Signed: 3/13/2021 10:56 PM   Workstation Name: [id number]    [facility]       I have personally reviewed the above radiology results.   ---------------------------------------------------------------------------------------------------------------------     Assessment & Plan       -Chest pain, rule out MI, mixed features   -Orthopnea, dyspnea on exertion  -Essential hypertension   -Hyperlipidemia   · Troponin T negative   · EKG with questionable ST elevation in 3 and aVF.  Stable compared to previous EKG on file.  No reciprocal changes appreciated.  · HEART Score = 3-4   · Continue aspirin daily   · Patient with lipid panel recently, pt with elevated total cholesterol and significant hypertriglyceridemia. Start statin.   · Trend troponin T, serial EKG   · Continuous telemetry monitoring.  · Obtain transthoracic echocardiogram to evaluate LVEF and cardiac wall motion   · NPO in preparation for stress test with myocardial perfusion imaging   · Systolic blood pressure appears slightly above goal, plan to resume home antihypertensive regimen once reconciled per pharmacy.  Closely monitor blood pressure hospital protocol and titrate medications as necessary.  · Patient does have some epigastric abdominal tenderness, reports history of hiatal hernia.  Reports history of EGD/colon with gastroenterology in the past.  His symptoms did slightly improve with antacids and ibuprofen.  Will obtain a lipase.  Continue Protonix. Concern for underlying GI etiology. Obtain abdominal US.     -Type II diabetes mellitus, non insulin dependent  -Diabetic polyneuropathy  · HgbA1C recently 8.7  · Review home medication regimen once reconciled per pharmacy   · Hold home oral DM medications for now.  · Start low dose SSI, titrate dosage as necessary   · Closely  monitor blood glucose levels with accuchecks QAC and QHS  · Hypoglycemia protocol in place should it be necessary  · Resume home Neurontin once reconciled per pharmacy.      -GERD  -History of hiatal hernia   · PPI     -History of gout   · Resume home medication once reconciled per pharmacy      -BPH  · Supportive care   · Monitor urine output   · Resume home medication once reconciled per pharmacy      -Obesity, BMI 31.43  · Affecting all aspects of care      -Former smoker      -F/E/N  · No IV fluids. Replace electrolytes per protocol as necessary. NPO diet.      ---------------------------------------------------  DVT Prophylaxis: Lovenox   GI Prophylaxis: PPI  Activity: Up with assistance as tolerated      The patient is considered to be a high risk patient due to: Chest pain, essential hypertension, hyperlipidemia, diabetes mellitus, obesity, and former tobacco abuse     OBSERVATION status, however if further evaluation or treatment plans warrant, status may change.  Based upon current information, I predict patient's care encounter to be less than or equal to 2 midnights.     Code Status: FULL CODE      I have discussed the patient's assessment and plan with the patient, nursing staff, and attending physician Dr. Nikunj Sanches MD.      Mirna Stark PA-C  Hospitalist Service -- [facility]   Pager: [phone]    03/14/21  01:17 EST     Attending Physician: Nikunj Sanches MD        ---------------------------------------------------------------------------------------------------------------------                   Cosigned by: Nikunj Sanches MD at 03/14/21 0624   Revision History                    Routing History

## 2021-03-23 ENCOUNTER — APPOINTMENT (OUTPATIENT)
Dept: GENERAL RADIOLOGY | Facility: HOSPITAL | Age: 71
End: 2021-03-23

## 2021-03-23 ENCOUNTER — TRANSITIONAL CARE MANAGEMENT TELEPHONE ENCOUNTER (OUTPATIENT)
Dept: CALL CENTER | Facility: HOSPITAL | Age: 71
End: 2021-03-23

## 2021-03-23 ENCOUNTER — NURSE TRIAGE (OUTPATIENT)
Dept: CALL CENTER | Facility: HOSPITAL | Age: 71
End: 2021-03-23

## 2021-03-23 ENCOUNTER — TELEPHONE (OUTPATIENT)
Dept: CARDIAC SURGERY | Facility: CLINIC | Age: 71
End: 2021-03-23

## 2021-03-23 ENCOUNTER — HOSPITAL ENCOUNTER (INPATIENT)
Facility: HOSPITAL | Age: 71
LOS: 2 days | Discharge: HOME HEALTH | End: 2021-03-27
Attending: STUDENT IN AN ORGANIZED HEALTH CARE EDUCATION/TRAINING PROGRAM | Admitting: INTERNAL MEDICINE

## 2021-03-23 DIAGNOSIS — I25.119 CORONARY ARTERY DISEASE INVOLVING NATIVE CORONARY ARTERY OF NATIVE HEART WITH ANGINA PECTORIS (HCC): ICD-10-CM

## 2021-03-23 DIAGNOSIS — I25.119 CORONARY ARTERY DISEASE INVOLVING NATIVE HEART WITH ANGINA PECTORIS, UNSPECIFIED VESSEL OR LESION TYPE (HCC): ICD-10-CM

## 2021-03-23 DIAGNOSIS — R07.9 CHEST PAIN, UNSPECIFIED TYPE: Primary | ICD-10-CM

## 2021-03-23 LAB
A-A DO2: 37.1 MMHG (ref 0–300)
ALBUMIN SERPL-MCNC: 3.7 G/DL (ref 3.5–5.2)
ALBUMIN/GLOB SERPL: 1.1 G/DL
ALP SERPL-CCNC: 110 U/L (ref 39–117)
ALT SERPL W P-5'-P-CCNC: 30 U/L (ref 1–41)
ANION GAP SERPL CALCULATED.3IONS-SCNC: 11.9 MMOL/L (ref 5–15)
ARTERIAL PATENCY WRIST A: POSITIVE
AST SERPL-CCNC: 22 U/L (ref 1–40)
ATMOSPHERIC PRESS: 724 MMHG
BASE EXCESS BLDA CALC-SCNC: 2.7 MMOL/L (ref 0–2)
BASOPHILS # BLD AUTO: 0.05 10*3/MM3 (ref 0–0.2)
BASOPHILS NFR BLD AUTO: 0.4 % (ref 0–1.5)
BDY SITE: ABNORMAL
BILIRUB SERPL-MCNC: 0.3 MG/DL (ref 0–1.2)
BODY TEMPERATURE: 0 C
BUN SERPL-MCNC: 18 MG/DL (ref 8–23)
BUN/CREAT SERPL: 17 (ref 7–25)
CALCIUM SPEC-SCNC: 8.8 MG/DL (ref 8.6–10.5)
CHLORIDE SERPL-SCNC: 98 MMOL/L (ref 98–107)
CO2 BLDA-SCNC: 27.9 MMOL/L (ref 22–33)
CO2 SERPL-SCNC: 26.1 MMOL/L (ref 22–29)
COHGB MFR BLD: 1.3 % (ref 0–5)
CREAT SERPL-MCNC: 1.06 MG/DL (ref 0.76–1.27)
CRP SERPL-MCNC: 15.14 MG/DL (ref 0–0.5)
DEPRECATED RDW RBC AUTO: 43.4 FL (ref 37–54)
EOSINOPHIL # BLD AUTO: 0.21 10*3/MM3 (ref 0–0.4)
EOSINOPHIL NFR BLD AUTO: 1.8 % (ref 0.3–6.2)
ERYTHROCYTE [DISTWIDTH] IN BLOOD BY AUTOMATED COUNT: 13.2 % (ref 12.3–15.4)
ERYTHROCYTE [SEDIMENTATION RATE] IN BLOOD: 79 MM/HR (ref 0–20)
FLUAV RNA RESP QL NAA+PROBE: NOT DETECTED
FLUBV RNA RESP QL NAA+PROBE: NOT DETECTED
GFR SERPL CREATININE-BSD FRML MDRD: 69 ML/MIN/1.73
GLOBULIN UR ELPH-MCNC: 3.3 GM/DL
GLUCOSE BLDC GLUCOMTR-MCNC: 337 MG/DL (ref 70–130)
GLUCOSE SERPL-MCNC: 342 MG/DL (ref 65–99)
HCO3 BLDA-SCNC: 26.7 MMOL/L (ref 20–26)
HCT VFR BLD AUTO: 32.9 % (ref 37.5–51)
HCT VFR BLD CALC: 32.1 % (ref 38–51)
HGB BLD-MCNC: 10.3 G/DL (ref 13–17.7)
HGB BLDA-MCNC: 10.5 G/DL (ref 14–18)
IMM GRANULOCYTES # BLD AUTO: 0.34 10*3/MM3 (ref 0–0.05)
IMM GRANULOCYTES NFR BLD AUTO: 2.9 % (ref 0–0.5)
INHALED O2 CONCENTRATION: 21 %
LIPASE SERPL-CCNC: 33 U/L (ref 13–60)
LYMPHOCYTES # BLD AUTO: 2.34 10*3/MM3 (ref 0.7–3.1)
LYMPHOCYTES NFR BLD AUTO: 20.2 % (ref 19.6–45.3)
Lab: ABNORMAL
MAGNESIUM SERPL-MCNC: 2.1 MG/DL (ref 1.6–2.4)
MCH RBC QN AUTO: 28.9 PG (ref 26.6–33)
MCHC RBC AUTO-ENTMCNC: 31.3 G/DL (ref 31.5–35.7)
MCV RBC AUTO: 92.2 FL (ref 79–97)
METHGB BLD QL: 0.6 % (ref 0–3)
MODALITY: ABNORMAL
MONOCYTES # BLD AUTO: 1 10*3/MM3 (ref 0.1–0.9)
MONOCYTES NFR BLD AUTO: 8.6 % (ref 5–12)
NEUTROPHILS NFR BLD AUTO: 66.1 % (ref 42.7–76)
NEUTROPHILS NFR BLD AUTO: 7.64 10*3/MM3 (ref 1.7–7)
NOTE: ABNORMAL
NRBC BLD AUTO-RTO: 0.2 /100 WBC (ref 0–0.2)
NT-PROBNP SERPL-MCNC: 1825 PG/ML (ref 0–900)
OXYHGB MFR BLDV: 91.9 % (ref 94–99)
PCO2 BLDA: 37.9 MM HG (ref 35–45)
PCO2 TEMP ADJ BLD: ABNORMAL MM[HG]
PH BLDA: 7.46 PH UNITS (ref 7.35–7.45)
PH, TEMP CORRECTED: ABNORMAL
PHOSPHATE SERPL-MCNC: 2.4 MG/DL (ref 2.5–4.5)
PLATELET # BLD AUTO: 417 10*3/MM3 (ref 140–450)
PMV BLD AUTO: 8.9 FL (ref 6–12)
PO2 BLDA: 62.3 MM HG (ref 83–108)
PO2 TEMP ADJ BLD: ABNORMAL MM[HG]
POTASSIUM SERPL-SCNC: 4.3 MMOL/L (ref 3.5–5.2)
PROT SERPL-MCNC: 7 G/DL (ref 6–8.5)
RBC # BLD AUTO: 3.57 10*6/MM3 (ref 4.14–5.8)
SAO2 % BLDCOA: 93.7 % (ref 94–99)
SARS-COV-2 RNA RESP QL NAA+PROBE: NOT DETECTED
SODIUM SERPL-SCNC: 136 MMOL/L (ref 136–145)
TROPONIN T SERPL-MCNC: 0.02 NG/ML (ref 0–0.03)
TROPONIN T SERPL-MCNC: 0.03 NG/ML (ref 0–0.03)
TSH SERPL DL<=0.05 MIU/L-ACNC: 2.1 UIU/ML (ref 0.27–4.2)
VENTILATOR MODE: ABNORMAL
WBC # BLD AUTO: 11.58 10*3/MM3 (ref 3.4–10.8)

## 2021-03-23 PROCEDURE — 85025 COMPLETE CBC W/AUTO DIFF WBC: CPT | Performed by: STUDENT IN AN ORGANIZED HEALTH CARE EDUCATION/TRAINING PROGRAM

## 2021-03-23 PROCEDURE — 93010 ELECTROCARDIOGRAM REPORT: CPT | Performed by: INTERNAL MEDICINE

## 2021-03-23 PROCEDURE — 84100 ASSAY OF PHOSPHORUS: CPT | Performed by: STUDENT IN AN ORGANIZED HEALTH CARE EDUCATION/TRAINING PROGRAM

## 2021-03-23 PROCEDURE — 83050 HGB METHEMOGLOBIN QUAN: CPT

## 2021-03-23 PROCEDURE — 85652 RBC SED RATE AUTOMATED: CPT | Performed by: STUDENT IN AN ORGANIZED HEALTH CARE EDUCATION/TRAINING PROGRAM

## 2021-03-23 PROCEDURE — 63710000001 INSULIN ASPART PER 5 UNITS: Performed by: INTERNAL MEDICINE

## 2021-03-23 PROCEDURE — 99285 EMERGENCY DEPT VISIT HI MDM: CPT

## 2021-03-23 PROCEDURE — 36600 WITHDRAWAL OF ARTERIAL BLOOD: CPT

## 2021-03-23 PROCEDURE — 93005 ELECTROCARDIOGRAM TRACING: CPT | Performed by: STUDENT IN AN ORGANIZED HEALTH CARE EDUCATION/TRAINING PROGRAM

## 2021-03-23 PROCEDURE — 83880 ASSAY OF NATRIURETIC PEPTIDE: CPT | Performed by: STUDENT IN AN ORGANIZED HEALTH CARE EDUCATION/TRAINING PROGRAM

## 2021-03-23 PROCEDURE — 82962 GLUCOSE BLOOD TEST: CPT

## 2021-03-23 PROCEDURE — 87636 SARSCOV2 & INF A&B AMP PRB: CPT | Performed by: STUDENT IN AN ORGANIZED HEALTH CARE EDUCATION/TRAINING PROGRAM

## 2021-03-23 PROCEDURE — 83690 ASSAY OF LIPASE: CPT | Performed by: STUDENT IN AN ORGANIZED HEALTH CARE EDUCATION/TRAINING PROGRAM

## 2021-03-23 PROCEDURE — 25010000002 MORPHINE PER 10 MG: Performed by: STUDENT IN AN ORGANIZED HEALTH CARE EDUCATION/TRAINING PROGRAM

## 2021-03-23 PROCEDURE — 71045 X-RAY EXAM CHEST 1 VIEW: CPT

## 2021-03-23 PROCEDURE — 71045 X-RAY EXAM CHEST 1 VIEW: CPT | Performed by: RADIOLOGY

## 2021-03-23 PROCEDURE — 82805 BLOOD GASES W/O2 SATURATION: CPT

## 2021-03-23 PROCEDURE — 99223 1ST HOSP IP/OBS HIGH 75: CPT | Performed by: PHYSICIAN ASSISTANT

## 2021-03-23 PROCEDURE — 86140 C-REACTIVE PROTEIN: CPT | Performed by: STUDENT IN AN ORGANIZED HEALTH CARE EDUCATION/TRAINING PROGRAM

## 2021-03-23 PROCEDURE — G0378 HOSPITAL OBSERVATION PER HR: HCPCS

## 2021-03-23 PROCEDURE — 82375 ASSAY CARBOXYHB QUANT: CPT

## 2021-03-23 PROCEDURE — 84484 ASSAY OF TROPONIN QUANT: CPT | Performed by: STUDENT IN AN ORGANIZED HEALTH CARE EDUCATION/TRAINING PROGRAM

## 2021-03-23 PROCEDURE — 83735 ASSAY OF MAGNESIUM: CPT | Performed by: STUDENT IN AN ORGANIZED HEALTH CARE EDUCATION/TRAINING PROGRAM

## 2021-03-23 PROCEDURE — 84443 ASSAY THYROID STIM HORMONE: CPT | Performed by: STUDENT IN AN ORGANIZED HEALTH CARE EDUCATION/TRAINING PROGRAM

## 2021-03-23 PROCEDURE — 63710000001 ONDANSETRON ODT 4 MG TABLET DISPERSIBLE: Performed by: STUDENT IN AN ORGANIZED HEALTH CARE EDUCATION/TRAINING PROGRAM

## 2021-03-23 PROCEDURE — 80053 COMPREHEN METABOLIC PANEL: CPT | Performed by: STUDENT IN AN ORGANIZED HEALTH CARE EDUCATION/TRAINING PROGRAM

## 2021-03-23 RX ORDER — NITROGLYCERIN 0.4 MG/1
0.4 TABLET SUBLINGUAL
Status: DISCONTINUED | OUTPATIENT
Start: 2021-03-23 | End: 2021-03-27 | Stop reason: HOSPADM

## 2021-03-23 RX ORDER — HYDROCODONE BITARTRATE AND ACETAMINOPHEN 7.5; 325 MG/1; MG/1
1 TABLET ORAL EVERY 4 HOURS PRN
Status: CANCELLED | OUTPATIENT
Start: 2021-03-23

## 2021-03-23 RX ORDER — NICOTINE POLACRILEX 4 MG
15 LOZENGE BUCCAL
Status: DISCONTINUED | OUTPATIENT
Start: 2021-03-23 | End: 2021-03-27 | Stop reason: HOSPADM

## 2021-03-23 RX ORDER — SODIUM CHLORIDE 0.9 % (FLUSH) 0.9 %
10 SYRINGE (ML) INJECTION AS NEEDED
Status: DISCONTINUED | OUTPATIENT
Start: 2021-03-23 | End: 2021-03-27 | Stop reason: HOSPADM

## 2021-03-23 RX ORDER — YOHIMBE BARK 500 MG
100 CAPSULE ORAL DAILY
Status: CANCELLED | OUTPATIENT
Start: 2021-03-24

## 2021-03-23 RX ORDER — DEXTROSE MONOHYDRATE 25 G/50ML
25 INJECTION, SOLUTION INTRAVENOUS
Status: DISCONTINUED | OUTPATIENT
Start: 2021-03-23 | End: 2021-03-27 | Stop reason: HOSPADM

## 2021-03-23 RX ORDER — HYDROCODONE BITARTRATE AND ACETAMINOPHEN 7.5; 325 MG/1; MG/1
1 TABLET ORAL EVERY 6 HOURS PRN
Status: DISCONTINUED | OUTPATIENT
Start: 2021-03-23 | End: 2021-03-24

## 2021-03-23 RX ORDER — DOXAZOSIN MESYLATE 4 MG/1
4 TABLET ORAL NIGHTLY
COMMUNITY
End: 2021-07-01

## 2021-03-23 RX ORDER — CLOPIDOGREL BISULFATE 75 MG/1
75 TABLET ORAL DAILY
Qty: 90 TABLET | Refills: 1 | Status: SHIPPED | OUTPATIENT
Start: 2021-03-23 | End: 2021-09-10

## 2021-03-23 RX ORDER — GLIPIZIDE 5 MG/1
5 TABLET ORAL EVERY EVENING
Status: CANCELLED | OUTPATIENT
Start: 2021-03-24

## 2021-03-23 RX ORDER — NITROGLYCERIN 0.4 MG/1
0.4 TABLET SUBLINGUAL
Qty: 100 TABLET | Refills: 12 | Status: SHIPPED | OUTPATIENT
Start: 2021-03-23 | End: 2022-12-02 | Stop reason: SDUPTHER

## 2021-03-23 RX ORDER — ONDANSETRON 4 MG/1
4 TABLET, ORALLY DISINTEGRATING ORAL ONCE
Status: COMPLETED | OUTPATIENT
Start: 2021-03-23 | End: 2021-03-23

## 2021-03-23 RX ORDER — GLIPIZIDE 5 MG/1
10 TABLET ORAL EVERY MORNING
Status: CANCELLED | OUTPATIENT
Start: 2021-03-24

## 2021-03-23 RX ORDER — FUROSEMIDE 20 MG/1
20 TABLET ORAL DAILY
Qty: 90 TABLET | Refills: 1 | Status: SHIPPED | OUTPATIENT
Start: 2021-03-23 | End: 2021-03-27 | Stop reason: HOSPADM

## 2021-03-23 RX ORDER — HEPARIN SODIUM 5000 [USP'U]/ML
5000 INJECTION, SOLUTION INTRAVENOUS; SUBCUTANEOUS EVERY 12 HOURS SCHEDULED
Status: DISCONTINUED | OUTPATIENT
Start: 2021-03-24 | End: 2021-03-27 | Stop reason: HOSPADM

## 2021-03-23 RX ORDER — SODIUM CHLORIDE 0.9 % (FLUSH) 0.9 %
10 SYRINGE (ML) INJECTION EVERY 12 HOURS SCHEDULED
Status: DISCONTINUED | OUTPATIENT
Start: 2021-03-23 | End: 2021-03-27 | Stop reason: HOSPADM

## 2021-03-23 RX ORDER — HYDROCODONE BITARTRATE AND ACETAMINOPHEN 7.5; 325 MG/1; MG/1
1 TABLET ORAL EVERY 4 HOURS PRN
Qty: 12 TABLET | Refills: 0 | Status: SHIPPED | OUTPATIENT
Start: 2021-03-23 | End: 2021-03-27 | Stop reason: HOSPADM

## 2021-03-23 RX ORDER — NITROGLYCERIN 0.4 MG/1
0.4 TABLET SUBLINGUAL
Status: CANCELLED | OUTPATIENT
Start: 2021-03-23

## 2021-03-23 RX ORDER — ATORVASTATIN CALCIUM 40 MG/1
40 TABLET, FILM COATED ORAL NIGHTLY
Qty: 90 TABLET | Refills: 3 | Status: SHIPPED | OUTPATIENT
Start: 2021-03-23 | End: 2021-05-10

## 2021-03-23 RX ADMIN — MORPHINE SULFATE 4 MG: 4 INJECTION, SOLUTION INTRAMUSCULAR; INTRAVENOUS at 19:15

## 2021-03-23 RX ADMIN — ONDANSETRON 4 MG: 4 TABLET, ORALLY DISINTEGRATING ORAL at 19:15

## 2021-03-23 RX ADMIN — INSULIN ASPART 7 UNITS: 100 INJECTION, SOLUTION INTRAVENOUS; SUBCUTANEOUS at 23:43

## 2021-03-23 RX ADMIN — HYDROCODONE BITARTRATE AND ACETAMINOPHEN 1 TABLET: 7.5; 325 TABLET ORAL at 23:59

## 2021-03-23 RX ADMIN — HEPARIN SODIUM 5000 UNITS: 5000 INJECTION INTRAVENOUS; SUBCUTANEOUS at 23:43

## 2021-03-23 RX ADMIN — SODIUM CHLORIDE, PRESERVATIVE FREE 10 ML: 5 INJECTION INTRAVENOUS at 23:44

## 2021-03-23 NOTE — DISCHARGE SUMMARY
CTS Discharge Summary    Patient Care Team:  Jacqueline Gatica MD as PCP - General (Family Medicine)  Consults:   Consults     Date and Time Order Name Status Description    3/16/2021  7:06 PM Inpatient Consult to Cardiology Completed     3/15/2021 10:48 PM Inpatient Hospitalist Consult Completed     3/14/2021 12:11 PM Inpatient Cardiology Consult Completed           Date of Admission: 3/15/2021  6:19 PM  Date of Discharge: 3/22/2021    Discharge Diagnosis  Past Medical History:   Diagnosis Date   • Arthritis    • Back pain    • BPH (benign prostatic hyperplasia)    • Diabetes mellitus (CMS/HCC)    • Diabetic peripheral neuropathy (CMS/HCC)    • Erectile dysfunction    • Former smoker    • GERD (gastroesophageal reflux disease)    • GERD (gastroesophageal reflux disease)    • Gout    • High cholesterol    • Hypertension    • Obesity      Coronary artery disease involving native coronary artery of native heart with angina pectoris (CMS/HCC) [I25.119]  CAD (coronary artery disease) [I25.10]     Procedures Performed  Procedure(s):  MEDIAN STERNOTOMY, CORONARY ARTERY BYPASS GRAFT X4, UTILIZATION OF LEFT INTERNAL MAMMARY ARTERY, AND ENDOSCOPIC VEIN HARVEST OF RIGHT GREATER SAPHENOUS VEIN         History of Present Illness  Patient is a 70 y.o. male with a past medical history significant for hypertension, type 2 diabetes mellitus with peripheral neuropathy, dyslipidemia, obesity and a hiatal hernia who presented to [facility] on 3/13/2021 with a one week history of progressively worsening substernal chest pain with associated dyspnea.  It was determined the patient was experiencing what appeared to be unstable angina.  He was admitted and underwent a stress test which was abnormal.  The patient subsequently underwent a left heart catheterization which revealed severe multivessel coronary disease with preserved left ventricular function with a left ventricular ejection fraction of approximately 55%.  The  patient was subsequently transferred to [facility] for further evaluation and possible surgical revascularization.  Currently, the patient denies any chest pain or dyspnea.      Hospital Course  Patient is a 70-year-old male with a past medical history of hypertension, type 2 diabetes, dyslipidemia, obesity, who was admitted to [facility] on 3/15/2021 after finding significant coronary artery disease in need of revascularization.  Patient was kept overnight, and a CABG procedure was performed on 3/16 by Dr. Jorge Simms.  Procedure was successful without complication.  He was transferred to ICU intubated and in stable condition.    POD 1, patient kept in ICU and extubated.  Put on 6 L nasal cannula.  Doing satisfactory.  POD 2, patient begins to wean down on submental oxygen.  Ambulating with PT.  Pain managed well.  Pacing wires were discontinued.  Plavix 75 mg was started.  Chest tubes kept in due to excessive output.  POD 3, patient doing well.  Chest tubes were discontinued.  Continues to ambulate with PT.  POD 4, patient found to be in stable condition and recovering well.  Transferred from ICU to telemetry floor.  Central line was discontinued as well.  POD 5, patient continues to improve.  There were some elevation in creatinine postop on labs on this day.  Decreased the use of nephrotoxic medications.  Bowel management also initiated due to lack of bowel movements postop.  POD 6, patient doing well.  Did have a number of bowel movements after receiving the laxatives.  Patient found to be satisfactory in recovery in stable condition.  Patient was discharged home on this day.      Discharge Medications     Discharge Medications      New Medications      Instructions Start Date   atorvastatin 40 MG tablet  Commonly known as: LIPITOR   40 mg, Oral, Nightly      clopidogrel 75 MG tablet  Commonly known as: PLAVIX   75 mg, Oral, Daily      furosemide 20 MG tablet  Commonly known as: LASIX    20 mg, Oral, Daily      HYDROcodone-acetaminophen 7.5-325 MG per tablet  Commonly known as: NORCO  Notes to patient: For pain   1 tablet, Oral, Every 4 Hours PRN      metoprolol tartrate 25 MG tablet  Commonly known as: LOPRESSOR   25 mg, Oral, Every 12 Hours Scheduled      nitroglycerin 0.4 MG SL tablet  Commonly known as: NITROSTAT   0.4 mg, Sublingual, Every 5 Minutes PRN, Take no more than 3 doses in 15 minutes.         Continue These Medications      Instructions Start Date   acetaminophen 500 MG tablet  Commonly known as: TYLENOL  Notes to patient: For pain   500 mg, Oral, Every 6 Hours PRN      Acidophilus 100 MG capsule  Notes to patient: Pro-biotic   100 mg, Oral, Daily      allopurinol 300 MG tablet  Commonly known as: ZYLOPRIM  Notes to patient: For gout   300 mg, Oral, Daily      Aspirin Adult Low Dose 81 MG EC tablet  Generic drug: aspirin  Notes to patient: For your heart to prevent blockages   81 mg, Oral, Daily      doxazosin 4 MG tablet  Commonly known as: CARDURA  Notes to patient: For your prostate   4 mg, Oral, Daily      finasteride 5 MG tablet  Commonly known as: PROSCAR  Notes to patient: For your prostate   5 mg, Oral, Daily      folic acid 1 MG tablet  Commonly known as: FOLVITE  Notes to patient: Supplement    1 mg, Oral, 2 times daily      gabapentin 300 MG capsule  Commonly known as: NEURONTIN  Notes to patient: For nerve pain   300 mg, Oral, 2 times daily      glipizide 5 MG tablet  Commonly known as: GLUCOTROL  Notes to patient: To lower your blood sugar   10 mg, Oral, Every Morning      glipizide 5 MG tablet  Commonly known as: GLUCOTROL  Notes to patient: To lower your blood sugar   5 mg, Oral, Every Evening      metFORMIN 1000 MG tablet  Commonly known as: GLUCOPHAGE  Notes to patient: To lower your blood sugar   1,000 mg, Oral, 2 Times Daily With Meals      multivitamin tablet tablet  Notes to patient: Supplement    1 tablet, Oral, Daily      omeprazole 20 MG capsule  Commonly known  as: priLOSEC  Notes to patient: For your stomach   20 mg, Oral, 2 times daily      vitamin E 400 UNIT capsule  Notes to patient: Supplement    400 Units, Oral, Daily         Stop These Medications    enalapril 20 MG tablet  Commonly known as: VASOTEC            Discharge Diet:   Diet Instructions     Diet: Regular, Consistent Carbohydrate, Cardiac; Thin      Discharge Diet:  Regular  Consistent Carbohydrate  Cardiac       Fluid Consistency: Thin          Activity at Discharge:   Activity Instructions     Bathing Restrictions      Type of Restriction: Bathing    Bathing Restrictions: No Tub Bath    Driving Restrictions      Type of Restriction: Driving    Driving Restrictions: No Driving Until Next Appointment    Lifting Restrictions      Type of Restriction: Lifting    Lifting Restrictions: Lifting Restriction (Indicate Limit)    Weight Limit (Pounds): 10    Length of Lifting Restriction: Until after next appointment          Follow-up Appointments  Future Appointments   Date Time Provider Department Center   3/29/2021  9:15 AM Indira Ko APRN [facility]   4/1/2021  3:30 PM Jacqueline Gatica MD [facility]   4/2/2021  9:45 AM Jacqueline Gatica MD [facility]        WOUND CARE:  Monitor surgical wounds daily. Keep incisons clean and dry.   Call CT Surgery office [phone]  with any questions or concerns, specifically let them know of increased redness, drainage, or opening up of incision.       Sam Albright PA-C  03/23/21  10:29 EDT

## 2021-03-23 NOTE — TELEPHONE ENCOUNTER
"Patient's pain med prescription was not sent to the pharmacy.  Routed to case management.    Reason for Disposition  • [1] Prescription not at pharmacy AND [2] was prescribed by PCP recently    Additional Information  • Negative: Drug overdose and triager unable to answer question  • Negative: Caller requesting information unrelated to medicine  • Negative: Caller requesting a prescription for Strep throat and has a positive culture result  • Negative: Rash while taking a medication or within 3 days of stopping it  • Negative: Immunization reaction suspected  • Negative: [1] Asthma and [2] having symptoms of asthma (cough, wheezing, etc.)  • Negative: [1] Influenza symptoms AND [2] anti-viral med prescription request, such as Tamiflu  • Negative: [1] Symptom of illness (e.g., headache, abdominal pain, earache, vomiting) AND [2] more than mild  • Negative: MORE THAN A DOUBLE DOSE of a prescription or over-the-counter (OTC) drug  • Negative: [1] DOUBLE DOSE (an extra dose or lesser amount) of over-the-counter (OTC) drug AND [2] any symptoms (e.g., dizziness, nausea, pain, sleepiness)  • Negative: [1] DOUBLE DOSE (an extra dose or lesser amount) of prescription drug AND [2] any symptoms (e.g., dizziness, nausea, pain, sleepiness)  • Negative: Took another person's prescription drug  • Negative: [1] DOUBLE DOSE (an extra dose or lesser amount) of prescription drug AND [2] NO symptoms (Exception: a double dose of antibiotics)  • Negative: Diabetes drug error or overdose (e.g., took wrong type of insulin or took extra dose)  • Negative: [1] Request for URGENT new prescription or refill of \"essential\" medication (i.e., likelihood of harm to patient if not taken) AND [2] triager unable to fill per unit policy    Answer Assessment - Initial Assessment Questions  1.   NAME of MEDICATION: \"What medicine are you calling about?\"      His pain medicine  2.   QUESTION: \"What is your question?\"      The prescription was not sent to " "the pharmacy  3.   PRESCRIBING HCP: \"Who prescribed it?\" Reason: if prescribed by specialist, call should be referred to that group.      Dr. Simms  4. SYMPTOMS: \"Do you have any symptoms?\"      yes  5. SEVERITY: If symptoms are present, ask \"Are they mild, moderate or severe?\"      mild  6.  PREGNANCY:  \"Is there any chance that you are pregnant?\" \"When was your last menstrual period?\"      na    Protocols used: MEDICATION QUESTION CALL-ADULT-      "

## 2021-03-23 NOTE — OUTREACH NOTE
Case Management Call Center Follow-up      Responses   [facility] Call Center Tracking Reason?  Medication Clarification   Has the Call Center Case Management Follow-up issue been resolved?  Yes   Follow-up Comments  Notified CT Surgery of the Medication needing to be sent to [facility].           ROVERTO Coronel    3/23/2021, 13:57 CDT

## 2021-03-23 NOTE — TELEPHONE ENCOUNTER
Patient wife called saying that patient is having pretty bad chest pain right now but he was not sure if it was because of the surgery or if it was his heart. She stated that she gave him a nitrostat but he has not had pain meds since he was discharged from the hospital. I advised that patient go to [facility] ED to be evaluated because I cannot tell over the phone what would be causing the patient's chest pain. She asked if she could give patient a dose of the pain medication as well. I told her that if the patient wanted to take it and felt like he needed it that it was up to them. She verbalized understanding and agreed. She will be taking the patient to the [facility] ED to be evaluated this evening.

## 2021-03-23 NOTE — OUTREACH NOTE
Call Center TCM Note      Responses   [facility] patient discharged from?  [place]   Does the patient have one of the following disease processes/diagnoses(primary or secondary)?  Cardiothoracic surgery   TCM attempt successful?  Yes   Call start time  1304   Call end time  1310   Discharge diagnosis  CABGx4   Does the patient have all medications related to this admission filled (includes all antibiotics, pain medications, cardiac medications, etc.)  Yes   Is the patient taking all medications as directed (includes completed medication regime)?  Yes   Comments regarding appointments  post-op appt on 3/29/21   Does the patient have a primary care provider?   Yes   Does the patient have an appointment scheduled with their C/T surgeon?  Yes   Comments regarding PCP  f/u with Dr. Brice on 4/1/21   Has the patient kept scheduled appointments due by today?  N/A   What is the Home health agency?   [facility]   Psychosocial issues?  No   Did the patient receive a copy of their discharge instructions?  Yes   Nursing interventions  Reviewed instructions with patient   What is the patient's perception of their health status since discharge?  Improving   Nursing interventions  Nurse provided patient education   Is the patient /caregiver able to teach back basic post-op care?  Continue use of incentive spirometry at least 1 week post discharge, Practice 'cough and deep breath', Drive as instructed by MD in discharge instructions, Take showers only when approved by MD-sponge bathe until then, No tub bath, swimming, or hot tub until instructed by MD, Keep incision areas clean, dry and protected, Do not remove steri-strips, Lifting as instructed by MD in discharge instructions   Is the patient/caregiver able to teach back signs and symptoms of incisional infection?  Fever   Is the patient/caregiver able to teach back the hierarchy of who to call/visit for symptoms/problems? PCP, Specialist, Home health nurse, Urgent Care,  ED, 911  Yes   TCM call completed?  Yes   Wrap up additional comments  States he is doing well, he has called nurse triage earlier today about his missing pain medication, they sent a note over to case management, he confirmed f/u appt with PCP for 4/1/21.          Rizwana Pollard RN    3/23/2021, 13:10 EDT

## 2021-03-24 ENCOUNTER — READMISSION MANAGEMENT (OUTPATIENT)
Dept: CALL CENTER | Facility: HOSPITAL | Age: 71
End: 2021-03-24

## 2021-03-24 ENCOUNTER — APPOINTMENT (OUTPATIENT)
Dept: CARDIOLOGY | Facility: HOSPITAL | Age: 71
End: 2021-03-24

## 2021-03-24 LAB
ALBUMIN SERPL-MCNC: 2.66 G/DL (ref 3.5–5.2)
ALBUMIN/GLOB SERPL: 0.9 G/DL
ALP SERPL-CCNC: 82 U/L (ref 39–117)
ALT SERPL W P-5'-P-CCNC: 22 U/L (ref 1–41)
ANION GAP SERPL CALCULATED.3IONS-SCNC: 9.1 MMOL/L (ref 5–15)
AST SERPL-CCNC: 13 U/L (ref 1–40)
BASOPHILS # BLD AUTO: 0.03 10*3/MM3 (ref 0–0.2)
BASOPHILS NFR BLD AUTO: 0.3 % (ref 0–1.5)
BH CV ECHO MEAS - % IVS THICK: -13.8 %
BH CV ECHO MEAS - % LVPW THICK: 13.5 %
BH CV ECHO MEAS - BSA(HAYCOCK): 2.3 M^2
BH CV ECHO MEAS - BSA: 2.2 M^2
BH CV ECHO MEAS - BZI_BMI: 33.1 KILOGRAMS/M^2
BH CV ECHO MEAS - BZI_METRIC_HEIGHT: 175.3 CM
BH CV ECHO MEAS - BZI_METRIC_WEIGHT: 101.6 KG
BH CV ECHO MEAS - EDV(CUBED): 77.9 ML
BH CV ECHO MEAS - EDV(MOD-SP4): 76.7 ML
BH CV ECHO MEAS - EDV(TEICH): 81.7 ML
BH CV ECHO MEAS - EF(CUBED): 57.3 %
BH CV ECHO MEAS - EF(MOD-SP4): 49.3 %
BH CV ECHO MEAS - EF(TEICH): 49.3 %
BH CV ECHO MEAS - ESV(CUBED): 33.2 ML
BH CV ECHO MEAS - ESV(MOD-SP4): 38.9 ML
BH CV ECHO MEAS - ESV(TEICH): 41.4 ML
BH CV ECHO MEAS - FS: 24.7 %
BH CV ECHO MEAS - IVS/LVPW: 1.2
BH CV ECHO MEAS - IVSD: 1.6 CM
BH CV ECHO MEAS - IVSS: 1.3 CM
BH CV ECHO MEAS - LV DIASTOLIC VOL/BSA (35-75): 35.4 ML/M^2
BH CV ECHO MEAS - LV MASS(C)D: 231.7 GRAMS
BH CV ECHO MEAS - LV MASS(C)DI: 106.9 GRAMS/M^2
BH CV ECHO MEAS - LV MASS(C)S: 151.3 GRAMS
BH CV ECHO MEAS - LV MASS(C)SI: 69.8 GRAMS/M^2
BH CV ECHO MEAS - LV SYSTOLIC VOL/BSA (12-30): 17.9 ML/M^2
BH CV ECHO MEAS - LVIDD: 4.3 CM
BH CV ECHO MEAS - LVIDS: 3.2 CM
BH CV ECHO MEAS - LVLD AP4: 7 CM
BH CV ECHO MEAS - LVLS AP4: 6.7 CM
BH CV ECHO MEAS - LVPWD: 1.3 CM
BH CV ECHO MEAS - LVPWS: 1.4 CM
BH CV ECHO MEAS - SI(CUBED): 20.6 ML/M^2
BH CV ECHO MEAS - SI(MOD-SP4): 17.4 ML/M^2
BH CV ECHO MEAS - SI(TEICH): 18.6 ML/M^2
BH CV ECHO MEAS - SV(CUBED): 44.6 ML
BH CV ECHO MEAS - SV(MOD-SP4): 37.8 ML
BH CV ECHO MEAS - SV(TEICH): 40.3 ML
BILIRUB SERPL-MCNC: 0.3 MG/DL (ref 0–1.2)
BUN SERPL-MCNC: 16 MG/DL (ref 8–23)
BUN/CREAT SERPL: 15.5 (ref 7–25)
CALCIUM SPEC-SCNC: 8.1 MG/DL (ref 8.6–10.5)
CHLORIDE SERPL-SCNC: 103 MMOL/L (ref 98–107)
CO2 SERPL-SCNC: 24.9 MMOL/L (ref 22–29)
CREAT SERPL-MCNC: 1.03 MG/DL (ref 0.76–1.27)
DEPRECATED RDW RBC AUTO: 44.8 FL (ref 37–54)
EOSINOPHIL # BLD AUTO: 0.17 10*3/MM3 (ref 0–0.4)
EOSINOPHIL NFR BLD AUTO: 1.7 % (ref 0.3–6.2)
ERYTHROCYTE [DISTWIDTH] IN BLOOD BY AUTOMATED COUNT: 13.3 % (ref 12.3–15.4)
GFR SERPL CREATININE-BSD FRML MDRD: 71 ML/MIN/1.73
GLOBULIN UR ELPH-MCNC: 2.9 GM/DL
GLUCOSE BLDC GLUCOMTR-MCNC: 312 MG/DL (ref 70–130)
GLUCOSE BLDC GLUCOMTR-MCNC: 321 MG/DL (ref 70–130)
GLUCOSE BLDC GLUCOMTR-MCNC: 423 MG/DL (ref 70–130)
GLUCOSE BLDC GLUCOMTR-MCNC: 427 MG/DL (ref 70–130)
GLUCOSE SERPL-MCNC: 317 MG/DL (ref 65–99)
HCT VFR BLD AUTO: 28.6 % (ref 37.5–51)
HGB BLD-MCNC: 8.7 G/DL (ref 13–17.7)
IMM GRANULOCYTES # BLD AUTO: 0.26 10*3/MM3 (ref 0–0.05)
IMM GRANULOCYTES NFR BLD AUTO: 2.7 % (ref 0–0.5)
IRON 24H UR-MRATE: 32 MCG/DL (ref 59–158)
IRON SATN MFR SERPL: 16 % (ref 20–50)
LYMPHOCYTES # BLD AUTO: 2.48 10*3/MM3 (ref 0.7–3.1)
LYMPHOCYTES NFR BLD AUTO: 25.5 % (ref 19.6–45.3)
MAGNESIUM SERPL-MCNC: 1.8 MG/DL (ref 1.6–2.4)
MAXIMAL PREDICTED HEART RATE: 149 BPM
MCH RBC QN AUTO: 28.2 PG (ref 26.6–33)
MCHC RBC AUTO-ENTMCNC: 30.4 G/DL (ref 31.5–35.7)
MCV RBC AUTO: 92.9 FL (ref 79–97)
MONOCYTES # BLD AUTO: 0.77 10*3/MM3 (ref 0.1–0.9)
MONOCYTES NFR BLD AUTO: 7.9 % (ref 5–12)
NEUTROPHILS NFR BLD AUTO: 6.03 10*3/MM3 (ref 1.7–7)
NEUTROPHILS NFR BLD AUTO: 61.9 % (ref 42.7–76)
NRBC BLD AUTO-RTO: 0 /100 WBC (ref 0–0.2)
PHOSPHATE SERPL-MCNC: 2.6 MG/DL (ref 2.5–4.5)
PLATELET # BLD AUTO: 347 10*3/MM3 (ref 140–450)
PMV BLD AUTO: 8.8 FL (ref 6–12)
POTASSIUM SERPL-SCNC: 4.3 MMOL/L (ref 3.5–5.2)
POTASSIUM SERPL-SCNC: 4.9 MMOL/L (ref 3.5–5.2)
PROT SERPL-MCNC: 5.6 G/DL (ref 6–8.5)
QT INTERVAL: 352 MS
QT INTERVAL: 364 MS
QT INTERVAL: 376 MS
QTC INTERVAL: 437 MS
QTC INTERVAL: 439 MS
QTC INTERVAL: 450 MS
RBC # BLD AUTO: 3.08 10*6/MM3 (ref 4.14–5.8)
SODIUM SERPL-SCNC: 137 MMOL/L (ref 136–145)
STRESS TARGET HR: 127 BPM
TIBC SERPL-MCNC: 201 MCG/DL (ref 298–536)
TRANSFERRIN SERPL-MCNC: 135 MG/DL (ref 200–360)
TROPONIN T SERPL-MCNC: 0.01 NG/ML (ref 0–0.03)
TROPONIN T SERPL-MCNC: 0.02 NG/ML (ref 0–0.03)
TROPONIN T SERPL-MCNC: 0.02 NG/ML (ref 0–0.03)
WBC # BLD AUTO: 9.74 10*3/MM3 (ref 3.4–10.8)

## 2021-03-24 PROCEDURE — 25010000002 FUROSEMIDE PER 20 MG: Performed by: INTERNAL MEDICINE

## 2021-03-24 PROCEDURE — 84484 ASSAY OF TROPONIN QUANT: CPT | Performed by: INTERNAL MEDICINE

## 2021-03-24 PROCEDURE — 83735 ASSAY OF MAGNESIUM: CPT | Performed by: INTERNAL MEDICINE

## 2021-03-24 PROCEDURE — G0378 HOSPITAL OBSERVATION PER HR: HCPCS

## 2021-03-24 PROCEDURE — 99233 SBSQ HOSP IP/OBS HIGH 50: CPT | Performed by: INTERNAL MEDICINE

## 2021-03-24 PROCEDURE — 84466 ASSAY OF TRANSFERRIN: CPT | Performed by: PHYSICIAN ASSISTANT

## 2021-03-24 PROCEDURE — 84132 ASSAY OF SERUM POTASSIUM: CPT | Performed by: INTERNAL MEDICINE

## 2021-03-24 PROCEDURE — 83540 ASSAY OF IRON: CPT | Performed by: PHYSICIAN ASSISTANT

## 2021-03-24 PROCEDURE — 94799 UNLISTED PULMONARY SVC/PX: CPT

## 2021-03-24 PROCEDURE — 80053 COMPREHEN METABOLIC PANEL: CPT | Performed by: INTERNAL MEDICINE

## 2021-03-24 PROCEDURE — 82962 GLUCOSE BLOOD TEST: CPT

## 2021-03-24 PROCEDURE — 93005 ELECTROCARDIOGRAM TRACING: CPT | Performed by: INTERNAL MEDICINE

## 2021-03-24 PROCEDURE — 93010 ELECTROCARDIOGRAM REPORT: CPT | Performed by: INTERNAL MEDICINE

## 2021-03-24 PROCEDURE — 63710000001 INSULIN ASPART PER 5 UNITS: Performed by: INTERNAL MEDICINE

## 2021-03-24 PROCEDURE — 93308 TTE F-UP OR LMTD: CPT | Performed by: INTERNAL MEDICINE

## 2021-03-24 PROCEDURE — 93321 DOPPLER ECHO F-UP/LMTD STD: CPT

## 2021-03-24 PROCEDURE — 93321 DOPPLER ECHO F-UP/LMTD STD: CPT | Performed by: INTERNAL MEDICINE

## 2021-03-24 PROCEDURE — 93308 TTE F-UP OR LMTD: CPT

## 2021-03-24 PROCEDURE — 63710000001 INSULIN DETEMIR PER 5 UNITS: Performed by: INTERNAL MEDICINE

## 2021-03-24 PROCEDURE — 84100 ASSAY OF PHOSPHORUS: CPT | Performed by: PHYSICIAN ASSISTANT

## 2021-03-24 PROCEDURE — 25010000002 HEPARIN (PORCINE) PER 1000 UNITS: Performed by: INTERNAL MEDICINE

## 2021-03-24 PROCEDURE — 85025 COMPLETE CBC W/AUTO DIFF WBC: CPT | Performed by: INTERNAL MEDICINE

## 2021-03-24 RX ORDER — FUROSEMIDE 20 MG/1
20 TABLET ORAL DAILY
Status: DISCONTINUED | OUTPATIENT
Start: 2021-03-24 | End: 2021-03-24

## 2021-03-24 RX ORDER — AMOXICILLIN 250 MG
2 CAPSULE ORAL 2 TIMES DAILY
Status: DISCONTINUED | OUTPATIENT
Start: 2021-03-24 | End: 2021-03-27 | Stop reason: HOSPADM

## 2021-03-24 RX ORDER — PANTOPRAZOLE SODIUM 40 MG/1
40 TABLET, DELAYED RELEASE ORAL EVERY MORNING
Status: DISCONTINUED | OUTPATIENT
Start: 2021-03-24 | End: 2021-03-27 | Stop reason: HOSPADM

## 2021-03-24 RX ORDER — ASPIRIN 81 MG/1
81 TABLET ORAL DAILY
Status: DISCONTINUED | OUTPATIENT
Start: 2021-03-24 | End: 2021-03-27 | Stop reason: HOSPADM

## 2021-03-24 RX ORDER — FOLIC ACID 1 MG/1
1 TABLET ORAL 2 TIMES DAILY
Status: DISCONTINUED | OUTPATIENT
Start: 2021-03-24 | End: 2021-03-27 | Stop reason: HOSPADM

## 2021-03-24 RX ORDER — DIPHENOXYLATE HYDROCHLORIDE AND ATROPINE SULFATE 2.5; .025 MG/1; MG/1
1 TABLET ORAL DAILY
Status: DISCONTINUED | OUTPATIENT
Start: 2021-03-24 | End: 2021-03-27 | Stop reason: HOSPADM

## 2021-03-24 RX ORDER — TERAZOSIN 5 MG/1
5 CAPSULE ORAL NIGHTLY
Status: DISCONTINUED | OUTPATIENT
Start: 2021-03-24 | End: 2021-03-27 | Stop reason: HOSPADM

## 2021-03-24 RX ORDER — OXYCODONE HYDROCHLORIDE 5 MG/1
10 TABLET ORAL EVERY 6 HOURS PRN
Status: DISCONTINUED | OUTPATIENT
Start: 2021-03-24 | End: 2021-03-25

## 2021-03-24 RX ORDER — GABAPENTIN 300 MG/1
300 CAPSULE ORAL 2 TIMES DAILY
Status: DISCONTINUED | OUTPATIENT
Start: 2021-03-24 | End: 2021-03-27 | Stop reason: HOSPADM

## 2021-03-24 RX ORDER — VITAMIN E 268 MG
400 CAPSULE ORAL DAILY
Status: DISCONTINUED | OUTPATIENT
Start: 2021-03-24 | End: 2021-03-27 | Stop reason: HOSPADM

## 2021-03-24 RX ORDER — FINASTERIDE 5 MG/1
5 TABLET, FILM COATED ORAL DAILY
Status: DISCONTINUED | OUTPATIENT
Start: 2021-03-24 | End: 2021-03-27 | Stop reason: HOSPADM

## 2021-03-24 RX ORDER — CLOPIDOGREL BISULFATE 75 MG/1
75 TABLET ORAL DAILY
Status: DISCONTINUED | OUTPATIENT
Start: 2021-03-24 | End: 2021-03-27 | Stop reason: HOSPADM

## 2021-03-24 RX ORDER — ALLOPURINOL 300 MG/1
300 TABLET ORAL DAILY
Status: DISCONTINUED | OUTPATIENT
Start: 2021-03-24 | End: 2021-03-27 | Stop reason: HOSPADM

## 2021-03-24 RX ORDER — ATORVASTATIN CALCIUM 40 MG/1
40 TABLET, FILM COATED ORAL NIGHTLY
Status: DISCONTINUED | OUTPATIENT
Start: 2021-03-24 | End: 2021-03-27 | Stop reason: HOSPADM

## 2021-03-24 RX ORDER — FUROSEMIDE 10 MG/ML
40 INJECTION INTRAMUSCULAR; INTRAVENOUS EVERY 12 HOURS
Status: DISCONTINUED | OUTPATIENT
Start: 2021-03-24 | End: 2021-03-25

## 2021-03-24 RX ORDER — ACETAMINOPHEN 500 MG
500 TABLET ORAL EVERY 6 HOURS PRN
Status: DISCONTINUED | OUTPATIENT
Start: 2021-03-24 | End: 2021-03-27 | Stop reason: HOSPADM

## 2021-03-24 RX ADMIN — FINASTERIDE 5 MG: 5 TABLET, FILM COATED ORAL at 09:01

## 2021-03-24 RX ADMIN — GABAPENTIN 300 MG: 300 CAPSULE ORAL at 09:04

## 2021-03-24 RX ADMIN — Medication 1 TABLET: at 09:00

## 2021-03-24 RX ADMIN — SODIUM CHLORIDE, PRESERVATIVE FREE 10 ML: 5 INJECTION INTRAVENOUS at 09:01

## 2021-03-24 RX ADMIN — ALLOPURINOL 300 MG: 300 TABLET ORAL at 09:00

## 2021-03-24 RX ADMIN — DOCUSATE SODIUM 50 MG AND SENNOSIDES 8.6 MG 2 TABLET: 8.6; 5 TABLET, FILM COATED ORAL at 15:22

## 2021-03-24 RX ADMIN — PANTOPRAZOLE SODIUM 40 MG: 40 TABLET, DELAYED RELEASE ORAL at 09:01

## 2021-03-24 RX ADMIN — CLOPIDOGREL 75 MG: 75 TABLET, FILM COATED ORAL at 09:00

## 2021-03-24 RX ADMIN — TERAZOSIN HYDROCHLORIDE 5 MG: 5 CAPSULE ORAL at 20:18

## 2021-03-24 RX ADMIN — DOCUSATE SODIUM 50 MG AND SENNOSIDES 8.6 MG 2 TABLET: 8.6; 5 TABLET, FILM COATED ORAL at 20:18

## 2021-03-24 RX ADMIN — METOPROLOL TARTRATE 25 MG: 25 TABLET, FILM COATED ORAL at 02:30

## 2021-03-24 RX ADMIN — INSULIN ASPART 5 UNITS: 100 INJECTION, SOLUTION INTRAVENOUS; SUBCUTANEOUS at 16:43

## 2021-03-24 RX ADMIN — TERAZOSIN HYDROCHLORIDE 5 MG: 5 CAPSULE ORAL at 02:30

## 2021-03-24 RX ADMIN — INSULIN ASPART 9 UNITS: 100 INJECTION, SOLUTION INTRAVENOUS; SUBCUTANEOUS at 16:42

## 2021-03-24 RX ADMIN — OXYCODONE 10 MG: 5 TABLET ORAL at 20:17

## 2021-03-24 RX ADMIN — FOLIC ACID 1 MG: 1 TABLET ORAL at 09:00

## 2021-03-24 RX ADMIN — INSULIN ASPART 9 UNITS: 100 INJECTION, SOLUTION INTRAVENOUS; SUBCUTANEOUS at 11:51

## 2021-03-24 RX ADMIN — OXYCODONE 10 MG: 5 TABLET ORAL at 13:35

## 2021-03-24 RX ADMIN — ATORVASTATIN CALCIUM 40 MG: 40 TABLET, FILM COATED ORAL at 20:18

## 2021-03-24 RX ADMIN — FUROSEMIDE 40 MG: 10 INJECTION, SOLUTION INTRAMUSCULAR; INTRAVENOUS at 13:46

## 2021-03-24 RX ADMIN — HEPARIN SODIUM 5000 UNITS: 5000 INJECTION INTRAVENOUS; SUBCUTANEOUS at 20:17

## 2021-03-24 RX ADMIN — METOPROLOL TARTRATE 25 MG: 25 TABLET, FILM COATED ORAL at 09:01

## 2021-03-24 RX ADMIN — FOLIC ACID 1 MG: 1 TABLET ORAL at 20:18

## 2021-03-24 RX ADMIN — HEPARIN SODIUM 5000 UNITS: 5000 INJECTION INTRAVENOUS; SUBCUTANEOUS at 09:01

## 2021-03-24 RX ADMIN — ASPIRIN 81 MG: 81 TABLET, COATED ORAL at 09:01

## 2021-03-24 RX ADMIN — ATORVASTATIN CALCIUM 40 MG: 40 TABLET, FILM COATED ORAL at 02:30

## 2021-03-24 RX ADMIN — INSULIN DETEMIR 15 UNITS: 100 INJECTION, SOLUTION SUBCUTANEOUS at 16:59

## 2021-03-24 RX ADMIN — Medication 400 UNITS: at 09:01

## 2021-03-24 RX ADMIN — FOLIC ACID 1 MG: 1 TABLET ORAL at 02:30

## 2021-03-24 RX ADMIN — FUROSEMIDE 20 MG: 20 TABLET ORAL at 09:00

## 2021-03-24 RX ADMIN — GABAPENTIN 300 MG: 300 CAPSULE ORAL at 02:30

## 2021-03-24 RX ADMIN — INSULIN ASPART 9 UNITS: 100 INJECTION, SOLUTION INTRAVENOUS; SUBCUTANEOUS at 20:19

## 2021-03-24 RX ADMIN — INSULIN ASPART 7 UNITS: 100 INJECTION, SOLUTION INTRAVENOUS; SUBCUTANEOUS at 07:54

## 2021-03-24 RX ADMIN — GABAPENTIN 300 MG: 300 CAPSULE ORAL at 20:18

## 2021-03-24 RX ADMIN — HYDROCODONE BITARTRATE AND ACETAMINOPHEN 1 TABLET: 7.5; 325 TABLET ORAL at 07:54

## 2021-03-24 RX ADMIN — METOPROLOL TARTRATE 25 MG: 25 TABLET, FILM COATED ORAL at 20:18

## 2021-03-24 NOTE — OUTREACH NOTE
CT Surgery Week 2 Survey      Responses   [facility] patient discharged from?  [place]   Does the patient have one of the following disease processes/diagnoses(primary or secondary)?  Cardiothoracic surgery   Week 2 attempt successful?  No   Unsuccessful attempts  Attempt 1   Revoke  Readmitted          Chrissie Rodriguez RN

## 2021-03-25 LAB
ANION GAP SERPL CALCULATED.3IONS-SCNC: 11.5 MMOL/L (ref 5–15)
BUN SERPL-MCNC: 17 MG/DL (ref 8–23)
BUN/CREAT SERPL: 14.9 (ref 7–25)
CALCIUM SPEC-SCNC: 8.4 MG/DL (ref 8.6–10.5)
CHLORIDE SERPL-SCNC: 97 MMOL/L (ref 98–107)
CO2 SERPL-SCNC: 25.5 MMOL/L (ref 22–29)
CREAT SERPL-MCNC: 1.14 MG/DL (ref 0.76–1.27)
GFR SERPL CREATININE-BSD FRML MDRD: 63 ML/MIN/1.73
GLUCOSE BLDC GLUCOMTR-MCNC: 276 MG/DL (ref 70–130)
GLUCOSE BLDC GLUCOMTR-MCNC: 402 MG/DL (ref 70–130)
GLUCOSE BLDC GLUCOMTR-MCNC: 427 MG/DL (ref 70–130)
GLUCOSE BLDC GLUCOMTR-MCNC: 452 MG/DL (ref 70–130)
GLUCOSE SERPL-MCNC: 361 MG/DL (ref 65–99)
POTASSIUM SERPL-SCNC: 4.6 MMOL/L (ref 3.5–5.2)
QT INTERVAL: 368 MS
QTC INTERVAL: 434 MS
SODIUM SERPL-SCNC: 134 MMOL/L (ref 136–145)

## 2021-03-25 PROCEDURE — 63710000001 INSULIN ASPART PER 5 UNITS: Performed by: INTERNAL MEDICINE

## 2021-03-25 PROCEDURE — 93005 ELECTROCARDIOGRAM TRACING: CPT | Performed by: INTERNAL MEDICINE

## 2021-03-25 PROCEDURE — 82962 GLUCOSE BLOOD TEST: CPT

## 2021-03-25 PROCEDURE — 25010000002 HEPARIN (PORCINE) PER 1000 UNITS: Performed by: INTERNAL MEDICINE

## 2021-03-25 PROCEDURE — 80048 BASIC METABOLIC PNL TOTAL CA: CPT | Performed by: INTERNAL MEDICINE

## 2021-03-25 PROCEDURE — 93010 ELECTROCARDIOGRAM REPORT: CPT | Performed by: INTERNAL MEDICINE

## 2021-03-25 PROCEDURE — 25010000002 FUROSEMIDE PER 20 MG: Performed by: INTERNAL MEDICINE

## 2021-03-25 PROCEDURE — 63710000001 INSULIN DETEMIR PER 5 UNITS: Performed by: INTERNAL MEDICINE

## 2021-03-25 PROCEDURE — 99233 SBSQ HOSP IP/OBS HIGH 50: CPT | Performed by: INTERNAL MEDICINE

## 2021-03-25 RX ORDER — METOPROLOL SUCCINATE 50 MG/1
50 TABLET, EXTENDED RELEASE ORAL
Status: DISCONTINUED | OUTPATIENT
Start: 2021-03-25 | End: 2021-03-27 | Stop reason: HOSPADM

## 2021-03-25 RX ORDER — FUROSEMIDE 10 MG/ML
40 INJECTION INTRAMUSCULAR; INTRAVENOUS EVERY 8 HOURS
Status: DISCONTINUED | OUTPATIENT
Start: 2021-03-25 | End: 2021-03-27 | Stop reason: HOSPADM

## 2021-03-25 RX ORDER — OXYCODONE HYDROCHLORIDE 15 MG/1
7.5 TABLET ORAL EVERY 6 HOURS PRN
Status: DISCONTINUED | OUTPATIENT
Start: 2021-03-25 | End: 2021-03-27 | Stop reason: HOSPADM

## 2021-03-25 RX ADMIN — INSULIN ASPART 14 UNITS: 100 INJECTION, SOLUTION INTRAVENOUS; SUBCUTANEOUS at 17:02

## 2021-03-25 RX ADMIN — FINASTERIDE 5 MG: 5 TABLET, FILM COATED ORAL at 08:12

## 2021-03-25 RX ADMIN — INSULIN ASPART 7 UNITS: 100 INJECTION, SOLUTION INTRAVENOUS; SUBCUTANEOUS at 17:02

## 2021-03-25 RX ADMIN — FUROSEMIDE 40 MG: 10 INJECTION, SOLUTION INTRAMUSCULAR; INTRAVENOUS at 02:52

## 2021-03-25 RX ADMIN — GABAPENTIN 300 MG: 300 CAPSULE ORAL at 20:51

## 2021-03-25 RX ADMIN — PANTOPRAZOLE SODIUM 40 MG: 40 TABLET, DELAYED RELEASE ORAL at 05:22

## 2021-03-25 RX ADMIN — FOLIC ACID 1 MG: 1 TABLET ORAL at 08:12

## 2021-03-25 RX ADMIN — METOPROLOL TARTRATE 25 MG: 25 TABLET, FILM COATED ORAL at 08:12

## 2021-03-25 RX ADMIN — OXYCODONE 10 MG: 5 TABLET ORAL at 03:14

## 2021-03-25 RX ADMIN — TERAZOSIN HYDROCHLORIDE 5 MG: 5 CAPSULE ORAL at 20:51

## 2021-03-25 RX ADMIN — FOLIC ACID 1 MG: 1 TABLET ORAL at 20:52

## 2021-03-25 RX ADMIN — INSULIN ASPART 8 UNITS: 100 INJECTION, SOLUTION INTRAVENOUS; SUBCUTANEOUS at 20:51

## 2021-03-25 RX ADMIN — FUROSEMIDE 40 MG: 10 INJECTION, SOLUTION INTRAMUSCULAR; INTRAVENOUS at 22:34

## 2021-03-25 RX ADMIN — INSULIN DETEMIR 15 UNITS: 100 INJECTION, SOLUTION SUBCUTANEOUS at 08:17

## 2021-03-25 RX ADMIN — DOCUSATE SODIUM 50 MG AND SENNOSIDES 8.6 MG 2 TABLET: 8.6; 5 TABLET, FILM COATED ORAL at 20:52

## 2021-03-25 RX ADMIN — ATORVASTATIN CALCIUM 40 MG: 40 TABLET, FILM COATED ORAL at 20:51

## 2021-03-25 RX ADMIN — HEPARIN SODIUM 5000 UNITS: 5000 INJECTION INTRAVENOUS; SUBCUTANEOUS at 20:51

## 2021-03-25 RX ADMIN — INSULIN ASPART 14 UNITS: 100 INJECTION, SOLUTION INTRAVENOUS; SUBCUTANEOUS at 08:12

## 2021-03-25 RX ADMIN — OXYCODONE HYDROCHLORIDE 7.5 MG: 15 TABLET ORAL at 17:08

## 2021-03-25 RX ADMIN — OXYCODONE 10 MG: 5 TABLET ORAL at 09:34

## 2021-03-25 RX ADMIN — Medication 1 TABLET: at 08:12

## 2021-03-25 RX ADMIN — SODIUM CHLORIDE, PRESERVATIVE FREE 10 ML: 5 INJECTION INTRAVENOUS at 20:52

## 2021-03-25 RX ADMIN — INSULIN ASPART 14 UNITS: 100 INJECTION, SOLUTION INTRAVENOUS; SUBCUTANEOUS at 11:01

## 2021-03-25 RX ADMIN — DOCUSATE SODIUM 50 MG AND SENNOSIDES 8.6 MG 2 TABLET: 8.6; 5 TABLET, FILM COATED ORAL at 08:12

## 2021-03-25 RX ADMIN — HEPARIN SODIUM 5000 UNITS: 5000 INJECTION INTRAVENOUS; SUBCUTANEOUS at 08:12

## 2021-03-25 RX ADMIN — CLOPIDOGREL 75 MG: 75 TABLET, FILM COATED ORAL at 08:12

## 2021-03-25 RX ADMIN — OXYCODONE HYDROCHLORIDE 7.5 MG: 15 TABLET ORAL at 22:34

## 2021-03-25 RX ADMIN — Medication 400 UNITS: at 08:12

## 2021-03-25 RX ADMIN — ASPIRIN 81 MG: 81 TABLET, COATED ORAL at 08:12

## 2021-03-25 RX ADMIN — FUROSEMIDE 40 MG: 10 INJECTION, SOLUTION INTRAMUSCULAR; INTRAVENOUS at 14:18

## 2021-03-25 RX ADMIN — ALLOPURINOL 300 MG: 300 TABLET ORAL at 08:12

## 2021-03-25 RX ADMIN — INSULIN DETEMIR 20 UNITS: 100 INJECTION, SOLUTION SUBCUTANEOUS at 11:01

## 2021-03-25 RX ADMIN — GABAPENTIN 300 MG: 300 CAPSULE ORAL at 08:11

## 2021-03-26 LAB
ANION GAP SERPL CALCULATED.3IONS-SCNC: 11.4 MMOL/L (ref 5–15)
BUN SERPL-MCNC: 18 MG/DL (ref 8–23)
BUN/CREAT SERPL: 15.5 (ref 7–25)
CALCIUM SPEC-SCNC: 9.1 MG/DL (ref 8.6–10.5)
CHLORIDE SERPL-SCNC: 94 MMOL/L (ref 98–107)
CO2 SERPL-SCNC: 27.6 MMOL/L (ref 22–29)
CREAT SERPL-MCNC: 1.16 MG/DL (ref 0.76–1.27)
GFR SERPL CREATININE-BSD FRML MDRD: 62 ML/MIN/1.73
GLUCOSE BLDC GLUCOMTR-MCNC: 177 MG/DL (ref 70–130)
GLUCOSE BLDC GLUCOMTR-MCNC: 280 MG/DL (ref 70–130)
GLUCOSE BLDC GLUCOMTR-MCNC: 343 MG/DL (ref 70–130)
GLUCOSE BLDC GLUCOMTR-MCNC: 358 MG/DL (ref 70–130)
GLUCOSE SERPL-MCNC: 136 MG/DL (ref 65–99)
POTASSIUM SERPL-SCNC: 5.1 MMOL/L (ref 3.5–5.2)
QT INTERVAL: 348 MS
QTC INTERVAL: 435 MS
SODIUM SERPL-SCNC: 133 MMOL/L (ref 136–145)

## 2021-03-26 PROCEDURE — 80048 BASIC METABOLIC PNL TOTAL CA: CPT | Performed by: INTERNAL MEDICINE

## 2021-03-26 PROCEDURE — 99232 SBSQ HOSP IP/OBS MODERATE 35: CPT | Performed by: INTERNAL MEDICINE

## 2021-03-26 PROCEDURE — 63710000001 INSULIN ASPART PER 5 UNITS: Performed by: INTERNAL MEDICINE

## 2021-03-26 PROCEDURE — 94799 UNLISTED PULMONARY SVC/PX: CPT

## 2021-03-26 PROCEDURE — 25010000002 FUROSEMIDE PER 20 MG: Performed by: INTERNAL MEDICINE

## 2021-03-26 PROCEDURE — 63710000001 INSULIN DETEMIR PER 5 UNITS: Performed by: INTERNAL MEDICINE

## 2021-03-26 PROCEDURE — 25010000002 HEPARIN (PORCINE) PER 1000 UNITS: Performed by: INTERNAL MEDICINE

## 2021-03-26 PROCEDURE — 82962 GLUCOSE BLOOD TEST: CPT

## 2021-03-26 RX ORDER — METOPROLOL SUCCINATE 50 MG/1
50 TABLET, EXTENDED RELEASE ORAL
Qty: 90 TABLET | Refills: 0 | Status: SHIPPED | OUTPATIENT
Start: 2021-03-27 | End: 2021-03-27

## 2021-03-26 RX ORDER — LACTULOSE 10 G/15ML
30 SOLUTION ORAL ONCE
Status: COMPLETED | OUTPATIENT
Start: 2021-03-26 | End: 2021-03-26

## 2021-03-26 RX ORDER — BUMETANIDE 2 MG/1
2 TABLET ORAL DAILY
Qty: 30 TABLET | Refills: 0 | Status: SHIPPED | OUTPATIENT
Start: 2021-03-26 | End: 2021-04-09 | Stop reason: HOSPADM

## 2021-03-26 RX ORDER — AMOXICILLIN 250 MG
2 CAPSULE ORAL 2 TIMES DAILY
Qty: 120 TABLET | Refills: 0 | Status: SHIPPED | OUTPATIENT
Start: 2021-03-26 | End: 2021-04-26

## 2021-03-26 RX ORDER — INSULIN DETEMIR 100 [IU]/ML
40 INJECTION, SOLUTION SUBCUTANEOUS DAILY
Qty: 15 ML | Refills: 0 | Status: SHIPPED | OUTPATIENT
Start: 2021-03-27 | End: 2021-03-27

## 2021-03-26 RX ADMIN — INSULIN DETEMIR 20 UNITS: 100 INJECTION, SOLUTION SUBCUTANEOUS at 08:11

## 2021-03-26 RX ADMIN — ALLOPURINOL 300 MG: 300 TABLET ORAL at 08:10

## 2021-03-26 RX ADMIN — ATORVASTATIN CALCIUM 40 MG: 40 TABLET, FILM COATED ORAL at 21:09

## 2021-03-26 RX ADMIN — FUROSEMIDE 40 MG: 10 INJECTION, SOLUTION INTRAMUSCULAR; INTRAVENOUS at 06:29

## 2021-03-26 RX ADMIN — GABAPENTIN 300 MG: 300 CAPSULE ORAL at 21:10

## 2021-03-26 RX ADMIN — INSULIN ASPART 7 UNITS: 100 INJECTION, SOLUTION INTRAVENOUS; SUBCUTANEOUS at 11:15

## 2021-03-26 RX ADMIN — INSULIN ASPART 10 UNITS: 100 INJECTION, SOLUTION INTRAVENOUS; SUBCUTANEOUS at 16:48

## 2021-03-26 RX ADMIN — ASPIRIN 81 MG: 81 TABLET, COATED ORAL at 08:10

## 2021-03-26 RX ADMIN — SODIUM CHLORIDE, PRESERVATIVE FREE 10 ML: 5 INJECTION INTRAVENOUS at 21:10

## 2021-03-26 RX ADMIN — FOLIC ACID 1 MG: 1 TABLET ORAL at 21:09

## 2021-03-26 RX ADMIN — METOPROLOL SUCCINATE 50 MG: 50 TABLET, FILM COATED, EXTENDED RELEASE ORAL at 08:10

## 2021-03-26 RX ADMIN — FUROSEMIDE 40 MG: 10 INJECTION, SOLUTION INTRAMUSCULAR; INTRAVENOUS at 22:34

## 2021-03-26 RX ADMIN — OXYCODONE HYDROCHLORIDE 7.5 MG: 15 TABLET ORAL at 09:58

## 2021-03-26 RX ADMIN — INSULIN DETEMIR 15 UNITS: 100 INJECTION, SOLUTION SUBCUTANEOUS at 11:15

## 2021-03-26 RX ADMIN — HEPARIN SODIUM 5000 UNITS: 5000 INJECTION INTRAVENOUS; SUBCUTANEOUS at 21:09

## 2021-03-26 RX ADMIN — CLOPIDOGREL 75 MG: 75 TABLET, FILM COATED ORAL at 08:10

## 2021-03-26 RX ADMIN — Medication 1 TABLET: at 08:10

## 2021-03-26 RX ADMIN — INSULIN ASPART 3 UNITS: 100 INJECTION, SOLUTION INTRAVENOUS; SUBCUTANEOUS at 08:10

## 2021-03-26 RX ADMIN — OXYCODONE HYDROCHLORIDE 7.5 MG: 15 TABLET ORAL at 04:11

## 2021-03-26 RX ADMIN — INSULIN ASPART 8 UNITS: 100 INJECTION, SOLUTION INTRAVENOUS; SUBCUTANEOUS at 16:49

## 2021-03-26 RX ADMIN — INSULIN ASPART 8 UNITS: 100 INJECTION, SOLUTION INTRAVENOUS; SUBCUTANEOUS at 11:14

## 2021-03-26 RX ADMIN — LACTULOSE 30 G: 10 SOLUTION ORAL at 14:25

## 2021-03-26 RX ADMIN — INSULIN ASPART 7 UNITS: 100 INJECTION, SOLUTION INTRAVENOUS; SUBCUTANEOUS at 08:11

## 2021-03-26 RX ADMIN — OXYCODONE HYDROCHLORIDE 7.5 MG: 15 TABLET ORAL at 22:34

## 2021-03-26 RX ADMIN — OXYCODONE HYDROCHLORIDE 7.5 MG: 15 TABLET ORAL at 16:49

## 2021-03-26 RX ADMIN — GABAPENTIN 300 MG: 300 CAPSULE ORAL at 08:11

## 2021-03-26 RX ADMIN — FOLIC ACID 1 MG: 1 TABLET ORAL at 08:10

## 2021-03-26 RX ADMIN — Medication 400 UNITS: at 08:10

## 2021-03-26 RX ADMIN — HEPARIN SODIUM 5000 UNITS: 5000 INJECTION INTRAVENOUS; SUBCUTANEOUS at 08:10

## 2021-03-26 RX ADMIN — INSULIN ASPART 12 UNITS: 100 INJECTION, SOLUTION INTRAVENOUS; SUBCUTANEOUS at 21:09

## 2021-03-26 RX ADMIN — FUROSEMIDE 40 MG: 10 INJECTION, SOLUTION INTRAMUSCULAR; INTRAVENOUS at 14:25

## 2021-03-26 RX ADMIN — DOCUSATE SODIUM 50 MG AND SENNOSIDES 8.6 MG 2 TABLET: 8.6; 5 TABLET, FILM COATED ORAL at 21:09

## 2021-03-26 RX ADMIN — DOCUSATE SODIUM 50 MG AND SENNOSIDES 8.6 MG 2 TABLET: 8.6; 5 TABLET, FILM COATED ORAL at 08:10

## 2021-03-26 RX ADMIN — TERAZOSIN HYDROCHLORIDE 5 MG: 5 CAPSULE ORAL at 21:09

## 2021-03-26 RX ADMIN — FINASTERIDE 5 MG: 5 TABLET, FILM COATED ORAL at 08:10

## 2021-03-27 ENCOUNTER — READMISSION MANAGEMENT (OUTPATIENT)
Dept: CALL CENTER | Facility: HOSPITAL | Age: 71
End: 2021-03-27

## 2021-03-27 VITALS
OXYGEN SATURATION: 97 % | HEIGHT: 69 IN | SYSTOLIC BLOOD PRESSURE: 123 MMHG | DIASTOLIC BLOOD PRESSURE: 60 MMHG | BODY MASS INDEX: 31.67 KG/M2 | HEART RATE: 93 BPM | RESPIRATION RATE: 18 BRPM | TEMPERATURE: 98 F | WEIGHT: 213.8 LBS

## 2021-03-27 LAB
ANION GAP SERPL CALCULATED.3IONS-SCNC: 9.7 MMOL/L (ref 5–15)
BUN SERPL-MCNC: 19 MG/DL (ref 8–23)
BUN/CREAT SERPL: 17.1 (ref 7–25)
CALCIUM SPEC-SCNC: 9.1 MG/DL (ref 8.6–10.5)
CHLORIDE SERPL-SCNC: 96 MMOL/L (ref 98–107)
CO2 SERPL-SCNC: 29.3 MMOL/L (ref 22–29)
CREAT SERPL-MCNC: 1.11 MG/DL (ref 0.76–1.27)
DEPRECATED RDW RBC AUTO: 42.4 FL (ref 37–54)
ERYTHROCYTE [DISTWIDTH] IN BLOOD BY AUTOMATED COUNT: 12.9 % (ref 12.3–15.4)
GFR SERPL CREATININE-BSD FRML MDRD: 65 ML/MIN/1.73
GLUCOSE BLDC GLUCOMTR-MCNC: 105 MG/DL (ref 70–130)
GLUCOSE BLDC GLUCOMTR-MCNC: 156 MG/DL (ref 70–130)
GLUCOSE BLDC GLUCOMTR-MCNC: 270 MG/DL (ref 70–130)
GLUCOSE BLDC GLUCOMTR-MCNC: 318 MG/DL (ref 70–130)
GLUCOSE SERPL-MCNC: 75 MG/DL (ref 65–99)
HCT VFR BLD AUTO: 29.3 % (ref 37.5–51)
HGB BLD-MCNC: 9.2 G/DL (ref 13–17.7)
MCH RBC QN AUTO: 28.8 PG (ref 26.6–33)
MCHC RBC AUTO-ENTMCNC: 31.4 G/DL (ref 31.5–35.7)
MCV RBC AUTO: 91.6 FL (ref 79–97)
PLATELET # BLD AUTO: 516 10*3/MM3 (ref 140–450)
PMV BLD AUTO: 8.9 FL (ref 6–12)
POTASSIUM SERPL-SCNC: 4 MMOL/L (ref 3.5–5.2)
RBC # BLD AUTO: 3.2 10*6/MM3 (ref 4.14–5.8)
SODIUM SERPL-SCNC: 135 MMOL/L (ref 136–145)
WBC # BLD AUTO: 10.83 10*3/MM3 (ref 3.4–10.8)

## 2021-03-27 PROCEDURE — 25010000002 HEPARIN (PORCINE) PER 1000 UNITS: Performed by: INTERNAL MEDICINE

## 2021-03-27 PROCEDURE — 63710000001 INSULIN ASPART PER 5 UNITS: Performed by: INTERNAL MEDICINE

## 2021-03-27 PROCEDURE — 82962 GLUCOSE BLOOD TEST: CPT

## 2021-03-27 PROCEDURE — 85027 COMPLETE CBC AUTOMATED: CPT | Performed by: INTERNAL MEDICINE

## 2021-03-27 PROCEDURE — 99239 HOSP IP/OBS DSCHRG MGMT >30: CPT | Performed by: INTERNAL MEDICINE

## 2021-03-27 PROCEDURE — 25010000002 FUROSEMIDE PER 20 MG: Performed by: INTERNAL MEDICINE

## 2021-03-27 PROCEDURE — 63710000001 INSULIN DETEMIR PER 5 UNITS: Performed by: INTERNAL MEDICINE

## 2021-03-27 PROCEDURE — 80048 BASIC METABOLIC PNL TOTAL CA: CPT | Performed by: INTERNAL MEDICINE

## 2021-03-27 RX ORDER — CALCIUM CITRATE/VITAMIN D3 200MG-6.25
TABLET ORAL
Qty: 100 EACH | Refills: 12 | Status: ON HOLD | OUTPATIENT
Start: 2021-03-27 | End: 2021-03-30

## 2021-03-27 RX ORDER — METOPROLOL SUCCINATE 50 MG/1
50 TABLET, EXTENDED RELEASE ORAL
Qty: 90 TABLET | Refills: 0 | Status: ON HOLD | OUTPATIENT
Start: 2021-03-27 | End: 2021-04-09 | Stop reason: SDUPTHER

## 2021-03-27 RX ORDER — INSULIN DETEMIR 100 [IU]/ML
37 INJECTION, SOLUTION SUBCUTANEOUS DAILY
Qty: 15 ML | Refills: 0 | Status: ON HOLD | OUTPATIENT
Start: 2021-03-27 | End: 2021-04-09 | Stop reason: SDUPTHER

## 2021-03-27 RX ADMIN — Medication 400 UNITS: at 08:29

## 2021-03-27 RX ADMIN — INSULIN ASPART 8 UNITS: 100 INJECTION, SOLUTION INTRAVENOUS; SUBCUTANEOUS at 18:40

## 2021-03-27 RX ADMIN — GABAPENTIN 300 MG: 300 CAPSULE ORAL at 08:29

## 2021-03-27 RX ADMIN — FUROSEMIDE 40 MG: 10 INJECTION, SOLUTION INTRAMUSCULAR; INTRAVENOUS at 06:17

## 2021-03-27 RX ADMIN — CLOPIDOGREL 75 MG: 75 TABLET, FILM COATED ORAL at 08:29

## 2021-03-27 RX ADMIN — INSULIN ASPART 3 UNITS: 100 INJECTION, SOLUTION INTRAVENOUS; SUBCUTANEOUS at 08:30

## 2021-03-27 RX ADMIN — FINASTERIDE 5 MG: 5 TABLET, FILM COATED ORAL at 08:29

## 2021-03-27 RX ADMIN — INSULIN ASPART 8 UNITS: 100 INJECTION, SOLUTION INTRAVENOUS; SUBCUTANEOUS at 13:03

## 2021-03-27 RX ADMIN — METOPROLOL SUCCINATE 50 MG: 50 TABLET, FILM COATED, EXTENDED RELEASE ORAL at 08:29

## 2021-03-27 RX ADMIN — INSULIN ASPART 10 UNITS: 100 INJECTION, SOLUTION INTRAVENOUS; SUBCUTANEOUS at 18:32

## 2021-03-27 RX ADMIN — PANTOPRAZOLE SODIUM 40 MG: 40 TABLET, DELAYED RELEASE ORAL at 06:17

## 2021-03-27 RX ADMIN — DOCUSATE SODIUM 50 MG AND SENNOSIDES 8.6 MG 2 TABLET: 8.6; 5 TABLET, FILM COATED ORAL at 08:29

## 2021-03-27 RX ADMIN — INSULIN ASPART 8 UNITS: 100 INJECTION, SOLUTION INTRAVENOUS; SUBCUTANEOUS at 08:30

## 2021-03-27 RX ADMIN — HEPARIN SODIUM 5000 UNITS: 5000 INJECTION INTRAVENOUS; SUBCUTANEOUS at 08:29

## 2021-03-27 RX ADMIN — OXYCODONE HYDROCHLORIDE 7.5 MG: 15 TABLET ORAL at 18:32

## 2021-03-27 RX ADMIN — ALLOPURINOL 300 MG: 300 TABLET ORAL at 08:29

## 2021-03-27 RX ADMIN — ASPIRIN 81 MG: 81 TABLET, COATED ORAL at 08:29

## 2021-03-27 RX ADMIN — SODIUM CHLORIDE, PRESERVATIVE FREE 10 ML: 5 INJECTION INTRAVENOUS at 09:00

## 2021-03-27 RX ADMIN — FOLIC ACID 1 MG: 1 TABLET ORAL at 08:29

## 2021-03-27 RX ADMIN — FUROSEMIDE 40 MG: 10 INJECTION, SOLUTION INTRAMUSCULAR; INTRAVENOUS at 13:03

## 2021-03-27 RX ADMIN — INSULIN DETEMIR 40 UNITS: 100 INJECTION, SOLUTION SUBCUTANEOUS at 09:00

## 2021-03-27 RX ADMIN — Medication 1 TABLET: at 08:29

## 2021-03-28 NOTE — OUTREACH NOTE
Prep Survey      Responses   [facility]   Is LACE score < 7 ?  No   Emergency Room discharge w/ pulse ox?  No   Eligibility  [facility]   Date of Admission  03/23/21   Date of Discharge  03/27/21   Discharge Disposition  Home or Self Care   Discharge diagnosis  Subacute cardiogenic pulmonary edema post CABG, with acute hypoxia   Does the patient have one of the following disease processes/diagnoses(primary or secondary)?  Other   Does the patient have Home health ordered?  Yes   What is the Home health agency?   [facility]   Is there a DME ordered?  No   Prep survey completed?  Yes          Anahi Feliz RN

## 2021-03-29 ENCOUNTER — HOSPITAL ENCOUNTER (INPATIENT)
Facility: HOSPITAL | Age: 71
LOS: 10 days | Discharge: HOME HEALTH | End: 2021-04-09
Attending: STUDENT IN AN ORGANIZED HEALTH CARE EDUCATION/TRAINING PROGRAM | Admitting: HOSPITALIST

## 2021-03-29 ENCOUNTER — HOSPITAL ENCOUNTER (OUTPATIENT)
Dept: GENERAL RADIOLOGY | Facility: HOSPITAL | Age: 71
Discharge: HOME | End: 2021-03-29

## 2021-03-29 ENCOUNTER — APPOINTMENT (OUTPATIENT)
Dept: ULTRASOUND IMAGING | Facility: HOSPITAL | Age: 71
End: 2021-03-29

## 2021-03-29 ENCOUNTER — OFFICE VISIT (OUTPATIENT)
Dept: CARDIOLOGY | Facility: HOSPITAL | Age: 71
End: 2021-03-29

## 2021-03-29 ENCOUNTER — TELEPHONE (OUTPATIENT)
Dept: FAMILY MEDICINE CLINIC | Facility: CLINIC | Age: 71
End: 2021-03-29

## 2021-03-29 ENCOUNTER — TRANSITIONAL CARE MANAGEMENT TELEPHONE ENCOUNTER (OUTPATIENT)
Dept: CALL CENTER | Facility: HOSPITAL | Age: 71
End: 2021-03-29

## 2021-03-29 ENCOUNTER — APPOINTMENT (OUTPATIENT)
Dept: GENERAL RADIOLOGY | Facility: HOSPITAL | Age: 71
End: 2021-03-29

## 2021-03-29 VITALS
WEIGHT: 210.38 LBS | TEMPERATURE: 98.4 F | RESPIRATION RATE: 24 BRPM | DIASTOLIC BLOOD PRESSURE: 69 MMHG | HEART RATE: 88 BPM | SYSTOLIC BLOOD PRESSURE: 139 MMHG | HEIGHT: 69 IN | OXYGEN SATURATION: 93 % | BODY MASS INDEX: 31.16 KG/M2

## 2021-03-29 DIAGNOSIS — I50.31 ACUTE DIASTOLIC CONGESTIVE HEART FAILURE (HCC): Primary | ICD-10-CM

## 2021-03-29 DIAGNOSIS — I10 ESSENTIAL HYPERTENSION: ICD-10-CM

## 2021-03-29 DIAGNOSIS — I25.10 CORONARY ARTERY DISEASE INVOLVING NATIVE HEART WITHOUT ANGINA PECTORIS, UNSPECIFIED VESSEL OR LESION TYPE: ICD-10-CM

## 2021-03-29 DIAGNOSIS — I25.119 CORONARY ARTERY DISEASE INVOLVING NATIVE CORONARY ARTERY OF NATIVE HEART WITH ANGINA PECTORIS (HCC): ICD-10-CM

## 2021-03-29 DIAGNOSIS — I50.9 ACUTE ON CHRONIC CONGESTIVE HEART FAILURE, UNSPECIFIED HEART FAILURE TYPE (HCC): Primary | ICD-10-CM

## 2021-03-29 DIAGNOSIS — I50.31 ACUTE DIASTOLIC CONGESTIVE HEART FAILURE (HCC): ICD-10-CM

## 2021-03-29 DIAGNOSIS — R31.9 HEMATURIA, UNSPECIFIED TYPE: ICD-10-CM

## 2021-03-29 LAB
ALBUMIN SERPL-MCNC: 3.58 G/DL (ref 3.5–5.2)
ALBUMIN/GLOB SERPL: 1 G/DL
ALP SERPL-CCNC: 106 U/L (ref 39–117)
ALT SERPL W P-5'-P-CCNC: 14 U/L (ref 1–41)
ANION GAP SERPL CALCULATED.3IONS-SCNC: 10.7 MMOL/L (ref 5–15)
ANION GAP SERPL CALCULATED.3IONS-SCNC: 13.7 MMOL/L (ref 5–15)
AST SERPL-CCNC: 10 U/L (ref 1–40)
BACTERIA UR QL AUTO: NORMAL /HPF
BILIRUB SERPL-MCNC: 0.3 MG/DL (ref 0–1.2)
BILIRUB UR QL STRIP: NEGATIVE
BUN SERPL-MCNC: 20 MG/DL (ref 8–23)
BUN SERPL-MCNC: 25 MG/DL (ref 8–23)
BUN/CREAT SERPL: 17.4 (ref 7–25)
BUN/CREAT SERPL: 21.7 (ref 7–25)
CALCIUM SPEC-SCNC: 9.5 MG/DL (ref 8.6–10.5)
CALCIUM SPEC-SCNC: 9.8 MG/DL (ref 8.6–10.5)
CHLORIDE SERPL-SCNC: 92 MMOL/L (ref 98–107)
CHLORIDE SERPL-SCNC: 93 MMOL/L (ref 98–107)
CLARITY UR: CLEAR
CO2 SERPL-SCNC: 29.3 MMOL/L (ref 22–29)
CO2 SERPL-SCNC: 29.3 MMOL/L (ref 22–29)
COLOR UR: YELLOW
CREAT SERPL-MCNC: 1.15 MG/DL (ref 0.76–1.27)
CREAT SERPL-MCNC: 1.15 MG/DL (ref 0.76–1.27)
FLUAV RNA RESP QL NAA+PROBE: NOT DETECTED
FLUBV RNA RESP QL NAA+PROBE: NOT DETECTED
GFR SERPL CREATININE-BSD FRML MDRD: 63 ML/MIN/1.73
GFR SERPL CREATININE-BSD FRML MDRD: 63 ML/MIN/1.73
GLOBULIN UR ELPH-MCNC: 3.4 GM/DL
GLUCOSE SERPL-MCNC: 255 MG/DL (ref 65–99)
GLUCOSE SERPL-MCNC: 407 MG/DL (ref 65–99)
GLUCOSE UR STRIP-MCNC: ABNORMAL MG/DL
HGB UR QL STRIP.AUTO: ABNORMAL
HYALINE CASTS UR QL AUTO: NORMAL /LPF
INR PPP: 0.97 (ref 0.9–1.1)
KETONES UR QL STRIP: NEGATIVE
LEUKOCYTE ESTERASE UR QL STRIP.AUTO: NEGATIVE
MAGNESIUM SERPL-MCNC: 1.7 MG/DL (ref 1.6–2.4)
NITRITE UR QL STRIP: NEGATIVE
NT-PROBNP SERPL-MCNC: 816.3 PG/ML (ref 0–900)
NT-PROBNP SERPL-MCNC: 848.1 PG/ML (ref 0–900)
PH UR STRIP.AUTO: 7 [PH] (ref 5–8)
PHOSPHATE SERPL-MCNC: 3.2 MG/DL (ref 2.5–4.5)
POTASSIUM SERPL-SCNC: 4.4 MMOL/L (ref 3.5–5.2)
POTASSIUM SERPL-SCNC: 4.7 MMOL/L (ref 3.5–5.2)
PROT SERPL-MCNC: 7 G/DL (ref 6–8.5)
PROT UR QL STRIP: NEGATIVE
PROTHROMBIN TIME: 12.7 SECONDS (ref 11.9–14.1)
QT INTERVAL: 368 MS
QTC INTERVAL: 437 MS
RBC # UR: NORMAL /HPF
REF LAB TEST METHOD: NORMAL
SARS-COV-2 RNA RESP QL NAA+PROBE: NOT DETECTED
SODIUM SERPL-SCNC: 133 MMOL/L (ref 136–145)
SODIUM SERPL-SCNC: 135 MMOL/L (ref 136–145)
SP GR UR STRIP: 1.01 (ref 1–1.03)
SQUAMOUS #/AREA URNS HPF: NORMAL /HPF
TROPONIN T SERPL-MCNC: <0.01 NG/ML (ref 0–0.03)
TROPONIN T SERPL-MCNC: <0.01 NG/ML (ref 0–0.03)
TSH SERPL DL<=0.05 MIU/L-ACNC: 3.41 UIU/ML (ref 0.27–4.2)
UROBILINOGEN UR QL STRIP: ABNORMAL
WBC UR QL AUTO: NORMAL /HPF

## 2021-03-29 PROCEDURE — 85610 PROTHROMBIN TIME: CPT | Performed by: STUDENT IN AN ORGANIZED HEALTH CARE EDUCATION/TRAINING PROGRAM

## 2021-03-29 PROCEDURE — 83735 ASSAY OF MAGNESIUM: CPT | Performed by: STUDENT IN AN ORGANIZED HEALTH CARE EDUCATION/TRAINING PROGRAM

## 2021-03-29 PROCEDURE — 36415 COLL VENOUS BLD VENIPUNCTURE: CPT

## 2021-03-29 PROCEDURE — 93970 EXTREMITY STUDY: CPT

## 2021-03-29 PROCEDURE — 93005 ELECTROCARDIOGRAM TRACING: CPT | Performed by: FAMILY MEDICINE

## 2021-03-29 PROCEDURE — 83880 ASSAY OF NATRIURETIC PEPTIDE: CPT | Performed by: STUDENT IN AN ORGANIZED HEALTH CARE EDUCATION/TRAINING PROGRAM

## 2021-03-29 PROCEDURE — 80053 COMPREHEN METABOLIC PANEL: CPT | Performed by: NURSE PRACTITIONER

## 2021-03-29 PROCEDURE — 87636 SARSCOV2 & INF A&B AMP PRB: CPT | Performed by: STUDENT IN AN ORGANIZED HEALTH CARE EDUCATION/TRAINING PROGRAM

## 2021-03-29 PROCEDURE — 84484 ASSAY OF TROPONIN QUANT: CPT | Performed by: STUDENT IN AN ORGANIZED HEALTH CARE EDUCATION/TRAINING PROGRAM

## 2021-03-29 PROCEDURE — 99214 OFFICE O/P EST MOD 30 MIN: CPT | Performed by: NURSE PRACTITIONER

## 2021-03-29 PROCEDURE — 84100 ASSAY OF PHOSPHORUS: CPT | Performed by: STUDENT IN AN ORGANIZED HEALTH CARE EDUCATION/TRAINING PROGRAM

## 2021-03-29 PROCEDURE — 99284 EMERGENCY DEPT VISIT MOD MDM: CPT

## 2021-03-29 PROCEDURE — 93010 ELECTROCARDIOGRAM REPORT: CPT | Performed by: INTERNAL MEDICINE

## 2021-03-29 PROCEDURE — 83880 ASSAY OF NATRIURETIC PEPTIDE: CPT | Performed by: NURSE PRACTITIONER

## 2021-03-29 PROCEDURE — 84443 ASSAY THYROID STIM HORMONE: CPT | Performed by: STUDENT IN AN ORGANIZED HEALTH CARE EDUCATION/TRAINING PROGRAM

## 2021-03-29 PROCEDURE — 71046 X-RAY EXAM CHEST 2 VIEWS: CPT

## 2021-03-29 PROCEDURE — 81001 URINALYSIS AUTO W/SCOPE: CPT | Performed by: NURSE PRACTITIONER

## 2021-03-29 PROCEDURE — 71045 X-RAY EXAM CHEST 1 VIEW: CPT

## 2021-03-29 PROCEDURE — 86140 C-REACTIVE PROTEIN: CPT | Performed by: HOSPITALIST

## 2021-03-29 RX ORDER — SODIUM CHLORIDE 0.9 % (FLUSH) 0.9 %
10 SYRINGE (ML) INJECTION AS NEEDED
Status: DISCONTINUED | OUTPATIENT
Start: 2021-03-29 | End: 2021-04-09 | Stop reason: HOSPADM

## 2021-03-29 NOTE — TELEPHONE ENCOUNTER
FINESSE  AT [facility] CALLED AND STATED PATIENT'S O2 STAT ON Friday WAS 85 AND Saturday 89.    WORKER IS REQUESTING AN ORDER FOR OXYGEN.    [facility] IS WHO THEY REQUEST TO SUPPLY THE OXYGEN.    CLEO IS BEST TO CALL IF YOU HAVE ANY QUESTIONS  [phone]

## 2021-03-29 NOTE — TELEPHONE ENCOUNTER
So I have not seen him after his two hospital stays, but at discharge he was 93% at [facility]. With falling O2 sats, he needs to be re-evaluated at the ER immediately. Please let him know. Thanks.

## 2021-03-29 NOTE — PROGRESS NOTES
"Chief Complaint  Establish Care and Post-op (CABG)    Subjective    History of Present Illness {CC  Problem List  Visit  Diagnosis   Encounters  Notes  Medications  Labs  Result Review Imaging  Media :23}     William Villasenor presents to [facility] CARDIOLOG[facility] for   History of Present Illness   70-year-old male with type 2 diabetes, hyperlipidemia, hypertension who presents today as a hospital referral status post CABG.  Patient underwent CABG x4 on 3/16 with Dr. Simms.  EF 55%.  He was readmitted to [facility] on 3/23-3/27 with fluid overload and complaints of chest pain.  Echo was stable.  Hypoxia improved with IV diuretics.  He was also found to have significant hyperglycemia.  Patient notes ongoing shortness of breath.  He reports that he was short of breath when he left the hospital, but wanted to go home because he was being \"starved\".  He reports that he has to sleep sitting up.  He feels dyspneic at rest.  He also notes some hematuria since the Santos catheter was removed.  He denies dysuria but he says he does feel some general discomfort in his bladder when he has to go to the bathroom.  He notes some bleeding from his right EVH site and ongoing BLE edema    Objective     Vital Signs:   Vitals:    03/29/21 0811 03/29/21 0812 03/29/21 0813   BP: 146/76 143/68 139/69   BP Location: Right arm Left arm Left arm   Patient Position: Sitting Standing Sitting   Cuff Size: Large Adult Large Adult Large Adult   Pulse: 90 96 88   Resp:   24   Temp:   98.4 °F (36.9 °C)   TempSrc:   Temporal   SpO2: 94% 95% 93%   Weight:   95.4 kg (210 lb 6 oz)   Height:   175.3 cm (69\")     Body mass index is 31.07 kg/m².  Physical Exam  Vitals reviewed.   Constitutional:       Appearance: Normal appearance.   HENT:      Head: Normocephalic.   Eyes:      Extraocular Movements: Extraocular movements intact.      Pupils: Pupils are equal, round, and reactive to light.   Neck:      Vascular: No carotid bruit. "   Cardiovascular:      Rate and Rhythm: Normal rate and regular rhythm.      Pulses: Normal pulses.      Heart sounds: Normal heart sounds, S1 normal and S2 normal. No murmur heard.     Pulmonary:      Effort: Pulmonary effort is normal. No respiratory distress.      Breath sounds: Examination of the right-lower field reveals rales. Examination of the left-lower field reveals rales. Rales present.   Chest:      Chest wall: No tenderness.   Abdominal:      General: Abdomen is flat. Bowel sounds are normal.      Palpations: Abdomen is soft.   Musculoskeletal:      Cervical back: Neck supple.      Right lower leg: 3+ Edema present.      Left lower leg: 3+ Edema present.   Skin:     General: Skin is warm and dry.      Comments: Midsternal incision is clean dry and intact with no evidence of infection.  Scabbing noted.  EVH site is clean dry and intact with no evidence of infection.  Scabbing noted with some mild bleeding where scabbing rubbed against his pants.  No other drainage   Neurological:      General: No focal deficit present.      Mental Status: He is alert and oriented to person, place, and time. Mental status is at baseline.   Psychiatric:         Mood and Affect: Mood normal.         Behavior: Behavior normal.         Thought Content: Thought content normal.              Result Review  Data Reviewed:{ Labs  Result Review  Imaging  Med Tab  Media :23}   Basic Metabolic Panel (03/27/2021 02:20)  CBC (No Diff) (03/27/2021 02:20)  ECG 12 Lead (03/25/2021 20:32)  Adult Transthoracic Echo Limited W/ Cont if Necessary Per Protocol (03/24/2021 13:15)  Adult Transthoracic Echo Complete W/ Cont if Necessary Per Protocol (03/14/2021 07:53)  XR Chest 1 View (03/23/2021 19:33)    Cardiology studies see above, Consultant notes cardiology, CTS, hospitalists and Recent hospitalization notes 3/13-3/23, 3/23-3/27            Assessment and Plan {CC Problem List  Visit Diagnosis  ROS  Review (Popup)  Health Maintenance   Quality  BestPractice  Medications  SmartSets  SnapShot Encounters  Media :23}   1. Acute diastolic congestive heart failure (CMS/HCC)/post op hypervolemia  IV diuresis today in office. Patient received 80mg lasix today through a butterfly in the LAC over slow IV push. During IV diuresis, vitals were monitored and stable. Please see IV diuresis record for those vitals. Patient voided 350 ml in the office prior to discharge from the office. Butterfly was d/c'd and area was free of erythema, ecchymosis, or drainage.  Patient will receive a follow up call from the HF center in 24 hours to evaluate urinary output and reassess signs and symptoms.   Continue Bumex 2 mg daily  - Basic Metabolic Panel; Future  - proBNP; Future  - XR Chest PA & Lateral; Future  - Basic Metabolic Panel  - proBNP    2. Coronary artery disease involving native heart without angina pectoris, unspecified vessel or lesion type  S/p CABG x 4 3/16  - Basic Metabolic Panel; Future  - XR Chest PA & Lateral; Future  - Basic Metabolic Panel    3. Essential hypertension  Controlled, initially elevated but controlled after recheck  Continue metoprolol succinate and doxazosin  - Basic Metabolic Panel    4. Hematuria, unspecified type  Likely from stewart catheter  - Urinalysis With Culture If Indicated -; Future          Follow Up {Instructions Charge Capture  Follow-up Communications :23}   Return in about 1 week (around 4/5/2021) for Office follow up, HF.    Patient was given instructions and counseling regarding his condition or for health maintenance advice. Please see specific information pulled into the AVS if appropriate.  Patient was instructed to call the [facility] with any questions, concerns, or worsening symptoms.    *Please note that portions of this note were completed with a voice recognition program. Efforts were made to edit the dictations, but occasionally words are mistranscribed.

## 2021-03-29 NOTE — TELEPHONE ENCOUNTER
Spoke with patient and his daughter,  got on the phone with patients daughter Kandy also. She is going to fax orders for O2 tonight and patient will be evaluated tomorrow in the office.

## 2021-03-29 NOTE — OUTREACH NOTE
Call Center TCM Note      Responses   [facility] [place] patient discharged from?  [facility]   Does the patient have one of the following disease processes/diagnoses(primary or secondary)?  Other   TCM attempt successful?  Yes   Call start time  1336   Call end time  1344   Discharge diagnosis  Subacute cardiogenic pulmonary edema post CABG, with acute hypoxia   Is patient permission given to speak with other caregiver?  Yes   List who call center can speak with  spouse- Perri   Person spoke with today (if not patient) and relationship  spouse- Perri   Does the patient have all medications ordered at discharge?  Yes   Is the patient taking all medications as directed (includes completed medication regime)?  Yes   Does the patient have a primary care provider?   Yes   Does the patient have an appointment with their PCP within 7 days of discharge?  Yes   Comments regarding PCP  f/u with Dr. Gatica on 4/1/21   Has the patient kept scheduled appointments due by today?  Yes   Comments  saw HF clinic today   What is the Home health agency?   [facility]   Has home health visited the patient within 72 hours of discharge?  No   Psychosocial issues?  No   Did the patient receive a copy of their discharge instructions?  Yes   Nursing interventions  Reviewed instructions with patient   What is the patient's perception of their health status since discharge?  Same   Is the patient/caregiver able to teach back the hierarchy of who to call/visit for symptoms/problems? PCP, Specialist, Home health nurse, Urgent Care, ED, 911  Yes   TCM call completed?  Yes   Wrap up additional comments  Per spouse, patient is not doing much better, he went to the Heart Failure clinic to be seen today and was given IV lasix, discussed with wife the importance of daily weights and to monitor for fluid gain, she verbalized understanding, she confirmed f/u appt with Dr. Gatica for 4/1, spouse states she has not heart from home health, will notify  case management.          Rizwana Pollard RN    3/29/2021, 13:44 EDT

## 2021-03-30 ENCOUNTER — TELEPHONE (OUTPATIENT)
Dept: CARDIOLOGY | Facility: HOSPITAL | Age: 71
End: 2021-03-30

## 2021-03-30 ENCOUNTER — TELEPHONE (OUTPATIENT)
Dept: FAMILY MEDICINE CLINIC | Facility: CLINIC | Age: 71
End: 2021-03-30

## 2021-03-30 ENCOUNTER — READMISSION MANAGEMENT (OUTPATIENT)
Dept: CALL CENTER | Facility: HOSPITAL | Age: 71
End: 2021-03-30

## 2021-03-30 PROBLEM — E78.5 HYPERLIPIDEMIA ASSOCIATED WITH TYPE 2 DIABETES MELLITUS: Chronic | Status: RESOLVED | Noted: 2020-12-07 | Resolved: 2021-03-30

## 2021-03-30 PROBLEM — I10 ESSENTIAL HYPERTENSION: Chronic | Status: ACTIVE | Noted: 2020-12-07

## 2021-03-30 PROBLEM — I10 ESSENTIAL HYPERTENSION: Status: ACTIVE | Noted: 2020-12-07

## 2021-03-30 PROBLEM — N18.2 CKD (CHRONIC KIDNEY DISEASE) STAGE 2, GFR 60-89 ML/MIN: Chronic | Status: ACTIVE | Noted: 2021-03-30

## 2021-03-30 PROBLEM — E11.9 TYPE II DIABETES MELLITUS: Status: ACTIVE | Noted: 2020-12-07

## 2021-03-30 PROBLEM — E78.5 HYPERLIPIDEMIA: Chronic | Status: ACTIVE | Noted: 2021-03-30

## 2021-03-30 PROBLEM — E11.9 TYPE II DIABETES MELLITUS: Chronic | Status: ACTIVE | Noted: 2020-12-07

## 2021-03-30 PROBLEM — E11.69 HYPERLIPIDEMIA ASSOCIATED WITH TYPE 2 DIABETES MELLITUS: Chronic | Status: RESOLVED | Noted: 2020-12-07 | Resolved: 2021-03-30

## 2021-03-30 PROBLEM — I50.9 ACUTE ON CHRONIC CONGESTIVE HEART FAILURE (HCC): Status: ACTIVE | Noted: 2021-03-30

## 2021-03-30 LAB
ALBUMIN SERPL-MCNC: 3.29 G/DL (ref 3.5–5.2)
ALBUMIN/GLOB SERPL: 0.9 G/DL
ALP SERPL-CCNC: 113 U/L (ref 39–117)
ALT SERPL W P-5'-P-CCNC: 15 U/L (ref 1–41)
ANION GAP SERPL CALCULATED.3IONS-SCNC: 15.1 MMOL/L (ref 5–15)
AST SERPL-CCNC: 13 U/L (ref 1–40)
BASOPHILS # BLD AUTO: 0.06 10*3/MM3 (ref 0–0.2)
BASOPHILS NFR BLD AUTO: 0.5 % (ref 0–1.5)
BILIRUB SERPL-MCNC: 0.3 MG/DL (ref 0–1.2)
BUN SERPL-MCNC: 24 MG/DL (ref 8–23)
BUN/CREAT SERPL: 21.6 (ref 7–25)
CALCIUM SPEC-SCNC: 9.7 MG/DL (ref 8.6–10.5)
CHLORIDE SERPL-SCNC: 97 MMOL/L (ref 98–107)
CO2 SERPL-SCNC: 28.9 MMOL/L (ref 22–29)
CREAT SERPL-MCNC: 1.11 MG/DL (ref 0.76–1.27)
CRP SERPL-MCNC: 5.14 MG/DL (ref 0–0.5)
CRP SERPL-MCNC: 5.67 MG/DL (ref 0–0.5)
D-LACTATE SERPL-SCNC: 1.8 MMOL/L (ref 0.5–2)
DEPRECATED RDW RBC AUTO: 44 FL (ref 37–54)
EOSINOPHIL # BLD AUTO: 0.17 10*3/MM3 (ref 0–0.4)
EOSINOPHIL NFR BLD AUTO: 1.3 % (ref 0.3–6.2)
ERYTHROCYTE [DISTWIDTH] IN BLOOD BY AUTOMATED COUNT: 13.2 % (ref 12.3–15.4)
FERRITIN SERPL-MCNC: 208.8 NG/ML (ref 30–400)
FOLATE SERPL-MCNC: >20 NG/ML (ref 4.78–24.2)
GFR SERPL CREATININE-BSD FRML MDRD: 65 ML/MIN/1.73
GLOBULIN UR ELPH-MCNC: 3.5 GM/DL
GLUCOSE BLDC GLUCOMTR-MCNC: 237 MG/DL (ref 70–130)
GLUCOSE BLDC GLUCOMTR-MCNC: 254 MG/DL (ref 70–130)
GLUCOSE BLDC GLUCOMTR-MCNC: 254 MG/DL (ref 70–130)
GLUCOSE BLDC GLUCOMTR-MCNC: 376 MG/DL (ref 70–130)
GLUCOSE BLDC GLUCOMTR-MCNC: 435 MG/DL (ref 70–130)
GLUCOSE SERPL-MCNC: 221 MG/DL (ref 65–99)
HCT VFR BLD AUTO: 34.9 % (ref 37.5–51)
HGB BLD-MCNC: 10.7 G/DL (ref 13–17.7)
IMM GRANULOCYTES # BLD AUTO: 0.11 10*3/MM3 (ref 0–0.05)
IMM GRANULOCYTES NFR BLD AUTO: 0.8 % (ref 0–0.5)
IRON 24H UR-MRATE: 46 MCG/DL (ref 59–158)
IRON SATN MFR SERPL: 16 % (ref 20–50)
LYMPHOCYTES # BLD AUTO: 3.02 10*3/MM3 (ref 0.7–3.1)
LYMPHOCYTES NFR BLD AUTO: 23 % (ref 19.6–45.3)
MAGNESIUM SERPL-MCNC: 1.7 MG/DL (ref 1.6–2.4)
MCH RBC QN AUTO: 28.3 PG (ref 26.6–33)
MCHC RBC AUTO-ENTMCNC: 30.7 G/DL (ref 31.5–35.7)
MCV RBC AUTO: 92.3 FL (ref 79–97)
MONOCYTES # BLD AUTO: 0.76 10*3/MM3 (ref 0.1–0.9)
MONOCYTES NFR BLD AUTO: 5.8 % (ref 5–12)
NEUTROPHILS NFR BLD AUTO: 68.6 % (ref 42.7–76)
NEUTROPHILS NFR BLD AUTO: 9 10*3/MM3 (ref 1.7–7)
NRBC BLD AUTO-RTO: 0 /100 WBC (ref 0–0.2)
PLATELET # BLD AUTO: 647 10*3/MM3 (ref 140–450)
PMV BLD AUTO: 8.6 FL (ref 6–12)
POTASSIUM SERPL-SCNC: 4.2 MMOL/L (ref 3.5–5.2)
PROT SERPL-MCNC: 6.8 G/DL (ref 6–8.5)
RBC # BLD AUTO: 3.78 10*6/MM3 (ref 4.14–5.8)
SODIUM SERPL-SCNC: 141 MMOL/L (ref 136–145)
TIBC SERPL-MCNC: 292 MCG/DL (ref 298–536)
TRANSFERRIN SERPL-MCNC: 196 MG/DL (ref 200–360)
VIT B12 BLD-MCNC: 1184 PG/ML (ref 211–946)
WBC # BLD AUTO: 13.12 10*3/MM3 (ref 3.4–10.8)

## 2021-03-30 PROCEDURE — 87040 BLOOD CULTURE FOR BACTERIA: CPT | Performed by: PHYSICIAN ASSISTANT

## 2021-03-30 PROCEDURE — 99223 1ST HOSP IP/OBS HIGH 75: CPT | Performed by: PHYSICIAN ASSISTANT

## 2021-03-30 PROCEDURE — 63710000001 INSULIN ASPART PER 5 UNITS: Performed by: HOSPITALIST

## 2021-03-30 PROCEDURE — 25010000002 VANCOMYCIN: Performed by: HOSPITALIST

## 2021-03-30 PROCEDURE — 25010000002 FUROSEMIDE PER 20 MG: Performed by: NURSE PRACTITIONER

## 2021-03-30 PROCEDURE — 82607 VITAMIN B-12: CPT | Performed by: PHYSICIAN ASSISTANT

## 2021-03-30 PROCEDURE — 85025 COMPLETE CBC W/AUTO DIFF WBC: CPT | Performed by: STUDENT IN AN ORGANIZED HEALTH CARE EDUCATION/TRAINING PROGRAM

## 2021-03-30 PROCEDURE — 84466 ASSAY OF TRANSFERRIN: CPT | Performed by: PHYSICIAN ASSISTANT

## 2021-03-30 PROCEDURE — 82962 GLUCOSE BLOOD TEST: CPT

## 2021-03-30 PROCEDURE — 86140 C-REACTIVE PROTEIN: CPT | Performed by: PHYSICIAN ASSISTANT

## 2021-03-30 PROCEDURE — 63710000001 INSULIN DETEMIR PER 5 UNITS: Performed by: HOSPITALIST

## 2021-03-30 PROCEDURE — 82728 ASSAY OF FERRITIN: CPT | Performed by: PHYSICIAN ASSISTANT

## 2021-03-30 PROCEDURE — 85060 BLOOD SMEAR INTERPRETATION: CPT | Performed by: PHYSICIAN ASSISTANT

## 2021-03-30 PROCEDURE — 83540 ASSAY OF IRON: CPT | Performed by: PHYSICIAN ASSISTANT

## 2021-03-30 PROCEDURE — 83605 ASSAY OF LACTIC ACID: CPT | Performed by: PHYSICIAN ASSISTANT

## 2021-03-30 PROCEDURE — 80053 COMPREHEN METABOLIC PANEL: CPT | Performed by: HOSPITALIST

## 2021-03-30 PROCEDURE — 25010000002 HEPARIN (PORCINE) PER 1000 UNITS: Performed by: HOSPITALIST

## 2021-03-30 PROCEDURE — 25010000002 PIPERACILLIN SOD-TAZOBACTAM PER 1 G: Performed by: HOSPITALIST

## 2021-03-30 PROCEDURE — 25010000002 FUROSEMIDE PER 20 MG: Performed by: PHYSICIAN ASSISTANT

## 2021-03-30 PROCEDURE — 99222 1ST HOSP IP/OBS MODERATE 55: CPT | Performed by: SPECIALIST

## 2021-03-30 PROCEDURE — 25010000002 FUROSEMIDE PER 20 MG: Performed by: HOSPITALIST

## 2021-03-30 PROCEDURE — 82746 ASSAY OF FOLIC ACID SERUM: CPT | Performed by: PHYSICIAN ASSISTANT

## 2021-03-30 PROCEDURE — 83735 ASSAY OF MAGNESIUM: CPT | Performed by: HOSPITALIST

## 2021-03-30 RX ORDER — HYDROCODONE BITARTRATE AND ACETAMINOPHEN 7.5; 325 MG/1; MG/1
1 TABLET ORAL EVERY 6 HOURS PRN
Status: CANCELLED | OUTPATIENT
Start: 2021-03-30 | End: 2021-04-06

## 2021-03-30 RX ORDER — TERAZOSIN 5 MG/1
5 CAPSULE ORAL NIGHTLY
Status: DISCONTINUED | OUTPATIENT
Start: 2021-03-30 | End: 2021-04-09 | Stop reason: HOSPADM

## 2021-03-30 RX ORDER — FOLIC ACID 1 MG/1
1 TABLET ORAL 2 TIMES DAILY
Status: DISCONTINUED | OUTPATIENT
Start: 2021-03-30 | End: 2021-04-09 | Stop reason: HOSPADM

## 2021-03-30 RX ORDER — ASPIRIN 81 MG/1
TABLET, CHEWABLE ORAL
Status: COMPLETED
Start: 2021-03-30 | End: 2021-03-30

## 2021-03-30 RX ORDER — FINASTERIDE 5 MG/1
5 TABLET, FILM COATED ORAL DAILY
Status: DISCONTINUED | OUTPATIENT
Start: 2021-03-30 | End: 2021-04-09 | Stop reason: HOSPADM

## 2021-03-30 RX ORDER — FUROSEMIDE 10 MG/ML
20 INJECTION INTRAMUSCULAR; INTRAVENOUS EVERY 12 HOURS
Status: DISCONTINUED | OUTPATIENT
Start: 2021-03-30 | End: 2021-04-01

## 2021-03-30 RX ORDER — CLOPIDOGREL BISULFATE 75 MG/1
75 TABLET ORAL DAILY
Status: CANCELLED | OUTPATIENT
Start: 2021-03-30

## 2021-03-30 RX ORDER — ALLOPURINOL 300 MG/1
300 TABLET ORAL DAILY
Status: DISCONTINUED | OUTPATIENT
Start: 2021-03-30 | End: 2021-04-09 | Stop reason: HOSPADM

## 2021-03-30 RX ORDER — CLOPIDOGREL BISULFATE 75 MG/1
75 TABLET ORAL DAILY
Status: DISCONTINUED | OUTPATIENT
Start: 2021-03-30 | End: 2021-04-09 | Stop reason: HOSPADM

## 2021-03-30 RX ORDER — ASPIRIN 81 MG/1
81 TABLET ORAL DAILY
Status: CANCELLED | OUTPATIENT
Start: 2021-03-30

## 2021-03-30 RX ORDER — MAGNESIUM SULFATE 1 G/100ML
1 INJECTION INTRAVENOUS AS NEEDED
Status: DISCONTINUED | OUTPATIENT
Start: 2021-03-30 | End: 2021-04-09 | Stop reason: HOSPADM

## 2021-03-30 RX ORDER — NITROGLYCERIN 0.4 MG/1
0.4 TABLET SUBLINGUAL
Status: CANCELLED | OUTPATIENT
Start: 2021-03-30

## 2021-03-30 RX ORDER — PANTOPRAZOLE SODIUM 40 MG/1
40 TABLET, DELAYED RELEASE ORAL EVERY MORNING
Status: CANCELLED | OUTPATIENT
Start: 2021-03-30

## 2021-03-30 RX ORDER — L.ACID,PARA/B.BIFIDUM/S.THERM 8B CELL
1 CAPSULE ORAL DAILY
Status: DISCONTINUED | OUTPATIENT
Start: 2021-03-30 | End: 2021-04-09 | Stop reason: HOSPADM

## 2021-03-30 RX ORDER — MAGNESIUM SULFATE HEPTAHYDRATE 40 MG/ML
2 INJECTION, SOLUTION INTRAVENOUS AS NEEDED
Status: DISCONTINUED | OUTPATIENT
Start: 2021-03-30 | End: 2021-04-09 | Stop reason: HOSPADM

## 2021-03-30 RX ORDER — SODIUM CHLORIDE 0.9 % (FLUSH) 0.9 %
10 SYRINGE (ML) INJECTION EVERY 12 HOURS SCHEDULED
Status: DISCONTINUED | OUTPATIENT
Start: 2021-03-30 | End: 2021-04-09 | Stop reason: HOSPADM

## 2021-03-30 RX ORDER — FUROSEMIDE 10 MG/ML
20 INJECTION INTRAMUSCULAR; INTRAVENOUS ONCE
Status: COMPLETED | OUTPATIENT
Start: 2021-03-30 | End: 2021-03-30

## 2021-03-30 RX ORDER — AMOXICILLIN 250 MG
2 CAPSULE ORAL 2 TIMES DAILY
Status: DISCONTINUED | OUTPATIENT
Start: 2021-03-30 | End: 2021-04-09 | Stop reason: HOSPADM

## 2021-03-30 RX ORDER — NITROGLYCERIN 0.4 MG/1
0.4 TABLET SUBLINGUAL
Status: DISCONTINUED | OUTPATIENT
Start: 2021-03-30 | End: 2021-04-09 | Stop reason: HOSPADM

## 2021-03-30 RX ORDER — METOPROLOL SUCCINATE 50 MG/1
50 TABLET, EXTENDED RELEASE ORAL
Status: DISCONTINUED | OUTPATIENT
Start: 2021-03-30 | End: 2021-04-09 | Stop reason: HOSPADM

## 2021-03-30 RX ORDER — NICOTINE POLACRILEX 4 MG
15 LOZENGE BUCCAL
Status: DISCONTINUED | OUTPATIENT
Start: 2021-03-30 | End: 2021-04-09 | Stop reason: HOSPADM

## 2021-03-30 RX ORDER — ACETAMINOPHEN 500 MG
500 TABLET ORAL EVERY 6 HOURS PRN
Status: CANCELLED | OUTPATIENT
Start: 2021-03-30

## 2021-03-30 RX ORDER — OXYCODONE AND ACETAMINOPHEN 7.5; 325 MG/1; MG/1
1 TABLET ORAL EVERY 6 HOURS PRN
COMMUNITY
Start: 2021-03-27 | End: 2021-04-09 | Stop reason: HOSPADM

## 2021-03-30 RX ORDER — PANTOPRAZOLE SODIUM 40 MG/1
40 TABLET, DELAYED RELEASE ORAL
Status: DISCONTINUED | OUTPATIENT
Start: 2021-03-30 | End: 2021-04-09 | Stop reason: HOSPADM

## 2021-03-30 RX ORDER — BUMETANIDE 1 MG/1
2 TABLET ORAL DAILY
Status: CANCELLED | OUTPATIENT
Start: 2021-03-30

## 2021-03-30 RX ORDER — OXYCODONE AND ACETAMINOPHEN 7.5; 325 MG/1; MG/1
1 TABLET ORAL EVERY 6 HOURS PRN
Status: DISCONTINUED | OUTPATIENT
Start: 2021-03-30 | End: 2021-04-09 | Stop reason: HOSPADM

## 2021-03-30 RX ORDER — ATORVASTATIN CALCIUM 40 MG/1
40 TABLET, FILM COATED ORAL NIGHTLY
Status: DISCONTINUED | OUTPATIENT
Start: 2021-03-30 | End: 2021-04-09 | Stop reason: HOSPADM

## 2021-03-30 RX ORDER — ACETAMINOPHEN 325 MG/1
650 TABLET ORAL EVERY 6 HOURS PRN
Status: DISCONTINUED | OUTPATIENT
Start: 2021-03-30 | End: 2021-04-09 | Stop reason: HOSPADM

## 2021-03-30 RX ORDER — ASPIRIN 81 MG/1
81 TABLET ORAL DAILY
Status: DISCONTINUED | OUTPATIENT
Start: 2021-03-30 | End: 2021-04-09 | Stop reason: HOSPADM

## 2021-03-30 RX ORDER — GABAPENTIN 300 MG/1
300 CAPSULE ORAL EVERY 12 HOURS SCHEDULED
Status: DISCONTINUED | OUTPATIENT
Start: 2021-03-30 | End: 2021-04-09 | Stop reason: HOSPADM

## 2021-03-30 RX ORDER — OXYCODONE AND ACETAMINOPHEN 7.5; 325 MG/1; MG/1
1 TABLET ORAL EVERY 6 HOURS PRN
Status: CANCELLED | OUTPATIENT
Start: 2021-03-30 | End: 2021-04-01

## 2021-03-30 RX ORDER — DEXTROSE MONOHYDRATE 25 G/50ML
25 INJECTION, SOLUTION INTRAVENOUS
Status: DISCONTINUED | OUTPATIENT
Start: 2021-03-30 | End: 2021-04-09 | Stop reason: HOSPADM

## 2021-03-30 RX ORDER — VITAMIN E 268 MG
400 CAPSULE ORAL DAILY
Status: DISCONTINUED | OUTPATIENT
Start: 2021-03-30 | End: 2021-04-09 | Stop reason: HOSPADM

## 2021-03-30 RX ORDER — SODIUM CHLORIDE 0.9 % (FLUSH) 0.9 %
10 SYRINGE (ML) INJECTION AS NEEDED
Status: DISCONTINUED | OUTPATIENT
Start: 2021-03-30 | End: 2021-04-09 | Stop reason: HOSPADM

## 2021-03-30 RX ORDER — HEPARIN SODIUM 5000 [USP'U]/ML
5000 INJECTION, SOLUTION INTRAVENOUS; SUBCUTANEOUS EVERY 12 HOURS SCHEDULED
Status: DISCONTINUED | OUTPATIENT
Start: 2021-03-30 | End: 2021-04-09 | Stop reason: HOSPADM

## 2021-03-30 RX ORDER — DIPHENOXYLATE HYDROCHLORIDE AND ATROPINE SULFATE 2.5; .025 MG/1; MG/1
1 TABLET ORAL DAILY
Status: DISCONTINUED | OUTPATIENT
Start: 2021-03-30 | End: 2021-04-09 | Stop reason: HOSPADM

## 2021-03-30 RX ADMIN — GABAPENTIN 300 MG: 300 CAPSULE ORAL at 09:52

## 2021-03-30 RX ADMIN — INSULIN ASPART 8 UNITS: 100 INJECTION, SOLUTION INTRAVENOUS; SUBCUTANEOUS at 17:55

## 2021-03-30 RX ADMIN — TERAZOSIN HYDROCHLORIDE 5 MG: 5 CAPSULE ORAL at 20:32

## 2021-03-30 RX ADMIN — ATORVASTATIN CALCIUM 40 MG: 40 TABLET, FILM COATED ORAL at 20:32

## 2021-03-30 RX ADMIN — VANCOMYCIN HYDROCHLORIDE 2000 MG: 1 INJECTION, POWDER, LYOPHILIZED, FOR SOLUTION INTRAVENOUS at 06:41

## 2021-03-30 RX ADMIN — ASPIRIN 81 MG: 81 TABLET, CHEWABLE ORAL at 09:53

## 2021-03-30 RX ADMIN — FOLIC ACID 1 MG: 1 TABLET ORAL at 09:53

## 2021-03-30 RX ADMIN — PIPERACILLIN SODIUM AND TAZOBACTAM SODIUM 3.38 G: 3; .375 INJECTION, POWDER, LYOPHILIZED, FOR SOLUTION INTRAVENOUS at 11:30

## 2021-03-30 RX ADMIN — ALLOPURINOL 300 MG: 300 TABLET ORAL at 09:53

## 2021-03-30 RX ADMIN — CLOPIDOGREL 75 MG: 75 TABLET, FILM COATED ORAL at 09:53

## 2021-03-30 RX ADMIN — FOLIC ACID 1 MG: 1 TABLET ORAL at 20:32

## 2021-03-30 RX ADMIN — Medication 1 TABLET: at 09:53

## 2021-03-30 RX ADMIN — FUROSEMIDE 20 MG: 10 INJECTION, SOLUTION INTRAMUSCULAR; INTRAVENOUS at 17:55

## 2021-03-30 RX ADMIN — SODIUM CHLORIDE, PRESERVATIVE FREE 10 ML: 5 INJECTION INTRAVENOUS at 20:32

## 2021-03-30 RX ADMIN — INSULIN DETEMIR 30 UNITS: 100 INJECTION, SOLUTION SUBCUTANEOUS at 02:29

## 2021-03-30 RX ADMIN — INSULIN ASPART 14 UNITS: 100 INJECTION, SOLUTION INTRAVENOUS; SUBCUTANEOUS at 11:30

## 2021-03-30 RX ADMIN — OXYCODONE HYDROCHLORIDE AND ACETAMINOPHEN 1 TABLET: 7.5; 325 TABLET ORAL at 22:01

## 2021-03-30 RX ADMIN — DOCUSATE SODIUM 50 MG AND SENNOSIDES 8.6 MG 2 TABLET: 8.6; 5 TABLET, FILM COATED ORAL at 20:32

## 2021-03-30 RX ADMIN — PIPERACILLIN SODIUM AND TAZOBACTAM SODIUM 3.38 G: 3; .375 INJECTION, POWDER, LYOPHILIZED, FOR SOLUTION INTRAVENOUS at 20:31

## 2021-03-30 RX ADMIN — DOCUSATE SODIUM 50 MG AND SENNOSIDES 8.6 MG 2 TABLET: 8.6; 5 TABLET, FILM COATED ORAL at 09:53

## 2021-03-30 RX ADMIN — PIPERACILLIN SODIUM AND TAZOBACTAM SODIUM 3.38 G: 3; .375 INJECTION, POWDER, LYOPHILIZED, FOR SOLUTION INTRAVENOUS at 06:41

## 2021-03-30 RX ADMIN — INSULIN ASPART 8 UNITS: 100 INJECTION, SOLUTION INTRAVENOUS; SUBCUTANEOUS at 09:53

## 2021-03-30 RX ADMIN — FUROSEMIDE 20 MG: 10 INJECTION, SOLUTION INTRAMUSCULAR; INTRAVENOUS at 05:48

## 2021-03-30 RX ADMIN — FINASTERIDE 5 MG: 5 TABLET, FILM COATED ORAL at 09:53

## 2021-03-30 RX ADMIN — GABAPENTIN 300 MG: 300 CAPSULE ORAL at 20:32

## 2021-03-30 RX ADMIN — FUROSEMIDE 20 MG: 10 INJECTION, SOLUTION INTRAMUSCULAR; INTRAVENOUS at 01:44

## 2021-03-30 RX ADMIN — INSULIN DETEMIR 30 UNITS: 100 INJECTION, SOLUTION SUBCUTANEOUS at 20:30

## 2021-03-30 RX ADMIN — HEPARIN SODIUM 5000 UNITS: 5000 INJECTION, SOLUTION INTRAVENOUS; SUBCUTANEOUS at 01:45

## 2021-03-30 RX ADMIN — METOPROLOL SUCCINATE 50 MG: 50 TABLET, EXTENDED RELEASE ORAL at 09:53

## 2021-03-30 RX ADMIN — OXYCODONE HYDROCHLORIDE AND ACETAMINOPHEN 1 TABLET: 7.5; 325 TABLET ORAL at 09:52

## 2021-03-30 RX ADMIN — SODIUM CHLORIDE, PRESERVATIVE FREE 10 ML: 5 INJECTION INTRAVENOUS at 09:52

## 2021-03-30 RX ADMIN — Medication 400 UNITS: at 09:53

## 2021-03-30 RX ADMIN — HEPARIN SODIUM 5000 UNITS: 5000 INJECTION, SOLUTION INTRAVENOUS; SUBCUTANEOUS at 20:32

## 2021-03-30 RX ADMIN — PANTOPRAZOLE SODIUM 40 MG: 40 TABLET, DELAYED RELEASE ORAL at 06:41

## 2021-03-30 RX ADMIN — Medication 1 CAPSULE: at 09:53

## 2021-03-30 NOTE — TELEPHONE ENCOUNTER
Called patient's wife to see how patient is feeling. She reports that the SOB kept getting worse so he called his PCP and got an order for O2. Of note, O2 sats were mostly 95-99% on room air during continuous monitoring after IV lasix. She reports that by the time O2 arrived that he could no longer catch his breath and went to ED. discussed lab results with his wife.  Labs showed normal proBNP.  Chest x-ray showed small right and probable trace left pleural effusion.  I advised her that my major concern was his uncontrolled diabetes and I wonder if this may be contributing to his symptoms since IV diuretics have not improved symptoms.  He currently is admitted to [facility].  I advised her to call me after discharge if he is having persistent symptoms.  She verbalized understanding with no further questions or concerns

## 2021-03-30 NOTE — TELEPHONE ENCOUNTER
Discussed patient with patient's daughter, Kandy, shailesh treviño. Apparently, patient was supposed to be discharged with home O2 and it didn't happen. There is no mention in his most recent discharge summary.     Patient was seen at cardiology by NP today. Per chart review, he was having trouble urinating and was diuresed with 80 mg IV lasix. Currently on bumex 2 mg daily at home. Also noted was 3+ edema bilateral lower extremities when at discharge, there was no edema appreciated. Also noted that patient was in the 90% RA at the hospital, yet in the 80s% RA at home.     Reviewed labs from cardiology - ok. BNP has improved. CXR continues to show pleural effusions bilaterally.     Concerned because he has not been seen at our office since discharge, so a drop in O2 sats on RA is definitely concerning along with the changes in edema and need for diuresis, despite stable labs and imaging. Discussed this at length with patient's daughter. Discussed with her that if this is truly the scenario, then O2 is not going to be helpful to patient, especially secondary to his shortness of breath. Worried about possible flash pulmonary edema, worsening CHF, etc. Discussed with patient's daughter that he may need re-evaluation at the ER, despite his reluctance. She argues that he does not want to go, but O2 may give him some comfort measures and if he sees that the O2 is not helping him, he may be more willing to go to the ER. I am reluctant to do this as this may give him a false sense of security that he doesn't have to go to the ER if he has O2.     Discussed at length. O2 faxed to [facility] as he agrees to be seen in clinic tomorrow morning for re-evaluation.

## 2021-03-30 NOTE — OUTREACH NOTE
Medical Week 2 Survey      Responses   [facility] patient discharged from?  [facility]   Does the patient have one of the following disease processes/diagnoses(primary or secondary)?  Other   Week 2 attempt successful?  No   Unsuccessful attempts  Attempt 1   Revoke  Readmitted          Chrissie Rodriguez RN

## 2021-03-31 LAB
ANION GAP SERPL CALCULATED.3IONS-SCNC: 8.4 MMOL/L (ref 5–15)
BUN SERPL-MCNC: 25 MG/DL (ref 8–23)
BUN/CREAT SERPL: 19.8 (ref 7–25)
CALCIUM SPEC-SCNC: 9 MG/DL (ref 8.6–10.5)
CHLORIDE SERPL-SCNC: 95 MMOL/L (ref 98–107)
CO2 SERPL-SCNC: 31.6 MMOL/L (ref 22–29)
CREAT SERPL-MCNC: 1.26 MG/DL (ref 0.76–1.27)
CYTOLOGIST CVX/VAG CYTO: NORMAL
DEPRECATED RDW RBC AUTO: 43.6 FL (ref 37–54)
ERYTHROCYTE [DISTWIDTH] IN BLOOD BY AUTOMATED COUNT: 13.2 % (ref 12.3–15.4)
GFR SERPL CREATININE-BSD FRML MDRD: 56 ML/MIN/1.73
GLUCOSE BLDC GLUCOMTR-MCNC: 312 MG/DL (ref 70–130)
GLUCOSE BLDC GLUCOMTR-MCNC: 368 MG/DL (ref 70–130)
GLUCOSE BLDC GLUCOMTR-MCNC: 370 MG/DL (ref 70–130)
GLUCOSE BLDC GLUCOMTR-MCNC: 390 MG/DL (ref 70–130)
GLUCOSE BLDC GLUCOMTR-MCNC: 465 MG/DL (ref 70–130)
GLUCOSE SERPL-MCNC: 295 MG/DL (ref 65–99)
HCT VFR BLD AUTO: 31.7 % (ref 37.5–51)
HGB BLD-MCNC: 9.7 G/DL (ref 13–17.7)
MCH RBC QN AUTO: 28.2 PG (ref 26.6–33)
MCHC RBC AUTO-ENTMCNC: 30.6 G/DL (ref 31.5–35.7)
MCV RBC AUTO: 92.2 FL (ref 79–97)
PATH INTERP BLD-IMP: NORMAL
PLATELET # BLD AUTO: 559 10*3/MM3 (ref 140–450)
PMV BLD AUTO: 8.5 FL (ref 6–12)
POTASSIUM SERPL-SCNC: 4.4 MMOL/L (ref 3.5–5.2)
RBC # BLD AUTO: 3.44 10*6/MM3 (ref 4.14–5.8)
SODIUM SERPL-SCNC: 135 MMOL/L (ref 136–145)
WBC # BLD AUTO: 8.38 10*3/MM3 (ref 3.4–10.8)

## 2021-03-31 PROCEDURE — 80048 BASIC METABOLIC PNL TOTAL CA: CPT | Performed by: INTERNAL MEDICINE

## 2021-03-31 PROCEDURE — 99233 SBSQ HOSP IP/OBS HIGH 50: CPT | Performed by: INTERNAL MEDICINE

## 2021-03-31 PROCEDURE — 25010000002 PIPERACILLIN SOD-TAZOBACTAM PER 1 G: Performed by: HOSPITALIST

## 2021-03-31 PROCEDURE — 63710000001 INSULIN DETEMIR PER 5 UNITS: Performed by: INTERNAL MEDICINE

## 2021-03-31 PROCEDURE — 25010000002 HEPARIN (PORCINE) PER 1000 UNITS: Performed by: HOSPITALIST

## 2021-03-31 PROCEDURE — 85027 COMPLETE CBC AUTOMATED: CPT | Performed by: INTERNAL MEDICINE

## 2021-03-31 PROCEDURE — 25010000002 VANCOMYCIN 5 G RECONSTITUTED SOLUTION: Performed by: INTERNAL MEDICINE

## 2021-03-31 PROCEDURE — 63710000001 INSULIN ASPART PER 5 UNITS: Performed by: HOSPITALIST

## 2021-03-31 PROCEDURE — 82962 GLUCOSE BLOOD TEST: CPT

## 2021-03-31 PROCEDURE — 99232 SBSQ HOSP IP/OBS MODERATE 35: CPT | Performed by: SPECIALIST

## 2021-03-31 PROCEDURE — 25010000002 FUROSEMIDE PER 20 MG: Performed by: NURSE PRACTITIONER

## 2021-03-31 RX ADMIN — PIPERACILLIN SODIUM AND TAZOBACTAM SODIUM 3.38 G: 3; .375 INJECTION, POWDER, LYOPHILIZED, FOR SOLUTION INTRAVENOUS at 05:16

## 2021-03-31 RX ADMIN — DOCUSATE SODIUM 50 MG AND SENNOSIDES 8.6 MG 2 TABLET: 8.6; 5 TABLET, FILM COATED ORAL at 21:49

## 2021-03-31 RX ADMIN — OXYCODONE HYDROCHLORIDE AND ACETAMINOPHEN 1 TABLET: 7.5; 325 TABLET ORAL at 11:24

## 2021-03-31 RX ADMIN — METOPROLOL SUCCINATE 50 MG: 50 TABLET, EXTENDED RELEASE ORAL at 08:15

## 2021-03-31 RX ADMIN — VANCOMYCIN HYDROCHLORIDE 1250 MG: 10 INJECTION, POWDER, LYOPHILIZED, FOR SOLUTION INTRAVENOUS at 01:28

## 2021-03-31 RX ADMIN — INSULIN ASPART 12 UNITS: 100 INJECTION, SOLUTION INTRAVENOUS; SUBCUTANEOUS at 17:25

## 2021-03-31 RX ADMIN — PIPERACILLIN SODIUM AND TAZOBACTAM SODIUM 3.38 G: 3; .375 INJECTION, POWDER, LYOPHILIZED, FOR SOLUTION INTRAVENOUS at 21:49

## 2021-03-31 RX ADMIN — FOLIC ACID 1 MG: 1 TABLET ORAL at 21:49

## 2021-03-31 RX ADMIN — GABAPENTIN 300 MG: 300 CAPSULE ORAL at 08:15

## 2021-03-31 RX ADMIN — VANCOMYCIN HYDROCHLORIDE 1250 MG: 10 INJECTION, POWDER, LYOPHILIZED, FOR SOLUTION INTRAVENOUS at 18:06

## 2021-03-31 RX ADMIN — FUROSEMIDE 20 MG: 10 INJECTION, SOLUTION INTRAMUSCULAR; INTRAVENOUS at 05:17

## 2021-03-31 RX ADMIN — OXYCODONE HYDROCHLORIDE AND ACETAMINOPHEN 1 TABLET: 7.5; 325 TABLET ORAL at 05:42

## 2021-03-31 RX ADMIN — INSULIN DETEMIR 35 UNITS: 100 INJECTION, SOLUTION SUBCUTANEOUS at 21:48

## 2021-03-31 RX ADMIN — Medication 1 TABLET: at 08:15

## 2021-03-31 RX ADMIN — ACETAMINOPHEN 650 MG: 325 TABLET ORAL at 10:22

## 2021-03-31 RX ADMIN — ASPIRIN 81 MG: 81 TABLET, COATED ORAL at 08:15

## 2021-03-31 RX ADMIN — INSULIN ASPART 14 UNITS: 100 INJECTION, SOLUTION INTRAVENOUS; SUBCUTANEOUS at 10:23

## 2021-03-31 RX ADMIN — FUROSEMIDE 20 MG: 10 INJECTION, SOLUTION INTRAMUSCULAR; INTRAVENOUS at 17:25

## 2021-03-31 RX ADMIN — SODIUM CHLORIDE, PRESERVATIVE FREE 10 ML: 5 INJECTION INTRAVENOUS at 21:57

## 2021-03-31 RX ADMIN — PANTOPRAZOLE SODIUM 40 MG: 40 TABLET, DELAYED RELEASE ORAL at 05:17

## 2021-03-31 RX ADMIN — SODIUM CHLORIDE, PRESERVATIVE FREE 10 ML: 5 INJECTION INTRAVENOUS at 08:15

## 2021-03-31 RX ADMIN — ATORVASTATIN CALCIUM 40 MG: 40 TABLET, FILM COATED ORAL at 21:51

## 2021-03-31 RX ADMIN — OXYCODONE HYDROCHLORIDE AND ACETAMINOPHEN 1 TABLET: 7.5; 325 TABLET ORAL at 17:25

## 2021-03-31 RX ADMIN — HEPARIN SODIUM 5000 UNITS: 5000 INJECTION, SOLUTION INTRAVENOUS; SUBCUTANEOUS at 08:15

## 2021-03-31 RX ADMIN — FINASTERIDE 5 MG: 5 TABLET, FILM COATED ORAL at 08:15

## 2021-03-31 RX ADMIN — HEPARIN SODIUM 5000 UNITS: 5000 INJECTION, SOLUTION INTRAVENOUS; SUBCUTANEOUS at 21:48

## 2021-03-31 RX ADMIN — INSULIN ASPART 10 UNITS: 100 INJECTION, SOLUTION INTRAVENOUS; SUBCUTANEOUS at 08:16

## 2021-03-31 RX ADMIN — TERAZOSIN HYDROCHLORIDE 5 MG: 5 CAPSULE ORAL at 21:49

## 2021-03-31 RX ADMIN — Medication 400 UNITS: at 08:15

## 2021-03-31 RX ADMIN — DOCUSATE SODIUM 50 MG AND SENNOSIDES 8.6 MG 2 TABLET: 8.6; 5 TABLET, FILM COATED ORAL at 08:15

## 2021-03-31 RX ADMIN — Medication 1 CAPSULE: at 08:15

## 2021-03-31 RX ADMIN — ALLOPURINOL 300 MG: 300 TABLET ORAL at 08:15

## 2021-03-31 RX ADMIN — PIPERACILLIN SODIUM AND TAZOBACTAM SODIUM 3.38 G: 3; .375 INJECTION, POWDER, LYOPHILIZED, FOR SOLUTION INTRAVENOUS at 12:24

## 2021-03-31 RX ADMIN — CLOPIDOGREL 75 MG: 75 TABLET, FILM COATED ORAL at 08:15

## 2021-03-31 RX ADMIN — GABAPENTIN 300 MG: 300 CAPSULE ORAL at 21:52

## 2021-03-31 RX ADMIN — FOLIC ACID 1 MG: 1 TABLET ORAL at 08:15

## 2021-04-01 LAB
ANION GAP SERPL CALCULATED.3IONS-SCNC: 12.6 MMOL/L (ref 5–15)
BUN SERPL-MCNC: 21 MG/DL (ref 8–23)
BUN/CREAT SERPL: 19.1 (ref 7–25)
CALCIUM SPEC-SCNC: 8.7 MG/DL (ref 8.6–10.5)
CHLORIDE SERPL-SCNC: 96 MMOL/L (ref 98–107)
CO2 SERPL-SCNC: 27.4 MMOL/L (ref 22–29)
CREAT SERPL-MCNC: 1.1 MG/DL (ref 0.76–1.27)
DEPRECATED RDW RBC AUTO: 42.3 FL (ref 37–54)
ERYTHROCYTE [DISTWIDTH] IN BLOOD BY AUTOMATED COUNT: 12.8 % (ref 12.3–15.4)
GFR SERPL CREATININE-BSD FRML MDRD: 66 ML/MIN/1.73
GLUCOSE BLDC GLUCOMTR-MCNC: 244 MG/DL (ref 70–130)
GLUCOSE BLDC GLUCOMTR-MCNC: 321 MG/DL (ref 70–130)
GLUCOSE BLDC GLUCOMTR-MCNC: 360 MG/DL (ref 70–130)
GLUCOSE BLDC GLUCOMTR-MCNC: 408 MG/DL (ref 70–130)
GLUCOSE SERPL-MCNC: 282 MG/DL (ref 65–99)
HCT VFR BLD AUTO: 33.1 % (ref 37.5–51)
HGB BLD-MCNC: 10.1 G/DL (ref 13–17.7)
MCH RBC QN AUTO: 28 PG (ref 26.6–33)
MCHC RBC AUTO-ENTMCNC: 30.5 G/DL (ref 31.5–35.7)
MCV RBC AUTO: 91.7 FL (ref 79–97)
PLATELET # BLD AUTO: 556 10*3/MM3 (ref 140–450)
PMV BLD AUTO: 8.6 FL (ref 6–12)
POTASSIUM SERPL-SCNC: 5.3 MMOL/L (ref 3.5–5.2)
RBC # BLD AUTO: 3.61 10*6/MM3 (ref 4.14–5.8)
SODIUM SERPL-SCNC: 136 MMOL/L (ref 136–145)
VANCOMYCIN TROUGH SERPL-MCNC: 8.9 MCG/ML (ref 5–20)
WBC # BLD AUTO: 8.82 10*3/MM3 (ref 3.4–10.8)

## 2021-04-01 PROCEDURE — 25010000002 FUROSEMIDE PER 20 MG: Performed by: NURSE PRACTITIONER

## 2021-04-01 PROCEDURE — 85027 COMPLETE CBC AUTOMATED: CPT | Performed by: INTERNAL MEDICINE

## 2021-04-01 PROCEDURE — 25010000002 HEPARIN (PORCINE) PER 1000 UNITS: Performed by: HOSPITALIST

## 2021-04-01 PROCEDURE — 25010000002 ONDANSETRON PER 1 MG: Performed by: INTERNAL MEDICINE

## 2021-04-01 PROCEDURE — 94799 UNLISTED PULMONARY SVC/PX: CPT

## 2021-04-01 PROCEDURE — 80202 ASSAY OF VANCOMYCIN: CPT | Performed by: INTERNAL MEDICINE

## 2021-04-01 PROCEDURE — 25010000002 PIPERACILLIN SOD-TAZOBACTAM PER 1 G: Performed by: HOSPITALIST

## 2021-04-01 PROCEDURE — 82962 GLUCOSE BLOOD TEST: CPT

## 2021-04-01 PROCEDURE — 63710000001 INSULIN DETEMIR PER 5 UNITS: Performed by: INTERNAL MEDICINE

## 2021-04-01 PROCEDURE — 25010000002 VANCOMYCIN: Performed by: INTERNAL MEDICINE

## 2021-04-01 PROCEDURE — 99233 SBSQ HOSP IP/OBS HIGH 50: CPT | Performed by: INTERNAL MEDICINE

## 2021-04-01 PROCEDURE — 80048 BASIC METABOLIC PNL TOTAL CA: CPT | Performed by: INTERNAL MEDICINE

## 2021-04-01 PROCEDURE — 63710000001 INSULIN ASPART PER 5 UNITS: Performed by: HOSPITALIST

## 2021-04-01 PROCEDURE — 99232 SBSQ HOSP IP/OBS MODERATE 35: CPT | Performed by: SPECIALIST

## 2021-04-01 RX ORDER — FUROSEMIDE 10 MG/ML
40 INJECTION INTRAMUSCULAR; INTRAVENOUS EVERY 12 HOURS
Status: DISCONTINUED | OUTPATIENT
Start: 2021-04-01 | End: 2021-04-05

## 2021-04-01 RX ORDER — SODIUM POLYSTYRENE SULFONATE 4.1 MEQ/G
15 POWDER, FOR SUSPENSION ORAL; RECTAL ONCE
Status: COMPLETED | OUTPATIENT
Start: 2021-04-01 | End: 2021-04-01

## 2021-04-01 RX ORDER — ONDANSETRON 2 MG/ML
4 INJECTION INTRAMUSCULAR; INTRAVENOUS EVERY 6 HOURS PRN
Status: DISCONTINUED | OUTPATIENT
Start: 2021-04-01 | End: 2021-04-09 | Stop reason: HOSPADM

## 2021-04-01 RX ADMIN — ONDANSETRON 4 MG: 2 INJECTION INTRAMUSCULAR; INTRAVENOUS at 12:31

## 2021-04-01 RX ADMIN — HEPARIN SODIUM 5000 UNITS: 5000 INJECTION, SOLUTION INTRAVENOUS; SUBCUTANEOUS at 08:00

## 2021-04-01 RX ADMIN — FINASTERIDE 5 MG: 5 TABLET, FILM COATED ORAL at 08:00

## 2021-04-01 RX ADMIN — TERAZOSIN HYDROCHLORIDE 5 MG: 5 CAPSULE ORAL at 20:07

## 2021-04-01 RX ADMIN — SODIUM CHLORIDE, PRESERVATIVE FREE 10 ML: 5 INJECTION INTRAVENOUS at 20:10

## 2021-04-01 RX ADMIN — Medication 1 TABLET: at 08:00

## 2021-04-01 RX ADMIN — PANTOPRAZOLE SODIUM 40 MG: 40 TABLET, DELAYED RELEASE ORAL at 05:37

## 2021-04-01 RX ADMIN — INSULIN ASPART 5 UNITS: 100 INJECTION, SOLUTION INTRAVENOUS; SUBCUTANEOUS at 08:00

## 2021-04-01 RX ADMIN — GABAPENTIN 300 MG: 300 CAPSULE ORAL at 20:07

## 2021-04-01 RX ADMIN — HEPARIN SODIUM 5000 UNITS: 5000 INJECTION, SOLUTION INTRAVENOUS; SUBCUTANEOUS at 20:09

## 2021-04-01 RX ADMIN — OXYCODONE HYDROCHLORIDE AND ACETAMINOPHEN 1 TABLET: 7.5; 325 TABLET ORAL at 01:13

## 2021-04-01 RX ADMIN — ASPIRIN 81 MG: 81 TABLET, COATED ORAL at 08:00

## 2021-04-01 RX ADMIN — INSULIN DETEMIR 37 UNITS: 100 INJECTION, SOLUTION SUBCUTANEOUS at 20:17

## 2021-04-01 RX ADMIN — DOCUSATE SODIUM 50 MG AND SENNOSIDES 8.6 MG 2 TABLET: 8.6; 5 TABLET, FILM COATED ORAL at 20:07

## 2021-04-01 RX ADMIN — PIPERACILLIN SODIUM AND TAZOBACTAM SODIUM 3.38 G: 3; .375 INJECTION, POWDER, LYOPHILIZED, FOR SOLUTION INTRAVENOUS at 12:03

## 2021-04-01 RX ADMIN — FOLIC ACID 1 MG: 1 TABLET ORAL at 20:07

## 2021-04-01 RX ADMIN — ATORVASTATIN CALCIUM 40 MG: 40 TABLET, FILM COATED ORAL at 20:07

## 2021-04-01 RX ADMIN — FUROSEMIDE 20 MG: 10 INJECTION, SOLUTION INTRAMUSCULAR; INTRAVENOUS at 05:37

## 2021-04-01 RX ADMIN — INSULIN ASPART 10 UNITS: 100 INJECTION, SOLUTION INTRAVENOUS; SUBCUTANEOUS at 11:15

## 2021-04-01 RX ADMIN — Medication 1 CAPSULE: at 08:00

## 2021-04-01 RX ADMIN — METOPROLOL SUCCINATE 50 MG: 50 TABLET, EXTENDED RELEASE ORAL at 08:00

## 2021-04-01 RX ADMIN — CLOPIDOGREL 75 MG: 75 TABLET, FILM COATED ORAL at 08:00

## 2021-04-01 RX ADMIN — OXYCODONE HYDROCHLORIDE AND ACETAMINOPHEN 1 TABLET: 7.5; 325 TABLET ORAL at 20:07

## 2021-04-01 RX ADMIN — DOCUSATE SODIUM 50 MG AND SENNOSIDES 8.6 MG 2 TABLET: 8.6; 5 TABLET, FILM COATED ORAL at 08:00

## 2021-04-01 RX ADMIN — PIPERACILLIN SODIUM AND TAZOBACTAM SODIUM 3.38 G: 3; .375 INJECTION, POWDER, LYOPHILIZED, FOR SOLUTION INTRAVENOUS at 20:09

## 2021-04-01 RX ADMIN — OXYCODONE HYDROCHLORIDE AND ACETAMINOPHEN 1 TABLET: 7.5; 325 TABLET ORAL at 08:00

## 2021-04-01 RX ADMIN — GABAPENTIN 300 MG: 300 CAPSULE ORAL at 08:00

## 2021-04-01 RX ADMIN — OXYCODONE HYDROCHLORIDE AND ACETAMINOPHEN 1 TABLET: 7.5; 325 TABLET ORAL at 13:56

## 2021-04-01 RX ADMIN — FUROSEMIDE 40 MG: 10 INJECTION, SOLUTION INTRAMUSCULAR; INTRAVENOUS at 16:51

## 2021-04-01 RX ADMIN — SODIUM POLYSTYRENE SULFONATE 15 G: 1 POWDER ORAL; RECTAL at 05:37

## 2021-04-01 RX ADMIN — VANCOMYCIN HYDROCHLORIDE 1250 MG: 10 INJECTION, POWDER, LYOPHILIZED, FOR SOLUTION INTRAVENOUS at 12:45

## 2021-04-01 RX ADMIN — FOLIC ACID 1 MG: 1 TABLET ORAL at 08:00

## 2021-04-01 RX ADMIN — PIPERACILLIN SODIUM AND TAZOBACTAM SODIUM 3.38 G: 3; .375 INJECTION, POWDER, LYOPHILIZED, FOR SOLUTION INTRAVENOUS at 05:37

## 2021-04-01 RX ADMIN — INSULIN ASPART 12 UNITS: 100 INJECTION, SOLUTION INTRAVENOUS; SUBCUTANEOUS at 16:51

## 2021-04-01 RX ADMIN — ALLOPURINOL 300 MG: 300 TABLET ORAL at 08:00

## 2021-04-01 RX ADMIN — Medication 400 UNITS: at 08:00

## 2021-04-01 NOTE — PLAN OF CARE
Goal Outcome Evaluation:   Patient doing well. Receiving IV antibiotics for cellulitis. No complaints at this time. Will continue to monitor and follow POC.

## 2021-04-01 NOTE — PROGRESS NOTES
Pharmacokinetics Service Note:    Mr. Villasenor continues on day 3 of vancomycin 1250 mg q18h for his SSTI.  A 16 hour post infusion level was drawn today at 1139 and resulted as 8.9 mg/L. This correlates with a calculated AUC of 392 mg/L.hr with a trough level of 10.8 mg/L. This is lower than desired and consistent with good clearance. Will increase the dosage to 1 gm q12h to better target an AUC of 400-600 mg/L.hr.  Will continue to monitor and obtain a repeat level in as needed to guide further dosing.      Thank you,   Letty Mei, PharmD  Pharmacy Resident  4/1/2021  13:07 EDT

## 2021-04-01 NOTE — CONSULTS
Nutrition Services    Patient Name:  William Villasenor  YOB: 1950  MRN: [id number]  Admit Date:  3/29/2021    RD consulted on diet education - Pt reports that he got dry cereal, juice, and an orange for breakfast.     Reviewed consistent carbohydrate diet, providing 45-75 g CHO / meal. RD obtained preferences to increase PO intake. Pt requesting double protein at meal to increased satiety.     Thank you for your consult, will cont to follow.     Electronically signed by:  Opal Hopkins RD  04/01/21 11:47 EDT

## 2021-04-01 NOTE — PROGRESS NOTES
LOS: 2 days     Name: William Villasenor  Age/Sex: 70 y.o. male  :  1950        PCP: Jacqueline Gatica MD  REF: No Known Provider    Principal Problem:    Acute on chronic congestive heart failure (CMS/HCC)  Active Problems:    Type II diabetes mellitus (CMS/HCC)    Essential hypertension    Benign prostatic hyperplasia without lower urinary tract symptoms    Obesity (BMI 30-39.9)    GERD (gastroesophageal reflux disease)    Diabetic neuropathy (CMS/HCC)    Former smoker    History of gout    Hyperlipidemia    CKD (chronic kidney disease) stage 2, GFR 60-89 ml/min      Reason for follow-up: Congestive heart failure     Subjective       Subjective     William Villasenor is a 70 year old male with a past medical history significant for chronic kidney disease, chronic diastolic congestive heart failure, coronary arterry disease s/p CABG, hyperlipidemia, diabetes mellitus type 2, essential hypertension and gout. Patient presented to the ED with complaints of chest pain, lower extremity edema and shortness of breath. He was recently admitted to this facility from 3/23/21-3/27/21 for chest pain and hypoxia. He was diuresed and sent home with PO pain medications due to chest pain that was thought to be related to his sternotomy.Of note patient was recently admitted to [facility] by Dr. Simms and underwent 4V CABG on 3/16/2021.      He had a follow up appt yesterday in [place] which was essentially normal.  Patient said he presented back to the ER overnight due to worsening shortness of breath and right leg pain from his incision.  He gets very short of breath with exertion.  He states his right leg incision is red and painful and he thinks it is infected.  Also complains of pain near his sternotomy.  He denies any cardiac chest pain.  Recent echocardiogram showed EF 51 to 55%.     Interval History: Patient reports he is still short of breath but breathing has improved since admission.  Creatinine stable.   Noted to be hyperkalemic today.      Vital Signs  Temp:  [97.3 °F (36.3 °C)-98.4 °F (36.9 °C)] 98.4 °F (36.9 °C)  Heart Rate:  [76-84] 76  Resp:  [18] 18  BP: (111-138)/(62-76) 130/75     Vital Signs (last 72 hrs)       03/29 0700  -  03/30 0659 03/30 0700  -  03/31 0659 03/31 0700  -  04/01 0659 04/01 0700  -  04/01 1322   Most Recent    Temp (°F) 98.4 -  99.1    97.7 -  98.7    97.3 -  98.1      98.4     98.4 (36.9)    Heart Rate 83 -  86    83 -  91    76 -  87      76     76    Resp 18 -  22      18    16 -  18      18     18    /63 -  153/68    122/68 -  141/75    111/64 -  138/64      130/75     130/75    SpO2 (%) 94 -  100    96 -  98    93 -  97      97     97        Body mass index is 31.91 kg/m².    Intake/Output Summary (Last 24 hours) at 4/1/2021 1322  Last data filed at 4/1/2021 1100  Gross per 24 hour   Intake 1954 ml   Output 2125 ml   Net -171 ml     Objective    Objective       Physical Exam:     General Appearance:    Alert, cooperative, in no acute distress   Head:    Normocephalic, without obvious abnormality, atraumatic   Eyes:            Conjunctivae and sclerae normal, no   icterus, no pallor, corneas clear.   Neck:   No adenopathy, supple, trachea midline, no thyromegaly, no   carotid bruit, no JVD   Lungs:     Clear to auscultation,respirations regular, even and                  unlabored    Heart:    Regular rhythm and normal rate, normal S1 and S2, no            murmur, no gallop, no rub, no click   Chest Wall:    No abnormalities observed   Abdomen:     Normal bowel sounds, no masses, no organomegaly, soft        non-tender, non-distended, no guarding, no rebound                tenderness   Extremities:   Moves all extremities well, no edema, no cyanosis, no             redness   Pulses:   Pulses palpable and equal bilaterally   Skin:   No bleeding, bruising or rash       Neurologic:   Alert and oriented      Results review       Results Review:   Results from last 7 days   Lab  Units 04/01/21  0148 03/31/21  0744 03/30/21  0126 03/27/21  0220   WBC 10*3/mm3 8.82 8.38 13.12* 10.83*   HEMOGLOBIN g/dL 10.1* 9.7* 10.7* 9.2*   PLATELETS 10*3/mm3 556* 559* 647* 516*     Results from last 7 days   Lab Units 04/01/21  0148 03/31/21  0744 03/30/21  0237 03/29/21 2110 03/29/21  0852 03/27/21 0220 03/26/21  0257   SODIUM mmol/L 136 135* 141 135* 133* 135* 133*   POTASSIUM mmol/L 5.3* 4.4 4.2 4.4 4.7 4.0 5.1   CHLORIDE mmol/L 96* 95* 97* 92* 93* 96* 94*   CO2 mmol/L 27.4 31.6* 28.9 29.3* 29.3* 29.3* 27.6   BUN mg/dL 21 25* 24* 25* 20 19 18   CREATININE mg/dL 1.10 1.26 1.11 1.15 1.15 1.11 1.16   CALCIUM mg/dL 8.7 9.0 9.7 9.8 9.5 9.1 9.1   GLUCOSE mg/dL 282* 295* 221* 255* 407* 75 136*   ALT (SGPT) U/L  --   --  15 14  --   --   --    AST (SGOT) U/L  --   --  13 10  --   --   --      Results from last 7 days   Lab Units 03/29/21 2320 03/29/21 2110   TROPONIN T ng/mL <0.010 <0.010     Lab Results   Component Value Date    INR 0.97 03/29/2021    INR 1.20 (H) 03/17/2021    INR 1.41 (H) 03/16/2021    INR 0.93 03/15/2021     Lab Results   Component Value Date    MG 1.7 03/30/2021    MG 1.7 03/29/2021    MG 1.8 03/24/2021     Lab Results   Component Value Date    TSH 3.410 03/29/2021    TRIG 618 (H) 03/15/2021    HDL 30 (L) 03/15/2021    LDL 91 03/15/2021      Imaging Results (Last 48 Hours)     ** No results found for the last 48 hours. **        Lab Results   Component Value Date    BNP 29.0 08/07/2018       Echo   Results for orders placed during the hospital encounter of 03/23/21    Adult Transthoracic Echo Limited W/ Cont if Necessary Per Protocol    Interpretation Summary  · Left ventricular ejection fraction appears to be 51 - 55%. Septal wall motion is abnormal, consistent with a post-operative state  · Left ventricular wall thickness is consistent with mild concentric hypertrophy.  · No significant functional valvular abnormalities noted  · There is no evidence of pericardial effusion       I  reviewed the patient's new clinical results.    Telemetry: Normal sinus rhythm 70 to 90 bpm       Medication Review:   allopurinol, 300 mg, Oral, Daily  aspirin, 81 mg, Oral, Daily  atorvastatin, 40 mg, Oral, Nightly  clopidogrel, 75 mg, Oral, Daily  finasteride, 5 mg, Oral, Daily  folic acid, 1 mg, Oral, BID  furosemide, 20 mg, Intravenous, Q12H  gabapentin, 300 mg, Oral, Q12H  heparin (porcine), 5,000 Units, Subcutaneous, Q12H  insulin aspart, 0-14 Units, Subcutaneous, TID AC  insulin detemir, 37 Units, Subcutaneous, Nightly  lactobacillus acidophilus, 1 capsule, Oral, Daily  metoprolol succinate XL, 50 mg, Oral, Q24H  multivitamin, 1 tablet, Oral, Daily  pantoprazole, 40 mg, Oral, Q AM  piperacillin-tazobactam, 3.375 g, Intravenous, Q8H  sennosides-docusate, 2 tablet, Oral, BID  sodium chloride, 10 mL, Intravenous, Q12H  terazosin, 5 mg, Oral, Nightly  [START ON 4/2/2021] vancomycin, 1,000 mg, Intravenous, Q12H  vitamin E, 400 Units, Oral, Daily             Assessment      Assessment:  1. Acute on chronic congestive heart failure HFpEF  2. Coronary artery disease status post recent coronary artery bypass grafting x4 LIMA to the LAD SVG grafts to the first diagonal obtuse marginal #1 obtuse numbers #2  3.  Lower extremity wound infection  4.  Hyper lipidemia  5.  History of tobacco abuse  6.  Diabetes mellitus type 2  7.  Hypertension essential  8.  Chronic renal impairment  9.  Benign prostate hypertrophy  10.  GERD          Plan     Recommendations:  1. His creatinine is stable but he is not diuresing really well so I am going to increase the dose of furosemide to 40 mg twice daily  2. Has lower extremity wound looks better continue antibiotics as per medicine service  3. Blood pressure stable monitor  4. Continue statin    I discussed the patients findings and my recommendations with patient and family      Electronically signed by ANKUR Kenney, 04/01/21, 1:22 PM EDT.  Electronically signed by Parvin  MD Erika, 04/01/21, 3:36 PM EDT.  Please note that portions of this note were completed with a voice recognition program.

## 2021-04-01 NOTE — PROGRESS NOTES
[facility] HOSPITALIST PROGRESS NOTE     Patient Identification:  Name:  William Villasenor  Age:  70 y.o.  Sex:  male  :  1950  MRN:  [id number]  Visit Number:  [id number]  ROOM: [address] [place] [address]     Primary Care Provider:  Jacqueline Gatica MD    Length of stay in inpatient status:  2    Subjective     Chief Compliant:    Chief Complaint   Patient presents with   • Chest Pain   • Shortness of Breath     History of Presenting Illness:    Patient stable today, no acute events overnight, no new complaints, continued diuresis and Cr stable, UOP was net positive yesterday though, cards following and updated recs pending, Cr stable at 1.1, Glc improved though still elevated, increased to home 37U levemir qhs, patient reports his leg pain and rash are improved, he is continued on Abx, patient and daughter have had several questions regarding our carb consistent diet and have consulted nutrition to speak with them both.  Patient denies any fevers or chills.   Objective     Current Hospital Meds:allopurinol, 300 mg, Oral, Daily  aspirin, 81 mg, Oral, Daily  atorvastatin, 40 mg, Oral, Nightly  clopidogrel, 75 mg, Oral, Daily  finasteride, 5 mg, Oral, Daily  folic acid, 1 mg, Oral, BID  furosemide, 20 mg, Intravenous, Q12H  gabapentin, 300 mg, Oral, Q12H  heparin (porcine), 5,000 Units, Subcutaneous, Q12H  insulin aspart, 0-14 Units, Subcutaneous, TID AC  insulin detemir, 37 Units, Subcutaneous, Nightly  lactobacillus acidophilus, 1 capsule, Oral, Daily  metoprolol succinate XL, 50 mg, Oral, Q24H  multivitamin, 1 tablet, Oral, Daily  pantoprazole, 40 mg, Oral, Q AM  piperacillin-tazobactam, 3.375 g, Intravenous, Q8H  sennosides-docusate, 2 tablet, Oral, BID  sodium chloride, 10 mL, Intravenous, Q12H  terazosin, 5 mg, Oral, Nightly  [START ON 2021] vancomycin, 1,000 mg, Intravenous, Q12H  vitamin E, 400 Units, Oral, Daily         Current Antimicrobial Therapy:  Anti-Infectives (From admission, onward)    Ordered      Dose/Rate Route Frequency Start Stop    04/01/21 1304  vancomycin 1 g/250 mL 0.9% NS (vial-mate)     Ordering Provider: Chucky Elise MD    1,000 mg  over 60 Minutes Intravenous Every 12 Hours 04/02/21 0100 04/09/21 0059    03/30/21 0530  piperacillin-tazobactam (ZOSYN) 3.375 g/100 mL 0.9% NS IVPB (mbp)     Ordering Provider: Robert Ortiz MD    3.375 g  over 4 Hours Intravenous Every 8 Hours 03/30/21 1300 04/09/21 1259    03/30/21 0530  piperacillin-tazobactam (ZOSYN) 3.375 g/100 mL 0.9% NS IVPB (mbp)     Ordering Provider: Robert Ortiz MD    3.375 g  over 30 Minutes Intravenous Once 03/30/21 0700 03/30/21 0711    03/30/21 0530  vancomycin 2000 mg/500 mL 0.9% NS IVPB ([facility])     Ordering Provider: Robert Ortiz MD    20 mg/kg × 97 kg  over 120 Minutes Intravenous Once 03/30/21 0700 03/30/21 0841        Current Diuretic Therapy:  Diuretics (From admission, onward)    Ordered     Dose/Rate Route Frequency Start Stop    03/30/21 1626  furosemide (LASIX) injection 20 mg     Ordering Provider: Maddi Munoz APRN    20 mg Intravenous Every 12 Hours 03/30/21 1800      03/30/21 0522  furosemide (LASIX) injection 20 mg     Ordering Provider: Shandra Barnard PA-C    20 mg Intravenous Once 03/30/21 0615 03/30/21 0548    03/30/21 0032  furosemide (LASIX) injection 20 mg     Ordering Provider: Robert Ortiz MD    20 mg Intravenous Once 03/30/21 0034 03/30/21 0144        ----------------------------------------------------------------------------------------------------------------------  Vital Signs:  Temp:  [97.3 °F (36.3 °C)-98.4 °F (36.9 °C)] 98.4 °F (36.9 °C)  Heart Rate:  [76-84] 76  Resp:  [18] 18  BP: (111-138)/(62-76) 130/75  SpO2:  [93 %-97 %] 97 %  on  Flow (L/min):  [2] 2;   Device (Oxygen Therapy): nasal cannula  Body mass index is 31.91 kg/m².    Wt Readings from Last 3 Encounters:   04/01/21 98 kg (216 lb 1.6 oz)   03/29/21 95.4 kg (210 lb 6 oz)   03/27/21 97  kg (213 lb 12.8 oz)     Intake & Output (last 3 days)       03/29 0701 - 03/30 0700 03/30 0701 - 03/31 0700 03/31 0701 - 04/01 0700 04/01 0701 - 04/02 0700    P.O.  120    I.V. (mL/kg)   1234 (12.6)     Total Intake(mL/kg) 360 (3.7) 960 (9.9) 2314 (23.6) 120 (1.2)    Urine (mL/kg/hr) 1000 1550 (0.7) 2000 (0.9) 400 (0.7)    Stool  0      Total Output 1000 1550 2000 400    Net -640 -590 +314 -280            Stool Unmeasured Occurrence  0 x          Diet Regular; Cardiac, Consistent Carbohydrate  ----------------------------------------------------------------------------------------------------------------------  Physical exam:  Constitutional:  Elderly, Obese, no acute distress.      HENT:  Head:  Normocephalic and atraumatic.  Mouth:  Moist mucous membranes.    Eyes:  Conjunctivae and EOM are normal. No scleral icterus.    Neck:  Neck supple.  No JVD present.    Cardiovascular:  Normal rate, regular rhythm and normal heart sounds with no murmur.  Pulmonary/Chest:  No respiratory distress, no wheezes, trace crackles. Healing midsternum incision, no drainage, stable  Abdominal:  Soft. No distension and no tenderness.   Musculoskeletal:  No tenderness, and no deformity.   Neurological:  Alert and oriented to person, place, and time.  No gross focal deficits  Skin:  Skin is warm and dry. No rash noted. No pallor. RLE erythema continues to improve.  Peripheral vascular:  no clubbing, no cyanosis, +1 b/l pitting edema stable  ----------------------------------------------------------------------------------------------------------------------  Results from last 7 days   Lab Units 04/01/21  0148 03/31/21  0744 03/30/21  0616 03/30/21  0126 03/29/21  2320 03/29/21  2110   CRP mg/dL  --   --  5.14*  --  5.67*  --    LACTATE mmol/L  --   --  1.8  --   --   --    WBC 10*3/mm3 8.82 8.38  --  13.12*  --   --    HEMOGLOBIN g/dL 10.1* 9.7*  --  10.7*  --   --    HEMATOCRIT % 33.1* 31.7*  --  34.9*  --   --    MCV fL  91.7 92.2  --  92.3  --   --    MCHC g/dL 30.5* 30.6*  --  30.7*  --   --    PLATELETS 10*3/mm3 556* 559*  --  647*  --   --    INR   --   --   --   --   --  0.97         Results from last 7 days   Lab Units 04/01/21  0148 03/31/21  0744 03/30/21  0616 03/30/21  0237 03/29/21 2110   SODIUM mmol/L 136 135*  --  141 135*   POTASSIUM mmol/L 5.3* 4.4  --  4.2 4.4   MAGNESIUM mg/dL  --   --  1.7  --  1.7   CHLORIDE mmol/L 96* 95*  --  97* 92*   CO2 mmol/L 27.4 31.6*  --  28.9 29.3*   BUN mg/dL 21 25*  --  24* 25*   CREATININE mg/dL 1.10 1.26  --  1.11 1.15   EGFR IF NONAFRICN AM mL/min/1.73 66 56*  --  65 63   CALCIUM mg/dL 8.7 9.0  --  9.7 9.8   PHOSPHORUS mg/dL  --   --   --   --  3.2   GLUCOSE mg/dL 282* 295*  --  221* 255*   ALBUMIN g/dL  --   --   --  3.29* 3.58   BILIRUBIN mg/dL  --   --   --  0.3 0.3   ALK PHOS U/L  --   --   --  113 106   AST (SGOT) U/L  --   --   --  13 10   ALT (SGPT) U/L  --   --   --  15 14   Estimated Creatinine Clearance: 71.1 mL/min (by C-G formula based on SCr of 1.1 mg/dL).  No results found for: AMMONIA  Results from last 7 days   Lab Units 03/29/21 2320 03/29/21 2110   TROPONIN T ng/mL <0.010 <0.010     Results from last 7 days   Lab Units 03/29/21 2110 03/29/21  0852   PROBNP pg/mL 848.1 816.3         Glucose   Date/Time Value Ref Range Status   04/01/2021 1103 321 (H) 70 - 130 mg/dL Final   04/01/2021 0623 244 (H) 70 - 130 mg/dL Final   03/31/2021 2054 390 (H) 70 - 130 mg/dL Final   03/31/2021 1609 370 (H) 70 - 130 mg/dL Final   03/31/2021 1121 368 (H) 70 - 130 mg/dL Final   03/31/2021 1006 465 (C) 70 - 130 mg/dL Final   03/31/2021 0746 312 (H) 70 - 130 mg/dL Final   03/30/2021 1850 376 (H) 70 - 130 mg/dL Final     Lab Results   Component Value Date    TSH 3.410 03/29/2021     No results found for: PREGTESTUR, PREGSERUM, HCG, HCGQUANT  Pain Management Panel     Pain Management Panel Latest Ref Rng & Units 3/15/2021 3/14/2021    AMPHETAMINES SCREEN, URINE Negative Negative  Negative    BARBITURATES SCREEN Negative Negative Negative    BENZODIAZEPINE SCREEN, URINE Negative Negative Negative    BUPRENORPHINEUR Negative Negative Negative    COCAINE SCREEN, URINE Negative Negative Negative    METHADONE SCREEN, URINE Negative Negative Negative    METHAMPHETAMINEUR Negative Negative -        Brief Urine Lab Results  (Last result in the past 365 days)      Color   Clarity   Blood   Leuk Est   Nitrite   Protein   CREAT   Urine HCG        03/29/21 0921 Yellow Clear Small (1+) Negative Negative Negative             Blood Culture   Date Value Ref Range Status   03/30/2021 No growth at 2 days  Preliminary   03/30/2021 No growth at 2 days  Preliminary     No results found for: URINECX  No results found for: WOUNDCX  No results found for: STOOLCX  No results found for: RESPCX  No results found for: AFBCX  Results from last 7 days   Lab Units 03/30/21  0616 03/29/21  2320   LACTATE mmol/L 1.8  --    CRP mg/dL 5.14* 5.67*     I have personally looked at the labs and they are summarized above.  ----------------------------------------------------------------------------------------------------------------------  Detailed radiology reports for the last 24 hours:  Imaging Results (Last 24 Hours)     ** No results found for the last 24 hours. **        Assessment & Plan    70M Obese PMH chronic kidney disease stage IIIa, chronic normocytic anemia, chronic diastolic CHF, CAD status post four-vessel CABG, hyperlipidemia, type 2 diabetes mellitus, essential hypertension, diabetic neuropathy, gout, BPH, that presented to the [facility] emergency department for evaluation of chest pain, lower extremity edema, and shortness of breath.    #HTN/HLD/CAD s/p recent 4V CABG a few weeks ago  #Acute on Chronic HFpEF  - Patient presented w/ increased weakness, dyspnea, LE swelling.    - Labs showed negative trops x 2, proBNP 800  - EKG showed NSR, LAFB, possible old anterior infarct  - Echo 3/24/21 showed  LVEF 51-55%, septal wall motion abnormal and c/w post operative state, LV noted to have mild concentric LVH, no significant valvular abnormality, no pericardial effusion  - Cards consulted; Following  - Continue home ASA 81, statin, plavix  - Continue home BB  - On lasix 20mg IV BID now, + 314cc's yesterday, Cr stable, continue diuresis  - Continue to monitor on tele, strict I/O's, daily weights, trend HR and BP    #Sepsis 2/2 Acute RLE Cellulitis   - Patient presented w/ LE redness R>L, RR 22, CRP 5.67, WBC count 13K, lactate < 2, Bcx's NGTD  - B/l Venous duplex showed no DVT, mildly prominent upper thigh region nodes  - Continue Vanc and Zosyn x 5-7 days  - Continue APAP PRN for fevers    #IDDM Type II, uncontrolled, c/b Neuropathy, Hyperglycemia  - Hgb A1c = 8.7%, Glc up to 465 this admission  - Holding home oral agents, continue FSBG and SSI, continue Levemir but increased to home 37U qhs, titrate as indicated.  Consulted Nutrition to talk to Daughter and Patient regarding diet per their request.  - Continue home Gabapentin    #CKDII-III  - Patient presented w/ Cr 1.1, b/l around 1.1-1.5  - Trend Cr and UOP, avoid nephrotoxins, NSAIDs, dehydration and contrast as able.     #Chronic Normocytic Anemia  #Thrombocytosis - Improved  - Plts 647 on admission, Hgb low but stable, MCV NML, no signs of bleeding, Iron 46, Ferritin 208, Iron sat 16, Transferrin 196, TIBC 292, Folate > 20, Vit B 12 1184; Trend CBC daily, continue home oral folate and MVI, transfuse Hgb < 7 or symptomatic     #Gout w/o Acute Flair  - Continue home Allopurinol     #BPH  - Continue home Hytrin/Proscar     #GERD  - Continue home PPI     #Electrolyte Abnormalities  - Acute Mild Hypomagnesemia - Mg 1.7 on admission, Replacing, on protocol    #Obesity  - BMI 31, complicates all aspects of care    F: Oral  E: Monitor & Replace PRN  N: Cardiac, DM diet  Ppx: SQH  Code: Full Code     Dispo: Pending clinical improvement, PT/OT consulted today     *This  patient is considered high risk due to sepsis, cellulitis, Acute CHF exacerbation, recent CABG, severe hyperglycemia.     VTE Prophylaxis:   Mechanical Order History:     None      Pharmalogical Order History:      Ordered     Dose Route Frequency Stop    03/30/21 0137  heparin (porcine) 5000 UNIT/ML injection 5,000 Units      5,000 Units SC Every 12 Hours Scheduled --              Chucky Elise MD  [facility]  04/01/21  13:08 EDT

## 2021-04-01 NOTE — PLAN OF CARE
Goal Outcome Evaluation:   Pt is resting in bed at this time. Pt complaining of pain at chest incision;denies chest pain, PRN medications given as ordered. No distress noted at this time. Will continue to follow plan of care.

## 2021-04-02 LAB
ANION GAP SERPL CALCULATED.3IONS-SCNC: 8.9 MMOL/L (ref 5–15)
BUN SERPL-MCNC: 22 MG/DL (ref 8–23)
BUN/CREAT SERPL: 18.6 (ref 7–25)
CALCIUM SPEC-SCNC: 8.7 MG/DL (ref 8.6–10.5)
CHLORIDE SERPL-SCNC: 98 MMOL/L (ref 98–107)
CO2 SERPL-SCNC: 27.1 MMOL/L (ref 22–29)
CREAT SERPL-MCNC: 1.18 MG/DL (ref 0.76–1.27)
DEPRECATED RDW RBC AUTO: 42.1 FL (ref 37–54)
ERYTHROCYTE [DISTWIDTH] IN BLOOD BY AUTOMATED COUNT: 13 % (ref 12.3–15.4)
GFR SERPL CREATININE-BSD FRML MDRD: 61 ML/MIN/1.73
GLUCOSE BLDC GLUCOMTR-MCNC: 250 MG/DL (ref 70–130)
GLUCOSE BLDC GLUCOMTR-MCNC: 284 MG/DL (ref 70–130)
GLUCOSE BLDC GLUCOMTR-MCNC: 308 MG/DL (ref 70–130)
GLUCOSE BLDC GLUCOMTR-MCNC: 325 MG/DL (ref 70–130)
GLUCOSE BLDC GLUCOMTR-MCNC: 340 MG/DL (ref 70–130)
GLUCOSE BLDC GLUCOMTR-MCNC: 344 MG/DL (ref 70–130)
GLUCOSE SERPL-MCNC: 273 MG/DL (ref 65–99)
HCT VFR BLD AUTO: 30.7 % (ref 37.5–51)
HGB BLD-MCNC: 9.6 G/DL (ref 13–17.7)
MCH RBC QN AUTO: 28.4 PG (ref 26.6–33)
MCHC RBC AUTO-ENTMCNC: 31.3 G/DL (ref 31.5–35.7)
MCV RBC AUTO: 90.8 FL (ref 79–97)
PLATELET # BLD AUTO: 531 10*3/MM3 (ref 140–450)
PMV BLD AUTO: 8.5 FL (ref 6–12)
POTASSIUM SERPL-SCNC: 4.3 MMOL/L (ref 3.5–5.2)
RBC # BLD AUTO: 3.38 10*6/MM3 (ref 4.14–5.8)
SODIUM SERPL-SCNC: 134 MMOL/L (ref 136–145)
WBC # BLD AUTO: 7.12 10*3/MM3 (ref 3.4–10.8)

## 2021-04-02 PROCEDURE — 63710000001 INSULIN ASPART PER 5 UNITS: Performed by: HOSPITALIST

## 2021-04-02 PROCEDURE — 80048 BASIC METABOLIC PNL TOTAL CA: CPT | Performed by: INTERNAL MEDICINE

## 2021-04-02 PROCEDURE — 99232 SBSQ HOSP IP/OBS MODERATE 35: CPT | Performed by: SPECIALIST

## 2021-04-02 PROCEDURE — 82962 GLUCOSE BLOOD TEST: CPT

## 2021-04-02 PROCEDURE — 25010000002 PIPERACILLIN SOD-TAZOBACTAM PER 1 G: Performed by: HOSPITALIST

## 2021-04-02 PROCEDURE — 63710000001 INSULIN ASPART PER 5 UNITS: Performed by: INTERNAL MEDICINE

## 2021-04-02 PROCEDURE — 85027 COMPLETE CBC AUTOMATED: CPT | Performed by: INTERNAL MEDICINE

## 2021-04-02 PROCEDURE — 25010000002 FUROSEMIDE PER 20 MG: Performed by: NURSE PRACTITIONER

## 2021-04-02 PROCEDURE — 25010000002 HEPARIN (PORCINE) PER 1000 UNITS: Performed by: HOSPITALIST

## 2021-04-02 PROCEDURE — 99232 SBSQ HOSP IP/OBS MODERATE 35: CPT | Performed by: INTERNAL MEDICINE

## 2021-04-02 PROCEDURE — 63710000001 INSULIN DETEMIR PER 5 UNITS: Performed by: INTERNAL MEDICINE

## 2021-04-02 PROCEDURE — 97166 OT EVAL MOD COMPLEX 45 MIN: CPT

## 2021-04-02 PROCEDURE — 25010000002 VANCOMYCIN 1 G RECONSTITUTED SOLUTION: Performed by: INTERNAL MEDICINE

## 2021-04-02 RX ORDER — MAGNESIUM CARB/ALUMINUM HYDROX 105-160MG
296 TABLET,CHEWABLE ORAL ONCE
Status: COMPLETED | OUTPATIENT
Start: 2021-04-02 | End: 2021-04-02

## 2021-04-02 RX ADMIN — OXYCODONE HYDROCHLORIDE AND ACETAMINOPHEN 1 TABLET: 7.5; 325 TABLET ORAL at 07:59

## 2021-04-02 RX ADMIN — PIPERACILLIN SODIUM AND TAZOBACTAM SODIUM 3.38 G: 3; .375 INJECTION, POWDER, LYOPHILIZED, FOR SOLUTION INTRAVENOUS at 11:36

## 2021-04-02 RX ADMIN — SODIUM CHLORIDE, PRESERVATIVE FREE 10 ML: 5 INJECTION INTRAVENOUS at 20:16

## 2021-04-02 RX ADMIN — PIPERACILLIN SODIUM AND TAZOBACTAM SODIUM 3.38 G: 3; .375 INJECTION, POWDER, LYOPHILIZED, FOR SOLUTION INTRAVENOUS at 05:43

## 2021-04-02 RX ADMIN — VANCOMYCIN HYDROCHLORIDE 1000 MG: 1 INJECTION, POWDER, LYOPHILIZED, FOR SOLUTION INTRAVENOUS at 00:08

## 2021-04-02 RX ADMIN — TERAZOSIN HYDROCHLORIDE 5 MG: 5 CAPSULE ORAL at 20:15

## 2021-04-02 RX ADMIN — VANCOMYCIN HYDROCHLORIDE 1000 MG: 1 INJECTION, POWDER, LYOPHILIZED, FOR SOLUTION INTRAVENOUS at 14:02

## 2021-04-02 RX ADMIN — CITROMA MAGNESIUM CITRATE 296 ML: 1.75 LIQUID ORAL at 14:20

## 2021-04-02 RX ADMIN — FUROSEMIDE 40 MG: 10 INJECTION, SOLUTION INTRAMUSCULAR; INTRAVENOUS at 05:43

## 2021-04-02 RX ADMIN — INSULIN ASPART 10 UNITS: 100 INJECTION, SOLUTION INTRAVENOUS; SUBCUTANEOUS at 17:20

## 2021-04-02 RX ADMIN — GABAPENTIN 300 MG: 300 CAPSULE ORAL at 07:59

## 2021-04-02 RX ADMIN — DOCUSATE SODIUM 50 MG AND SENNOSIDES 8.6 MG 2 TABLET: 8.6; 5 TABLET, FILM COATED ORAL at 07:57

## 2021-04-02 RX ADMIN — ASPIRIN 81 MG: 81 TABLET, COATED ORAL at 07:58

## 2021-04-02 RX ADMIN — OXYCODONE HYDROCHLORIDE AND ACETAMINOPHEN 1 TABLET: 7.5; 325 TABLET ORAL at 14:02

## 2021-04-02 RX ADMIN — OXYCODONE HYDROCHLORIDE AND ACETAMINOPHEN 1 TABLET: 7.5; 325 TABLET ORAL at 02:01

## 2021-04-02 RX ADMIN — OXYCODONE HYDROCHLORIDE AND ACETAMINOPHEN 1 TABLET: 7.5; 325 TABLET ORAL at 20:16

## 2021-04-02 RX ADMIN — FINASTERIDE 5 MG: 5 TABLET, FILM COATED ORAL at 07:58

## 2021-04-02 RX ADMIN — FUROSEMIDE 40 MG: 10 INJECTION, SOLUTION INTRAMUSCULAR; INTRAVENOUS at 17:20

## 2021-04-02 RX ADMIN — INSULIN ASPART 10 UNITS: 100 INJECTION, SOLUTION INTRAVENOUS; SUBCUTANEOUS at 11:37

## 2021-04-02 RX ADMIN — GABAPENTIN 300 MG: 300 CAPSULE ORAL at 20:16

## 2021-04-02 RX ADMIN — INSULIN ASPART 8 UNITS: 100 INJECTION, SOLUTION INTRAVENOUS; SUBCUTANEOUS at 07:59

## 2021-04-02 RX ADMIN — INSULIN ASPART 10 UNITS: 100 INJECTION, SOLUTION INTRAVENOUS; SUBCUTANEOUS at 17:21

## 2021-04-02 RX ADMIN — Medication 400 UNITS: at 07:58

## 2021-04-02 RX ADMIN — FOLIC ACID 1 MG: 1 TABLET ORAL at 07:58

## 2021-04-02 RX ADMIN — Medication 1 CAPSULE: at 07:57

## 2021-04-02 RX ADMIN — CLOPIDOGREL 75 MG: 75 TABLET, FILM COATED ORAL at 07:57

## 2021-04-02 RX ADMIN — METOPROLOL SUCCINATE 50 MG: 50 TABLET, EXTENDED RELEASE ORAL at 07:57

## 2021-04-02 RX ADMIN — DOCUSATE SODIUM 50 MG AND SENNOSIDES 8.6 MG 2 TABLET: 8.6; 5 TABLET, FILM COATED ORAL at 20:16

## 2021-04-02 RX ADMIN — INSULIN DETEMIR 37 UNITS: 100 INJECTION, SOLUTION SUBCUTANEOUS at 20:35

## 2021-04-02 RX ADMIN — PANTOPRAZOLE SODIUM 40 MG: 40 TABLET, DELAYED RELEASE ORAL at 05:43

## 2021-04-02 RX ADMIN — HEPARIN SODIUM 5000 UNITS: 5000 INJECTION, SOLUTION INTRAVENOUS; SUBCUTANEOUS at 07:59

## 2021-04-02 RX ADMIN — INSULIN ASPART 10 UNITS: 100 INJECTION, SOLUTION INTRAVENOUS; SUBCUTANEOUS at 11:36

## 2021-04-02 RX ADMIN — ALLOPURINOL 300 MG: 300 TABLET ORAL at 07:58

## 2021-04-02 RX ADMIN — ATORVASTATIN CALCIUM 40 MG: 40 TABLET, FILM COATED ORAL at 20:16

## 2021-04-02 RX ADMIN — HEPARIN SODIUM 5000 UNITS: 5000 INJECTION, SOLUTION INTRAVENOUS; SUBCUTANEOUS at 20:15

## 2021-04-02 RX ADMIN — PIPERACILLIN SODIUM AND TAZOBACTAM SODIUM 3.38 G: 3; .375 INJECTION, POWDER, LYOPHILIZED, FOR SOLUTION INTRAVENOUS at 20:16

## 2021-04-02 RX ADMIN — Medication 1 TABLET: at 07:58

## 2021-04-02 RX ADMIN — FOLIC ACID 1 MG: 1 TABLET ORAL at 20:16

## 2021-04-02 NOTE — PROGRESS NOTES
Patient Identification:  Name:  William Villasenor  Age:  70 y.o.  Sex:  male  :  1950  MRN:  [id number]  Visit Number:  [id number]    Chief Complaint:   Heart failure, infected lower extremity wound    Subjective:    Patient feels a lot better today his breathing has improved denies any chest pain no palpitations no dizziness his large right lower extremity also is feeling better with no pain  ----------------------------------------------------------------------------------------------------------------------  Current Hospital Meds:  allopurinol, 300 mg, Oral, Daily  aspirin, 81 mg, Oral, Daily  atorvastatin, 40 mg, Oral, Nightly  clopidogrel, 75 mg, Oral, Daily  finasteride, 5 mg, Oral, Daily  folic acid, 1 mg, Oral, BID  furosemide, 40 mg, Intravenous, Q12H  gabapentin, 300 mg, Oral, Q12H  heparin (porcine), 5,000 Units, Subcutaneous, Q12H  insulin aspart, 0-14 Units, Subcutaneous, TID PC  insulin aspart, 10 Units, Subcutaneous, TID With Meals  insulin detemir, 37 Units, Subcutaneous, Nightly  lactobacillus acidophilus, 1 capsule, Oral, Daily  metoprolol succinate XL, 50 mg, Oral, Q24H  multivitamin, 1 tablet, Oral, Daily  pantoprazole, 40 mg, Oral, Q AM  piperacillin-tazobactam, 3.375 g, Intravenous, Q8H  sennosides-docusate, 2 tablet, Oral, BID  sodium chloride, 10 mL, Intravenous, Q12H  terazosin, 5 mg, Oral, Nightly  vancomycin, 1,000 mg, Intravenous, Q12H  vitamin E, 400 Units, Oral, Daily         ----------------------------------------------------------------------------------------------------------------------  Vital Signs:  Temp:  [97.3 °F (36.3 °C)-98.4 °F (36.9 °C)] 97.3 °F (36.3 °C)  Heart Rate:  [80-85] 82  Resp:  [20] 20  BP: (122-143)/(63-74) 122/70  Vital Signs (last 72 hrs)       700  -   0659 700  -   0659 700  -   0659 700  -   1119   Most Recent    Temp (°F) 97.7 -  98.7    97.3 -  98.1    97.3 -  98.4       97.3 (36.3)    Heart Rate 83 -  91     76 -  87    76 -  85      82     82    Resp   18    16 -  18    18 -  20      20     20    /68 -  141/75    111/64 -  138/64    123/63 -  143/71      122/70     122/70    SpO2 (%) 96 -  98    93 -  97    96 -  98      97     97            03/31/21  0432 04/01/21  0500 04/02/21  0500   Weight: 96.9 kg (213 lb 9.6 oz) 98 kg (216 lb 1.6 oz) 96.4 kg (212 lb 9.6 oz) (Pt refused standing scale.  RN notified.)     Body mass index is 31.4 kg/m².    Intake/Output Summary (Last 24 hours) at 4/2/2021 1119  Last data filed at 4/2/2021 1100  Gross per 24 hour   Intake 960 ml   Output 3400 ml   Net -2440 ml     Diet Regular; Cardiac, Consistent Carbohydrate  ----------------------------------------------------------------------------------------------------------------------  Physical exam:    HEENT:  Head:  Normocephalic and atraumatic.     Eyes:  Conjunctivae and EOM are normal.  Pupils are equal, round, and reactive to light.  No scleral icterus.    Neck:  Neck supple.  No JVD present.  No bruit.  Cardiovascular: Normal rate, regular rhythm, S1 S2+, NO S3 / S4  Pulmonary/Chest:  Vesicular breath sounds B/L, Clear to auscultation, with no added sounds.  Abdominal:  Soft.  Bowel sounds are normal.  No distension and no tenderness.  No organomegaly.  Neurological:  Alert and oriented to person, place, and time. No focal defecits  Skin:  Skin is warm and dry. No rash noted. No pallor.   Musculoskeletal:  No tenderness, and no deformity.  No red or swollen joints anywhere.   Lower extremities: No edema, Peripheral vascular:  2+ Pulses B/L DP.  ----------------------------------------------------------------------------------------------------------------------    ----------------------------------------------------------------------------------------------------------------------  Results from last 7 days   Lab Units 03/29/21  2320 03/29/21  2110   TROPONIN T ng/mL <0.010 <0.010     Results from last 7 days   Lab Units  04/02/21 0352 04/01/21 0148 03/31/21  0744 03/30/21  0616 03/29/21  2320 03/29/21  2110   CRP mg/dL  --   --   --  5.14* 5.67*  --    LACTATE mmol/L  --   --   --  1.8  --   --    WBC 10*3/mm3 7.12 8.82 8.38  --   --   --    HEMOGLOBIN g/dL 9.6* 10.1* 9.7*  --   --   --    HEMATOCRIT % 30.7* 33.1* 31.7*  --   --   --    MCV fL 90.8 91.7 92.2  --   --   --    MCHC g/dL 31.3* 30.5* 30.6*  --   --   --    PLATELETS 10*3/mm3 531* 556* 559*  --   --   --    INR   --   --   --   --   --  0.97         Results from last 7 days   Lab Units 04/02/21 0352 04/01/21 0148 03/31/21  0744 03/30/21  0616 03/30/21  0237 03/29/21  2110   SODIUM mmol/L 134* 136 135*  --  141 135*   POTASSIUM mmol/L 4.3 5.3* 4.4  --  4.2 4.4   MAGNESIUM mg/dL  --   --   --  1.7  --  1.7   CHLORIDE mmol/L 98 96* 95*  --  97* 92*   CO2 mmol/L 27.1 27.4 31.6*  --  28.9 29.3*   BUN mg/dL 22 21 25*  --  24* 25*   CREATININE mg/dL 1.18 1.10 1.26  --  1.11 1.15   EGFR IF NONAFRICN AM mL/min/1.73 61 66 56*  --  65 63   CALCIUM mg/dL 8.7 8.7 9.0  --  9.7 9.8   GLUCOSE mg/dL 273* 282* 295*  --  221* 255*   ALBUMIN g/dL  --   --   --   --  3.29* 3.58   BILIRUBIN mg/dL  --   --   --   --  0.3 0.3   ALK PHOS U/L  --   --   --   --  113 106   AST (SGOT) U/L  --   --   --   --  13 10   ALT (SGPT) U/L  --   --   --   --  15 14   Estimated Creatinine Clearance: 65.8 mL/min (by C-G formula based on SCr of 1.18 mg/dL).    No results found for: AMMONIA      Blood Culture   Date Value Ref Range Status   03/30/2021 No growth at 3 days  Preliminary   03/30/2021 No growth at 3 days  Preliminary     No results found for: URINECX  No results found for: WOUNDCX  No results found for: STOOLCX    I have personally looked at the labs and they are summarized above.  ----------------------------------------------------------------------------------------------------------------------  Imaging Results (Last 24 Hours)     ** No results found for the last 24 hours. **         ----------------------------------------------------------------------------------------------------------------------    Assessment:  1.  Acute on chronic HFpEF  2.  Status post corrective has grafting x4 with left intramammary graft to LAD SVG grafts to the diagonal, obtuse marginal 1, obtuse marginal 2  3.  Lower extremity wound infection  4.  Hyperlipidemia  5.  History of tobacco abuse  6.  Diabetes mellitus type 2  7.  Essential hypertension  8.  Acute renal impairment  9.  Benign prostate hypertrophy  10.  GERD    Plan:  1.  Patient had excellent diuresis overnight with acceptable creatinine we will continue with the current dose of furosemide likely changing to p.o. within the next 48 hours if he continues to diurese well  2.  Lower extremity wound is better  3.  Blood pressures well controlled continue current management  4.  Continue statin      Parivn James MD   04/02/21 11:19 EDT

## 2021-04-02 NOTE — PROGRESS NOTES
[facility] HOSPITALIST PROGRESS NOTE     Patient Identification:  Name:  William Villasenor  Age:  70 y.o.  Sex:  male  :  1950  MRN:  [id number]  Visit Number:  [id number]  ROOM: [address] [place] [address]     Primary Care Provider:  Jacqueline Gatica MD    Length of stay in inpatient status:  3    Subjective     Chief Compliant:    Chief Complaint   Patient presents with   • Chest Pain   • Shortness of Breath     History of Presenting Illness:    Patient stable today, no acute events overnight, no new complaints, diuresed well yesterday w/ negative 2.5L and Cr stable, continued on diuresis today, cards following, patient still complaining of his meals they bring w/ juice and bread, had nutrition speak w/ patient and daughter, we discussed not eating what he does not want and if his family wants to bring him in foods he eats at home per his diet that is fine, continued on ABx, patient reports his swelling and redness are improving, denies any fevers or chills.   Objective     Current Hospital Meds:allopurinol, 300 mg, Oral, Daily  aspirin, 81 mg, Oral, Daily  atorvastatin, 40 mg, Oral, Nightly  clopidogrel, 75 mg, Oral, Daily  finasteride, 5 mg, Oral, Daily  folic acid, 1 mg, Oral, BID  furosemide, 40 mg, Intravenous, Q12H  gabapentin, 300 mg, Oral, Q12H  heparin (porcine), 5,000 Units, Subcutaneous, Q12H  insulin aspart, 0-14 Units, Subcutaneous, TID PC  insulin aspart, 10 Units, Subcutaneous, TID With Meals  insulin detemir, 37 Units, Subcutaneous, Nightly  lactobacillus acidophilus, 1 capsule, Oral, Daily  metoprolol succinate XL, 50 mg, Oral, Q24H  multivitamin, 1 tablet, Oral, Daily  pantoprazole, 40 mg, Oral, Q AM  piperacillin-tazobactam, 3.375 g, Intravenous, Q8H  sennosides-docusate, 2 tablet, Oral, BID  sodium chloride, 10 mL, Intravenous, Q12H  terazosin, 5 mg, Oral, Nightly  vancomycin, 1,000 mg, Intravenous, Q12H  vitamin E, 400 Units, Oral, Daily         Current Antimicrobial Therapy:  Anti-Infectives  (From admission, onward)    Ordered     Dose/Rate Route Frequency Start Stop    04/01/21 1304  vancomycin 1 g/250 mL 0.9% NS (vial-mate)     Ordering Provider: Chucky Elise MD    1,000 mg  over 60 Minutes Intravenous Every 12 Hours 04/02/21 0100 04/09/21 0059    03/30/21 0530  piperacillin-tazobactam (ZOSYN) 3.375 g/100 mL 0.9% NS IVPB (mbp)     Ordering Provider: Robert Ortiz MD    3.375 g  over 4 Hours Intravenous Every 8 Hours 03/30/21 1300 04/09/21 1259    03/30/21 0530  piperacillin-tazobactam (ZOSYN) 3.375 g/100 mL 0.9% NS IVPB (mbp)     Ordering Provider: Robert Ortiz MD    3.375 g  over 30 Minutes Intravenous Once 03/30/21 0700 03/30/21 0711    03/30/21 0530  vancomycin 2000 mg/500 mL 0.9% NS IVPB ([facility])     Ordering Provider: Robert Ortiz MD    20 mg/kg × 97 kg  over 120 Minutes Intravenous Once 03/30/21 0700 03/30/21 0841        Current Diuretic Therapy:  Diuretics (From admission, onward)    Ordered     Dose/Rate Route Frequency Start Stop    04/01/21 1533  furosemide (LASIX) injection 40 mg     Ordering Provider: Maddi Munoz APRN    40 mg Intravenous Every 12 Hours 04/01/21 1800      03/30/21 0522  furosemide (LASIX) injection 20 mg     Ordering Provider: Shandra Barnard PA-C    20 mg Intravenous Once 03/30/21 0615 03/30/21 0548    03/30/21 0032  furosemide (LASIX) injection 20 mg     Ordering Provider: Robert Ortiz MD    20 mg Intravenous Once 03/30/21 0034 03/30/21 0144        ----------------------------------------------------------------------------------------------------------------------  Vital Signs:  Temp:  [97.3 °F (36.3 °C)-98.4 °F (36.9 °C)] 97.3 °F (36.3 °C)  Heart Rate:  [76-85] 82  Resp:  [18-20] 20  BP: (123-143)/(63-75) 143/71  SpO2:  [96 %-98 %] 98 %  on  Flow (L/min):  [2] 2;   Device (Oxygen Therapy): nasal cannula  Body mass index is 31.4 kg/m².    Wt Readings from Last 3 Encounters:   04/02/21 96.4 kg (212 lb 9.6 oz)    03/29/21 95.4 kg (210 lb 6 oz)   03/27/21 97 kg (213 lb 12.8 oz)     Intake & Output (last 3 days)       03/30 0701 - 03/31 0700 03/31 0701 - 04/01 0700 04/01 0701 - 04/02 0700 04/02 0701 - 04/03 0700    P.O. 960 1080 960     I.V. (mL/kg)  1234 (12.6)      Total Intake(mL/kg) 960 (9.9) 2314 (23.6) 960 (10)     Urine (mL/kg/hr) 1550 (0.7) 2000 (0.9) 3500 (1.5)     Stool 0  0     Total Output 1550 2000 3500     Net -590 +314 -2540             Stool Unmeasured Occurrence 0 x  0 x         Diet Regular; Cardiac, Consistent Carbohydrate  ----------------------------------------------------------------------------------------------------------------------  Physical exam:  Constitutional:  Elderly, Obese, no acute distress.      HENT:  Head:  Normocephalic and atraumatic.  Mouth:  Moist mucous membranes.    Eyes:  Conjunctivae and EOM are normal. No scleral icterus.    Neck:  Neck supple.  No JVD present.    Cardiovascular:  Normal rate, regular rhythm and normal heart sounds with no murmur.  Pulmonary/Chest:  No respiratory distress, no wheezes, trace crackles. Healing midsternum incision, no drainage, stable  Abdominal:  Soft. No distension and no tenderness.   Musculoskeletal:  No tenderness, and no deformity.   Neurological:  Alert and oriented to person, place, and time.  No gross focal deficits  Skin:  Skin is warm and dry. No rash noted. No pallor. Minimal RLE erythema   Peripheral vascular:  no clubbing, no cyanosis, +1 b/l pitting edema improved  ----------------------------------------------------------------------------------------------------------------------  Results from last 7 days   Lab Units 04/02/21  0352 04/01/21  0148 03/31/21  0744 03/30/21  0616 03/29/21  2320 03/29/21  2110   CRP mg/dL  --   --   --  5.14* 5.67*  --    LACTATE mmol/L  --   --   --  1.8  --   --    WBC 10*3/mm3 7.12 8.82 8.38  --   --   --    HEMOGLOBIN g/dL 9.6* 10.1* 9.7*  --   --   --    HEMATOCRIT % 30.7* 33.1* 31.7*  --   --    --    MCV fL 90.8 91.7 92.2  --   --   --    MCHC g/dL 31.3* 30.5* 30.6*  --   --   --    PLATELETS 10*3/mm3 531* 556* 559*  --   --   --    INR   --   --   --   --   --  0.97         Results from last 7 days   Lab Units 04/02/21  0352 04/01/21  0148 03/31/21  0744 03/30/21  0616 03/30/21  0237 03/29/21 2110   SODIUM mmol/L 134* 136 135*  --  141 135*   POTASSIUM mmol/L 4.3 5.3* 4.4  --  4.2 4.4   MAGNESIUM mg/dL  --   --   --  1.7  --  1.7   CHLORIDE mmol/L 98 96* 95*  --  97* 92*   CO2 mmol/L 27.1 27.4 31.6*  --  28.9 29.3*   BUN mg/dL 22 21 25*  --  24* 25*   CREATININE mg/dL 1.18 1.10 1.26  --  1.11 1.15   EGFR IF NONAFRICN AM mL/min/1.73 61 66 56*  --  65 63   CALCIUM mg/dL 8.7 8.7 9.0  --  9.7 9.8   PHOSPHORUS mg/dL  --   --   --   --   --  3.2   GLUCOSE mg/dL 273* 282* 295*  --  221* 255*   ALBUMIN g/dL  --   --   --   --  3.29* 3.58   BILIRUBIN mg/dL  --   --   --   --  0.3 0.3   ALK PHOS U/L  --   --   --   --  113 106   AST (SGOT) U/L  --   --   --   --  13 10   ALT (SGPT) U/L  --   --   --   --  15 14   Estimated Creatinine Clearance: 65.8 mL/min (by C-G formula based on SCr of 1.18 mg/dL).  No results found for: AMMONIA  Results from last 7 days   Lab Units 03/29/21 2320 03/29/21 2110   TROPONIN T ng/mL <0.010 <0.010     Results from last 7 days   Lab Units 03/29/21 2110 03/29/21  0852   PROBNP pg/mL 848.1 816.3         Glucose   Date/Time Value Ref Range Status   04/02/2021 0645 250 (H) 70 - 130 mg/dL Final   04/02/2021 0012 344 (H) 70 - 130 mg/dL Final   04/01/2021 1918 408 (C) 70 - 130 mg/dL Final   04/01/2021 1637 360 (H) 70 - 130 mg/dL Final   04/01/2021 1103 321 (H) 70 - 130 mg/dL Final   04/01/2021 0623 244 (H) 70 - 130 mg/dL Final   03/31/2021 2054 390 (H) 70 - 130 mg/dL Final   03/31/2021 1609 370 (H) 70 - 130 mg/dL Final     Lab Results   Component Value Date    TSH 3.410 03/29/2021     No results found for: PREGTESTUR, PREGSERUM, HCG, HCGQUANT  Pain Management Panel     Pain Management  Panel Latest Ref Rng & Units 3/15/2021 3/14/2021    AMPHETAMINES SCREEN, URINE Negative Negative Negative    BARBITURATES SCREEN Negative Negative Negative    BENZODIAZEPINE SCREEN, URINE Negative Negative Negative    BUPRENORPHINEUR Negative Negative Negative    COCAINE SCREEN, URINE Negative Negative Negative    METHADONE SCREEN, URINE Negative Negative Negative    METHAMPHETAMINEUR Negative Negative -        Brief Urine Lab Results  (Last result in the past 365 days)      Color   Clarity   Blood   Leuk Est   Nitrite   Protein   CREAT   Urine HCG        03/29/21 0921 Yellow Clear Small (1+) Negative Negative Negative             Blood Culture   Date Value Ref Range Status   03/30/2021 No growth at 3 days  Preliminary   03/30/2021 No growth at 3 days  Preliminary     No results found for: URINECX  No results found for: WOUNDCX  No results found for: STOOLCX  No results found for: RESPCX  No results found for: AFBCX  Results from last 7 days   Lab Units 03/30/21  0616 03/29/21  2320   LACTATE mmol/L 1.8  --    CRP mg/dL 5.14* 5.67*     I have personally looked at the labs and they are summarized above.  ----------------------------------------------------------------------------------------------------------------------  Detailed radiology reports for the last 24 hours:  Imaging Results (Last 24 Hours)     ** No results found for the last 24 hours. **        Assessment & Plan    70M Obese PMH chronic kidney disease stage IIIa, chronic normocytic anemia, chronic diastolic CHF, CAD status post four-vessel CABG, hyperlipidemia, type 2 diabetes mellitus, essential hypertension, diabetic neuropathy, gout, BPH, that presented to the [facility] emergency department for evaluation of chest pain, lower extremity edema, and shortness of breath.    #HTN/HLD/CAD s/p recent 4V CABG a few weeks ago  #Acute on Chronic HFpEF  - Patient presented w/ increased weakness, dyspnea, LE swelling.    - Labs showed negative trops  x 2, proBNP 800  - EKG showed NSR, LAFB, possible old anterior infarct  - Echo 3/24/21 showed LVEF 51-55%, septal wall motion abnormal and c/w post operative state, LV noted to have mild concentric LVH, no significant valvular abnormality, no pericardial effusion  - Cards consulted; Following  - Continue home ASA 81, statin, plavix  - Continue home BB  - -2.5L yesterday w/ increased diuretic dose, Cr stable, continued on diuresis  - Continue to monitor on tele, strict I/O's, daily weights, trend HR and BP    #Sepsis 2/2 Acute RLE Cellulitis   - Patient presented w/ LE redness R>L, RR 22, CRP 5.67, WBC count 13K, lactate < 2, Bcx's NGTD  - B/l Venous duplex showed no DVT, mildly prominent upper thigh region nodes  - Continue Vanc and Zosyn x 5-7 days pending improvement  - Continue APAP PRN for fevers    #IDDM Type II, uncontrolled, c/b Neuropathy, Hyperglycemia  - Hgb A1c = 8.7%, Glc up to 465 this admission  - Consulted Nutrition.  - Continue home Gabapentin  - Holding home oral agents, continue FSBG and SSI, continue home Levemir 37U qhs, added Aspart 10U TID w/ meals, titrate as indicated.      #CKDII-III  - Patient presented w/ Cr 1.1, b/l around 1.1-1.5  - Trend Cr and UOP, avoid nephrotoxins, NSAIDs, dehydration and contrast as able.     #Chronic Normocytic Anemia  #Thrombocytosis - Improved  - Plts 647 on admission, Hgb low but stable, MCV NML, no signs of bleeding, Iron 46, Ferritin 208, Iron sat 16, Transferrin 196, TIBC 292, Folate > 20, Vit B 12 1184; Trend CBC daily, continue home oral folate and MVI, transfuse Hgb < 7 or symptomatic     #Gout w/o Acute Flair  - Continue home Allopurinol     #BPH  - Continue home Hytrin/Proscar     #GERD  - Continue home PPI     #Electrolyte Abnormalities  - Acute Mild Hypomagnesemia - Mg 1.7 on admission, Replacing, on protocol    #Obesity  - BMI 31, complicates all aspects of care    F: Oral  E: Monitor & Replace PRN  N: Cardiac, DM diet  Ppx: SQH  Code: Full Code      Dispo: Pending clinical improvement, PT/OT recs pending     *This patient is considered high risk due to sepsis, cellulitis, Acute CHF exacerbation, recent CABG, severe hyperglycemia.      VTE Prophylaxis:   Mechanical Order History:     None      Pharmalogical Order History:      Ordered     Dose Route Frequency Stop    03/30/21 0137  heparin (porcine) 5000 UNIT/ML injection 5,000 Units      5,000 Units SC Every 12 Hours Scheduled --                Chucky Elise MD  [facility] Hospitalist  04/02/21  10:12 EDT

## 2021-04-02 NOTE — PLAN OF CARE
Goal Outcome Evaluation:  Pt has been resting in bed. Pt has had frequent pain that is relieved with PRN pain medication. Pt otherwise has had no complaints. Will continue to follow plan of care.

## 2021-04-02 NOTE — THERAPY EVALUATION
Patient Name: William Villasenor  : 1950    MRN: [id number]                              Today's Date: 2021       Admit Date: 3/29/2021    Visit Dx:     ICD-10-CM ICD-9-CM   1. Acute on chronic congestive heart failure, unspecified heart failure type (CMS/[facility])  I50.9 428.0   2. Coronary artery disease involving native coronary artery of native heart with angina pectoris (CMS/HCC)  I25.119 414.01     413.9     Patient Active Problem List   Diagnosis   • Idiopathic chronic gout of multiple sites without tophus   • Type II diabetes mellitus (CMS/[facility])   • Essential hypertension   • Benign prostatic hyperplasia without lower urinary tract symptoms   • Chest pain   • Obesity (BMI 30-39.9)   • GERD (gastroesophageal reflux disease)   • Diabetic neuropathy (CMS/[facility])   • Former smoker   • History of gout   • Coronary artery disease involving native coronary artery of native heart with angina pectoris (CMS/[facility])   • CAD (coronary artery disease)   • IZA (acute kidney injury) (CMS/[facility])   • Acute on chronic congestive heart failure (CMS/[facility])   • Hyperlipidemia   • CKD (chronic kidney disease) stage 2, GFR 60-89 ml/min     Past Medical History:   Diagnosis Date   • Arthritis    • Back pain    • BPH (benign prostatic hyperplasia)    • Diabetes mellitus (CMS/[facility])    • Diabetic peripheral neuropathy (CMS/[facility])    • Erectile dysfunction    • Former smoker    • GERD (gastroesophageal reflux disease)    • GERD (gastroesophageal reflux disease)    • Gout    • High cholesterol    • Hypertension    • Obesity      Past Surgical History:   Procedure Laterality Date   • CARDIAC CATHETERIZATION N/A 3/15/2021    Procedure: Left Heart Cath;  Surgeon: Trent Zaragoza MD;  Location:  [facility];  Service: Cardiology;  Laterality: N/A;   • CARDIAC CATHETERIZATION N/A 3/15/2021    Procedure: Coronary angiography;  Surgeon: Trent Zaragoza MD;  Location:  [facility];  Service: Cardiology;  Laterality: N/A;   •  COLONOSCOPY W/ BIOPSIES  2015   • CORONARY ARTERY BYPASS GRAFT N/A 3/16/2021    Procedure: MEDIAN STERNOTOMY, CORONARY ARTERY BYPASS GRAFT X4, UTILIZATION OF LEFT INTERNAL MAMMARY ARTERY, AND ENDOSCOPIC VEIN HARVEST OF RIGHT GREATER SAPHENOUS VEIN;  Surgeon: Jorge Simms MD;  Location: [facility];  Service: Cardiothoracic;  Laterality: N/A;  vein out- 1614  vein ready- 1628             General Information     Row Name 04/02/21 1356          OT Time and Intention    Document Type  evaluation  -LM     Mode of Treatment  occupational therapy  -LM     Row Name 04/02/21 1356          General Information    Patient Profile Reviewed  yes  -LM     Prior Level of Function  independent:;all household mobility;transfer;ADL's prior to cabg  -LM     Existing Precautions/Restrictions  sternal;fall;cardiac  -LM     Barriers to Rehab  medically complex  -LM     Row Name 04/02/21 1356          Living Environment    Lives With  spouse  -     Row Name 04/02/21 1356          Cognition    Orientation Status (Cognition)  oriented x 4  -LM       User Key  (r) = Recorded By, (t) = Taken By, (c) = Cosigned By    Initials Name Provider Type    LM Aye Cordova, OT Occupational Therapist          Mobility/ADL's     Row Name 04/02/21 1350          Activities of Daily Living    BADL Assessment/Intervention  bathing;upper body dressing;lower body dressing;grooming;feeding;toileting  -     Row Name 04/02/21 1357          Bathing Assessment/Intervention    [place] Level (Bathing)  minimum assist (75% patient effort)  -LM     Row Name 04/02/21 1357          Upper Body Dressing Assessment/Training    [place] Level (Upper Body Dressing)  set up;supervision  -     Row Name 04/02/21 135          Lower Body Dressing Assessment/Training    [place] Level (Lower Body Dressing)  minimum assist (75% patient effort);moderate assist (50% patient effort)  -     Row Name 04/02/21 1357          Grooming Assessment/Training     [place] Level (Grooming)  set up  -     Row Name 04/02/21 1357          Self-Feeding Assessment/Training    [place] Level (Feeding)  set up  -     Row Name 04/02/21 1353          Toileting Assessment/Training    [place] Level (Toileting)  minimum assist (75% patient effort)  -LM       User Key  (r) = Recorded By, (t) = Taken By, (c) = Cosigned By    Initials Name Provider Type     Aye Cordova OT Occupational Therapist        Obj/Interventions     Row Name 04/02/21 1358          Sensory Assessment (Somatosensory)    Sensory Assessment (Somatosensory)  sensation intact  -     Row Name 04/02/21 1358          Vision Assessment/Intervention    Visual Impairment/Limitations  WNL  -LM     Row Name 04/02/21 1358          Range of Motion Comprehensive    General Range of Motion  no range of motion deficits identified  -     Row Name 04/02/21 1355          Strength Comprehensive (MMT)    General Manual Muscle Testing (MMT) Assessment  upper extremity strength deficits identified  -LM     Comment, General Manual Muscle Testing (MMT) Assessment  3/5  -LM       User Key  (r) = Recorded By, (t) = Taken By, (c) = Cosigned By    Initials Name Provider Type     Aye Cordova, OT Occupational Therapist        Goals/Plan     Row Name 04/02/21 1444          Transfer Goal 1 (OT)    Activity/Assistive Device (Transfer Goal 1, OT)  transfers, all;commode, 3-in-1  -LM     [place] Level/Cues Needed (Transfer Goal 1, OT)  standby assist;supervision required  -LM     Time Frame (Transfer Goal 1, OT)  by discharge  -[facility] Name 04/02/21 5452          Bathing Goal 1 (OT)    Activity/Device (Bathing Goal 1, OT)  bathing skills, all  -LM     [place] Level/Cues Needed (Bathing Goal 1, OT)  contact guard assist;supervision required  -LM     Time Frame (Bathing Goal 1, OT)  by discharge  -     Row Name 04/02/21 1489          Therapy Assessment/Plan (OT)    Planned Therapy Interventions (OT)  activity  tolerance training;BADL retraining;ROM/therapeutic exercise;patient/caregiver education/training;strengthening exercise;transfer/mobility retraining  -LM       User Key  (r) = Recorded By, (t) = Taken By, (c) = Cosigned By    Initials Name Provider Type    Aye Thakkar OT Occupational Therapist        Clinical Impression     Row Name 04/02/21 1358          Plan of Care Review    Plan of Care Reviewed With  patient  -LM     Row Name 04/02/21 1358          Therapy Assessment/Plan (OT)    Patient/Family Therapy Goal Statement (OT)  return home to plof  -LM     Rehab Potential (OT)  good, to achieve stated therapy goals  -LM     Criteria for Skilled Therapeutic Interventions Met (OT)  yes;meets criteria;skilled treatment is necessary  -LM     Row Name 04/02/21 1350          Therapy Plan Review/Discharge Plan (OT)    Anticipated Discharge Disposition (OT)  home with assist  -LM     Row Name 04/02/21 135          Positioning and Restraints    Post Treatment Position  bed  -LM     In Bed  call light within reach;encouraged to call for assist  -LM       User Key  (r) = Recorded By, (t) = Taken By, (c) = Cosigned By    Initials Name Provider Type    Aye Thakkar, OT Occupational Therapist        Outcome Measures    No documentation.         OT Recommendation and Plan  Planned Therapy Interventions (OT): activity tolerance training, BADL retraining, ROM/therapeutic exercise, patient/caregiver education/training, strengthening exercise, transfer/mobility retraining  Plan of Care Review  Plan of Care Reviewed With: patient     Time Calculation:     Therapy Charges for Today     Code Description Service Date Service Provider Modifiers Qty    [id number]  OT EVAL MOD COMPLEXITY 4 4/2/2021 Aye Cordova OT GO 1               Aye Cordova OT  4/2/2021

## 2021-04-02 NOTE — PROGRESS NOTES
Discharge Planning Assessment   Silvio     Patient Name: William Villasenor  MRN: [id number]  Today's Date: 4/2/2021    Admit Date: 3/29/2021        Discharge Plan     Row Name 04/02/21 1319       Plan    Plan  Pt admitted on 3/30/21.  Pt lives at home with spouse and plans to return home at discharge.  [facility] utilizes [facility].  [facility] will need new referral with face to face at discharge.  Pt currently utilizes hospital bed, cane, bsc, rw, lift chair and home 02 at 2 liters nc prn via [facility].  SS will follow.    Row Name 04/02/21 1247        RUSLAN Rojo

## 2021-04-02 NOTE — PLAN OF CARE
Goal Outcome Evaluation:   Patient doing well this shift. Complained of some constipation; mag citrate ordered per Dr. Elise. VSS. No complaints. Will continue to monitor and follow POC.

## 2021-04-02 NOTE — PROGRESS NOTES
Antimicrobial Length of Therapy:    Day 4 of 7 vancomycin   Day 4 of 7 Zosyn     Thank you,   Letty Mei, PharmD  Pharmacy Resident  4/2/2021  08:32 EDT

## 2021-04-03 LAB
ANION GAP SERPL CALCULATED.3IONS-SCNC: 9.6 MMOL/L (ref 5–15)
BUN SERPL-MCNC: 25 MG/DL (ref 8–23)
BUN/CREAT SERPL: 22.3 (ref 7–25)
CALCIUM SPEC-SCNC: 9.2 MG/DL (ref 8.6–10.5)
CHLORIDE SERPL-SCNC: 95 MMOL/L (ref 98–107)
CO2 SERPL-SCNC: 29.4 MMOL/L (ref 22–29)
CREAT SERPL-MCNC: 1.12 MG/DL (ref 0.76–1.27)
DEPRECATED RDW RBC AUTO: 42.5 FL (ref 37–54)
ERYTHROCYTE [DISTWIDTH] IN BLOOD BY AUTOMATED COUNT: 12.9 % (ref 12.3–15.4)
GFR SERPL CREATININE-BSD FRML MDRD: 65 ML/MIN/1.73
GLUCOSE BLDC GLUCOMTR-MCNC: 237 MG/DL (ref 70–130)
GLUCOSE BLDC GLUCOMTR-MCNC: 301 MG/DL (ref 70–130)
GLUCOSE BLDC GLUCOMTR-MCNC: 339 MG/DL (ref 70–130)
GLUCOSE BLDC GLUCOMTR-MCNC: 346 MG/DL (ref 70–130)
GLUCOSE BLDC GLUCOMTR-MCNC: 437 MG/DL (ref 70–130)
GLUCOSE SERPL-MCNC: 228 MG/DL (ref 65–99)
HCT VFR BLD AUTO: 34.6 % (ref 37.5–51)
HGB BLD-MCNC: 10.7 G/DL (ref 13–17.7)
MCH RBC QN AUTO: 28.2 PG (ref 26.6–33)
MCHC RBC AUTO-ENTMCNC: 30.9 G/DL (ref 31.5–35.7)
MCV RBC AUTO: 91.1 FL (ref 79–97)
PLATELET # BLD AUTO: 560 10*3/MM3 (ref 140–450)
PMV BLD AUTO: 8.5 FL (ref 6–12)
POTASSIUM SERPL-SCNC: 4.4 MMOL/L (ref 3.5–5.2)
RBC # BLD AUTO: 3.8 10*6/MM3 (ref 4.14–5.8)
SODIUM SERPL-SCNC: 134 MMOL/L (ref 136–145)
VANCOMYCIN TROUGH SERPL-MCNC: 13.5 MCG/ML (ref 5–20)
WBC # BLD AUTO: 7.4 10*3/MM3 (ref 3.4–10.8)

## 2021-04-03 PROCEDURE — 25010000002 FUROSEMIDE PER 20 MG: Performed by: NURSE PRACTITIONER

## 2021-04-03 PROCEDURE — 25010000002 ONDANSETRON PER 1 MG: Performed by: INTERNAL MEDICINE

## 2021-04-03 PROCEDURE — 99232 SBSQ HOSP IP/OBS MODERATE 35: CPT | Performed by: INTERNAL MEDICINE

## 2021-04-03 PROCEDURE — 63710000001 INSULIN DETEMIR PER 5 UNITS: Performed by: INTERNAL MEDICINE

## 2021-04-03 PROCEDURE — 63710000001 INSULIN ASPART PER 5 UNITS: Performed by: INTERNAL MEDICINE

## 2021-04-03 PROCEDURE — 80202 ASSAY OF VANCOMYCIN: CPT | Performed by: HOSPITALIST

## 2021-04-03 PROCEDURE — 80048 BASIC METABOLIC PNL TOTAL CA: CPT | Performed by: INTERNAL MEDICINE

## 2021-04-03 PROCEDURE — 85027 COMPLETE CBC AUTOMATED: CPT | Performed by: INTERNAL MEDICINE

## 2021-04-03 PROCEDURE — 25010000002 PIPERACILLIN SOD-TAZOBACTAM PER 1 G: Performed by: HOSPITALIST

## 2021-04-03 PROCEDURE — 99232 SBSQ HOSP IP/OBS MODERATE 35: CPT | Performed by: SPECIALIST

## 2021-04-03 PROCEDURE — 25010000002 HEPARIN (PORCINE) PER 1000 UNITS: Performed by: HOSPITALIST

## 2021-04-03 PROCEDURE — 82962 GLUCOSE BLOOD TEST: CPT

## 2021-04-03 RX ADMIN — DOCUSATE SODIUM 50 MG AND SENNOSIDES 8.6 MG 2 TABLET: 8.6; 5 TABLET, FILM COATED ORAL at 07:57

## 2021-04-03 RX ADMIN — INSULIN DETEMIR 40 UNITS: 100 INJECTION, SOLUTION SUBCUTANEOUS at 19:31

## 2021-04-03 RX ADMIN — TERAZOSIN HYDROCHLORIDE 5 MG: 5 CAPSULE ORAL at 19:33

## 2021-04-03 RX ADMIN — ASPIRIN 81 MG: 81 TABLET, COATED ORAL at 07:58

## 2021-04-03 RX ADMIN — ALLOPURINOL 300 MG: 300 TABLET ORAL at 07:58

## 2021-04-03 RX ADMIN — INSULIN ASPART 10 UNITS: 100 INJECTION, SOLUTION INTRAVENOUS; SUBCUTANEOUS at 12:53

## 2021-04-03 RX ADMIN — SODIUM CHLORIDE, PRESERVATIVE FREE 10 ML: 5 INJECTION INTRAVENOUS at 19:33

## 2021-04-03 RX ADMIN — FOLIC ACID 1 MG: 1 TABLET ORAL at 19:32

## 2021-04-03 RX ADMIN — INSULIN ASPART 12 UNITS: 100 INJECTION, SOLUTION INTRAVENOUS; SUBCUTANEOUS at 17:40

## 2021-04-03 RX ADMIN — HEPARIN SODIUM 5000 UNITS: 5000 INJECTION, SOLUTION INTRAVENOUS; SUBCUTANEOUS at 07:55

## 2021-04-03 RX ADMIN — PIPERACILLIN SODIUM AND TAZOBACTAM SODIUM 3.38 G: 3; .375 INJECTION, POWDER, LYOPHILIZED, FOR SOLUTION INTRAVENOUS at 05:08

## 2021-04-03 RX ADMIN — Medication 1 CAPSULE: at 07:56

## 2021-04-03 RX ADMIN — INSULIN ASPART 12 UNITS: 100 INJECTION, SOLUTION INTRAVENOUS; SUBCUTANEOUS at 08:11

## 2021-04-03 RX ADMIN — PIPERACILLIN SODIUM AND TAZOBACTAM SODIUM 3.38 G: 3; .375 INJECTION, POWDER, LYOPHILIZED, FOR SOLUTION INTRAVENOUS at 12:26

## 2021-04-03 RX ADMIN — ATORVASTATIN CALCIUM 40 MG: 40 TABLET, FILM COATED ORAL at 19:32

## 2021-04-03 RX ADMIN — PIPERACILLIN SODIUM AND TAZOBACTAM SODIUM 3.38 G: 3; .375 INJECTION, POWDER, LYOPHILIZED, FOR SOLUTION INTRAVENOUS at 19:34

## 2021-04-03 RX ADMIN — INSULIN ASPART 5 UNITS: 100 INJECTION, SOLUTION INTRAVENOUS; SUBCUTANEOUS at 08:11

## 2021-04-03 RX ADMIN — VANCOMYCIN HYDROCHLORIDE 1000 MG: 1 INJECTION, POWDER, LYOPHILIZED, FOR SOLUTION INTRAVENOUS at 13:38

## 2021-04-03 RX ADMIN — OXYCODONE HYDROCHLORIDE AND ACETAMINOPHEN 1 TABLET: 7.5; 325 TABLET ORAL at 19:33

## 2021-04-03 RX ADMIN — ONDANSETRON 4 MG: 2 INJECTION INTRAMUSCULAR; INTRAVENOUS at 06:01

## 2021-04-03 RX ADMIN — HEPARIN SODIUM 5000 UNITS: 5000 INJECTION, SOLUTION INTRAVENOUS; SUBCUTANEOUS at 19:33

## 2021-04-03 RX ADMIN — OXYCODONE HYDROCHLORIDE AND ACETAMINOPHEN 1 TABLET: 7.5; 325 TABLET ORAL at 07:58

## 2021-04-03 RX ADMIN — Medication 1 TABLET: at 07:56

## 2021-04-03 RX ADMIN — VANCOMYCIN HYDROCHLORIDE 1000 MG: 1 INJECTION, POWDER, LYOPHILIZED, FOR SOLUTION INTRAVENOUS at 01:34

## 2021-04-03 RX ADMIN — Medication 400 UNITS: at 07:57

## 2021-04-03 RX ADMIN — FOLIC ACID 1 MG: 1 TABLET ORAL at 07:56

## 2021-04-03 RX ADMIN — OXYCODONE HYDROCHLORIDE AND ACETAMINOPHEN 1 TABLET: 7.5; 325 TABLET ORAL at 02:01

## 2021-04-03 RX ADMIN — FUROSEMIDE 40 MG: 10 INJECTION, SOLUTION INTRAMUSCULAR; INTRAVENOUS at 05:08

## 2021-04-03 RX ADMIN — PANTOPRAZOLE SODIUM 40 MG: 40 TABLET, DELAYED RELEASE ORAL at 05:08

## 2021-04-03 RX ADMIN — INSULIN ASPART 10 UNITS: 100 INJECTION, SOLUTION INTRAVENOUS; SUBCUTANEOUS at 17:40

## 2021-04-03 RX ADMIN — GABAPENTIN 300 MG: 300 CAPSULE ORAL at 19:33

## 2021-04-03 RX ADMIN — FUROSEMIDE 40 MG: 10 INJECTION, SOLUTION INTRAMUSCULAR; INTRAVENOUS at 17:40

## 2021-04-03 RX ADMIN — INSULIN ASPART 12 UNITS: 100 INJECTION, SOLUTION INTRAVENOUS; SUBCUTANEOUS at 12:53

## 2021-04-03 RX ADMIN — OXYCODONE HYDROCHLORIDE AND ACETAMINOPHEN 1 TABLET: 7.5; 325 TABLET ORAL at 13:58

## 2021-04-03 RX ADMIN — GABAPENTIN 300 MG: 300 CAPSULE ORAL at 07:58

## 2021-04-03 RX ADMIN — FINASTERIDE 5 MG: 5 TABLET, FILM COATED ORAL at 07:58

## 2021-04-03 RX ADMIN — CLOPIDOGREL 75 MG: 75 TABLET, FILM COATED ORAL at 07:57

## 2021-04-03 RX ADMIN — DOCUSATE SODIUM 50 MG AND SENNOSIDES 8.6 MG 2 TABLET: 8.6; 5 TABLET, FILM COATED ORAL at 19:32

## 2021-04-03 NOTE — PROGRESS NOTES
Patient Identification:  Name:  William Villasenor  Age:  70 y.o.  Sex:  male  :  1950  MRN:  [id number]  Visit Number:  [id number]    Chief Complaint:   Shortness of breath and CHF, infected lower extremity wound    Subjective:    Patient continues to feel better his shortness of breath is improving no chest pain no palpitations no other cardiac symptoms  ----------------------------------------------------------------------------------------------------------------------  Current Hospital Meds:  allopurinol, 300 mg, Oral, Daily  aspirin, 81 mg, Oral, Daily  atorvastatin, 40 mg, Oral, Nightly  clopidogrel, 75 mg, Oral, Daily  finasteride, 5 mg, Oral, Daily  folic acid, 1 mg, Oral, BID  furosemide, 40 mg, Intravenous, Q12H  gabapentin, 300 mg, Oral, Q12H  heparin (porcine), 5,000 Units, Subcutaneous, Q12H  insulin aspart, 0-14 Units, Subcutaneous, TID PC  insulin aspart, 12 Units, Subcutaneous, TID With Meals  insulin detemir, 40 Units, Subcutaneous, Nightly  lactobacillus acidophilus, 1 capsule, Oral, Daily  metoprolol succinate XL, 50 mg, Oral, Q24H  multivitamin, 1 tablet, Oral, Daily  pantoprazole, 40 mg, Oral, Q AM  piperacillin-tazobactam, 3.375 g, Intravenous, Q8H  sennosides-docusate, 2 tablet, Oral, BID  sodium chloride, 10 mL, Intravenous, Q12H  terazosin, 5 mg, Oral, Nightly  vancomycin, 1,000 mg, Intravenous, Q12H  vitamin E, 400 Units, Oral, Daily         ----------------------------------------------------------------------------------------------------------------------  Vital Signs:  Temp:  [97.4 °F (36.3 °C)-98.2 °F (36.8 °C)] 97.4 °F (36.3 °C)  Heart Rate:  [78-84] 84  Resp:  [18-20] 18  BP: (108-134)/(64-72) 124/72  Vital Signs (last 72 hrs)       700  -   0659 700  -   0659 700  -   0659 700  -   1226   Most Recent    Temp (°F) 97.3 -  98.1    97.3 -  98.4    97.7 -  98.2      97.4     97.4 (36.3)    Heart Rate 76 -  87    76 -  85    78 -  83       84     84    Resp 16 -  18    18 -  20    18 -  20      18     18    /64 -  138/64    123/63 -  143/71    108/66 -  134/69      124/72     124/72    SpO2 (%) 93 -  97    96 -  98    95 -  99      96     96            04/01/21  0500 04/02/21  0500 04/03/21  0500   Weight: 98 kg (216 lb 1.6 oz) 96.4 kg (212 lb 9.6 oz) (Pt refused standing scale.  RN notified.) 98.9 kg (218 lb)     Body mass index is 32.19 kg/m².    Intake/Output Summary (Last 24 hours) at 4/3/2021 1226  Last data filed at 4/3/2021 0900  Gross per 24 hour   Intake 1560 ml   Output 1775 ml   Net -215 ml     Diet Regular; Cardiac, Consistent Carbohydrate  ----------------------------------------------------------------------------------------------------------------------  Physical exam:    HEENT:  Head:  Normocephalic and atraumatic.     Eyes:  Conjunctivae and EOM are normal.  Pupils are equal, round, and reactive to light.  No scleral icterus.    Neck:  Neck supple.  No JVD present.  No bruit.  Cardiovascular: Normal rate, regular rhythm, S1 S2+, NO S3 / S4  Pulmonary/Chest:  Vesicular breath sounds B/L, Clear to auscultation, with no added sounds.  Abdominal:  Soft.  Bowel sounds are normal.  No distension and no tenderness.  No organomegaly.  Neurological:  Alert and oriented to person, place, and time. No focal defecits  Skin:  Skin is warm and dry. No rash noted. No pallor.   Musculoskeletal:  No tenderness, and no deformity.  No red or swollen joints anywhere.   Lower extremities: No edema, Peripheral vascular:  2+ Pulses B/L DP.  ----------------------------------------------------------------------------------------------------------------------    ----------------------------------------------------------------------------------------------------------------------  Results from last 7 days   Lab Units 03/29/21  6350 03/29/21  2110   TROPONIN T ng/mL <0.010 <0.010     Results from last 7 days   Lab Units 04/03/21  0530 04/02/21  0355  04/01/21  0148 03/30/21  0616 03/29/21  2320 03/29/21  2110   CRP mg/dL  --   --   --  5.14* 5.67*  --    LACTATE mmol/L  --   --   --  1.8  --   --    WBC 10*3/mm3 7.40 7.12 8.82  --   --   --    HEMOGLOBIN g/dL 10.7* 9.6* 10.1*  --   --   --    HEMATOCRIT % 34.6* 30.7* 33.1*  --   --   --    MCV fL 91.1 90.8 91.7  --   --   --    MCHC g/dL 30.9* 31.3* 30.5*  --   --   --    PLATELETS 10*3/mm3 560* 531* 556*  --   --   --    INR   --   --   --   --   --  0.97         Results from last 7 days   Lab Units 04/03/21  0530 04/02/21  0352 04/01/21 0148 03/30/21  0616 03/30/21  0237 03/29/21 2110   SODIUM mmol/L 134* 134* 136  --  141 135*   POTASSIUM mmol/L 4.4 4.3 5.3*  --  4.2 4.4   MAGNESIUM mg/dL  --   --   --  1.7  --  1.7   CHLORIDE mmol/L 95* 98 96*  --  97* 92*   CO2 mmol/L 29.4* 27.1 27.4  --  28.9 29.3*   BUN mg/dL 25* 22 21  --  24* 25*   CREATININE mg/dL 1.12 1.18 1.10  --  1.11 1.15   EGFR IF NONAFRICN AM mL/min/1.73 65 61 66  --  65 63   CALCIUM mg/dL 9.2 8.7 8.7  --  9.7 9.8   GLUCOSE mg/dL 228* 273* 282*  --  221* 255*   ALBUMIN g/dL  --   --   --   --  3.29* 3.58   BILIRUBIN mg/dL  --   --   --   --  0.3 0.3   ALK PHOS U/L  --   --   --   --  113 106   AST (SGOT) U/L  --   --   --   --  13 10   ALT (SGPT) U/L  --   --   --   --  15 14   Estimated Creatinine Clearance: 70.2 mL/min (by C-G formula based on SCr of 1.12 mg/dL).    No results found for: AMMONIA      Blood Culture   Date Value Ref Range Status   03/30/2021 No growth at 4 days  Preliminary   03/30/2021 No growth at 4 days  Preliminary     No results found for: URINECX  No results found for: WOUNDCX  No results found for: STOOLCX    I have personally looked at the labs and they are summarized above.  ----------------------------------------------------------------------------------------------------------------------  Imaging Results (Last 24 Hours)     ** No results found for the last 24 hours. **         ----------------------------------------------------------------------------------------------------------------------    Assessment:  1.  Acute on chronic HFpEF  2.  Status post coronary artery bypass grafting x4 with left intramammary graft to the LAD SVG graft to diagonal, obtuse marginal 1 and obtuse marginal 2  3.  Lower extremity wound infection  4.  Hyper lipidemia  5.  History of tobacco abuse  6.  Diabetes mellitus type 2  7.  Essential hypertension  8.  Acute renal impairment  9.  Benign prostatic hypertrophy  10.  GERD    Plan:  1.  He is subjectively feeling better continues to diurese creatinine stable continue current IV dose of the furosemide  2.  Blood pressures well controlled continue current management  3.  Continue statin  4.  Wound infection management as per medicine service      Parvin James MD   04/03/21 12:26 EDT

## 2021-04-03 NOTE — PLAN OF CARE
Goal Outcome Evaluation:  Plan of Care Reviewed With: patient     Outcome Summary: Pt resting in bed. Complains of generlized pain. PRN meds given. VS stable. Will continue with plan of care.

## 2021-04-03 NOTE — PLAN OF CARE
Goal Outcome Evaluation:   Patient doing well this shift. VSS. No complaints at this time. Will continue with POC.

## 2021-04-03 NOTE — PROGRESS NOTES
[facility] HOSPITALIST PROGRESS NOTE     Patient Identification:  Name:  William Villasenor  Age:  70 y.o.  Sex:  male  :  1950  MRN:  [id number]  Visit Number:  [id number]  ROOM: [address] [place] [address]     Primary Care Provider:  Jacqueline Gatica MD    Length of stay in inpatient status:  4    Subjective     Chief Compliant:    Chief Complaint   Patient presents with   • Chest Pain   • Shortness of Breath     History of Presenting Illness:    Patient stable today, no acute events overnight, no new complaints, still complaining of toast and other carbs on DM diet tray, I told him he and his family could bring in whatever he would like for diet, increased long acting insulin to 40U qhs and aspart to home 12U TID w/ meals, continued on IV ABx, reports pain is improved, redness improved and plan on likely 7 day course Abx, continued on diuresis, Cr stable at 1.1, not as net negative last 24hrs but feels his swelling is improved, cardiology following. He denies any fevers or chills.   Objective     Current Hospital Meds:allopurinol, 300 mg, Oral, Daily  aspirin, 81 mg, Oral, Daily  atorvastatin, 40 mg, Oral, Nightly  clopidogrel, 75 mg, Oral, Daily  finasteride, 5 mg, Oral, Daily  folic acid, 1 mg, Oral, BID  furosemide, 40 mg, Intravenous, Q12H  gabapentin, 300 mg, Oral, Q12H  heparin (porcine), 5,000 Units, Subcutaneous, Q12H  insulin aspart, 0-14 Units, Subcutaneous, TID PC  insulin aspart, 12 Units, Subcutaneous, TID With Meals  insulin detemir, 40 Units, Subcutaneous, Nightly  lactobacillus acidophilus, 1 capsule, Oral, Daily  metoprolol succinate XL, 50 mg, Oral, Q24H  multivitamin, 1 tablet, Oral, Daily  pantoprazole, 40 mg, Oral, Q AM  piperacillin-tazobactam, 3.375 g, Intravenous, Q8H  sennosides-docusate, 2 tablet, Oral, BID  sodium chloride, 10 mL, Intravenous, Q12H  terazosin, 5 mg, Oral, Nightly  vancomycin, 1,000 mg, Intravenous, Q12H  vitamin E, 400 Units, Oral, Daily         Current Antimicrobial  Therapy:  Anti-Infectives (From admission, onward)    Ordered     Dose/Rate Route Frequency Start Stop    04/01/21 1304  vancomycin 1 g/250 mL 0.9% NS (vial-mate)     Ordering Provider: Chucky Elise MD    1,000 mg  over 60 Minutes Intravenous Every 12 Hours 04/02/21 0100 04/09/21 0059    03/30/21 0530  piperacillin-tazobactam (ZOSYN) 3.375 g/100 mL 0.9% NS IVPB (mbp)     Ordering Provider: Robert Ortiz MD    3.375 g  over 4 Hours Intravenous Every 8 Hours 03/30/21 1300 04/09/21 1259    03/30/21 0530  piperacillin-tazobactam (ZOSYN) 3.375 g/100 mL 0.9% NS IVPB (mbp)     Ordering Provider: Robert Ortiz MD    3.375 g  over 30 Minutes Intravenous Once 03/30/21 0700 03/30/21 0711    03/30/21 0530  vancomycin 2000 mg/500 mL 0.9% NS IVPB ([facility])     Ordering Provider: Robert Ortiz MD    20 mg/kg × 97 kg  over 120 Minutes Intravenous Once 03/30/21 0700 03/30/21 0841        Current Diuretic Therapy:  Diuretics (From admission, onward)    Ordered     Dose/Rate Route Frequency Start Stop    04/01/21 1533  furosemide (LASIX) injection 40 mg     Ordering Provider: Maddi Munoz APRN    40 mg Intravenous Every 12 Hours 04/01/21 1800      03/30/21 0522  furosemide (LASIX) injection 20 mg     Ordering Provider: Shandra Barnard PA-C    20 mg Intravenous Once 03/30/21 0615 03/30/21 0548    03/30/21 0032  furosemide (LASIX) injection 20 mg     Ordering Provider: Robert Ortiz MD    20 mg Intravenous Once 03/30/21 0034 03/30/21 0144        ----------------------------------------------------------------------------------------------------------------------  Vital Signs:  Temp:  [97.4 °F (36.3 °C)-98.2 °F (36.8 °C)] 97.4 °F (36.3 °C)  Heart Rate:  [78-84] 84  Resp:  [18-20] 18  BP: (108-134)/(64-72) 124/72  SpO2:  [95 %-99 %] 96 %  on  Flow (L/min):  [2] 2;   Device (Oxygen Therapy): nasal cannula  Body mass index is 32.19 kg/m².    Wt Readings from Last 3 Encounters:   04/03/21  98.9 kg (218 lb)   03/29/21 95.4 kg (210 lb 6 oz)   03/27/21 97 kg (213 lb 12.8 oz)     Intake & Output (last 3 days)       03/31 0701 - 04/01 0700 04/01 0701 - 04/02 0700 04/02 0701 - 04/03 0700 04/03 0701 - 04/04 0700    P.O. 0983 290 0642 360    I.V. (mL/kg) 1234 (12.6)       Total Intake(mL/kg) 2314 (23.6) 960 (10) 1320 (13.3) 360 (3.6)    Urine (mL/kg/hr) 2000 (0.9) 3500 (1.5) 1475 (0.6) 600 (1.4)    Stool  0      Total Output 2000 3500 1475 600    Net +314 -2540 -155 -240            Stool Unmeasured Occurrence  0 x          Diet Regular; Cardiac, Consistent Carbohydrate  ----------------------------------------------------------------------------------------------------------------------  Physical exam:  Constitutional:  Elderly, Obese, no acute distress.      HENT:  Head:  Normocephalic and atraumatic.  Mouth:  Moist mucous membranes.    Eyes:  Conjunctivae and EOM are normal. No scleral icterus.    Neck:  Neck supple.  No JVD present.    Cardiovascular:  Normal rate, regular rhythm and normal heart sounds with no murmur.  Pulmonary/Chest:  No respiratory distress, no wheezes, tno crackles. Healing midsternum incision, no drainage, stable  Abdominal:  Soft. No distension and no tenderness.   Musculoskeletal:  No tenderness, and no deformity.   Neurological:  Alert and oriented to person, place, and time.  No gross focal deficits  Skin:  Skin is warm and dry. No rash noted. No pallor. resolving RLE erythema   Peripheral vascular:  no clubbing, no cyanosis, trace b/l pitting edema   ----------------------------------------------------------------------------------------------------------------------  Results from last 7 days   Lab Units 04/03/21  0530 04/02/21  0352 04/01/21  0148 03/30/21  0616 03/29/21  2320 03/29/21  2110   CRP mg/dL  --   --   --  5.14* 5.67*  --    LACTATE mmol/L  --   --   --  1.8  --   --    WBC 10*3/mm3 7.40 7.12 8.82  --   --   --    HEMOGLOBIN g/dL 10.7* 9.6* 10.1*  --   --   --     HEMATOCRIT % 34.6* 30.7* 33.1*  --   --   --    MCV fL 91.1 90.8 91.7  --   --   --    MCHC g/dL 30.9* 31.3* 30.5*  --   --   --    PLATELETS 10*3/mm3 560* 531* 556*  --   --   --    INR   --   --   --   --   --  0.97         Results from last 7 days   Lab Units 04/03/21  0530 04/02/21  0352 04/01/21  0148 03/30/21  0616 03/30/21  0237 03/29/21 2110   SODIUM mmol/L 134* 134* 136  --  141 135*   POTASSIUM mmol/L 4.4 4.3 5.3*  --  4.2 4.4   MAGNESIUM mg/dL  --   --   --  1.7  --  1.7   CHLORIDE mmol/L 95* 98 96*  --  97* 92*   CO2 mmol/L 29.4* 27.1 27.4  --  28.9 29.3*   BUN mg/dL 25* 22 21  --  24* 25*   CREATININE mg/dL 1.12 1.18 1.10  --  1.11 1.15   EGFR IF NONAFRICN AM mL/min/1.73 65 61 66  --  65 63   CALCIUM mg/dL 9.2 8.7 8.7  --  9.7 9.8   PHOSPHORUS mg/dL  --   --   --   --   --  3.2   GLUCOSE mg/dL 228* 273* 282*  --  221* 255*   ALBUMIN g/dL  --   --   --   --  3.29* 3.58   BILIRUBIN mg/dL  --   --   --   --  0.3 0.3   ALK PHOS U/L  --   --   --   --  113 106   AST (SGOT) U/L  --   --   --   --  13 10   ALT (SGPT) U/L  --   --   --   --  15 14   Estimated Creatinine Clearance: 70.2 mL/min (by C-G formula based on SCr of 1.12 mg/dL).  No results found for: AMMONIA  Results from last 7 days   Lab Units 03/29/21 2320 03/29/21 2110   TROPONIN T ng/mL <0.010 <0.010     Results from last 7 days   Lab Units 03/29/21  2110 03/29/21  0852   PROBNP pg/mL 848.1 816.3         Glucose   Date/Time Value Ref Range Status   04/03/2021 0755 237 (H) 70 - 130 mg/dL Final   04/02/2021 2313 284 (H) 70 - 130 mg/dL Final   04/02/2021 1855 308 (H) 70 - 130 mg/dL Final   04/02/2021 1650 325 (H) 70 - 130 mg/dL Final   04/02/2021 1019 340 (H) 70 - 130 mg/dL Final   04/02/2021 0645 250 (H) 70 - 130 mg/dL Final   04/02/2021 0012 344 (H) 70 - 130 mg/dL Final   04/01/2021 1918 408 (C) 70 - 130 mg/dL Final     Lab Results   Component Value Date    TSH 3.410 03/29/2021     No results found for: PREGTESTUR, PREGSERUM, HCG,  HCGQUANT  Pain Management Panel     Pain Management Panel Latest Ref Rng & Units 3/15/2021 3/14/2021    AMPHETAMINES SCREEN, URINE Negative Negative Negative    BARBITURATES SCREEN Negative Negative Negative    BENZODIAZEPINE SCREEN, URINE Negative Negative Negative    BUPRENORPHINEUR Negative Negative Negative    COCAINE SCREEN, URINE Negative Negative Negative    METHADONE SCREEN, URINE Negative Negative Negative    METHAMPHETAMINEUR Negative Negative -        Brief Urine Lab Results  (Last result in the past 365 days)      Color   Clarity   Blood   Leuk Est   Nitrite   Protein   CREAT   Urine HCG        03/29/21 0921 Yellow Clear Small (1+) Negative Negative Negative             Blood Culture   Date Value Ref Range Status   03/30/2021 No growth at 4 days  Preliminary   03/30/2021 No growth at 4 days  Preliminary     No results found for: URINECX  No results found for: WOUNDCX  No results found for: STOOLCX  No results found for: RESPCX  No results found for: AFBCX  Results from last 7 days   Lab Units 03/30/21  0616 03/29/21  2320   LACTATE mmol/L 1.8  --    CRP mg/dL 5.14* 5.67*     I have personally looked at the labs and they are summarized above.  ----------------------------------------------------------------------------------------------------------------------  Detailed radiology reports for the last 24 hours:  Imaging Results (Last 24 Hours)     ** No results found for the last 24 hours. **        Assessment & Plan    70M Obese PMH chronic kidney disease stage IIIa, chronic normocytic anemia, chronic diastolic CHF, CAD status post four-vessel CABG, hyperlipidemia, type 2 diabetes mellitus, essential hypertension, diabetic neuropathy, gout, BPH, that presented to the [facility] emergency department for evaluation of chest pain, lower extremity edema, and shortness of breath.    #HTN/HLD/CAD s/p recent 4V CABG a few weeks ago  #Acute on Chronic HFpEF - Improved  - Patient presented w/ increased  weakness, dyspnea, LE swelling.    - Labs showed negative trops x 2, proBNP 800  - EKG showed NSR, LAFB, possible old anterior infarct  - Echo 3/24/21 showed LVEF 51-55%, septal wall motion abnormal and c/w post operative state, LV noted to have mild concentric LVH, no significant valvular abnormality, no pericardial effusion  - Cards consulted; Following  - Continue home ASA 81, statin, plavix  - Continue home BB  - -150cc's yesterday, Cr stable, continued on diuresis  - Continue to monitor on tele, strict I/O's, daily weights, trend HR and BP  - Possibly home early next week but will need f/u rescheduled w/ Dr. Simms's office, CT surgery, as his appointment is on Monday.    #Sepsis 2/2 Acute RLE Cellulitis - Improved  - Patient presented w/ LE redness R>L, RR 22, CRP 5.67, WBC count 13K, lactate < 2, Bcx's NGTD  - B/l Venous duplex showed no DVT, mildly prominent upper thigh region nodes  - Continue Vanc and Zosyn x 7 days   - Continue APAP PRN for fevers    #IDDM Type II, uncontrolled, c/b Neuropathy, Hyperglycemia  - Hgb A1c = 8.7%, Glc up to 465 this admission  - Consulted Nutrition.  - Continue home Gabapentin  - Holding home oral agents, continue FSBG and SSI, continue Levemir but increase from 37U to 40U qhs, Aspart increased to home 12U TID w/ meals, titrate as indicated.      #CKDII-III  - Patient presented w/ Cr 1.1, b/l around 1.1-1.5  - Trend Cr and UOP, avoid nephrotoxins, NSAIDs, dehydration and contrast as able.     #Chronic Normocytic Anemia  #Thrombocytosis - Improved  - Plts 647 on admission, Hgb low but stable, MCV NML, no signs of bleeding, Iron 46, Ferritin 208, Iron sat 16, Transferrin 196, TIBC 292, Folate > 20, Vit B 12 1184; Trend CBC daily, continue home oral folate and MVI, transfuse Hgb < 7 or symptomatic     #Gout w/o Acute Flair  - Continue home Allopurinol     #BPH  - Continue home Hytrin/Proscar     #GERD  - Continue home PPI     #Electrolyte Abnormalities  - Acute Mild  Hypomagnesemia - Mg 1.7 on admission, Replacing, on protocol    #Obesity  - BMI 31, complicates all aspects of care    F: Oral  E: Monitor & Replace PRN  N: Cardiac, DM diet  Ppx: SQH  Code: Full Code     Dispo: Pending clinical improvement, PT/OT recs pending     *This patient is considered high risk due to sepsis, cellulitis, Acute CHF exacerbation, recent CABG, severe hyperglycemia.     VTE Prophylaxis:   Mechanical Order History:     None      Pharmalogical Order History:      Ordered     Dose Route Frequency Stop    03/30/21 0137  heparin (porcine) 5000 UNIT/ML injection 5,000 Units      5,000 Units SC Every 12 Hours Scheduled --              Chucky Elise MD  [facility]  04/03/21  11:13 EDT

## 2021-04-03 NOTE — PROGRESS NOTES
continues on day 5 of vancomycin.  A 10.5 hour post infusion level was reported as 13.5 mg/L.  The AUC is calculated at 435 with a trough level of 13.1 on this regimen. These are desired levels and consistent with good clearance. Will continue the dosage of 1 gm q12 hrs to target an AUC of 400-600 mg/L.hr.  Will obtain a repeat level in a few days to guide further dosing.      Thank you,    Anila Pereira, PharmD

## 2021-04-04 LAB
ANION GAP SERPL CALCULATED.3IONS-SCNC: 11.3 MMOL/L (ref 5–15)
BACTERIA SPEC AEROBE CULT: NORMAL
BACTERIA SPEC AEROBE CULT: NORMAL
BUN SERPL-MCNC: 24 MG/DL (ref 8–23)
BUN/CREAT SERPL: 19.7 (ref 7–25)
CALCIUM SPEC-SCNC: 8.8 MG/DL (ref 8.6–10.5)
CHLORIDE SERPL-SCNC: 97 MMOL/L (ref 98–107)
CO2 SERPL-SCNC: 26.7 MMOL/L (ref 22–29)
CREAT SERPL-MCNC: 1.22 MG/DL (ref 0.76–1.27)
DEPRECATED RDW RBC AUTO: 42.6 FL (ref 37–54)
ERYTHROCYTE [DISTWIDTH] IN BLOOD BY AUTOMATED COUNT: 12.9 % (ref 12.3–15.4)
GFR SERPL CREATININE-BSD FRML MDRD: 59 ML/MIN/1.73
GLUCOSE BLDC GLUCOMTR-MCNC: 275 MG/DL (ref 70–130)
GLUCOSE BLDC GLUCOMTR-MCNC: 347 MG/DL (ref 70–130)
GLUCOSE BLDC GLUCOMTR-MCNC: 387 MG/DL (ref 70–130)
GLUCOSE BLDC GLUCOMTR-MCNC: 398 MG/DL (ref 70–130)
GLUCOSE SERPL-MCNC: 286 MG/DL (ref 65–99)
HCT VFR BLD AUTO: 31.4 % (ref 37.5–51)
HGB BLD-MCNC: 9.8 G/DL (ref 13–17.7)
MCH RBC QN AUTO: 28.2 PG (ref 26.6–33)
MCHC RBC AUTO-ENTMCNC: 31.2 G/DL (ref 31.5–35.7)
MCV RBC AUTO: 90.5 FL (ref 79–97)
PLATELET # BLD AUTO: 506 10*3/MM3 (ref 140–450)
PMV BLD AUTO: 8.6 FL (ref 6–12)
POTASSIUM SERPL-SCNC: 4.4 MMOL/L (ref 3.5–5.2)
RBC # BLD AUTO: 3.47 10*6/MM3 (ref 4.14–5.8)
SODIUM SERPL-SCNC: 135 MMOL/L (ref 136–145)
WBC # BLD AUTO: 7.17 10*3/MM3 (ref 3.4–10.8)

## 2021-04-04 PROCEDURE — 94799 UNLISTED PULMONARY SVC/PX: CPT

## 2021-04-04 PROCEDURE — 25010000002 VANCOMYCIN 1 G RECONSTITUTED SOLUTION: Performed by: INTERNAL MEDICINE

## 2021-04-04 PROCEDURE — 25010000002 HEPARIN (PORCINE) PER 1000 UNITS: Performed by: HOSPITALIST

## 2021-04-04 PROCEDURE — 25010000002 FUROSEMIDE PER 20 MG: Performed by: NURSE PRACTITIONER

## 2021-04-04 PROCEDURE — 85027 COMPLETE CBC AUTOMATED: CPT | Performed by: INTERNAL MEDICINE

## 2021-04-04 PROCEDURE — 80048 BASIC METABOLIC PNL TOTAL CA: CPT | Performed by: INTERNAL MEDICINE

## 2021-04-04 PROCEDURE — 99232 SBSQ HOSP IP/OBS MODERATE 35: CPT | Performed by: SPECIALIST

## 2021-04-04 PROCEDURE — 63710000001 INSULIN ASPART PER 5 UNITS: Performed by: INTERNAL MEDICINE

## 2021-04-04 PROCEDURE — 82962 GLUCOSE BLOOD TEST: CPT

## 2021-04-04 PROCEDURE — 25010000002 PIPERACILLIN SOD-TAZOBACTAM PER 1 G: Performed by: HOSPITALIST

## 2021-04-04 PROCEDURE — 25010000002 ONDANSETRON PER 1 MG: Performed by: INTERNAL MEDICINE

## 2021-04-04 PROCEDURE — 63710000001 INSULIN DETEMIR PER 5 UNITS: Performed by: INTERNAL MEDICINE

## 2021-04-04 PROCEDURE — 99232 SBSQ HOSP IP/OBS MODERATE 35: CPT | Performed by: INTERNAL MEDICINE

## 2021-04-04 RX ORDER — LACTULOSE 10 G/15ML
30 SOLUTION ORAL ONCE
Status: COMPLETED | OUTPATIENT
Start: 2021-04-04 | End: 2021-04-04

## 2021-04-04 RX ORDER — LACTULOSE 10 G/15ML
30 SOLUTION ORAL 3 TIMES DAILY
Status: DISCONTINUED | OUTPATIENT
Start: 2021-04-04 | End: 2021-04-08

## 2021-04-04 RX ORDER — BISACODYL 10 MG
10 SUPPOSITORY, RECTAL RECTAL DAILY
Status: DISCONTINUED | OUTPATIENT
Start: 2021-04-04 | End: 2021-04-09 | Stop reason: HOSPADM

## 2021-04-04 RX ADMIN — INSULIN ASPART 14 UNITS: 100 INJECTION, SOLUTION INTRAVENOUS; SUBCUTANEOUS at 17:02

## 2021-04-04 RX ADMIN — CLOPIDOGREL 75 MG: 75 TABLET, FILM COATED ORAL at 08:09

## 2021-04-04 RX ADMIN — METOPROLOL SUCCINATE 50 MG: 50 TABLET, EXTENDED RELEASE ORAL at 08:09

## 2021-04-04 RX ADMIN — OXYCODONE HYDROCHLORIDE AND ACETAMINOPHEN 1 TABLET: 7.5; 325 TABLET ORAL at 08:09

## 2021-04-04 RX ADMIN — FUROSEMIDE 40 MG: 10 INJECTION, SOLUTION INTRAMUSCULAR; INTRAVENOUS at 17:02

## 2021-04-04 RX ADMIN — VANCOMYCIN HYDROCHLORIDE 1000 MG: 1 INJECTION, POWDER, LYOPHILIZED, FOR SOLUTION INTRAVENOUS at 00:47

## 2021-04-04 RX ADMIN — SODIUM CHLORIDE, PRESERVATIVE FREE 10 ML: 5 INJECTION INTRAVENOUS at 20:25

## 2021-04-04 RX ADMIN — Medication 400 UNITS: at 08:09

## 2021-04-04 RX ADMIN — FOLIC ACID 1 MG: 1 TABLET ORAL at 20:26

## 2021-04-04 RX ADMIN — FUROSEMIDE 40 MG: 10 INJECTION, SOLUTION INTRAMUSCULAR; INTRAVENOUS at 05:03

## 2021-04-04 RX ADMIN — PIPERACILLIN SODIUM AND TAZOBACTAM SODIUM 3.38 G: 3; .375 INJECTION, POWDER, LYOPHILIZED, FOR SOLUTION INTRAVENOUS at 20:27

## 2021-04-04 RX ADMIN — ATORVASTATIN CALCIUM 40 MG: 40 TABLET, FILM COATED ORAL at 20:26

## 2021-04-04 RX ADMIN — PIPERACILLIN SODIUM AND TAZOBACTAM SODIUM 3.38 G: 3; .375 INJECTION, POWDER, LYOPHILIZED, FOR SOLUTION INTRAVENOUS at 13:28

## 2021-04-04 RX ADMIN — ONDANSETRON 4 MG: 2 INJECTION INTRAMUSCULAR; INTRAVENOUS at 22:35

## 2021-04-04 RX ADMIN — BISACODYL 10 MG: 10 SUPPOSITORY RECTAL at 17:02

## 2021-04-04 RX ADMIN — DOCUSATE SODIUM 50 MG AND SENNOSIDES 8.6 MG 2 TABLET: 8.6; 5 TABLET, FILM COATED ORAL at 20:26

## 2021-04-04 RX ADMIN — Medication 1 CAPSULE: at 08:09

## 2021-04-04 RX ADMIN — Medication 1 TABLET: at 08:09

## 2021-04-04 RX ADMIN — FOLIC ACID 1 MG: 1 TABLET ORAL at 08:09

## 2021-04-04 RX ADMIN — LACTULOSE 30 G: 10 SOLUTION ORAL at 11:38

## 2021-04-04 RX ADMIN — PIPERACILLIN SODIUM AND TAZOBACTAM SODIUM 3.38 G: 3; .375 INJECTION, POWDER, LYOPHILIZED, FOR SOLUTION INTRAVENOUS at 05:02

## 2021-04-04 RX ADMIN — GABAPENTIN 300 MG: 300 CAPSULE ORAL at 08:09

## 2021-04-04 RX ADMIN — INSULIN ASPART 10 UNITS: 100 INJECTION, SOLUTION INTRAVENOUS; SUBCUTANEOUS at 11:39

## 2021-04-04 RX ADMIN — INSULIN ASPART 12 UNITS: 100 INJECTION, SOLUTION INTRAVENOUS; SUBCUTANEOUS at 08:10

## 2021-04-04 RX ADMIN — INSULIN DETEMIR 42 UNITS: 100 INJECTION, SOLUTION SUBCUTANEOUS at 20:28

## 2021-04-04 RX ADMIN — ASPIRIN 81 MG: 81 TABLET, COATED ORAL at 08:09

## 2021-04-04 RX ADMIN — DOCUSATE SODIUM 50 MG AND SENNOSIDES 8.6 MG 2 TABLET: 8.6; 5 TABLET, FILM COATED ORAL at 08:09

## 2021-04-04 RX ADMIN — OXYCODONE HYDROCHLORIDE AND ACETAMINOPHEN 1 TABLET: 7.5; 325 TABLET ORAL at 01:51

## 2021-04-04 RX ADMIN — VANCOMYCIN HYDROCHLORIDE 1000 MG: 1 INJECTION, POWDER, LYOPHILIZED, FOR SOLUTION INTRAVENOUS at 11:38

## 2021-04-04 RX ADMIN — SODIUM CHLORIDE, PRESERVATIVE FREE 10 ML: 5 INJECTION INTRAVENOUS at 08:10

## 2021-04-04 RX ADMIN — LACTULOSE 30 G: 20 SOLUTION ORAL at 17:02

## 2021-04-04 RX ADMIN — GABAPENTIN 300 MG: 300 CAPSULE ORAL at 20:25

## 2021-04-04 RX ADMIN — LACTULOSE 30 G: 20 SOLUTION ORAL at 20:26

## 2021-04-04 RX ADMIN — ALLOPURINOL 300 MG: 300 TABLET ORAL at 08:09

## 2021-04-04 RX ADMIN — TERAZOSIN HYDROCHLORIDE 5 MG: 5 CAPSULE ORAL at 20:27

## 2021-04-04 RX ADMIN — HEPARIN SODIUM 5000 UNITS: 5000 INJECTION, SOLUTION INTRAVENOUS; SUBCUTANEOUS at 20:27

## 2021-04-04 RX ADMIN — INSULIN ASPART 14 UNITS: 100 INJECTION, SOLUTION INTRAVENOUS; SUBCUTANEOUS at 11:39

## 2021-04-04 RX ADMIN — PANTOPRAZOLE SODIUM 40 MG: 40 TABLET, DELAYED RELEASE ORAL at 05:03

## 2021-04-04 RX ADMIN — HEPARIN SODIUM 5000 UNITS: 5000 INJECTION, SOLUTION INTRAVENOUS; SUBCUTANEOUS at 08:10

## 2021-04-04 RX ADMIN — INSULIN ASPART 8 UNITS: 100 INJECTION, SOLUTION INTRAVENOUS; SUBCUTANEOUS at 08:09

## 2021-04-04 RX ADMIN — FINASTERIDE 5 MG: 5 TABLET, FILM COATED ORAL at 08:09

## 2021-04-04 NOTE — PLAN OF CARE
Problem: Adult Inpatient Plan of Care  Goal: Plan of Care Review  Outcome: Ongoing, Progressing  Goal: Patient-Specific Goal (Individualized)  Outcome: Ongoing, Progressing  Goal: Absence of Hospital-Acquired Illness or Injury  Outcome: Ongoing, Progressing  Intervention: Identify and Manage Fall Risk  Recent Flowsheet Documentation  Taken 4/3/2021 1932 by Soledad Funk, RN  Safety Promotion/Fall Prevention:   safety round/check completed   room organization consistent   fall prevention program maintained  Intervention: Prevent and Manage VTE (venous thromboembolism) Risk  Recent Flowsheet Documentation  Taken 4/3/2021 1932 by Soledad Funk, RN  VTE Prevention/Management: (heparin subq, see MAR) other (see comments)  Intervention: Prevent Infection  Recent Flowsheet Documentation  Taken 4/3/2021 1932 by Soledad Funk, RN  Infection Prevention:   rest/sleep promoted   single patient room provided  Goal: Optimal Comfort and Wellbeing  Outcome: Ongoing, Progressing  Intervention: Provide Person-Centered Care  Recent Flowsheet Documentation  Taken 4/3/2021 1932 by Soledad Funk, RN  Trust Relationship/Rapport:   care explained   choices provided   emotional support provided   empathic listening provided   questions answered   questions encouraged   reassurance provided   thoughts/feelings acknowledged  Goal: Readiness for Transition of Care  Outcome: Ongoing, Progressing

## 2021-04-04 NOTE — PLAN OF CARE
Goal Outcome Evaluation:     Progress: improving     Pt resting in bed. Had small hard bowel movement after enema. Wife at bedside. Denies any needs.

## 2021-04-04 NOTE — PROGRESS NOTES
Patient Identification:  Name:  William Villasenor  Age:  70 y.o.  Sex:  male  :  1950  MRN:  [id number]  Visit Number:  [id number]    Chief Complaint:   Shortness of breath, CHF, lower extremity wound infection  Subjective:    He continues to feel better however he is not at baseline yet he still have some shortness of breath but it is better  ----------------------------------------------------------------------------------------------------------------------  Current Hospital Meds:  allopurinol, 300 mg, Oral, Daily  aspirin, 81 mg, Oral, Daily  atorvastatin, 40 mg, Oral, Nightly  clopidogrel, 75 mg, Oral, Daily  finasteride, 5 mg, Oral, Daily  folic acid, 1 mg, Oral, BID  furosemide, 40 mg, Intravenous, Q12H  gabapentin, 300 mg, Oral, Q12H  heparin (porcine), 5,000 Units, Subcutaneous, Q12H  insulin aspart, 0-14 Units, Subcutaneous, TID PC  insulin aspart, 14 Units, Subcutaneous, TID With Meals  insulin detemir, 42 Units, Subcutaneous, Nightly  lactobacillus acidophilus, 1 capsule, Oral, Daily  lactulose, 30 g, Oral, Once  metoprolol succinate XL, 50 mg, Oral, Q24H  multivitamin, 1 tablet, Oral, Daily  pantoprazole, 40 mg, Oral, Q AM  piperacillin-tazobactam, 3.375 g, Intravenous, Q8H  sennosides-docusate, 2 tablet, Oral, BID  sodium chloride, 10 mL, Intravenous, Q12H  terazosin, 5 mg, Oral, Nightly  vancomycin, 1,000 mg, Intravenous, Q12H  vitamin E, 400 Units, Oral, Daily         ----------------------------------------------------------------------------------------------------------------------  Vital Signs:  Temp:  [97.3 °F (36.3 °C)-98.8 °F (37.1 °C)] 98.2 °F (36.8 °C)  Heart Rate:  [85-91] 86  Resp:  [18-20] 20  BP: (122-151)/(66-77) 151/77  Vital Signs (last 72 hrs)       700  -   0659 700  -   0659 700  -   0659 700  -   1125   Most Recent    Temp (°F) 97.3 -  98.4    97.7 -  98.2    97.3 -  98.8      98.2     98.2 (36.8)    Heart Rate 76 -  85    78 -   83    84 -  91      86     86    Resp 18 -  20    18 -  20    18 -  20      20     20    /63 -  143/71    108/66 -  134/69    122/66 -  136/73      151/77     151/77    SpO2 (%) 96 -  98    95 -  99    95 -  97      98     98            04/02/21  0500 04/03/21  0500 04/04/21  0418   Weight: 96.4 kg (212 lb 9.6 oz) (Pt refused standing scale.  RN notified.) 98.9 kg (218 lb) 98.7 kg (217 lb 9.6 oz)     Body mass index is 32.13 kg/m².    Intake/Output Summary (Last 24 hours) at 4/4/2021 1125  Last data filed at 4/4/2021 1030  Gross per 24 hour   Intake 2920 ml   Output 3250 ml   Net -330 ml     Diet Regular; Cardiac, Consistent Carbohydrate  ----------------------------------------------------------------------------------------------------------------------  Physical exam:    HEENT:  Head:  Normocephalic and atraumatic.     Eyes:  Conjunctivae and EOM are normal.  Pupils are equal, round, and reactive to light.  No scleral icterus.    Neck:  Neck supple.  No JVD present.  No bruit.  Cardiovascular: Normal rate, regular rhythm, S1 S2+, NO S3 / S4  Pulmonary/Chest:  Vesicular breath sounds B/L, Clear to auscultation, with no added sounds.  Abdominal:  Soft.  Bowel sounds are normal.  No distension and no tenderness.  No organomegaly.  Neurological:  Alert and oriented to person, place, and time. No focal defecits  Skin:  Skin is warm and dry. No rash noted. No pallor.   Musculoskeletal:  No tenderness, and no deformity.  No red or swollen joints anywhere.   Lower extremities: No edema, Peripheral vascular:  2+ Pulses B/L DP.  ----------------------------------------------------------------------------------------------------------------------    ----------------------------------------------------------------------------------------------------------------------  Results from last 7 days   Lab Units 03/29/21  2320 03/29/21  2110   TROPONIN T ng/mL <0.010 <0.010     Results from last 7 days   Lab Units  04/04/21 0443 04/03/21 0530 04/02/21 0352 03/30/21  0616 03/29/21  2320 03/29/21  2110   CRP mg/dL  --   --   --  5.14* 5.67*  --    LACTATE mmol/L  --   --   --  1.8  --   --    WBC 10*3/mm3 7.17 7.40 7.12  --   --   --    HEMOGLOBIN g/dL 9.8* 10.7* 9.6*  --   --   --    HEMATOCRIT % 31.4* 34.6* 30.7*  --   --   --    MCV fL 90.5 91.1 90.8  --   --   --    MCHC g/dL 31.2* 30.9* 31.3*  --   --   --    PLATELETS 10*3/mm3 506* 560* 531*  --   --   --    INR   --   --   --   --   --  0.97         Results from last 7 days   Lab Units 04/04/21 0443 04/03/21 0530 04/02/21 0352 03/30/21 0616 03/30/21 0237 03/29/21 2110   SODIUM mmol/L 135* 134* 134*  --  141 135*   POTASSIUM mmol/L 4.4 4.4 4.3  --  4.2 4.4   MAGNESIUM mg/dL  --   --   --  1.7  --  1.7   CHLORIDE mmol/L 97* 95* 98  --  97* 92*   CO2 mmol/L 26.7 29.4* 27.1  --  28.9 29.3*   BUN mg/dL 24* 25* 22  --  24* 25*   CREATININE mg/dL 1.22 1.12 1.18  --  1.11 1.15   EGFR IF NONAFRICN AM mL/min/1.73 59* 65 61  --  65 63   CALCIUM mg/dL 8.8 9.2 8.7  --  9.7 9.8   GLUCOSE mg/dL 286* 228* 273*  --  221* 255*   ALBUMIN g/dL  --   --   --   --  3.29* 3.58   BILIRUBIN mg/dL  --   --   --   --  0.3 0.3   ALK PHOS U/L  --   --   --   --  113 106   AST (SGOT) U/L  --   --   --   --  13 10   ALT (SGPT) U/L  --   --   --   --  15 14   Estimated Creatinine Clearance: 64.3 mL/min (by C-G formula based on SCr of 1.22 mg/dL).    No results found for: AMMONIA      Blood Culture   Date Value Ref Range Status   03/30/2021 No growth at 5 days  Final   03/30/2021 No growth at 5 days  Final     No results found for: URINECX  No results found for: WOUNDCX  No results found for: STOOLCX    I have personally looked at the labs and they are summarized above.  ----------------------------------------------------------------------------------------------------------------------  Imaging Results (Last 24 Hours)     ** No results found for the last 24 hours. **         ----------------------------------------------------------------------------------------------------------------------    Assessment:  1.  Acute on chronic HFpEF  2.  Coronary artery disease status post recent coronary bypass grafting x4 with LIMA to the LAD, SVG graft to first diagonal, obtuse marginal 1 and obtuse marginal 2  3.  Lower extremity wound infection  4.  Hyperlipidemia  5.  History of tobacco abuse  6.  Diabetes mellitus type 2  7.  Essential hypertension  8.  Acute renal impairment  9.  Benign prostate hypertrophy  10.  GERD    Plan:  1.  Patient still diuresing modestly but his creatinine is rising mildly so we will continue the current dose of furosemide for now and monitoring creatinine closely  2.  Continue antibiotic for lower extremity wound as per medicine service  3.  Continue statin  4.  Blood pressure acceptable monitor      Parvin James MD   04/04/21 11:25 EDT

## 2021-04-04 NOTE — PROGRESS NOTES
[facility] HOSPITALIST PROGRESS NOTE     Patient Identification:  Name:  William Villasenor  Age:  70 y.o.  Sex:  male  :  1950  MRN:  [id number]  Visit Number:  [id number]  ROOM: [address] [place] [address]     Primary Care Provider:  Jacqueline Gatica MD    Length of stay in inpatient status:  5    Subjective     Chief Compliant:    Chief Complaint   Patient presents with   • Chest Pain   • Shortness of Breath     History of Presenting Illness:    Patient stable today, no acute events overnight, no new complaints, still having some redness on his legs, pain has improved, continued on Abx, UOP remains net negative daily, Cr slightly up at 1.2, cards following and continuing diuresis, Glc remains elevated, patient notes he had orange juice today, titrating up long and mealtime insulin today, he denies any fevers or chills.   Objective     Current Hospital Meds:allopurinol, 300 mg, Oral, Daily  aspirin, 81 mg, Oral, Daily  atorvastatin, 40 mg, Oral, Nightly  clopidogrel, 75 mg, Oral, Daily  finasteride, 5 mg, Oral, Daily  folic acid, 1 mg, Oral, BID  furosemide, 40 mg, Intravenous, Q12H  gabapentin, 300 mg, Oral, Q12H  heparin (porcine), 5,000 Units, Subcutaneous, Q12H  insulin aspart, 0-14 Units, Subcutaneous, TID PC  insulin aspart, 14 Units, Subcutaneous, TID With Meals  insulin detemir, 42 Units, Subcutaneous, Nightly  lactobacillus acidophilus, 1 capsule, Oral, Daily  metoprolol succinate XL, 50 mg, Oral, Q24H  multivitamin, 1 tablet, Oral, Daily  pantoprazole, 40 mg, Oral, Q AM  piperacillin-tazobactam, 3.375 g, Intravenous, Q8H  sennosides-docusate, 2 tablet, Oral, BID  sodium chloride, 10 mL, Intravenous, Q12H  terazosin, 5 mg, Oral, Nightly  vancomycin, 1,000 mg, Intravenous, Q12H  vitamin E, 400 Units, Oral, Daily         Current Antimicrobial Therapy:  Anti-Infectives (From admission, onward)    Ordered     Dose/Rate Route Frequency Start Stop    21 1304  vancomycin 1 g/250 mL 0.9% NS (vial-mate)      Ordering Provider: Chucky Elise MD    1,000 mg  over 60 Minutes Intravenous Every 12 Hours 04/02/21 0100 04/09/21 0059    03/30/21 0530  piperacillin-tazobactam (ZOSYN) 3.375 g/100 mL 0.9% NS IVPB (mbp)     Ordering Provider: Robert Ortiz MD    3.375 g  over 4 Hours Intravenous Every 8 Hours 03/30/21 1300 04/09/21 1259    03/30/21 0530  piperacillin-tazobactam (ZOSYN) 3.375 g/100 mL 0.9% NS IVPB (mbp)     Ordering Provider: Robert Ortiz MD    3.375 g  over 30 Minutes Intravenous Once 03/30/21 0700 03/30/21 0711    03/30/21 0530  vancomycin 2000 mg/500 mL 0.9% NS IVPB ([facility])     Ordering Provider: Robert Ortiz MD    20 mg/kg × 97 kg  over 120 Minutes Intravenous Once 03/30/21 0700 03/30/21 0841        Current Diuretic Therapy:  Diuretics (From admission, onward)    Ordered     Dose/Rate Route Frequency Start Stop    04/01/21 1533  furosemide (LASIX) injection 40 mg     Ordering Provider: Maddi Munoz APRN    40 mg Intravenous Every 12 Hours 04/01/21 1800      03/30/21 0522  furosemide (LASIX) injection 20 mg     Ordering Provider: Shandra Barnard PA-C    20 mg Intravenous Once 03/30/21 0615 03/30/21 0548    03/30/21 0032  furosemide (LASIX) injection 20 mg     Ordering Provider: Robert Ortiz MD    20 mg Intravenous Once 03/30/21 0034 03/30/21 0144        ----------------------------------------------------------------------------------------------------------------------  Vital Signs:  Temp:  [97.3 °F (36.3 °C)-98.8 °F (37.1 °C)] 98.2 °F (36.8 °C)  Heart Rate:  [85-91] 86  Resp:  [18-20] 20  BP: (122-151)/(66-77) 151/77  SpO2:  [95 %-98 %] 98 %  on  Flow (L/min):  [2] 2;   Device (Oxygen Therapy): nasal cannula  Body mass index is 32.13 kg/m².    Wt Readings from Last 3 Encounters:   04/04/21 98.7 kg (217 lb 9.6 oz)   03/29/21 95.4 kg (210 lb 6 oz)   03/27/21 97 kg (213 lb 12.8 oz)     Intake & Output (last 3 days)       04/01 0701 - 04/02 0700 04/02 0701 -  04/03 0700 04/03 0701 - 04/04 0700 04/04 0701 - 04/05 0700    P.O. 960 1320 1200 480    I.V. (mL/kg)   1600 (16.2)     Total Intake(mL/kg) 960 (10) 1320 (13.3) 2800 (28.4) 480 (4.9)    Urine (mL/kg/hr) 3500 (1.5) 1475 (0.6) 3200 (1.4) 650 (1.3)    Stool 0       Total Output 3500 1475 3200 650    Net -7500 -155 -400 -170            Stool Unmeasured Occurrence 0 x           Diet Regular; Cardiac, Consistent Carbohydrate  ----------------------------------------------------------------------------------------------------------------------  Physical exam:  Constitutional:  Elderly, Obese, no acute distress.      HENT:  Head:  Normocephalic and atraumatic.  Mouth:  Moist mucous membranes.    Eyes:  Conjunctivae and EOM are normal. No scleral icterus.    Neck:  Neck supple.  No JVD present.    Cardiovascular:  Normal rate, regular rhythm and normal heart sounds with no murmur.  Pulmonary/Chest:  No respiratory distress, no wheezes, no crackles. Healing midsternum incision stable  Abdominal:  Soft. No distension and no tenderness.   Musculoskeletal:  No tenderness, and no deformity.   Neurological:  Alert and oriented to person, place, and time.  No gross focal deficits  Skin:  Skin is warm and dry. No rash noted. No pallor. resolving RLE erythema though still some patchy blanching erythema anterior tibia  Peripheral vascular:  no clubbing, no cyanosis, trace-+1 b/l pitting edema   ----------------------------------------------------------------------------------------------------------------------  Results from last 7 days   Lab Units 04/04/21  0443 04/03/21  0530 04/02/21  0352 03/30/21  0616 03/29/21  2320 03/29/21  2110   CRP mg/dL  --   --   --  5.14* 5.67*  --    LACTATE mmol/L  --   --   --  1.8  --   --    WBC 10*3/mm3 7.17 7.40 7.12  --   --   --    HEMOGLOBIN g/dL 9.8* 10.7* 9.6*  --   --   --    HEMATOCRIT % 31.4* 34.6* 30.7*  --   --   --    MCV fL 90.5 91.1 90.8  --   --   --    MCHC g/dL 31.2* 30.9* 31.3*  --    --   --    PLATELETS 10*3/mm3 506* 560* 531*  --   --   --    INR   --   --   --   --   --  0.97         Results from last 7 days   Lab Units 04/04/21  0443 04/03/21  0530 04/02/21  0352 03/30/21  0616 03/30/21  0237 03/29/21 2110   SODIUM mmol/L 135* 134* 134*  --  141 135*   POTASSIUM mmol/L 4.4 4.4 4.3  --  4.2 4.4   MAGNESIUM mg/dL  --   --   --  1.7  --  1.7   CHLORIDE mmol/L 97* 95* 98  --  97* 92*   CO2 mmol/L 26.7 29.4* 27.1  --  28.9 29.3*   BUN mg/dL 24* 25* 22  --  24* 25*   CREATININE mg/dL 1.22 1.12 1.18  --  1.11 1.15   EGFR IF NONAFRICN AM mL/min/1.73 59* 65 61  --  65 63   CALCIUM mg/dL 8.8 9.2 8.7  --  9.7 9.8   PHOSPHORUS mg/dL  --   --   --   --   --  3.2   GLUCOSE mg/dL 286* 228* 273*  --  221* 255*   ALBUMIN g/dL  --   --   --   --  3.29* 3.58   BILIRUBIN mg/dL  --   --   --   --  0.3 0.3   ALK PHOS U/L  --   --   --   --  113 106   AST (SGOT) U/L  --   --   --   --  13 10   ALT (SGPT) U/L  --   --   --   --  15 14   Estimated Creatinine Clearance: 64.3 mL/min (by C-G formula based on SCr of 1.22 mg/dL).  No results found for: AMMONIA  Results from last 7 days   Lab Units 03/29/21 2320 03/29/21 2110   TROPONIN T ng/mL <0.010 <0.010     Results from last 7 days   Lab Units 03/29/21 2110 03/29/21  0852   PROBNP pg/mL 848.1 816.3         Glucose   Date/Time Value Ref Range Status   04/04/2021 1032 347 (H) 70 - 130 mg/dL Final   04/04/2021 0639 275 (H) 70 - 130 mg/dL Final   04/03/2021 2304 346 (H) 70 - 130 mg/dL Final   04/03/2021 1930 437 (C) 70 - 130 mg/dL Final   04/03/2021 1648 339 (H) 70 - 130 mg/dL Final   04/03/2021 1217 301 (H) 70 - 130 mg/dL Final   04/03/2021 0755 237 (H) 70 - 130 mg/dL Final   04/02/2021 2313 284 (H) 70 - 130 mg/dL Final     Lab Results   Component Value Date    TSH 3.410 03/29/2021     No results found for: PREGTESTUR, PREGSERUM, HCG, HCGQUANT  Pain Management Panel     Pain Management Panel Latest Ref Rng & Units 3/15/2021 3/14/2021    AMPHETAMINES SCREEN, URINE  Negative Negative Negative    BARBITURATES SCREEN Negative Negative Negative    BENZODIAZEPINE SCREEN, URINE Negative Negative Negative    BUPRENORPHINEUR Negative Negative Negative    COCAINE SCREEN, URINE Negative Negative Negative    METHADONE SCREEN, URINE Negative Negative Negative    METHAMPHETAMINEUR Negative Negative -        Brief Urine Lab Results  (Last result in the past 365 days)      Color   Clarity   Blood   Leuk Est   Nitrite   Protein   CREAT   Urine HCG        03/29/21 0921 Yellow Clear Small (1+) Negative Negative Negative             Blood Culture   Date Value Ref Range Status   03/30/2021 No growth at 5 days  Final   03/30/2021 No growth at 5 days  Final     No results found for: URINECX  No results found for: WOUNDCX  No results found for: STOOLCX  No results found for: RESPCX  No results found for: AFBCX  Results from last 7 days   Lab Units 03/30/21  0616 03/29/21  2320   LACTATE mmol/L 1.8  --    CRP mg/dL 5.14* 5.67*     I have personally looked at the labs and they are summarized above.  ----------------------------------------------------------------------------------------------------------------------  Detailed radiology reports for the last 24 hours:  Imaging Results (Last 24 Hours)     ** No results found for the last 24 hours. **        Assessment & Plan    70M Obese PMH chronic kidney disease stage IIIa, chronic normocytic anemia, chronic diastolic CHF, CAD status post four-vessel CABG, hyperlipidemia, type 2 diabetes mellitus, essential hypertension, diabetic neuropathy, gout, BPH, that presented to the [facility] emergency department for evaluation of chest pain, lower extremity edema, and shortness of breath.    #HTN/HLD/CAD s/p recent 4V CABG a few weeks ago  #Acute on Chronic HFpEF - Improved  - Patient presented w/ increased weakness, dyspnea, LE swelling.    - Labs showed negative trops x 2, proBNP 800  - EKG showed NSR, LAFB, possible old anterior infarct  - Echo  3/24/21 showed LVEF 51-55%, septal wall motion abnormal and c/w post operative state, LV noted to have mild concentric LVH, no significant valvular abnormality, no pericardial effusion  - Cards consulted; Following  - Continue home ASA 81, statin, plavix  - Continue home BB  - -400cc's yesterday, Cr stable, continued on diuresis per Cards  - Continue to monitor on tele, strict I/O's, daily weights, trend HR and BP  - Will need f/u rescheduled w/ Dr. Simms's office, CT surgery, as his appointment is on Monday.    #Sepsis 2/2 Acute RLE Cellulitis - Improved  - Patient presented w/ LE redness R>L, RR 22, CRP 5.67, WBC count 13K, lactate < 2, Bcx's NGTD  - B/l Venous duplex showed no DVT, mildly prominent upper thigh region nodes  - Continue Vanc and Zosyn x 7-10 days, still has some mild blanching erythema RLE  - Continue APAP PRN for fevers    #IDDM Type II, uncontrolled, c/b Neuropathy, Hyperglycemia  - Hgb A1c = 8.7%, Glc up to 465 this admission  - Consulted Nutrition.  - Continue home Gabapentin  - Holding home oral agents, continue FSBG and SSI, continue Levemir but increase from 40U to 42U qhs, Aspart increased to home 14U TID w/ meals, titrate as indicated.      #CKDII-III  - Patient presented w/ Cr 1.1, b/l around 1.1-1.5  - Trend Cr and UOP, avoid nephrotoxins, NSAIDs, dehydration and contrast as able.     #Chronic Normocytic Anemia  #Thrombocytosis - Improved  - Plts 647 on admission, Hgb low but stable, MCV NML, no signs of bleeding, Iron 46, Ferritin 208, Iron sat 16, Transferrin 196, TIBC 292, Folate > 20, Vit B 12 1184; Trend CBC daily, continue home oral folate and MVI, transfuse Hgb < 7 or symptomatic     #Gout w/o Acute Flair  - Continue home Allopurinol     #BPH  - Continue home Hytrin/Proscar     #GERD  - Continue home PPI     #Electrolyte Abnormalities  - Acute Mild Hypomagnesemia - Mg 1.7 on admission, Replacing, on protocol    #Obesity  - BMI 31, complicates all aspects of care    F: Oral  E:  Monitor & Replace PRN  N: Cardiac, DM diet  Ppx: SQH  Code: Full Code     Dispo: Pending clinical improvement, PT/OT recs pending     *This patient is considered high risk due to sepsis, cellulitis, Acute CHF exacerbation, recent CABG, severe hyperglycemia.     VTE Prophylaxis:   Mechanical Order History:     None      Pharmalogical Order History:      Ordered     Dose Route Frequency Stop    03/30/21 0137  heparin (porcine) 5000 UNIT/ML injection 5,000 Units      5,000 Units SC Every 12 Hours Scheduled --              Chucky Elise MD  [facility]  04/04/21  12:05 EDT

## 2021-04-05 LAB
ANION GAP SERPL CALCULATED.3IONS-SCNC: 12.5 MMOL/L (ref 5–15)
BUN SERPL-MCNC: 22 MG/DL (ref 8–23)
BUN/CREAT SERPL: 18.2 (ref 7–25)
CALCIUM SPEC-SCNC: 9 MG/DL (ref 8.6–10.5)
CHLORIDE SERPL-SCNC: 100 MMOL/L (ref 98–107)
CO2 SERPL-SCNC: 25.5 MMOL/L (ref 22–29)
CREAT SERPL-MCNC: 1.21 MG/DL (ref 0.76–1.27)
DEPRECATED RDW RBC AUTO: 43.2 FL (ref 37–54)
ERYTHROCYTE [DISTWIDTH] IN BLOOD BY AUTOMATED COUNT: 13.2 % (ref 12.3–15.4)
GFR SERPL CREATININE-BSD FRML MDRD: 59 ML/MIN/1.73
GLUCOSE BLDC GLUCOMTR-MCNC: 270 MG/DL (ref 70–130)
GLUCOSE BLDC GLUCOMTR-MCNC: 272 MG/DL (ref 70–130)
GLUCOSE BLDC GLUCOMTR-MCNC: 281 MG/DL (ref 70–130)
GLUCOSE BLDC GLUCOMTR-MCNC: 303 MG/DL (ref 70–130)
GLUCOSE BLDC GLUCOMTR-MCNC: 308 MG/DL (ref 70–130)
GLUCOSE SERPL-MCNC: 270 MG/DL (ref 65–99)
HCT VFR BLD AUTO: 31.1 % (ref 37.5–51)
HGB BLD-MCNC: 9.6 G/DL (ref 13–17.7)
MCH RBC QN AUTO: 28.2 PG (ref 26.6–33)
MCHC RBC AUTO-ENTMCNC: 30.9 G/DL (ref 31.5–35.7)
MCV RBC AUTO: 91.2 FL (ref 79–97)
PLATELET # BLD AUTO: 484 10*3/MM3 (ref 140–450)
PMV BLD AUTO: 8.5 FL (ref 6–12)
POTASSIUM SERPL-SCNC: 4 MMOL/L (ref 3.5–5.2)
RBC # BLD AUTO: 3.41 10*6/MM3 (ref 4.14–5.8)
SODIUM SERPL-SCNC: 138 MMOL/L (ref 136–145)
WBC # BLD AUTO: 10.08 10*3/MM3 (ref 3.4–10.8)

## 2021-04-05 PROCEDURE — 25010000002 FUROSEMIDE PER 20 MG: Performed by: NURSE PRACTITIONER

## 2021-04-05 PROCEDURE — 25010000002 PIPERACILLIN SOD-TAZOBACTAM PER 1 G: Performed by: HOSPITALIST

## 2021-04-05 PROCEDURE — 25010000002 VANCOMYCIN 1 G RECONSTITUTED SOLUTION: Performed by: INTERNAL MEDICINE

## 2021-04-05 PROCEDURE — 82962 GLUCOSE BLOOD TEST: CPT

## 2021-04-05 PROCEDURE — 85027 COMPLETE CBC AUTOMATED: CPT | Performed by: INTERNAL MEDICINE

## 2021-04-05 PROCEDURE — 25010000002 HEPARIN (PORCINE) PER 1000 UNITS: Performed by: HOSPITALIST

## 2021-04-05 PROCEDURE — 63710000001 INSULIN DETEMIR PER 5 UNITS: Performed by: STUDENT IN AN ORGANIZED HEALTH CARE EDUCATION/TRAINING PROGRAM

## 2021-04-05 PROCEDURE — 80048 BASIC METABOLIC PNL TOTAL CA: CPT | Performed by: INTERNAL MEDICINE

## 2021-04-05 PROCEDURE — 99232 SBSQ HOSP IP/OBS MODERATE 35: CPT | Performed by: SPECIALIST

## 2021-04-05 PROCEDURE — 63710000001 INSULIN ASPART PER 5 UNITS: Performed by: INTERNAL MEDICINE

## 2021-04-05 PROCEDURE — 99232 SBSQ HOSP IP/OBS MODERATE 35: CPT | Performed by: STUDENT IN AN ORGANIZED HEALTH CARE EDUCATION/TRAINING PROGRAM

## 2021-04-05 RX ORDER — FUROSEMIDE 40 MG/1
40 TABLET ORAL
Status: DISCONTINUED | OUTPATIENT
Start: 2021-04-05 | End: 2021-04-06

## 2021-04-05 RX ORDER — LISINOPRIL 10 MG/1
10 TABLET ORAL
Status: DISCONTINUED | OUTPATIENT
Start: 2021-04-05 | End: 2021-04-09 | Stop reason: HOSPADM

## 2021-04-05 RX ADMIN — SODIUM CHLORIDE, PRESERVATIVE FREE 10 ML: 5 INJECTION INTRAVENOUS at 08:16

## 2021-04-05 RX ADMIN — INSULIN ASPART 14 UNITS: 100 INJECTION, SOLUTION INTRAVENOUS; SUBCUTANEOUS at 08:16

## 2021-04-05 RX ADMIN — INSULIN ASPART 14 UNITS: 100 INJECTION, SOLUTION INTRAVENOUS; SUBCUTANEOUS at 17:08

## 2021-04-05 RX ADMIN — INSULIN ASPART 10 UNITS: 100 INJECTION, SOLUTION INTRAVENOUS; SUBCUTANEOUS at 17:08

## 2021-04-05 RX ADMIN — VANCOMYCIN HYDROCHLORIDE 1000 MG: 1 INJECTION, POWDER, LYOPHILIZED, FOR SOLUTION INTRAVENOUS at 00:19

## 2021-04-05 RX ADMIN — ALLOPURINOL 300 MG: 300 TABLET ORAL at 08:16

## 2021-04-05 RX ADMIN — Medication 1 CAPSULE: at 08:16

## 2021-04-05 RX ADMIN — PIPERACILLIN SODIUM AND TAZOBACTAM SODIUM 3.38 G: 3; .375 INJECTION, POWDER, LYOPHILIZED, FOR SOLUTION INTRAVENOUS at 20:24

## 2021-04-05 RX ADMIN — TERAZOSIN HYDROCHLORIDE 5 MG: 5 CAPSULE ORAL at 21:37

## 2021-04-05 RX ADMIN — FUROSEMIDE 40 MG: 10 INJECTION, SOLUTION INTRAMUSCULAR; INTRAVENOUS at 05:00

## 2021-04-05 RX ADMIN — PIPERACILLIN SODIUM AND TAZOBACTAM SODIUM 3.38 G: 3; .375 INJECTION, POWDER, LYOPHILIZED, FOR SOLUTION INTRAVENOUS at 12:12

## 2021-04-05 RX ADMIN — PANTOPRAZOLE SODIUM 40 MG: 40 TABLET, DELAYED RELEASE ORAL at 05:00

## 2021-04-05 RX ADMIN — SODIUM CHLORIDE, PRESERVATIVE FREE 10 ML: 5 INJECTION INTRAVENOUS at 20:24

## 2021-04-05 RX ADMIN — LACTULOSE 30 G: 20 SOLUTION ORAL at 17:08

## 2021-04-05 RX ADMIN — PIPERACILLIN SODIUM AND TAZOBACTAM SODIUM 3.38 G: 3; .375 INJECTION, POWDER, LYOPHILIZED, FOR SOLUTION INTRAVENOUS at 05:00

## 2021-04-05 RX ADMIN — FINASTERIDE 5 MG: 5 TABLET, FILM COATED ORAL at 08:16

## 2021-04-05 RX ADMIN — INSULIN ASPART 8 UNITS: 100 INJECTION, SOLUTION INTRAVENOUS; SUBCUTANEOUS at 08:18

## 2021-04-05 RX ADMIN — Medication 1 TABLET: at 08:16

## 2021-04-05 RX ADMIN — CLOPIDOGREL 75 MG: 75 TABLET, FILM COATED ORAL at 08:16

## 2021-04-05 RX ADMIN — INSULIN DETEMIR 45 UNITS: 100 INJECTION, SOLUTION SUBCUTANEOUS at 21:37

## 2021-04-05 RX ADMIN — GABAPENTIN 300 MG: 300 CAPSULE ORAL at 21:37

## 2021-04-05 RX ADMIN — METOPROLOL SUCCINATE 50 MG: 50 TABLET, EXTENDED RELEASE ORAL at 08:16

## 2021-04-05 RX ADMIN — ATORVASTATIN CALCIUM 40 MG: 40 TABLET, FILM COATED ORAL at 21:37

## 2021-04-05 RX ADMIN — FOLIC ACID 1 MG: 1 TABLET ORAL at 21:37

## 2021-04-05 RX ADMIN — HEPARIN SODIUM 5000 UNITS: 5000 INJECTION, SOLUTION INTRAVENOUS; SUBCUTANEOUS at 21:37

## 2021-04-05 RX ADMIN — INSULIN ASPART 14 UNITS: 100 INJECTION, SOLUTION INTRAVENOUS; SUBCUTANEOUS at 12:11

## 2021-04-05 RX ADMIN — INSULIN ASPART 8 UNITS: 100 INJECTION, SOLUTION INTRAVENOUS; SUBCUTANEOUS at 12:12

## 2021-04-05 RX ADMIN — FUROSEMIDE 40 MG: 40 TABLET ORAL at 17:08

## 2021-04-05 RX ADMIN — FOLIC ACID 1 MG: 1 TABLET ORAL at 08:16

## 2021-04-05 RX ADMIN — Medication 400 UNITS: at 08:16

## 2021-04-05 RX ADMIN — VANCOMYCIN HYDROCHLORIDE 1000 MG: 1 INJECTION, POWDER, LYOPHILIZED, FOR SOLUTION INTRAVENOUS at 12:12

## 2021-04-05 RX ADMIN — LISINOPRIL 10 MG: 10 TABLET ORAL at 17:08

## 2021-04-05 RX ADMIN — DOCUSATE SODIUM 50 MG AND SENNOSIDES 8.6 MG 2 TABLET: 8.6; 5 TABLET, FILM COATED ORAL at 08:16

## 2021-04-05 RX ADMIN — GABAPENTIN 300 MG: 300 CAPSULE ORAL at 08:21

## 2021-04-05 RX ADMIN — ASPIRIN 81 MG: 81 TABLET, COATED ORAL at 08:16

## 2021-04-05 NOTE — PROGRESS NOTES
LOS: 6 days     Name: William Villasenor  Age/Sex: 70 y.o. male  :  1950        PCP: Jacqueline Gatica MD  REF: No Known Provider    Principal Problem:    Acute on chronic congestive heart failure (CMS/HCC)  Active Problems:    Type II diabetes mellitus (CMS/HCC)    Essential hypertension    Benign prostatic hyperplasia without lower urinary tract symptoms    Obesity (BMI 30-39.9)    GERD (gastroesophageal reflux disease)    Diabetic neuropathy (CMS/HCC)    Former smoker    History of gout    Hyperlipidemia    CKD (chronic kidney disease) stage 2, GFR 60-89 ml/min      Reason for follow-up: Congestive heart failure     Subjective       Subjective     William Villasenor is a 70 year old male with a past medical history significant for chronic kidney disease, chronic diastolic congestive heart failure, coronary arterry disease s/p CABG, hyperlipidemia, diabetes mellitus type 2, essential hypertension and gout. Patient presented to the ED with complaints of chest pain, lower extremity edema and shortness of breath.     Interval History: Patient reports his breathing is at baseline. He does complain of some fatigue and weakness today. Vital signs stable. Kidney function stable.     Vital Signs  Temp:  [97.7 °F (36.5 °C)-98.5 °F (36.9 °C)] 98.5 °F (36.9 °C)  Heart Rate:  [] 91  Resp:  [15-22] 15  BP: (133-169)/(74-80) 155/76  Vital Signs (last 72 hrs)       04/ 0700  -  04/03 0659 04/03 0700  -  04/ 0659 04/ 0700  -   0659 / 0700  -  / 1152   Most Recent    Temp (°F) 97.7 -  98.2    97.3 -  98.8    97.7 -  98.3      98.5     98.5 (36.9)    Heart Rate 78 -  83    84 -  91    86 -  101      91     91    Resp 18 -  20    18 -  20    18 -  22      15     15    /66 -  134/69    122/66 -  136/73    133/75 -  169/80      155/76     155/76    SpO2 (%) 95 -  99    95 -  97    94 -  98      96     96        Body mass index is 32.19 kg/m².    Intake/Output Summary (Last 24 hours) at 2021  1152  Last data filed at 4/5/2021 0900  Gross per 24 hour   Intake 1440 ml   Output 650 ml   Net 790 ml     Objective    Objective       Physical Exam:     General Appearance:    Alert, cooperative, in no acute distress   Head:    Normocephalic, without obvious abnormality, atraumatic   Eyes:            Conjunctivae and sclerae normal, no   icterus, no pallor, corneas clear.   Neck:   No adenopathy, supple, trachea midline, no thyromegaly, no   carotid bruit, no JVD   Lungs:     Clear to auscultation,respirations regular, even and                  unlabored    Heart:    Regular rhythm and normal rate, normal S1 and S2, no            murmur, no gallop, no rub, no click   Chest Wall:    No abnormalities observed, CABG scar noted   Abdomen:     Normal bowel sounds, no masses, no organomegaly, soft        non-tender, non-distended, no guarding, no rebound                tenderness   Extremities:   Moves all extremities well, no edema, no cyanosis, no             redness   Pulses:   Pulses palpable and equal bilaterally   Skin:   No bleeding, bruising or rash       Neurologic:   Alert and oriented      Results review       Results Review:   Results from last 7 days   Lab Units 04/05/21  0346 04/04/21  0443 04/03/21  0530 04/02/21  0352 04/01/21  0148 03/31/21  0744 03/30/21  0126   WBC 10*3/mm3 10.08 7.17 7.40 7.12 8.82 8.38 13.12*   HEMOGLOBIN g/dL 9.6* 9.8* 10.7* 9.6* 10.1* 9.7* 10.7*   PLATELETS 10*3/mm3 484* 506* 560* 531* 556* 559* 647*     Results from last 7 days   Lab Units 04/05/21  0346 04/04/21  0443 04/03/21  0530 04/02/21  0352 04/01/21  0148 03/31/21  0744 03/30/21  0237 03/29/21  2110   SODIUM mmol/L 138 135* 134* 134* 136 135* 141 135*   POTASSIUM mmol/L 4.0 4.4 4.4 4.3 5.3* 4.4 4.2 4.4   CHLORIDE mmol/L 100 97* 95* 98 96* 95* 97* 92*   CO2 mmol/L 25.5 26.7 29.4* 27.1 27.4 31.6* 28.9 29.3*   BUN mg/dL 22 24* 25* 22 21 25* 24* 25*   CREATININE mg/dL 1.21 1.22 1.12 1.18 1.10 1.26 1.11 1.15   CALCIUM mg/dL  9.0 8.8 9.2 8.7 8.7 9.0 9.7 9.8   GLUCOSE mg/dL 270* 286* 228* 273* 282* 295* 221* 255*   ALT (SGPT) U/L  --   --   --   --   --   --  15 14   AST (SGOT) U/L  --   --   --   --   --   --  13 10     Results from last 7 days   Lab Units 03/29/21  2320 03/29/21  2110   TROPONIN T ng/mL <0.010 <0.010     Lab Results   Component Value Date    INR 0.97 03/29/2021    INR 1.20 (H) 03/17/2021    INR 1.41 (H) 03/16/2021    INR 0.93 03/15/2021     Lab Results   Component Value Date    MG 1.7 03/30/2021    MG 1.7 03/29/2021    MG 1.8 03/24/2021     Lab Results   Component Value Date    TSH 3.410 03/29/2021    TRIG 618 (H) 03/15/2021    HDL 30 (L) 03/15/2021    LDL 91 03/15/2021      Imaging Results (Last 48 Hours)     ** No results found for the last 48 hours. **        Lab Results   Component Value Date    BNP 29.0 08/07/2018     Echo   Results for orders placed during the hospital encounter of 03/23/21    Adult Transthoracic Echo Limited W/ Cont if Necessary Per Protocol    Interpretation Summary  · Left ventricular ejection fraction appears to be 51 - 55%. Septal wall motion is abnormal, consistent with a post-operative state  · Left ventricular wall thickness is consistent with mild concentric hypertrophy.  · No significant functional valvular abnormalities noted  · There is no evidence of pericardial effusion     I reviewed the patient's new clinical results.    Telemetry: NSR 80-90 bpm        Medication Review:   allopurinol, 300 mg, Oral, Daily  aspirin, 81 mg, Oral, Daily  atorvastatin, 40 mg, Oral, Nightly  bisacodyl, 10 mg, Rectal, Daily  clopidogrel, 75 mg, Oral, Daily  finasteride, 5 mg, Oral, Daily  folic acid, 1 mg, Oral, BID  furosemide, 40 mg, Intravenous, Q12H  gabapentin, 300 mg, Oral, Q12H  heparin (porcine), 5,000 Units, Subcutaneous, Q12H  insulin aspart, 0-14 Units, Subcutaneous, TID PC  insulin aspart, 14 Units, Subcutaneous, TID With Meals  insulin detemir, 42 Units, Subcutaneous, Nightly  lactobacillus  acidophilus, 1 capsule, Oral, Daily  lactulose, 30 g, Oral, TID  metoprolol succinate XL, 50 mg, Oral, Q24H  multivitamin, 1 tablet, Oral, Daily  pantoprazole, 40 mg, Oral, Q AM  piperacillin-tazobactam, 3.375 g, Intravenous, Q8H  sennosides-docusate, 2 tablet, Oral, BID  sodium chloride, 10 mL, Intravenous, Q12H  terazosin, 5 mg, Oral, Nightly  vancomycin, 1,000 mg, Intravenous, Q12H  vitamin E, 400 Units, Oral, Daily             Assessment      Assessment:  1. Acute on chronic HFpEF  2.  Coronary artery disease s/p recent cardiac device grafting x4 with LIMA to LAD SVG grafts to diagonal, obtuse marginal 1 and obtuse marginal 2  3.  Lower extremity wound infection  4.  Hyperlipidemia  5.  History of tobacco abuse  6.  Essential hypertension  7.  Diabetes mellitus type 2  8.  Acute renal impairment  9.  Prostate hypertrophy  10.  GERD          Plan     Recommendations:  1. No much diuresis today creatinine is stable but he subjectively feels better, so switch to p.o. furosemide  2. Antibiotics as per medicine team  3. Blood pressure is a little bit elevated will add lisinopril monitor his creatinine  4. Continue statin    I discussed the patients findings and my recommendations with patient and family      Electronically signed by ANKUR Kenney, 04/05/21, 11:55 AM EDT.  Electronically signed by Parvin James MD, 04/05/21, 12:05 PM EDT.  Please note that portions of this note were completed with a voice recognition program.

## 2021-04-05 NOTE — PROGRESS NOTES
[facility] HOSPITALIST PROGRESS NOTE     Patient Identification:  Name:  William Villasenor  Age:  70 y.o.  Sex:  male  :  1950  MRN:  [id number]  Visit Number:  [id number]  ROOM: [address] [place] [address]     Primary Care Provider:  Jacqueline Gatica MD    Length of stay in inpatient status:  6    Subjective     Chief Compliant:    Chief Complaint   Patient presents with   • Chest Pain   • Shortness of Breath       History of Presenting Illness: Patient seen and evaluated in follow-up for acute on chronic heart failure with preserved ejection fraction and sepsis secondary to right lower extremity cellulitis.  Patient appears overall much improved from prior denies any acute complaints of still complaining of some swelling and soreness in the right lower extremity.    Objective     Current Hospital Meds:allopurinol, 300 mg, Oral, Daily  aspirin, 81 mg, Oral, Daily  atorvastatin, 40 mg, Oral, Nightly  bisacodyl, 10 mg, Rectal, Daily  clopidogrel, 75 mg, Oral, Daily  finasteride, 5 mg, Oral, Daily  folic acid, 1 mg, Oral, BID  furosemide, 40 mg, Oral, BID  gabapentin, 300 mg, Oral, Q12H  heparin (porcine), 5,000 Units, Subcutaneous, Q12H  insulin aspart, 0-14 Units, Subcutaneous, TID PC  insulin aspart, 14 Units, Subcutaneous, TID With Meals  insulin detemir, 42 Units, Subcutaneous, Nightly  lactobacillus acidophilus, 1 capsule, Oral, Daily  lactulose, 30 g, Oral, TID  lisinopril, 10 mg, Oral, Q24H  metoprolol succinate XL, 50 mg, Oral, Q24H  multivitamin, 1 tablet, Oral, Daily  pantoprazole, 40 mg, Oral, Q AM  piperacillin-tazobactam, 3.375 g, Intravenous, Q8H  sennosides-docusate, 2 tablet, Oral, BID  sodium chloride, 10 mL, Intravenous, Q12H  terazosin, 5 mg, Oral, Nightly  vancomycin, 1,000 mg, Intravenous, Q12H  vitamin E, 400 Units, Oral, Daily         Current Antimicrobial Therapy:  Anti-Infectives (From admission, onward)    Ordered     Dose/Rate Route Frequency Start Stop    21 1304  vancomycin 1 g/250  mL 0.9% NS (vial-mate)     Ordering Provider: Chucky Elise MD    1,000 mg  over 60 Minutes Intravenous Every 12 Hours 04/02/21 0100 04/09/21 0059    03/30/21 0530  piperacillin-tazobactam (ZOSYN) 3.375 g/100 mL 0.9% NS IVPB (mbp)     Ordering Provider: Robert Ortiz MD    3.375 g  over 4 Hours Intravenous Every 8 Hours 03/30/21 1300 04/09/21 1259    03/30/21 0530  piperacillin-tazobactam (ZOSYN) 3.375 g/100 mL 0.9% NS IVPB (mbp)     Ordering Provider: Robert Ortiz MD    3.375 g  over 30 Minutes Intravenous Once 03/30/21 0700 03/30/21 0711    03/30/21 0530  vancomycin 2000 mg/500 mL 0.9% NS IVPB ([facility])     Ordering Provider: Robert Ortiz MD    20 mg/kg × 97 kg  over 120 Minutes Intravenous Once 03/30/21 0700 03/30/21 0841        Current Diuretic Therapy:  Diuretics (From admission, onward)    Ordered     Dose/Rate Route Frequency Start Stop    04/05/21 1209  furosemide (LASIX) tablet 40 mg     Ordering Provider: Parvin James MD    40 mg Oral 2 Times Daily (Diuretics) 04/05/21 1800      03/30/21 0522  furosemide (LASIX) injection 20 mg     Ordering Provider: Shandra Barnard PA-C    20 mg Intravenous Once 03/30/21 0615 03/30/21 0548    03/30/21 0032  furosemide (LASIX) injection 20 mg     Ordering Provider: Robert Ortiz MD    20 mg Intravenous Once 03/30/21 0034 03/30/21 0144        ----------------------------------------------------------------------------------------------------------------------  Vital Signs:  Temp:  [98.1 °F (36.7 °C)-98.5 °F (36.9 °C)] 98.5 °F (36.9 °C)  Heart Rate:  [] 85  Resp:  [15-22] 18  BP: (134-169)/(73-80) 149/73  SpO2:  [95 %-98 %] 98 %  on  Flow (L/min):  [2] 2;   Device (Oxygen Therapy): nasal cannula  Body mass index is 32.19 kg/m².    Wt Readings from Last 3 Encounters:   04/05/21 98.9 kg (218 lb)   03/29/21 95.4 kg (210 lb 6 oz)   03/27/21 97 kg (213 lb 12.8 oz)     Intake & Output (last 3 days)       04/03 0701 - 04/04 0700  04/04 0701 - 04/05 0700 04/05 0701 - 04/06 0700    P.O. 1200 1560 720    I.V. (mL/kg) 1600 (16.2)      Total Intake(mL/kg) 2800 (28.4) 1560 (15.8) 720 (7.3)    Urine (mL/kg/hr) 3200 (1.4) 1300 (0.5) 250 (0.2)    Stool  0     Total Output 3200 1300 250    Net -400 +260 +470           Urine Unmeasured Occurrence  1 x     Stool Unmeasured Occurrence  5 x         Diet Regular; Cardiac, Consistent Carbohydrate  ----------------------------------------------------------------------------------------------------------------------  Physical exam:  Constitutional: Elderly obese male sitting up in chair, NAD.   HENT:  Head:  Normocephalic and atraumatic.  Mouth:  Moist mucous membranes.    Eyes:  Conjunctivae and EOM are normal. No scleral icterus.    Neck:  Neck supple.  No JVD present.    Cardiovascular:  Normal rate, regular rhythm and normal heart sounds with no murmur.    Pulmonary/Chest:  No respiratory distress, no wheezes, no crackles.  Midsternal incision site appears to be healing well.  Abdominal:  Soft.  Bowel sounds are normal.  No distension and no tenderness.   Musculoskeletal: Functional ROM intact.   Neurological:  Alert and oriented to person, place, and time.  No cranial nerve deficit. Intact Sensation throughout  Skin:  Skin is warm and dry. No rash or lesion noted.  Right lower extremity with improving erythema and edema with blanching erythema superior to the shin below the knee.  Peripheral vascular:  Pulses in all 4 extremities with no clubbing, no cyanosis, no edema.  Psychiatric: Appropriate mood and affect, pleasant.   ----------------------------------------------------------------------------------------------------------------------  Tele:    ----------------------------------------------------------------------------------------------------------------------  Results from last 7 days   Lab Units 04/05/21  0346 04/04/21  0443 04/03/21  0530 03/30/21  0616 03/29/21  2320 03/29/21  2110   CRP mg/dL   --   --   --  5.14* 5.67*  --    LACTATE mmol/L  --   --   --  1.8  --   --    WBC 10*3/mm3 10.08 7.17 7.40  --   --   --    HEMOGLOBIN g/dL 9.6* 9.8* 10.7*  --   --   --    HEMATOCRIT % 31.1* 31.4* 34.6*  --   --   --    MCV fL 91.2 90.5 91.1  --   --   --    MCHC g/dL 30.9* 31.2* 30.9*  --   --   --    PLATELETS 10*3/mm3 484* 506* 560*  --   --   --    INR   --   --   --   --   --  0.97         Results from last 7 days   Lab Units 04/05/21  0346 04/04/21  0443 04/03/21  0530 03/30/21  0616 03/30/21  0237 03/29/21  2110   SODIUM mmol/L 138 135* 134*  --  141 135*   POTASSIUM mmol/L 4.0 4.4 4.4  --  4.2 4.4   MAGNESIUM mg/dL  --   --   --  1.7  --  1.7   CHLORIDE mmol/L 100 97* 95*  --  97* 92*   CO2 mmol/L 25.5 26.7 29.4*  --  28.9 29.3*   BUN mg/dL 22 24* 25*  --  24* 25*   CREATININE mg/dL 1.21 1.22 1.12  --  1.11 1.15   EGFR IF NONAFRICN AM mL/min/1.73 59* 59* 65  --  65 63   CALCIUM mg/dL 9.0 8.8 9.2  --  9.7 9.8   PHOSPHORUS mg/dL  --   --   --   --   --  3.2   GLUCOSE mg/dL 270* 286* 228*  --  221* 255*   ALBUMIN g/dL  --   --   --   --  3.29* 3.58   BILIRUBIN mg/dL  --   --   --   --  0.3 0.3   ALK PHOS U/L  --   --   --   --  113 106   AST (SGOT) U/L  --   --   --   --  13 10   ALT (SGPT) U/L  --   --   --   --  15 14   Estimated Creatinine Clearance: 64.9 mL/min (by C-G formula based on SCr of 1.21 mg/dL).  No results found for: AMMONIA  Results from last 7 days   Lab Units 03/29/21  2320 03/29/21  2110   TROPONIN T ng/mL <0.010 <0.010     Results from last 7 days   Lab Units 03/29/21  2110   PROBNP pg/mL 848.1         Glucose   Date/Time Value Ref Range Status   04/05/2021 1643 308 (H) 70 - 130 mg/dL Final   04/05/2021 1103 272 (H) 70 - 130 mg/dL Final   04/05/2021 0614 270 (H) 70 - 130 mg/dL Final   04/04/2021 1827 387 (H) 70 - 130 mg/dL Final   04/04/2021 1631 398 (H) 70 - 130 mg/dL Final   04/04/2021 1032 347 (H) 70 - 130 mg/dL Final   04/04/2021 0639 275 (H) 70 - 130 mg/dL Final   04/03/2021 2304 346  (H) 70 - 130 mg/dL Final     Lab Results   Component Value Date    TSH 3.410 03/29/2021     No results found for: PREGTESTUR, PREGSERUM, HCG, HCGQUANT  Pain Management Panel     Pain Management Panel Latest Ref Rng & Units 3/15/2021 3/14/2021    AMPHETAMINES SCREEN, URINE Negative Negative Negative    BARBITURATES SCREEN Negative Negative Negative    BENZODIAZEPINE SCREEN, URINE Negative Negative Negative    BUPRENORPHINEUR Negative Negative Negative    COCAINE SCREEN, URINE Negative Negative Negative    METHADONE SCREEN, URINE Negative Negative Negative    METHAMPHETAMINEUR Negative Negative -        Brief Urine Lab Results  (Last result in the past 365 days)      Color   Clarity   Blood   Leuk Est   Nitrite   Protein   CREAT   Urine HCG        03/29/21 0921 Yellow Clear Small (1+) Negative Negative Negative             Blood Culture   Date Value Ref Range Status   03/30/2021 No growth at 5 days  Final   03/30/2021 No growth at 5 days  Final     No results found for: URINECX  No results found for: WOUNDCX  No results found for: STOOLCX  No results found for: RESPCX  No results found for: AFBCX  Results from last 7 days   Lab Units 03/30/21  0616 03/29/21  2320   LACTATE mmol/L 1.8  --    CRP mg/dL 5.14* 5.67*       I have personally looked at the labs and they are summarized above.  ----------------------------------------------------------------------------------------------------------------------  Detailed radiology reports for the last 24 hours:    Imaging Results (Last 24 Hours)     ** No results found for the last 24 hours. **        Assessment & Plan      Acute on chronic HFpEF  Hypertension  Hyperlipidemia  CAD s/p recent four-vessel CABG    -Initially presenting with increased weakness, dyspnea and lower extremity swelling    -Negative troponins x2 with proBNP only 800, EKG with normal sinus rhythm and left anterior fascicular block with possible old anterior infarct    -Echo on 3/24/2021 with EF of 51 to  55% with septal wall motion abnormality consistent with postoperative state and LV noted to have mild eccentric LVH with no significant valvular abnormalities noted and no pericardial effusion.    -Cardiology consulted and following along, appreciate input    -Continue aspirin, statin, Plavix and home beta-blocker    -Switch to oral diuretics today by cardiology from   Will need follow-up rescheduled with Dr. Simms's office, CT surgery at discharge    Sepsis secondary to right lower extremity cellulitis    -Cellulitis overall appears to be improving but still with some blanchable erythema and diffuse tenderness throughout that area of the leg    -Bilateral venous duplex with no evidence of DVT and mildly prominent upper thigh region nodes    -We will continue bank and Zosyn for an expected 10-day course as he still has significant tenderness and blanchable erythema in the right lower extremity    Diabetes mellitus type 2  Peripheral neuropathy    -Globin A1c of 8.7%    -Continue home gabapentin for neuropathy    -On oral agents currently on hold, will continue Levemir 42 units at bedtime and NovoLog 14 units 3 times daily with meals.  Once patient clinically improved will attempt to place back on oral regimen as he is a  and would like to avoid insulin usage at all costs if possible.    CKD stage II-III    -Continue to monitor creatinine urine output and avoid nephrotoxins as well as NSAIDs volume depletion    Chronic normocytic anemia  Thrombocytosis, improved    -Platelets trended down from 647 admission to 44    -Globin remained stable at 9.6 with normal MCV    -We will continue home oral folate and multivitamin    -Threshold to transfuse remains hemoglobin less than 7 or symptomatic    Gout without flare  BPH  GERD  Obesity with BMI of 31       VTE Prophylaxis:   Mechanical Order History:     None      Pharmalogical Order History:      Ordered     Dose Route Frequency Stop    03/30/21 4512   heparin (porcine) 5000 UNIT/ML injection 5,000 Units      5,000 Units SC Every 12 Hours Scheduled --                Disposition Home once medically stable and improved.    Dany Davis DO  [facility] Hospitalist  04/05/21  19:52 EDT

## 2021-04-05 NOTE — PROGRESS NOTES
Discharge Planning Assessment   Silvio     Patient Name: William Villasenor  MRN: [id number]  Today's Date: 4/5/2021    Admit Date: 3/29/2021        Discharge Plan     Row Name 04/05/21 1559       Plan    Plan  Pt admitted on 3/29/21.  Pt lives at home with spouse and plans to return home at discharge.  [facility] currently utilizes [facility].  [facility] will need new referral with face to face at discharge.  Pt currently utilizes hospital bed, cane, bedside commode, rolling walker, lift chair and home 02 at 2 liters nc PRN from [facility].   will follow.          RUSLAN Rojo

## 2021-04-05 NOTE — PLAN OF CARE
Problem: Adult Inpatient Plan of Care  Goal: Plan of Care Review  4/4/2021 2058 by Soledad Funk RN  Outcome: Ongoing, Progressing  4/4/2021 2058 by Soledad Funk RN  Outcome: Ongoing, Progressing  Goal: Patient-Specific Goal (Individualized)  4/4/2021 2058 by Soledad Funk RN  Outcome: Ongoing, Progressing  4/4/2021 2058 by Soledad Funk RN  Outcome: Ongoing, Progressing  Goal: Absence of Hospital-Acquired Illness or Injury  4/4/2021 2058 by Soledad Funk RN  Outcome: Ongoing, Progressing  4/4/2021 2058 by Soledad Funk RN  Outcome: Ongoing, Progressing  Intervention: Identify and Manage Fall Risk  Recent Flowsheet Documentation  Taken 4/4/2021 2026 by Soledad Funk RN  Safety Promotion/Fall Prevention:   safety round/check completed   room organization consistent   fall prevention program maintained  Intervention: Prevent and Manage VTE (venous thromboembolism) Risk  Recent Flowsheet Documentation  Taken 4/4/2021 2026 by Soledad Funk RN  VTE Prevention/Management: (see MAR) other (see comments)  Intervention: Prevent Infection  Recent Flowsheet Documentation  Taken 4/4/2021 2026 by Soledad Funk RN  Infection Prevention:   single patient room provided   rest/sleep promoted   hand hygiene promoted  Goal: Optimal Comfort and Wellbeing  4/4/2021 2058 by Soledad Funk RN  Outcome: Ongoing, Progressing  4/4/2021 2058 by Soledad Funk RN  Outcome: Ongoing, Progressing  Intervention: Provide Person-Centered Care  Recent Flowsheet Documentation  Taken 4/4/2021 2026 by Soledad Funk RN  Trust Relationship/Rapport:   care explained   choices provided   emotional support provided   empathic listening provided   questions answered   questions encouraged   thoughts/feelings acknowledged   reassurance provided  Goal: Readiness for Transition of Care  4/4/2021 2058 by Soledad Funk RN  Outcome: Ongoing, Progressing  4/4/2021 2058 by Soledad Funk RN  Outcome: Ongoing,  Progressing

## 2021-04-05 NOTE — PLAN OF CARE
Goal Outcome Evaluation:     Progress: improving   Pt resting in bed. Receiving abx. Denies any pain. Breathing is improved. Will monitor

## 2021-04-06 LAB
ANION GAP SERPL CALCULATED.3IONS-SCNC: 9.9 MMOL/L (ref 5–15)
BUN SERPL-MCNC: 23 MG/DL (ref 8–23)
BUN/CREAT SERPL: 18 (ref 7–25)
CALCIUM SPEC-SCNC: 8.2 MG/DL (ref 8.6–10.5)
CHLORIDE SERPL-SCNC: 101 MMOL/L (ref 98–107)
CO2 SERPL-SCNC: 25.1 MMOL/L (ref 22–29)
CREAT SERPL-MCNC: 1.28 MG/DL (ref 0.76–1.27)
DEPRECATED RDW RBC AUTO: 44.7 FL (ref 37–54)
ERYTHROCYTE [DISTWIDTH] IN BLOOD BY AUTOMATED COUNT: 13.2 % (ref 12.3–15.4)
GFR SERPL CREATININE-BSD FRML MDRD: 55 ML/MIN/1.73
GLUCOSE BLDC GLUCOMTR-MCNC: 217 MG/DL (ref 70–130)
GLUCOSE BLDC GLUCOMTR-MCNC: 218 MG/DL (ref 70–130)
GLUCOSE BLDC GLUCOMTR-MCNC: 273 MG/DL (ref 70–130)
GLUCOSE BLDC GLUCOMTR-MCNC: 294 MG/DL (ref 70–130)
GLUCOSE SERPL-MCNC: 280 MG/DL (ref 65–99)
HCT VFR BLD AUTO: 31.8 % (ref 37.5–51)
HGB BLD-MCNC: 9.5 G/DL (ref 13–17.7)
MCH RBC QN AUTO: 28.1 PG (ref 26.6–33)
MCHC RBC AUTO-ENTMCNC: 29.9 G/DL (ref 31.5–35.7)
MCV RBC AUTO: 94.1 FL (ref 79–97)
PLATELET # BLD AUTO: 445 10*3/MM3 (ref 140–450)
PMV BLD AUTO: 8.5 FL (ref 6–12)
POTASSIUM SERPL-SCNC: 3.9 MMOL/L (ref 3.5–5.2)
RBC # BLD AUTO: 3.38 10*6/MM3 (ref 4.14–5.8)
SODIUM SERPL-SCNC: 136 MMOL/L (ref 136–145)
WBC # BLD AUTO: 8.34 10*3/MM3 (ref 3.4–10.8)

## 2021-04-06 PROCEDURE — 97162 PT EVAL MOD COMPLEX 30 MIN: CPT

## 2021-04-06 PROCEDURE — 80048 BASIC METABOLIC PNL TOTAL CA: CPT | Performed by: INTERNAL MEDICINE

## 2021-04-06 PROCEDURE — 63710000001 INSULIN DETEMIR PER 5 UNITS: Performed by: STUDENT IN AN ORGANIZED HEALTH CARE EDUCATION/TRAINING PROGRAM

## 2021-04-06 PROCEDURE — 63710000001 INSULIN ASPART PER 5 UNITS: Performed by: STUDENT IN AN ORGANIZED HEALTH CARE EDUCATION/TRAINING PROGRAM

## 2021-04-06 PROCEDURE — 25010000002 HEPARIN (PORCINE) PER 1000 UNITS: Performed by: HOSPITALIST

## 2021-04-06 PROCEDURE — 82962 GLUCOSE BLOOD TEST: CPT

## 2021-04-06 PROCEDURE — 99232 SBSQ HOSP IP/OBS MODERATE 35: CPT | Performed by: STUDENT IN AN ORGANIZED HEALTH CARE EDUCATION/TRAINING PROGRAM

## 2021-04-06 PROCEDURE — 25010000002 VANCOMYCIN 1 G RECONSTITUTED SOLUTION: Performed by: INTERNAL MEDICINE

## 2021-04-06 PROCEDURE — 25010000002 PIPERACILLIN SOD-TAZOBACTAM PER 1 G: Performed by: HOSPITALIST

## 2021-04-06 PROCEDURE — 94799 UNLISTED PULMONARY SVC/PX: CPT

## 2021-04-06 PROCEDURE — 85027 COMPLETE CBC AUTOMATED: CPT | Performed by: INTERNAL MEDICINE

## 2021-04-06 PROCEDURE — 63710000001 INSULIN ASPART PER 5 UNITS: Performed by: INTERNAL MEDICINE

## 2021-04-06 PROCEDURE — 99232 SBSQ HOSP IP/OBS MODERATE 35: CPT | Performed by: INTERNAL MEDICINE

## 2021-04-06 RX ORDER — FUROSEMIDE 40 MG/1
40 TABLET ORAL ONCE
Status: COMPLETED | OUTPATIENT
Start: 2021-04-06 | End: 2021-04-06

## 2021-04-06 RX ADMIN — METFORMIN HYDROCHLORIDE 1000 MG: 500 TABLET ORAL at 12:09

## 2021-04-06 RX ADMIN — Medication 400 UNITS: at 08:41

## 2021-04-06 RX ADMIN — METFORMIN HYDROCHLORIDE 1000 MG: 500 TABLET ORAL at 17:11

## 2021-04-06 RX ADMIN — Medication 1 CAPSULE: at 08:41

## 2021-04-06 RX ADMIN — METOPROLOL SUCCINATE 50 MG: 50 TABLET, EXTENDED RELEASE ORAL at 08:41

## 2021-04-06 RX ADMIN — VANCOMYCIN HYDROCHLORIDE 1000 MG: 1 INJECTION, POWDER, LYOPHILIZED, FOR SOLUTION INTRAVENOUS at 01:04

## 2021-04-06 RX ADMIN — ASPIRIN 81 MG: 81 TABLET, COATED ORAL at 08:41

## 2021-04-06 RX ADMIN — GABAPENTIN 300 MG: 300 CAPSULE ORAL at 08:44

## 2021-04-06 RX ADMIN — HEPARIN SODIUM 5000 UNITS: 5000 INJECTION, SOLUTION INTRAVENOUS; SUBCUTANEOUS at 08:42

## 2021-04-06 RX ADMIN — DOCUSATE SODIUM 50 MG AND SENNOSIDES 8.6 MG 2 TABLET: 8.6; 5 TABLET, FILM COATED ORAL at 08:41

## 2021-04-06 RX ADMIN — INSULIN ASPART 5 UNITS: 100 INJECTION, SOLUTION INTRAVENOUS; SUBCUTANEOUS at 08:42

## 2021-04-06 RX ADMIN — CLOPIDOGREL 75 MG: 75 TABLET, FILM COATED ORAL at 08:41

## 2021-04-06 RX ADMIN — GABAPENTIN 300 MG: 300 CAPSULE ORAL at 21:30

## 2021-04-06 RX ADMIN — SODIUM CHLORIDE, PRESERVATIVE FREE 10 ML: 5 INJECTION INTRAVENOUS at 21:31

## 2021-04-06 RX ADMIN — Medication 1 TABLET: at 08:41

## 2021-04-06 RX ADMIN — VANCOMYCIN HYDROCHLORIDE 1000 MG: 1 INJECTION, POWDER, LYOPHILIZED, FOR SOLUTION INTRAVENOUS at 12:09

## 2021-04-06 RX ADMIN — INSULIN ASPART 8 UNITS: 100 INJECTION, SOLUTION INTRAVENOUS; SUBCUTANEOUS at 12:09

## 2021-04-06 RX ADMIN — PIPERACILLIN SODIUM AND TAZOBACTAM SODIUM 3.38 G: 3; .375 INJECTION, POWDER, LYOPHILIZED, FOR SOLUTION INTRAVENOUS at 12:09

## 2021-04-06 RX ADMIN — INSULIN ASPART 5 UNITS: 100 INJECTION, SOLUTION INTRAVENOUS; SUBCUTANEOUS at 17:11

## 2021-04-06 RX ADMIN — PIPERACILLIN SODIUM AND TAZOBACTAM SODIUM 3.38 G: 3; .375 INJECTION, POWDER, LYOPHILIZED, FOR SOLUTION INTRAVENOUS at 21:30

## 2021-04-06 RX ADMIN — PIPERACILLIN SODIUM AND TAZOBACTAM SODIUM 3.38 G: 3; .375 INJECTION, POWDER, LYOPHILIZED, FOR SOLUTION INTRAVENOUS at 04:26

## 2021-04-06 RX ADMIN — ALLOPURINOL 300 MG: 300 TABLET ORAL at 08:41

## 2021-04-06 RX ADMIN — FUROSEMIDE 40 MG: 40 TABLET ORAL at 17:11

## 2021-04-06 RX ADMIN — INSULIN DETEMIR 45 UNITS: 100 INJECTION, SOLUTION SUBCUTANEOUS at 21:31

## 2021-04-06 RX ADMIN — FINASTERIDE 5 MG: 5 TABLET, FILM COATED ORAL at 08:41

## 2021-04-06 RX ADMIN — INSULIN ASPART 15 UNITS: 100 INJECTION, SOLUTION INTRAVENOUS; SUBCUTANEOUS at 08:41

## 2021-04-06 RX ADMIN — FUROSEMIDE 40 MG: 40 TABLET ORAL at 08:41

## 2021-04-06 RX ADMIN — INSULIN ASPART 15 UNITS: 100 INJECTION, SOLUTION INTRAVENOUS; SUBCUTANEOUS at 17:11

## 2021-04-06 RX ADMIN — LISINOPRIL 10 MG: 10 TABLET ORAL at 08:41

## 2021-04-06 RX ADMIN — ATORVASTATIN CALCIUM 40 MG: 40 TABLET, FILM COATED ORAL at 21:51

## 2021-04-06 RX ADMIN — FUROSEMIDE 40 MG: 40 TABLET ORAL at 17:47

## 2021-04-06 RX ADMIN — HEPARIN SODIUM 5000 UNITS: 5000 INJECTION, SOLUTION INTRAVENOUS; SUBCUTANEOUS at 21:30

## 2021-04-06 RX ADMIN — FOLIC ACID 1 MG: 1 TABLET ORAL at 21:31

## 2021-04-06 RX ADMIN — FOLIC ACID 1 MG: 1 TABLET ORAL at 08:41

## 2021-04-06 RX ADMIN — SODIUM CHLORIDE, PRESERVATIVE FREE 10 ML: 5 INJECTION INTRAVENOUS at 08:42

## 2021-04-06 RX ADMIN — INSULIN ASPART 15 UNITS: 100 INJECTION, SOLUTION INTRAVENOUS; SUBCUTANEOUS at 12:09

## 2021-04-06 RX ADMIN — PANTOPRAZOLE SODIUM 40 MG: 40 TABLET, DELAYED RELEASE ORAL at 04:26

## 2021-04-06 NOTE — PROGRESS NOTES
[facility] HOSPITALIST PROGRESS NOTE     Patient Identification:  Name:  William Villasenor  Age:  70 y.o.  Sex:  male  :  1950  MRN:  [id number]  Visit Number:  [id number]  ROOM: [address] [place] [address]     Primary Care Provider:  Jacqueline Gatica MD    Length of stay in inpatient status:  7    Subjective     Chief Compliant:    Chief Complaint   Patient presents with   • Chest Pain   • Shortness of Breath       History of Presenting Illness: Patient seen and evaluated in follow-up for acute on chronic heart failure with preserved ejection fraction and sepsis secondary to right lower extremity cellulitis.  Patient today states that he does not feel well, complains that he did not get any sleep last night and that he is having a lot of congestion today.     Objective     Current Hospital Meds:allopurinol, 300 mg, Oral, Daily  aspirin, 81 mg, Oral, Daily  atorvastatin, 40 mg, Oral, Nightly  bisacodyl, 10 mg, Rectal, Daily  clopidogrel, 75 mg, Oral, Daily  finasteride, 5 mg, Oral, Daily  folic acid, 1 mg, Oral, BID  gabapentin, 300 mg, Oral, Q12H  heparin (porcine), 5,000 Units, Subcutaneous, Q12H  insulin aspart, 0-14 Units, Subcutaneous, TID PC  insulin aspart, 15 Units, Subcutaneous, TID With Meals  insulin detemir, 45 Units, Subcutaneous, Nightly  lactobacillus acidophilus, 1 capsule, Oral, Daily  lactulose, 30 g, Oral, TID  lisinopril, 10 mg, Oral, Q24H  metFORMIN, 1,000 mg, Oral, BID With Meals  metoprolol succinate XL, 50 mg, Oral, Q24H  multivitamin, 1 tablet, Oral, Daily  pantoprazole, 40 mg, Oral, Q AM  piperacillin-tazobactam, 3.375 g, Intravenous, Q8H  sennosides-docusate, 2 tablet, Oral, BID  sodium chloride, 10 mL, Intravenous, Q12H  terazosin, 5 mg, Oral, Nightly  vancomycin, 1,000 mg, Intravenous, Q12H  vitamin E, 400 Units, Oral, Daily         Current Antimicrobial Therapy:  Anti-Infectives (From admission, onward)    Ordered     Dose/Rate Route Frequency Start Stop    21 1304  vancomycin 1  g/250 mL 0.9% NS (vial-mate)     Ordering Provider: Chucky Elise MD    1,000 mg  over 60 Minutes Intravenous Every 12 Hours 04/02/21 0100 04/09/21 0059    03/30/21 0530  piperacillin-tazobactam (ZOSYN) 3.375 g/100 mL 0.9% NS IVPB (mbp)     Ordering Provider: Robert Ortiz MD    3.375 g  over 4 Hours Intravenous Every 8 Hours 03/30/21 1300 04/09/21 1259    03/30/21 0530  piperacillin-tazobactam (ZOSYN) 3.375 g/100 mL 0.9% NS IVPB (mbp)     Ordering Provider: Robert Ortiz MD    3.375 g  over 30 Minutes Intravenous Once 03/30/21 0700 03/30/21 0711    03/30/21 0530  vancomycin 2000 mg/500 mL 0.9% NS IVPB ([facility])     Ordering Provider: Robert Ortiz MD    20 mg/kg × 97 kg  over 120 Minutes Intravenous Once 03/30/21 0700 03/30/21 0841        Current Diuretic Therapy:  Diuretics (From admission, onward)    Ordered     Dose/Rate Route Frequency Start Stop    04/06/21 1714  furosemide (LASIX) tablet 40 mg     Ordering Provider: Joseph Tyler MD    40 mg Oral Once 04/06/21 1715 04/06/21 1747    03/30/21 0522  furosemide (LASIX) injection 20 mg     Ordering Provider: Shandra Barnard PA-C    20 mg Intravenous Once 03/30/21 0615 03/30/21 0548    03/30/21 0032  furosemide (LASIX) injection 20 mg     Ordering Provider: Robert Ortiz MD    20 mg Intravenous Once 03/30/21 0034 03/30/21 0144        ----------------------------------------------------------------------------------------------------------------------  Vital Signs:  Temp:  [98 °F (36.7 °C)-98.6 °F (37 °C)] 98.6 °F (37 °C)  Heart Rate:  [82-92] 90  Resp:  [16-20] 20  BP: (110-136)/(57-75) 124/64  SpO2:  [94 %-98 %] 97 %  on  Flow (L/min):  [2] 2;   Device (Oxygen Therapy): humidified;nasal cannula  Body mass index is 32.36 kg/m².    Wt Readings from Last 3 Encounters:   04/06/21 99.4 kg (219 lb 1.9 oz)   03/29/21 95.4 kg (210 lb 6 oz)   03/27/21 97 kg (213 lb 12.8 oz)     Intake & Output (last 3 days)       04/04 0701 - 04/05  0700 04/05 0701 - 04/06 0700 04/06 0701 - 04/07 0700    P.O. 1560 1145 720    I.V. (mL/kg)       Total Intake(mL/kg) 1560 (15.8) 1145 (11.5) 720 (7.2)    Urine (mL/kg/hr) 1300 (0.5) 1300 (0.5) 1200 (1)    Stool 0 0     Total Output 1300 1300 1200    Net +260 -155 -480           Urine Unmeasured Occurrence 1 x      Stool Unmeasured Occurrence 5 x 1 x         Diet Regular; Cardiac, Consistent Carbohydrate  ----------------------------------------------------------------------------------------------------------------------  Physical exam:  Constitutional: Elderly obese male sitting up in chair, NAD.   HENT:  Head:  Normocephalic and atraumatic.  Mouth:  Moist mucous membranes.    Eyes:  Conjunctivae and EOM are normal. No scleral icterus.    Neck:  Neck supple.  No JVD present.    Cardiovascular:  Normal rate, regular rhythm and normal heart sounds with no murmur.    Pulmonary/Chest:  No respiratory distress, no wheezes, mild right basilar crackles present.  Midsternal incision site appears to be healing well.  Abdominal:  Soft.  Bowel sounds are normal.  No distension and no tenderness.   Musculoskeletal: Functional ROM intact.   Neurological:  Alert and oriented to person, place, and time.  No cranial nerve deficit. Intact Sensation throughout  Skin:  Skin is warm and dry. No rash or lesion noted.  Right lower extremity with improving erythema and edema with blanching erythema superior to the shin below the knee.  Peripheral vascular:  Pulses in all 4 extremities with no clubbing, no cyanosis, no edema.  Psychiatric: Appropriate mood and affect, pleasant.   ----------------------------------------------------------------------------------------------------------------------  Tele:    ----------------------------------------------------------------------------------------------------------------------  Results from last 7 days   Lab Units 04/06/21  0056 04/05/21  0346 04/04/21  0443   WBC 10*3/mm3 8.34 10.08 7.17    HEMOGLOBIN g/dL 9.5* 9.6* 9.8*   HEMATOCRIT % 31.8* 31.1* 31.4*   MCV fL 94.1 91.2 90.5   MCHC g/dL 29.9* 30.9* 31.2*   PLATELETS 10*3/mm3 445 484* 506*         Results from last 7 days   Lab Units 04/06/21  0056 04/05/21  0346 04/04/21  0443   SODIUM mmol/L 136 138 135*   POTASSIUM mmol/L 3.9 4.0 4.4   CHLORIDE mmol/L 101 100 97*   CO2 mmol/L 25.1 25.5 26.7   BUN mg/dL 23 22 24*   CREATININE mg/dL 1.28* 1.21 1.22   EGFR IF NONAFRICN AM mL/min/1.73 55* 59* 59*   CALCIUM mg/dL 8.2* 9.0 8.8   GLUCOSE mg/dL 280* 270* 286*   Estimated Creatinine Clearance: 61.5 mL/min (A) (by C-G formula based on SCr of 1.28 mg/dL (H)).  No results found for: AMMONIA              Glucose   Date/Time Value Ref Range Status   04/06/2021 1559 217 (H) 70 - 130 mg/dL Final   04/06/2021 1012 273 (H) 70 - 130 mg/dL Final   04/06/2021 0621 218 (H) 70 - 130 mg/dL Final   04/05/2021 2301 281 (H) 70 - 130 mg/dL Final   04/05/2021 2108 303 (H) 70 - 130 mg/dL Final   04/05/2021 1643 308 (H) 70 - 130 mg/dL Final   04/05/2021 1103 272 (H) 70 - 130 mg/dL Final   04/05/2021 0614 270 (H) 70 - 130 mg/dL Final     Lab Results   Component Value Date    TSH 3.410 03/29/2021     No results found for: PREGTESTUR, PREGSERUM, HCG, HCGQUANT  Pain Management Panel     Pain Management Panel Latest Ref Rng & Units 3/15/2021 3/14/2021    AMPHETAMINES SCREEN, URINE Negative Negative Negative    BARBITURATES SCREEN Negative Negative Negative    BENZODIAZEPINE SCREEN, URINE Negative Negative Negative    BUPRENORPHINEUR Negative Negative Negative    COCAINE SCREEN, URINE Negative Negative Negative    METHADONE SCREEN, URINE Negative Negative Negative    METHAMPHETAMINEUR Negative Negative -        Brief Urine Lab Results  (Last result in the past 365 days)      Color   Clarity   Blood   Leuk Est   Nitrite   Protein   CREAT   Urine HCG        03/29/21 0921 Yellow Clear Small (1+) Negative Negative Negative             Blood Culture   Date Value Ref Range Status    03/30/2021 No growth at 5 days  Final   03/30/2021 No growth at 5 days  Final     No results found for: URINECX  No results found for: WOUNDCX  No results found for: STOOLCX  No results found for: RESPCX  No results found for: AFBCX        I have personally looked at the labs and they are summarized above.  ----------------------------------------------------------------------------------------------------------------------  Detailed radiology reports for the last 24 hours:    Imaging Results (Last 24 Hours)     ** No results found for the last 24 hours. 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    -Cardiology consulted and following along, appreciate input    -Continue aspirin, statin, Plavix and home beta-blocker    -Switch to oral diuretics on 4/5/21 reduced to once daily due to bump in creatinine    -Will need follow-up rescheduled with Dr. Simms's office, CT surgery at discharge    -Would benefit from cardiac rehab    Sepsis secondary to right lower extremity cellulitis    -Cellulitis overall appears to be improving but still with some blanchable erythema and diffuse tenderness throughout that area of the leg    -Bilateral venous duplex with no evidence of DVT and mildly prominent upper thigh region nodes    -We will continue bank and Zosyn for an expected 10-day course as he still has significant tenderness and blanchable erythema in the right lower extremity    Diabetes  mellitus type 2  Peripheral neuropathy    -Globin A1c of 8.7%    -Continue home gabapentin for neuropathy    -On oral agents currently on hold, will continue Levemir 42 units at bedtime and NovoLog 14 units 3 times daily with meals.  Once patient clinically improved will attempt to place back on oral regimen as he is a  and would like to avoid insulin usage at all costs if possible.    CKD stage II-III    -Continue to monitor creatinine urine output and avoid nephrotoxins as well as NSAIDs volume depletion    -Holding diuretics as above    Chronic normocytic anemia  Thrombocytosis, improved    -Platelets trended down from 647 admission to 44    -Globin remained stable at 9.6 with normal MCV    -We will continue home oral folate and multivitamin    -Threshold to transfuse remains hemoglobin less than 7 or symptomatic    Gout without flare  BPH  GERD  Obesity with BMI of 31       VTE Prophylaxis:   Mechanical Order History:     None      Pharmalogical Order History:      Ordered     Dose Route Frequency Stop    03/30/21 0137  heparin (porcine) 5000 UNIT/ML injection 5,000 Units      5,000 Units SC Every 12 Hours Scheduled --                Disposition Home once medically stable and improved.    Dany Davis DO  [facility] Hospitalist  04/06/21  19:18 EDT

## 2021-04-06 NOTE — PLAN OF CARE
Goal Outcome Evaluation:  Plan of Care Reviewed With: patient  Progress: no change  Outcome Summary: Pt resting in bed. States his bowels has moved and refuses any bowel meds. VS stable. No s/s of acute distress note. Will continue with plan of care.

## 2021-04-06 NOTE — PROGRESS NOTES
LOS: 7 days     Name: William Villasenor  Age/Sex: 70 y.o. male  :  1950        PCP: Jacqueline Gatica MD  REF: No Known Provider    Principal Problem:    Acute on chronic congestive heart failure (CMS/HCC)  Active Problems:    Type II diabetes mellitus (CMS/HCC)    Essential hypertension    Benign prostatic hyperplasia without lower urinary tract symptoms    Obesity (BMI 30-39.9)    GERD (gastroesophageal reflux disease)    Diabetic neuropathy (CMS/HCC)    Former smoker    History of gout    Hyperlipidemia    CKD (chronic kidney disease) stage 2, GFR 60-89 ml/min      Reason for follow-up: Congestive heart failure     Subjective       Subjective     William Villasenor is a 70 year old male with a past medical history significant for chronic kidney disease, chronic diastolic congestive heart failure, coronary arterry disease s/p CABG, hyperlipidemia, diabetes mellitus type 2, essential hypertension and gout. Patient presented to the ED with complaints of chest pain, lower extremity edema and shortness of breath.      Interval History: Patient reports he thinks his breathing is better. He is upset that he has not been able to get any sleep while in the hospital. Kidney function stable. He states he is unsure if he has had good urine output.     Vital Signs  Temp:  [97.6 °F (36.4 °C)-98.5 °F (36.9 °C)] 98 °F (36.7 °C)  Heart Rate:  [83-92] 92  Resp:  [16-20] 18  BP: (110-149)/(57-75) 110/57     Vital Signs (last 72 hrs)       / 0700  -  / 0659 / 0700  -   0659 / 07  -   0659 / 07  -   1136   Most Recent    Temp (°F) 97.3 -  98.8    97.7 -  98.3    97.6 -  98.5      98     98 (36.7)    Heart Rate 84 -  91    86 -  101    83 -  91      92     92    Resp 18 -  20    18 -  22    15 -  20      18     18    /66 -  136/73    133/75 -  169/80    118/61 -  155/76      110/57     110/57    SpO2 (%) 95 -  97    94 -  98    94 -  98      96     96        Body mass index is 32.36  kg/m².    Intake/Output Summary (Last 24 hours) at 4/6/2021 1136  Last data filed at 4/6/2021 0945  Gross per 24 hour   Intake 1265 ml   Output 1850 ml   Net -585 ml     Objective    Objective     I have seen and examined Mr. Villasenor today.  Physical Exam:     General Appearance:    Alert, cooperative, in no acute distress.  Patient is lying flat in bed with no acute dyspnea or tachypnea.   Head:    Normocephalic, without obvious abnormality, atraumatic   Eyes:            Conjunctivae and sclerae normal, no   icterus, no pallor, corneas clear.   Neck:   No adenopathy, supple, trachea midline, no thyromegaly, no   carotid bruit, no JVD   Lungs:     Clear to auscultation,respirations regular, even and                  unlabored    Heart:    Regular rhythm and normal rate, normal S1 and S2, no            murmur, no gallop, no rub, no click   Chest Wall:    No abnormalities observed, CABG scar   Abdomen:     Normal bowel sounds, no masses, no organomegaly, soft        non-tender, non-distended, no guarding, no rebound                tenderness   Extremities:   Moves all extremities well, right lower extremity with improving erythema and edema, no cyanosis, no             redness   Pulses:   Pulses palpable and equal bilaterally   Skin:   No bleeding, bruising or rash       Neurologic:   Alert and oriented     Results review       Results Review:   Results from last 7 days   Lab Units 04/06/21  0056 04/05/21  0346 04/04/21  0443 04/03/21  0530 04/02/21  0352 04/01/21  0148 03/31/21  0744   WBC 10*3/mm3 8.34 10.08 7.17 7.40 7.12 8.82 8.38   HEMOGLOBIN g/dL 9.5* 9.6* 9.8* 10.7* 9.6* 10.1* 9.7*   PLATELETS 10*3/mm3 445 484* 506* 560* 531* 556* 559*     Results from last 7 days   Lab Units 04/06/21  0056 04/05/21  0346 04/04/21  0443 04/03/21  0530 04/02/21  0352 04/01/21  0148 03/31/21  0744   SODIUM mmol/L 136 138 135* 134* 134* 136 135*   POTASSIUM mmol/L 3.9 4.0 4.4 4.4 4.3 5.3* 4.4   CHLORIDE mmol/L 101 100 97* 95* 98  96* 95*   CO2 mmol/L 25.1 25.5 26.7 29.4* 27.1 27.4 31.6*   BUN mg/dL 23 22 24* 25* 22 21 25*   CREATININE mg/dL 1.28* 1.21 1.22 1.12 1.18 1.10 1.26   CALCIUM mg/dL 8.2* 9.0 8.8 9.2 8.7 8.7 9.0   GLUCOSE mg/dL 280* 270* 286* 228* 273* 282* 295*         Lab Results   Component Value Date    INR 0.97 03/29/2021    INR 1.20 (H) 03/17/2021    INR 1.41 (H) 03/16/2021    INR 0.93 03/15/2021     Lab Results   Component Value Date    MG 1.7 03/30/2021    MG 1.7 03/29/2021    MG 1.8 03/24/2021     Lab Results   Component Value Date    TSH 3.410 03/29/2021    TRIG 618 (H) 03/15/2021    HDL 30 (L) 03/15/2021    LDL 91 03/15/2021      Imaging Results (Last 48 Hours)     ** No results found for the last 48 hours. **        Lab Results   Component Value Date    BNP 29.0 08/07/2018     Echo   Results for orders placed during the hospital encounter of 03/23/21    Adult Transthoracic Echo Limited W/ Cont if Necessary Per Protocol    Interpretation Summary  · Left ventricular ejection fraction appears to be 51 - 55%. Septal wall motion is abnormal, consistent with a post-operative state  · Left ventricular wall thickness is consistent with mild concentric hypertrophy.  · No significant functional valvular abnormalities noted  · There is no evidence of pericardial effusion     I reviewed the patient's new clinical results.    Telemetry: NSR 80-90 bpm      Medication Review:   allopurinol, 300 mg, Oral, Daily  aspirin, 81 mg, Oral, Daily  atorvastatin, 40 mg, Oral, Nightly  bisacodyl, 10 mg, Rectal, Daily  clopidogrel, 75 mg, Oral, Daily  finasteride, 5 mg, Oral, Daily  folic acid, 1 mg, Oral, BID  furosemide, 40 mg, Oral, BID  gabapentin, 300 mg, Oral, Q12H  heparin (porcine), 5,000 Units, Subcutaneous, Q12H  insulin aspart, 0-14 Units, Subcutaneous, TID PC  insulin aspart, 15 Units, Subcutaneous, TID With Meals  insulin detemir, 45 Units, Subcutaneous, Nightly  lactobacillus acidophilus, 1 capsule, Oral, Daily  lactulose, 30 g, Oral,  TID  lisinopril, 10 mg, Oral, Q24H  metFORMIN, 1,000 mg, Oral, BID With Meals  metoprolol succinate XL, 50 mg, Oral, Q24H  multivitamin, 1 tablet, Oral, Daily  pantoprazole, 40 mg, Oral, Q AM  piperacillin-tazobactam, 3.375 g, Intravenous, Q8H  sennosides-docusate, 2 tablet, Oral, BID  sodium chloride, 10 mL, Intravenous, Q12H  terazosin, 5 mg, Oral, Nightly  vancomycin, 1,000 mg, Intravenous, Q12H  vitamin E, 400 Units, Oral, Daily           Assessment      Assessment:  1. Acute on chronic diastolic heart failure (HFpEF), appears to be relatively compensated.  2. ASCVD, status post CABG clinically stable.  3. Essential hypertension.  4. Chronic kidney disease (stage II)      Plan     Recommendations:  1. We will decrease furosemide to 40 mg daily due to worsening of his kidney function.  2. Continue to monitor the kidney function and his blood pressure and make adjustment to the dose of lisinopril and or metoprolol succinate.  3. Consider cardiac rehab when patient is able to participate in this.    I discussed the patients findings and my recommendations with patient.      Electronically signed by ANKUR Kenney, 04/06/21, 11:36 AM EDT.    Electronically signed by Joseph Tyler MD, 04/06/21, 5:11 PM EDT.    Please note that portions of this note were completed with a voice recognition program.

## 2021-04-06 NOTE — THERAPY EVALUATION
Acute Care - Physical Therapy Initial Evaluation   Silvio     Patient Name: William Villasenor  : 1950  MRN: [id number]  Today's Date: 2021   Onset of Illness/Injury or Date of Surgery: 21 (admit date)       PT Assessment (last 12 hours)      PT Evaluation and Treatment     Row Name 21 1518          Physical Therapy Time and Intention    Subjective Information  complains of;weakness;fatigue  -CT     Document Type  evaluation  -CT     Mode of Treatment  physical therapy  -CT     Patient Effort  good  -CT     Symptoms Noted During/After Treatment  fatigue  -CT     Comment  Pt tolerated evaluation well with rest breaks provided as needed. Pt ambulated today with use of RW and CGA.   -CT     Row Name 21 1518          General Information    Patient Profile Reviewed  yes  -CT     Onset of Illness/Injury or Date of Surgery  21 admit date  -CT     Referring Physician  Elise  -CT     Patient Observations  alert;cooperative;agree to therapy  -CT     Prior Level of Function  independent:;all household mobility;community mobility  -CT     Equipment Currently Used at Home  none  -CT     Existing Precautions/Restrictions  sternal;fall;cardiac  -CT     Equipment Issued to Patient  gait belt  -CT     Risks Reviewed  patient:;LOB;nausea/vomiting;dizziness;increased discomfort;change in vital signs;increased drainage;lines disloged  -CT     Benefits Reviewed  patient:;improve function;increase independence;increase strength;increase balance;decrease pain;decrease risk of DVT;improve skin integrity;increase knowledge  -CT     Barriers to Rehab  medically complex  -CT     Row Name 21 1518          Previous Level of Function/Home Environm    Household Ambulation, Premorbid Functional Level  independent  -CT     Community Ambulation, Premorbid Functional Level  independent  -CT     Row Name 21 1518          Living Environment    Current Living Arrangements  home/apartment/condo  -CT     Lives  With  spouse  -CT     Row Name 04/06/21 1518          Cognition    Affect/Mental Status (Cognitive)  WFL  -CT     Orientation Status (Cognition)  oriented x 4  -CT     Follows Commands (Cognition)  follows multi-step commands  -CT     Row Name 04/06/21 1518          Pain    Additional Documentation  -- no pain reported  -CT     Row Name 04/06/21 1518          Range of Motion Comprehensive    Comment, General Range of Motion  BLE grossly WFL  -CT     Row Name 04/06/21 1518          Strength Comprehensive (MMT)    Comment, General Manual Muscle Testing (MMT) Assessment  BLE grossly 4/5  -CT     Row Name 04/06/21 1518          Bed Mobility    Bed Mobility  bed mobility (all) activities  -CT     All Activities, [place] (Bed Mobility)  contact guard assist  -CT     Bed Mobility, Safety Issues  decreased use of arms for pushing/pulling;decreased use of legs for bridging/pushing  -CT     Assistive Device (Bed Mobility)  bed rails  -CT     Row Name 04/06/21 1518          Transfers    Transfers  sit-stand transfer;stand-sit transfer  -CT     Sit-Stand [place] (Transfers)  contact guard  -CT     Stand-Sit [place] (Transfers)  contact guard  -CT     Row Name 04/06/21 1518          Sit-Stand Transfer    Assistive Device (Sit-Stand Transfers)  walker, front-wheeled  -CT     Row Name 04/06/21 1518          Stand-Sit Transfer    Assistive Device (Stand-Sit Transfers)  walker, front-wheeled  -CT     Row Name 04/06/21 1518          Gait/Stairs (Locomotion)    [place] Level (Gait)  contact guard  -CT     Assistive Device (Gait)  walker, front-wheeled  -CT     Distance in Feet (Gait)  250  -CT     Pattern (Gait)  step-to  -CT     Deviations/Abnormal Patterns (Gait)  gait speed decreased  -CT     Bilateral Gait Deviations  forward flexed posture  -CT     Row Name 04/06/21 1518          Balance    Balance Assessment  sitting static balance;standing static balance  -CT     Static Sitting Balance  WFL  -CT     Static  Standing Balance  WFL  -CT     Row Name             Wound 03/30/21 0120 medial sternal Incision    Wound - Properties Group Placement Date: 03/30/21  -AB Placement Time: 0120  -AB Present on Hospital Admission: Y  -AB Orientation: medial  -AB Location: sternal  -AB Primary Wound Type: Incision  -AB    Retired Wound - Properties Group Date first assessed: 03/30/21  -AB Time first assessed: 0120  -AB Present on Hospital Admission: Y  -AB Location: sternal  -AB Primary Wound Type: Incision  -AB    Row Name             Wound 03/30/21 0120 Right abdomen Incision    Wound - Properties Group Placement Date: 03/30/21  -AB Placement Time: 0120  -AB Side: Right  -AB Location: abdomen  -AB Primary Wound Type: Incision  -AB    Retired Wound - Properties Group Date first assessed: 03/30/21  -AB Time first assessed: 0120  -AB Side: Right  -AB Location: abdomen  -AB Primary Wound Type: Incision  -AB    Row Name             Wound 03/30/21 0120 Right distal leg Incision    Wound - Properties Group Placement Date: 03/30/21  -AB Placement Time: 0120  -AB Side: Right  -AB Orientation: distal  -AB Location: leg  -AB Primary Wound Type: Incision  -AB    Retired Wound - Properties Group Date first assessed: 03/30/21  -AB Time first assessed: 0120  -AB Side: Right  -AB Location: leg  -AB Primary Wound Type: Incision  -AB    Row Name 04/06/21 1518          Coping    Observed Emotional State  calm;cooperative  -CT     Verbalized Emotional State  acceptance  -CT     Row Name 04/06/21 1518          Plan of Care Review    Plan of Care Reviewed With  patient  -CT     Row Name 04/06/21 1518          Physical Therapy Goals    Bed Mobility Goal Selection (PT)  bed mobility, PT goal 1  -CT     Transfer Goal Selection (PT)  transfer, PT goal 1  -CT     Gait Training Goal Selection (PT)  gait training, PT goal 1  -CT     Row Name 04/06/21 1518          Bed Mobility Goal 1 (PT)    Activity/Assistive Device (Bed Mobility Goal 1, PT)  bed mobility  activities, all  -CT     Heard Level/Cues Needed (Bed Mobility Goal 1, PT)  modified independence  -CT     Time Frame (Bed Mobility Goal 1, PT)  by discharge  -CT     Row Name 04/06/21 1518          Transfer Goal 1 (PT)    Activity/Assistive Device (Transfer Goal 1, PT)  sit-to-stand/stand-to-sit;bed-to-chair/chair-to-bed  -CT     Heard Level/Cues Needed (Transfer Goal 1, PT)  modified independence  -CT     Time Frame (Transfer Goal 1, PT)  by discharge  -CT     Row Name 04/06/21 1518          Gait Training Goal 1 (PT)    Activity/Assistive Device (Gait Training Goal 1, PT)  gait (walking locomotion);assistive device use  -CT     Heard Level (Gait Training Goal 1, PT)  modified independence  -CT     Distance (Gait Training Goal 1, PT)  300  -CT     Time Frame (Gait Training Goal 1, PT)  by discharge  -CT     Row Name 04/06/21 1518          Positioning and Restraints    Pre-Treatment Position  in bed  -CT     Post Treatment Position  chair  -CT     In Chair  sitting;call light within reach;encouraged to call for assist;notified nsg;with family/caregiver  -CT     Row Name 04/06/21 151          Therapy Assessment/Plan (PT)    Patient/Family Therapy Goals Statement (PT)  Pt goals are to return to PLOF  -CT     Functional Level at Time of Evaluation (PT)  CGA  -CT     PT Diagnosis (PT)  decreased functional mobility  -CT     Rehab Potential (PT)  good, to achieve stated therapy goals  -CT     Criteria for Skilled Interventions Met (PT)  yes;skilled treatment is necessary  -CT     Predicted Duration of Therapy Intervention (PT)  length of stay  -CT     Row Name 04/06/21 1510          Therapy Plan Review/Discharge Plan (PT)    Therapy Plan Review (PT)  evaluation/treatment results reviewed;care plan/treatment goals reviewed;risks/benefits reviewed;current/potential barriers reviewed;participants voiced agreement with care plan;participants included;patient  -CT       User Key  (r) = Recorded By, (t) =  Taken By, (c) = Cosigned By    Initials Name Provider Type    CT Kellen Manning PT Physical Therapist    Francisca Bennett, RN Registered Nurse        Physical Therapy Education                 Title: PT OT SLP Therapies (Done)     Topic: Physical Therapy (Done)     Point: Mobility training (Done)     Learning Progress Summary           Patient Acceptance, E,TB, VU by CT at 4/6/2021 1524                   Point: Home exercise program (Done)     Learning Progress Summary           Patient Acceptance, E,TB, VU by CT at 4/6/2021 1524                   Point: Body mechanics (Done)     Learning Progress Summary           Patient Acceptance, E,TB, VU by CT at 4/6/2021 1524                   Point: Precautions (Done)     Learning Progress Summary           Patient Acceptance, E,TB, VU by CT at 4/6/2021 1524                               User Key     Initials Effective Dates Name Provider Type Discipline    CT 04/03/18 -  Kellen Manning PT Physical Therapist PT              PT Recommendation and Plan  Anticipated Discharge Disposition (PT): home, home with assist  Planned Therapy Interventions (PT): balance training, bed mobility training, gait training, home exercise program, manual therapy techniques, motor coordination training, neuromuscular re-education, patient/family education, postural re-education, strengthening, transfer training  Therapy Frequency (PT): 3 times/wk (3-5 times/wk )  Plan of Care Reviewed With: patient       Time Calculation:   PT Charges     Row Name 04/06/21 1524             Time Calculation    PT Received On  04/06/21  -CT      PT Goal Re-Cert Due Date  04/20/21  -CT        User Key  (r) = Recorded By, (t) = Taken By, (c) = Cosigned By    Initials Name Provider Type    CT Kellen Manning PT Physical Therapist        Therapy Charges for Today     Code Description Service Date Service Provider Modifiers Qty    04681414394 HC PT EVAL MOD COMPLEXITY 4 4/6/2021 Kellen Manning PT GP 1                Kellen Manning, PT  4/6/2021

## 2021-04-06 NOTE — PLAN OF CARE
Goal Outcome Evaluation:     Progress: no change   Pt is sitting on edge of bed with wife at bedside. Has declined bowel regimen today. Pt has also declined pain medications in fear he becomes constipated. Acetaminophen offered. Educated pt on using bowel regimen to prevent constipation. Pt leg redressed, wound is red with no drainage. Legs tender to touch. Has trace/ +1 edema. Received p.o lasix today.

## 2021-04-06 NOTE — NURSING NOTE
"Pt is currently refusing for anyone to enter room or to \"bother\" him. Pt refused standing scale weight and a bedscale was obtained. Pt is refusing for anyone to come in for rounding at this time. Pt stated it would be \"fine\" for someone to come in for his 6am vitals. Pt verbalized that he is \"not able to get any better if people wont leave him alone.\" Pt refused to have door closed due to \"lack of circulation\" even though pt was informed it would decrease noise.     Pt complains of pain at this time but continues to refuse pain medicine due to not wanting his \"bowels to stop again\". Pt refused evening dose of lactulose and senokot. Pt was educated on the use of stool softeners, etc to help with bowel motility especially when on pain meds. Pt continued to refuse pain medication.   "

## 2021-04-07 LAB
GLUCOSE BLDC GLUCOMTR-MCNC: 210 MG/DL (ref 70–130)
GLUCOSE BLDC GLUCOMTR-MCNC: 214 MG/DL (ref 70–130)
GLUCOSE BLDC GLUCOMTR-MCNC: 237 MG/DL (ref 70–130)
GLUCOSE BLDC GLUCOMTR-MCNC: 269 MG/DL (ref 70–130)
GLUCOSE BLDC GLUCOMTR-MCNC: 288 MG/DL (ref 70–130)
GLUCOSE BLDC GLUCOMTR-MCNC: 318 MG/DL (ref 70–130)

## 2021-04-07 PROCEDURE — 25010000002 VANCOMYCIN 1 G RECONSTITUTED SOLUTION: Performed by: INTERNAL MEDICINE

## 2021-04-07 PROCEDURE — 25010000002 HEPARIN (PORCINE) PER 1000 UNITS: Performed by: HOSPITALIST

## 2021-04-07 PROCEDURE — 25010000002 PIPERACILLIN SOD-TAZOBACTAM PER 1 G: Performed by: HOSPITALIST

## 2021-04-07 PROCEDURE — 63710000001 INSULIN DETEMIR PER 5 UNITS: Performed by: STUDENT IN AN ORGANIZED HEALTH CARE EDUCATION/TRAINING PROGRAM

## 2021-04-07 PROCEDURE — 99232 SBSQ HOSP IP/OBS MODERATE 35: CPT | Performed by: STUDENT IN AN ORGANIZED HEALTH CARE EDUCATION/TRAINING PROGRAM

## 2021-04-07 PROCEDURE — 63710000001 INSULIN ASPART PER 5 UNITS: Performed by: INTERNAL MEDICINE

## 2021-04-07 PROCEDURE — 63710000001 INSULIN ASPART PER 5 UNITS: Performed by: STUDENT IN AN ORGANIZED HEALTH CARE EDUCATION/TRAINING PROGRAM

## 2021-04-07 PROCEDURE — 99232 SBSQ HOSP IP/OBS MODERATE 35: CPT | Performed by: SPECIALIST

## 2021-04-07 PROCEDURE — 82962 GLUCOSE BLOOD TEST: CPT

## 2021-04-07 RX ORDER — GLIPIZIDE 5 MG/1
5 TABLET ORAL
Status: DISCONTINUED | OUTPATIENT
Start: 2021-04-08 | End: 2021-04-09 | Stop reason: HOSPADM

## 2021-04-07 RX ADMIN — INSULIN ASPART 10 UNITS: 100 INJECTION, SOLUTION INTRAVENOUS; SUBCUTANEOUS at 11:46

## 2021-04-07 RX ADMIN — INSULIN ASPART 5 UNITS: 100 INJECTION, SOLUTION INTRAVENOUS; SUBCUTANEOUS at 18:32

## 2021-04-07 RX ADMIN — SODIUM CHLORIDE, PRESERVATIVE FREE 10 ML: 5 INJECTION INTRAVENOUS at 21:47

## 2021-04-07 RX ADMIN — LISINOPRIL 10 MG: 10 TABLET ORAL at 10:33

## 2021-04-07 RX ADMIN — ASPIRIN 81 MG: 81 TABLET, COATED ORAL at 10:32

## 2021-04-07 RX ADMIN — METFORMIN HYDROCHLORIDE 1000 MG: 500 TABLET ORAL at 18:32

## 2021-04-07 RX ADMIN — Medication 400 UNITS: at 10:32

## 2021-04-07 RX ADMIN — ATORVASTATIN CALCIUM 40 MG: 40 TABLET, FILM COATED ORAL at 21:44

## 2021-04-07 RX ADMIN — PIPERACILLIN SODIUM AND TAZOBACTAM SODIUM 3.38 G: 3; .375 INJECTION, POWDER, LYOPHILIZED, FOR SOLUTION INTRAVENOUS at 16:02

## 2021-04-07 RX ADMIN — ALLOPURINOL 300 MG: 300 TABLET ORAL at 10:32

## 2021-04-07 RX ADMIN — HEPARIN SODIUM 5000 UNITS: 5000 INJECTION, SOLUTION INTRAVENOUS; SUBCUTANEOUS at 21:47

## 2021-04-07 RX ADMIN — VANCOMYCIN HYDROCHLORIDE 1000 MG: 1 INJECTION, POWDER, LYOPHILIZED, FOR SOLUTION INTRAVENOUS at 01:31

## 2021-04-07 RX ADMIN — PIPERACILLIN SODIUM AND TAZOBACTAM SODIUM 3.38 G: 3; .375 INJECTION, POWDER, LYOPHILIZED, FOR SOLUTION INTRAVENOUS at 06:14

## 2021-04-07 RX ADMIN — FOLIC ACID 1 MG: 1 TABLET ORAL at 21:46

## 2021-04-07 RX ADMIN — CLOPIDOGREL 75 MG: 75 TABLET, FILM COATED ORAL at 10:32

## 2021-04-07 RX ADMIN — GABAPENTIN 300 MG: 300 CAPSULE ORAL at 11:45

## 2021-04-07 RX ADMIN — HEPARIN SODIUM 5000 UNITS: 5000 INJECTION, SOLUTION INTRAVENOUS; SUBCUTANEOUS at 10:33

## 2021-04-07 RX ADMIN — INSULIN DETEMIR 45 UNITS: 100 INJECTION, SOLUTION SUBCUTANEOUS at 21:53

## 2021-04-07 RX ADMIN — Medication 1 TABLET: at 10:32

## 2021-04-07 RX ADMIN — Medication 1 CAPSULE: at 10:32

## 2021-04-07 RX ADMIN — TERAZOSIN HYDROCHLORIDE 5 MG: 5 CAPSULE ORAL at 21:45

## 2021-04-07 RX ADMIN — VANCOMYCIN HYDROCHLORIDE 1000 MG: 1 INJECTION, POWDER, LYOPHILIZED, FOR SOLUTION INTRAVENOUS at 18:32

## 2021-04-07 RX ADMIN — METOPROLOL SUCCINATE 50 MG: 50 TABLET, EXTENDED RELEASE ORAL at 11:46

## 2021-04-07 RX ADMIN — DOCUSATE SODIUM 50 MG AND SENNOSIDES 8.6 MG 2 TABLET: 8.6; 5 TABLET, FILM COATED ORAL at 10:33

## 2021-04-07 RX ADMIN — FOLIC ACID 1 MG: 1 TABLET ORAL at 10:32

## 2021-04-07 RX ADMIN — INSULIN ASPART 15 UNITS: 100 INJECTION, SOLUTION INTRAVENOUS; SUBCUTANEOUS at 18:39

## 2021-04-07 RX ADMIN — INSULIN ASPART 15 UNITS: 100 INJECTION, SOLUTION INTRAVENOUS; SUBCUTANEOUS at 11:49

## 2021-04-07 RX ADMIN — FINASTERIDE 5 MG: 5 TABLET, FILM COATED ORAL at 10:32

## 2021-04-07 RX ADMIN — METFORMIN HYDROCHLORIDE 1000 MG: 500 TABLET ORAL at 10:31

## 2021-04-07 RX ADMIN — GABAPENTIN 300 MG: 300 CAPSULE ORAL at 22:00

## 2021-04-07 NOTE — PROGRESS NOTES
Discharge Planning Assessment   Silvio     Patient Name: William Villasenor  MRN: [id number]  Today's Date: 4/7/2021    Admit Date: 3/29/2021    Discharge Needs Assessment    No documentation.       Discharge Plan     Row Name 04/07/21 1744       Plan    Plan Pt was admitted on 03/29/21. Pt lives at home with spouse and plans to return home at discharge. Pt utilizes [facility]. H will need new referral with face to face at discharge. Pt currently utilizes hosptial bed, cane, bedsdie commode, rolling walker, lift chair and home O2@ 2liters via [employer]. SS to follow.        Jennifer Way

## 2021-04-07 NOTE — PROGRESS NOTES
LOS: 8 days     Name: William Villasenor  Age/Sex: 70 y.o. male  :  1950        PCP: Jacqueline Gatica MD  REF: No Known Provider    Principal Problem:    Acute on chronic congestive heart failure (CMS/HCC)  Active Problems:    Type II diabetes mellitus (CMS/HCC)    Essential hypertension    Benign prostatic hyperplasia without lower urinary tract symptoms    Obesity (BMI 30-39.9)    GERD (gastroesophageal reflux disease)    Diabetic neuropathy (CMS/HCC)    Former smoker    History of gout    Hyperlipidemia    CKD (chronic kidney disease) stage 2, GFR 60-89 ml/min      Reason for follow-up: Congestive heart failure     Subjective       Subjective     William Villasenor is a 70 year old male with a past medical history significant for chronic kidney disease, chronic diastolic congestive heart failure, coronary arterry disease s/p CABG, hyperlipidemia, diabetes mellitus type 2, essential hypertension and gout. Patient presented to the ED with complaints of chest pain, lower extremity edema and shortness of breath.     Interval History: Patient states his breathing is better today that he is feeling overall better since admission.  He had good urine output yesterday.  Creatinine stable.      Vital Signs  Temp:  [97.9 °F (36.6 °C)-98.6 °F (37 °C)] 97.9 °F (36.6 °C)  Heart Rate:  [82-92] 90  Resp:  [18-21] 21  BP: (110-140)/(57-70) 140/70     Vital Signs (last 72 hrs)       04/ 0700  -  04/05 0659 04/ 0700  -  04/06 0659 / 0700  -   0659 / 0700  -   0928   Most Recent    Temp (°F) 97.7 -  98.3    97.6 -  98.5    98 -  98.6      97.9     97.9 (36.6)    Heart Rate 86 -  101    83 -  91    82 -  92      90     90    Resp 18 -  22    15 -  20    18 -  20      21     21    /75 -  169/80    118/61 -  155/76    110/57 -  124/64      140/70     140/70    SpO2 (%) 94 -  98    94 -  98    96 -  98      94     94        Body mass index is 32.36 kg/m².    Intake/Output Summary (Last 24 hours) at 2021  0928  Last data filed at 4/7/2021 0400  Gross per 24 hour   Intake 1200 ml   Output 3000 ml   Net -1800 ml     Objective    Objective       Physical Exam:     General Appearance:    Alert, cooperative, in no acute distress   Head:    Normocephalic, without obvious abnormality, atraumatic   Eyes:            Conjunctivae and sclerae normal, no   icterus, no pallor, corneas clear.   Neck:   No adenopathy, supple, trachea midline, no thyromegaly, no   carotid bruit, no JVD   Lungs:     Clear to auscultation,respirations regular, even and                  unlabored    Heart:    Regular rhythm and normal rate, normal S1 and S2, no            murmur, no gallop, no rub, no click   Chest Wall:    No abnormalities observed   Abdomen:     Normal bowel sounds, no masses, no organomegaly, soft        non-tender, non-distended, no guarding, no rebound                tenderness   Extremities:   Moves all extremities well, no edema, no cyanosis, no             redness, right lower extremity wound improving   Pulses:   Pulses palpable and equal bilaterally   Skin:   No bleeding, bruising or rash       Neurologic:  Alert and oriented     Results review       Results Review:   Results from last 7 days   Lab Units 04/06/21  0056 04/05/21  0346 04/04/21  0443 04/03/21  0530 04/02/21  0352 04/01/21  0148   WBC 10*3/mm3 8.34 10.08 7.17 7.40 7.12 8.82   HEMOGLOBIN g/dL 9.5* 9.6* 9.8* 10.7* 9.6* 10.1*   PLATELETS 10*3/mm3 445 484* 506* 560* 531* 556*     Results from last 7 days   Lab Units 04/06/21  0056 04/05/21  0346 04/04/21  0443 04/03/21  0530 04/02/21  0352 04/01/21  0148   SODIUM mmol/L 136 138 135* 134* 134* 136   POTASSIUM mmol/L 3.9 4.0 4.4 4.4 4.3 5.3*   CHLORIDE mmol/L 101 100 97* 95* 98 96*   CO2 mmol/L 25.1 25.5 26.7 29.4* 27.1 27.4   BUN mg/dL 23 22 24* 25* 22 21   CREATININE mg/dL 1.28* 1.21 1.22 1.12 1.18 1.10   CALCIUM mg/dL 8.2* 9.0 8.8 9.2 8.7 8.7   GLUCOSE mg/dL 280* 270* 286* 228* 273* 282*         Lab Results    Component Value Date    INR 0.97 03/29/2021    INR 1.20 (H) 03/17/2021    INR 1.41 (H) 03/16/2021    INR 0.93 03/15/2021     Lab Results   Component Value Date    MG 1.7 03/30/2021    MG 1.7 03/29/2021    MG 1.8 03/24/2021     Lab Results   Component Value Date    TSH 3.410 03/29/2021    TRIG 618 (H) 03/15/2021    HDL 30 (L) 03/15/2021    LDL 91 03/15/2021      Imaging Results (Last 48 Hours)     ** No results found for the last 48 hours. **        Lab Results   Component Value Date    BNP 29.0 08/07/2018     Echo   Results for orders placed during the hospital encounter of 03/23/21    Adult Transthoracic Echo Limited W/ Cont if Necessary Per Protocol    Interpretation Summary  · Left ventricular ejection fraction appears to be 51 - 55%. Septal wall motion is abnormal, consistent with a post-operative state  · Left ventricular wall thickness is consistent with mild concentric hypertrophy.  · No significant functional valvular abnormalities noted  · There is no evidence of pericardial effusion     I reviewed the patient's new clinical results.    Telemetry: Sinus rhythm/ tachycardia  bpm        Medication Review:   allopurinol, 300 mg, Oral, Daily  aspirin, 81 mg, Oral, Daily  atorvastatin, 40 mg, Oral, Nightly  bisacodyl, 10 mg, Rectal, Daily  clopidogrel, 75 mg, Oral, Daily  finasteride, 5 mg, Oral, Daily  folic acid, 1 mg, Oral, BID  gabapentin, 300 mg, Oral, Q12H  heparin (porcine), 5,000 Units, Subcutaneous, Q12H  insulin aspart, 0-14 Units, Subcutaneous, TID PC  insulin aspart, 15 Units, Subcutaneous, TID With Meals  insulin detemir, 45 Units, Subcutaneous, Nightly  lactobacillus acidophilus, 1 capsule, Oral, Daily  lactulose, 30 g, Oral, TID  lisinopril, 10 mg, Oral, Q24H  metFORMIN, 1,000 mg, Oral, BID With Meals  metoprolol succinate XL, 50 mg, Oral, Q24H  multivitamin, 1 tablet, Oral, Daily  pantoprazole, 40 mg, Oral, Q AM  piperacillin-tazobactam, 3.375 g, Intravenous, Q8H  sennosides-docusate, 2  tablet, Oral, BID  sodium chloride, 10 mL, Intravenous, Q12H  terazosin, 5 mg, Oral, Nightly  vancomycin, 1,000 mg, Intravenous, Q12H  vitamin E, 400 Units, Oral, Daily             Assessment      Assessment:  1. Acute on chronic HFpEF, better compensation  2. Coronary artery disease s/p CABG  3. Essential hypertension  4. Chronic kidney disease stage II        Plan     Recommendations:  1. Patient is diuresing very well his creatinine slightly up we will recommend switching to p.o. furosemide in continue current management he is approaching euvolemic status  2. Regarding his coronary artery disease seems to be stable continue current management  3. Blood pressures controlled continue current management  4. We will follow at a distance please call if assistance is required    I discussed the patients findings and my recommendations with patient and family      Electronically signed by ANKUR Kenney, 04/07/21, 9:28 AM EDT.  Electronically signed by Parvin James MD, 04/07/21, 1:53 PM EDT.  Please note that portions of this note were completed with a voice recognition program.

## 2021-04-07 NOTE — PLAN OF CARE
Goal Outcome Evaluation:         Pt requested me to hold his anti biotics until later this afternoon so he could get some rest, otherwise no complaints will monitor

## 2021-04-08 ENCOUNTER — APPOINTMENT (OUTPATIENT)
Dept: GENERAL RADIOLOGY | Facility: HOSPITAL | Age: 71
End: 2021-04-08

## 2021-04-08 LAB
ALBUMIN SERPL-MCNC: 3.05 G/DL (ref 3.5–5.2)
ALBUMIN/GLOB SERPL: 0.9 G/DL
ALP SERPL-CCNC: 99 U/L (ref 39–117)
ALT SERPL W P-5'-P-CCNC: 13 U/L (ref 1–41)
ANION GAP SERPL CALCULATED.3IONS-SCNC: 8.1 MMOL/L (ref 5–15)
AST SERPL-CCNC: 10 U/L (ref 1–40)
BASOPHILS # BLD AUTO: 0.04 10*3/MM3 (ref 0–0.2)
BASOPHILS NFR BLD AUTO: 0.5 % (ref 0–1.5)
BILIRUB SERPL-MCNC: 0.2 MG/DL (ref 0–1.2)
BUN SERPL-MCNC: 17 MG/DL (ref 8–23)
BUN/CREAT SERPL: 15.5 (ref 7–25)
CALCIUM SPEC-SCNC: 9.4 MG/DL (ref 8.6–10.5)
CHLORIDE SERPL-SCNC: 100 MMOL/L (ref 98–107)
CO2 SERPL-SCNC: 25.9 MMOL/L (ref 22–29)
CREAT SERPL-MCNC: 1.1 MG/DL (ref 0.76–1.27)
DEPRECATED RDW RBC AUTO: 43.8 FL (ref 37–54)
EOSINOPHIL # BLD AUTO: 0.22 10*3/MM3 (ref 0–0.4)
EOSINOPHIL NFR BLD AUTO: 2.7 % (ref 0.3–6.2)
ERYTHROCYTE [DISTWIDTH] IN BLOOD BY AUTOMATED COUNT: 13.2 % (ref 12.3–15.4)
GFR SERPL CREATININE-BSD FRML MDRD: 66 ML/MIN/1.73
GLOBULIN UR ELPH-MCNC: 3.4 GM/DL
GLUCOSE BLDC GLUCOMTR-MCNC: 129 MG/DL (ref 70–130)
GLUCOSE BLDC GLUCOMTR-MCNC: 155 MG/DL (ref 70–130)
GLUCOSE BLDC GLUCOMTR-MCNC: 178 MG/DL (ref 70–130)
GLUCOSE BLDC GLUCOMTR-MCNC: 212 MG/DL (ref 70–130)
GLUCOSE BLDC GLUCOMTR-MCNC: 217 MG/DL (ref 70–130)
GLUCOSE SERPL-MCNC: 178 MG/DL (ref 65–99)
HCT VFR BLD AUTO: 32.9 % (ref 37.5–51)
HGB BLD-MCNC: 10.1 G/DL (ref 13–17.7)
IMM GRANULOCYTES # BLD AUTO: 0.05 10*3/MM3 (ref 0–0.05)
IMM GRANULOCYTES NFR BLD AUTO: 0.6 % (ref 0–0.5)
LYMPHOCYTES # BLD AUTO: 2.19 10*3/MM3 (ref 0.7–3.1)
LYMPHOCYTES NFR BLD AUTO: 27.1 % (ref 19.6–45.3)
MCH RBC QN AUTO: 28.1 PG (ref 26.6–33)
MCHC RBC AUTO-ENTMCNC: 30.7 G/DL (ref 31.5–35.7)
MCV RBC AUTO: 91.6 FL (ref 79–97)
MONOCYTES # BLD AUTO: 0.72 10*3/MM3 (ref 0.1–0.9)
MONOCYTES NFR BLD AUTO: 8.9 % (ref 5–12)
NEUTROPHILS NFR BLD AUTO: 4.86 10*3/MM3 (ref 1.7–7)
NEUTROPHILS NFR BLD AUTO: 60.2 % (ref 42.7–76)
NRBC BLD AUTO-RTO: 0 /100 WBC (ref 0–0.2)
PLATELET # BLD AUTO: 437 10*3/MM3 (ref 140–450)
PMV BLD AUTO: 8.6 FL (ref 6–12)
POTASSIUM SERPL-SCNC: 4 MMOL/L (ref 3.5–5.2)
PROT SERPL-MCNC: 6.4 G/DL (ref 6–8.5)
QT INTERVAL: 356 MS
QTC INTERVAL: 447 MS
RBC # BLD AUTO: 3.59 10*6/MM3 (ref 4.14–5.8)
SODIUM SERPL-SCNC: 134 MMOL/L (ref 136–145)
TROPONIN T SERPL-MCNC: <0.01 NG/ML (ref 0–0.03)
WBC # BLD AUTO: 8.08 10*3/MM3 (ref 3.4–10.8)

## 2021-04-08 PROCEDURE — 84484 ASSAY OF TROPONIN QUANT: CPT | Performed by: INTERNAL MEDICINE

## 2021-04-08 PROCEDURE — 63710000001 INSULIN DETEMIR PER 5 UNITS: Performed by: STUDENT IN AN ORGANIZED HEALTH CARE EDUCATION/TRAINING PROGRAM

## 2021-04-08 PROCEDURE — 80053 COMPREHEN METABOLIC PANEL: CPT | Performed by: STUDENT IN AN ORGANIZED HEALTH CARE EDUCATION/TRAINING PROGRAM

## 2021-04-08 PROCEDURE — 82962 GLUCOSE BLOOD TEST: CPT

## 2021-04-08 PROCEDURE — 71045 X-RAY EXAM CHEST 1 VIEW: CPT | Performed by: RADIOLOGY

## 2021-04-08 PROCEDURE — 85025 COMPLETE CBC W/AUTO DIFF WBC: CPT | Performed by: STUDENT IN AN ORGANIZED HEALTH CARE EDUCATION/TRAINING PROGRAM

## 2021-04-08 PROCEDURE — 93010 ELECTROCARDIOGRAM REPORT: CPT | Performed by: INTERNAL MEDICINE

## 2021-04-08 PROCEDURE — 93005 ELECTROCARDIOGRAM TRACING: CPT | Performed by: INTERNAL MEDICINE

## 2021-04-08 PROCEDURE — 25010000002 HEPARIN (PORCINE) PER 1000 UNITS: Performed by: HOSPITALIST

## 2021-04-08 PROCEDURE — 25010000002 VANCOMYCIN 1 G RECONSTITUTED SOLUTION: Performed by: INTERNAL MEDICINE

## 2021-04-08 PROCEDURE — 99232 SBSQ HOSP IP/OBS MODERATE 35: CPT | Performed by: STUDENT IN AN ORGANIZED HEALTH CARE EDUCATION/TRAINING PROGRAM

## 2021-04-08 PROCEDURE — 71045 X-RAY EXAM CHEST 1 VIEW: CPT

## 2021-04-08 PROCEDURE — 25010000002 PIPERACILLIN SOD-TAZOBACTAM PER 1 G: Performed by: HOSPITALIST

## 2021-04-08 PROCEDURE — 94799 UNLISTED PULMONARY SVC/PX: CPT

## 2021-04-08 PROCEDURE — 63710000001 INSULIN ASPART PER 5 UNITS: Performed by: INTERNAL MEDICINE

## 2021-04-08 RX ORDER — FUROSEMIDE 40 MG/1
40 TABLET ORAL DAILY
Status: DISCONTINUED | OUTPATIENT
Start: 2021-04-08 | End: 2021-04-09 | Stop reason: HOSPADM

## 2021-04-08 RX ADMIN — OXYCODONE HYDROCHLORIDE AND ACETAMINOPHEN 1 TABLET: 7.5; 325 TABLET ORAL at 03:30

## 2021-04-08 RX ADMIN — GABAPENTIN 300 MG: 300 CAPSULE ORAL at 20:15

## 2021-04-08 RX ADMIN — FOLIC ACID 1 MG: 1 TABLET ORAL at 08:25

## 2021-04-08 RX ADMIN — LISINOPRIL 10 MG: 10 TABLET ORAL at 09:00

## 2021-04-08 RX ADMIN — INSULIN ASPART 3 UNITS: 100 INJECTION, SOLUTION INTRAVENOUS; SUBCUTANEOUS at 17:04

## 2021-04-08 RX ADMIN — INSULIN DETEMIR 40 UNITS: 100 INJECTION, SOLUTION SUBCUTANEOUS at 20:15

## 2021-04-08 RX ADMIN — TERAZOSIN HYDROCHLORIDE 5 MG: 5 CAPSULE ORAL at 20:15

## 2021-04-08 RX ADMIN — Medication 1 CAPSULE: at 08:21

## 2021-04-08 RX ADMIN — SODIUM CHLORIDE, PRESERVATIVE FREE 10 ML: 5 INJECTION INTRAVENOUS at 20:15

## 2021-04-08 RX ADMIN — PANTOPRAZOLE SODIUM 40 MG: 40 TABLET, DELAYED RELEASE ORAL at 04:42

## 2021-04-08 RX ADMIN — FUROSEMIDE 40 MG: 40 TABLET ORAL at 16:57

## 2021-04-08 RX ADMIN — ALLOPURINOL 300 MG: 300 TABLET ORAL at 08:25

## 2021-04-08 RX ADMIN — PIPERACILLIN SODIUM AND TAZOBACTAM SODIUM 3.38 G: 3; .375 INJECTION, POWDER, LYOPHILIZED, FOR SOLUTION INTRAVENOUS at 23:28

## 2021-04-08 RX ADMIN — GABAPENTIN 300 MG: 300 CAPSULE ORAL at 08:25

## 2021-04-08 RX ADMIN — DOCUSATE SODIUM 50 MG AND SENNOSIDES 8.6 MG 2 TABLET: 8.6; 5 TABLET, FILM COATED ORAL at 08:21

## 2021-04-08 RX ADMIN — METFORMIN HYDROCHLORIDE 1000 MG: 500 TABLET ORAL at 08:25

## 2021-04-08 RX ADMIN — CLOPIDOGREL 75 MG: 75 TABLET, FILM COATED ORAL at 08:25

## 2021-04-08 RX ADMIN — INSULIN ASPART 5 UNITS: 100 INJECTION, SOLUTION INTRAVENOUS; SUBCUTANEOUS at 12:11

## 2021-04-08 RX ADMIN — HEPARIN SODIUM 5000 UNITS: 5000 INJECTION, SOLUTION INTRAVENOUS; SUBCUTANEOUS at 20:15

## 2021-04-08 RX ADMIN — GLIPIZIDE 5 MG: 5 TABLET ORAL at 16:57

## 2021-04-08 RX ADMIN — METOPROLOL SUCCINATE 50 MG: 50 TABLET, EXTENDED RELEASE ORAL at 08:25

## 2021-04-08 RX ADMIN — HEPARIN SODIUM 5000 UNITS: 5000 INJECTION, SOLUTION INTRAVENOUS; SUBCUTANEOUS at 08:26

## 2021-04-08 RX ADMIN — OXYCODONE HYDROCHLORIDE AND ACETAMINOPHEN 1 TABLET: 7.5; 325 TABLET ORAL at 21:32

## 2021-04-08 RX ADMIN — ATORVASTATIN CALCIUM 40 MG: 40 TABLET, FILM COATED ORAL at 20:15

## 2021-04-08 RX ADMIN — GLIPIZIDE 5 MG: 5 TABLET ORAL at 08:24

## 2021-04-08 RX ADMIN — PIPERACILLIN SODIUM AND TAZOBACTAM SODIUM 3.38 G: 3; .375 INJECTION, POWDER, LYOPHILIZED, FOR SOLUTION INTRAVENOUS at 15:56

## 2021-04-08 RX ADMIN — METFORMIN HYDROCHLORIDE 1000 MG: 500 TABLET ORAL at 16:57

## 2021-04-08 RX ADMIN — ASPIRIN 81 MG: 81 TABLET, COATED ORAL at 08:25

## 2021-04-08 RX ADMIN — VANCOMYCIN HYDROCHLORIDE 1000 MG: 1 INJECTION, POWDER, LYOPHILIZED, FOR SOLUTION INTRAVENOUS at 16:57

## 2021-04-08 RX ADMIN — FOLIC ACID 1 MG: 1 TABLET ORAL at 20:15

## 2021-04-08 RX ADMIN — PIPERACILLIN SODIUM AND TAZOBACTAM SODIUM 3.38 G: 3; .375 INJECTION, POWDER, LYOPHILIZED, FOR SOLUTION INTRAVENOUS at 08:27

## 2021-04-08 RX ADMIN — FINASTERIDE 5 MG: 5 TABLET, FILM COATED ORAL at 08:21

## 2021-04-08 RX ADMIN — Medication 1 TABLET: at 08:24

## 2021-04-08 RX ADMIN — VANCOMYCIN HYDROCHLORIDE 1000 MG: 1 INJECTION, POWDER, LYOPHILIZED, FOR SOLUTION INTRAVENOUS at 05:21

## 2021-04-08 RX ADMIN — Medication 400 UNITS: at 08:24

## 2021-04-08 RX ADMIN — PIPERACILLIN SODIUM AND TAZOBACTAM SODIUM 3.38 G: 3; .375 INJECTION, POWDER, LYOPHILIZED, FOR SOLUTION INTRAVENOUS at 00:35

## 2021-04-08 NOTE — PROGRESS NOTES
[facility] HOSPITALIST PROGRESS NOTE     Patient Identification:  Name:  William Villasenor  Age:  70 y.o.  Sex:  male  :  1950  MRN:  [id number]  Visit Number:  [id number]  ROOM: [address] [place] [address]     Primary Care Provider:  Jacqueline Gatica MD    Length of stay in inpatient status:  8    Subjective     Chief Compliant:    Chief Complaint   Patient presents with   • Chest Pain   • Shortness of Breath       History of Presenting Illness: Patient seen and evaluated in follow-up for acute on chronic heart failure with preserved ejection fraction and sepsis secondary to right lower extremity cellulitis.  Patient today states that he feels better today, was able to get some sleep at night for his IV antibiotics.  Complains of clear sputum production feels like he can quite clear his chest.    Objective     Current Hospital Meds:allopurinol, 300 mg, Oral, Daily  aspirin, 81 mg, Oral, Daily  atorvastatin, 40 mg, Oral, Nightly  bisacodyl, 10 mg, Rectal, Daily  clopidogrel, 75 mg, Oral, Daily  finasteride, 5 mg, Oral, Daily  folic acid, 1 mg, Oral, BID  gabapentin, 300 mg, Oral, Q12H  heparin (porcine), 5,000 Units, Subcutaneous, Q12H  insulin aspart, 0-14 Units, Subcutaneous, TID PC  insulin aspart, 15 Units, Subcutaneous, TID With Meals  insulin detemir, 45 Units, Subcutaneous, Nightly  lactobacillus acidophilus, 1 capsule, Oral, Daily  lactulose, 30 g, Oral, TID  lisinopril, 10 mg, Oral, Q24H  metFORMIN, 1,000 mg, Oral, BID With Meals  metoprolol succinate XL, 50 mg, Oral, Q24H  multivitamin, 1 tablet, Oral, Daily  pantoprazole, 40 mg, Oral, Q AM  piperacillin-tazobactam, 3.375 g, Intravenous, Q8H  sennosides-docusate, 2 tablet, Oral, BID  sodium chloride, 10 mL, Intravenous, Q12H  terazosin, 5 mg, Oral, Nightly  vancomycin, 1,000 mg, Intravenous, Q12H  vitamin E, 400 Units, Oral, Daily         Current Antimicrobial Therapy:  Anti-Infectives (From admission, onward)    Ordered     Dose/Rate Route Frequency Start  Stop    04/01/21 1304  vancomycin 1 g/250 mL 0.9% NS (vial-mate)     Ordering Provider: Chucky Elise MD    1,000 mg  over 60 Minutes Intravenous Every 12 Hours 04/02/21 0100 04/09/21 0059    03/30/21 0530  piperacillin-tazobactam (ZOSYN) 3.375 g/100 mL 0.9% NS IVPB (mbp)     Ordering Provider: Robert Ortiz MD    3.375 g  over 4 Hours Intravenous Every 8 Hours 03/30/21 1300 04/09/21 1259    03/30/21 0530  piperacillin-tazobactam (ZOSYN) 3.375 g/100 mL 0.9% NS IVPB (mbp)     Ordering Provider: Robert Ortiz MD    3.375 g  over 30 Minutes Intravenous Once 03/30/21 0700 03/30/21 0711    03/30/21 0530  vancomycin 2000 mg/500 mL 0.9% NS IVPB ([facility])     Ordering Provider: Robert Ortiz MD    20 mg/kg × 97 kg  over 120 Minutes Intravenous Once 03/30/21 0700 03/30/21 0841        Current Diuretic Therapy:  Diuretics (From admission, onward)    Ordered     Dose/Rate Route Frequency Start Stop    04/06/21 1714  furosemide (LASIX) tablet 40 mg     Ordering Provider: Joseph Tyler MD    40 mg Oral Once 04/06/21 1715 04/06/21 1747    03/30/21 0522  furosemide (LASIX) injection 20 mg     Ordering Provider: Shandra Barnard PA-C    20 mg Intravenous Once 03/30/21 0615 03/30/21 0548    03/30/21 0032  furosemide (LASIX) injection 20 mg     Ordering Provider: Robert Ortiz MD    20 mg Intravenous Once 03/30/21 0034 03/30/21 0144        ----------------------------------------------------------------------------------------------------------------------  Vital Signs:  Temp:  [97.9 °F (36.6 °C)-99.3 °F (37.4 °C)] 99.3 °F (37.4 °C)  Heart Rate:  [90-95] 90  Resp:  [20-21] 20  BP: (140-165)/(70-77) 165/77  SpO2:  [94 %-98 %] 98 %  on  Flow (L/min):  [2] 2;   Device (Oxygen Therapy): humidified;nasal cannula  Body mass index is 32.36 kg/m².    Wt Readings from Last 3 Encounters:   04/06/21 99.4 kg (219 lb 1.9 oz)   03/29/21 95.4 kg (210 lb 6 oz)   03/27/21 97 kg (213 lb 12.8 oz)     Intake &  Output (last 3 days)       04/05 0701 - 04/06 0700 04/06 0701 - 04/07 0700 04/07 0701 - 04/08 0700    P.O. 1145 1200 1080    Total Intake(mL/kg) 1145 (11.5) 1200 (12.1) 1080 (10.9)    Urine (mL/kg/hr) 1300 (0.5) 3300 (1.4) 1425 (1)    Stool 0      Total Output 1300 3300 1425    Net -155 -2100 -345           Stool Unmeasured Occurrence 1 x          Diet Regular; Cardiac, Consistent Carbohydrate  ----------------------------------------------------------------------------------------------------------------------  Physical exam:  Constitutional: Elderly obese male sitting up in chair, NAD.   HENT:  Head:  Normocephalic and atraumatic.  Mouth:  Moist mucous membranes.    Eyes:  Conjunctivae and EOM are normal. No scleral icterus.    Neck:  Neck supple.  No JVD present.    Cardiovascular:  Normal rate, regular rhythm and normal heart sounds with no murmur.    Pulmonary/Chest:  No respiratory distress, no wheezes, mild right basilar crackles present.  Midsternal incision site appears to be healing well.  Abdominal:  Soft.  Bowel sounds are normal.  No distension and no tenderness.   Musculoskeletal: Functional ROM intact.   Neurological:  Alert and oriented to person, place, and time.  No cranial nerve deficit. Intact Sensation throughout  Skin:  Skin is warm and dry. No rash or lesion noted.  Right lower extremity with improving erythema and edema with blanching erythema superior to the shin below the knee.  Peripheral vascular:  Pulses in all 4 extremities with no clubbing, no cyanosis, no edema.  Psychiatric: Appropriate mood and affect, pleasant.   ----------------------------------------------------------------------------------------------------------------------  Tele:    ----------------------------------------------------------------------------------------------------------------------  Results from last 7 days   Lab Units 04/06/21  0056 04/05/21  0346 04/04/21  0443   WBC 10*3/mm3 8.34 10.08 7.17   HEMOGLOBIN  g/dL 9.5* 9.6* 9.8*   HEMATOCRIT % 31.8* 31.1* 31.4*   MCV fL 94.1 91.2 90.5   MCHC g/dL 29.9* 30.9* 31.2*   PLATELETS 10*3/mm3 445 484* 506*         Results from last 7 days   Lab Units 04/06/21  0056 04/05/21  0346 04/04/21  0443   SODIUM mmol/L 136 138 135*   POTASSIUM mmol/L 3.9 4.0 4.4   CHLORIDE mmol/L 101 100 97*   CO2 mmol/L 25.1 25.5 26.7   BUN mg/dL 23 22 24*   CREATININE mg/dL 1.28* 1.21 1.22   EGFR IF NONAFRICN AM mL/min/1.73 55* 59* 59*   CALCIUM mg/dL 8.2* 9.0 8.8   GLUCOSE mg/dL 280* 270* 286*   Estimated Creatinine Clearance: 61.5 mL/min (A) (by C-G formula based on SCr of 1.28 mg/dL (H)).  No results found for: AMMONIA              Glucose   Date/Time Value Ref Range Status   04/07/2021 1821 269 (H) 70 - 130 mg/dL Final   04/07/2021 1600 237 (H) 70 - 130 mg/dL Final   04/07/2021 1117 318 (H) 70 - 130 mg/dL Final   04/07/2021 0729 214 (H) 70 - 130 mg/dL Final   04/07/2021 0133 288 (H) 70 - 130 mg/dL Final   04/06/2021 1958 294 (H) 70 - 130 mg/dL Final   04/06/2021 1559 217 (H) 70 - 130 mg/dL Final   04/06/2021 1012 273 (H) 70 - 130 mg/dL Final     Lab Results   Component Value Date    TSH 3.410 03/29/2021     No results found for: PREGTESTUR, PREGSERUM, HCG, HCGQUANT  Pain Management Panel     Pain Management Panel Latest Ref Rng & Units 3/15/2021 3/14/2021    AMPHETAMINES SCREEN, URINE Negative Negative Negative    BARBITURATES SCREEN Negative Negative Negative    BENZODIAZEPINE SCREEN, URINE Negative Negative Negative    BUPRENORPHINEUR Negative Negative Negative    COCAINE SCREEN, URINE Negative Negative Negative    METHADONE SCREEN, URINE Negative Negative Negative    METHAMPHETAMINEUR Negative Negative -        Brief Urine Lab Results  (Last result in the past 365 days)      Color   Clarity   Blood   Leuk Est   Nitrite   Protein   CREAT   Urine HCG        03/29/21 0921 Yellow Clear Small (1+) Negative Negative Negative             Blood Culture   Date Value Ref Range Status   03/30/2021 No  growth at 5 days  Final   03/30/2021 No growth at 5 days  Final     No results found for: URINECX  No results found for: WOUNDCX  No results found for: STOOLCX  No results found for: RESPCX  No results found for: AFBCX        I have personally looked at the labs and they are summarized above.  ----------------------------------------------------------------------------------------------------------------------  Detailed radiology reports for the last 24 hours:    Imaging Results (Last 24 Hours)     ** No results found for the last 24 hours. 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    -Cardiology consulted and following along, appreciate input    -Continue aspirin, statin, Plavix and home beta-blocker    -Switch to oral diuretics on 4/5/21 reduced to once daily due to bump in creatinine now on hold due to creatinine.    -Will need follow-up rescheduled with Dr. Simms's office, CT surgery at discharge    -Would benefit from cardiac rehab    Sepsis secondary to right lower extremity cellulitis    -Cellulitis overall appears to be improving but still with some blanchable erythema and diffuse tenderness throughout that area of the leg    -Bilateral venous duplex with no evidence of DVT and mildly prominent upper thigh region nodes    -We will continue, and Zosyn for an expected 10-day course as he still has significant tenderness and blanchable erythema in the right lower extremity  although it is improved today compared to yesterday.    Diabetes mellitus type 2  Peripheral neuropathy    -Globin A1c of 8.7%    -Continue home gabapentin for neuropathy    -On oral agents currently on hold, will continue Levemir 42 units at bedtime and NovoLog 14 units 3 times daily with meals.  Once patient clinically improved will attempt to place back on oral regimen as he is a  and would like to avoid insulin usage at all costs if possible.    CKD stage II-III    -Continue to monitor creatinine urine output and avoid nephrotoxins as well as NSAIDs volume depletion    -Holding diuretics as above    Chronic normocytic anemia  Thrombocytosis, improved    -Platelets trended down from 647 admission to 44    -Globin remained stable at 9.6 with normal MCV    -We will continue home oral folate and multivitamin    -Threshold to transfuse remains hemoglobin less than 7 or symptomatic    Gout without flare  BPH  GERD  Obesity with BMI of 31       VTE Prophylaxis:   Mechanical Order History:     None      Pharmalogical Order History:      Ordered     Dose Route Frequency Stop    03/30/21 0137  heparin (porcine) 5000 UNIT/ML injection 5,000 Units      5,000 Units SC Every 12 Hours Scheduled --                Disposition Home once medically stable and improved.    Dany Davis DO  [facility] Hospitalist  04/07/21  21:02 EDT

## 2021-04-08 NOTE — PLAN OF CARE
Goal Outcome Evaluation:   Patient resting well during shift. He has complained of chest pain at previous chest incision. Stat EKG and troponin obtained. Maria Antonia ELIAS aware. PRN medications given as ordered. No distress noted will continue to monitor.

## 2021-04-08 NOTE — PLAN OF CARE
Goal Outcome Evaluation:   Patient doing well this shift. Lactulose and stool softeners were held due to patient having frequent BM's since yesterday. No complaints of pain at this time. Will continue to monitor and follow POC.

## 2021-04-09 ENCOUNTER — READMISSION MANAGEMENT (OUTPATIENT)
Dept: CALL CENTER | Facility: HOSPITAL | Age: 71
End: 2021-04-09

## 2021-04-09 VITALS
OXYGEN SATURATION: 97 % | HEIGHT: 69 IN | HEART RATE: 90 BPM | DIASTOLIC BLOOD PRESSURE: 77 MMHG | WEIGHT: 224.2 LBS | SYSTOLIC BLOOD PRESSURE: 150 MMHG | TEMPERATURE: 97.3 F | BODY MASS INDEX: 33.21 KG/M2 | RESPIRATION RATE: 20 BRPM

## 2021-04-09 LAB
ANION GAP SERPL CALCULATED.3IONS-SCNC: 10.1 MMOL/L (ref 5–15)
BUN SERPL-MCNC: 14 MG/DL (ref 8–23)
BUN/CREAT SERPL: 13.3 (ref 7–25)
CALCIUM SPEC-SCNC: 9.3 MG/DL (ref 8.6–10.5)
CHLORIDE SERPL-SCNC: 100 MMOL/L (ref 98–107)
CO2 SERPL-SCNC: 26.9 MMOL/L (ref 22–29)
CREAT SERPL-MCNC: 1.05 MG/DL (ref 0.76–1.27)
GFR SERPL CREATININE-BSD FRML MDRD: 70 ML/MIN/1.73
GLUCOSE BLDC GLUCOMTR-MCNC: 130 MG/DL (ref 70–130)
GLUCOSE BLDC GLUCOMTR-MCNC: 240 MG/DL (ref 70–130)
GLUCOSE SERPL-MCNC: 117 MG/DL (ref 65–99)
POTASSIUM SERPL-SCNC: 4.1 MMOL/L (ref 3.5–5.2)
SODIUM SERPL-SCNC: 137 MMOL/L (ref 136–145)

## 2021-04-09 PROCEDURE — 25010000002 HEPARIN (PORCINE) PER 1000 UNITS: Performed by: HOSPITALIST

## 2021-04-09 PROCEDURE — 82962 GLUCOSE BLOOD TEST: CPT

## 2021-04-09 PROCEDURE — 80048 BASIC METABOLIC PNL TOTAL CA: CPT | Performed by: STUDENT IN AN ORGANIZED HEALTH CARE EDUCATION/TRAINING PROGRAM

## 2021-04-09 PROCEDURE — 99239 HOSP IP/OBS DSCHRG MGMT >30: CPT | Performed by: STUDENT IN AN ORGANIZED HEALTH CARE EDUCATION/TRAINING PROGRAM

## 2021-04-09 PROCEDURE — 63710000001 INSULIN ASPART PER 5 UNITS: Performed by: INTERNAL MEDICINE

## 2021-04-09 RX ORDER — METOPROLOL SUCCINATE 50 MG/1
50 TABLET, EXTENDED RELEASE ORAL
Qty: 90 TABLET | Refills: 0 | Status: SHIPPED | OUTPATIENT
Start: 2021-04-09 | End: 2021-04-15 | Stop reason: SDUPTHER

## 2021-04-09 RX ORDER — GLIPIZIDE 5 MG/1
5 TABLET ORAL
Qty: 60 TABLET | Refills: 0 | Status: SHIPPED | OUTPATIENT
Start: 2021-04-09 | End: 2021-06-16

## 2021-04-09 RX ORDER — LISINOPRIL 10 MG/1
10 TABLET ORAL
Qty: 30 TABLET | Refills: 0 | Status: SHIPPED | OUTPATIENT
Start: 2021-04-10 | End: 2021-04-19 | Stop reason: SDUPTHER

## 2021-04-09 RX ORDER — INSULIN DETEMIR 100 [IU]/ML
35 INJECTION, SOLUTION SUBCUTANEOUS DAILY
Qty: 45 ML | Refills: 0 | Status: SHIPPED | OUTPATIENT
Start: 2021-04-09 | End: 2021-05-07

## 2021-04-09 RX ORDER — BUMETANIDE 0.5 MG/1
1 TABLET ORAL DAILY
Qty: 60 TABLET | Refills: 0 | Status: SHIPPED | OUTPATIENT
Start: 2021-04-09 | End: 2021-04-23 | Stop reason: SDUPTHER

## 2021-04-09 RX ADMIN — ALLOPURINOL 300 MG: 300 TABLET ORAL at 08:37

## 2021-04-09 RX ADMIN — OXYCODONE HYDROCHLORIDE AND ACETAMINOPHEN 1 TABLET: 7.5; 325 TABLET ORAL at 08:40

## 2021-04-09 RX ADMIN — GABAPENTIN 300 MG: 300 CAPSULE ORAL at 08:36

## 2021-04-09 RX ADMIN — FOLIC ACID 1 MG: 1 TABLET ORAL at 08:37

## 2021-04-09 RX ADMIN — METOPROLOL SUCCINATE 50 MG: 50 TABLET, EXTENDED RELEASE ORAL at 08:38

## 2021-04-09 RX ADMIN — PANTOPRAZOLE SODIUM 40 MG: 40 TABLET, DELAYED RELEASE ORAL at 06:21

## 2021-04-09 RX ADMIN — METFORMIN HYDROCHLORIDE 1000 MG: 500 TABLET ORAL at 08:38

## 2021-04-09 RX ADMIN — FUROSEMIDE 40 MG: 40 TABLET ORAL at 08:40

## 2021-04-09 RX ADMIN — Medication 1 TABLET: at 08:37

## 2021-04-09 RX ADMIN — CLOPIDOGREL 75 MG: 75 TABLET, FILM COATED ORAL at 08:38

## 2021-04-09 RX ADMIN — LISINOPRIL 10 MG: 10 TABLET ORAL at 08:36

## 2021-04-09 RX ADMIN — HEPARIN SODIUM 5000 UNITS: 5000 INJECTION, SOLUTION INTRAVENOUS; SUBCUTANEOUS at 08:38

## 2021-04-09 RX ADMIN — Medication 400 UNITS: at 08:36

## 2021-04-09 RX ADMIN — GLIPIZIDE 5 MG: 5 TABLET ORAL at 08:37

## 2021-04-09 RX ADMIN — INSULIN ASPART 5 UNITS: 100 INJECTION, SOLUTION INTRAVENOUS; SUBCUTANEOUS at 12:03

## 2021-04-09 RX ADMIN — ASPIRIN 81 MG: 81 TABLET, COATED ORAL at 08:37

## 2021-04-09 RX ADMIN — FINASTERIDE 5 MG: 5 TABLET, FILM COATED ORAL at 08:37

## 2021-04-09 NOTE — DISCHARGE PLACEMENT REQUEST
"Khalif Villasenor (70 y.o. Male)     Date of Birth Social Security Number Address Home Phone MRN    1950  [address] [facility] [address] [phone] [id number]    Orthodox Marital Status          Non-[employer]        Admission Date Admission Type Admitting Provider Attending Provider Department, Room/Bed    3/29/21 Emergency Robert Ortiz MD Cagle, William, DO [address], 3322/1P    Discharge Date Discharge Disposition Discharge Destination         Home or Self Care              Attending Provider: Dany Davis DO    Allergies: No Known Allergies    Isolation: None   Infection: None   Code Status: CPR    Ht: 175.3 cm (69\")   Wt: 102 kg (224 lb 3.2 oz)    Admission Cmt: None   Principal Problem: Acute on chronic congestive heart failure (CMS/HCC) [I50.9]                 Active Insurance as of 3/29/2021     Primary Coverage     Payor Plan Insurance Group Employer/Plan Group    MEDICARE MEDICARE A & B      Payor Plan Address Payor Plan Phone Number Payor Plan Fax Number Effective Dates    [address] [phone]  8/1/2012 - None Entered    [place] [address]       Subscriber Name Subscriber Birth Date Member ID       KHALIF VILLASENOR 1950 [id number]                 Emergency Contacts      (Rel.) Home Phone Work Phone Mobile Phone    Perri Villasenor (Spouse) [phone] -- --    Kandy Gibson (Daughter) -- -- [phone]            Emergency Contact Information     Name Relation Home Work Mobile    Perri Villasenor Spouse [phone]      Kandy Gibson Daughter   [phone]          Insurance Information                MEDICARE/MEDICARE A & B Phone: [phone]    Subscriber: Hamilton Khalif Subscriber#: [id number]    Group#:  Precert#:              History & Physical      Shandra Barnard PA-C at 03/30/21 0334               [facility] Medicine Services  HISTORY & PHYSICAL    Patient Identification:  Name:  Khalif Villasenor  Age:  " "70 y.o.  Sex:  male  :  1950  MRN:  [id number]   Visit Number:  [id number]  Admit Date: 3/29/2021   Primary Care Physician:  Jacqueline Gatica MD     Subjective     Chief complaint:   Chief Complaint   Patient presents with   • Chest Pain   • Shortness of Breath     History of presenting illness:   Patient is a 70 y.o. male with past medical history significant for chronic kidney disease stage IIIa, chronic normocytic anemia, chronic diastolic CHF, CAD status post four-vessel CABG, hyperlipidemia, type 2 diabetes mellitus, essential hypertension, diabetic neuropathy, gout, BPH, that presented to the [facility] emergency department for evaluation of chest pain, lower extremity edema, and shortness of breath.     The patient states that he has been experiencing substernal chest pain and bilateral leg edema since he underwent a CABG on 3/16/21at [facility] by Dr. Simms. He was recently admitted to [facility] from 3/23/21-3/27/20 for severe chest pain and was found to have hypoxia, as well as, fluid retention. He was initially requiring oxygen therapy as he was found to be hypoxic on room air with activity but was weaned off of the oxygen after receiving IV diuretics. He was given a prescription for PO oxycodone 7.5 mg for his chest pain that was due to his sternotomy and was discharged home in stable condition. He states the pain medication has helped his discomfort \"some\" but he is still very uncomfortable. He had a follow up appointment yesterday in [place], [place] with cardiology that he said was essentially unremarkable. He is concerned, however, as he feels his wound from the CABG on his right lower extremity is becoming infected. He states that since yesterday, his right lower extremity has become more erythematous, edematous, and warm to the touch. He admits that he has bilateral lower extremity edema but the right is greater than the left. He denies any fever, chills, " diaphoresis, nausea, vomiting, or dysuria. He further denies any drainage from his surgical wounds. He is currently requiring 2L O2 via NC with O2 saturations around 96%.     Upon arrival to the ED, vitals were temperature 99.1 °F, pulse 86, respirations 22, /63, SPO2 94% on room air.  Troponin T negative x2.  CMP with glucose 255, sodium 135, CO2 29.3, chloride 92, BUN 25.  CRP 5.67.  Magnesium 1.7.  CBC of WBC 13.12, RBC 3.78, hemoglobin 10.7, hematocrit 34.9, MCHC 30.7, platelets 647.  UA with 3+ glucose, 1+ blood.  Chest x-ray shows no acute cardiopulmonary findings.  Ultrasound venous Doppler of bilateral lower extremity shows no DVT, mildly predominant lymph nodes in the patient's upper thigh regions.  EKG with normal sinus rhythm, left anterior fascicular block, 85 bpm, QTc 437 MS.  COVID-19 negative.    In the emergency department the patient received IV Lasix 20 mg    Patient has been admitted to the telemetry floor for further evaluation and treatment.   ---------------------------------------------------------------------------------------------------------------------   Review of Systems   Constitutional: Negative for chills, diaphoresis and fever.   HENT: Negative for congestion and sore throat.    Eyes: Negative for discharge and visual disturbance.   Respiratory: Positive for shortness of breath. Negative for cough and wheezing.    Cardiovascular: Positive for chest pain (along sternotomy site) and leg swelling (bilateral; R>L). Negative for palpitations.   Gastrointestinal: Negative for abdominal pain, constipation, diarrhea, nausea and vomiting.   Endocrine: Negative for polydipsia and polyuria.   Genitourinary: Negative for dysuria and frequency.   Musculoskeletal: Negative for back pain and gait problem.   Skin: Negative for rash and wound.        Right lower extremity erythema      Neurological: Negative for dizziness, syncope and light-headedness.   Hematological: Does not bruise/bleed  easily.   Psychiatric/Behavioral: Negative for confusion. The patient is not nervous/anxious.       ---------------------------------------------------------------------------------------------------------------------   Past Medical History:   Diagnosis Date   • Arthritis    • Back pain    • BPH (benign prostatic hyperplasia)    • Diabetes mellitus (CMS/HCC)    • Diabetic peripheral neuropathy (CMS/HCC)    • Erectile dysfunction    • Former smoker    • GERD (gastroesophageal reflux disease)    • GERD (gastroesophageal reflux disease)    • Gout    • High cholesterol    • Hypertension    • Obesity      Past Surgical History:   Procedure Laterality Date   • CARDIAC CATHETERIZATION N/A 3/15/2021    Procedure: Left Heart Cath;  Surgeon: Trent Zaragoza MD;  Location: [facility] CATH INVASIVE LOCATION;  Service: Cardiology;  Laterality: N/A;   • CARDIAC CATHETERIZATION N/A 3/15/2021    Procedure: Coronary angiography;  Surgeon: Trent Zaragoza MD;  Location: [facility] INVASIVE LOCATION;  Service: Cardiology;  Laterality: N/A;   • COLONOSCOPY W/ BIOPSIES  2015   • CORONARY ARTERY BYPASS GRAFT N/A 3/16/2021    Procedure: MEDIAN STERNOTOMY, CORONARY ARTERY BYPASS GRAFT X4, UTILIZATION OF LEFT INTERNAL MAMMARY ARTERY, AND ENDOSCOPIC VEIN HARVEST OF RIGHT GREATER SAPHENOUS VEIN;  Surgeon: Jorge Simms MD;  Location: [facility];  Service: Cardiothoracic;  Laterality: N/A;  vein out- 1614  vein ready- 1628             Family History   Problem Relation Age of Onset   • Heart disease Mother    • Hypertension Mother    • Depression Mother    • Alcohol abuse Maternal Uncle    • Heart disease Maternal Grandmother    • Hypertension Maternal Grandmother    • Diabetes Maternal Grandmother    • Diabetes Paternal Grandmother    • No Known Problems Half-Brother    • No Known Problems Half-Brother    • No Known Problems Half-Sister    • No Known Problems Daughter    • No Known Problems Daughter    • Hyperlipidemia Daughter      Social  History     Socioeconomic History   • Marital status:      Spouse name: Not on file   • Number of children: Not on file   • Years of education: Not on file   • Highest education level: Not on file   Tobacco Use   • Smoking status: Former Smoker     Packs/day: 1.00     Years: 5.00     Pack years: 5.00     Quit date: 1974     Years since quittin.1   • Smokeless tobacco: Never Used   Substance and Sexual Activity   • Alcohol use: Never     Comment: quit in    • Drug use: Never   • Sexual activity: Defer     ---------------------------------------------------------------------------------------------------------------------   Allergies:  Patient has no known allergies.  ---------------------------------------------------------------------------------------------------------------------   Medications below are reported home medications pulling from within the system; at this time, these medications have not been reconciled unless otherwise specified and are in the verification process for further verifcation as current home medications.    Prior to Admission Medications     Prescriptions Last Dose Informant Patient Reported? Taking?    acetaminophen (TYLENOL) 500 MG tablet Unknown Provider's Office - Patient Acknowledged and Agreed Yes No    Take 500 mg by mouth Every 6 (Six) Hours As Needed for Mild Pain .    allopurinol (ZYLOPRIM) 300 MG tablet 3/29/2021 Provider's Office - Patient Acknowledged and Agreed No Yes    Take 1 tablet by mouth Daily.    aspirin (Aspirin Adult Low Dose) 81 MG EC tablet 3/29/2021 Provider's Office - Patient Acknowledged and Agreed Yes Yes    Take 81 mg by mouth Daily.    atorvastatin (LIPITOR) 40 MG tablet 3/28/2021 Provider's Office - Patient Acknowledged and Agreed No Yes    Take 1 tablet by mouth Every Night.    bumetanide (BUMEX) 2 MG tablet 3/29/2021 Provider's Office - Patient Acknowledged and Agreed No Yes    Take 1 tablet by mouth Daily for 30 days.    clopidogrel  (PLAVIX) 75 MG tablet 3/29/2021 Provider's Office - Patient Acknowledged and Agreed No Yes    Take 1 tablet by mouth Daily.    doxazosin (CARDURA) 4 MG tablet 3/28/2021 Provider's Office - Patient Acknowledged and Agreed Yes Yes    Take 4 mg by mouth Every Night.    finasteride (PROSCAR) 5 MG tablet 3/29/2021 Provider's Office - Patient Acknowledged and Agreed No Yes    Take 1 tablet by mouth Daily.    folic acid (FOLVITE) 1 MG tablet 3/29/2021 Provider's Office - Patient Acknowledged and Agreed Yes Yes    Take 1 mg by mouth 2 (two) times a day.    gabapentin (NEURONTIN) 300 MG capsule 3/29/2021 Provider's Office - Patient Acknowledged and Agreed No Yes    Take 1 capsule by mouth 2 (two) times a day.    insulin aspart (novoLOG FLEXPEN) 100 UNIT/ML solution pen-injector sc pen 3/29/2021 Provider's Office - Patient Acknowledged and Agreed No Yes    Inject 12 Units under the skin into the appropriate area as directed 3 (Three) Times a Day With Meals for 30 days.    insulin detemir (Levemir FlexTouch) 100 UNIT/ML injection 3/29/2021 Provider's Office - Patient Acknowledged and Agreed No Yes    Inject 37 Units under the skin into the appropriate area as directed Daily for 30 days.    metFORMIN (GLUCOPHAGE) 1000 MG tablet 3/29/2021 Provider's Office - Patient Acknowledged and Agreed Yes Yes    Take 1,000 mg by mouth 2 (Two) Times a Day With Meals.    metoprolol succinate XL (TOPROL-XL) 50 MG 24 hr tablet 3/29/2021 Provider's Office - Patient Acknowledged and Agreed No Yes    Take 1 tablet by mouth Daily for 90 days.    multivitamin (multivitamin) tablet tablet 3/29/2021 Provider's Office - Patient Acknowledged and Agreed No Yes    Take 1 tablet by mouth Daily.    nitroglycerin (NITROSTAT) 0.4 MG SL tablet 3/28/2021 Provider's Office - Patient Acknowledged and Agreed No Yes    Place 1 tablet under the tongue Every 5 (Five) Minutes As Needed for Chest Pain (Only if SBP Greater Than 100). Take no more than 3 doses in 15  minutes.    omeprazole (priLOSEC) 20 MG capsule Unknown Provider's Office - Patient Acknowledged and Agreed Yes No    Take 20 mg by mouth 2 (two) times a day.    oxyCODONE-acetaminophen (PERCOCET) 7.5-325 MG per tablet 3/29/2021 Provider's Office - Patient Acknowledged and Agreed Yes Yes    Take 1 tablet by mouth Every 6 (Six) Hours As Needed for Severe Pain .    sennosides-docusate (PERICOLACE) 8.6-50 MG per tablet 3/29/2021 Provider's Office - Patient Acknowledged and Agreed No Yes    Take 2 tablets by mouth 2 (Two) Times a Day for 30 days.    vitamin E 400 UNIT capsule 3/29/2021 Provider's Office - Patient Acknowledged and Agreed No Yes    Take 1 capsule by mouth Daily.        ---------------------------------------------------------------------------------------------------------------------    Objective     Hospital Scheduled Meds:  aspirin, 81 mg, Oral, Daily  clopidogrel, 75 mg, Oral, Daily  heparin (porcine), 5,000 Units, Subcutaneous, Q12H  insulin aspart, 0-14 Units, Subcutaneous, TID AC  insulin detemir, 30 Units, Subcutaneous, Nightly  sodium chloride, 10 mL, Intravenous, Q12H           Current listed hospital scheduled medications may not yet reflect those currently placed in orders that are signed and held, awaiting patient's arrival to floor/unit.    ---------------------------------------------------------------------------------------------------------------------   Vital Signs:  Temp:  [98.4 °F (36.9 °C)-99.1 °F (37.3 °C)] 99.1 °F (37.3 °C)  Heart Rate:  [84-96] 84  Resp:  [20-24] 20  BP: (105-153)/(56-83) 153/68  Mean Arterial Pressure (Non-Invasive) for the past 24 hrs (Last 3 readings):   Noninvasive MAP (mmHg)   03/30/21 0110 98   03/30/21 0032 97   03/30/21 0017 97     SpO2 Percentage    03/30/21 0003 03/30/21 0017 03/30/21 0032   SpO2: 99% 99% 100%     SpO2:  [93 %-100 %] 100 %  on   ;   Device (Oxygen Therapy): room air    Body mass index is 31.59 kg/m².  Wt Readings from Last 3 Encounters:    21 97 kg (213 lb 14.4 oz)   21 95.4 kg (210 lb 6 oz)   21 97 kg (213 lb 12.8 oz)     ---------------------------------------------------------------------------------------------------------------------   Physical Exam:  Physical Exam  Constitutional:       Appearance: Normal appearance. He is well-developed. He is obese.      Interventions: Nasal cannula in place.      Comments: 2L O2 NC    HENT:      Head: Normocephalic and atraumatic.   Eyes:      General: Lids are normal.         Right eye: No discharge.         Left eye: No discharge.   Cardiovascular:      Rate and Rhythm: Normal rate and regular rhythm.      Pulses: Normal pulses.           Dorsalis pedis pulses are 2+ on the right side and 2+ on the left side.        Posterior tibial pulses are 2+ on the right side and 2+ on the left side.      Heart sounds: Normal heart sounds. No murmur heard.   No friction rub. No gallop.    Pulmonary:      Effort: Pulmonary effort is normal. No tachypnea, bradypnea or respiratory distress.      Breath sounds: Normal breath sounds. No decreased breath sounds, wheezing, rhonchi or rales.   Chest:       Abdominal:      General: Bowel sounds are normal.      Palpations: Abdomen is soft.      Tenderness: There is no abdominal tenderness.   Musculoskeletal:      Right lower le+ Pitting Edema present.      Left lower le+ Pitting Edema present.   Skin:     Findings: Erythema present.          Neurological:      General: No focal deficit present.      Mental Status: He is alert and oriented to person, place, and time. Mental status is at baseline.   Psychiatric:         Speech: Speech normal.         Behavior: Behavior is cooperative.       ---------------------------------------------------------------------------------------------------------------------  EKG:    Per cardiology read by Dr. Tyler, NSR with left anterior fascicular block, HR 85 bpm, QTc 437 ms.     Telemetry:    NSR, HR 90 bpm, SpO2  96% on RA.     I have personally reviewed the EKG/Telemetry strip  ---------------------------------------------------------------------------------------------------------------------   Results from last 7 days   Lab Units 03/29/21 2320 03/29/21 2110 03/24/21  1039   TROPONIN T ng/mL <0.010 <0.010 0.020     Results from last 7 days   Lab Units 03/29/21 2110 03/29/21  0852 03/23/21 1917   PROBNP pg/mL 848.1 816.3 1,825.0*       Results from last 7 days   Lab Units 03/23/21  1930   PH, ARTERIAL pH units 7.457*   PO2 ART mm Hg 62.3*   PCO2, ARTERIAL mm Hg 37.9   HCO3 ART mmol/L 26.7*     Results from last 7 days   Lab Units 03/30/21  0126 03/29/21 2320 03/29/21 2110 03/27/21 0220 03/24/21  0351 03/23/21 1917   CRP mg/dL  --  5.67*  --   --   --  15.14*   WBC 10*3/mm3 13.12*  --   --  10.83* 9.74 11.58*   HEMOGLOBIN g/dL 10.7*  --   --  9.2* 8.7* 10.3*   HEMATOCRIT % 34.9*  --   --  29.3* 28.6* 32.9*   MCV fL 92.3  --   --  91.6 92.9 92.2   MCHC g/dL 30.7*  --   --  31.4* 30.4* 31.3*   PLATELETS 10*3/mm3 647*  --   --  516* 347 417   INR   --   --  0.97  --   --   --      Results from last 7 days   Lab Units 03/30/21 0237 03/29/21 2110 03/29/21  0852 03/24/21  2307 03/24/21  0351 03/24/21  0351 03/23/21  1917   SODIUM mmol/L 141 135* 133*  --    < > 137 136   POTASSIUM mmol/L 4.2 4.4 4.7 4.3  --  4.9 4.3   MAGNESIUM mg/dL  --  1.7  --  1.8  --   --  2.1   CHLORIDE mmol/L 97* 92* 93*  --    < > 103 98   CO2 mmol/L 28.9 29.3* 29.3*  --    < > 24.9 26.1   BUN mg/dL 24* 25* 20  --    < > 16 18   CREATININE mg/dL 1.11 1.15 1.15  --    < > 1.03 1.06   EGFR IF NONAFRICN AM mL/min/1.73 65 63 63  --    < > 71 69   CALCIUM mg/dL 9.7 9.8 9.5  --    < > 8.1* 8.8   GLUCOSE mg/dL 221* 255* 407*  --    < > 317* 342*   ALBUMIN g/dL 3.29* 3.58  --   --   --  2.66* 3.70   BILIRUBIN mg/dL 0.3 0.3  --   --   --  0.3 0.3   ALK PHOS U/L 113 106  --   --   --  82 110   AST (SGOT) U/L 13 10  --   --   --  13 22   ALT (SGPT) U/L 15  14  --   --   --  22 30    < > = values in this interval not displayed.   Estimated Creatinine Clearance: 70.1 mL/min (by C-G formula based on SCr of 1.11 mg/dL).  No results found for: AMMONIA    Glucose   Date/Time Value Ref Range Status   03/30/2021 0115 237 (H) 70 - 130 mg/dL Final   03/27/2021 1618 318 (H) 70 - 130 mg/dL Final   03/27/2021 1049 270 (H) 70 - 130 mg/dL Final   03/27/2021 0711 156 (H) 70 - 130 mg/dL Final     Lab Results   Component Value Date    HGBA1C 8.70 (H) 03/15/2021     Lab Results   Component Value Date    TSH 3.410 03/29/2021     Pain Management Panel     Pain Management Panel Latest Ref Rng & Units 3/15/2021 3/14/2021    AMPHETAMINES SCREEN, URINE Negative Negative Negative    BARBITURATES SCREEN Negative Negative Negative    BENZODIAZEPINE SCREEN, URINE Negative Negative Negative    BUPRENORPHINEUR Negative Negative Negative    COCAINE SCREEN, URINE Negative Negative Negative    METHADONE SCREEN, URINE Negative Negative Negative    METHAMPHETAMINEUR Negative Negative -        I have personally reviewed the above laboratory results.   ---------------------------------------------------------------------------------------------------------------------  Imaging Results (Last 7 Days)     Procedure Component Value Units Date/Time    US Venous Doppler Lower Extremity Bilateral (duplex) [[id number]] Collected: 03/30/21 0003     Updated: 03/30/21 0005    Narrative:      US Veins LE Duplex BILAT    HISTORY:   Recent right lower leg redness and swelling. Heart failure, history of recent bypass surgery.     TECHNIQUE:   Real-time ultrasound was performed of both lower extremities utilizing spectral and color Doppler with compression and augmentation techniques.    COMPARISON:  None available.    FINDINGS:  There is some mildly prominent lymph nodes in the thigh region bilaterally.  Right Lower Extremity:  There is no deep venous thrombus seen in the right lower extremity, including the right common  femoral veins, right femoral veins and right popliteal veins.  Normal compressibility and respiratory phasicity was visualized.  No calf vein thrombus.    Left Lower Extremity:  There is no deep venous thrombus seen in the left lower extremity, including the left common femoral veins, left femoral veins and left popliteal veins.   Normal compressibility and respiratory phasicity was visualized.  No calf vein thrombus.      Impression:      No deep venous thrombus seen in either lower extremity. There are some mildly prominent with nodes in the patient's upper thigh regions. This may be within normal limits for the patient.    Signer Name: Roseann Feliz MD   Signed: 3/30/2021 12:03 AM   Workstation Name: [id number]    [facility]    XR Chest 1 View [[id number]] Collected: 03/29/21 2202     Updated: 03/29/21 2204    Narrative:      CR Chest 1 Vw    INDICATION:   Pulmonary edema     COMPARISON:    Chest x-ray from 3/29/2021    FINDINGS:  Single portable AP view(s) of the chest.  There is cardiomegaly. There is mild prominence to the bronchovascular markings. There may be trace bilateral pleural effusions again seen. No pneumothorax.      Impression:      No definite acute cardiopulmonary findings. There is cardiomegaly. No definite interval change.    Signer Name: Roseann Feliz MD   Signed: 3/29/2021 10:02 PM   Workstation Name: [id number]    [facility]        I have personally reviewed the above radiology results.     Last Echocardiogram:  Results for orders placed during the hospital encounter of 03/23/21    Adult Transthoracic Echo Limited W/ Cont if Necessary Per Protocol    Interpretation Summary  · Left ventricular ejection fraction appears to be 51 - 55%. Septal wall motion is abnormal, consistent with a post-operative state  · Left ventricular wall thickness is consistent with mild concentric hypertrophy.  · No significant functional valvular abnormalities noted  ·  There is no evidence of pericardial effusion    ---------------------------------------------------------------------------------------------------------------------    Assessment & Plan      Active Hospital Problems    Diagnosis  POA   • **Acute on chronic congestive heart failure (CMS/[facility]) [I50.9]  Yes   • Hyperlipidemia [E78.5]  Yes   • CKD (chronic kidney disease) stage 2, GFR 60-89 ml/min [N18.2]  Yes   • Diabetic neuropathy (CMS/[facility]) [E11.40]  Yes   • Former smoker [Z87.891]  Not Applicable   • GERD (gastroesophageal reflux disease) [K21.9]  Yes   • History of gout [Z87.39]  Yes   • Obesity (BMI 30-39.9) [E66.9]  Yes   • Benign prostatic hyperplasia without lower urinary tract symptoms [N40.0]  Yes   • Essential hypertension [I10]  Yes   • Type II diabetes mellitus (CMS/[facility]) [E11.9]  Yes     #Acute on chronic diastolic CHF   -Bilateral lower extremities with 2+ pitting edema  -Received IV lasix 20 mg in ED; reports good urine output  -Will give additional dose of IV lasix 20 mg; monitor diuresis with strict I's and O's, obtain daily weights  -Renal function is currently at baseline; monitor closely in setting of diuresis   -Echo from 3/24/21 with EF 51-55%   -Cardiology has been consulted, input/assisstance is much appreciated     #Sepsis criteria present upon admission with RR 22, CrP 5.67, and WBC 13.12, suspect 2/2 to right lower extremity cellulitis   -CXR shows no acute cardiopulmonary findings; UA is unremarkable   -Right lower extremity is erythematous and edematous  -Discussed with attending, we will start the patient on pharmacy to dose IV vancomycin and IV zosyn   -Lactobacillus for GI protection   -Blood cultures x 2 ordered   -Repeat AM CBC and CrP; obtain lactate   -Venous US of bilateral lower extremities reveals no DVT   -Continue to monitor closely     #Thrombocytosis  #Chronic normocytic anemia   -Platelets 647; possible 2/2 to iron deficiency anemia vs. infection  -Obtain repeat  CBC  -Peripheral blood smear ordered   -H&H stable  -Obtain iron, folate, ferritin, iron panel and vitamin B12; replace as necessary   -If hemoglobin <7, will transfuse     #Hypomagnesemia (1.7)   -Magnesium replacement protocol ordered     #CAD s/p CABG x 4 vessel (3/16/21)   #Hyperlipidemia   -Patient still experiencing pain at sternotomy incision site   -EKG without acute ischemic changes; Troponin T negative x 2   -Plan to continue home ASA, statin, plavix, and Toprol-XL  -Echo from 3/24/21 with EF 51-55%  -Continue home Percocet 7.5 mg for pain with holding parameters   -Continue to monitor closely on telemetry     #Insulin-dependent Type II diabetes mellitus   #Diabetic neuropathy   #Pseudohyponatremia in the setting of hyperglycemia   -Hgb A1c 8.70  -Hold home oral hypoglycemics to prevent hypoglycemia. Will add SSI and SQ Levemir 30 units nightly for now. Accu-cheks qAC and qhs. Titrate insulin therapy as necessary.    -Corrected sodium 139; repeat AM CMP     #Essential hypertension  -BP appears fairly controlled   -Plan to continue home antihypertensive agents once reconciled per pharmacy with holding parameters to prevent bradycardia/hypotension      #CKD stage II  -Baseline Cr 1.1-1.5; Cr on admission 1.11  -Repeat AM CMP and monitor renal function closely in setting of diuresis     #Gout   -Review home medications once reconciled per pharmacy     #BPH   -Review home medications once reconciled per pharmacy     #Obesity   -BMI 31.59 kg/m2   -Complicates all aspects of patient care     F/E/N: No IV fluids. Replace electrolytes as necessary. Cardiac, consistent carb diet.   ---------------------------------------------------  DVT Prophylaxis: Subcutaneous heparin   GI Prophylaxis: Protonix   Activity: Up with assistance, fall precautions     The patient is considered to be a high risk patient due to: Acute on chronic diastolic CHF exacerbation, sepsis 2/2 to unknown etiology at this time, thrombocytosis      INPATIENT status due to the need for care which can only be reasonably provided in an hospital setting such as aggressive/expedited ancillary services and/or consultation services, the necessity for IV medications, close physician monitoring and/or the possible need for procedures.  In such, I feel patient’s risk for adverse outcomes and need for care warrant INPATIENT evaluation and predict the patient’s care encounter to likely last beyond 2 midnights.    Code Status: FULL CODE     I have discussed the patient's assessment and plan with the patient, nursing staff, and attending physician       Shandra Barnard PA-C  Hospitalist Service -- [facility]       03/30/21  03:58 EDT    Attending Physician: Robert Ortiz MD        Electronically signed by Shandra Barnard PA-C at 03/30/21 0530         Current Facility-Administered Medications   Medication Dose Route Frequency Provider Last Rate Last Admin   • acetaminophen (TYLENOL) tablet 650 mg  650 mg Oral Q6H PRN Shandra Barnard PA-C   650 mg at 03/31/21 1022   • allopurinol (ZYLOPRIM) tablet 300 mg  300 mg Oral Daily Shandra Barnard PA-C   300 mg at 04/09/21 0837   • aspirin EC tablet 81 mg  81 mg Oral Daily Robert Ortiz MD   81 mg at 04/09/21 0837   • atorvastatin (LIPITOR) tablet 40 mg  40 mg Oral Nightly Shandra Barnard PA-C   40 mg at 04/08/21 2015   • bisacodyl (DULCOLAX) suppository 10 mg  10 mg Rectal Daily Chucky Elise MD   10 mg at 04/04/21 1702   • clopidogrel (PLAVIX) tablet 75 mg  75 mg Oral Daily Robert Ortiz MD   75 mg at 04/09/21 0838   • dextrose (D50W) 25 g/ 50mL Intravenous Solution 25 g  25 g Intravenous Q15 Min PRN Robert Ortiz MD       • dextrose (GLUTOSE) oral gel 15 g  15 g Oral Q15 Min PRN Robert Ortiz MD       • finasteride (PROSCAR) tablet 5 mg  5 mg Oral Daily Shandra Barnard PA-C   5 mg at 04/09/21 0837   • folic acid (FOLVITE) tablet 1 mg  1  mg Oral BID Shandra Barnard PA-C   1 mg at 04/09/21 0837   • furosemide (LASIX) tablet 40 mg  40 mg Oral Daily Dany Davis DO   40 mg at 04/09/21 0840   • gabapentin (NEURONTIN) capsule 300 mg  300 mg Oral Q12H Shandra Barnard PA-C   300 mg at 04/09/21 0836   • glipizide (GLUCOTROL) tablet 5 mg  5 mg Oral BID AC Dany Davis DO   5 mg at 04/09/21 0837   • glucagon (human recombinant) (GLUCAGEN DIAGNOSTIC) injection 1 mg  1 mg Subcutaneous Q15 Min PRN Robert Ortiz MD       • heparin (porcine) 5000 UNIT/ML injection 5,000 Units  5,000 Units Subcutaneous Q12H Robert Ortiz MD   5,000 Units at 04/09/21 0838   • insulin aspart (novoLOG) injection 0-14 Units  0-14 Units Subcutaneous TID PC Chucky Elise MD   Stopped at 04/09/21 0900   • insulin detemir (LEVEMIR) injection 40 Units  40 Units Subcutaneous Nightly Dany Davis DO   40 Units at 04/08/21 2015   • lactobacillus acidophilus (RISAQUAD) capsule 1 capsule  1 capsule Oral Daily Shandra Barnard PA-C   1 capsule at 04/08/21 0821   • lisinopril (PRINIVIL,ZESTRIL) tablet 10 mg  10 mg Oral Q24H Parvin James MD   10 mg at 04/09/21 0836   • Magnesium Sulfate 2 gram infusion - Mg less than or equal to 1.5 mg/dL  2 g Intravenous PRN Shandra Barnard PA-C        Or   • Magnesium Sulfate 1 gram infusion - Mg 1.6-1.9 mg/dL  1 g Intravenous PRN Shandra Barnard PA-C       • metFORMIN (GLUCOPHAGE) tablet 1,000 mg  1,000 mg Oral BID With Meals Dany Davis DO   1,000 mg at 04/09/21 0838   • metoprolol succinate XL (TOPROL-XL) 24 hr tablet 50 mg  50 mg Oral Q24H Shandra Barnard PA-C   50 mg at 04/09/21 0838   • multivitamin (THERAGRAN) tablet 1 tablet  1 tablet Oral Daily Shandra Barnard PA-C   1 tablet at 04/09/21 0837   • nitroglycerin (NITROSTAT) SL tablet 0.4 mg  0.4 mg Sublingual Q5 Min PRN Robert Ortiz MD       • ondansetron (ZOFRAN) injection 4 mg  4 mg Intravenous Q6H PRN Chucky Elise MD    4 mg at 21 2235   • oxyCODONE-acetaminophen (PERCOCET) 7.5-325 MG per tablet 1 tablet  1 tablet Oral Q6H PRN Dany Davis DO   1 tablet at 21 0840   • pantoprazole (PROTONIX) EC tablet 40 mg  40 mg Oral Q AM Shandra Barnard PA-C   40 mg at 21 0621   • sennosides-docusate (PERICOLACE) 8.6-50 MG per tablet 2 tablet  2 tablet Oral BID Shandra Barnard PA-C   2 tablet at 21 0821   • sodium chloride 0.9 % flush 10 mL  10 mL Intravenous PRN Robert Ortiz MD       • sodium chloride 0.9 % flush 10 mL  10 mL Intravenous Q12H Robert Ortiz MD   10 mL at 21   • sodium chloride 0.9 % flush 10 mL  10 mL Intravenous PRN Robert Ortiz MD       • terazosin (HYTRIN) capsule 5 mg  5 mg Oral Nightly Shandra Barnard PA-C   5 mg at 21   • vitamin E capsule 400 Units  400 Units Oral Daily Shandra Barnard PA-C   400 Units at 21 0836     Operative/Procedure Notes (most recent note)    No notes of this type exist for this encounter.            Physician Progress Notes (most recent note)      Dany Davis DO at 21              [facility] HOSPITALIST PROGRESS NOTE     Patient Identification:  Name:  William Villasenor  Age:  70 y.o.  Sex:  male  :  1950  MRN:  [id number]  Visit Number:  [id number]  ROOM: [address] [place] [address]     Primary Care Provider:  Jacqueline Gatica MD    Length of stay in inpatient status:  9    Subjective     Chief Compliant:    Chief Complaint   Patient presents with   • Chest Pain   • Shortness of Breath       History of Presenting Illness: Patient seen and evaluated in follow-up for acute on chronic heart failure with preserved ejection fraction and sepsis secondary to right lower extremity cellulitis.  Patient today feeling well, using incentive spirometer, leg looks good.    Objective     Current Hospital Meds:allopurinol, 300 mg, Oral, Daily  aspirin, 81 mg, Oral, Daily  atorvastatin, 40 mg,  Oral, Nightly  bisacodyl, 10 mg, Rectal, Daily  clopidogrel, 75 mg, Oral, Daily  finasteride, 5 mg, Oral, Daily  folic acid, 1 mg, Oral, BID  furosemide, 40 mg, Oral, Daily  gabapentin, 300 mg, Oral, Q12H  glipizide, 5 mg, Oral, BID AC  heparin (porcine), 5,000 Units, Subcutaneous, Q12H  insulin aspart, 0-14 Units, Subcutaneous, TID PC  insulin detemir, 40 Units, Subcutaneous, Nightly  lactobacillus acidophilus, 1 capsule, Oral, Daily  lisinopril, 10 mg, Oral, Q24H  metFORMIN, 1,000 mg, Oral, BID With Meals  metoprolol succinate XL, 50 mg, Oral, Q24H  multivitamin, 1 tablet, Oral, Daily  pantoprazole, 40 mg, Oral, Q AM  piperacillin-tazobactam, 3.375 g, Intravenous, Q8H  sennosides-docusate, 2 tablet, Oral, BID  sodium chloride, 10 mL, Intravenous, Q12H  terazosin, 5 mg, Oral, Nightly  vitamin E, 400 Units, Oral, Daily         Current Antimicrobial Therapy:  Anti-Infectives (From admission, onward)    Ordered     Dose/Rate Route Frequency Start Stop    21 1304  vancomycin 1 g/250 mL 0.9% NS (vial-mate)     Dany Davis DO reviewed the order on 21.   Ordering Provider: Chucky Elise MD    1,000 mg  over 60 Minutes Intravenous Every 12 Hours 21 0100 21 1757    21 0530  piperacillin-tazobactam (ZOSYN) 3.375 g/100 mL 0.9% NS IVPB (mbp)     Dany Davis DO let the order  on 21.   Ordering Provider: Robert Ortiz MD    3.375 g  over 4 Hours Intravenous Every 8 Hours 21 1300 21 0859    21 0530  piperacillin-tazobactam (ZOSYN) 3.375 g/100 mL 0.9% NS IVPB (mbp)     Ordering Provider: Robert Ortiz MD    3.375 g  over 30 Minutes Intravenous Once 21 0700 21 0711    21 0530  vancomycin 2000 mg/500 mL 0.9% NS IVPB ([facility])     Ordering Provider: Robert Ortiz MD    20 mg/kg × 97 kg  over 120 Minutes Intravenous Once 21 0700 21 0841        Current Diuretic Therapy:  Diuretics (From admission, onward)     Ordered     Dose/Rate Route Frequency Start Stop    04/08/21 1623  furosemide (LASIX) tablet 40 mg     Ordering Provider: Dany Davis DO    40 mg Oral Daily 04/08/21 1800      04/06/21 1714  furosemide (LASIX) tablet 40 mg     Ordering Provider: Joseph Tyler MD    40 mg Oral Once 04/06/21 1715 04/06/21 1747    03/30/21 0522  furosemide (LASIX) injection 20 mg     Ordering Provider: Shandra Barnard PA-C    20 mg Intravenous Once 03/30/21 0615 03/30/21 0548    03/30/21 0032  furosemide (LASIX) injection 20 mg     Ordering Provider: Robert Ortiz MD    20 mg Intravenous Once 03/30/21 0034 03/30/21 0144        ----------------------------------------------------------------------------------------------------------------------  Vital Signs:  Temp:  [97.8 °F (36.6 °C)-98.4 °F (36.9 °C)] 98.2 °F (36.8 °C)  Heart Rate:  [80-95] 80  Resp:  [20] 20  BP: (111-150)/(58-80) 113/64  SpO2:  [94 %-98 %] 95 %  on  Flow (L/min):  [2] 2;   Device (Oxygen Therapy): humidified;nasal cannula  Body mass index is 32.64 kg/m².    Wt Readings from Last 3 Encounters:   04/08/21 100 kg (221 lb)   03/29/21 95.4 kg (210 lb 6 oz)   03/27/21 97 kg (213 lb 12.8 oz)     Intake & Output (last 3 days)       04/06 0701 - 04/07 0700 04/07 0701 - 04/08 0700 04/08 0701 - 04/09 0700    P.O. 1200 1560 960    I.V. (mL/kg)  3420.6 (34.2)     Total Intake(mL/kg) 1200 (12.1) 4980.6 (49.8) 960 (9.6)    Urine (mL/kg/hr) 3300 (1.4) 3075 (1.3) 1150 (0.9)    Stool  0     Total Output 3300 3075 1150    Net -2100 +1905.6 -190           Stool Unmeasured Occurrence  1 x         Diet Regular; Cardiac, Consistent Carbohydrate  ----------------------------------------------------------------------------------------------------------------------  Physical exam:  Constitutional: Elderly obese male sitting up in chair, NAD.   HENT:  Head:  Normocephalic and atraumatic.  Mouth:  Moist mucous membranes.    Eyes:  Conjunctivae and EOM are normal. No  scleral icterus.    Neck:  Neck supple.  No JVD present.    Cardiovascular:  Normal rate, regular rhythm and normal heart sounds with no murmur.    Pulmonary/Chest:  No respiratory distress, no wheezes, mild right basilar crackles present.  Midsternal incision site appears to be healing well.  Abdominal:  Soft.  Bowel sounds are normal.  No distension and no tenderness.   Musculoskeletal: Functional ROM intact.   Neurological:  Alert and oriented to person, place, and time.  No cranial nerve deficit. Intact Sensation throughout  Skin:  Skin is warm and dry. No rash or lesion noted.  Right lower extremity with improving erythema and edema with blanching erythema superior to the shin below the knee.  Peripheral vascular:  Pulses in all 4 extremities with no clubbing, no cyanosis, no edema.  Psychiatric: Appropriate mood and affect, pleasant.   ----------------------------------------------------------------------------------------------------------------------  Tele:    ----------------------------------------------------------------------------------------------------------------------  Results from last 7 days   Lab Units 04/08/21  0134 04/06/21  0056 04/05/21  0346   WBC 10*3/mm3 8.08 8.34 10.08   HEMOGLOBIN g/dL 10.1* 9.5* 9.6*   HEMATOCRIT % 32.9* 31.8* 31.1*   MCV fL 91.6 94.1 91.2   MCHC g/dL 30.7* 29.9* 30.9*   PLATELETS 10*3/mm3 437 445 484*         Results from last 7 days   Lab Units 04/08/21  0134 04/06/21  0056 04/05/21  0346   SODIUM mmol/L 134* 136 138   POTASSIUM mmol/L 4.0 3.9 4.0   CHLORIDE mmol/L 100 101 100   CO2 mmol/L 25.9 25.1 25.5   BUN mg/dL 17 23 22   CREATININE mg/dL 1.10 1.28* 1.21   EGFR IF NONAFRICN AM mL/min/1.73 66 55* 59*   CALCIUM mg/dL 9.4 8.2* 9.0   GLUCOSE mg/dL 178* 280* 270*   ALBUMIN g/dL 3.05*  --   --    BILIRUBIN mg/dL 0.2  --   --    ALK PHOS U/L 99  --   --    AST (SGOT) U/L 10  --   --    ALT (SGPT) U/L 13  --   --    Estimated Creatinine Clearance: 71.8 mL/min (by C-G  formula based on SCr of 1.1 mg/dL).  No results found for: AMMONIA  Results from last 7 days   Lab Units 04/08/21  0134   TROPONIN T ng/mL <0.010             Glucose   Date/Time Value Ref Range Status   04/08/2021 1900 212 (H) 70 - 130 mg/dL Final   04/08/2021 1609 178 (H) 70 - 130 mg/dL Final   04/08/2021 1050 217 (H) 70 - 130 mg/dL Final   04/08/2021 0720 129 70 - 130 mg/dL Final   04/08/2021 0439 155 (H) 70 - 130 mg/dL Final   04/07/2021 2150 210 (H) 70 - 130 mg/dL Final   04/07/2021 1821 269 (H) 70 - 130 mg/dL Final   04/07/2021 1600 237 (H) 70 - 130 mg/dL Final     Lab Results   Component Value Date    TSH 3.410 03/29/2021     No results found for: PREGTESTUR, PREGSERUM, HCG, HCGQUANT  Pain Management Panel     Pain Management Panel Latest Ref Rng & Units 3/15/2021 3/14/2021    AMPHETAMINES SCREEN, URINE Negative Negative Negative    BARBITURATES SCREEN Negative Negative Negative    BENZODIAZEPINE SCREEN, URINE Negative Negative Negative    BUPRENORPHINEUR Negative Negative Negative    COCAINE SCREEN, URINE Negative Negative Negative    METHADONE SCREEN, URINE Negative Negative Negative    METHAMPHETAMINEUR Negative Negative -        Brief Urine Lab Results  (Last result in the past 365 days)      Color   Clarity   Blood   Leuk Est   Nitrite   Protein   CREAT   Urine HCG        03/29/21 0921 Yellow Clear Small (1+) Negative Negative Negative             Blood Culture   Date Value Ref Range Status   03/30/2021 No growth at 5 days  Final   03/30/2021 No growth at 5 days  Final     No results found for: URINECX  No results found for: WOUNDCX  No results found for: STOOLCX  No results found for: RESPCX  No results found for: AFBCX        I have personally looked at the labs and they are summarized above.  ----------------------------------------------------------------------------------------------------------------------  Detailed radiology reports for the last 24 hours:    Imaging Results (Last 24 Hours)      Procedure Component Value Units Date/Time    XR Chest 1 View [[id number]] Collected: 04/08/21 0854     Updated: 04/08/21 0857    Narrative:      EXAM:    XR Chest, 1 View     EXAM DATE:    4/8/2021 7:59 AM     CLINICAL HISTORY:    increasing sputum production; I50.9-Heart failure, unspecified;  I25.119-Atherosclerotic heart disease of native coronary artery with  unspecified angina pectoris     TECHNIQUE:    Frontal view of the chest.     COMPARISON:    03/29/2031.     FINDINGS:    LUNGS:  Unremarkable.  No consolidation.    PLEURAL SPACE:  Tiny bilateral pleural effusions.  No pneumothorax.    HEART:  Cardiomegaly.    MEDIASTINUM:  Unremarkable.    BONES/JOINTS:  Unremarkable.       Impression:      1.  Tiny bilateral pleural effusions.  2.  Cardiomegaly.     This report was finalized on 4/8/2021 8:55 AM by Dr. Corby Cardoza MD.           Assessment & Plan      Acute on chronic HFpEF  Hypertension  Hyperlipidemia  CAD s/p recent four-vessel CABG    -Initially presenting with increased weakness, dyspnea and lower extremity swelling    -Negative troponins x2 with proBNP only 800, EKG with normal sinus rhythm and left anterior fascicular block with possible old anterior infarct    -Echo on 3/24/2021 with EF of 51 to 55% with septal wall motion abnormality consistent with postoperative state and LV noted to have mild eccentric LVH with no significant valvular abnormalities noted and no pericardial effusion.    -Cardiology consulted and following along, appreciate input    -Continue aspirin, statin, Plavix and home beta-blocker    -Resume oral lasix    -Will need follow-up rescheduled with Dr. Simms's office, CT surgery at discharge    -Would benefit from cardiac rehab    Sepsis secondary to right lower extremity cellulitis    -Cellulitis overall appears to be improving     -Bilateral venous duplex with no evidence of DVT and mildly prominent upper thigh region nodes    -We will continue Vanc and Zosyn for an expected  10-day course as he still has significant tenderness and blanchable erythema in the right lower extremity although it is improved today compared to yesterday.  Will finish today    Diabetes mellitus type 2  Peripheral neuropathy    -Globin A1c of 8.7%    -Continue home gabapentin for neuropathy    -Resumed metformin and glipizide    -Reduced insulin due to above    CKD stage II-III    -Continue to monitor creatinine urine output and avoid nephrotoxins as well as NSAIDs    Chronic normocytic anemia  Thrombocytosis, improved    -Platelets trended down from 647 admission to 437    -Globin remained stable    -We will continue home oral folate and multivitamin    -Threshold to transfuse remains hemoglobin less than 7 or symptomatic    Gout without flare  BPH  GERD  Obesity with BMI of 31       VTE Prophylaxis:   Mechanical Order History:     None      Pharmalogical Order History:      Ordered     Dose Route Frequency Stop    03/30/21 0137  heparin (porcine) 5000 UNIT/ML injection 5,000 Units      5,000 Units SC Every 12 Hours Scheduled --                Disposition Home once medically stable and improved.      Electronically signed by Dany Davis DO at 04/08/21 2009          Consult Notes (most recent note)      Parvin James MD at 03/30/21 1548      Consult Orders    1. Inpatient Cardiology Consult [[id number]] ordered by Robert Ortiz MD at 03/30/21 0037               Date of Admit: 3/29/2021  Date of Consult: 03/30/21  Provider, No Known        Acute on chronic congestive heart failure (CMS/HCC)    Type II diabetes mellitus (CMS/HCC)    Essential hypertension    Benign prostatic hyperplasia without lower urinary tract symptoms    Obesity (BMI 30-39.9)    GERD (gastroesophageal reflux disease)    Diabetic neuropathy (CMS/HCC)    Former smoker    History of gout    Hyperlipidemia    CKD (chronic kidney disease) stage 2, GFR 60-89 ml/min      Assessment      1. Coronary artery disease status post coronary  bypass grafting times 4 March 2021 with left intramammary graft to the LAD SVG grafts to the diagonal, OM1, OM2  2. Acute on chronic diastolic heart failure HFpEF  3. Lower extremity wound erythema  4. Hyperlipidemia  5. History of tobacco abuse  6. Diabetes mellitus type 2  7. Essential hypertension  8. Chronic renal impairment      Recommendations     1.  Would recommend to continue diuresing the patient he has been diuresing well overnight continue current dose of furosemide  2.  Currently blood pressures controlled continue current management  3.  Continue with the statin  4.  Would recommend also spirometry  5.  We will monitor creatinine  6.  Antibiotics management as per medicine service  7.  Ambulate        Reason for consultation: Congestive Heart failure     Subjective       Subjective     History of Present Illness     William Villasenor is a 70 year old male with a past medical history significant for chronic kidney disease, chronic diastolic congestive heart failure, coronary arterry disease s/p CABG, hyperlipidemia, diabetes mellitus type 2, essential hypertension and gout. Patient presented to the ED with complaints of chest pain, lower extremity edema and shortness of breath. He was recently admitted to this facility from 3/23/21-3/27/21 for chest pain and hypoxia. He was diuresed and sent home with PO pain medications due to chest pain that was thought to be related to his sternotomy.Of note patient was recently [facility] to [facility] by Dr. Simms and underwent 4V CABG on 3/16/2021.     He had a follow up appt yesterday in [place] which was essentially normal.  Patient said he presented back to the ER overnight due to worsening shortness of breath and right leg pain from his incision.  He gets very short of breath with exertion.  He states his right leg incision is red and painful and he thinks it is infected.  Also complains of pain near his sternotomy.  He denies any cardiac chest  pain.  Recent echocardiogram showed EF 51 to 55%.    Cardiac risk factors:arteriosclerotic heart disease, hypertension, Sedentary life style and Obesity    Last Echo: 3.24.2021  · Left ventricular ejection fraction appears to be 51 - 55%. Septal wall motion is abnormal, consistent with a post-operative state  · Left ventricular wall thickness is consistent with mild concentric hypertrophy.  · No significant functional valvular abnormalities noted  · There is no evidence of pericardial effusion    Last Stress: 3.14.2021  · Myocardial perfusion imaging indicates a medium-sized, moderately severe area of ischemia located in the entire apex and distal anteroapical with mild infraction in apex.  · Diaphragmatic attenuation artifact is present.  · Left ventricular ejection fraction is normal. (Calculated EF = 57%) with distal anteroapical hypokinesis  · Findings consistent with a normal ECG stress test.  · Impressions are consistent with an intermediate risk study.     Last Cath: 3/15/2021  · Prox RCA lesion is 99% stenosed.  · Mid LAD lesion is 100% stenosed.  · Ost Cx lesion is 50% stenosed.  · Prox LAD lesion is 85% stenosed.  · Mid Cx lesion is 75% stenosed.  · 3rd Mrg lesion is 90% stenosed.  · 1st Diag lesion is 100% stenosed.  · 1st LPL lesion is 90% stenosed.  · Normal systolic function.  · The ejection fraction was greater than 55% by visual estimate.  · LV pressures (S/D/E) : 120/80/15 mmHg  · THREE VESSEL CAD WITH PRESERVED LV FUNCTION  · CT SURGERY CONSULT FOR CABG           Past Medical History:   Diagnosis Date   • Arthritis    • Back pain    • BPH (benign prostatic hyperplasia)    • Diabetes mellitus (CMS/HCC)    • Diabetic peripheral neuropathy (CMS/HCC)    • Erectile dysfunction    • Former smoker    • GERD (gastroesophageal reflux disease)    • GERD (gastroesophageal reflux disease)    • Gout    • High cholesterol    • Hypertension    • Obesity      Past Surgical History:   Procedure Laterality Date   •  CARDIAC CATHETERIZATION N/A 3/15/2021    Procedure: Left Heart Cath;  Surgeon: Trent Zaragoza MD;  Location:  [facility];  Service: Cardiology;  Laterality: N/A;   • CARDIAC CATHETERIZATION N/A 3/15/2021    Procedure: Coronary angiography;  Surgeon: Trent Zaragoza MD;  Location:  [facility];  Service: Cardiology;  Laterality: N/A;   • COLONOSCOPY W/ BIOPSIES     • CORONARY ARTERY BYPASS GRAFT N/A 3/16/2021    Procedure: MEDIAN STERNOTOMY, CORONARY ARTERY BYPASS GRAFT X4, UTILIZATION OF LEFT INTERNAL MAMMARY ARTERY, AND ENDOSCOPIC VEIN HARVEST OF RIGHT GREATER SAPHENOUS VEIN;  Surgeon: Jorge Simms MD;  Location:  [facility];  Service: Cardiothoracic;  Laterality: N/A;  vein out- 1614  vein ready- 1628             Family History   Problem Relation Age of Onset   • Heart disease Mother    • Hypertension Mother    • Depression Mother    • Alcohol abuse Maternal Uncle    • Heart disease Maternal Grandmother    • Hypertension Maternal Grandmother    • Diabetes Maternal Grandmother    • Diabetes Paternal Grandmother    • No Known Problems Half-Brother    • No Known Problems Half-Brother    • No Known Problems Half-Sister    • No Known Problems Daughter    • No Known Problems Daughter    • Hyperlipidemia Daughter      Social History     Tobacco Use   • Smoking status: Former Smoker     Packs/day: 1.00     Years: 5.00     Pack years: 5.00     Quit date: 1974     Years since quittin.1   • Smokeless tobacco: Never Used   Substance Use Topics   • Alcohol use: Never     Comment: quit in    • Drug use: Never     Medications Prior to Admission   Medication Sig Dispense Refill Last Dose   • allopurinol (ZYLOPRIM) 300 MG tablet Take 1 tablet by mouth Daily. 90 tablet 1 3/29/2021 at am   • aspirin (Aspirin Adult Low Dose) 81 MG EC tablet Take 81 mg by mouth Daily.   3/29/2021 at Unknown time   • atorvastatin (LIPITOR) 40 MG tablet Take 1 tablet by mouth Every Night. 90  tablet 3 3/28/2021 at pm   • bumetanide (BUMEX) 2 MG tablet Take 1 tablet by mouth Daily for 30 days. 30 tablet 0 3/29/2021 at am   • clopidogrel (PLAVIX) 75 MG tablet Take 1 tablet by mouth Daily. 90 tablet 1 3/29/2021 at am   • doxazosin (CARDURA) 4 MG tablet Take 4 mg by mouth Every Night.   3/28/2021 at pm   • finasteride (PROSCAR) 5 MG tablet Take 1 tablet by mouth Daily. 90 tablet 1 3/29/2021 at am   • folic acid (FOLVITE) 1 MG tablet Take 1 mg by mouth 2 (two) times a day.   3/29/2021 at am   • gabapentin (NEURONTIN) 300 MG capsule Take 1 capsule by mouth 2 (two) times a day. 60 capsule 0 3/29/2021 at am   • insulin aspart (novoLOG FLEXPEN) 100 UNIT/ML solution pen-injector sc pen Inject 12 Units under the skin into the appropriate area as directed 3 (Three) Times a Day With Meals for 30 days. 15 mL 0 3/29/2021 at noon   • insulin detemir (Levemir FlexTouch) 100 UNIT/ML injection Inject 37 Units under the skin into the appropriate area as directed Daily for 30 days. 15 mL 0 3/29/2021 at am   • metFORMIN (GLUCOPHAGE) 1000 MG tablet Take 1,000 mg by mouth 2 (Two) Times a Day With Meals.   3/29/2021 at am   • metoprolol succinate XL (TOPROL-XL) 50 MG 24 hr tablet Take 1 tablet by mouth Daily for 90 days. 90 tablet 0 3/29/2021 at am   • multivitamin (multivitamin) tablet tablet Take 1 tablet by mouth Daily. 30 tablet  3/29/2021 at am   • nitroglycerin (NITROSTAT) 0.4 MG SL tablet Place 1 tablet under the tongue Every 5 (Five) Minutes As Needed for Chest Pain (Only if SBP Greater Than 100). Take no more than 3 doses in 15 minutes. 100 tablet 12 3/28/2021 at pm   • oxyCODONE-acetaminophen (PERCOCET) 7.5-325 MG per tablet Take 1 tablet by mouth Every 6 (Six) Hours As Needed for Severe Pain .   3/29/2021 at Unknown time   • sennosides-docusate (PERICOLACE) 8.6-50 MG per tablet Take 2 tablets by mouth 2 (Two) Times a Day for 30 days. 120 tablet 0 3/29/2021 at am   • vitamin E 400 UNIT capsule Take 1 capsule by mouth  Daily.   3/29/2021 at am   • acetaminophen (TYLENOL) 500 MG tablet Take 500 mg by mouth Every 6 (Six) Hours As Needed for Mild Pain .   Unknown at Unknown time   • omeprazole (priLOSEC) 20 MG capsule Take 20 mg by mouth 2 (two) times a day.   Unknown at Unknown time     Allergies:  Patient has no known allergies.    Review of Systems   Constitutional: Negative for chills, diaphoresis, fatigue and fever.   HENT: Negative for congestion and trouble swallowing.    Eyes: Negative for photophobia and visual disturbance.   Respiratory: Positive for shortness of breath. Negative for chest tightness and wheezing.    Cardiovascular: Positive for leg swelling. Negative for chest pain and palpitations.   Gastrointestinal: Negative for abdominal pain, nausea and vomiting.   Endocrine: Negative for polyphagia and polyuria.   Genitourinary: Negative for dysuria and hematuria.   Musculoskeletal: Negative for back pain and neck stiffness.   Skin: Positive for wound. Negative for pallor and rash.   Allergic/Immunologic: Negative for food allergies.   Neurological: Negative for dizziness, syncope and light-headedness.   Hematological: Negative for adenopathy. Does not bruise/bleed easily.   Psychiatric/Behavioral: Negative for confusion. The patient is not nervous/anxious.        Objective       Objective      Vital Signs  Temp:  [97.7 °F (36.5 °C)-99.1 °F (37.3 °C)] 98.3 °F (36.8 °C)  Heart Rate:  [83-86] 83  Resp:  [18-22] 18  BP: (105-153)/(56-83) 125/59  Vital Signs (last 72 hrs)       03/27 0700  -  03/28 0659 03/28 0700  -  03/29 0659 03/29 0700  -  03/30 0659 03/30 0700  -  03/30 1549   Most Recent    Temp (°F)     98.4 -  99.1    97.7 -  98.3     98.3 (36.8)    Heart Rate     83 -  86    83 -  84     83    Resp     18 -  22      18     18    BP     105/63 -  153/68    124/66 -  125/59     125/59    SpO2 (%)     94 -  100    96 -  97     97        Body mass index is 31.59 kg/m².    Intake/Output Summary (Last 24 hours) at  3/30/2021 1549  Last data filed at 3/30/2021 1326  Gross per 24 hour   Intake 600 ml   Output 1500 ml   Net -900 ml     Physical Exam  Constitutional:       General: He is not in acute distress.     Appearance: Normal appearance. He is well-developed.   HENT:      Head: Normocephalic and atraumatic.      Mouth/Throat:      Mouth: Mucous membranes are moist.   Eyes:      Extraocular Movements: Extraocular movements intact.      Pupils: Pupils are equal, round, and reactive to light.   Neck:      Vascular: No JVD.   Cardiovascular:      Rate and Rhythm: Normal rate and regular rhythm.      Heart sounds: Normal heart sounds. No murmur heard.   No S3 or S4 sounds.    Pulmonary:      Effort: Pulmonary effort is normal. No respiratory distress.      Breath sounds: Rales present. No wheezing.   Abdominal:      General: Bowel sounds are normal. There is no distension.      Palpations: Abdomen is soft. There is no hepatomegaly.      Tenderness: There is no abdominal tenderness.   Musculoskeletal:         General: Normal range of motion.      Cervical back: Normal range of motion and neck supple.   Skin:     General: Skin is warm and dry.      Coloration: Skin is not jaundiced or pale.      Comments: Recent sternotomy scar  Right leg incision from CABG noted to be scabbed over with erythema   Neurological:      General: No focal deficit present.      Mental Status: He is alert and oriented to person, place, and time. Mental status is at baseline.   Psychiatric:         Mood and Affect: Mood normal.         Behavior: Behavior normal.         Thought Content: Thought content normal.         Judgment: Judgment normal.       Results review     Results Review:    I reviewed the patient's new clinical results.  Results from last 7 days   Lab Units 03/29/21  2320 03/29/21  2110 03/24/21  1039 03/24/21  0713 03/24/21  0351   TROPONIN T ng/mL <0.010 <0.010 0.020 0.013 0.024     Results from last 7 days   Lab Units 03/30/21  0126  03/27/21  0220 03/24/21  0351 03/23/21 1917   WBC 10*3/mm3 13.12* 10.83* 9.74 11.58*   HEMOGLOBIN g/dL 10.7* 9.2* 8.7* 10.3*   PLATELETS 10*3/mm3 647* 516* 347 417     Results from last 7 days   Lab Units 03/30/21  0237 03/29/21  2110 03/29/21  0852 03/27/21  0220 03/26/21  0257 03/25/21  0509 03/24/21  2307 03/24/21  0351 03/23/21 1917   SODIUM mmol/L 141 135* 133* 135* 133* 134*  --  137 136   POTASSIUM mmol/L 4.2 4.4 4.7 4.0 5.1 4.6 4.3 4.9 4.3   CHLORIDE mmol/L 97* 92* 93* 96* 94* 97*  --  103 98   CO2 mmol/L 28.9 29.3* 29.3* 29.3* 27.6 25.5  --  24.9 26.1   BUN mg/dL 24* 25* 20 19 18 17  --  16 18   CREATININE mg/dL 1.11 1.15 1.15 1.11 1.16 1.14  --  1.03 1.06   CALCIUM mg/dL 9.7 9.8 9.5 9.1 9.1 8.4*  --  8.1* 8.8   GLUCOSE mg/dL 221* 255* 407* 75 136* 361*  --  317* 342*   ALT (SGPT) U/L 15 14  --   --   --   --   --  22 30   AST (SGOT) U/L 13 10  --   --   --   --   --  13 22     Lab Results   Component Value Date    INR 0.97 03/29/2021    INR 1.20 (H) 03/17/2021    INR 1.41 (H) 03/16/2021    INR 0.93 03/15/2021     Lab Results   Component Value Date    MG 1.7 03/30/2021    MG 1.7 03/29/2021    MG 1.8 03/24/2021     Lab Results   Component Value Date    TSH 3.410 03/29/2021    TRIG 618 (H) 03/15/2021    HDL 30 (L) 03/15/2021    LDL 91 03/15/2021      Lab Results   Component Value Date    PROBNP 848.1 03/29/2021    PROBNP 816.3 03/29/2021    PROBNP 1,825.0 (H) 03/23/2021       ECG  ECG/EMG Results (last 24 hours)     Procedure Component Value Units Date/Time    ECG 12 Lead [[id number]] Collected: 03/29/21 2011     Updated: 03/29/21 2213     QT Interval 368 ms      QTC Interval 437 ms     Narrative:      Test Reason : cp soa  Blood Pressure : **/** mmHG  Vent. Rate : 085 BPM     Atrial Rate : 085 BPM     P-R Int : 150 ms          QRS Dur : 090 ms      QT Int : 368 ms       P-R-T Axes : 031 -45 102 degrees     QTc Int : 437 ms    Normal sinus rhythm  Left anterior fascicular block  Possible Anterior infarct  (cited on or before 25-MAR-2021)  Abnormal ECG  When compared with ECG of 25-MAR-2021 20:32,  premature ventricular complexes are no longer present  Confirmed by Joseph Tyler ([id number]) on 3/29/2021 10:13:24 PM    Referred By:  NICK           Confirmed By:Joseph Tyler          Imaging Results (Last 72 Hours)     Procedure Component Value Units Date/Time    US Venous Doppler Lower Extremity Bilateral (duplex) [[id number]] Collected: 03/30/21 0003     Updated: 03/30/21 0005    Narrative:      US Veins LE Duplex BILAT    HISTORY:   Recent right lower leg redness and swelling. Heart failure, history of recent bypass surgery.     TECHNIQUE:   Real-time ultrasound was performed of both lower extremities utilizing spectral and color Doppler with compression and augmentation techniques.    COMPARISON:  None available.    FINDINGS:  There is some mildly prominent lymph nodes in the thigh region bilaterally.  Right Lower Extremity:  There is no deep venous thrombus seen in the right lower extremity, including the right common femoral veins, right femoral veins and right popliteal veins.  Normal compressibility and respiratory phasicity was visualized.  No calf vein thrombus.    Left Lower Extremity:  There is no deep venous thrombus seen in the left lower extremity, including the left common femoral veins, left femoral veins and left popliteal veins.   Normal compressibility and respiratory phasicity was visualized.  No calf vein thrombus.      Impression:      No deep venous thrombus seen in either lower extremity. There are some mildly prominent with nodes in the patient's upper thigh regions. This may be within normal limits for the patient.    Signer Name: Roseann Feliz MD   Signed: 3/30/2021 12:03 AM   Workstation Name: [id number]    [facility]    XR Chest 1 View [[id number]] Collected: 03/29/21 2202     Updated: 03/29/21 2204    Narrative:      CR Chest 1 Vw    INDICATION:   Pulmonary edema      COMPARISON:    Chest x-ray from 3/29/2021    FINDINGS:  Single portable AP view(s) of the chest.  There is cardiomegaly. There is mild prominence to the bronchovascular markings. There may be trace bilateral pleural effusions again seen. No pneumothorax.      Impression:      No definite acute cardiopulmonary findings. There is cardiomegaly. No definite interval change.    Signer Name: Roseann Feliz MD   Signed: 3/29/2021 10:02 PM   Workstation Name: [id number]    [facility]          I have discussed my impression and recommendations with the patient and family.    Thank you very much for asking us to be involved in this patient's care.  We will follow along with you.      Electronically signed by ANKUR Kenney, 21, 4:19 PM EDT.  Electronically signed by Parvin James MD, 21, 4:27 PM EDT.  Please note that portions of this note were completed with a voice recognition program.          Electronically signed by Parvin James MD at 21 1627          Physical Therapy Notes (most recent note)      Kellen Manning, PT at 21 1524  Version 1 of 1         Acute Care - Physical Therapy Initial Evaluation   [facility]     Patient Name: William Villasenor  : 1950  MRN: [id number]  Today's Date: 2021   Onset of Illness/Injury or Date of Surgery: 21 (admit date)       PT Assessment (last 12 hours)      PT Evaluation and Treatment     Row Name 21 1518          Physical Therapy Time and Intention    Subjective Information  complains of;weakness;fatigue  -CT     Document Type  evaluation  -CT     Mode of Treatment  physical therapy  -CT     Patient Effort  good  -CT     Symptoms Noted During/After Treatment  fatigue  -CT     Comment  Pt tolerated evaluation well with rest breaks provided as needed. Pt ambulated today with use of RW and CGA.   -CT     Row Name 21 1518          General Information    Patient Profile Reviewed  yes  -CT     Onset of  Illness/Injury or Date of Surgery  03/29/21 admit date  -CT     Referring Physician  Elise  -CT     Patient Observations  alert;cooperative;agree to therapy  -CT     Prior Level of Function  independent:;all household mobility;community mobility  -CT     Equipment Currently Used at Home  none  -CT     Existing Precautions/Restrictions  sternal;fall;cardiac  -CT     Equipment Issued to Patient  gait belt  -CT     Risks Reviewed  patient:;LOB;nausea/vomiting;dizziness;increased discomfort;change in vital signs;increased drainage;lines disloged  -CT     Benefits Reviewed  patient:;improve function;increase independence;increase strength;increase balance;decrease pain;decrease risk of DVT;improve skin integrity;increase knowledge  -CT     Barriers to Rehab  medically complex  -CT     Row Name 04/06/21 1518          Previous Level of Function/Home Environm    Household Ambulation, Premorbid Functional Level  independent  -CT     Community Ambulation, Premorbid Functional Level  independent  -CT     Row Name 04/06/21 1518          Living Environment    Current Living Arrangements  home/apartment/condo  -CT     Lives With  spouse  -CT     Row Name 04/06/21 1518          Cognition    Affect/Mental Status (Cognitive)  WFL  -CT     Orientation Status (Cognition)  oriented x 4  -CT     Follows Commands (Cognition)  follows multi-step commands  -CT     Row Name 04/06/21 1518          Pain    Additional Documentation  -- no pain reported  -CT     Row Name 04/06/21 1518          Range of Motion Comprehensive    Comment, General Range of Motion  BLE grossly WFL  -CT     Row Name 04/06/21 1518          Strength Comprehensive (MMT)    Comment, General Manual Muscle Testing (MMT) Assessment  BLE grossly 4/5  -CT     Row Name 04/06/21 1518          Bed Mobility    Bed Mobility  bed mobility (all) activities  -CT     All Activities, [place] (Bed Mobility)  contact guard assist  -CT     Bed Mobility, Safety Issues  decreased use of  arms for pushing/pulling;decreased use of legs for bridging/pushing  -CT     Assistive Device (Bed Mobility)  bed rails  -CT     Row Name 04/06/21 1518          Transfers    Transfers  sit-stand transfer;stand-sit transfer  -CT     Sit-Stand Lilly (Transfers)  contact guard  -CT     Stand-Sit Lilly (Transfers)  contact guard  -CT     Row Name 04/06/21 1518          Sit-Stand Transfer    Assistive Device (Sit-Stand Transfers)  walker, front-wheeled  -CT     Row Name 04/06/21 1518          Stand-Sit Transfer    Assistive Device (Stand-Sit Transfers)  walker, front-wheeled  -CT     Row Name 04/06/21 1518          Gait/Stairs (Locomotion)    Lilly Level (Gait)  contact guard  -CT     Assistive Device (Gait)  walker, front-wheeled  -CT     Distance in Feet (Gait)  250  -CT     Pattern (Gait)  step-to  -CT     Deviations/Abnormal Patterns (Gait)  gait speed decreased  -CT     Bilateral Gait Deviations  forward flexed posture  -CT     Row Name 04/06/21 1518          Balance    Balance Assessment  sitting static balance;standing static balance  -CT     Static Sitting Balance  WFL  -CT     Static Standing Balance  WFL  -CT     Row Name             Wound 03/30/21 0120 medial sternal Incision    Wound - Properties Group Placement Date: 03/30/21  -AB Placement Time: 0120  -AB Present on Hospital Admission: Y  -AB Orientation: medial  -AB Location: sternal  -AB Primary Wound Type: Incision  -AB    Retired Wound - Properties Group Date first assessed: 03/30/21  -AB Time first assessed: 0120  -AB Present on Hospital Admission: Y  -AB Location: sternal  -AB Primary Wound Type: Incision  -AB    Row Name             Wound 03/30/21 0120 Right abdomen Incision    Wound - Properties Group Placement Date: 03/30/21  -AB Placement Time: 0120  -AB Side: Right  -AB Location: abdomen  -AB Primary Wound Type: Incision  -AB    Retired Wound - Properties Group Date first assessed: 03/30/21  -AB Time first assessed: 0120  -AB  Side: Right  -AB Location: abdomen  -AB Primary Wound Type: Incision  -AB    Row Name             Wound 03/30/21 0120 Right distal leg Incision    Wound - Properties Group Placement Date: 03/30/21  -AB Placement Time: 0120  -AB Side: Right  -AB Orientation: distal  -AB Location: leg  -AB Primary Wound Type: Incision  -AB    Retired Wound - Properties Group Date first assessed: 03/30/21  -AB Time first assessed: 0120  -AB Side: Right  -AB Location: leg  -AB Primary Wound Type: Incision  -AB    Row Name 04/06/21 1518          Coping    Observed Emotional State  calm;cooperative  -CT     Verbalized Emotional State  acceptance  -CT     Row Name 04/06/21 1518          Plan of Care Review    Plan of Care Reviewed With  patient  -CT     Row Name 04/06/21 1518          Physical Therapy Goals    Bed Mobility Goal Selection (PT)  bed mobility, PT goal 1  -CT     Transfer Goal Selection (PT)  transfer, PT goal 1  -CT     Gait Training Goal Selection (PT)  gait training, PT goal 1  -CT     Row Name 04/06/21 1518          Bed Mobility Goal 1 (PT)    Activity/Assistive Device (Bed Mobility Goal 1, PT)  bed mobility activities, all  -CT     [place] Level/Cues Needed (Bed Mobility Goal 1, PT)  modified independence  -CT     Time Frame (Bed Mobility Goal 1, PT)  by discharge  -CT     Row Name 04/06/21 1518          Transfer Goal 1 (PT)    Activity/Assistive Device (Transfer Goal 1, PT)  sit-to-stand/stand-to-sit;bed-to-chair/chair-to-bed  -CT     [place] Level/Cues Needed (Transfer Goal 1, PT)  modified independence  -CT     Time Frame (Transfer Goal 1, PT)  by discharge  -CT     Row Name 04/06/21 1518          Gait Training Goal 1 (PT)    Activity/Assistive Device (Gait Training Goal 1, PT)  gait (walking locomotion);assistive device use  -CT     [place] Level (Gait Training Goal 1, PT)  modified independence  -CT     Distance (Gait Training Goal 1, PT)  300  -CT     Time Frame (Gait Training Goal 1, PT)  by  discharge  -CT     Row Name 04/06/21 1518          Positioning and Restraints    Pre-Treatment Position  in bed  -CT     Post Treatment Position  chair  -CT     In Chair  sitting;call light within reach;encouraged to call for assist;notified nsg;with family/caregiver  -CT     Row Name 04/06/21 1518          Therapy Assessment/Plan (PT)    Patient/Family Therapy Goals Statement (PT)  Pt goals are to return to PLOF  -CT     Functional Level at Time of Evaluation (PT)  CGA  -CT     PT Diagnosis (PT)  decreased functional mobility  -CT     Rehab Potential (PT)  good, to achieve stated therapy goals  -CT     Criteria for Skilled Interventions Met (PT)  yes;skilled treatment is necessary  -CT     Predicted Duration of Therapy Intervention (PT)  length of stay  -CT     Row Name 04/06/21 1518          Therapy Plan Review/Discharge Plan (PT)    Therapy Plan Review (PT)  evaluation/treatment results reviewed;care plan/treatment goals reviewed;risks/benefits reviewed;current/potential barriers reviewed;participants voiced agreement with care plan;participants included;patient  -CT       User Key  (r) = Recorded By, (t) = Taken By, (c) = Cosigned By    Initials Name Provider Type    CT Kellen Manning, PT Physical Therapist    Francisca Bennett, RN Registered Nurse        Physical Therapy Education                 Title: PT OT SLP Therapies (Done)     Topic: Physical Therapy (Done)     Point: Mobility training (Done)     Learning Progress Summary           Patient Acceptance, E,TB, VU by CT at 4/6/2021 1524                   Point: Home exercise program (Done)     Learning Progress Summary           Patient Acceptance, E,TB, VU by CT at 4/6/2021 1524                   Point: Body mechanics (Done)     Learning Progress Summary           Patient Acceptance, E,TB, VU by CT at 4/6/2021 1524                   Point: Precautions (Done)     Learning Progress Summary           Patient Acceptance, E,TB, VU by CT at 4/6/2021 1524                                User Key     Initials Effective Dates Name Provider Type Discipline    CT 04/03/18 -  Kellen Manning PT Physical Therapist PT              PT Recommendation and Plan  Anticipated Discharge Disposition (PT): home, home with assist  Planned Therapy Interventions (PT): balance training, bed mobility training, gait training, home exercise program, manual therapy techniques, motor coordination training, neuromuscular re-education, patient/family education, postural re-education, strengthening, transfer training  Therapy Frequency (PT): 3 times/wk (3-5 times/wk )  Plan of Care Reviewed With: patient       Time Calculation:   PT Charges     Row Name 04/06/21 1524             Time Calculation    PT Received On  04/06/21  -CT      PT Goal Re-Cert Due Date  04/20/21  -CT        User Key  (r) = Recorded By, (t) = Taken By, (c) = Cosigned By    Initials Name Provider Type    CT Kellen Manning PT Physical Therapist        Therapy Charges for Today     Code Description Service Date Service Provider Modifiers Qty    12874383478 HC PT EVAL MOD COMPLEXITY 4 4/6/2021 Kellen Manning, JOVANNA GP 1               Kellen Manning PT  4/6/2021      Electronically signed by Kellen Manning PT at 04/06/21 1525       Discharge Summary    No notes of this type exist for this encounter.

## 2021-04-09 NOTE — PROGRESS NOTES
Discharge Planning Assessment  LINDA Anguiano     Patient Name: William Villasenor  MRN: [id number]  Today's Date: 4/9/2021    Admit Date: 3/29/2021    Discharge Needs Assessment    No documentation.       Discharge Plan     Row Name 04/09/21 1034       Plan    Final Discharge Disposition Code  06 - home with home health care    Final Note Pt is being discharged with existing [facility]. SS has faxed clinical update to [facility] fax [phone]. SS notified lead nurse with report number [facility] [phone]. Pt's spouse to transport via private vehicle.        Jennifer Way

## 2021-04-09 NOTE — PLAN OF CARE
Goal Outcome Evaluation:   Patient resting well during shift. He has complained of pain at the chest incision. PRN medications given as ordered. No distress noted will continue to follow plan of care.

## 2021-04-09 NOTE — OUTREACH NOTE
Prep Survey      Responses   [facility] facility patient discharged from?  [facility]   Is LACE score < 7 ?  No   Emergency Room discharge w/ pulse ox?  No   Eligibility  TCM   Hospital  [facility]   Date of Admission  03/29/21   Date of Discharge  04/09/21   Discharge Disposition  [facility]   Discharge diagnosis  A/C CHF, sepsis d/t RLE cellulitis, chronic anemia, IDDM, CKD   Does the patient have one of the following disease processes/diagnoses(primary or secondary)?  Sepsis   Does the patient have Home health ordered?  Yes   What is the Home health agency?   [facility]   Is there a DME ordered?  No   Prep survey completed?  Yes          Sandie Lewis RN

## 2021-04-09 NOTE — NURSING NOTE
Ambulated patient in hallway without oxygen. Patient's O2 saturations stayed between 90-91% during this, with the lowest it dropped was 89%. Dr. Ryna busch.

## 2021-04-09 NOTE — DISCHARGE INSTR - APPOINTMENTS
Pt  Has  An  Apt  With  Elias Coyne  For  April 29  At  2:45  And  Heart  Failure  For April 12  At  2  Pm  And  Dr Simms for  April 28  At  9  am

## 2021-04-09 NOTE — PROGRESS NOTES
[facility] HOSPITALIST PROGRESS NOTE     Patient Identification:  Name:  William Villasenor  Age:  70 y.o.  Sex:  male  :  1950  MRN:  [id number]  Visit Number:  [id number]  ROOM: [address] [place] [address]     Primary Care Provider:  Jacqueline Gatica MD    Length of stay in inpatient status:  9    Subjective     Chief Compliant:    Chief Complaint   Patient presents with   • Chest Pain   • Shortness of Breath       History of Presenting Illness: Patient seen and evaluated in follow-up for acute on chronic heart failure with preserved ejection fraction and sepsis secondary to right lower extremity cellulitis.  Patient today feeling well, using incentive spirometer, leg looks good.    Objective     Current Hospital Meds:allopurinol, 300 mg, Oral, Daily  aspirin, 81 mg, Oral, Daily  atorvastatin, 40 mg, Oral, Nightly  bisacodyl, 10 mg, Rectal, Daily  clopidogrel, 75 mg, Oral, Daily  finasteride, 5 mg, Oral, Daily  folic acid, 1 mg, Oral, BID  furosemide, 40 mg, Oral, Daily  gabapentin, 300 mg, Oral, Q12H  glipizide, 5 mg, Oral, BID AC  heparin (porcine), 5,000 Units, Subcutaneous, Q12H  insulin aspart, 0-14 Units, Subcutaneous, TID PC  insulin detemir, 40 Units, Subcutaneous, Nightly  lactobacillus acidophilus, 1 capsule, Oral, Daily  lisinopril, 10 mg, Oral, Q24H  metFORMIN, 1,000 mg, Oral, BID With Meals  metoprolol succinate XL, 50 mg, Oral, Q24H  multivitamin, 1 tablet, Oral, Daily  pantoprazole, 40 mg, Oral, Q AM  piperacillin-tazobactam, 3.375 g, Intravenous, Q8H  sennosides-docusate, 2 tablet, Oral, BID  sodium chloride, 10 mL, Intravenous, Q12H  terazosin, 5 mg, Oral, Nightly  vitamin E, 400 Units, Oral, Daily         Current Antimicrobial Therapy:  Anti-Infectives (From admission, onward)    Ordered     Dose/Rate Route Frequency Start Stop    21 1304  vancomycin 1 g/250 mL 0.9% NS (vial-mate)     Dany Davis DO reviewed the order on 21 4522.   Ordering Provider: Chucky Elise MD    1,000  mg  over 60 Minutes Intravenous Every 12 Hours 21 0100 21 1757    21 0530  piperacillin-tazobactam (ZOSYN) 3.375 g/100 mL 0.9% NS IVPB (mbp)     Dany Davis DO let the order  on 21 0822.   Ordering Provider: Robert Ortiz MD    3.375 g  over 4 Hours Intravenous Every 8 Hours 21 1300 21 0859    21 0530  piperacillin-tazobactam (ZOSYN) 3.375 g/100 mL 0.9% NS IVPB (mbp)     Ordering Provider: Robert Ortiz MD    3.375 g  over 30 Minutes Intravenous Once 21 0700 21 0711    21 0530  vancomycin 2000 mg/500 mL 0.9% NS IVPB ([facility])     Ordering Provider: Robert Ortiz MD    20 mg/kg × 97 kg  over 120 Minutes Intravenous Once 21 0700 21 0841        Current Diuretic Therapy:  Diuretics (From admission, onward)    Ordered     Dose/Rate Route Frequency Start Stop    21 1623  furosemide (LASIX) tablet 40 mg     Ordering Provider: Dany Davis DO    40 mg Oral Daily 21 1800      21 1714  furosemide (LASIX) tablet 40 mg     Ordering Provider: Joseph Tyler MD    40 mg Oral Once 21 1715 21 1747    21 0522  furosemide (LASIX) injection 20 mg     Ordering Provider: Shandra Barnard PA-C    20 mg Intravenous Once 21 0615 21 0548    21 0032  furosemide (LASIX) injection 20 mg     Ordering Provider: Robert Ortiz MD    20 mg Intravenous Once 21 0034 21 0144        ----------------------------------------------------------------------------------------------------------------------  Vital Signs:  Temp:  [97.8 °F (36.6 °C)-98.4 °F (36.9 °C)] 98.2 °F (36.8 °C)  Heart Rate:  [80-95] 80  Resp:  [20] 20  BP: (111-150)/(58-80) 113/64  SpO2:  [94 %-98 %] 95 %  on  Flow (L/min):  [2] 2;   Device (Oxygen Therapy): humidified;nasal cannula  Body mass index is 32.64 kg/m².    Wt Readings from Last 3 Encounters:   21 100 kg (221 lb)   21 95.4 kg  (210 lb 6 oz)   03/27/21 97 kg (213 lb 12.8 oz)     Intake & Output (last 3 days)       04/06 0701 - 04/07 0700 04/07 0701 - 04/08 0700 04/08 0701 - 04/09 0700    P.O. 1200 1560 960    I.V. (mL/kg)  3420.6 (34.2)     Total Intake(mL/kg) 1200 (12.1) 4980.6 (49.8) 960 (9.6)    Urine (mL/kg/hr) 3300 (1.4) 3075 (1.3) 1150 (0.9)    Stool  0     Total Output 3300 3075 1150    Net -2100 +1905.6 -190           Stool Unmeasured Occurrence  1 x         Diet Regular; Cardiac, Consistent Carbohydrate  ----------------------------------------------------------------------------------------------------------------------  Physical exam:  Constitutional: Elderly obese male sitting up in chair, NAD.   HENT:  Head:  Normocephalic and atraumatic.  Mouth:  Moist mucous membranes.    Eyes:  Conjunctivae and EOM are normal. No scleral icterus.    Neck:  Neck supple.  No JVD present.    Cardiovascular:  Normal rate, regular rhythm and normal heart sounds with no murmur.    Pulmonary/Chest:  No respiratory distress, no wheezes, mild right basilar crackles present.  Midsternal incision site appears to be healing well.  Abdominal:  Soft.  Bowel sounds are normal.  No distension and no tenderness.   Musculoskeletal: Functional ROM intact.   Neurological:  Alert and oriented to person, place, and time.  No cranial nerve deficit. Intact Sensation throughout  Skin:  Skin is warm and dry. No rash or lesion noted.  Right lower extremity with improving erythema and edema with blanching erythema superior to the shin below the knee.  Peripheral vascular:  Pulses in all 4 extremities with no clubbing, no cyanosis, no edema.  Psychiatric: Appropriate mood and affect, pleasant.   ----------------------------------------------------------------------------------------------------------------------  Tele:    ----------------------------------------------------------------------------------------------------------------------  Results from last 7 days    Lab Units 04/08/21 0134 04/06/21 0056 04/05/21  0346   WBC 10*3/mm3 8.08 8.34 10.08   HEMOGLOBIN g/dL 10.1* 9.5* 9.6*   HEMATOCRIT % 32.9* 31.8* 31.1*   MCV fL 91.6 94.1 91.2   MCHC g/dL 30.7* 29.9* 30.9*   PLATELETS 10*3/mm3 437 445 484*         Results from last 7 days   Lab Units 04/08/21  0134 04/06/21 0056 04/05/21 0346   SODIUM mmol/L 134* 136 138   POTASSIUM mmol/L 4.0 3.9 4.0   CHLORIDE mmol/L 100 101 100   CO2 mmol/L 25.9 25.1 25.5   BUN mg/dL 17 23 22   CREATININE mg/dL 1.10 1.28* 1.21   EGFR IF NONAFRICN AM mL/min/1.73 66 55* 59*   CALCIUM mg/dL 9.4 8.2* 9.0   GLUCOSE mg/dL 178* 280* 270*   ALBUMIN g/dL 3.05*  --   --    BILIRUBIN mg/dL 0.2  --   --    ALK PHOS U/L 99  --   --    AST (SGOT) U/L 10  --   --    ALT (SGPT) U/L 13  --   --    Estimated Creatinine Clearance: 71.8 mL/min (by C-G formula based on SCr of 1.1 mg/dL).  No results found for: AMMONIA  Results from last 7 days   Lab Units 04/08/21 0134   TROPONIN T ng/mL <0.010             Glucose   Date/Time Value Ref Range Status   04/08/2021 1900 212 (H) 70 - 130 mg/dL Final   04/08/2021 1609 178 (H) 70 - 130 mg/dL Final   04/08/2021 1050 217 (H) 70 - 130 mg/dL Final   04/08/2021 0720 129 70 - 130 mg/dL Final   04/08/2021 0439 155 (H) 70 - 130 mg/dL Final   04/07/2021 2150 210 (H) 70 - 130 mg/dL Final   04/07/2021 1821 269 (H) 70 - 130 mg/dL Final   04/07/2021 1600 237 (H) 70 - 130 mg/dL Final     Lab Results   Component Value Date    TSH 3.410 03/29/2021     No results found for: PREGTESTUR, PREGSERUM, HCG, HCGQUANT  Pain Management Panel     Pain Management Panel Latest Ref Rng & Units 3/15/2021 3/14/2021    AMPHETAMINES SCREEN, URINE Negative Negative Negative    BARBITURATES SCREEN Negative Negative Negative    BENZODIAZEPINE SCREEN, URINE Negative Negative Negative    BUPRENORPHINEUR Negative Negative Negative    COCAINE SCREEN, URINE Negative Negative Negative    METHADONE SCREEN, URINE Negative Negative Negative    METHAMPHETAMINEUR  Negative Negative -        Brief Urine Lab Results  (Last result in the past 365 days)      Color   Clarity   Blood   Leuk Est   Nitrite   Protein   CREAT   Urine HCG        03/29/21 0921 Yellow Clear Small (1+) Negative Negative Negative             Blood Culture   Date Value Ref Range Status   03/30/2021 No growth at 5 days  Final   03/30/2021 No growth at 5 days  Final     No results found for: URINECX  No results found for: WOUNDCX  No results found for: STOOLCX  No results found for: RESPCX  No results found for: AFBCX        I have personally looked at the labs and they are summarized above.  ----------------------------------------------------------------------------------------------------------------------  Detailed radiology reports for the last 24 hours:    Imaging Results (Last 24 Hours)     Procedure Component Value Units Date/Time    XR Chest 1 View [[id number]] Collected: 04/08/21 0854     Updated: 04/08/21 0857    Narrative:      EXAM:    XR Chest, 1 View     EXAM DATE:    4/8/2021 7:59 AM     CLINICAL HISTORY:    increasing sputum production; I50.9-Heart failure, unspecified;  I25.119-Atherosclerotic heart disease of native coronary artery with  unspecified angina pectoris     TECHNIQUE:    Frontal view of the chest.     COMPARISON:    03/29/2031.     FINDINGS:    LUNGS:  Unremarkable.  No consolidation.    PLEURAL SPACE:  Tiny bilateral pleural effusions.  No pneumothorax.    HEART:  Cardiomegaly.    MEDIASTINUM:  Unremarkable.    BONES/JOINTS:  Unremarkable.       Impression:      1.  Tiny bilateral pleural effusions.  2.  Cardiomegaly.     This report was finalized on 4/8/2021 8:55 AM by Dr. Corby Cardoza MD.           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    -Cardiology consulted and following along, appreciate input    -Continue aspirin, statin, Plavix and home beta-blocker    -Resume oral lasix    -Will need follow-up rescheduled with Dr. Simms's office, CT surgery at discharge    -Would benefit from cardiac rehab    Sepsis secondary to right lower extremity cellulitis    -Cellulitis overall appears to be improving     -Bilateral venous duplex with no evidence of DVT and mildly prominent upper thigh region nodes    -We will continue Vanc and Zosyn for an expected 10-day course as he still has significant tenderness and blanchable erythema in the right lower extremity although it is improved today compared to yesterday.  Will finish today    Diabetes mellitus type 2  Peripheral neuropathy    -Globin A1c of 8.7%    -Continue home gabapentin for neuropathy    -Resumed metformin and glipizide    -Reduced insulin due to above    CKD stage II-III    -Continue to monitor creatinine urine output and avoid nephrotoxins as well as NSAIDs    Chronic normocytic anemia  Thrombocytosis, improved    -Platelets trended down from 647 admission to 437    -Globin remained stable    -We will continue home oral folate and multivitamin    -Threshold to transfuse remains hemoglobin less than 7 or symptomatic    Gout without flare  BPH  GERD  Obesity with BMI of 31       VTE Prophylaxis:   Mechanical Order History:     None      Pharmalogical Order History:      Ordered     Dose Route Frequency Stop    03/30/21 0137  heparin (porcine) 5000 UNIT/ML injection 5,000 Units      5,000 Units SC Every 12 Hours Scheduled --                Disposition Home once medically stable and improved.

## 2021-04-12 ENCOUNTER — HOSPITAL ENCOUNTER (OUTPATIENT)
Dept: CARDIOLOGY | Facility: HOSPITAL | Age: 71
Discharge: HOME | End: 2021-04-12
Admitting: NURSE PRACTITIONER

## 2021-04-12 ENCOUNTER — TRANSITIONAL CARE MANAGEMENT TELEPHONE ENCOUNTER (OUTPATIENT)
Dept: CALL CENTER | Facility: HOSPITAL | Age: 71
End: 2021-04-12

## 2021-04-12 VITALS
DIASTOLIC BLOOD PRESSURE: 62 MMHG | RESPIRATION RATE: 18 BRPM | OXYGEN SATURATION: 95 % | HEART RATE: 78 BPM | BODY MASS INDEX: 31.01 KG/M2 | WEIGHT: 210 LBS | SYSTOLIC BLOOD PRESSURE: 132 MMHG

## 2021-04-12 DIAGNOSIS — I50.32 CHRONIC DIASTOLIC CONGESTIVE HEART FAILURE (HCC): Primary | ICD-10-CM

## 2021-04-12 DIAGNOSIS — I25.810 CORONARY ARTERY DISEASE INVOLVING CORONARY BYPASS GRAFT OF NATIVE HEART WITHOUT ANGINA PECTORIS: ICD-10-CM

## 2021-04-12 DIAGNOSIS — I10 ESSENTIAL HYPERTENSION: Chronic | ICD-10-CM

## 2021-04-12 DIAGNOSIS — E66.9 OBESITY (BMI 30-39.9): Chronic | ICD-10-CM

## 2021-04-12 DIAGNOSIS — N18.2 CKD (CHRONIC KIDNEY DISEASE) STAGE 2, GFR 60-89 ML/MIN: Chronic | ICD-10-CM

## 2021-04-12 LAB
ANION GAP SERPL CALCULATED.3IONS-SCNC: 11.3 MMOL/L (ref 5–15)
BUN SERPL-MCNC: 16 MG/DL (ref 8–23)
BUN/CREAT SERPL: 13.9 (ref 7–25)
CALCIUM SPEC-SCNC: 9.9 MG/DL (ref 8.6–10.5)
CHLORIDE SERPL-SCNC: 99 MMOL/L (ref 98–107)
CO2 SERPL-SCNC: 27.7 MMOL/L (ref 22–29)
CREAT SERPL-MCNC: 1.15 MG/DL (ref 0.76–1.27)
GFR SERPL CREATININE-BSD FRML MDRD: 63 ML/MIN/1.73
GLUCOSE SERPL-MCNC: 122 MG/DL (ref 65–99)
MAGNESIUM SERPL-MCNC: 1.8 MG/DL (ref 1.6–2.4)
NT-PROBNP SERPL-MCNC: 687.6 PG/ML (ref 0–900)
POTASSIUM SERPL-SCNC: 4.3 MMOL/L (ref 3.5–5.2)
SODIUM SERPL-SCNC: 138 MMOL/L (ref 136–145)

## 2021-04-12 PROCEDURE — 83880 ASSAY OF NATRIURETIC PEPTIDE: CPT | Performed by: NURSE PRACTITIONER

## 2021-04-12 PROCEDURE — 99214 OFFICE O/P EST MOD 30 MIN: CPT | Performed by: NURSE PRACTITIONER

## 2021-04-12 PROCEDURE — 80048 BASIC METABOLIC PNL TOTAL CA: CPT | Performed by: NURSE PRACTITIONER

## 2021-04-12 PROCEDURE — 83735 ASSAY OF MAGNESIUM: CPT | Performed by: NURSE PRACTITIONER

## 2021-04-12 NOTE — PROGRESS NOTES
[facility] CHF CLINIC OFFICE VISIT    Subjective:   History of Present Illness   William Villasenor is a 70-year-old  male who presents to the clinic today as a new patient accompanied by his wife.  He has underlying history of chronic diastolic congestive heart failure with an LVEF of 51 to 55% from echocardiogram on 3/24/2021.  He takes metoprolol XL 50 mg daily, lisinopril 10 mg daily, Bumex 1 mg daily.     History of three-vessel CABG in 3/2021   Feels like his breathing is doing better  Swelling is improving  Reports they monitor his sodium intake and fluid intake  He still is recovering and very sore from his recent bypass surgery  Monitoring  his vitals at home and they report stable however no report available for review today    PCP: Dr. Gatica  Cardiologist: Dr. Simms/ANKUR Cuevas    Hospitalizations: Discharged on 4/9/2021    Past Medical History:   Diagnosis Date   • Arthritis    • Back pain    • BPH (benign prostatic hyperplasia)    • Diabetes mellitus (CMS/HCC)    • Diabetic peripheral neuropathy (CMS/HCC)    • Erectile dysfunction    • Former smoker    • GERD (gastroesophageal reflux disease)    • GERD (gastroesophageal reflux disease)    • Gout    • High cholesterol    • Hypertension    • Obesity      Past Surgical History:   Procedure Laterality Date   • CARDIAC CATHETERIZATION N/A 3/15/2021    Procedure: Left Heart Cath;  Surgeon: Trent Zaragoza MD;  Location:  [facility];  Service: Cardiology;  Laterality: N/A;   • CARDIAC CATHETERIZATION N/A 3/15/2021    Procedure: Coronary angiography;  Surgeon: Trent Zaragoza MD;  Location:  [facility]ATH INVAS[facility] LOCATION;  Service: Cardiology;  Laterality: N/A;   • COLONOSCOPY W/ BIOPSIES  2015   • CORONARY ARTERY BYPASS GRAFT N/A 3/16/2021    Procedure: MEDIAN STERNOTOMY, CORONARY ARTERY BYPASS GRAFT X4, UTILIZATION OF LEFT INTERNAL MAMMARY ARTERY, AND ENDOSCOPIC VEIN HARVEST OF RIGHT GREATER SAPHENOUS VEIN;  Surgeon: Jorge Simms  MD LYNETTE;  Location: [facility];  Service: Cardiothoracic;  Laterality: N/A;  vein out- 1614  vein ready- 1628             Social History     Socioeconomic History   • Marital status:      Spouse name: Not on file   • Number of children: Not on file   • Years of education: Not on file   • Highest education level: Not on file   Tobacco Use   • Smoking status: Former Smoker     Packs/day: 1.00     Years: 5.00     Pack years: 5.00     Quit date: 1974     Years since quittin.2   • Smokeless tobacco: Never Used   Substance and Sexual Activity   • Alcohol use: Never     Comment: quit in    • Drug use: Never   • Sexual activity: Defer     Family History   Problem Relation Age of Onset   • Heart disease Mother    • Hypertension Mother    • Depression Mother    • Alcohol abuse Maternal Uncle    • Heart disease Maternal Grandmother    • Hypertension Maternal Grandmother    • Diabetes Maternal Grandmother    • Diabetes Paternal Grandmother    • No Known Problems Half-Brother    • No Known Problems Half-Brother    • No Known Problems Half-Sister    • No Known Problems Daughter    • No Known Problems Daughter    • Hyperlipidemia Daughter      Allergies:  No Known Allergies    Review of Systems   Constitutional: Negative for chills, fever, weight gain and weight loss.   HENT: Negative for congestion, hoarse voice and sore throat.    Eyes: Negative for blurred vision, pain and visual disturbance.   Cardiovascular: Negative for chest pain, claudication, cyanosis, dyspnea on exertion, irregular heartbeat, leg swelling, near-syncope, orthopnea, palpitations and syncope.   Respiratory: Negative for cough, shortness of breath and wheezing.    Endocrine: Negative for cold intolerance, heat intolerance and polyuria.   Hematologic/Lymphatic: Negative for bleeding problem. Does not bruise/bleed easily.   Skin: Negative for color change, flushing and rash.   Musculoskeletal: Negative for arthritis, back pain, joint pain and  myalgias.   Gastrointestinal: Negative for abdominal pain, constipation, diarrhea, nausea and vomiting.   Genitourinary: Negative for dysuria, frequency, hesitancy and urgency.   Neurological: Negative for excessive daytime sleepiness, dizziness, headaches, numbness, vertigo and weakness.   Psychiatric/Behavioral: Negative for depression. The patient does not have insomnia and is not nervous/anxious.    All other systems reviewed and are negative.    Current Outpatient Medications   Medication Sig Dispense Refill   • acetaminophen (TYLENOL) 500 MG tablet Take 500 mg by mouth Every 6 (Six) Hours As Needed for Mild Pain .     • allopurinol (ZYLOPRIM) 300 MG tablet Take 1 tablet by mouth Daily. 90 tablet 1   • aspirin (Aspirin Adult Low Dose) 81 MG EC tablet Take 81 mg by mouth Daily.     • atorvastatin (LIPITOR) 40 MG tablet Take 1 tablet by mouth Every Night. 90 tablet 3   • bumetanide (BUMEX) 0.5 MG tablet Take 2 tablets by mouth Daily for 30 days. 60 tablet 0   • clopidogrel (PLAVIX) 75 MG tablet Take 1 tablet by mouth Daily. 90 tablet 1   • doxazosin (CARDURA) 4 MG tablet Take 4 mg by mouth Every Night.     • finasteride (PROSCAR) 5 MG tablet Take 1 tablet by mouth Daily. 90 tablet 1   • folic acid (FOLVITE) 1 MG tablet Take 1 mg by mouth 2 (two) times a day.     • gabapentin (NEURONTIN) 300 MG capsule Take 1 capsule by mouth 2 (two) times a day. 60 capsule 0   • glipizide (GLUCOTROL) 5 MG tablet Take 1 tablet by mouth 2 (Two) Times a Day Before Meals for 30 days. 60 tablet 0   • insulin detemir (Levemir FlexTouch) 100 UNIT/ML injection Inject 35 Units under the skin into the appropriate area as directed Daily for 30 days. 45 mL 0   • lisinopril (PRINIVIL,ZESTRIL) 10 MG tablet Take 1 tablet by mouth Daily for 30 days. 30 tablet 0   • metFORMIN (GLUCOPHAGE) 1000 MG tablet Take 1,000 mg by mouth 2 (Two) Times a Day With Meals.     • metoprolol succinate XL (TOPROL-XL) 50 MG 24 hr tablet Take 1 tablet by mouth Daily.  30 tablet 0   • multivitamin (multivitamin) tablet tablet Take 1 tablet by mouth Daily. 30 tablet    • nitroglycerin (NITROSTAT) 0.4 MG SL tablet Place 1 tablet under the tongue Every 5 (Five) Minutes As Needed for Chest Pain (Only if SBP Greater Than 100). Take no more than 3 doses in 15 minutes. 100 tablet 12   • omeprazole (priLOSEC) 20 MG capsule Take 20 mg by mouth 2 (two) times a day.     • sennosides-docusate (PERICOLACE) 8.6-50 MG per tablet Take 2 tablets by mouth 2 (Two) Times a Day for 30 days. 120 tablet 0   • vitamin E 400 UNIT capsule Take 1 capsule by mouth Daily.       No current facility-administered medications for this encounter.      Objective:     Vitals:    04/12/21 1500   BP: 132/62   Pulse: 78   Resp: 18   SpO2: 95%     Body mass index is 31.01 kg/m².    Wt Readings from Last 3 Encounters:   04/12/21 95.3 kg (210 lb)   04/09/21 102 kg (224 lb 3.2 oz)   03/29/21 95.4 kg (210 lb 6 oz)        Vitals reviewed.   Constitutional:       Appearance: Well-developed.   HENT:      Head: Normocephalic.   Neck:      Vascular: No JVD.   Pulmonary:      Effort: Pulmonary effort is normal.      Breath sounds: Normal breath sounds.   Cardiovascular:      Normal rate. Regular rhythm.   Abdominal:      General: Bowel sounds are normal.      Palpations: Abdomen is soft.   Musculoskeletal: Normal range of motion.      Cervical back: Normal range of motion and neck supple. Skin:     General: Skin is warm and dry.   Neurological:      Mental Status: Alert and oriented to person, place, and time.      Deep Tendon Reflexes: Reflexes are normal and symmetric.       Cardiographics  Results for orders placed during the hospital encounter of 03/23/21    Adult Transthoracic Echo Limited W/ Cont if Necessary Per Protocol    Interpretation Summary  · Left ventricular ejection fraction appears to be 51 - 55%. Septal wall motion is abnormal, consistent with a post-operative state  · Left ventricular wall thickness is  consistent with mild concentric hypertrophy.  · No significant functional valvular abnormalities noted  · There is no evidence of pericardial effusion    Imaging  XR Chest 1 View    Result Date: 4/8/2021  1.  Tiny bilateral pleural effusions. 2.  Cardiomegaly.  This report was finalized on 4/8/2021 8:55 AM by Dr. Corby Cardoza MD.      XR Chest 1 View    Result Date: 3/29/2021  No definite acute cardiopulmonary findings. There is cardiomegaly. No definite interval change. Signer Name: Roseann Feliz MD  Signed: 3/29/2021 10:02 PM  Workstation Name: [id number]  [facility]    XR Chest 1 View    Result Date: 3/23/2021  Interval surgery. Lungs are adequately aerated  This report was finalized on 3/23/2021 7:45 PM by Dr. Dayo Kumar MD.      XR Chest 1 View    Result Date: 3/20/2021  Interval removal of left-sided pleural drain. No distinct pneumothorax. Right-sided IJ catheter projects unchanged. Persistent left basilar opacities similar to prior. Unchanged degree of cardiac enlargement.  This report was finalized on 3/20/2021 8:18 AM by Dany Montes.      XR Chest 1 View    Result Date: 3/19/2021  Mild remaining bibasilar discoid atelectasis. No new chest pathology is seen.   D:  03/19/2021 E:  03/19/2021  This report was finalized on 3/19/2021 10:40 PM by Dr. Walt Casas MD.      XR Chest 1 View    Result Date: 3/18/2021  No new chest disease.  D:  03/18/2021 E:  03/18/2021  This report was finalized on 3/18/2021 10:10 PM by Dr. Walt Casas MD.      XR Chest 1 View    Result Date: 3/18/2021  Status post extubation with overall preservation of lung volumes and support hardware otherwise noted.  D:  03/17/2021 E:  03/17/2021  This report was finalized on 3/18/2021 2:34 PM by Dr. Ronal Garvey.      XR Chest 1 View    Result Date: 3/16/2021  1. Tubes and lines in satisfactory position, detailed above. No pneumothorax. 2. Stable postoperative cardiomediastinal silhouette. 3. Mild central vascular  congestion. Signer Name: Santiago Samaniego MD  Signed: 3/16/2021 7:47 PM  Workstation Name: [id number]  [facility]    US Venous Doppler Lower Extremity Bilateral (duplex)    Result Date: 3/30/2021  No deep venous thrombus seen in either lower extremity. There are some mildly prominent with nodes in the patient's upper thigh regions. This may be within normal limits for the patient. Signer Name: Roseann Feliz MD  Signed: 3/30/2021 12:03 AM  Workstation Name: [id number]  [facility]    XR Chest PA & Lateral    Result Date: 3/29/2021  Decreased opacifications left lung base of decreased atelectasis versus airspace disease from prior comparison 2021 with persistent small right and probable trace left pleural effusions.  D:  2021 E:  2021  This report was finalized on 3/29/2021 4:39 PM by Dr. Ronal Garvey.      [id number]      Lab Review   Lab Results   Component Value Date    TSH 3.410 2021     Lab Results   Component Value Date    GLUCOSE 122 (H) 2021    BUN 16 2021    CREATININE 1.15 2021    EGFRIFNONA 63 2021    BCR 13.9 2021    K 4.3 2021    CO2 27.7 2021    CALCIUM 9.9 2021    ALBUMIN 3.05 (L) 2021    AST 10 2021    ALT 13 2021     Lab Results   Component Value Date    WBC 8.08 2021    HGB 10.1 (L) 2021    HCT 32.9 (L) 2021    MCV 91.6 2021     2021     Lab Results   Component Value Date    TROPONINI <0.006 2018    TROPONINT <0.010 2021     Lab Results   Component Value Date    PROBNP 687.6 2021      The following portions of the patient's history were reviewed and updated as appropriate: allergies, current medications, past family history, past medical history, past social history, past surgical history and problem list.     Old records reviewed and pertinent information is included in the above objective data.    Assessment/Plan:      Diagnosis Plan   1. Chronic diastolic congestive heart failure (CMS/HCC)     2. Essential hypertension     3. CKD (chronic kidney disease) stage 2, GFR 60-89 ml/min     4. Coronary artery disease involving coronary bypass graft of native heart without angina pectoris     5. Obesity (BMI 30-39.9)       BMP, proBNP and magnesium today  Reviewed and discussed results with patient today  Monitor BP and heart rate  Continue on Bumex at 1 mg daily.  Take an extra 0.5 mg Bumex if needed for 2 to 3 pound weight gain in 1 day or 5 pounds in 1 week.   Could consider spironolactone in the future if vitals and kidney function remain stable  Follow-up in 1 week, sooner if needed    30 minutes face to face spent counseling patient extensively on dietary Na+ intake, importance of activity, weight monitoring, compliance with medications and follow up appointments.    Received a message on 4/15/2021 that the patient was having difficulties with his metoprolol.  When reviewing his chart it looks like Dr. Davis from discharge had sent in a 3-month supply of metoprolol XL to mail-in pharmacy.  Patient reports something about receiving the wrong type of medication and is requesting a local 30-day prescription to be sent to the pharmacy.  Prescription sent to [facility] [place]

## 2021-04-12 NOTE — OUTREACH NOTE
Call Center TCM Note      Responses   [facility] patient discharged from?  [facility]   Does the patient have one of the following disease processes/diagnoses(primary or secondary)?  Sepsis   TCM attempt successful?  Yes   Call start time  0933   Call end time  0937   Discharge diagnosis  A/C CHF, sepsis d/t RLE cellulitis, chronic anemia, IDDM, CKD   Does the patient have all medications related to this admission filled (includes all antibiotics, inhalers, nebulizers,steroids,etc.)  Yes   Is the patient taking all medications as directed (includes completed medication regime)?  Yes   Comments regarding appointments  appt with HF clinic this morning (4/12)   Does the patient have a primary care provider?   Yes   Comments regarding PCP  f/u with Dr. Coyne on 4/29 at 2:45 pm   Does the patient have an appointment with their PCP within 7 days of discharge?  Yes   Has the patient kept scheduled appointments due by today?  N/A   What is the Home health agency?   [facility]   Has home health visited the patient within 72 hours of discharge?  Yes   Psychosocial issues?  No   Did the patient receive a copy of their discharge instructions?  Yes   Nursing interventions  Reviewed instructions with patient   What is the patient's perception of their health status since discharge?  Improving   Nursing interventions  Nurse provided patient education   Is the patient/caregiver able to teach back Sepsis?  S - Shivering,fever or very cold, E - Extreme pain or generalized discomfort (worst ever,especially abdomen), P - Pale or discolored skin, S - Sleepy, difficult to arouse,confused, I -   I feel like I might die-a feeling of hopelessness, S - Short of breath   Is patient/caregiver able to teach back steps to recovery at home?  Set small, achievable goals for return to baseline health, Rest and regain strength   Is the patient/caregiver able to teach back signs and symptoms of worsening condition:  Fever   Is the  patient/caregiver able to teach back the hierarchy of who to call/visit for symptoms/problems? PCP, Specialist, Home health nurse, Urgent Care, ED, 911  Yes   TCM call completed?  Yes   Wrap up additional comments  States he is doing well, no questions or concerns at this time, states he has appt with HF clinic this afternoon, confirmed f/u appt with Dr. Coyne for 4/29, discussed importance of daily weights, patient states he weighed himself this morning.          Rizwana Pollard RN    4/12/2021, 09:37 EDT

## 2021-04-12 NOTE — PROGRESS NOTES
Heart Failure Pharmacy Note  Patient Name: William Villasenor   Referring Provider: Maddi Munoz  Primary Cardiologist: Dany PASCUAL    Medication Use:   Adherence: No issues   Hx of med intolerances: None reported  Affordability: No issues   Retail Rx Management: None at this time     Past Medical History:   Diagnosis Date   • Arthritis    • Back pain    • BPH (benign prostatic hyperplasia)    • Diabetes mellitus (CMS/HCC)    • Diabetic peripheral neuropathy (CMS/HCC)    • Erectile dysfunction    • Former smoker    • GERD (gastroesophageal reflux disease)    • GERD (gastroesophageal reflux disease)    • Gout    • High cholesterol    • Hypertension    • Obesity      ALLERGIES: Patient has no known allergies.  Current Outpatient Medications   Medication Sig Dispense Refill   • acetaminophen (TYLENOL) 500 MG tablet Take 500 mg by mouth Every 6 (Six) Hours As Needed for Mild Pain .     • allopurinol (ZYLOPRIM) 300 MG tablet Take 1 tablet by mouth Daily. 90 tablet 1   • aspirin (Aspirin Adult Low Dose) 81 MG EC tablet Take 81 mg by mouth Daily.     • atorvastatin (LIPITOR) 40 MG tablet Take 1 tablet by mouth Every Night. 90 tablet 3   • bumetanide (BUMEX) 0.5 MG tablet Take 2 tablets by mouth Daily for 30 days. 60 tablet 0   • clopidogrel (PLAVIX) 75 MG tablet Take 1 tablet by mouth Daily. 90 tablet 1   • doxazosin (CARDURA) 4 MG tablet Take 4 mg by mouth Every Night.     • finasteride (PROSCAR) 5 MG tablet Take 1 tablet by mouth Daily. 90 tablet 1   • folic acid (FOLVITE) 1 MG tablet Take 1 mg by mouth 2 (two) times a day.     • gabapentin (NEURONTIN) 300 MG capsule Take 1 capsule by mouth 2 (two) times a day. 60 capsule 0   • glipizide (GLUCOTROL) 5 MG tablet Take 1 tablet by mouth 2 (Two) Times a Day Before Meals for 30 days. 60 tablet 0   • insulin detemir (Levemir FlexTouch) 100 UNIT/ML injection Inject 35 Units under the skin into the appropriate area as directed Daily for 30 days. 45 mL 0   • lisinopril  (PRINIVIL,ZESTRIL) 10 MG tablet Take 1 tablet by mouth Daily for 30 days. 30 tablet 0   • metFORMIN (GLUCOPHAGE) 1000 MG tablet Take 1,000 mg by mouth 2 (Two) Times a Day With Meals.     • metoprolol succinate XL (TOPROL-XL) 50 MG 24 hr tablet Take 1 tablet by mouth Daily for 90 days. 90 tablet 0   • multivitamin (multivitamin) tablet tablet Take 1 tablet by mouth Daily. 30 tablet    • nitroglycerin (NITROSTAT) 0.4 MG SL tablet Place 1 tablet under the tongue Every 5 (Five) Minutes As Needed for Chest Pain (Only if SBP Greater Than 100). Take no more than 3 doses in 15 minutes. 100 tablet 12   • omeprazole (priLOSEC) 20 MG capsule Take 20 mg by mouth 2 (two) times a day.     • sennosides-docusate (PERICOLACE) 8.6-50 MG per tablet Take 2 tablets by mouth 2 (Two) Times a Day for 30 days. 120 tablet 0   • vitamin E 400 UNIT capsule Take 1 capsule by mouth Daily.       No current facility-administered medications for this encounter.       Vaccination History:   Pneumonia: Needs Assessed  Annual Influenza: Needs Assessed     Objective  Vitals:    04/12/21 1300   Weight: 95.3 kg (210 lb)     Wt Readings from Last 3 Encounters:   04/12/21 95.3 kg (210 lb)   04/09/21 102 kg (224 lb 3.2 oz)   03/29/21 95.4 kg (210 lb 6 oz)         04/12/21  1300   Weight: 95.3 kg (210 lb)     Lab Results   Component Value Date    GLUCOSE 122 (H) 04/12/2021    BUN 16 04/12/2021    CREATININE 1.15 04/12/2021    EGFRIFNONA 63 04/12/2021    BCR 13.9 04/12/2021    K 4.3 04/12/2021    CO2 27.7 04/12/2021    CALCIUM 9.9 04/12/2021    ALBUMIN 3.05 (L) 04/08/2021    AST 10 04/08/2021    ALT 13 04/08/2021     Lab Results   Component Value Date    WBC 8.08 04/08/2021    HGB 10.1 (L) 04/08/2021    HCT 32.9 (L) 04/08/2021    MCV 91.6 04/08/2021     04/08/2021     Lab Results   Component Value Date    TROPONINI <0.006 08/07/2018    TROPONINT <0.010 04/08/2021     Lab Results   Component Value Date    PROBNP 687.6 04/12/2021     Results for orders  placed during the hospital encounter of 03/23/21    Adult Transthoracic Echo Limited W/ Cont if Necessary Per Protocol    Interpretation Summary  · Left ventricular ejection fraction appears to be 51 - 55%. Septal wall motion is abnormal, consistent with a post-operative state  · Left ventricular wall thickness is consistent with mild concentric hypertrophy.  · No significant functional valvular abnormalities noted  · There is no evidence of pericardial effusion           GDMT:       Class   Drug   Dose Last Dose Adjustment   ACEi/ARB/ARNI lisinopril 10mg 4/9/21   Beta Blocker Toprol XL  50mg    MRA        Drug Therapy Problems    1. Drug Interactions Screening: Clopidogrel and omeprazole - may decrease clopidogrel efficacy.   2. Drug-Disease Interactions: None  3. Uncontrolled Hypertension     Recommendations:     1. Would recommend alternative PPI (pantoprazole) due to interaction with clopidogrel and omeprazole.  Will send to PCP.  2. Consider addition of spironolactone if kidney function and BP allow.     Discharge medications have been reviewed and reconciled.    Patient was educated on heart failure medications and the importance of medication adherence. All questions were addressed and patient expressed understanding.     Thank you for allowing me to participate in the care of your patient,    Imani Jackson, DeanD  04/12/21  16:33 EDT

## 2021-04-15 ENCOUNTER — TELEPHONE (OUTPATIENT)
Dept: CARDIAC SURGERY | Facility: CLINIC | Age: 71
End: 2021-04-15

## 2021-04-15 ENCOUNTER — TELEPHONE (OUTPATIENT)
Dept: CARDIOLOGY | Facility: CLINIC | Age: 71
End: 2021-04-15

## 2021-04-15 ENCOUNTER — NURSE TRIAGE (OUTPATIENT)
Dept: CALL CENTER | Facility: HOSPITAL | Age: 71
End: 2021-04-15

## 2021-04-15 RX ORDER — METOPROLOL SUCCINATE 50 MG/1
50 TABLET, EXTENDED RELEASE ORAL
Qty: 30 TABLET | Refills: 0 | Status: SHIPPED | OUTPATIENT
Start: 2021-04-15 | End: 2021-04-19 | Stop reason: SDUPTHER

## 2021-04-15 NOTE — TELEPHONE ENCOUNTER
Wife called about a refill on his Metoprolol if he needed to continue.  It was prescribed by a Hospitalist, I advised her to call his cardiologist that he will be seeing next month to let them know he was out, and if they can refill or if they need to see him sooner.

## 2021-04-15 NOTE — TELEPHONE ENCOUNTER
"Caller states that her  was started on metoprolol succinate XL at discharge.  He was given 30 day supply and took the last one yesterday.  The mail order sent the wrong med.  I am unable to tell what the problem with the prescription is and routed to case management.  He has new doctors that the has not seen due to being readmitted and has not had a follow up with any of them yet for them to refill.  Reason for Disposition  • [1] Request for URGENT new prescription or refill of \"essential\" medication (i.e., likelihood of harm to patient if not taken) AND [2] triager unable to fill per unit policy    Additional Information  • Negative: Drug overdose and triager unable to answer question  • Negative: Caller requesting information unrelated to medicine  • Negative: Caller requesting a prescription for Strep throat and has a positive culture result  • Negative: Rash while taking a medication or within 3 days of stopping it  • Negative: Immunization reaction suspected  • Negative: [1] Asthma and [2] having symptoms of asthma (cough, wheezing, etc.)  • Negative: [1] Influenza symptoms AND [2] anti-viral med prescription request, such as Tamiflu  • Negative: [1] Symptom of illness (e.g., headache, abdominal pain, earache, vomiting) AND [2] more than mild  • Negative: MORE THAN A DOUBLE DOSE of a prescription or over-the-counter (OTC) drug  • Negative: [1] DOUBLE DOSE (an extra dose or lesser amount) of over-the-counter (OTC) drug AND [2] any symptoms (e.g., dizziness, nausea, pain, sleepiness)  • Negative: [1] DOUBLE DOSE (an extra dose or lesser amount) of prescription drug AND [2] any symptoms (e.g., dizziness, nausea, pain, sleepiness)  • Negative: Took another person's prescription drug  • Negative: [1] DOUBLE DOSE (an extra dose or lesser amount) of prescription drug AND [2] NO symptoms (Exception: a double dose of antibiotics)  • Negative: Diabetes drug error or overdose (e.g., took wrong type of insulin or " "took extra dose)    Answer Assessment - Initial Assessment Questions  1.   NAME of MEDICATION: \"What medicine are you calling about?\"      torpol XL  2.   QUESTION: \"What is your question?\"      He took his last one yesterday and the mail order sent the short acting  3.   PRESCRIBING HCP: \"Who prescribed it?\" Reason: if prescribed by specialist, call should be referred to that group.      hospitalist  4. SYMPTOMS: \"Do you have any symptoms?\"      no  5. SEVERITY: If symptoms are present, ask \"Are they mild, moderate or severe?\"      na  6.  PREGNANCY:  \"Is there any chance that you are pregnant?\" \"When was your last menstrual period?\"      na    Protocols used: MEDICATION QUESTION CALL-ADULT-    "

## 2021-04-15 NOTE — TELEPHONE ENCOUNTER
ANKUR Del Toro with the [facility] has sent in a 30 day supply for the patient to [facility] pharmacy until the patient can correct issues with the mail order pharmacy. Patient was seen in the [facility] on Monday.  I have contacted the patient and he as well as his daughter are aware.     Thank you,    Annie Vo, DeanD

## 2021-04-15 NOTE — TELEPHONE ENCOUNTER
A woman M stating pt was in the hospital and needs RF.   [phone]      Per chart review, pt is scheduled for consult, but we have not seen him. Cannot send in meds until pt is seen. Cannot speak w/ anyone other than pt.       Called pt and informed him of the above, he told me to speak w/ his wife and handed her the phone. I informed her of the above, she stated to schedule him sooner. I informed her that we don't have any sooner openings right now that i'm aware of, but that we will call if something sooner opends up. Advised them to requested medication from PCP.

## 2021-04-15 NOTE — TELEPHONE ENCOUNTER
Patients were moved around to make opening on Tuesday 4/20/21 however patient not available at that time. He request to keep appointment as scheduled.  Roseann Montemayor MA      Patient is on the wait list. I am watching for a cancellation to reschedule patient. Roseann Montemayor MA

## 2021-04-16 NOTE — DISCHARGE SUMMARY
[facility] HOSPITALISTS DISCHARGE SUMMARY    Patient Identification:  Name:  William Villasenor  Age:  70 y.o.  Sex:  male  :  1950  MRN:  [id number]  Visit Number:  [id number]    Date of Admission: 3/29/2021  Date of Discharge: 2021    PCP: Jacqueline Gatica MD    DISCHARGE DIAGNOSIS    Acute on chronic HFpEF  Hypertension  Hyperlipidemia  CAD s/p recent four-vessel CABG  Sepsis secondary to right lower extremity cellulitis  Diabetes mellitus type 2  Peripheral neuropathy  CKD stage II-III  Chronic normocytic anemia  Thrombocytosis, improved  Gout without flare  BPH  GERD  Obesity with BMI of 31  CONSULTS     Diabetes education  Heart failure education  Cardiology  Nutrition  PROCEDURES PERFORMED      HOSPITAL COURSE  Patient is a 70 y.o. male presented to [facility] complaining of chest pain and shortness of breath as well as lower extremity edema..  Please see the admitting history and physical for further details.      Patient admitted to the hospitalist service for CHF exacerbation as well as right lower extremity cellulitis in the setting of poorly controlled diabetes mellitus.  Patient with recent four-vessel CABG with cellulitis developing at the site of vein harvesting.  Due to this as well as the patient's significant level of tenderness and blanching of the area the patient was treated with a 10-day course of vancomycin and Zosyn.  Patient in regards to his heart failure also treated with aggressive diuresis after being evaluated for chest pain with troponins negative x2 and EKG with normal sinus rhythm with left anterior fascicular block.  Echo from 3/24 showed an EF of 51 to 55% with septal wall motion abnormality consistent with his postoperative state and LV noted to have mild concentric LVH with no significant valvular abnormalities.  New patients recent complicated cardiology course and history cardiology was consulted and followed along and aided in the treatment of  the patient for his heart failure.  He was continued on aspirin statin Plavix and home beta-blocker and initially diuresed with IV Lasix with transition to oral Lasix with good tolerance.  Patient also with a hemoglobin A1c of 8.7% with history of previously not being on insulin but now requiring.  Due to his profession as a  patient had set a goal to be able to come off insulin therapy and as his kidney function had improved and was tolerating diuretics well patient was resumed on previous home Metformin and glipizide with continuation of insulin at home and hopefully with dietary changes and weight loss be able to progress back to not requiring insulin however we did discuss the patient may require insulin going forward.  Patient was feeling well by time of his day of discharge and wishing to return home.  Due to his recent frequent hospitalizations patient and his wife were both slightly confused about medications and time was taken to go over medications and what the purpose is for for at bedside at time of his discharge.  Patient will follow up with his PCP and his previous appointment with Dr. Simms his cardiothoracic surgeon that he missed while in the hospital was rescheduled for him.  Patient was feeling well at time of discharge and has he completed antibiotic course and was stable on his current cardiac regiment he was felt to have reached maximum benefit from hospitalization and was discharged home.    VITAL SIGNS:        on   ;        Body mass index is 33.11 kg/m².  Wt Readings from Last 3 Encounters:   04/12/21 95.3 kg (210 lb)   04/09/21 102 kg (224 lb 3.2 oz)   03/29/21 95.4 kg (210 lb 6 oz)       PHYSICAL EXAM:  Constitutional: Elderly obese male sitting up in chair, NAD.   HENT:  Head:  Normocephalic and atraumatic.  Mouth:  Moist mucous membranes.    Eyes:  Conjunctivae and EOM are normal. No scleral icterus.    Neck:  Neck supple.  No JVD present.    Cardiovascular:  Normal rate,  regular rhythm and normal heart sounds with no murmur.    Pulmonary/Chest:  No respiratory distress, no wheezes, mild right basilar crackles present.  Midsternal incision site appears to be healing well.  Abdominal:  Soft.  Bowel sounds are normal.  No distension and no tenderness.   Musculoskeletal: Functional ROM intact.   Neurological:  Alert and oriented to person, place, and time.  No cranial nerve deficit. Intact Sensation throughout  Skin:  Skin is warm and dry. No rash or lesion noted.  Right lower extremity with near resolution of erythema and edema with blanching erythema no longer present superior to the shin below the knee.  Peripheral vascular:  Pulses in all 4 extremities with no clubbing, no cyanosis, no edema.  Psychiatric: Appropriate mood and affect, pleasant.     DISCHARGE DISPOSITION   Stable    DISCHARGE MEDICATIONS:     Discharge Medications      New Medications      Instructions Start Date   glipizide 5 MG tablet  Commonly known as: GLUCOTROL   5 mg, Oral, 2 Times Daily Before Meals      lisinopril 10 MG tablet  Commonly known as: PRINIVIL,ZESTRIL   10 mg, Oral, Every 24 Hours Scheduled         Changes to Medications      Instructions Start Date   bumetanide 0.5 MG tablet  Commonly known as: BUMEX  What changed:   · medication strength  · how much to take   1 mg, Oral, Daily      Levemir FlexTouch 100 UNIT/ML injection  Generic drug: insulin detemir  What changed: how much to take   35 Units, Subcutaneous, Daily         Continue These Medications      Instructions Start Date   acetaminophen 500 MG tablet  Commonly known as: TYLENOL   500 mg, Oral, Every 6 Hours PRN      allopurinol 300 MG tablet  Commonly known as: ZYLOPRIM   300 mg, Oral, Daily      Aspirin Adult Low Dose 81 MG EC tablet  Generic drug: aspirin   81 mg, Oral, Daily      atorvastatin 40 MG tablet  Commonly known as: LIPITOR   40 mg, Oral, Nightly      clopidogrel 75 MG tablet  Commonly known as: PLAVIX   75 mg, Oral, Daily       doxazosin 4 MG tablet  Commonly known as: CARDURA   4 mg, Oral, Nightly      finasteride 5 MG tablet  Commonly known as: PROSCAR   5 mg, Oral, Daily      folic acid 1 MG tablet  Commonly known as: FOLVITE   1 mg, Oral, 2 times daily      gabapentin 300 MG capsule  Commonly known as: NEURONTIN   300 mg, Oral, 2 times daily      metFORMIN 1000 MG tablet  Commonly known as: GLUCOPHAGE   1,000 mg, Oral, 2 Times Daily With Meals      multivitamin tablet tablet   1 tablet, Oral, Daily      nitroglycerin 0.4 MG SL tablet  Commonly known as: NITROSTAT   0.4 mg, Sublingual, Every 5 Minutes PRN, Take no more than 3 doses in 15 minutes.      omeprazole 20 MG capsule  Commonly known as: priLOSEC   20 mg, Oral, 2 times daily      Stool Softener Plus Laxative 8.6-50 MG per tablet  Generic drug: sennosides-docusate   2 tablets, Oral, 2 Times Daily      vitamin E 400 UNIT capsule   400 Units, Oral, Daily         Stop These Medications    metoprolol succinate XL 50 MG 24 hr tablet  Commonly known as: TOPROL-XL     NovoLOG FlexPen 100 UNIT/ML solution pen-injector sc pen  Generic drug: insulin aspart     oxyCODONE-acetaminophen 7.5-325 MG per tablet  Commonly known as: PERCOCET              Additional Instructions for the Follow-ups that You Need to Schedule     Discharge Follow-up with PCP   As directed       Currently Documented PCP:    Jacqueline Gatica MD    PCP Phone Number:    [phone]     Follow Up Details: pateint requests to be set up with new pcp         Discharge Follow-up with Specialty: Cardiothoracic Surgery; 2 Weeks   As directed      Specialty: Cardiothoracic Surgery    Follow Up: 2 Weeks    Follow Up Details: 1-2 weeks with Dr. Simms, missed appointment while in hospital            Contact information for follow-up providers     [facility] .    Specialty: Cardiology  Contact information:  [address] [place] [id number] [phone]           Jacqueline Gatica MD  .    Specialty: Family Medicine  Why: pateint requests to be set up with new pcp  Contact information:  [address]  [address]  [place] [address]  [phone]             José Miguel Gatica MD Follow up.    Specialty: Family Medicine  Why: pt  has  an apt  with  josé miguel gatica  for april 20  at  3:30  Contact information:  [address] [place] [address]  [phone]             Elias Coyne DO Follow up.    Specialty: Family Medicine  Why: pt  has  an  apt  with  elias coyne  for april 29  at  2:45  Contact information:  [address] [place] [address]  [phone]                   Contact information for after-discharge care     Home Medical Care     [facility] .    Service: Home Health Services  Contact information:  [address] [facility] [address]  [phone]                              TEST  RESULTS PENDING AT DISCHARGE       CODE STATUS  Code Status and Medical Interventions:   Ordered at: 03/30/21 0037     Level Of Support Discussed With:    Patient     Code Status:    CPR     Medical Interventions (Level of Support Prior to Arrest):    Full       Dany Davis DO  [facility] Hospitalist  04/16/21  09:48 EDT    Please note that this discharge summary required more than 30 minutes to complete.

## 2021-04-19 ENCOUNTER — OFFICE VISIT (OUTPATIENT)
Dept: CARDIOLOGY | Facility: CLINIC | Age: 71
End: 2021-04-19

## 2021-04-19 ENCOUNTER — TELEPHONE (OUTPATIENT)
Dept: CARDIOLOGY | Facility: CLINIC | Age: 71
End: 2021-04-19

## 2021-04-19 ENCOUNTER — READMISSION MANAGEMENT (OUTPATIENT)
Dept: CALL CENTER | Facility: HOSPITAL | Age: 71
End: 2021-04-19

## 2021-04-19 VITALS
OXYGEN SATURATION: 97 % | DIASTOLIC BLOOD PRESSURE: 65 MMHG | BODY MASS INDEX: 30.66 KG/M2 | HEIGHT: 69 IN | SYSTOLIC BLOOD PRESSURE: 109 MMHG | HEART RATE: 60 BPM | WEIGHT: 207 LBS

## 2021-04-19 DIAGNOSIS — I10 ESSENTIAL HYPERTENSION: ICD-10-CM

## 2021-04-19 DIAGNOSIS — I25.810 CORONARY ARTERY DISEASE INVOLVING CORONARY BYPASS GRAFT OF NATIVE HEART WITHOUT ANGINA PECTORIS: ICD-10-CM

## 2021-04-19 DIAGNOSIS — Z95.1 S/P CABG (CORONARY ARTERY BYPASS GRAFT): Primary | ICD-10-CM

## 2021-04-19 DIAGNOSIS — I50.32 CHRONIC DIASTOLIC CONGESTIVE HEART FAILURE (HCC): ICD-10-CM

## 2021-04-19 DIAGNOSIS — R73.09 OTHER ABNORMAL GLUCOSE: ICD-10-CM

## 2021-04-19 PROBLEM — M54.50 LOW BACK PAIN: Status: ACTIVE | Noted: 2017-06-22

## 2021-04-19 PROBLEM — R12 HEARTBURN: Status: ACTIVE | Noted: 2017-06-22

## 2021-04-19 PROBLEM — E11.42 DIABETIC POLYNEUROPATHY (HCC): Status: ACTIVE | Noted: 2017-06-22

## 2021-04-19 PROBLEM — R00.1 BRADYCARDIA: Status: ACTIVE | Noted: 2017-06-22

## 2021-04-19 PROCEDURE — 99214 OFFICE O/P EST MOD 30 MIN: CPT | Performed by: NURSE PRACTITIONER

## 2021-04-19 RX ORDER — METOPROLOL SUCCINATE 100 MG/1
100 TABLET, EXTENDED RELEASE ORAL DAILY
Qty: 30 TABLET | Refills: 0 | Status: SHIPPED | OUTPATIENT
Start: 2021-04-19 | End: 2021-05-07 | Stop reason: SDUPTHER

## 2021-04-19 RX ORDER — LOSARTAN POTASSIUM 25 MG/1
25 TABLET ORAL DAILY
Qty: 30 TABLET | Refills: 11 | Status: SHIPPED | OUTPATIENT
Start: 2021-04-19 | End: 2021-04-19 | Stop reason: SDUPTHER

## 2021-04-19 RX ORDER — METOPROLOL SUCCINATE 100 MG/1
100 TABLET, EXTENDED RELEASE ORAL DAILY
Qty: 90 TABLET | Refills: 3 | Status: SHIPPED | OUTPATIENT
Start: 2021-04-19 | End: 2021-04-19 | Stop reason: SDUPTHER

## 2021-04-19 RX ORDER — DAPAGLIFLOZIN 10 MG/1
10 TABLET, FILM COATED ORAL DAILY
Qty: 30 TABLET | Refills: 0 | Status: SHIPPED | OUTPATIENT
Start: 2021-04-19 | End: 2021-04-29

## 2021-04-19 RX ORDER — DAPAGLIFLOZIN 10 MG/1
10 TABLET, FILM COATED ORAL DAILY
Qty: 90 TABLET | Refills: 3 | Status: SHIPPED | OUTPATIENT
Start: 2021-04-19 | End: 2021-04-19 | Stop reason: SDUPTHER

## 2021-04-19 RX ORDER — LOSARTAN POTASSIUM 25 MG/1
25 TABLET ORAL DAILY
Qty: 30 TABLET | Refills: 0 | Status: SHIPPED | OUTPATIENT
Start: 2021-04-19 | End: 2021-04-22

## 2021-04-19 NOTE — OUTREACH NOTE
Sepsis Week 2 Survey      Responses   [facility] patient discharged from?  [place]   Does the patient have one of the following disease processes/diagnoses(primary or secondary)?  Sepsis   Week 2 attempt successful?  No   Unsuccessful attempts  Attempt 1          Gala Murillo RN

## 2021-04-19 NOTE — PROGRESS NOTES
Subjective     William Villasenor is a 70 y.o. male who presents to day for New Patient (S/P CABG March 16 2021).    CHIEF COMPLIANT  Chief Complaint   Patient presents with   • New Patient     S/P CABG March 16 2021       Active Problems:  Problem List Items Addressed This Visit        Cardiac and Vasculature    Essential hypertension (Chronic)    Relevant Medications    losartan (Cozaar) 25 MG tablet    Other Relevant Orders    CBC & Differential (Completed)    Comprehensive Metabolic Panel (Completed)    Lipid Panel (Completed)    Troponin (Completed)    TSH (Completed)    Magnesium (Completed)    Hemoglobin A1c (Completed)    Adult Transthoracic Echo Complete W/ Cont if Necessary Per Protocol    Ambulatory Referral to Cardiac Rehab    CAD (coronary artery disease)    Relevant Orders    CBC & Differential (Completed)    Comprehensive Metabolic Panel (Completed)    Lipid Panel (Completed)    Troponin (Completed)    TSH (Completed)    Magnesium (Completed)    Hemoglobin A1c (Completed)    Adult Transthoracic Echo Complete W/ Cont if Necessary Per Protocol    Ambulatory Referral to Cardiac Rehab      Other Visit Diagnoses     S/P CABG (coronary artery bypass graft)    -  Primary    Relevant Orders    CBC & Differential (Completed)    Comprehensive Metabolic Panel (Completed)    Lipid Panel (Completed)    Troponin (Completed)    TSH (Completed)    Magnesium (Completed)    Hemoglobin A1c (Completed)    Adult Transthoracic Echo Complete W/ Cont if Necessary Per Protocol    Ambulatory Referral to Cardiac Rehab    Chronic diastolic congestive heart failure (CMS/HCC)        Relevant Orders    CBC & Differential (Completed)    Comprehensive Metabolic Panel (Completed)    Lipid Panel (Completed)    Troponin (Completed)    TSH (Completed)    Magnesium (Completed)    Hemoglobin A1c (Completed)    Adult Transthoracic Echo Complete W/ Cont if Necessary Per Protocol    Ambulatory Referral to Cardiac Rehab    Other abnormal glucose          Relevant Orders    Hemoglobin A1c (Completed)      Problem list  1.  Coronary artery disease status post coronary artery bypass grafting x4  1.1 coronary artery bypass grafting 3/21: LIMA to LAD, SVG to diagonal, SVG to OM1, SVG to OM 2.  Residual  of RCA  2.  Essential hypertension  3.  Diastolic dysfunction  4.  Chest pain  5.  Shortness of breath  6.  Lower extremity edema    HPI  HPI  Mr. Villasenor is a 70-year-old male patient who is being seen today to establish care for continuation of care status post coronary artery bypass grafting March 2021 by Dr. Jorge Simms at [facility].  Patient is doing exceptionally well.  His incisions are healing well and have no exudate or erythema that would indicate any signs of infection.  The only chest pain that he is experiencing is musculoskeletal due to sternotomy that only occurs when he coughs.  He is on Plavix, aspirin, and high intensity statin of a atorvastatin 40 mg daily we will continue these medications without change.  Patient is currently on he does have some residual shortness of breath with a persistent dry cough which has been present since he started lisinopril.  He does have a low level of dyspnea on exertion.  He also has some fatigue in which he gets tired easily.  Patient does have a history of chronic arterial hypertension which she is being treated with doxazosin, lisinopril, and metoprolol.  His heart rate is well controlled today at 109/65 with a heart rate of 60.  He does report some lower extremity edema that mainly in the right leg in which his saphenous vein was harvested.  We did have an extensive conversation regarding the anatomy of the bypass grafting's,.plan, activity, driving, and insulin use.  Patient reports that the insulin is extremely expensive and that he would like to try to get off the insulin.  Patient denies any palpitations, dizziness, lightheadedness, syncope, PND, orthopnea, or other neurological  problems.       PRIOR MEDS  Current Outpatient Medications on File Prior to Visit   Medication Sig Dispense Refill   • acetaminophen (TYLENOL) 500 MG tablet Take 500 mg by mouth Every 6 (Six) Hours As Needed for Mild Pain .     • allopurinol (ZYLOPRIM) 300 MG tablet Take 1 tablet by mouth Daily. 90 tablet 1   • aspirin (Aspirin Adult Low Dose) 81 MG EC tablet Take 81 mg by mouth Daily.     • atorvastatin (LIPITOR) 40 MG tablet Take 1 tablet by mouth Every Night. 90 tablet 3   • bumetanide (BUMEX) 0.5 MG tablet Take 2 tablets by mouth Daily for 30 days. 60 tablet 0   • clopidogrel (PLAVIX) 75 MG tablet Take 1 tablet by mouth Daily. 90 tablet 1   • doxazosin (CARDURA) 4 MG tablet Take 4 mg by mouth Every Night.     • finasteride (PROSCAR) 5 MG tablet Take 1 tablet by mouth Daily. 90 tablet 1   • folic acid (FOLVITE) 1 MG tablet Take 1 mg by mouth 2 (two) times a day.     • gabapentin (NEURONTIN) 300 MG capsule Take 1 capsule by mouth 2 (two) times a day. 60 capsule 0   • glipizide (GLUCOTROL) 5 MG tablet Take 1 tablet by mouth 2 (Two) Times a Day Before Meals for 30 days. 60 tablet 0   • insulin detemir (Levemir FlexTouch) 100 UNIT/ML injection Inject 35 Units under the skin into the appropriate area as directed Daily for 30 days. (Patient taking differently: Inject 25 Units under the skin into the appropriate area as directed Daily.) 45 mL 0   • metFORMIN (GLUCOPHAGE) 1000 MG tablet Take 1,000 mg by mouth 2 (Two) Times a Day With Meals.     • multivitamin (multivitamin) tablet tablet Take 1 tablet by mouth Daily. 30 tablet    • nitroglycerin (NITROSTAT) 0.4 MG SL tablet Place 1 tablet under the tongue Every 5 (Five) Minutes As Needed for Chest Pain (Only if SBP Greater Than 100). Take no more than 3 doses in 15 minutes. 100 tablet 12   • omeprazole (priLOSEC) 20 MG capsule Take 20 mg by mouth 2 (two) times a day.     • sennosides-docusate (PERICOLACE) 8.6-50 MG per tablet Take 2 tablets by mouth 2 (Two) Times a Day  for 30 days. 120 tablet 0   • vitamin E 400 UNIT capsule Take 1 capsule by mouth Daily.       No current facility-administered medications on file prior to visit.       ALLERGIES  Lisinopril    HISTORY  Past Medical History:   Diagnosis Date   • Arthritis    • Back pain    • BPH (benign prostatic hyperplasia)    • Diabetes mellitus (CMS/HCC)    • Diabetic peripheral neuropathy (CMS/HCC)    • Erectile dysfunction    • Former smoker    • GERD (gastroesophageal reflux disease)    • GERD (gastroesophageal reflux disease)    • Gout    • High cholesterol    • Hypertension    • Obesity        Social History     Socioeconomic History   • Marital status:      Spouse name: Not on file   • Number of children: Not on file   • Years of education: Not on file   • Highest education level: Not on file   Tobacco Use   • Smoking status: Former Smoker     Packs/day: 1.00     Years: 5.00     Pack years: 5.00     Quit date: 1974     Years since quittin.2   • Smokeless tobacco: Never Used   Substance and Sexual Activity   • Alcohol use: Never     Comment: quit in    • Drug use: Never   • Sexual activity: Defer       Family History   Problem Relation Age of Onset   • Heart disease Mother    • Hypertension Mother    • Depression Mother    • Alcohol abuse Maternal Uncle    • Heart disease Maternal Grandmother    • Hypertension Maternal Grandmother    • Diabetes Maternal Grandmother    • Diabetes Paternal Grandmother    • No Known Problems Half-Brother    • No Known Problems Half-Brother    • No Known Problems Half-Sister    • No Known Problems Daughter    • No Known Problems Daughter    • Hyperlipidemia Daughter        Review of Systems   Constitutional: Positive for chills and fatigue. Negative for fever.   HENT: Negative.  Negative for congestion, sinus pressure and sore throat.    Eyes: Positive for visual disturbance (glasses prn).   Respiratory: Positive for cough (cough since starting Lisinopril) and shortness of  "breath (some). Negative for chest tightness.    Cardiovascular: Positive for chest pain (reports from coughing) and leg swelling (right ankle, r/t CABG). Negative for palpitations.   Gastrointestinal: Positive for diarrhea (some). Negative for abdominal pain, blood in stool, constipation, nausea and vomiting.   Endocrine: Positive for cold intolerance. Negative for heat intolerance.   Genitourinary: Negative.  Negative for dysuria, frequency, hematuria and urgency.   Musculoskeletal: Positive for back pain (coughing) and gait problem (uses cane). Negative for arthralgias and neck stiffness.   Skin: Positive for wound. Negative for rash.   Allergic/Immunologic: Negative.  Negative for food allergies.   Neurological: Negative for dizziness, syncope and light-headedness.   Hematological: Bruises/bleeds easily.   Psychiatric/Behavioral: Positive for sleep disturbance (oxygen, denies waking with soa or cp).       Objective     VITALS: /65 (BP Location: Left arm, Patient Position: Sitting)   Pulse 60   Ht 175.3 cm (69\")   Wt 93.9 kg (207 lb)   SpO2 97%   BMI 30.57 kg/m²     LABS:   Lab Results (most recent)     None          IMAGING:   XR Chest 2 View    Result Date: 3/13/2021  No acute cardiopulmonary findings. Signer Name: Boaz Bowman MD  Signed: 3/13/2021 10:56 PM  Workstation Name: [id number]  [facility] Abdomen Complete    Result Date: 3/14/2021    No acute findings in the abdomen.  This report was finalized on 3/14/2021 11:10 AM by Dr. Catrachito Majano MD.      XR Chest 1 View    Result Date: 4/8/2021  1.  Tiny bilateral pleural effusions. 2.  Cardiomegaly.  This report was finalized on 4/8/2021 8:55 AM by Dr. Corby Cardoza MD.      XR Chest 1 View    Result Date: 3/29/2021  No definite acute cardiopulmonary findings. There is cardiomegaly. No definite interval change. Signer Name: Roseann Feliz MD  Signed: 3/29/2021 10:02 PM  Workstation Name: [id number]  [facility] " [facility]    XR Chest 1 View    Result Date: 3/23/2021  Interval surgery. Lungs are adequately aerated  This report was finalized on 3/23/2021 7:45 PM by Dr. Dayo Kumar MD.      XR Chest 1 View    Result Date: 3/20/2021  Interval removal of left-sided pleural drain. No distinct pneumothorax. Right-sided IJ catheter projects unchanged. Persistent left basilar opacities similar to prior. Unchanged degree of cardiac enlargement.  This report was finalized on 3/20/2021 8:18 AM by Dany Montes.      XR Chest 1 View    Result Date: 3/19/2021  Mild remaining bibasilar discoid atelectasis. No new chest pathology is seen.   D:  03/19/2021 E:  03/19/2021  This report was finalized on 3/19/2021 10:40 PM by Dr. Walt Casas MD.      XR Chest 1 View    Result Date: 3/18/2021  No new chest disease.  D:  03/18/2021 E:  03/18/2021  This report was finalized on 3/18/2021 10:10 PM by Dr. Walt Casas MD.      XR Chest 1 View    Result Date: 3/18/2021  Status post extubation with overall preservation of lung volumes and support hardware otherwise noted.  D:  03/17/2021 E:  03/17/2021  This report was finalized on 3/18/2021 2:34 PM by Dr. Ronal Garvey.      XR Chest 1 View    Result Date: 3/16/2021  1. Tubes and lines in satisfactory position, detailed above. No pneumothorax. 2. Stable postoperative cardiomediastinal silhouette. 3. Mild central vascular congestion. Signer Name: Santiago Samaniego MD  Signed: 3/16/2021 7:47 PM  Workstation Name: [id number]  [facility]    XR Chest 1 View    Result Date: 3/15/2021  No acute cardiopulmonary findings. Signer Name: Roseann Feliz MD  Signed: 3/15/2021 8:38 PM  Workstation Name: [id number]  [facility]    US Venous Doppler Lower Extremity Bilateral (duplex)    Result Date: 3/30/2021  No deep venous thrombus seen in either lower extremity. There are some mildly prominent with nodes in the patient's upper thigh regions. This may be within normal  limits for the patient. Signer Name: Roseann Feliz MD  Signed: 3/30/2021 12:03 AM  Workstation Name: [id number]  [facility] [place]    XR Chest PA & Lateral    Result Date: 3/29/2021  Decreased opacifications left lung base of decreased atelectasis versus airspace disease from prior comparison 03/20/2021 with persistent small right and probable trace left pleural effusions.  D:  03/29/2021 E:  03/29/2021  This report was finalized on 3/29/2021 4:39 PM by Dr. Ronal Garvey.        EXAM:  Physical Exam  Vitals and nursing note reviewed.   Constitutional:       Appearance: He is well-developed.   Eyes:      Pupils: Pupils are equal, round, and reactive to light.   Neck:      Thyroid: No thyroid mass.      Vascular: No carotid bruit or JVD.      Trachea: Trachea and phonation normal.   Cardiovascular:      Rate and Rhythm: Normal rate and regular rhythm.      Pulses:           Radial pulses are 2+ on the right side and 2+ on the left side.        Posterior tibial pulses are 2+ on the right side and 2+ on the left side.      Heart sounds: Normal heart sounds. No murmur heard.   No friction rub. No gallop.    Pulmonary:      Effort: Pulmonary effort is normal. No respiratory distress.      Breath sounds: Normal breath sounds. No wheezing or rales.   Abdominal:      General: Bowel sounds are normal. There is no distension or abdominal bruit.      Palpations: Abdomen is soft.      Tenderness: There is no abdominal tenderness.   Musculoskeletal:         General: Normal range of motion.      Cervical back: Neck supple.   Skin:     General: Skin is warm and dry.      Capillary Refill: Capillary refill takes less than 2 seconds.      Findings: No rash.          Neurological:      Mental Status: He is alert and oriented to person, place, and time.      Cranial Nerves: No cranial nerve deficit.      Sensory: No sensory deficit.   Psychiatric:         Speech: Speech normal.         Behavior: Behavior normal.          Thought Content: Thought content normal.         Judgment: Judgment normal.         Procedure   Procedures       Assessment/Plan    Diagnosis Plan   1. S/P CABG (coronary artery bypass graft)  CBC & Differential    Comprehensive Metabolic Panel    Lipid Panel    Troponin    TSH    Magnesium    Hemoglobin A1c    Adult Transthoracic Echo Complete W/ Cont if Necessary Per Protocol    Ambulatory Referral to Cardiac Rehab   2. Coronary artery disease involving coronary bypass graft of native heart without angina pectoris  CBC & Differential    Comprehensive Metabolic Panel    Lipid Panel    Troponin    TSH    Magnesium    Hemoglobin A1c    Adult Transthoracic Echo Complete W/ Cont if Necessary Per Protocol    Ambulatory Referral to Cardiac Rehab   3. Chronic diastolic congestive heart failure (CMS/HCC)  CBC & Differential    Comprehensive Metabolic Panel    Lipid Panel    Troponin    TSH    Magnesium    Hemoglobin A1c    Adult Transthoracic Echo Complete W/ Cont if Necessary Per Protocol    Ambulatory Referral to Cardiac Rehab   4. Essential hypertension  CBC & Differential    Comprehensive Metabolic Panel    Lipid Panel    Troponin    TSH    Magnesium    Hemoglobin A1c    Adult Transthoracic Echo Complete W/ Cont if Necessary Per Protocol    Ambulatory Referral to Cardiac Rehab    losartan (Cozaar) 25 MG tablet   5. Other abnormal glucose   Hemoglobin A1c   Patient is status post bypass grafting by Dr. Jorge Simms at [facility].  Patient is doing exceptionally well and has no angina anginal equivalent symptoms.  We will continue his current medication regimen including atorvastatin, Plavix, and aspirin.  His incisions look to be well-healing with minimal erythema and no exudate.  He denies any fevers.  I would like to get an echocardiogram for reevaluation of his ejection fraction due to noted septal injury.  This will help evaluate the extent of his diastolic dysfunction as well as systolic  function.  2.  Patient does have a history of diastolic dysfunction and is currently been treated with diuretic therapy with Bumex.  This relatively seems to be controlling his edema quite well.  He does have increased swelling in his right lower extremity from harvesting of the saphenous vein graft.  3.  Patient's blood pressure is well controlled on current blood pressure medication regimen.  We will change his lisinopril to losartan 25 mg daily due to the fact of the persistent dry cough in which she is experiencing.   Patient advised to monitor blood pressure on a daily basis and report any persistent highs or lows.  Set goal blood pressure for patient at 130/80 or below.  4.  Due to the fact that patient's heart rate is running in the 80s and 90s per his log I do think we should attempt to increase his metoprolol to achieve desired heart rate of 60-70.  5.  Patient's blood sugar has been dramatically improving with blood sugars in the low 100s and 90s with evening blood sugars are greater than 200.  There is obvious steady decline in his blood glucose and he is concerned about using the insulin.  He request to try to come off the insulin.  We will decrease the insulin by 10 units and restart him back on Farxiga in which he is been on previously.  He will continue to monitor his glucose.  6.  I would like to do an extensive laboratory work-up including a CBC, CMP, magnesium, and hemoglobin A1c.  7.  After much discussion with the patient and his family we will refer patient to [facility] for cardiac rehab.  8.  Informed of signs and symptoms of ACS and advised to seek emergent treatment for any new worsening symptoms.  Patient also advised sooner follow-up as needed.  Also advised to follow-up with family doctor as needed  This note is dictated utilizing voice recognition software.  Although this record has been proof read, transcriptional errors may still be present. If questions occur regarding  the content of this record please do not hesitate to call our office.  I have reviewed and confirmed the accuracy of the ROS as documented by the MA/LPN/RN ANKUR Cuevas    Return in about 2 weeks (around 5/3/2021), or if symptoms worsen or fail to improve.    Diagnoses and all orders for this visit:    1. S/P CABG (coronary artery bypass graft) (Primary)  -     Cancel: Ambulatory Referral to Cardiac Rehab  -     Cancel: Adult Transthoracic Echo Complete W/ Cont if Necessary Per Protocol; Future  -     CBC & Differential; Future  -     Comprehensive Metabolic Panel; Future  -     Lipid Panel; Future  -     Troponin; Future  -     TSH; Future  -     Magnesium; Future  -     Hemoglobin A1c; Future  -     Adult Transthoracic Echo Complete W/ Cont if Necessary Per Protocol; Future  -     Ambulatory Referral to Cardiac Rehab    2. Coronary artery disease involving coronary bypass graft of native heart without angina pectoris  -     Discontinue: metoprolol succinate XL (Toprol XL) 100 MG 24 hr tablet; Take 1 tablet by mouth Daily.  Dispense: 90 tablet; Refill: 3  -     Cancel: Ambulatory Referral to Cardiac Rehab  -     Cancel: Adult Transthoracic Echo Complete W/ Cont if Necessary Per Protocol; Future  -     CBC & Differential; Future  -     Comprehensive Metabolic Panel; Future  -     Lipid Panel; Future  -     Troponin; Future  -     TSH; Future  -     Magnesium; Future  -     Hemoglobin A1c; Future  -     Adult Transthoracic Echo Complete W/ Cont if Necessary Per Protocol; Future  -     Ambulatory Referral to Cardiac Rehab    3. Chronic diastolic congestive heart failure (CMS/HCC)  -     Discontinue: metoprolol succinate XL (Toprol XL) 100 MG 24 hr tablet; Take 1 tablet by mouth Daily.  Dispense: 90 tablet; Refill: 3  -     Cancel: Ambulatory Referral to Cardiac Rehab  -     Cancel: Adult Transthoracic Echo Complete W/ Cont if Necessary Per Protocol; Future  -     CBC & Differential; Future  -     Comprehensive  Metabolic Panel; Future  -     Lipid Panel; Future  -     Troponin; Future  -     TSH; Future  -     Magnesium; Future  -     Hemoglobin A1c; Future  -     Adult Transthoracic Echo Complete W/ Cont if Necessary Per Protocol; Future  -     Ambulatory Referral to Cardiac Rehab    4. Essential hypertension  -     Discontinue: losartan (Cozaar) 25 MG tablet; Take 1 tablet by mouth Daily.  Dispense: 30 tablet; Refill: 11  -     Discontinue: metoprolol succinate XL (Toprol XL) 100 MG 24 hr tablet; Take 1 tablet by mouth Daily.  Dispense: 90 tablet; Refill: 3  -     Cancel: Ambulatory Referral to Cardiac Rehab  -     Cancel: Adult Transthoracic Echo Complete W/ Cont if Necessary Per Protocol; Future  -     CBC & Differential; Future  -     Comprehensive Metabolic Panel; Future  -     Lipid Panel; Future  -     Troponin; Future  -     TSH; Future  -     Magnesium; Future  -     Hemoglobin A1c; Future  -     Adult Transthoracic Echo Complete W/ Cont if Necessary Per Protocol; Future  -     Ambulatory Referral to Cardiac Rehab  -     losartan (Cozaar) 25 MG tablet; Take 0.5 tablets by mouth Daily.  Dispense: 30 tablet; Refill: 0    5. Other abnormal glucose   -     Hemoglobin A1c; Future  -     Discontinue: Dapagliflozin Propanediol (Farxiga) 10 MG tablet; Take 10 mg by mouth Daily.  Dispense: 90 tablet; Refill: 3        William Villasenor  reports that he quit smoking about 47 years ago. He has a 5.00 pack-year smoking history. He has never used smokeless tobacco..         Patient's Body mass index is 30.57 kg/m². BMI is above normal parameters. Recommendations include: educational material.    Advance Care Planning   ACP discussion was held with the patient during this visit. Patient does not have an advance directive, declines further assistance.         MEDS ORDERED DURING VISIT:  New Medications Ordered This Visit   Medications   • losartan (Cozaar) 25 MG tablet     Sig: Take 0.5 tablets by mouth Daily.     Dispense:  30 tablet      Refill:  0           This document has been electronically signed by Dany San Jr., APRN  April 22, 2021 23:08 EDT

## 2021-04-19 NOTE — PATIENT INSTRUCTIONS
"Fat and Cholesterol Restricted Eating Plan  Getting too much fat and cholesterol in your diet may cause health problems. Choosing the right foods helps keep your fat and cholesterol at normal levels. This can keep you from getting certain diseases.  Your doctor may recommend an eating plan that includes:  · Total fat: ______% or less of total calories a day.  · Saturated fat: ______% or less of total calories a day.  · Cholesterol: less than _________mg a day.  · Fiber: ______g a day.  What are tips for following this plan?  Meal planning  · At meals, divide your plate into four equal parts:  ? Fill one-half of your plate with vegetables and green salads.  ? Fill one-fourth of your plate with whole grains.  ? Fill one-fourth of your plate with low-fat (lean) protein foods.  · Eat fish that is high in omega-3 fats at least two times a week. This includes mackerel, tuna, sardines, and salmon.  · Eat foods that are high in fiber, such as whole grains, beans, apples, broccoli, carrots, peas, and barley.  General tips    · Work with your doctor to lose weight if you need to.  · Avoid:  ? Foods with added sugar.  ? Fried foods.  ? Foods with partially hydrogenated oils.  · Limit alcohol intake to no more than 1 drink a day for nonpregnant women and 2 drinks a day for men. One drink equals 12 oz of beer, 5 oz of wine, or 1½ oz of hard liquor.  Reading food labels  · Check food labels for:  ? Trans fats.  ? Partially hydrogenated oils.  ? Saturated fat (g) in each serving.  ? Cholesterol (mg) in each serving.  ? Fiber (g) in each serving.  · Choose foods with healthy fats, such as:  ? Monounsaturated fats.  ? Polyunsaturated fats.  ? Omega-3 fats.  · Choose grain products that have whole grains. Look for the word \"whole\" as the first word in the ingredient list.  Cooking  · Cook foods using low-fat methods. These include baking, boiling, grilling, and broiling.  · Eat more home-cooked foods. Eat at restaurants and buffets " less often.  · Avoid cooking using saturated fats, such as butter, cream, palm oil, palm kernel oil, and coconut oil.  Recommended foods    Fruits  · All fresh, canned (in natural juice), or frozen fruits.  Vegetables  · Fresh or frozen vegetables (raw, steamed, roasted, or grilled). Green salads.  Grains  · Whole grains, such as whole wheat or whole grain breads, crackers, cereals, and pasta. Unsweetened oatmeal, bulgur, barley, quinoa, or brown rice. Corn or whole wheat flour tortillas.  Meats and other protein foods  · Ground beef (85% or leaner), grass-fed beef, or beef trimmed of fat. Skinless chicken or turkey. Ground chicken or turkey. Pork trimmed of fat. All fish and seafood. Egg whites. Dried beans, peas, or lentils. Unsalted nuts or seeds. Unsalted canned beans. Nut butters without added sugar or oil.  Dairy  · Low-fat or nonfat dairy products, such as skim or 1% milk, 2% or reduced-fat cheeses, low-fat and fat-free ricotta or cottage cheese, or plain low-fat and nonfat yogurt.  Fats and oils  · Tub margarine without trans fats. Light or reduced-fat mayonnaise and salad dressings. Avocado. Olive, canola, sesame, or safflower oils.  The items listed above may not be a complete list of foods and beverages you can eat. Contact a dietitian for more information.  Foods to avoid  Fruits  · Canned fruit in heavy syrup. Fruit in cream or butter sauce. Fried fruit.  Vegetables  · Vegetables cooked in cheese, cream, or butter sauce. Fried vegetables.  Grains  · White bread. White pasta. White rice. Cornbread. Bagels, pastries, and croissants. Crackers and snack foods that contain trans fat and hydrogenated oils.  Meats and other protein foods  · Fatty cuts of meat. Ribs, chicken wings, miller, sausage, bologna, salami, chitterlings, fatback, hot dogs, bratwurst, and packaged lunch meats. Liver and organ meats. Whole eggs and egg yolks. Chicken and turkey with skin. Fried meat.  Dairy  · Whole or 2% milk, cream,  half-and-half, and cream cheese. Whole milk cheeses. Whole-fat or sweetened yogurt. Full-fat cheeses. Nondairy creamers and whipped toppings. Processed cheese, cheese spreads, and cheese curds.  Beverages  · Alcohol. Sugar-sweetened drinks such as sodas, lemonade, and fruit drinks.  Fats and oils  · Butter, stick margarine, lard, shortening, ghee, or miller fat. Coconut, palm kernel, and palm oils.  Sweets and desserts  · Corn syrup, sugars, honey, and molasses. Candy. Jam and jelly. Syrup. Sweetened cereals. Cookies, pies, cakes, donuts, muffins, and ice cream.  The items listed above may not be a complete list of foods and beverages you should avoid. Contact a dietitian for more information.  Summary  · Choosing the right foods helps keep your fat and cholesterol at normal levels. This can keep you from getting certain diseases.  · At meals, fill one-half of your plate with vegetables and green salads.  · Eat high-fiber foods, like whole grains, beans, apples, carrots, peas, and barley.  · Limit added sugar, saturated fats, alcohol, and fried foods.  This information is not intended to replace advice given to you by your health care provider. Make sure you discuss any questions you have with your health care provider.  Document Revised: 08/21/2019 Document Reviewed: 09/04/2018  [employer] Patient Education © 2021 [employer]  BMI for Adults  What is BMI?  Body mass index (BMI) is a number that is calculated from a person's weight and height. BMI can help estimate how much of a person's weight is composed of fat. BMI does not measure body fat directly. Rather, it is an alternative to procedures that directly measure body fat, which can be difficult and expensive.  BMI can help identify people who may be at higher risk for certain medical problems.  What are BMI measurements used for?  BMI is used as a screening tool to identify possible weight problems. It helps determine whether a person is obese, overweight, a  "healthy weight, or underweight.  BMI is useful for:  · Identifying a weight problem that may be related to a medical condition or may increase the risk for medical problems.  · Promoting changes, such as changes in diet and exercise, to help reach a healthy weight. BMI screening can be repeated to see if these changes are working.  How is BMI calculated?  BMI involves measuring your weight in relation to your height. Both height and weight are measured, and the BMI is calculated from those numbers. This can be done either in English ([place] or metric measurements. Note that charts and online BMI calculators are available to help you find your BMI quickly and easily without having to do these calculations yourself.  To calculate your BMI in English ([place] measurements:    1. Measure your weight in pounds (lb).  2. Multiply the number of pounds by 703.  ? For example, for a person who weighs 180 lb, multiply that number by 703, which equals 126,540.  3. Measure your height in inches. Then multiply that number by itself to get a measurement called \"inches squared.\"  ? For example, for a person who is 70 inches tall, the \"inches squared\" measurement is 70 inches x 70 inches, which equals 4,900 inches squared.  4. Divide the total from step 2 (number of lb x 703) by the total from step 3 (inches squared): 126,540 ÷ 4,900 = 25.8. This is your BMI.  To calculate your BMI in metric measurements:  1. Measure your weight in kilograms (kg).  2. Measure your height in meters (m). Then multiply that number by itself to get a measurement called \"meters squared.\"  ? For example, for a person who is 1.75 m tall, the \"meters squared\" measurement is 1.75 m x 1.75 m, which is equal to 3.1 meters squared.  3. Divide the number of kilograms (your weight) by the meters squared number. In this example: 70 ÷ 3.1 = 22.6. This is your BMI.  What do the results mean?  BMI charts are used to identify whether you are underweight, normal weight, " overweight, or obese. The following guidelines will be used:  · Underweight: BMI less than 18.5.  · Normal weight: BMI between 18.5 and 24.9.  · Overweight: BMI between 25 and 29.9.  · Obese: BMI of 30 or above.  Keep these notes in mind:  · Weight includes both fat and muscle, so someone with a muscular build, such as an athlete, may have a BMI that is higher than 24.9. In cases like these, BMI is not an accurate measure of body fat.  · To determine if excess body fat is the cause of a BMI of 25 or higher, further assessments may need to be done by a health care provider.  · BMI is usually interpreted in the same way for men and women.  Where to find more information  For more information about BMI, including tools to quickly calculate your BMI, go to these websites:  · Centers for Disease Control and Prevention: www.cdc.gov  · American Heart Association: www.heart.org  · National Heart, Lung, and [facility] [place]: www.nhlbi.nih.gov  Summary  · Body mass index (BMI) is a number that is calculated from a person's weight and height.  · BMI may help estimate how much of a person's weight is composed of fat. BMI can help identify those who may be at higher risk for certain medical problems.  · BMI can be measured using English measurements or metric measurements.  · BMI charts are used to identify whether you are underweight, normal weight, overweight, or obese.  This information is not intended to replace advice given to you by your health care provider. Make sure you discuss any questions you have with your health care provider.  Document Revised: 09/09/2020 Document Reviewed: 07/17/2020  [employer] Patient Education © 2021 [employer]    Hypertension, Adult  Hypertension is another name for high blood pressure. High blood pressure forces your heart to work harder to pump blood. This can cause problems over time.  There are two numbers in a blood pressure reading. There is a top number (systolic) over a bottom number  (diastolic). It is best to have a blood pressure that is below 120/80. Healthy choices can help lower your blood pressure, or you may need medicine to help lower it.  What are the causes?  The cause of this condition is not known. Some conditions may be related to high blood pressure.  What increases the risk?  · Smoking.  · Having type 2 diabetes mellitus, high cholesterol, or both.  · Not getting enough exercise or physical activity.  · Being overweight.  · Having too much fat, sugar, calories, or salt (sodium) in your diet.  · Drinking too much alcohol.  · Having long-term (chronic) kidney disease.  · Having a family history of high blood pressure.  · Age. Risk increases with age.  · Race. You may be at higher risk if you are .  · Gender. Men are at higher risk than women before age 45. After age 65, women are at higher risk than men.  · Having obstructive sleep apnea.  · Stress.  What are the signs or symptoms?  · High blood pressure may not cause symptoms. Very high blood pressure (hypertensive crisis) may cause:  ? Headache.  ? Feelings of worry or nervousness (anxiety).  ? Shortness of breath.  ? Nosebleed.  ? A feeling of being sick to your stomach (nausea).  ? Throwing up (vomiting).  ? Changes in how you see.  ? Very bad chest pain.  ? Seizures.  How is this treated?  · This condition is treated by making healthy lifestyle changes, such as:  ? Eating healthy foods.  ? Exercising more.  ? Drinking less alcohol.  · Your health care provider may prescribe medicine if lifestyle changes are not enough to get your blood pressure under control, and if:  ? Your top number is above 130.  ? Your bottom number is above 80.  · Your personal target blood pressure may vary.  Follow these instructions at home:  Eating and drinking    · If told, follow the DASH eating plan. To follow this plan:  ? Fill one half of your plate at each meal with fruits and vegetables.  ? Fill one fourth of your plate at each  meal with whole grains. Whole grains include whole-wheat pasta, brown rice, and whole-grain bread.  ? Eat or drink low-fat dairy products, such as skim milk or low-fat yogurt.  ? Fill one fourth of your plate at each meal with low-fat (lean) proteins. Low-fat proteins include fish, chicken without skin, eggs, beans, and tofu.  ? Avoid fatty meat, cured and processed meat, or chicken with skin.  ? Avoid pre-made or processed food.  · Eat less than 1,500 mg of salt each day.  · Do not drink alcohol if:  ? Your doctor tells you not to drink.  ? You are pregnant, may be pregnant, or are planning to become pregnant.  · If you drink alcohol:  ? Limit how much you use to:  § 0-1 drink a day for women.  § 0-2 drinks a day for men.  ? Be aware of how much alcohol is in your drink. In the [place] one drink equals one 12 oz bottle of beer (355 mL), one 5 oz glass of wine (148 mL), or one 1½ oz glass of hard liquor (44 mL).  Lifestyle    · Work with your doctor to stay at a healthy weight or to lose weight. Ask your doctor what the best weight is for you.  · Get at least 30 minutes of exercise most days of the week. This may include walking, swimming, or biking.  · Get at least 30 minutes of exercise that strengthens your muscles (resistance exercise) at least 3 days a week. This may include lifting weights or doing Pilates.  · Do not use any products that contain nicotine or tobacco, such as cigarettes, e-cigarettes, and chewing tobacco. If you need help quitting, ask your doctor.  · Check your blood pressure at home as told by your doctor.  · Keep all follow-up visits as told by your doctor. This is important.  Medicines  · Take over-the-counter and prescription medicines only as told by your doctor. Follow directions carefully.  · Do not skip doses of blood pressure medicine. The medicine does not work as well if you skip doses. Skipping doses also puts you at risk for problems.  · Ask your doctor about side effects or  reactions to medicines that you should watch for.  Contact a doctor if you:  · Think you are having a reaction to the medicine you are taking.  · Have headaches that keep coming back (recurring).  · Feel dizzy.  · Have swelling in your ankles.  · Have trouble with your vision.  Get help right away if you:  · Get a very bad headache.  · Start to feel mixed up (confused).  · Feel weak or numb.  · Feel faint.  · Have very bad pain in your:  ? Chest.  ? Belly (abdomen).  · Throw up more than once.  · Have trouble breathing.  Summary  · Hypertension is another name for high blood pressure.  · High blood pressure forces your heart to work harder to pump blood.  · For most people, a normal blood pressure is less than 120/80.  · Making healthy choices can help lower blood pressure. If your blood pressure does not get lower with healthy choices, you may need to take medicine.  This information is not intended to replace advice given to you by your health care provider. Make sure you discuss any questions you have with your health care provider.  Document Revised: 08/28/2019 Document Reviewed: 08/28/2019  [employer] Patient Education © 2021 [employer]      Palpitations  Palpitations are feelings that your heartbeat is not normal. Your heartbeat may feel like it is:  · Uneven.  · Faster than normal.  · Fluttering.  · Skipping a beat.  This is usually not a serious problem. In some cases, you may need tests to rule out any serious problems.  Follow these instructions at home:  Pay attention to any changes in your condition. Take these actions to help manage your symptoms:  Eating and drinking  · Avoid:  ? Coffee, tea, soft drinks, and energy drinks.  ? Chocolate.  ? Alcohol.  ? Diet pills.  Lifestyle    · Try to lower your stress. These things can help you relax:  ? Yoga.  ? Deep breathing and meditation.  ? Exercise.  ? Using words and images to create positive thoughts (guided imagery).  ? Using your mind to control things  in your body (biofeedback).  · Do not use drugs.  · Get plenty of rest and sleep. Keep a regular bed time.  General instructions    · Take over-the-counter and prescription medicines only as told by your doctor.  · Do not use any products that contain nicotine or tobacco, such as cigarettes and e-cigarettes. If you need help quitting, ask your doctor.  · Keep all follow-up visits as told by your doctor. This is important. You may need more tests if palpitations do not go away or get worse.  Contact a doctor if:  · Your symptoms last more than 24 hours.  · Your symptoms occur more often.  Get help right away if you:  · Have chest pain.  · Feel short of breath.  · Have a very bad headache.  · Feel dizzy.  · Pass out (faint).  Summary  · Palpitations are feelings that your heartbeat is uneven or faster than normal. It may feel like your heart is fluttering or skipping a beat.  · Avoid food and drinks that may cause palpitations. These include caffeine, chocolate, and alcohol.  · Try to lower your stress. Do not smoke or use drugs.  · Get help right away if you faint or have chest pain, shortness of breath, a severe headache, or dizziness.  This information is not intended to replace advice given to you by your health care provider. Make sure you discuss any questions you have with your health care provider.  Document Revised: 01/30/2019 Document Reviewed: 01/30/2019  [employer] Patient Education © 2021 [employer]

## 2021-04-19 NOTE — TELEPHONE ENCOUNTER
Pt's wife LVM stating pt saw JR today and needs to know how to P/U rx for pt to start today and if it's at [facility].       Per chart review, meds were sent to [facility] mail order.       Called pt's wife and confirmed local px, stated we can send a short-term supply to local px while they wait on mail order. She verbalized understanding.

## 2021-04-21 ENCOUNTER — OFFICE VISIT (OUTPATIENT)
Dept: CARDIAC SURGERY | Facility: CLINIC | Age: 71
End: 2021-04-21

## 2021-04-21 ENCOUNTER — HOSPITAL ENCOUNTER (OUTPATIENT)
Dept: CARDIOLOGY | Facility: HOSPITAL | Age: 71
Discharge: HOME | End: 2021-04-21

## 2021-04-21 ENCOUNTER — LAB (OUTPATIENT)
Dept: LAB | Facility: HOSPITAL | Age: 71
End: 2021-04-21

## 2021-04-21 ENCOUNTER — READMISSION MANAGEMENT (OUTPATIENT)
Dept: CALL CENTER | Facility: HOSPITAL | Age: 71
End: 2021-04-21

## 2021-04-21 VITALS
SYSTOLIC BLOOD PRESSURE: 118 MMHG | RESPIRATION RATE: 20 BRPM | HEART RATE: 99 BPM | WEIGHT: 204 LBS | OXYGEN SATURATION: 94 % | BODY MASS INDEX: 30.13 KG/M2 | DIASTOLIC BLOOD PRESSURE: 64 MMHG

## 2021-04-21 VITALS
TEMPERATURE: 97.9 F | HEIGHT: 69 IN | RESPIRATION RATE: 20 BRPM | HEART RATE: 99 BPM | BODY MASS INDEX: 30.21 KG/M2 | SYSTOLIC BLOOD PRESSURE: 118 MMHG | DIASTOLIC BLOOD PRESSURE: 64 MMHG | OXYGEN SATURATION: 94 % | WEIGHT: 204 LBS

## 2021-04-21 DIAGNOSIS — Z95.1 S/P CABG (CORONARY ARTERY BYPASS GRAFT): ICD-10-CM

## 2021-04-21 DIAGNOSIS — L03.115 CELLULITIS OF RIGHT LOWER EXTREMITY: Primary | ICD-10-CM

## 2021-04-21 DIAGNOSIS — Z95.1 S/P CABG X 4: ICD-10-CM

## 2021-04-21 DIAGNOSIS — I50.32 CHRONIC DIASTOLIC CONGESTIVE HEART FAILURE (HCC): Primary | ICD-10-CM

## 2021-04-21 DIAGNOSIS — N17.1: ICD-10-CM

## 2021-04-21 DIAGNOSIS — N18.2: ICD-10-CM

## 2021-04-21 DIAGNOSIS — I50.9 ACUTE ON CHRONIC CONGESTIVE HEART FAILURE, UNSPECIFIED HEART FAILURE TYPE (HCC): ICD-10-CM

## 2021-04-21 DIAGNOSIS — E66.9 OBESITY (BMI 30-39.9): Chronic | ICD-10-CM

## 2021-04-21 DIAGNOSIS — I10 ESSENTIAL HYPERTENSION: ICD-10-CM

## 2021-04-21 DIAGNOSIS — E11.21 TYPE 2 DIABETES MELLITUS WITH DIABETIC NEPHROPATHY, UNSPECIFIED WHETHER LONG TERM INSULIN USE (HCC): Chronic | ICD-10-CM

## 2021-04-21 DIAGNOSIS — R73.09 OTHER ABNORMAL GLUCOSE: ICD-10-CM

## 2021-04-21 DIAGNOSIS — I25.810 CORONARY ARTERY DISEASE INVOLVING CORONARY BYPASS GRAFT OF NATIVE HEART WITHOUT ANGINA PECTORIS: ICD-10-CM

## 2021-04-21 DIAGNOSIS — I50.32 CHRONIC DIASTOLIC CONGESTIVE HEART FAILURE (HCC): ICD-10-CM

## 2021-04-21 LAB
ALBUMIN SERPL-MCNC: 4.16 G/DL (ref 3.5–5.2)
ALBUMIN/GLOB SERPL: 1.3 G/DL
ALP SERPL-CCNC: 106 U/L (ref 39–117)
ALT SERPL W P-5'-P-CCNC: 12 U/L (ref 1–41)
ANION GAP SERPL CALCULATED.3IONS-SCNC: 12.3 MMOL/L (ref 5–15)
AST SERPL-CCNC: 11 U/L (ref 1–40)
BASOPHILS # BLD AUTO: 0.06 10*3/MM3 (ref 0–0.2)
BASOPHILS NFR BLD AUTO: 0.8 % (ref 0–1.5)
BILIRUB SERPL-MCNC: 0.3 MG/DL (ref 0–1.2)
BUN SERPL-MCNC: 17 MG/DL (ref 8–23)
BUN/CREAT SERPL: 12.9 (ref 7–25)
CALCIUM SPEC-SCNC: 9.8 MG/DL (ref 8.6–10.5)
CHLORIDE SERPL-SCNC: 101 MMOL/L (ref 98–107)
CHOLEST SERPL-MCNC: 140 MG/DL (ref 0–200)
CO2 SERPL-SCNC: 24.7 MMOL/L (ref 22–29)
CREAT SERPL-MCNC: 1.32 MG/DL (ref 0.76–1.27)
DEPRECATED RDW RBC AUTO: 42.1 FL (ref 37–54)
EOSINOPHIL # BLD AUTO: 0.13 10*3/MM3 (ref 0–0.4)
EOSINOPHIL NFR BLD AUTO: 1.7 % (ref 0.3–6.2)
ERYTHROCYTE [DISTWIDTH] IN BLOOD BY AUTOMATED COUNT: 13.3 % (ref 12.3–15.4)
GFR SERPL CREATININE-BSD FRML MDRD: 53 ML/MIN/1.73
GLOBULIN UR ELPH-MCNC: 3.2 GM/DL
GLUCOSE SERPL-MCNC: 115 MG/DL (ref 65–99)
HBA1C MFR BLD: 8.3 % (ref 4.8–5.6)
HCT VFR BLD AUTO: 39.3 % (ref 37.5–51)
HDLC SERPL-MCNC: 27 MG/DL (ref 40–60)
HGB BLD-MCNC: 12.2 G/DL (ref 13–17.7)
IMM GRANULOCYTES # BLD AUTO: 0.03 10*3/MM3 (ref 0–0.05)
IMM GRANULOCYTES NFR BLD AUTO: 0.4 % (ref 0–0.5)
LDLC SERPL CALC-MCNC: 75 MG/DL (ref 0–100)
LDLC/HDLC SERPL: 2.47 {RATIO}
LYMPHOCYTES # BLD AUTO: 3.26 10*3/MM3 (ref 0.7–3.1)
LYMPHOCYTES NFR BLD AUTO: 42.9 % (ref 19.6–45.3)
MAGNESIUM SERPL-MCNC: 1.4 MG/DL (ref 1.6–2.4)
MCH RBC QN AUTO: 26.9 PG (ref 26.6–33)
MCHC RBC AUTO-ENTMCNC: 31 G/DL (ref 31.5–35.7)
MCV RBC AUTO: 86.8 FL (ref 79–97)
MONOCYTES # BLD AUTO: 0.59 10*3/MM3 (ref 0.1–0.9)
MONOCYTES NFR BLD AUTO: 7.8 % (ref 5–12)
NEUTROPHILS NFR BLD AUTO: 3.53 10*3/MM3 (ref 1.7–7)
NEUTROPHILS NFR BLD AUTO: 46.4 % (ref 42.7–76)
NRBC BLD AUTO-RTO: 0 /100 WBC (ref 0–0.2)
NT-PROBNP SERPL-MCNC: 363.8 PG/ML (ref 0–900)
PLATELET # BLD AUTO: 408 10*3/MM3 (ref 140–450)
PMV BLD AUTO: 8.4 FL (ref 6–12)
POTASSIUM SERPL-SCNC: 4.1 MMOL/L (ref 3.5–5.2)
PROT SERPL-MCNC: 7.4 G/DL (ref 6–8.5)
RBC # BLD AUTO: 4.53 10*6/MM3 (ref 4.14–5.8)
SODIUM SERPL-SCNC: 138 MMOL/L (ref 136–145)
TRIGL SERPL-MCNC: 231 MG/DL (ref 0–150)
TROPONIN T SERPL-MCNC: <0.01 NG/ML (ref 0–0.03)
TSH SERPL DL<=0.05 MIU/L-ACNC: 2.01 UIU/ML (ref 0.27–4.2)
VLDLC SERPL-MCNC: 38 MG/DL (ref 5–40)
WBC # BLD AUTO: 7.6 10*3/MM3 (ref 3.4–10.8)

## 2021-04-21 PROCEDURE — 80061 LIPID PANEL: CPT | Performed by: NURSE PRACTITIONER

## 2021-04-21 PROCEDURE — 83036 HEMOGLOBIN GLYCOSYLATED A1C: CPT | Performed by: NURSE PRACTITIONER

## 2021-04-21 PROCEDURE — 83735 ASSAY OF MAGNESIUM: CPT | Performed by: NURSE PRACTITIONER

## 2021-04-21 PROCEDURE — 80053 COMPREHEN METABOLIC PANEL: CPT | Performed by: NURSE PRACTITIONER

## 2021-04-21 PROCEDURE — 84484 ASSAY OF TROPONIN QUANT: CPT | Performed by: NURSE PRACTITIONER

## 2021-04-21 PROCEDURE — 84443 ASSAY THYROID STIM HORMONE: CPT | Performed by: NURSE PRACTITIONER

## 2021-04-21 PROCEDURE — 93306 TTE W/DOPPLER COMPLETE: CPT

## 2021-04-21 PROCEDURE — 99024 POSTOP FOLLOW-UP VISIT: CPT | Performed by: NURSE PRACTITIONER

## 2021-04-21 PROCEDURE — 93306 TTE W/DOPPLER COMPLETE: CPT | Performed by: INTERNAL MEDICINE

## 2021-04-21 PROCEDURE — 83880 ASSAY OF NATRIURETIC PEPTIDE: CPT | Performed by: NURSE PRACTITIONER

## 2021-04-21 PROCEDURE — 85025 COMPLETE CBC W/AUTO DIFF WBC: CPT | Performed by: NURSE PRACTITIONER

## 2021-04-21 PROCEDURE — 99214 OFFICE O/P EST MOD 30 MIN: CPT | Performed by: NURSE PRACTITIONER

## 2021-04-21 RX ORDER — MULTIVIT-MIN/IRON/FOLIC ACID/K 18-600-40
2000 CAPSULE ORAL DAILY
COMMUNITY

## 2021-04-21 NOTE — PROGRESS NOTES
[facility] Cardiothoracic Surgery Office Follow Up Note     Date of Encounter: 04/21/2021     MRN Number: [id number]  Name: William Villasenor  Phone Number: [phone]     Referred By: No ref. provider found  PCP: Elo Quiles APRN    Chief Complaint:    No chief complaint on file.      History of Present Illness:    William Villasenor is a 70 y.o. male former smoker with a history of HTN, HLD, DM, obesity, CKD, CAD s/p CABG x 4 with right EVH by Dr Simms on 3/15/21 who presented to [facility] 3/2021 for unstable angina. Pt was discharged on POD 6 after a relatively uneventful hospital stay. However, on 3/23 he represented to the [facility] ED for SOA and chest pain and was subsequently dx with subacute cardiogenic pulmonary edema with acute hypoxia (ECHO EF 55%), musculoskeletal chest pain at sternotomy site, and significant hyperglycemia (400s). He was IV diuresed which improved hypoxia and he was successfully weaned to room air. Pain controled with oxycodone and sugars tx with Levemir up to 37 units. He was discharged home on 3/27 and represented to the hospital on 3/29 for continued complaints of post-surgical pain, SOA, and now RLE swelling and warmth with concerns for infection at right EVH site. He was admitted for RLE cellulitis in the setting of poorly controlled DM and completd a 10 day course of vanc and zosyn (negative blood cultures no wound culture results in epic). He presents to heart valve clinic today for follow up regarding his CHF and provider was concerned about his RLE site and have sent him to us for eval. Patient has not experiencing any fever, chills, body aches, drainage or warmth to site since finishing antibiotics in the hospital. His post-surgical sternal pain has improved. He still complains of burning, neuropathic pain to RLE. His swelling has much improved per pt and wife report.      Review of Systems:  Review of Systems   Constitutional: Positive for weight loss (purposeful, on  diuretic therapy). Negative for chills, decreased appetite, diaphoresis, fever, malaise/fatigue, night sweats and weight gain.   Cardiovascular: Positive for leg swelling. Negative for chest pain, claudication, cyanosis, dyspnea on exertion and syncope.   Respiratory: Negative for cough and shortness of breath.    Skin: Negative for rash.   Gastrointestinal: Negative for abdominal pain and constipation.   Neurological: Negative for headaches and vertigo.       I have reviewed the following portions of the patient's history: allergies, current medications, past family history, past medical history, past social history, past surgical history and problem list and confirm it's accurate.    Allergies:  Allergies   Allergen Reactions   • Lisinopril Cough       Medications:      Current Outpatient Medications:   •  acetaminophen (TYLENOL) 500 MG tablet, Take 500 mg by mouth Every 6 (Six) Hours As Needed for Mild Pain ., Disp: , Rfl:   •  allopurinol (ZYLOPRIM) 300 MG tablet, Take 1 tablet by mouth Daily., Disp: 90 tablet, Rfl: 1  •  aspirin (Aspirin Adult Low Dose) 81 MG EC tablet, Take 81 mg by mouth Daily., Disp: , Rfl:   •  atorvastatin (LIPITOR) 40 MG tablet, Take 1 tablet by mouth Every Night., Disp: 90 tablet, Rfl: 3  •  bumetanide (BUMEX) 0.5 MG tablet, Take 2 tablets by mouth Daily for 30 days., Disp: 60 tablet, Rfl: 0  •  clopidogrel (PLAVIX) 75 MG tablet, Take 1 tablet by mouth Daily., Disp: 90 tablet, Rfl: 1  •  Dapagliflozin Propanediol (Farxiga) 10 MG tablet, Take 10 mg by mouth Daily., Disp: 30 tablet, Rfl: 0  •  doxazosin (CARDURA) 4 MG tablet, Take 4 mg by mouth Every Night., Disp: , Rfl:   •  finasteride (PROSCAR) 5 MG tablet, Take 1 tablet by mouth Daily., Disp: 90 tablet, Rfl: 1  •  folic acid (FOLVITE) 1 MG tablet, Take 1 mg by mouth 2 (two) times a day., Disp: , Rfl:   •  gabapentin (NEURONTIN) 300 MG capsule, Take 1 capsule by mouth 2 (two) times a day., Disp: 60 capsule, Rfl: 0  •  glipizide  (GLUCOTROL) 5 MG tablet, Take 1 tablet by mouth 2 (Two) Times a Day Before Meals for 30 days., Disp: 60 tablet, Rfl: 0  •  insulin detemir (Levemir FlexTouch) 100 UNIT/ML injection, Inject 35 Units under the skin into the appropriate area as directed Daily for 30 days. (Patient taking differently: Inject 25 Units under the skin into the appropriate area as directed Daily.), Disp: 45 mL, Rfl: 0  •  losartan (Cozaar) 25 MG tablet, Take 1 tablet by mouth Daily., Disp: 30 tablet, Rfl: 0  •  metFORMIN (GLUCOPHAGE) 1000 MG tablet, Take 1,000 mg by mouth 2 (Two) Times a Day With Meals., Disp: , Rfl:   •  metoprolol succinate XL (Toprol XL) 100 MG 24 hr tablet, Take 1 tablet by mouth Daily., Disp: 30 tablet, Rfl: 0  •  multivitamin (multivitamin) tablet tablet, Take 1 tablet by mouth Daily., Disp: 30 tablet, Rfl:   •  nitroglycerin (NITROSTAT) 0.4 MG SL tablet, Place 1 tablet under the tongue Every 5 (Five) Minutes As Needed for Chest Pain (Only if SBP Greater Than 100). Take no more than 3 doses in 15 minutes., Disp: 100 tablet, Rfl: 12  •  omeprazole (priLOSEC) 20 MG capsule, Take 20 mg by mouth 2 (two) times a day., Disp: , Rfl:   •  sennosides-docusate (PERICOLACE) 8.6-50 MG per tablet, Take 2 tablets by mouth 2 (Two) Times a Day for 30 days., Disp: 120 tablet, Rfl: 0  •  vitamin C (ASCORBIC ACID) 250 MG tablet, Take 500 mg by mouth Daily., Disp: , Rfl:   •  vitamin E 400 UNIT capsule, Take 1 capsule by mouth Daily., Disp: , Rfl:     History:   Past Medical History:   Diagnosis Date   • Arthritis    • Back pain    • BPH (benign prostatic hyperplasia)    • Diabetes mellitus (CMS/HCC)    • Diabetic peripheral neuropathy (CMS/HCC)    • Erectile dysfunction    • Former smoker    • GERD (gastroesophageal reflux disease)    • GERD (gastroesophageal reflux disease)    • Gout    • High cholesterol    • Hypertension    • Obesity        Past Surgical History:   Procedure Laterality Date   • CARDIAC CATHETERIZATION N/A 3/15/2021  "   Procedure: Left Heart Cath;  Surgeon: Trent Zaragoza MD;  Location:  [facility];  Service: Cardiology;  Laterality: N/A;   • CARDIAC CATHETERIZATION N/A 3/15/2021    Procedure: Coronary angiography;  Surgeon: Trent Zaragoza MD;  Location:  [facility];  Service: Cardiology;  Laterality: N/A;   • COLONOSCOPY W/ BIOPSIES     • CORONARY ARTERY BYPASS GRAFT N/A 3/16/2021    Procedure: MEDIAN STERNOTOMY, CORONARY ARTERY BYPASS GRAFT X4, UTILIZATION OF LEFT INTERNAL MAMMARY ARTERY, AND ENDOSCOPIC VEIN HARVEST OF RIGHT GREATER SAPHENOUS VEIN;  Surgeon: Jorge Simms MD;  Location:  [facility];  Service: Cardiothoracic;  Laterality: N/A;  vein out- 1614  vein ready- 1628                Family History   Problem Relation Age of Onset   • Heart disease Mother    • Hypertension Mother    • Depression Mother    • Alcohol abuse Maternal Uncle    • Heart disease Maternal Grandmother    • Hypertension Maternal Grandmother    • Diabetes Maternal Grandmother    • Diabetes Paternal Grandmother    • No Known Problems Half-Brother    • No Known Problems Half-Brother    • No Known Problems Half-Sister    • No Known Problems Daughter    • No Known Problems Daughter    • Hyperlipidemia Daughter        Social History     Socioeconomic History   • Marital status:      Spouse name: Not on file   • Number of children: Not on file   • Years of education: Not on file   • Highest education level: Not on file   Tobacco Use   • Smoking status: Former Smoker     Packs/day: 1.00     Years: 5.00     Pack years: 5.00     Quit date: 1974     Years since quittin.2   • Smokeless tobacco: Never Used   Substance and Sexual Activity   • Alcohol use: Never     Comment: quit in    • Drug use: Never   • Sexual activity: Defer     Physical Exam:  Vitals:    21 1839   BP: 118/64   Pulse: 99   Resp: 20   Temp: 97.9 °F (36.6 °C)   SpO2: 94%   Weight: 92.5 kg (204 lb)   Height: 175.3 cm (69\") "      Body mass index is 30.13 kg/m².    Physical Exam  Vitals and nursing note reviewed.   Constitutional:       Appearance: Normal appearance.   HENT:      Head: Normocephalic and atraumatic.   Eyes:      Pupils: Pupils are equal, round, and reactive to light.   Neck:      Vascular: No carotid bruit.   Cardiovascular:      Rate and Rhythm: Normal rate and regular rhythm.      Pulses: Normal pulses.           Radial pulses are 2+ on the right side and 2+ on the left side.        Dorsalis pedis pulses are 2+ on the right side and 2+ on the left side.        Posterior tibial pulses are 2+ on the right side and 2+ on the left side.      Heart sounds: Normal heart sounds, S1 normal and S2 normal. No murmur heard.     Pulmonary:      Effort: Pulmonary effort is normal.      Breath sounds: Normal breath sounds.   Abdominal:      Palpations: Abdomen is soft.   Musculoskeletal:         General: Normal range of motion.      Cervical back: Normal range of motion and neck supple.      Right lower leg: No edema.      Left lower leg: No edema.   Skin:     General: Skin is warm and dry.      Capillary Refill: Capillary refill takes less than 2 seconds.      Comments: Mid-sternotomy incision: wound edges well approximated, no erythema, edema, or induration  Mediastinal tubes sites: well healing without erythema, edema, or drainage  Endoscopic vein harvest site: scant surrounding erythema, without edema, or drainage. No warmth noticed, nontender   Neurological:      General: No focal deficit present.      Mental Status: He is alert and oriented to person, place, and time. Mental status is at baseline.      GCS: GCS eye subscore is 4. GCS verbal subscore is 5. GCS motor subscore is 6.      Motor: Motor function is intact.      Coordination: Coordination is intact.      Gait: Gait is intact.   Psychiatric:         Mood and Affect: Mood normal.         Speech: Speech normal.         Behavior: Behavior normal. Behavior is cooperative.          Cognition and Memory: Cognition normal.     Labs/Imaging:  XR Chest 1 View    Result Date: 4/8/2021  1.  Tiny bilateral pleural effusions. 2.  Cardiomegaly.  This report was finalized on 4/8/2021 8:55 AM by Dr. Corby Cardoza MD.      XR Chest 1 View    Result Date: 3/29/2021  No definite acute cardiopulmonary findings. There is cardiomegaly. No definite interval change. Signer Name: Roseann Feliz MD  Signed: 3/29/2021 10:02 PM  Workstation Name: [id number]  [facility]    XR Chest 1 View    Result Date: 3/23/2021  Interval surgery. Lungs are adequately aerated  This report was finalized on 3/23/2021 7:45 PM by Dr. Dayo Kumar MD.      US Venous Doppler Lower Extremity Bilateral (duplex)    Result Date: 3/30/2021  No deep venous thrombus seen in either lower extremity. There are some mildly prominent with nodes in the patient's upper thigh regions. This may be within normal limits for the patient. Signer Name: oRseann Feliz MD  Signed: 3/30/2021 12:03 AM  Workstation Name: [id number]  [facility]    Assessment / Plan:  Diagnoses and all orders for this visit:    1. Cellulitis of right lower extremity (Primary)    2. S/P CABG x 4    3. Type 2 diabetes mellitus with diabetic nephropathy, unspecified whether long term insulin use (CMS/[facility])    4. Acute on chronic congestive heart failure, unspecified heart failure type (CMS/[facility])    William Villasenor is a 70 y.o. male former smoker with a history of HTN, HLD, DM, obesity, CKD, CHF, CAD s/p CABG x 4 with right EVH by Dr Simms on 3/15/21 with multiple hospital readmissions since discharge for CH with acute hypoxia both improved with IV diuresis and most recently for RLE cellulitis with significant hyperglycemia requiring new insulin requirements and tx with 10 day course of IV vanc and zosyn (negative blood cultures no wound culture in Epic). He seems to be doing much better and required reassurance about the residual leg  pain. He post-surgical sternal pain and shortness of air have resolved and he says his leg is much better. He currently has no open wounds, no edema, no warmth or tenderness on palpation of EVH site. He does have some remaining mild surrounding erythema. Line of demarcation drawn on patient and education provided regarding s/s of worsening infection. Pt and wife are to RTC in 1 week for recheck or sooner for spreading of redness, swelling, warmth, drainage, fever chills, etc.     Patient Education:  Wound Care: continue to keep your incisions clean and dry. You may use plain antibacterial soap (i.e. dial) and water to clean and pat dry. No lotions, creams, oils, powders, salves, or ointments.       Follow Up:   Appointment scheduled for recheck next week Wednesday with myself.   Please return to clinic for any further concerns or worsening sign and symptoms. If unable to reach us in the office please dial 911 or go to the nearest emergency department.      Vianney PASCUAL  [facility] Cardiothoracic Surgery

## 2021-04-21 NOTE — PROGRESS NOTES
[facility] CHF CLINIC OFFICE VISIT    Subjective:   History of Present Illness   William Villasenor is a 70-year-old  male who presents to the clinic today for follow up on heart failure. He is accompanied by his wife.  He has underlying history of chronic diastolic congestive heart failure with an LVEF of 51 to 55% from echocardiogram on 3/24/2021.  He takes metoprolol XL 50 mg daily, lisinopril 10 mg daily, Bumex 1 mg daily.     He saw cardiology in follow-up for CABG. His lisinopril was stopped and losartan 25 mg was started.  His metoprolol XL was increased from 50 to 100 mg daily.  Farxiga 10 mg daily was started (for DM per patient report), Bumex 1 mg daily.   History of three-vessel CABG in 3/2021   Feels like his breathing is doing better  Swelling is improving  Reports they monitor his sodium intake and fluid intake  He still is recovering and very sore from his recent bypass surgery  Home weight - 206 down to 201 lbs  Home BP - 130/76, 123/77, 139/77, 118/67, 109/65, 114/72, 121/68, 108/66, 101/61  Home HR - 96, 86, 90, 91, 97, 98  He continues to express concerned at his vein graft site and redness    PCP: Dr. Gatica  Cardiologist: Dr. Simms/ANKUR Cuevas    Hospitalizations: Discharged on 4/9/2021    Past Medical History:   Diagnosis Date   • Arthritis    • Back pain    • BPH (benign prostatic hyperplasia)    • Diabetes mellitus (CMS/HCC)    • Diabetic peripheral neuropathy (CMS/HCC)    • Erectile dysfunction    • Former smoker    • GERD (gastroesophageal reflux disease)    • GERD (gastroesophageal reflux disease)    • Gout    • High cholesterol    • Hypertension    • Obesity      Past Surgical History:   Procedure Laterality Date   • CARDIAC CATHETERIZATION N/A 3/15/2021    Procedure: Left Heart Cath;  Surgeon: Trent Zaragoza MD;  Location: [facility] CATH INVASIVE LOCATION;  Service: Cardiology;  Laterality: N/A;   • CARDIAC CATHETERIZATION N/A 3/15/2021    Procedure: Coronary angiography;  Surgeon:  Trent Zaragoza MD;  Location:  [facility] CATH INVASIVE LOCATION;  Service: Cardiology;  Laterality: N/A;   • COLONOSCOPY W/ BIOPSIES     • CORONARY ARTERY BYPASS GRAFT N/A 3/16/2021    Procedure: MEDIAN STERNOTOMY, CORONARY ARTERY BYPASS GRAFT X4, UTILIZATION OF LEFT INTERNAL MAMMARY ARTERY, AND ENDOSCOPIC VEIN HARVEST OF RIGHT GREATER SAPHENOUS VEIN;  Surgeon: Jorge Simms MD;  Location:  [facility];  Service: Cardiothoracic;  Laterality: N/A;  vein out- 1614  vein ready- 1628             Social History     Socioeconomic History   • Marital status:      Spouse name: Not on file   • Number of children: Not on file   • Years of education: Not on file   • Highest education level: Not on file   Tobacco Use   • Smoking status: Former Smoker     Packs/day: 1.00     Years: 5.00     Pack years: 5.00     Quit date: 1974     Years since quittin.2   • Smokeless tobacco: Never Used   Substance and Sexual Activity   • Alcohol use: Never     Comment: quit in    • Drug use: Never   • Sexual activity: Defer     Family History   Problem Relation Age of Onset   • Heart disease Mother    • Hypertension Mother    • Depression Mother    • Alcohol abuse Maternal Uncle    • Heart disease Maternal Grandmother    • Hypertension Maternal Grandmother    • Diabetes Maternal Grandmother    • Diabetes Paternal Grandmother    • No Known Problems Half-Brother    • No Known Problems Half-Brother    • No Known Problems Half-Sister    • No Known Problems Daughter    • No Known Problems Daughter    • Hyperlipidemia Daughter      Allergies:  Allergies   Allergen Reactions   • Lisinopril Cough     Review of Systems   Constitutional: Negative for chills, fever, weight gain and weight loss.   HENT: Negative for congestion, hoarse voice and sore throat.    Eyes: Negative for blurred vision, pain and visual disturbance.   Cardiovascular: Negative for chest pain, claudication, cyanosis, dyspnea on exertion, irregular heartbeat, leg  swelling, near-syncope, orthopnea, palpitations and syncope.   Respiratory: Negative for cough, shortness of breath and wheezing.    Endocrine: Negative for cold intolerance, heat intolerance and polyuria.   Hematologic/Lymphatic: Negative for bleeding problem. Does not bruise/bleed easily.   Skin: Negative for color change, flushing and rash.   Musculoskeletal: Negative for arthritis, back pain, joint pain and myalgias.   Gastrointestinal: Negative for abdominal pain, constipation, diarrhea, nausea and vomiting.   Genitourinary: Negative for dysuria, frequency, hesitancy and urgency.   Neurological: Negative for excessive daytime sleepiness, dizziness, headaches, numbness, vertigo and weakness.   Psychiatric/Behavioral: Negative for depression. The patient does not have insomnia and is not nervous/anxious.    All other systems reviewed and are negative.    Current Outpatient Medications   Medication Sig Dispense Refill   • acetaminophen (TYLENOL) 500 MG tablet Take 500 mg by mouth Every 6 (Six) Hours As Needed for Mild Pain .     • allopurinol (ZYLOPRIM) 300 MG tablet Take 1 tablet by mouth Daily. 90 tablet 1   • aspirin (Aspirin Adult Low Dose) 81 MG EC tablet Take 81 mg by mouth Daily.     • atorvastatin (LIPITOR) 40 MG tablet Take 1 tablet by mouth Every Night. 90 tablet 3   • bumetanide (BUMEX) 0.5 MG tablet Take 2 tablets by mouth Daily for 30 days. 60 tablet 0   • clopidogrel (PLAVIX) 75 MG tablet Take 1 tablet by mouth Daily. 90 tablet 1   • Dapagliflozin Propanediol (Farxiga) 10 MG tablet Take 10 mg by mouth Daily. 30 tablet 0   • doxazosin (CARDURA) 4 MG tablet Take 4 mg by mouth Every Night.     • finasteride (PROSCAR) 5 MG tablet Take 1 tablet by mouth Daily. 90 tablet 1   • folic acid (FOLVITE) 1 MG tablet Take 1 mg by mouth 2 (two) times a day.     • gabapentin (NEURONTIN) 300 MG capsule Take 1 capsule by mouth 2 (two) times a day. 60 capsule 0   • glipizide (GLUCOTROL) 5 MG tablet Take 1 tablet by  mouth 2 (Two) Times a Day Before Meals for 30 days. 60 tablet 0   • insulin detemir (Levemir FlexTouch) 100 UNIT/ML injection Inject 35 Units under the skin into the appropriate area as directed Daily for 30 days. (Patient taking differently: Inject 25 Units under the skin into the appropriate area as directed Daily.) 45 mL 0   • metFORMIN (GLUCOPHAGE) 1000 MG tablet Take 1,000 mg by mouth 2 (Two) Times a Day With Meals.     • metoprolol succinate XL (Toprol XL) 100 MG 24 hr tablet Take 1 tablet by mouth Daily. 30 tablet 0   • multivitamin (multivitamin) tablet tablet Take 1 tablet by mouth Daily. 30 tablet    • nitroglycerin (NITROSTAT) 0.4 MG SL tablet Place 1 tablet under the tongue Every 5 (Five) Minutes As Needed for Chest Pain (Only if SBP Greater Than 100). Take no more than 3 doses in 15 minutes. 100 tablet 12   • omeprazole (priLOSEC) 20 MG capsule Take 20 mg by mouth 2 (two) times a day.     • sennosides-docusate (PERICOLACE) 8.6-50 MG per tablet Take 2 tablets by mouth 2 (Two) Times a Day for 30 days. 120 tablet 0   • vitamin C (ASCORBIC ACID) 250 MG tablet Take 500 mg by mouth Daily.     • vitamin E 400 UNIT capsule Take 1 capsule by mouth Daily.     • losartan (Cozaar) 25 MG tablet Take 0.5 tablets by mouth Daily. 30 tablet 0   • meclizine (ANTIVERT) 25 MG tablet Take 1 tablet by mouth 3 (Three) Times a Day As Needed for Dizziness. 30 tablet 0     No current facility-administered medications for this encounter.      Objective:   Body mass index is 30.13 kg/m².    Vitals:    04/21/21 1100   BP: 118/64   Pulse: 99   Resp: 20   SpO2: 94%     Body mass index is 30.13 kg/m².       Wt Readings from Last 3 Encounters:   04/21/21 92.5 kg (204 lb)   04/21/21 92.5 kg (204 lb)   04/19/21 93.9 kg (207 lb)       ReDs - 34 (4/21/2021)    Vitals reviewed.   Constitutional:       Appearance: Normal appearance. Well-developed.   Eyes:      Conjunctiva/sclera: Conjunctivae normal.   HENT:      Head: Normocephalic.    Neck:      Vascular: No JVD or JVR.   Pulmonary:      Effort: Pulmonary effort is normal.      Breath sounds: Normal breath sounds.   Chest:      Comments: Healing CABG wound  Cardiovascular:      Normal rate. Regular rhythm.      Comments: Redness present around his vein graft site, scab noted  Edema:     Ankle: bilateral 1+ edema of the ankle.     Feet: bilateral 1+ edema of the feet.  Abdominal:      General: Bowel sounds are normal.      Palpations: Abdomen is soft. There is no hepatomegaly or splenomegaly.   Musculoskeletal: Normal range of motion.      Cervical back: Normal range of motion and neck supple. Skin:     General: Skin is warm and dry.   Neurological:      Mental Status: Alert and oriented to person, place, and time.   Psychiatric:         Attention and Perception: Attention normal.         Mood and Affect: Mood normal.         Speech: Speech normal.         Behavior: Behavior normal. Behavior is cooperative.         Cognition and Memory: Cognition normal.       Cardiographics  Results for orders placed during the hospital encounter of 03/23/21    Adult Transthoracic Echo Limited W/ Cont if Necessary Per Protocol    Interpretation Summary  · Left ventricular ejection fraction appears to be 51 - 55%. Septal wall motion is abnormal, consistent with a post-operative state  · Left ventricular wall thickness is consistent with mild concentric hypertrophy.  · No significant functional valvular abnormalities noted  · There is no evidence of pericardial effusion    Imaging  XR Chest 1 View    Result Date: 4/8/2021  1.  Tiny bilateral pleural effusions. 2.  Cardiomegaly.  This report was finalized on 4/8/2021 8:55 AM by Dr. Corby Cardoza MD.      XR Chest 1 View    Result Date: 3/29/2021  No definite acute cardiopulmonary findings. There is cardiomegaly. No definite interval change. Signer Name: Roseann Feliz MD  Signed: 3/29/2021 10:02 PM  Workstation Name: [id number]  [facility]    XR Chest 1 View    Result Date: 3/23/2021  Interval surgery. Lungs are adequately aerated  This report was finalized on 3/23/2021 7:45 PM by Dr. Dayo Kumar MD.      US Venous Doppler Lower Extremity Bilateral (duplex)    Result Date: 3/30/2021  No deep venous thrombus seen in either lower extremity. There are some mildly prominent with nodes in the patient's upper thigh regions. This may be within normal limits for the patient. Signer Name: Roseann Feliz MD  Signed: 3/30/2021 12:03 AM  Workstation Name: [id number]  [facility]    XR Chest PA & Lateral    Result Date: 3/29/2021  Decreased opacifications left lung base of decreased atelectasis versus airspace disease from prior comparison 2021 with persistent small right and probable trace left pleural effusions.  D:  2021 E:  2021  This report was finalized on 3/29/2021 4:39 PM by Dr. Ronal Garvey.      [id number]      Lab Review   Lab Results   Component Value Date    TSH 2.010 2021     Lab Results   Component Value Date    GLUCOSE 115 (H) 2021    BUN 17 2021    CREATININE 1.32 (H) 2021    EGFRIFNONA 53 (L) 2021    BCR 12.9 2021    K 4.1 2021    CO2 24.7 2021    CALCIUM 9.8 2021    ALBUMIN 4.16 2021    AST 11 2021    ALT 12 2021     Lab Results   Component Value Date    WBC 7.60 2021    HGB 12.2 (L) 2021    HCT 39.3 2021    MCV 86.8 2021     2021     Lab Results   Component Value Date    TROPONINI <0.006 2018    TROPONINT <0.010 2021     Lab Results   Component Value Date    PROBNP 363.8 2021      The following portions of the patient's history were reviewed and updated as appropriate: allergies, current medications, past family history, past medical history, past social history, past surgical history and problem list.     Old records reviewed and pertinent information is included in  the above objective data.   Assessment/Plan:      Diagnosis Plan   1. Chronic diastolic congestive heart failure (CMS/HCC)  CBC & Differential    CBC & Differential    Comprehensive Metabolic Panel    Comprehensive Metabolic Panel    Lipid Panel    Lipid Panel    Troponin    Troponin    TSH    TSH    Magnesium    Magnesium    Hemoglobin A1c    Hemoglobin A1c    ReDs Vest   2. Essential hypertension  CBC & Differential    CBC & Differential    Comprehensive Metabolic Panel    Comprehensive Metabolic Panel    Lipid Panel    Lipid Panel    Troponin    Troponin    TSH    TSH    Magnesium    Magnesium    Hemoglobin A1c    Hemoglobin A1c   3. Acute renal failure with acute renal cortical necrosis superimposed on stage 2 chronic kidney disease (CMS/[facility])     4. Obesity (BMI 30-39.9)     5. S/P CABG (coronary artery bypass graft)  CBC & Differential    CBC & Differential    Comprehensive Metabolic Panel    Comprehensive Metabolic Panel    Lipid Panel    Lipid Panel    Troponin    Troponin    TSH    TSH    Magnesium    Magnesium    Hemoglobin A1c    Hemoglobin A1c   6. Coronary artery disease involving coronary bypass graft of native heart without angina pectoris  CBC & Differential    CBC & Differential    Comprehensive Metabolic Panel    Comprehensive Metabolic Panel    Lipid Panel    Lipid Panel    Troponin    Troponin    TSH    TSH    Magnesium    Magnesium    Hemoglobin A1c    Hemoglobin A1c   7. Other abnormal glucose   Hemoglobin A1c    Hemoglobin A1c     BMP, proBNP and magnesium today  Reviewed and discussed results with patient today  ReDs reviewed and discussed today  Monitor BP and heart rate  Recommend decreasing Bumex to 0.5 mg daily. Take an extra 0.5 mg Bumex if needed for 2 to 3 pound weight gain in 1 days or 5 pounds in 1 week.   Could consider spironolactone in the future if vitals and kidney function remain stable  Offered to prescribe magnesium however they report they would just get it over-the-counter  and take it twice a day.    Due to recent changes in medications and slight elevation in serum creatinine patient is advised to follow-up with cardiology for further instructions.   Discussed patient care today with cardiothoracic surgeon who evaluated patient today for graft site redness and concerns.  Counseled patient extensively on dietary Na+ intake, importance of activity, weight monitoring, compliance with medications and follow up appointments.  Follow-up in 2-3 weeks, sooner if needed

## 2021-04-21 NOTE — PROGRESS NOTES
Heart Failure Clinic    Date: 04/21/21     Vitals:    04/21/21 1100   BP: 118/64   Pulse: 99   Resp: 20   SpO2: 94%      Mask Worn: Yes     Method of arrival: Ambulatory    Weighing self daily: Yes    Monitoring Heart Failure Zones: Yes    Today's HF Zone: Yellow     Taking medications as prescribed: Yes    Edema Yes    Shortness of Air: Yes    Number of pillows used at night:<2    Educational Materials given:                                                                           ReDS Value: 34%  25-35 Optimal Value Status      Kathryn Gregory MA 04/21/21 13:30 EDT

## 2021-04-21 NOTE — OUTREACH NOTE
Sepsis Week 2 Survey      Responses   [facility] patient discharged from?  [facility]   Does the patient have one of the following disease processes/diagnoses(primary or secondary)?  Sepsis   Week 2 attempt successful?  Yes   Call start time  1250   Call end time  1252   Discharge diagnosis  A/C CHF, sepsis d/t RLE cellulitis, chronic anemia, IDDM, CKD   Person spoke with today (if not patient) and relationship  spouse- Perri   Meds reviewed with patient/caregiver?  Yes   Is the patient having any side effects they believe may be caused by any medication additions or changes?  No   Is the patient taking all medications as directed (includes completed medication regime)?  Yes   Comments regarding appointments  Appt with heart failure clinic at time of call on 4/21/21   Has the patient kept scheduled appointments due by today?  Yes   What is the patient's perception of their health status since discharge?  Improving   Is patient/caregiver able to teach back steps to recovery at home?  Rest and regain strength, Eat a balanced diet [Wife reports patient has trouble resting. ]   If the patient is a current smoker, are they able to teach back resources for cessation?  Not a smoker   Week 2 call completed?  Yes          Angelita Christianson RN

## 2021-04-21 NOTE — PROGRESS NOTES
Heart Failure Pharmacy Note  Patient Name: William Villasenor   Referring Provider: Maddi Munoz  Primary Cardiologist: Dany PASCUAL    Medication Use:   Adherence: No issues   Hx of med intolerances: None reported  Affordability: Reports issues affording Farxiga and Insulin  Retail Rx Management: None at this time     Past Medical History:   Diagnosis Date   • Arthritis    • Back pain    • BPH (benign prostatic hyperplasia)    • Diabetes mellitus (CMS/HCC)    • Diabetic peripheral neuropathy (CMS/HCC)    • Erectile dysfunction    • Former smoker    • GERD (gastroesophageal reflux disease)    • GERD (gastroesophageal reflux disease)    • Gout    • High cholesterol    • Hypertension    • Obesity      ALLERGIES: Lisinopril  Current Outpatient Medications   Medication Sig Dispense Refill   • acetaminophen (TYLENOL) 500 MG tablet Take 500 mg by mouth Every 6 (Six) Hours As Needed for Mild Pain .     • allopurinol (ZYLOPRIM) 300 MG tablet Take 1 tablet by mouth Daily. 90 tablet 1   • aspirin (Aspirin Adult Low Dose) 81 MG EC tablet Take 81 mg by mouth Daily.     • atorvastatin (LIPITOR) 40 MG tablet Take 1 tablet by mouth Every Night. 90 tablet 3   • bumetanide (BUMEX) 0.5 MG tablet Take 2 tablets by mouth Daily for 30 days. 60 tablet 0   • clopidogrel (PLAVIX) 75 MG tablet Take 1 tablet by mouth Daily. 90 tablet 1   • Dapagliflozin Propanediol (Farxiga) 10 MG tablet Take 10 mg by mouth Daily. 30 tablet 0   • doxazosin (CARDURA) 4 MG tablet Take 4 mg by mouth Every Night.     • finasteride (PROSCAR) 5 MG tablet Take 1 tablet by mouth Daily. 90 tablet 1   • folic acid (FOLVITE) 1 MG tablet Take 1 mg by mouth 2 (two) times a day.     • gabapentin (NEURONTIN) 300 MG capsule Take 1 capsule by mouth 2 (two) times a day. 60 capsule 0   • glipizide (GLUCOTROL) 5 MG tablet Take 1 tablet by mouth 2 (Two) Times a Day Before Meals for 30 days. 60 tablet 0   • insulin detemir (Levemir FlexTouch) 100 UNIT/ML injection Inject 35  Units under the skin into the appropriate area as directed Daily for 30 days. (Patient taking differently: Inject 25 Units under the skin into the appropriate area as directed Daily.) 45 mL 0   • losartan (Cozaar) 25 MG tablet Take 1 tablet by mouth Daily. 30 tablet 0   • metFORMIN (GLUCOPHAGE) 1000 MG tablet Take 1,000 mg by mouth 2 (Two) Times a Day With Meals.     • metoprolol succinate XL (Toprol XL) 100 MG 24 hr tablet Take 1 tablet by mouth Daily. 30 tablet 0   • multivitamin (multivitamin) tablet tablet Take 1 tablet by mouth Daily. 30 tablet    • nitroglycerin (NITROSTAT) 0.4 MG SL tablet Place 1 tablet under the tongue Every 5 (Five) Minutes As Needed for Chest Pain (Only if SBP Greater Than 100). Take no more than 3 doses in 15 minutes. 100 tablet 12   • omeprazole (priLOSEC) 20 MG capsule Take 20 mg by mouth 2 (two) times a day.     • sennosides-docusate (PERICOLACE) 8.6-50 MG per tablet Take 2 tablets by mouth 2 (Two) Times a Day for 30 days. 120 tablet 0   • vitamin C (ASCORBIC ACID) 250 MG tablet Take 500 mg by mouth Daily.     • vitamin E 400 UNIT capsule Take 1 capsule by mouth Daily.       No current facility-administered medications for this encounter.       Vaccination History:   Pneumonia: Needs Assessed  Annual Influenza: Needs Assessed     Objective  Vitals:    04/21/21 1100   BP: 118/64   BP Location: Left arm   Patient Position: Sitting   Cuff Size: Adult   Pulse: 99   Resp: 20   SpO2: 94%   Weight: 92.5 kg (204 lb)     Wt Readings from Last 3 Encounters:   04/21/21 92.5 kg (204 lb)   04/19/21 93.9 kg (207 lb)   04/12/21 95.3 kg (210 lb)         04/21/21  1100   Weight: 92.5 kg (204 lb)     Lab Results   Component Value Date    GLUCOSE 115 (H) 04/21/2021    BUN 17 04/21/2021    CREATININE 1.32 (H) 04/21/2021    EGFRIFNONA 53 (L) 04/21/2021    BCR 12.9 04/21/2021    K 4.1 04/21/2021    CO2 24.7 04/21/2021    CALCIUM 9.8 04/21/2021    ALBUMIN 4.16 04/21/2021    AST 11 04/21/2021    ALT 12  04/21/2021     Lab Results   Component Value Date    WBC 7.60 04/21/2021    HGB 12.2 (L) 04/21/2021    HCT 39.3 04/21/2021    MCV 86.8 04/21/2021     04/21/2021     Lab Results   Component Value Date    TROPONINI <0.006 08/07/2018    TROPONINT <0.010 04/21/2021     Lab Results   Component Value Date    PROBNP 687.6 04/12/2021     Results for orders placed during the hospital encounter of 03/23/21    Adult Transthoracic Echo Limited W/ Cont if Necessary Per Protocol    Interpretation Summary  · Left ventricular ejection fraction appears to be 51 - 55%. Septal wall motion is abnormal, consistent with a post-operative state  · Left ventricular wall thickness is consistent with mild concentric hypertrophy.  · No significant functional valvular abnormalities noted  · There is no evidence of pericardial effusion           GDMT:       Class   Drug   Dose Last Dose Adjustment   ACEi/ARB/ARNI losartan 25mg 4/19/21   Beta Blocker Toprol XL  100mg 4/19/21   MRA        Drug Therapy Problems    1. Drug Interactions Screening: Clopidogrel and omeprazole - may decrease clopidogrel efficacy.   2. Hypomagnesemia   3. Needs patient assistance     Recommendations:     1. Would recommend alternative PPI (pantoprazole) due to interaction with clopidogrel and omeprazole.  Will send to primary cardiologist.   2. Replace magnesium   3. Recommend applying for LIS (Medicare Extra Help).  Also requested a Farxiga Rx to allow patient assistance through our pharmacy via HF clinic.     Patient was educated on heart failure medications and the importance of medication adherence. All questions were addressed and patient expressed understanding.     Thank you for allowing me to participate in the care of your patient,    Imani Jackson, PharmD  04/21/21  13:43 EDT

## 2021-04-22 ENCOUNTER — TELEPHONE (OUTPATIENT)
Dept: CARDIOLOGY | Facility: CLINIC | Age: 71
End: 2021-04-22

## 2021-04-22 DIAGNOSIS — R55 NEAR SYNCOPE: ICD-10-CM

## 2021-04-22 DIAGNOSIS — R42 DIZZINESS: Primary | ICD-10-CM

## 2021-04-22 RX ORDER — LOSARTAN POTASSIUM 25 MG/1
12.5 TABLET ORAL DAILY
Qty: 30 TABLET | Refills: 0 | Status: SHIPPED | OUTPATIENT
Start: 2021-04-22 | End: 2021-05-03 | Stop reason: SDUPTHER

## 2021-04-22 RX ORDER — MECLIZINE HYDROCHLORIDE 25 MG/1
25 TABLET ORAL 3 TIMES DAILY PRN
Qty: 30 TABLET | Refills: 0 | Status: SHIPPED | OUTPATIENT
Start: 2021-04-22 | End: 2021-05-03

## 2021-04-22 NOTE — TELEPHONE ENCOUNTER
"Pt's wife, Perri, LVM stating she needs a call. Stated pt is having dizziness/lightheadedness and need to know what to do.     Per chart review, OV note from 4/19/21 is unsigned unsure of pt instructions. No dizziness/lightheadedness mentioned in ROS from that date. BP was 109/65 at appt, HR 60.       Called pt's wife, asked how long pt has had this issue, she stated that they got home Monday evening and pt started having issues. I asked how his BP is at home, she stated 115/74 this am, this pm 104/59. HR 96 this afternoon, 103 this am.   She gave pt the phone, I asked if he has any symptoms of illness. He states \"You ask this every time and every time I say no. The time you get through all this, a person could die.\" He stated he felt like he could pass out earlier. Pt stated that he's getting worse. Pt states he thinks these sx might be related to his new meds- I asked if he started any of his new meds on Monday, he stated he didn't start taking them until Tuesday. I stated that if he started rx Tues and had sx Monday, likely unrelated.  Pt's wife took the phone back, I asked if pt has had any other rx changes. She stated that pt was seen yesterday at heart clinic and that they cut his fluid pill back. I asked if they said anything about pt's current sx, she stated they didn't. States they were told \"If symptoms change, contact somebody.\" She stated pt is \"weak and frustrated.\"         "

## 2021-04-22 NOTE — TELEPHONE ENCOUNTER
3 I called Mr. Villasenor and spoke to him and his his family in regards to the new symptoms that he is having.  He reported that he was sitting in his recliner got very dizzy and lightheaded and felt like he was going to pass out.  This only lasted for approximately 30 seconds however he has remained lightheaded all day.  His wife reports that his blood sugar prior was about 114 and his blood pressure was 109/65 with a heart rate of 60.  We did make several changes yesterday including increased his metoprolol 100mg, we switch lisinopril to losartan due to cough, and due to the cost of insulin we decreased his insulin and started him back on Farxiga.  After discussion a plan was made to decrease the losartan to 12.5 mg daily order a cardiac event monitor for 14 days, prescribe him meclizine 25 mg 3 times daily as needed for dizziness and he is going to take magnesium supplementation in which his labs identified a magnesium of 1.4.  We did review his other labs in no acute interventions are needed at this time.  They are going to notify me tomorrow or on Monday morning on how he is doing.

## 2021-04-23 RX ORDER — BUMETANIDE 0.5 MG/1
0.5 TABLET ORAL DAILY
Qty: 30 TABLET | Refills: 0
Start: 2021-04-23 | End: 2021-05-07 | Stop reason: SDUPTHER

## 2021-04-23 RX ORDER — MAGNESIUM OXIDE 400 MG/1
400 TABLET ORAL 2 TIMES DAILY
Qty: 60 TABLET | Refills: 0
Start: 2021-04-23 | End: 2021-08-09

## 2021-04-28 ENCOUNTER — TELEPHONE (OUTPATIENT)
Dept: CARDIOLOGY | Facility: CLINIC | Age: 71
End: 2021-04-28

## 2021-04-28 ENCOUNTER — READMISSION MANAGEMENT (OUTPATIENT)
Dept: CALL CENTER | Facility: HOSPITAL | Age: 71
End: 2021-04-28

## 2021-04-28 ENCOUNTER — OFFICE VISIT (OUTPATIENT)
Dept: CARDIAC SURGERY | Facility: CLINIC | Age: 71
End: 2021-04-28

## 2021-04-28 VITALS
WEIGHT: 197 LBS | BODY MASS INDEX: 29.18 KG/M2 | OXYGEN SATURATION: 98 % | TEMPERATURE: 97.3 F | SYSTOLIC BLOOD PRESSURE: 128 MMHG | HEIGHT: 69 IN | HEART RATE: 77 BPM | DIASTOLIC BLOOD PRESSURE: 71 MMHG

## 2021-04-28 DIAGNOSIS — Z95.1 S/P CABG X 4: Primary | ICD-10-CM

## 2021-04-28 DIAGNOSIS — R07.9 CHEST PAIN DUE TO GERD: ICD-10-CM

## 2021-04-28 DIAGNOSIS — K21.9 CHEST PAIN DUE TO GERD: ICD-10-CM

## 2021-04-28 DIAGNOSIS — I25.810 CORONARY ARTERY DISEASE INVOLVING CORONARY BYPASS GRAFT OF NATIVE HEART WITHOUT ANGINA PECTORIS: Primary | ICD-10-CM

## 2021-04-28 PROCEDURE — 71046 X-RAY EXAM CHEST 2 VIEWS: CPT | Performed by: NURSE PRACTITIONER

## 2021-04-28 PROCEDURE — 99024 POSTOP FOLLOW-UP VISIT: CPT | Performed by: NURSE PRACTITIONER

## 2021-04-28 RX ORDER — PANTOPRAZOLE SODIUM 40 MG/1
40 TABLET, DELAYED RELEASE ORAL DAILY
Qty: 90 TABLET | Refills: 3 | Status: SHIPPED | OUTPATIENT
Start: 2021-04-28 | End: 2022-03-14

## 2021-04-28 NOTE — PROGRESS NOTES
[facility] Cardiothoracic Surgery Office Follow Up Note     Date of Encounter: 04/28/2021     MRN Number: [id number]  Name: William Villasenor  Phone Number: [phone]     Referred By: No ref. provider found  PCP: Elo Quiles APRN    Chief Complaint:    Chief Complaint   Patient presents with   • Post-op Follow-up     hosp f/u S/P CABG x4 on 3/16 with ROM.        History of Present Illness:    William Villasenor is a 71 y.o. male former smoker with a history of HTN, HLD, DM, obesity, CKD, CAD s/p CABG x 4 with right EVH by Dr Simms on 3/15/21. He did have two post-op hospital visit for subacute cardiogenic pulmonary edema with acute hypoxia requiring diuresis,  RLE cellulitis in the setting of significant hyperglycemia (400s) tx with a 10 day course of IV antibiotics (negative blood cultures no wound culture results in epic). He is being followed closely by the heart valve clinic in [place] and is undergoing some changes to his medication regiment because he has been experiencing some orthostasis. I saw him in clinic last week regarding his RLE cellulitis and Right EVH site. Line of demarcation made. He returns to clinic for re-eval. Patient has not experiencing any fever, chills, body aches, drainage or warmth to site since our visit. His post-surgical sternal pain has improved. He is ambulating more with PT visits twice weekly. He has had no productive cough or SOA, however no longer using his IS. His sugars are much improved and are averaging 120s-150s with few readings in the 200s (beging of April all readings were over 200).     Review of Systems:  Review of Systems   Constitutional: Positive for malaise/fatigue. Negative for chills, decreased appetite, diaphoresis, fever, night sweats, weight gain and weight loss.   HENT: Negative for hoarse voice.    Eyes: Negative for blurred vision, double vision and visual disturbance.   Cardiovascular: Positive for leg swelling. Negative for chest pain,  claudication, dyspnea on exertion, irregular heartbeat, near-syncope, orthopnea, palpitations, paroxysmal nocturnal dyspnea and syncope.   Respiratory: Positive for cough. Negative for hemoptysis, shortness of breath, sputum production and wheezing.    Hematologic/Lymphatic: Negative for adenopathy and bleeding problem. Does not bruise/bleed easily.   Skin: Positive for poor wound healing ( RT leg incision ). Negative for color change, nail changes and rash.   Musculoskeletal: Positive for joint pain. Negative for back pain, falls and muscle cramps.   Gastrointestinal: Negative for abdominal pain, dysphagia and heartburn.   Genitourinary: Negative for flank pain.   Neurological: Positive for dizziness, focal weakness and light-headedness. Negative for brief paralysis, disturbances in coordination, headaches, loss of balance, numbness, paresthesias, sensory change, vertigo and weakness.   Psychiatric/Behavioral: Negative for depression and suicidal ideas.   Allergic/Immunologic: Negative for persistent infections.       I have reviewed the following portions of the patient's history: allergies, current medications, past family history, past medical history, past social history, past surgical history and problem list and confirm it's accurate.    Allergies:  Allergies   Allergen Reactions   • Lisinopril Cough       Medications:      Current Outpatient Medications:   •  acetaminophen (TYLENOL) 500 MG tablet, Take 500 mg by mouth Every 6 (Six) Hours As Needed for Mild Pain ., Disp: , Rfl:   •  allopurinol (ZYLOPRIM) 300 MG tablet, Take 1 tablet by mouth Daily., Disp: 90 tablet, Rfl: 1  •  Alpha Lipoic Acid-Vitamin E 100-30 MG-UNIT capsule, Take  by mouth., Disp: , Rfl:   •  aspirin (Aspirin Adult Low Dose) 81 MG EC tablet, Take 81 mg by mouth Daily., Disp: , Rfl:   •  atorvastatin (LIPITOR) 40 MG tablet, Take 1 tablet by mouth Every Night., Disp: 90 tablet, Rfl: 3  •  bumetanide (BUMEX) 0.5 MG tablet, Take 1 tablet by  mouth Daily for 30 days., Disp: 30 tablet, Rfl: 0  •  calcium citrate-vitamin d (CALCITRATE) 315-250 MG-UNIT tablet tablet, Take  by mouth Daily., Disp: , Rfl:   •  clopidogrel (PLAVIX) 75 MG tablet, Take 1 tablet by mouth Daily., Disp: 90 tablet, Rfl: 1  •  Dapagliflozin Propanediol (Farxiga) 10 MG tablet, Take 10 mg by mouth Daily., Disp: 30 tablet, Rfl: 0  •  doxazosin (CARDURA) 4 MG tablet, Take 4 mg by mouth Every Night., Disp: , Rfl:   •  Echinacea-Goldenseal (IMMUNE HEALTH BLEND PO), Take  by mouth., Disp: , Rfl:   •  finasteride (PROSCAR) 5 MG tablet, Take 1 tablet by mouth Daily., Disp: 90 tablet, Rfl: 1  •  folic acid (FOLVITE) 1 MG tablet, Take 1 mg by mouth 2 (two) times a day., Disp: , Rfl:   •  gabapentin (NEURONTIN) 300 MG capsule, Take 1 capsule by mouth 2 (two) times a day., Disp: 60 capsule, Rfl: 0  •  glipizide (GLUCOTROL) 5 MG tablet, Take 1 tablet by mouth 2 (Two) Times a Day Before Meals for 30 days., Disp: 60 tablet, Rfl: 0  •  insulin detemir (Levemir FlexTouch) 100 UNIT/ML injection, Inject 35 Units under the skin into the appropriate area as directed Daily for 30 days. (Patient taking differently: Inject 25 Units under the skin into the appropriate area as directed Daily.), Disp: 45 mL, Rfl: 0  •  losartan (Cozaar) 25 MG tablet, Take 0.5 tablets by mouth Daily., Disp: 30 tablet, Rfl: 0  •  magnesium oxide (MAG-OX) 400 MG tablet, Take 1 tablet by mouth 2 (Two) Times a Day. Patient will get otc and take, Disp: 60 tablet, Rfl: 0  •  meclizine (ANTIVERT) 25 MG tablet, Take 1 tablet by mouth 3 (Three) Times a Day As Needed for Dizziness., Disp: 30 tablet, Rfl: 0  •  metFORMIN (GLUCOPHAGE) 1000 MG tablet, Take 1,000 mg by mouth 2 (Two) Times a Day With Meals., Disp: , Rfl:   •  metoprolol succinate XL (Toprol XL) 100 MG 24 hr tablet, Take 1 tablet by mouth Daily., Disp: 30 tablet, Rfl: 0  •  Misc Natural Products (LUTEIN 20 PO), Take 1 capsule by mouth., Disp: , Rfl:   •  multivitamin  (multivitamin) tablet tablet, Take 1 tablet by mouth Daily., Disp: 30 tablet, Rfl:   •  nitroglycerin (NITROSTAT) 0.4 MG SL tablet, Place 1 tablet under the tongue Every 5 (Five) Minutes As Needed for Chest Pain (Only if SBP Greater Than 100). Take no more than 3 doses in 15 minutes., Disp: 100 tablet, Rfl: 12  •  pantoprazole (Protonix) 40 MG EC tablet, Take 1 tablet by mouth Daily., Disp: 90 tablet, Rfl: 3  •  vitamin C (ASCORBIC ACID) 250 MG tablet, Take 500 mg by mouth Daily., Disp: , Rfl:   •  vitamin E 400 UNIT capsule, Take 1 capsule by mouth Daily., Disp: , Rfl:     History:   Past Medical History:   Diagnosis Date   • Arthritis    • Back pain    • BPH (benign prostatic hyperplasia)    • Diabetes mellitus (CMS/HCC)    • Diabetic peripheral neuropathy (CMS/HCC)    • Erectile dysfunction    • Former smoker    • GERD (gastroesophageal reflux disease)    • GERD (gastroesophageal reflux disease)    • Gout    • High cholesterol    • Hypertension    • Obesity        Past Surgical History:   Procedure Laterality Date   • CARDIAC CATHETERIZATION N/A 3/15/2021    Procedure: Left Heart Cath;  Surgeon: Trent Zaragoza MD;  Location:  [facility];  Service: Cardiology;  Laterality: N/A;   • CARDIAC CATHETERIZATION N/A 3/15/2021    Procedure: Coronary angiography;  Surgeon: Trent Zaragoza MD;  Location:  [facility];  Service: Cardiology;  Laterality: N/A;   • COLONOSCOPY W/ BIOPSIES  2015   • CORONARY ARTERY BYPASS GRAFT N/A 3/16/2021    Procedure: MEDIAN STERNOTOMY, CORONARY ARTERY BYPASS GRAFT X4, UTILIZATION OF LEFT INTERNAL MAMMARY ARTERY, AND ENDOSCOPIC VEIN HARVEST OF RIGHT GREATER SAPHENOUS VEIN;  Surgeon: Jorge Simms MD;  [facility];  Service: Cardiothoracic;  Laterality: N/A;  vein out- 1614  vein ready- 1628                Family History   Problem Relation Age of Onset   • Heart disease Mother    • Hypertension Mother    • Depression Mother    • Alcohol abuse  "Maternal Uncle    • Heart disease Maternal Grandmother    • Hypertension Maternal Grandmother    • Diabetes Maternal Grandmother    • Diabetes Paternal Grandmother    • No Known Problems Half-Brother    • No Known Problems Half-Brother    • No Known Problems Half-Sister    • No Known Problems Daughter    • No Known Problems Daughter    • Hyperlipidemia Daughter        Social History     Socioeconomic History   • Marital status:      Spouse name: Not on file   • Number of children: 3   • Years of education: Not on file   • Highest education level: Not on file   Tobacco Use   • Smoking status: Former Smoker     Packs/day: 1.00     Years: 5.00     Pack years: 5.00     Quit date: 1974     Years since quittin.2   • Smokeless tobacco: Never Used   Substance and Sexual Activity   • Alcohol use: Never     Comment: quit in    • Drug use: Never   • Sexual activity: Defer     Physical Exam:  Vitals:    21 0912   BP: 128/71   BP Location: Left arm   Pulse: 77   Temp: 97.3 °F (36.3 °C)   SpO2: 98%   Weight: 89.4 kg (197 lb)   Height: 175.3 cm (69\")      Body mass index is 29.09 kg/m².    Physical Exam  Vitals and nursing note reviewed.   Constitutional:       Appearance: Normal appearance. He is overweight.      Comments: wheelchair   HENT:      Head: Normocephalic and atraumatic.   Eyes:      Pupils: Pupils are equal, round, and reactive to light.   Neck:      Vascular: No carotid bruit.   Cardiovascular:      Rate and Rhythm: Normal rate and regular rhythm.      Pulses: Normal pulses.           Radial pulses are 2+ on the right side and 2+ on the left side.        Dorsalis pedis pulses are 2+ on the right side and 2+ on the left side.        Posterior tibial pulses are 2+ on the right side and 2+ on the left side.      Heart sounds: Normal heart sounds, S1 normal and S2 normal. No murmur heard.     Pulmonary:      Effort: Pulmonary effort is normal.      Breath sounds: Normal breath sounds. "   Abdominal:      Palpations: Abdomen is soft.   Musculoskeletal:         General: Normal range of motion.      Cervical back: Normal range of motion and neck supple.      Right lower leg: No edema.      Left lower leg: No edema.   Skin:     General: Skin is warm and dry.      Capillary Refill: Capillary refill takes less than 2 seconds.      Comments: Mid-sternotomy incision: wound edges well approximated, no erythema, edema, or induration  Mediastinal tubes sites: well healing without erythema, edema, or drainage. No skin opening or sternal wire punctures. Mild distal tenderness on palpation  Endoscopic vein harvest site: improvement in scant surrounding erythema, no growth outside the line of demarcation; no edema, underlying induration or drainage. No warmth noticed, nontender   Neurological:      General: No focal deficit present.      Mental Status: He is alert and oriented to person, place, and time. Mental status is at baseline.      GCS: GCS eye subscore is 4. GCS verbal subscore is 5. GCS motor subscore is 6.      Motor: Motor function is intact.      Coordination: Coordination is intact.      Gait: Gait is intact.   Psychiatric:         Mood and Affect: Mood normal.         Speech: Speech normal.         Behavior: Behavior normal. Behavior is cooperative.         Cognition and Memory: Cognition normal.           Labs/Imaging:  Chest x-ray in office today: Lungs fully expanded and well-inflated. No signs of pneumothorax, pleural effusion, or acute airspace consolidation noted. All sternal wires closed and intact. Last distal sternal wire deviated to right from midline. Cardiomediastinal structures appear within normal limits. Personally reviewed.  Official radiology report pending    XR Chest 1 View    Result Date: 4/8/2021  1.  Tiny bilateral pleural effusions. 2.  Cardiomegaly.  This report was finalized on 4/8/2021 8:55 AM by Dr. Corby Cardoza MD.          Assessment / Plan:  Diagnoses and all orders  for this visit:    1. S/P CABG x 4 (Primary)    William Villasenor is a 71 y.o. male former smoker with a history of HTN, HLD, DM, obesity, CKD, CAD s/p CABG x 4 with right EVH by Dr Simms on 3/15/21 who resents for his second postop follow-up regarding right EVH site cellulitis wound check.  Wound is slow healing but progressing nicely.  Erythema has not cross light of demarcation.  There is no warmth, tenderness, or drainage from the site.  Skin is closed.  Patient has had no constitutional symptoms and his sugars are improving weekly.  Encouragement and reassurance provided to patient and wife regarding progressive wound healing and postoperative status. Patient should continue to increase his activity/ambulation as his strength returns and use IS while sitting in recliner. Patient has close follow-up with PCP tomorrow, cardiology on 5/7, heart valve clinic, and finally endocrinology on 7/7.  Patient has received heart monitor and was instructed to return to heart valve clinic for placement.  We will follow-up with patient in 1 month for reeval and wound check.    Patient Education:  Wound Care: continue to keep your incisions clean and dry. You may use plain antibacterial soap (i.e. dial) and water to clean and pat dry. Peroxide as needed. No lotions, creams, oils, powders, salves, or ointments.         Follow Up:   Return in about 4 weeks (around 5/26/2021) for Recheck wound.   Or sooner for any further concerns or worsening sign and symptoms. If unable to reach us in the office please dial 911 or go to the nearest emergency department.      Vianney PASCUAL  [facility] Cardiothoracic Surgery

## 2021-04-28 NOTE — OUTREACH NOTE
Sepsis Week 3 Survey      Responses   [facility] patient discharged from?  [facility]   Does the patient have one of the following disease processes/diagnoses(primary or secondary)?  Sepsis   Week 3 attempt successful?  Yes   Call start time  1422   Call end time  1426   Discharge diagnosis  A/C CHF, sepsis d/t RLE cellulitis, chronic anemia, IDDM, CKD   Meds reviewed with patient/caregiver?  Yes   Is the patient taking all medications as directed (includes completed medication regime)?  Yes   Has the patient kept scheduled appointments due by today?  Yes   What is the Home health agency?   [facility]   What is the patient's perception of their health status since discharge?  Improving   Additional teach back comments  Has a heart moniter to wear   Week 3 call completed?  Yes          Dana Staley RN

## 2021-04-28 NOTE — TELEPHONE ENCOUNTER
Left message to call office. Mary Blue LPN    ----- Message from ANKUR Cuevas sent at 4/28/2021 12:50 AM EDT -----  Will you please let Mr. Villasenor know the medication change that was recommended it was associated with his reflux medication and potential interaction with decreasing the effectiveness of Plavix.  I sent in Protonix which is more suitable to his pharmacy.

## 2021-04-29 ENCOUNTER — OFFICE VISIT (OUTPATIENT)
Dept: FAMILY MEDICINE CLINIC | Facility: CLINIC | Age: 71
End: 2021-04-29

## 2021-04-29 ENCOUNTER — TELEPHONE (OUTPATIENT)
Dept: CARDIOLOGY | Facility: CLINIC | Age: 71
End: 2021-04-29

## 2021-04-29 VITALS
OXYGEN SATURATION: 99 % | BODY MASS INDEX: 30.72 KG/M2 | HEIGHT: 69 IN | HEART RATE: 92 BPM | WEIGHT: 207.4 LBS | TEMPERATURE: 96.4 F | SYSTOLIC BLOOD PRESSURE: 122 MMHG | DIASTOLIC BLOOD PRESSURE: 68 MMHG

## 2021-04-29 DIAGNOSIS — Z79.4 TYPE 2 DIABETES MELLITUS WITH HYPERGLYCEMIA, WITH LONG-TERM CURRENT USE OF INSULIN (HCC): Primary | ICD-10-CM

## 2021-04-29 DIAGNOSIS — Z91.199 NONCOMPLIANCE: ICD-10-CM

## 2021-04-29 DIAGNOSIS — E11.65 TYPE 2 DIABETES MELLITUS WITH HYPERGLYCEMIA, WITH LONG-TERM CURRENT USE OF INSULIN (HCC): Primary | ICD-10-CM

## 2021-04-29 PROCEDURE — 99213 OFFICE O/P EST LOW 20 MIN: CPT | Performed by: FAMILY MEDICINE

## 2021-04-29 NOTE — TELEPHONE ENCOUNTER
Patient informed of changing from Omeprazole to Protonix. Patient verbalized understanding. Mary Blue LPN

## 2021-04-29 NOTE — TELEPHONE ENCOUNTER
----- Message from ANKUR Cuevas sent at 4/28/2021 12:50 AM EDT -----  Will you please let Mr. Villasenor know the medication change that was recommended it was associated with his reflux medication and potential interaction with decreasing the effectiveness of Plavix.  I sent in Protonix which is more suitable to his pharmacy.

## 2021-05-02 LAB
BH CV ECHO MEAS - % IVS THICK: 56.8 %
BH CV ECHO MEAS - % LVPW THICK: 36.6 %
BH CV ECHO MEAS - ACS: 2.6 CM
BH CV ECHO MEAS - AO ROOT AREA (BSA CORRECTED): 1.8
BH CV ECHO MEAS - AO ROOT AREA: 11.6 CM^2
BH CV ECHO MEAS - AO ROOT DIAM: 3.9 CM
BH CV ECHO MEAS - BSA(HAYCOCK): 2.2 M^2
BH CV ECHO MEAS - BSA: 2.1 M^2
BH CV ECHO MEAS - BZI_BMI: 30.6 KILOGRAMS/M^2
BH CV ECHO MEAS - BZI_METRIC_HEIGHT: 175.3 CM
BH CV ECHO MEAS - BZI_METRIC_WEIGHT: 93.9 KG
BH CV ECHO MEAS - EDV(CUBED): 105.8 ML
BH CV ECHO MEAS - EDV(MOD-SP4): 56.3 ML
BH CV ECHO MEAS - EDV(TEICH): 103.9 ML
BH CV ECHO MEAS - EF(CUBED): 52.9 %
BH CV ECHO MEAS - EF(MOD-SP4): 57 %
BH CV ECHO MEAS - EF(TEICH): 44.8 %
BH CV ECHO MEAS - ESV(CUBED): 49.8 ML
BH CV ECHO MEAS - ESV(MOD-SP4): 24.2 ML
BH CV ECHO MEAS - ESV(TEICH): 57.4 ML
BH CV ECHO MEAS - FS: 22.2 %
BH CV ECHO MEAS - IVS/LVPW: 0.85
BH CV ECHO MEAS - IVSD: 1.2 CM
BH CV ECHO MEAS - IVSS: 1.9 CM
BH CV ECHO MEAS - LA DIMENSION: 3.3 CM
BH CV ECHO MEAS - LA/AO: 0.84
BH CV ECHO MEAS - LV DIASTOLIC VOL/BSA (35-75): 26.9 ML/M^2
BH CV ECHO MEAS - LV MASS(C)D: 243.6 GRAMS
BH CV ECHO MEAS - LV MASS(C)DI: 116.2 GRAMS/M^2
BH CV ECHO MEAS - LV MASS(C)S: 311.5 GRAMS
BH CV ECHO MEAS - LV MASS(C)SI: 148.6 GRAMS/M^2
BH CV ECHO MEAS - LV SYSTOLIC VOL/BSA (12-30): 11.5 ML/M^2
BH CV ECHO MEAS - LVIDD: 4.7 CM
BH CV ECHO MEAS - LVIDS: 3.7 CM
BH CV ECHO MEAS - LVLD AP4: 7.3 CM
BH CV ECHO MEAS - LVLS AP4: 7.2 CM
BH CV ECHO MEAS - LVOT AREA (M): 2.8 CM^2
BH CV ECHO MEAS - LVOT AREA: 2.8 CM^2
BH CV ECHO MEAS - LVOT DIAM: 1.9 CM
BH CV ECHO MEAS - LVPWD: 1.4 CM
BH CV ECHO MEAS - LVPWS: 1.9 CM
BH CV ECHO MEAS - MV A MAX VEL: 64.3 CM/SEC
BH CV ECHO MEAS - MV E MAX VEL: 62.1 CM/SEC
BH CV ECHO MEAS - MV E/A: 0.97
BH CV ECHO MEAS - PA ACC TIME: 0.09 SEC
BH CV ECHO MEAS - PA PR(ACCEL): 40.8 MMHG
BH CV ECHO MEAS - RVDD: 1.9 CM
BH CV ECHO MEAS - SI(CUBED): 26.7 ML/M^2
BH CV ECHO MEAS - SI(MOD-SP4): 15.3 ML/M^2
BH CV ECHO MEAS - SI(TEICH): 22.2 ML/M^2
BH CV ECHO MEAS - SV(CUBED): 56 ML
BH CV ECHO MEAS - SV(MOD-SP4): 32.1 ML
BH CV ECHO MEAS - SV(TEICH): 46.5 ML
MAXIMAL PREDICTED HEART RATE: 149 BPM
STRESS TARGET HR: 127 BPM

## 2021-05-03 ENCOUNTER — LAB (OUTPATIENT)
Dept: LAB | Facility: HOSPITAL | Age: 71
End: 2021-05-03

## 2021-05-03 ENCOUNTER — TELEPHONE (OUTPATIENT)
Dept: CARDIOLOGY | Facility: CLINIC | Age: 71
End: 2021-05-03

## 2021-05-03 ENCOUNTER — OFFICE VISIT (OUTPATIENT)
Dept: CARDIOLOGY | Facility: CLINIC | Age: 71
End: 2021-05-03

## 2021-05-03 VITALS — SYSTOLIC BLOOD PRESSURE: 129 MMHG | HEART RATE: 86 BPM | DIASTOLIC BLOOD PRESSURE: 78 MMHG

## 2021-05-03 DIAGNOSIS — Z95.1 S/P CABG X 4: ICD-10-CM

## 2021-05-03 DIAGNOSIS — B37.2 YEAST DERMATITIS: ICD-10-CM

## 2021-05-03 DIAGNOSIS — R42 DIZZINESS: ICD-10-CM

## 2021-05-03 DIAGNOSIS — S81.801D WOUND OF RIGHT LOWER EXTREMITY, SUBSEQUENT ENCOUNTER: ICD-10-CM

## 2021-05-03 DIAGNOSIS — Z95.1 S/P CABG (CORONARY ARTERY BYPASS GRAFT): Primary | ICD-10-CM

## 2021-05-03 DIAGNOSIS — Z95.1 S/P CABG (CORONARY ARTERY BYPASS GRAFT): ICD-10-CM

## 2021-05-03 DIAGNOSIS — R94.30 CARDIAC LEFT VENTRICULAR EJECTION FRACTION 30-35 PERCENT: ICD-10-CM

## 2021-05-03 PROCEDURE — 87205 SMEAR GRAM STAIN: CPT

## 2021-05-03 PROCEDURE — 99214 OFFICE O/P EST MOD 30 MIN: CPT | Performed by: NURSE PRACTITIONER

## 2021-05-03 PROCEDURE — 87147 CULTURE TYPE IMMUNOLOGIC: CPT

## 2021-05-03 PROCEDURE — 87077 CULTURE AEROBIC IDENTIFY: CPT

## 2021-05-03 PROCEDURE — 87070 CULTURE OTHR SPECIMN AEROBIC: CPT

## 2021-05-03 PROCEDURE — 87186 SC STD MICRODIL/AGAR DIL: CPT

## 2021-05-03 RX ORDER — SACUBITRIL AND VALSARTAN 24; 26 MG/1; MG/1
1 TABLET, FILM COATED ORAL 2 TIMES DAILY
Qty: 180 TABLET | Refills: 3 | Status: SHIPPED | OUTPATIENT
Start: 2021-05-03 | End: 2021-06-16 | Stop reason: SDUPTHER

## 2021-05-03 RX ORDER — MECLIZINE HYDROCHLORIDE 25 MG/1
25 TABLET ORAL 3 TIMES DAILY PRN
Qty: 30 TABLET | Refills: 0 | Status: SHIPPED | OUTPATIENT
Start: 2021-05-03 | End: 2022-04-15

## 2021-05-03 RX ORDER — SULFAMETHOXAZOLE AND TRIMETHOPRIM 800; 160 MG/1; MG/1
1 TABLET ORAL 2 TIMES DAILY
Qty: 20 TABLET | Refills: 0 | Status: SHIPPED | OUTPATIENT
Start: 2021-05-03 | End: 2021-05-21

## 2021-05-03 RX ORDER — SULFAMETHOXAZOLE AND TRIMETHOPRIM 800; 160 MG/1; MG/1
1 TABLET ORAL 2 TIMES DAILY
Qty: 20 TABLET | Refills: 0 | Status: SHIPPED | OUTPATIENT
Start: 2021-05-03 | End: 2021-05-03

## 2021-05-03 RX ORDER — SACUBITRIL AND VALSARTAN 24; 26 MG/1; MG/1
1 TABLET, FILM COATED ORAL 2 TIMES DAILY
Qty: 60 TABLET | Refills: 11 | Status: SHIPPED | OUTPATIENT
Start: 2021-05-03 | End: 2021-05-03

## 2021-05-03 RX ORDER — FLUCONAZOLE 200 MG/1
200 TABLET ORAL DAILY
Qty: 2 TABLET | Refills: 2 | Status: SHIPPED | OUTPATIENT
Start: 2021-05-03 | End: 2021-06-16

## 2021-05-03 NOTE — PROGRESS NOTES
Subjective     William Villasenor is a 71 y.o. male who presents to day for No chief complaint on file..    CHIEF COMPLIANT  No chief complaint on file.      Active Problems:  Problem List Items Addressed This Visit        Cardiac and Vasculature    S/P CABG x 4    Relevant Medications    sacubitril-valsartan (Entresto) 24-26 MG tablet      Other Visit Diagnoses     S/P CABG (coronary artery bypass graft)    -  Primary    Relevant Medications    sulfamethoxazole-trimethoprim (Bactrim DS) 800-160 MG per tablet    Other Relevant Orders    Wound Culture - Wound, Leg, Right    Wound of right lower extremity, subsequent encounter        Relevant Medications    sulfamethoxazole-trimethoprim (Bactrim DS) 800-160 MG per tablet    Cardiac left ventricular ejection fraction 30-35 percent        Relevant Medications    sacubitril-valsartan (Entresto) 24-26 MG tablet    Yeast dermatitis        Relevant Medications    fluconazole (DIFLUCAN) 200 MG tablet       Problem list  1.  Coronary artery disease status post coronary artery bypass grafting x4  1.1 coronary artery bypass grafting 3/21: LIMA to LAD, SVG to diagonal, SVG to OM1, SVG to OM 2.  Residual  of RCA  1.2 echocardiogram 4/21: EF 35± percent, LV dyssynchrony  2.  Essential hypertension  3.  Diastolic dysfunction  4.  Chest pain  5.  Shortness of breath  6.  Lower extremity edema    HPI  HPI    Mr. Villasenor is a 71-year-old male patient who is being followed up today status post coronary artery bypass grafting in which we repeated an echocardiogram post bypass.  It does look as if his ejection fraction has dropped from 50 to 55% down to 35± percent.  He continues to report low level dyspnea with exertion also reports fatigue which she gets tired easily.  We did discuss a plan of repeating echocardiogram in 3 months and the potential need for implantable cardioverter defibrillator.  In addition patient reports that he has pressure behind his sternum which was discussed  earlier.  It is felt that this is most likely musculoskeletal due to his previous exacerbation with coughing.  Patient also has chronic arterial hypertension which is blood pressures well controlled at 129/78 on his current blood pressure medication regimen.  He also provided a log which did correspond closely to what his blood pressures been running.  He has not taken his insulin in approximately 5 days due to low normal blood glucose.  We will discontinue the insulin at this time and he will continue with the Farxiga and Metformin.  He is also concerned about the wound on his right lower extremity from the harvest site.  It does seem to be erythemic and has a small amount of yellow exudate.  He denies fever or chills.  He is overly concerned that this is infected due to the fact that previous history of infection that caused his hospital stay to be prolonged.      PRIOR MEDS  Current Outpatient Medications on File Prior to Visit   Medication Sig Dispense Refill   • acetaminophen (TYLENOL) 500 MG tablet Take 500 mg by mouth Every 6 (Six) Hours As Needed for Mild Pain .     • allopurinol (ZYLOPRIM) 300 MG tablet Take 1 tablet by mouth Daily. 90 tablet 1   • Alpha Lipoic Acid-Vitamin E 100-30 MG-UNIT capsule Take  by mouth.     • aspirin (Aspirin Adult Low Dose) 81 MG EC tablet Take 81 mg by mouth Daily.     • atorvastatin (LIPITOR) 40 MG tablet Take 1 tablet by mouth Every Night. 90 tablet 3   • bumetanide (BUMEX) 0.5 MG tablet Take 1 tablet by mouth Daily for 30 days. 30 tablet 0   • calcium citrate-vitamin d (CALCITRATE) 315-250 MG-UNIT tablet tablet Take  by mouth Daily.     • clopidogrel (PLAVIX) 75 MG tablet Take 1 tablet by mouth Daily. 90 tablet 1   • doxazosin (CARDURA) 4 MG tablet Take 4 mg by mouth Every Night.     • finasteride (PROSCAR) 5 MG tablet Take 1 tablet by mouth Daily. 90 tablet 1   • folic acid (FOLVITE) 1 MG tablet Take 1 mg by mouth 2 (two) times a day.     • gabapentin (NEURONTIN) 300 MG  capsule Take 1 capsule by mouth 2 (two) times a day. 60 capsule 0   • glipizide (GLUCOTROL) 5 MG tablet Take 1 tablet by mouth 2 (Two) Times a Day Before Meals for 30 days. 60 tablet 0   • insulin detemir (Levemir FlexTouch) 100 UNIT/ML injection Inject 35 Units under the skin into the appropriate area as directed Daily for 30 days. (Patient taking differently: Inject 25 Units under the skin into the appropriate area as directed Daily.) 45 mL 0   • magnesium oxide (MAG-OX) 400 MG tablet Take 1 tablet by mouth 2 (Two) Times a Day. Patient will get otc and take 60 tablet 0   • metFORMIN (GLUCOPHAGE) 1000 MG tablet Take 1,000 mg by mouth 2 (Two) Times a Day With Meals.     • metoprolol succinate XL (Toprol XL) 100 MG 24 hr tablet Take 1 tablet by mouth Daily. 30 tablet 0   • Misc Natural Products (LUTEIN 20 PO) Take 1 capsule by mouth.     • multivitamin (multivitamin) tablet tablet Take 1 tablet by mouth Daily. 30 tablet    • nitroglycerin (NITROSTAT) 0.4 MG SL tablet Place 1 tablet under the tongue Every 5 (Five) Minutes As Needed for Chest Pain (Only if SBP Greater Than 100). Take no more than 3 doses in 15 minutes. 100 tablet 12   • pantoprazole (Protonix) 40 MG EC tablet Take 1 tablet by mouth Daily. 90 tablet 3   • vitamin C (ASCORBIC ACID) 250 MG tablet Take 500 mg by mouth Daily.     • vitamin E 400 UNIT capsule Take 1 capsule by mouth Daily.     • [DISCONTINUED] losartan (Cozaar) 25 MG tablet Take 0.5 tablets by mouth Daily. 30 tablet 0   • [DISCONTINUED] meclizine (ANTIVERT) 25 MG tablet Take 1 tablet by mouth 3 (Three) Times a Day As Needed for Dizziness. 30 tablet 0     No current facility-administered medications on file prior to visit.       ALLERGIES  Lisinopril    HISTORY  Past Medical History:   Diagnosis Date   • Arthritis    • Back pain    • BPH (benign prostatic hyperplasia)    • Diabetes mellitus (CMS/HCC)    • Diabetic peripheral neuropathy (CMS/HCC)    • Erectile dysfunction    • Former smoker       • GERD (gastroesophageal reflux disease)    • GERD (gastroesophageal reflux disease)    • Gout    • High cholesterol    • Hypertension    • Obesity        Social History     Socioeconomic History   • Marital status:      Spouse name: Not on file   • Number of children: 3   • Years of education: Not on file   • Highest education level: Not on file   Tobacco Use   • Smoking status: Former Smoker     Packs/day: 1.00     Years: 5.00     Pack years: 5.00     Quit date: 1974     Years since quittin.2   • Smokeless tobacco: Never Used   Substance and Sexual Activity   • Alcohol use: Never     Comment: quit in    • Drug use: Never   • Sexual activity: Defer       Family History   Problem Relation Age of Onset   • Heart disease Mother    • Hypertension Mother    • Depression Mother    • Alcohol abuse Maternal Uncle    • Heart disease Maternal Grandmother    • Hypertension Maternal Grandmother    • Diabetes Maternal Grandmother    • Diabetes Paternal Grandmother    • No Known Problems Half-Brother    • No Known Problems Half-Brother    • No Known Problems Half-Sister    • No Known Problems Daughter    • No Known Problems Daughter    • Hyperlipidemia Daughter        Review of Systems   Constitutional: Positive for fatigue. Negative for chills and fever.   HENT: Negative for congestion.    Respiratory: Positive for chest tightness and shortness of breath.    Cardiovascular: Positive for leg swelling.   Gastrointestinal: Negative for abdominal pain, nausea and vomiting.   Skin: Positive for wound.   Neurological: Negative for dizziness and light-headedness.   Hematological: Bruises/bleeds easily.   Psychiatric/Behavioral: Negative for sleep disturbance.       Objective     VITALS: There were no vitals taken for this visit.    LABS:   Lab Results (most recent)     None          IMAGING:   XR Chest 2 View    Result Date: 2021  There is blunting of the costophrenic angles bilaterally. The lung fields are  otherwise clear. No new focal parenchymal opacification present.  D:  04/28/2021 E:  04/28/2021  This report was finalized on 4/28/2021 4:47 PM by Dr. Tiffanie Page MD.      XR Chest 2 View    Result Date: 3/13/2021  No acute cardiopulmonary findings. Signer Name: Boaz Bowman MD  Signed: 3/13/2021 10:56 PM  Workstation Name: [id number]  [facility]    US Abdomen Complete    Result Date: 3/14/2021    No acute findings in the abdomen.  This report was finalized on 3/14/2021 11:10 AM by Dr. Catrachito Majano MD.      XR Chest 1 View    Result Date: 4/8/2021  1.  Tiny bilateral pleural effusions. 2.  Cardiomegaly.  This report was finalized on 4/8/2021 8:55 AM by Dr. Corby Cardoza MD.      XR Chest 1 View    Result Date: 3/29/2021  No definite acute cardiopulmonary findings. There is cardiomegaly. No definite interval change. Signer Name: Roseann Feliz MD  Signed: 3/29/2021 10:02 PM  Workstation Name: [id number]  [facility]    XR Chest 1 View    Result Date: 3/23/2021  Interval surgery. Lungs are adequately aerated  This report was finalized on 3/23/2021 7:45 PM by Dr. Dayo Kumar MD.      XR Chest 1 View    Result Date: 3/20/2021  Interval removal of left-sided pleural drain. No distinct pneumothorax. Right-sided IJ catheter projects unchanged. Persistent left basilar opacities similar to prior. Unchanged degree of cardiac enlargement.  This report was finalized on 3/20/2021 8:18 AM by Dany Montes.      XR Chest 1 View    Result Date: 3/19/2021  Mild remaining bibasilar discoid atelectasis. No new chest pathology is seen.   D:  03/19/2021 E:  03/19/2021  This report was finalized on 3/19/2021 10:40 PM by Dr. Walt Casas MD.      XR Chest 1 View    Result Date: 3/18/2021  No new chest disease.  D:  03/18/2021 E:  03/18/2021  This report was finalized on 3/18/2021 10:10 PM by Dr. Walt Casas MD.      XR Chest 1 View    Result Date: 3/18/2021  Status post extubation  with overall preservation of lung volumes and support hardware otherwise noted.  D:  03/17/2021 E:  03/17/2021  This report was finalized on 3/18/2021 2:34 PM by Dr. Ronal Garvey.      XR Chest 1 View    Result Date: 3/16/2021  1. Tubes and lines in satisfactory position, detailed above. No pneumothorax. 2. Stable postoperative cardiomediastinal silhouette. 3. Mild central vascular congestion. Signer Name: Santiago Samaniego MD  Signed: 3/16/2021 7:47 PM  Workstation Name: [id number]  [facility]    XR Chest 1 View    Result Date: 3/15/2021  No acute cardiopulmonary findings. Signer Name: Roseann Feliz MD  Signed: 3/15/2021 8:38 PM  Workstation Name: [id number]  [facility]    US Venous Doppler Lower Extremity Bilateral (duplex)    Result Date: 3/30/2021  No deep venous thrombus seen in either lower extremity. There are some mildly prominent with nodes in the patient's upper thigh regions. This may be within normal limits for the patient. Signer Name: Roseann Feliz MD  Signed: 3/30/2021 12:03 AM  Workstation Name: [id number]  [facility]    XR Chest PA & Lateral    Result Date: 3/29/2021  Decreased opacifications left lung base of decreased atelectasis versus airspace disease from prior comparison 03/20/2021 with persistent small right and probable trace left pleural effusions.  D:  03/29/2021 E:  03/29/2021  This report was finalized on 3/29/2021 4:39 PM by Dr. Ronal Garvey.        EXAM:  Physical Exam  Constitutional:       Appearance: Normal appearance.   HENT:      Head: Normocephalic and atraumatic.      Nose: Nose normal.   Musculoskeletal:         General: Swelling present.      Cervical back: Normal range of motion.   Skin:     General: Skin is warm and dry.          Neurological:      General: No focal deficit present.      Mental Status: He is alert and oriented to person, place, and time.   Psychiatric:         Mood and Affect: Mood normal.          Behavior: Behavior normal.         Thought Content: Thought content normal.         Judgment: Judgment normal.         Procedure   Procedures       Assessment/Plan    Diagnosis Plan   1. S/P CABG (coronary artery bypass graft)  Wound Culture - Wound, Leg, Right    sulfamethoxazole-trimethoprim (Bactrim DS) 800-160 MG per tablet   2. Wound of right lower extremity, subsequent encounter  sulfamethoxazole-trimethoprim (Bactrim DS) 800-160 MG per tablet   3. S/P CABG x 4  sacubitril-valsartan (Entresto) 24-26 MG tablet   4. Cardiac left ventricular ejection fraction 30-35 percent  sacubitril-valsartan (Entresto) 24-26 MG tablet   5. Yeast dermatitis  fluconazole (DIFLUCAN) 200 MG tablet   1.  Patient is status post coronary artery bypass grafting which he went through a postop echocardiogram which did show a marked reduction of his ejection fraction from 50 to 55% down to 35± percent.  Patient does have fatigue and persistent shortness of breath which is consistent with the lower ejection fraction.  He is already on an ARB and losartan and beta-blocker and metoprolol succinate.  However I would like to switch his losartan to Entresto to maximize his heart failure treatment.  We will repeat an echocardiogram in 3 months and at that time we will discuss potential for implantable cardioverter defibrillator if there is no recovery of the ejection fraction.  2.  Patient does have a incision to his right inner lower leg just below the knee where the saphenous vein harvest site.  He does have a previous infection at the site and was hospitalized for this.  It does seem to be erythemic with a mild amount of yellow exudate.  Wound cultures were obtained and patient wishes to move forth with antibiotic therapy just to be in sure.  We will prescribe Bactrim DS for 10 days.  3.  Patient does have persistent yeast infections in which she normally takes fluconazole.  I will reorder this at this time due to the fact of  reinitiating antibiotics.  4.  We will keep patient schedule follow-up next week for further evaluation and see how he is tolerating the Entresto.  5.  Informed of signs and symptoms of ACS and advised to seek emergent treatment for any new worsening symptoms.  Patient also advised sooner follow-up as needed.  Also advised to follow-up with family doctor as needed  This note is dictated utilizing voice recognition software.  Although this record has been proof read, transcriptional errors may still be present. If questions occur regarding the content of this record please do not hesitate to call our office.  I have reviewed and confirmed the accuracy of the ROS as documented by the MA/LPN/RN ANKUR Cuevas    No follow-ups on file.    Diagnoses and all orders for this visit:    1. S/P CABG (coronary artery bypass graft) (Primary)  -     Wound Culture - Wound, Leg, Right; Future  -     Discontinue: sulfamethoxazole-trimethoprim (Bactrim DS) 800-160 MG per tablet; Take 1 tablet by mouth 2 (Two) Times a Day.  Dispense: 20 tablet; Refill: 0  -     sulfamethoxazole-trimethoprim (Bactrim DS) 800-160 MG per tablet; Take 1 tablet by mouth 2 (Two) Times a Day.  Dispense: 20 tablet; Refill: 0    2. Wound of right lower extremity, subsequent encounter  -     Discontinue: sulfamethoxazole-trimethoprim (Bactrim DS) 800-160 MG per tablet; Take 1 tablet by mouth 2 (Two) Times a Day.  Dispense: 20 tablet; Refill: 0  -     sulfamethoxazole-trimethoprim (Bactrim DS) 800-160 MG per tablet; Take 1 tablet by mouth 2 (Two) Times a Day.  Dispense: 20 tablet; Refill: 0    3. S/P CABG x 4  -     Discontinue: sacubitril-valsartan (Entresto) 24-26 MG tablet; Take 1 tablet by mouth 2 (Two) Times a Day.  Dispense: 60 tablet; Refill: 11  -     sacubitril-valsartan (Entresto) 24-26 MG tablet; Take 1 tablet by mouth 2 (Two) Times a Day.  Dispense: 180 tablet; Refill: 3    4. Cardiac left ventricular ejection fraction 30-35 percent  -      Discontinue: sacubitril-valsartan (Entresto) 24-26 MG tablet; Take 1 tablet by mouth 2 (Two) Times a Day.  Dispense: 60 tablet; Refill: 11  -     sacubitril-valsartan (Entresto) 24-26 MG tablet; Take 1 tablet by mouth 2 (Two) Times a Day.  Dispense: 180 tablet; Refill: 3    5. Yeast dermatitis  -     fluconazole (DIFLUCAN) 200 MG tablet; Take 1 tablet by mouth Daily. Take one pill and if still symptoms repeat in 72 hours  Dispense: 2 tablet; Refill: 2        William Villasenor  reports that he quit smoking about 47 years ago. He has a 5.00 pack-year smoking history. He has never used smokeless tobacco..           MEDS ORDERED DURING VISIT:  New Medications Ordered This Visit   Medications   • sulfamethoxazole-trimethoprim (Bactrim DS) 800-160 MG per tablet     Sig: Take 1 tablet by mouth 2 (Two) Times a Day.     Dispense:  20 tablet     Refill:  0   • sacubitril-valsartan (Entresto) 24-26 MG tablet     Sig: Take 1 tablet by mouth 2 (Two) Times a Day.     Dispense:  180 tablet     Refill:  3   • fluconazole (DIFLUCAN) 200 MG tablet     Sig: Take 1 tablet by mouth Daily. Take one pill and if still symptoms repeat in 72 hours     Dispense:  2 tablet     Refill:  2           This document has been electronically signed by Dany San Jr., APRN  May 3, 2021 17:14 EDT

## 2021-05-03 NOTE — TELEPHONE ENCOUNTER
Assisted patient with donning monitor. Patient instructed to call number on box with any issues 24 hours a day. Patient verbalized understanding. Mary Blue LPN     [Negative] : Heme/Lymph

## 2021-05-05 ENCOUNTER — TELEPHONE (OUTPATIENT)
Dept: CARDIOLOGY | Facility: CLINIC | Age: 71
End: 2021-05-05

## 2021-05-05 DIAGNOSIS — S81.801D WOUND OF RIGHT LOWER EXTREMITY, SUBSEQUENT ENCOUNTER: Primary | ICD-10-CM

## 2021-05-05 NOTE — TELEPHONE ENCOUNTER
Patient is aware. He will continue Bactrim as prescribed. Advised he will receive a call once appointment for infectious disease is scheduled. He does not feel he has been seeing anyone for infectious disease.   Roseann Montemayor MA      ----- Message from ANKUR Cuevas sent at 5/5/2021  8:38 AM EDT -----  Sanjay Whitfield we please make sure that Mr. Villasenor is referred to infectious disease.  I did not put a referral in at this time due to the fact that I am thinking he has already seen infectious disease.  Is cultures did come back positive for gram-positive cocci in which I feel the Bactrim is an appropriate antibiotic but I still would like us to have him see infectious disease.  Actually I will go ahead and put a referral and just to make sure  ----- Message -----  From: Lab, Background User  Sent: 5/3/2021  11:10 PM EDT  To: ANKUR Cuevas

## 2021-05-06 ENCOUNTER — DOCUMENTATION (OUTPATIENT)
Dept: CARDIOLOGY | Facility: CLINIC | Age: 71
End: 2021-05-06

## 2021-05-06 ENCOUNTER — DOCUMENTATION (OUTPATIENT)
Dept: CARDIAC REHAB | Facility: HOSPITAL | Age: 71
End: 2021-05-06

## 2021-05-07 ENCOUNTER — OFFICE VISIT (OUTPATIENT)
Dept: CARDIOLOGY | Facility: CLINIC | Age: 71
End: 2021-05-07

## 2021-05-07 ENCOUNTER — READMISSION MANAGEMENT (OUTPATIENT)
Dept: CALL CENTER | Facility: HOSPITAL | Age: 71
End: 2021-05-07

## 2021-05-07 VITALS
HEIGHT: 69 IN | SYSTOLIC BLOOD PRESSURE: 108 MMHG | WEIGHT: 206 LBS | OXYGEN SATURATION: 98 % | BODY MASS INDEX: 30.51 KG/M2 | DIASTOLIC BLOOD PRESSURE: 64 MMHG | HEART RATE: 87 BPM

## 2021-05-07 DIAGNOSIS — I10 ESSENTIAL HYPERTENSION: ICD-10-CM

## 2021-05-07 DIAGNOSIS — S81.801D WOUND OF RIGHT LOWER EXTREMITY, SUBSEQUENT ENCOUNTER: ICD-10-CM

## 2021-05-07 DIAGNOSIS — I25.810 CORONARY ARTERY DISEASE INVOLVING CORONARY BYPASS GRAFT OF NATIVE HEART WITHOUT ANGINA PECTORIS: ICD-10-CM

## 2021-05-07 DIAGNOSIS — I50.32 CHRONIC DIASTOLIC CONGESTIVE HEART FAILURE (HCC): ICD-10-CM

## 2021-05-07 DIAGNOSIS — R94.30 CARDIAC LEFT VENTRICULAR EJECTION FRACTION 30-35 PERCENT: ICD-10-CM

## 2021-05-07 DIAGNOSIS — Z95.1 S/P CABG X 4: Primary | ICD-10-CM

## 2021-05-07 PROCEDURE — 99214 OFFICE O/P EST MOD 30 MIN: CPT | Performed by: NURSE PRACTITIONER

## 2021-05-07 RX ORDER — METOPROLOL SUCCINATE 100 MG/1
100 TABLET, EXTENDED RELEASE ORAL DAILY
Qty: 90 TABLET | Refills: 1 | Status: SHIPPED | OUTPATIENT
Start: 2021-05-07 | End: 2021-08-09 | Stop reason: SDUPTHER

## 2021-05-07 RX ORDER — BUMETANIDE 0.5 MG/1
0.5 TABLET ORAL DAILY
Qty: 90 TABLET | Refills: 1
Start: 2021-05-07 | End: 2021-05-26 | Stop reason: SDUPTHER

## 2021-05-07 RX ORDER — SENNA PLUS 8.6 MG/1
2 TABLET ORAL 2 TIMES DAILY
COMMUNITY
End: 2021-06-16 | Stop reason: SDUPTHER

## 2021-05-07 NOTE — PROGRESS NOTES
Subjective     William Villasenor is a 71 y.o. male who presents to day for 2 wk follow up.    CHIEF COMPLIANT  Chief Complaint   Patient presents with   • 2 wk follow up       Active Problems:  Problem List Items Addressed This Visit        Cardiac and Vasculature    Essential hypertension (Chronic)    Relevant Medications    bumetanide (BUMEX) 0.5 MG tablet    metoprolol succinate XL (Toprol XL) 100 MG 24 hr tablet    CAD (coronary artery disease)    Relevant Medications    metoprolol succinate XL (Toprol XL) 100 MG 24 hr tablet    S/P CABG x 4 - Primary    Relevant Medications    bumetanide (BUMEX) 0.5 MG tablet    Other Relevant Orders    Adult Transthoracic Echo Complete W/ Cont if Necessary Per Protocol      Other Visit Diagnoses     Cardiac left ventricular ejection fraction 30-35 percent        Relevant Orders    Adult Transthoracic Echo Complete W/ Cont if Necessary Per Protocol    Chronic diastolic congestive heart failure (CMS/HCC)        Relevant Medications    bumetanide (BUMEX) 0.5 MG tablet    metoprolol succinate XL (Toprol XL) 100 MG 24 hr tablet    Other Relevant Orders    Adult Transthoracic Echo Complete W/ Cont if Necessary Per Protocol    Wound of right lower extremity, subsequent encounter        Relevant Orders    Ambulatory Referral to Infectious Disease      Problem list  1.  Coronary artery disease status post coronary artery bypass grafting x4  1.1 coronary artery bypass grafting 3/21: LIMA to LAD, SVG to diagonal, SVG to OM1, SVG to OM 2.  Residual  of RCA  1.2 echocardiogram 4/21: EF 35± percent, LV dyssynchrony  2.  Essential hypertension  3.  Diastolic dysfunction  4.  Chest pain  5.  Shortness of breath  6.  Lower extremity edema    HPI  HPI  Mr. Villasenor is a 71-year-old male patient who is being followed up today for 2-week follow-up.  He does have a history of coronary artery bypass grafting in March 2021.  He is doing reasonably well and has a reasonable functional capacity.  We did  discuss multiple activities and now that we have a reached a 6-week.  After surgery he may start driving.  I did advise him to be very cautious only drive if he needed to in the longer we could avoid this is more would mitigate the potential risk involved.  Patient verbalized understanding.  We also discussed slowly increasing his exertion and lifting.  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  We also discussed his graft harvest site and the culture that we have taken previously.  And did grow gram-positive cocci and it seems to be more purulent drainage from it today than what there previously was.  The wife reports that she has been cleaning it with peroxide.  The erythema surrounding the wound has actually decreased and based on the sensitivity of the culture Bactrim is an effective antibiotic for this type of bacteria and that I would like for him to follow-up with infectious disease for further evaluation.  Patient does have a mild level of exertional dyspnea that is about the same and nonprogressive.  He also reports some intermittent fatigue at times.  But overall he says he is feels like he is doing pretty well.  We also discussed his heart failure with reduced ejection fraction and the medications in which he is being treated with including the Entresto and metoprolol.  We will continue these medications at this time we will repeat the echocardiogram after June 16 of 2021 to reevaluate cardiac ejection fraction to determine if patient is in need of implantable cardioverter defibrillator and is at risk of sudden cardiac death.  Patient also has chronic arterial hypertension which is blood pressure is well controlled on his current blood pressure medication regimen.  He is currently on Entresto, metoprolol which we will continue at current dosing.  Overall he feels he is done relatively well from the cardiovascular  standpoint he denies any chest pain, lower extremity edema, palpitations, dizziness, lightheadedness, syncope, PND, orthopnea, or other neurological problems.  PRIOR MEDS  Current Outpatient Medications on File Prior to Visit   Medication Sig Dispense Refill   • acetaminophen (TYLENOL) 500 MG tablet Take 500 mg by mouth Every 6 (Six) Hours As Needed for Mild Pain .     • allopurinol (ZYLOPRIM) 300 MG tablet Take 1 tablet by mouth Daily. 90 tablet 1   • Alpha Lipoic Acid-Vitamin E 100-30 MG-UNIT capsule Take  by mouth.     • Ascorbic Acid (Vitamin C) 500 MG capsule Take 500 mg by mouth Daily.     • aspirin (Aspirin Adult Low Dose) 81 MG EC tablet Take 81 mg by mouth Daily.     • atorvastatin (LIPITOR) 40 MG tablet Take 1 tablet by mouth Every Night. 90 tablet 3   • clopidogrel (PLAVIX) 75 MG tablet Take 1 tablet by mouth Daily. 90 tablet 1   • doxazosin (CARDURA) 4 MG tablet Take 4 mg by mouth Every Night.     • finasteride (PROSCAR) 5 MG tablet Take 1 tablet by mouth Daily. 90 tablet 1   • folic acid (FOLVITE) 1 MG tablet Take 1 mg by mouth 2 (two) times a day.     • glipizide (GLUCOTROL) 5 MG tablet Take 1 tablet by mouth 2 (Two) Times a Day Before Meals for 30 days. 60 tablet 0   • magnesium oxide (MAG-OX) 400 MG tablet Take 1 tablet by mouth 2 (Two) Times a Day. Patient will get otc and take 60 tablet 0   • meclizine (ANTIVERT) 25 MG tablet TAKE 1 TABLET BY MOUTH 3 (THREE) TIMES A DAY AS NEEDED FOR DIZZINESS. 30 tablet 0   • metFORMIN (GLUCOPHAGE) 1000 MG tablet Take 1,000 mg by mouth 2 (Two) Times a Day With Meals.     • Misc Natural Products (LUTEIN 20 PO) Take 1 capsule by mouth.     • multivitamin (multivitamin) tablet tablet Take 1 tablet by mouth Daily. 30 tablet    • nitroglycerin (NITROSTAT) 0.4 MG SL tablet Place 1 tablet under the tongue Every 5 (Five) Minutes As Needed for Chest Pain (Only if SBP Greater Than 100). Take no more than 3 doses in 15 minutes. 100 tablet 12   • pantoprazole (Protonix) 40 MG  EC tablet Take 1 tablet by mouth Daily. 90 tablet 3   • sacubitril-valsartan (Entresto) 24-26 MG tablet Take 1 tablet by mouth 2 (Two) Times a Day. 180 tablet 3   • senna (senna) 8.6 MG tablet Take 2 tablets by mouth 2 (Two) Times a Day.     • sulfamethoxazole-trimethoprim (Bactrim DS) 800-160 MG per tablet Take 1 tablet by mouth 2 (Two) Times a Day. 20 tablet 0   • vitamin E 400 UNIT capsule Take 1 capsule by mouth Daily.     • [DISCONTINUED] bumetanide (BUMEX) 0.5 MG tablet Take 1 tablet by mouth Daily for 30 days. 30 tablet 0   • [DISCONTINUED] metoprolol succinate XL (Toprol XL) 100 MG 24 hr tablet Take 1 tablet by mouth Daily. 30 tablet 0   • fluconazole (DIFLUCAN) 200 MG tablet Take 1 tablet by mouth Daily. Take one pill and if still symptoms repeat in 72 hours 2 tablet 2   • gabapentin (NEURONTIN) 300 MG capsule Take 1 capsule by mouth 2 (two) times a day. 60 capsule 0   • [DISCONTINUED] calcium citrate-vitamin d (CALCITRATE) 315-250 MG-UNIT tablet tablet Take  by mouth Daily.     • [DISCONTINUED] insulin detemir (Levemir FlexTouch) 100 UNIT/ML injection Inject 35 Units under the skin into the appropriate area as directed Daily for 30 days. (Patient taking differently: Inject 25 Units under the skin into the appropriate area as directed Daily.) 45 mL 0     No current facility-administered medications on file prior to visit.       ALLERGIES  Lisinopril    HISTORY  Past Medical History:   Diagnosis Date   • Arthritis    • Back pain    • BPH (benign prostatic hyperplasia)    • Diabetes mellitus (CMS/HCC)    • Diabetic peripheral neuropathy (CMS/HCC)    • Erectile dysfunction    • Former smoker    • GERD (gastroesophageal reflux disease)    • GERD (gastroesophageal reflux disease)    • Gout    • High cholesterol    • Hypertension    • Obesity        Social History     Socioeconomic History   • Marital status:      Spouse name: Not on file   • Number of children: 3   • Years of education: Not on file   •  Highest education level: Not on file   Tobacco Use   • Smoking status: Former Smoker     Packs/day: 1.00     Years: 5.00     Pack years: 5.00     Quit date: 1974     Years since quittin.2   • Smokeless tobacco: Never Used   Substance and Sexual Activity   • Alcohol use: Never     Comment: quit in    • Drug use: Never   • Sexual activity: Defer       Family History   Problem Relation Age of Onset   • Heart disease Mother    • Hypertension Mother    • Depression Mother    • Alcohol abuse Maternal Uncle    • Heart disease Maternal Grandmother    • Hypertension Maternal Grandmother    • Diabetes Maternal Grandmother    • Diabetes Paternal Grandmother    • No Known Problems Half-Brother    • No Known Problems Half-Brother    • No Known Problems Half-Sister    • No Known Problems Daughter    • No Known Problems Daughter    • Hyperlipidemia Daughter        Review of Systems   Constitutional: Positive for fatigue (at times). Negative for chills and fever.   HENT: Positive for sinus pressure. Negative for congestion and sore throat.    Eyes: Positive for visual disturbance.   Respiratory: Positive for shortness of breath (some). Negative for chest tightness.    Cardiovascular: Negative.  Negative for chest pain, palpitations and leg swelling.   Gastrointestinal: Negative.  Negative for abdominal pain, blood in stool, constipation, diarrhea, nausea and vomiting.   Endocrine: Negative for cold intolerance and heat intolerance.   Genitourinary: Negative.  Negative for dysuria, frequency, hematuria and urgency.   Musculoskeletal: Positive for arthralgias, back pain and neck pain.   Skin: Positive for wound. Negative for rash.   Allergic/Immunologic: Positive for environmental allergies. Negative for food allergies.   Neurological: Negative.  Negative for dizziness, syncope and light-headedness.   Hematological: Bruises/bleeds easily.   Psychiatric/Behavioral: Negative.  Negative for sleep disturbance (denies waking  "with soa or cp).       Objective     VITALS: /64 (BP Location: Left arm, Patient Position: Sitting)   Pulse 87   Ht 175.3 cm (69\")   Wt 93.4 kg (206 lb)   SpO2 98%   BMI 30.42 kg/m²     LABS:   Lab Results (most recent)     None          IMAGING:   XR Chest 2 View    Result Date: 4/28/2021  There is blunting of the costophrenic angles bilaterally. The lung fields are otherwise clear. No new focal parenchymal opacification present.  D:  04/28/2021 E:  04/28/2021  This report was finalized on 4/28/2021 4:47 PM by Dr. Tiffanie Page MD.      XR Chest 2 View    Result Date: 3/13/2021  No acute cardiopulmonary findings. Signer Name: Boaz Bowman MD  Signed: 3/13/2021 10:56 PM  Workstation Name: [id number]  [facility] Abdomen Complete    Result Date: 3/14/2021    No acute findings in the abdomen.  This report was finalized on 3/14/2021 11:10 AM by Dr. Catrachito Majano MD.      XR Chest 1 View    Result Date: 4/8/2021  1.  Tiny bilateral pleural effusions. 2.  Cardiomegaly.  This report was finalized on 4/8/2021 8:55 AM by Dr. Corby Cardoza MD.      XR Chest 1 View    Result Date: 3/29/2021  No definite acute cardiopulmonary findings. There is cardiomegaly. No definite interval change. Signer Name: Roseann Feliz MD  Signed: 3/29/2021 10:02 PM  Workstation Name: [id number]  [facility]    XR Chest 1 View    Result Date: 3/23/2021  Interval surgery. Lungs are adequately aerated  This report was finalized on 3/23/2021 7:45 PM by Dr. Dayo Kumar MD.      XR Chest 1 View    Result Date: 3/20/2021  Interval removal of left-sided pleural drain. No distinct pneumothorax. Right-sided IJ catheter projects unchanged. Persistent left basilar opacities similar to prior. Unchanged degree of cardiac enlargement.  This report was finalized on 3/20/2021 8:18 AM by Dany Montes.      XR Chest 1 View    Result Date: 3/19/2021  Mild remaining bibasilar discoid atelectasis. " No new chest pathology is seen.   D:  03/19/2021 E:  03/19/2021  This report was finalized on 3/19/2021 10:40 PM by Dr. Walt Casas MD.      XR Chest 1 View    Result Date: 3/18/2021  No new chest disease.  D:  03/18/2021 E:  03/18/2021  This report was finalized on 3/18/2021 10:10 PM by Dr. Walt Casas MD.      XR Chest 1 View    Result Date: 3/18/2021  Status post extubation with overall preservation of lung volumes and support hardware otherwise noted.  D:  03/17/2021 E:  03/17/2021  This report was finalized on 3/18/2021 2:34 PM by Dr. Ronal Garvey.      XR Chest 1 View    Result Date: 3/16/2021  1. Tubes and lines in satisfactory position, detailed above. No pneumothorax. 2. Stable postoperative cardiomediastinal silhouette. 3. Mild central vascular congestion. Signer Name: Santiago Samaniego MD  Signed: 3/16/2021 7:47 PM  Workstation Name: [id number]  [facility]    XR Chest 1 View    Result Date: 3/15/2021  No acute cardiopulmonary findings. Signer Name: Roseann Feliz MD  Signed: 3/15/2021 8:38 PM  Workstation Name: [id number]  [facility]    US Venous Doppler Lower Extremity Bilateral (duplex)    Result Date: 3/30/2021  No deep venous thrombus seen in either lower extremity. There are some mildly prominent with nodes in the patient's upper thigh regions. This may be within normal limits for the patient. Signer Name: Roseann Feliz MD  Signed: 3/30/2021 12:03 AM  Workstation Name: [id number]  [facility]    XR Chest PA & Lateral    Result Date: 3/29/2021  Decreased opacifications left lung base of decreased atelectasis versus airspace disease from prior comparison 03/20/2021 with persistent small right and probable trace left pleural effusions.  D:  03/29/2021 E:  03/29/2021  This report was finalized on 3/29/2021 4:39 PM by Dr. Ronal Garvey.        EXAM:  Physical Exam  Vitals and nursing note reviewed.   Constitutional:       Appearance: He is  well-developed.   HENT:      Head: Normocephalic and atraumatic.   Eyes:      Pupils: Pupils are equal, round, and reactive to light.   Neck:      Vascular: No carotid bruit or JVD.   Cardiovascular:      Rate and Rhythm: Normal rate and regular rhythm.      Pulses:           Carotid pulses are 2+ on the right side and 2+ on the left side.       Radial pulses are 2+ on the right side and 2+ on the left side.        Posterior tibial pulses are 2+ on the right side and 2+ on the left side.      Heart sounds: Normal heart sounds. No murmur heard.   No gallop.    Pulmonary:      Effort: Pulmonary effort is normal. No respiratory distress.      Breath sounds: Normal breath sounds.   Abdominal:      General: Bowel sounds are normal. There is no distension.      Palpations: Abdomen is soft.      Tenderness: There is no abdominal tenderness.   Musculoskeletal:         General: Swelling present. Normal range of motion.      Cervical back: Neck supple.   Skin:     General: Skin is warm and dry.          Neurological:      Mental Status: He is alert and oriented to person, place, and time.      Cranial Nerves: No cranial nerve deficit.      Sensory: No sensory deficit.   Psychiatric:         Speech: Speech normal.         Behavior: Behavior normal.         Thought Content: Thought content normal.         Judgment: Judgment normal.         Procedure   Procedures       Assessment/Plan    Diagnosis Plan   1. S/P CABG x 4  bumetanide (BUMEX) 0.5 MG tablet    Adult Transthoracic Echo Complete W/ Cont if Necessary Per Protocol   2. Coronary artery disease involving coronary bypass graft of native heart without angina pectoris  metoprolol succinate XL (Toprol XL) 100 MG 24 hr tablet   3. Cardiac left ventricular ejection fraction 30-35 percent  Adult Transthoracic Echo Complete W/ Cont if Necessary Per Protocol   4. Chronic diastolic congestive heart failure (CMS/HCC)  bumetanide (BUMEX) 0.5 MG tablet    metoprolol succinate XL  (Toprol XL) 100 MG 24 hr tablet    Adult Transthoracic Echo Complete W/ Cont if Necessary Per Protocol   5. Essential hypertension  metoprolol succinate XL (Toprol XL) 100 MG 24 hr tablet   6. Wound of right lower extremity, subsequent encounter  Ambulatory Referral to Infectious Disease   1.  Patient did have bypass surgery in March of this year and is doing relatively well from a cardiovascular standpoint.  There is still plans to do cardiac rehab in [place].  He is having a reasonable functional capacity with some residual shortness of breath and fatigue otherwise no major complaints.  However he does have erythema and purulent drainage and a small amount at the area of vein harvest site in which we previously cultured and did grow gram-positive cocci.  Patient was placed on Bactrim at the time of culture and based on the sensitivity is an appropriate antibiotic for this infection.  However I still would like to for him to follow with infectious disease for further evaluation.  2.  Patient does have heart failure with reduced ejection fraction after bypass surgery with an ejection fraction of 30 to 35%.  He is on Entresto and metoprolol for guideline driven therapy in which we will continue.  We will repeat the echocardiogram on June 16 or after for reevaluation of the need for potential implantable cardioverter defibrillator due to increased risk of sudden cardiac death.  3.  Patient does have chronic diastolic dysfunction in which she does have some lower extremity edema in which she wishes to continue the Bumex 0.5 mg daily.  He request a refill in which I will send into the pharmacy of his choice.  We will continue to monitor.  4.  Patient's blood pressure is well controlled on current blood pressure medication regimen.  No medication changes are warranted at this time.  Patient advised to monitor blood pressure on a daily basis and report any persistent highs or lows.  Set goal blood pressure for patient at  130/80 or below.  5.  Informed of signs and symptoms of ACS and advised to seek emergent treatment for any new worsening symptoms.  Patient also advised sooner follow-up as needed.  Also advised to follow-up with family doctor as needed  This note is dictated utilizing voice recognition software.  Although this record has been proof read, transcriptional errors may still be present. If questions occur regarding the content of this record please do not hesitate to call our office.  I have reviewed and confirmed the accuracy of the ROS as documented by the MA/LPN/RN ANKUR Cuevas    Return in about 3 months (around 8/7/2021), or if symptoms worsen or fail to improve.    Diagnoses and all orders for this visit:    1. S/P CABG x 4 (Primary)  -     bumetanide (BUMEX) 0.5 MG tablet; Take 1 tablet by mouth Daily.  Dispense: 90 tablet; Refill: 1  -     Adult Transthoracic Echo Complete W/ Cont if Necessary Per Protocol; Future    2. Coronary artery disease involving coronary bypass graft of native heart without angina pectoris  -     metoprolol succinate XL (Toprol XL) 100 MG 24 hr tablet; Take 1 tablet by mouth Daily.  Dispense: 90 tablet; Refill: 1    3. Cardiac left ventricular ejection fraction 30-35 percent  -     Adult Transthoracic Echo Complete W/ Cont if Necessary Per Protocol; Future    4. Chronic diastolic congestive heart failure (CMS/HCC)  -     bumetanide (BUMEX) 0.5 MG tablet; Take 1 tablet by mouth Daily.  Dispense: 90 tablet; Refill: 1  -     metoprolol succinate XL (Toprol XL) 100 MG 24 hr tablet; Take 1 tablet by mouth Daily.  Dispense: 90 tablet; Refill: 1  -     Adult Transthoracic Echo Complete W/ Cont if Necessary Per Protocol; Future    5. Essential hypertension  -     metoprolol succinate XL (Toprol XL) 100 MG 24 hr tablet; Take 1 tablet by mouth Daily.  Dispense: 90 tablet; Refill: 1    6. Wound of right lower extremity, subsequent encounter  -     Ambulatory Referral to Infectious  Disease        William Villasenor  reports that he quit smoking about 47 years ago. He has a 5.00 pack-year smoking history. He has never used smokeless tobacco..         Patient's (Body mass index is 30.42 kg/m².) indicates that they are obese (BMI >30) with obesity-related health conditions that include hypertension, coronary heart disease and dyslipidemias . Obesity is unchanged. BMI is is above average; BMI management plan is completed. We discussed portion control and increasing exercise.           MEDS ORDERED DURING VISIT:  New Medications Ordered This Visit   Medications   • bumetanide (BUMEX) 0.5 MG tablet     Sig: Take 1 tablet by mouth Daily.     Dispense:  90 tablet     Refill:  1   • metoprolol succinate XL (Toprol XL) 100 MG 24 hr tablet     Sig: Take 1 tablet by mouth Daily.     Dispense:  90 tablet     Refill:  1           This document has been electronically signed by Dany San Jr., APRN  May 7, 2021 12:29 EDT

## 2021-05-07 NOTE — PATIENT INSTRUCTIONS
"Fat and Cholesterol Restricted Eating Plan  Getting too much fat and cholesterol in your diet may cause health problems. Choosing the right foods helps keep your fat and cholesterol at normal levels. This can keep you from getting certain diseases.  Your doctor may recommend an eating plan that includes:  · Total fat: ______% or less of total calories a day.  · Saturated fat: ______% or less of total calories a day.  · Cholesterol: less than _________mg a day.  · Fiber: ______g a day.  What are tips for following this plan?  Meal planning  · At meals, divide your plate into four equal parts:  ? Fill one-half of your plate with vegetables and green salads.  ? Fill one-fourth of your plate with whole grains.  ? Fill one-fourth of your plate with low-fat (lean) protein foods.  · Eat fish that is high in omega-3 fats at least two times a week. This includes mackerel, tuna, sardines, and salmon.  · Eat foods that are high in fiber, such as whole grains, beans, apples, broccoli, carrots, peas, and barley.  General tips    · Work with your doctor to lose weight if you need to.  · Avoid:  ? Foods with added sugar.  ? Fried foods.  ? Foods with partially hydrogenated oils.  · Limit alcohol intake to no more than 1 drink a day for nonpregnant women and 2 drinks a day for men. One drink equals 12 oz of beer, 5 oz of wine, or 1½ oz of hard liquor.  Reading food labels  · Check food labels for:  ? Trans fats.  ? Partially hydrogenated oils.  ? Saturated fat (g) in each serving.  ? Cholesterol (mg) in each serving.  ? Fiber (g) in each serving.  · Choose foods with healthy fats, such as:  ? Monounsaturated fats.  ? Polyunsaturated fats.  ? Omega-3 fats.  · Choose grain products that have whole grains. Look for the word \"whole\" as the first word in the ingredient list.  Cooking  · Cook foods using low-fat methods. These include baking, boiling, grilling, and broiling.  · Eat more home-cooked foods. Eat at restaurants and buffets " less often.  · Avoid cooking using saturated fats, such as butter, cream, palm oil, palm kernel oil, and coconut oil.  Recommended foods    Fruits  · All fresh, canned (in natural juice), or frozen fruits.  Vegetables  · Fresh or frozen vegetables (raw, steamed, roasted, or grilled). Green salads.  Grains  · Whole grains, such as whole wheat or whole grain breads, crackers, cereals, and pasta. Unsweetened oatmeal, bulgur, barley, quinoa, or brown rice. Corn or whole wheat flour tortillas.  Meats and other protein foods  · Ground beef (85% or leaner), grass-fed beef, or beef trimmed of fat. Skinless chicken or turkey. Ground chicken or turkey. Pork trimmed of fat. All fish and seafood. Egg whites. Dried beans, peas, or lentils. Unsalted nuts or seeds. Unsalted canned beans. Nut butters without added sugar or oil.  Dairy  · Low-fat or nonfat dairy products, such as skim or 1% milk, 2% or reduced-fat cheeses, low-fat and fat-free ricotta or cottage cheese, or plain low-fat and nonfat yogurt.  Fats and oils  · Tub margarine without trans fats. Light or reduced-fat mayonnaise and salad dressings. Avocado. Olive, canola, sesame, or safflower oils.  The items listed above may not be a complete list of foods and beverages you can eat. Contact a dietitian for more information.  Foods to avoid  Fruits  · Canned fruit in heavy syrup. Fruit in cream or butter sauce. Fried fruit.  Vegetables  · Vegetables cooked in cheese, cream, or butter sauce. Fried vegetables.  Grains  · White bread. White pasta. White rice. Cornbread. Bagels, pastries, and croissants. Crackers and snack foods that contain trans fat and hydrogenated oils.  Meats and other protein foods  · Fatty cuts of meat. Ribs, chicken wings, miller, sausage, bologna, salami, chitterlings, fatback, hot dogs, bratwurst, and packaged lunch meats. Liver and organ meats. Whole eggs and egg yolks. Chicken and turkey with skin. Fried meat.  Dairy  · Whole or 2% milk, cream,  half-and-half, and cream cheese. Whole milk cheeses. Whole-fat or sweetened yogurt. Full-fat cheeses. Nondairy creamers and whipped toppings. Processed cheese, cheese spreads, and cheese curds.  Beverages  · Alcohol. Sugar-sweetened drinks such as sodas, lemonade, and fruit drinks.  Fats and oils  · Butter, stick margarine, lard, shortening, ghee, or miller fat. Coconut, palm kernel, and palm oils.  Sweets and desserts  · Corn syrup, sugars, honey, and molasses. Candy. Jam and jelly. Syrup. Sweetened cereals. Cookies, pies, cakes, donuts, muffins, and ice cream.  The items listed above may not be a complete list of foods and beverages you should avoid. Contact a dietitian for more information.  Summary  · Choosing the right foods helps keep your fat and cholesterol at normal levels. This can keep you from getting certain diseases.  · At meals, fill one-half of your plate with vegetables and green salads.  · Eat high-fiber foods, like whole grains, beans, apples, carrots, peas, and barley.  · Limit added sugar, saturated fats, alcohol, and fried foods.  This information is not intended to replace advice given to you by your health care provider. Make sure you discuss any questions you have with your health care provider.  Document Revised: 08/21/2019 Document Reviewed: 09/04/2018  [employer] Patient Education © 2021 [employer]  BMI for Adults  What is BMI?  Body mass index (BMI) is a number that is calculated from a person's weight and height. BMI can help estimate how much of a person's weight is composed of fat. BMI does not measure body fat directly. Rather, it is an alternative to procedures that directly measure body fat, which can be difficult and expensive.  BMI can help identify people who may be at higher risk for certain medical problems.  What are BMI measurements used for?  BMI is used as a screening tool to identify possible weight problems. It helps determine whether a person is obese, overweight, a  "healthy weight, or underweight.  BMI is useful for:  · Identifying a weight problem that may be related to a medical condition or may increase the risk for medical problems.  · Promoting changes, such as changes in diet and exercise, to help reach a healthy weight. BMI screening can be repeated to see if these changes are working.  How is BMI calculated?  BMI involves measuring your weight in relation to your height. Both height and weight are measured, and the BMI is calculated from those numbers. This can be done either in English ([place] or metric measurements. Note that charts and online BMI calculators are available to help you find your BMI quickly and easily without having to do these calculations yourself.  To calculate your BMI in English ([place] measurements:    1. Measure your weight in pounds (lb).  2. Multiply the number of pounds by 703.  ? For example, for a person who weighs 180 lb, multiply that number by 703, which equals 126,540.  3. Measure your height in inches. Then multiply that number by itself to get a measurement called \"inches squared.\"  ? For example, for a person who is 70 inches tall, the \"inches squared\" measurement is 70 inches x 70 inches, which equals 4,900 inches squared.  4. Divide the total from step 2 (number of lb x 703) by the total from step 3 (inches squared): 126,540 ÷ 4,900 = 25.8. This is your BMI.  To calculate your BMI in metric measurements:  1. Measure your weight in kilograms (kg).  2. Measure your height in meters (m). Then multiply that number by itself to get a measurement called \"meters squared.\"  ? For example, for a person who is 1.75 m tall, the \"meters squared\" measurement is 1.75 m x 1.75 m, which is equal to 3.1 meters squared.  3. Divide the number of kilograms (your weight) by the meters squared number. In this example: 70 ÷ 3.1 = 22.6. This is your BMI.  What do the results mean?  BMI charts are used to identify whether you are underweight, normal weight, " overweight, or obese. The following guidelines will be used:  · Underweight: BMI less than 18.5.  · Normal weight: BMI between 18.5 and 24.9.  · Overweight: BMI between 25 and 29.9.  · Obese: BMI of 30 or above.  Keep these notes in mind:  · Weight includes both fat and muscle, so someone with a muscular build, such as an athlete, may have a BMI that is higher than 24.9. In cases like these, BMI is not an accurate measure of body fat.  · To determine if excess body fat is the cause of a BMI of 25 or higher, further assessments may need to be done by a health care provider.  · BMI is usually interpreted in the same way for men and women.  Where to find more information  For more information about BMI, including tools to quickly calculate your BMI, go to these websites:  · Centers for Disease Control and Prevention: www.cdc.gov  · American Heart Association: www.heart.org  · National Heart, Lung, and Blood Walker: www.nhlbi.nih.gov  Summary  · Body mass index (BMI) is a number that is calculated from a person's weight and height.  · BMI may help estimate how much of a person's weight is composed of fat. BMI can help identify those who may be at higher risk for certain medical problems.  · BMI can be measured using English measurements or metric measurements.  · BMI charts are used to identify whether you are underweight, normal weight, overweight, or obese.  This information is not intended to replace advice given to you by your health care provider. Make sure you discuss any questions you have with your health care provider.  Document Revised: 09/09/2020 Document Reviewed: 07/17/2020  [email or web address] Patient Education © 2021 [email or web address]    Acute Coronary Syndrome  Acute coronary syndrome (ACS) is a serious problem in which there is suddenly not enough blood and oxygen reaching the heart. ACS can result in chest pain or a heart attack.  This condition is a medical emergency. If you have any symptoms of this condition,  get help right away.  What are the causes?  This condition may be caused by:  · A buildup of fat and cholesterol inside the arteries (atherosclerosis). This is the most common cause. The buildup (plaque) can cause blood vessels in the heart (coronary arteries) to become narrow or blocked, which reduces blood flow to the heart. Plaque can also break off and lead to a clot, which can block an artery and cause a heart attack or stroke.  · Sudden tightening of the muscles around the coronary arteries (coronary spasm).  · Tearing of a coronary artery (spontaneous coronary artery dissection).  · Very low blood pressure (hypotension).  · An abnormal heartbeat (arrhythmia).  · Other medical conditions that cause a decrease of oxygen to the heart, such as anemiaorrespiratory failure.  · Using cocaine or methamphetamine.  What increases the risk?  The following factors may make you more likely to develop this condition:  · Age. The risk for ACS increases as you get older.  · History of chest pain, heart attack, peripheral artery disease, or stroke.  · Having taken chemotherapy or immune-suppressing medicines.  · Being male.  · Family history of chest pain, heart disease, or stroke.  · Smoking.  · Not exercising enough.  · Being overweight.  · High cholesterol.  · High blood pressure (hypertension).  · Diabetes.  · Excessive alcohol use.  What are the signs or symptoms?  Common symptoms of this condition include:  · Chest pain. The pain may last a long time, or it may stop and come back (recur). It may feel like:  ? Crushing or squeezing.  ? Tightness, pressure, fullness, or heaviness.  · Arm, neck, jaw, or back pain.  · Heartburn or indigestion.  · Shortness of breath.  · Nausea.  · Sudden cold sweats.  · Light-headedness.  · Dizziness or passing out.  · Tiredness (fatigue).  Sometimes there are no symptoms.  How is this diagnosed?  This condition may be diagnosed based on:  · Your medical history and symptoms.  · Imaging  tests, such as:  ? An electrocardiogram (ECG). This measures the heart's electrical activity.  ? X-rays.  ? CT scan.  ? A coronary angiogram. For this test, dye is injected into the heart arteries and then X-rays are taken.  ? Myocardial perfusion imaging. This test shows how well blood flows through your heart muscle.  · Blood tests. These may be repeated at certain time intervals.  · Exercise stress testing.  · Echocardiogram. This is a test that uses sound waves to produce detailed images of the heart.  How is this treated?  Treatment for this condition may include:  · Oxygen therapy.  · Medicines, such as:  ? Antiplatelet medicines and blood-thinning medicines, such as aspirin. These help prevent blood clots.  ? Medicine that dissolves any blood clots (fibrinolytic therapy).  ? Blood pressure medicines.  ? Nitroglycerin. This helps widen blood vessels to improve blood flow.  ? Pain medicine.  ? Cholesterol-lowering medicine.  · Surgery, such as:  ? Coronary angioplasty with stent placement. This involves placing a small piece of metal that looks like mesh or a spring into a narrow coronary artery. This widens the artery and keeps it open.  ? Coronary artery bypass surgery. This involves taking a section of a blood vessel from a different part of your body and placing it on the blocked coronary artery to allow blood to flow around the blockage.  · Cardiac rehabilitation. This is a program that includes exercise training, education, and counseling to help you recover.  Follow these instructions at home:  Eating and drinking  · Eat a heart-healthy diet that includes whole grains, fruits and vegetables, lean proteins, and low-fat or nonfat dairy products.  · Limit how much salt (sodium) you eat as told by your health care provider. Follow instructions from your health care provider about any other eating or drinking restrictions, such as limiting foods that are high in fat and processed sugars.  · Use healthy  cooking methods such as roasting, grilling, broiling, baking, poaching, steaming, or stir-frying.  · Work with a dietitian to follow a heart-healthy eating plan.  Medicines  · Take over-the-counter and prescription medicines only as told by your health care provider.  · Do not take these medicines unless your health care provider approves:  ? Vitamin supplements that contain vitamin A or vitamin E.  ? NSAIDs, such as ibuprofen, naproxen, or celecoxib.  ? Hormone replacement therapy that contains estrogen.  · If you are taking blood thinners:  ? Talk with your health care provider before you take any medicines that contain aspirin or NSAIDs. These medicines increase your risk for dangerous bleeding.  ? Take your medicine exactly as told, at the same time every day.  ? Avoid activities that could cause injury or bruising, and follow instructions about how to prevent falls.  ? Wear a medical alert bracelet, and carry a card that lists what medicines you take.  Activity  · Follow your cardiac rehabilitation program. Do exercises as told by your physical therapist.  · Ask your health care provider what activities and exercises are safe for you. Follow his or her instructions about lifting, driving, or climbing stairs.  Lifestyle  · Do not use any products that contain nicotine or tobacco, such as cigarettes, e-cigarettes, and chewing tobacco. If you need help quitting, ask your health care provider.  · Do not drink alcohol if your health care provider tells you not to drink.  · If you drink alcohol:  ? Limit how much you have to 0-1 drink a day.  ? Be aware of how much alcohol is in your drink. In the [place] one drink equals one 12 oz bottle of beer (355 mL), one 5 oz glass of wine (148 mL), or one 1½ oz glass of hard liquor (44 mL).  · Maintain a healthy weight. If you need to lose weight, work with your health care provider to do so safely.  General instructions  · Tell all the health care providers who provide care for  you about your heart condition, including your dentist. This may affect the medicines or treatment you receive.  · Manage any other health conditions you have, such as hypertension or diabetes. These conditions affect your heart.  · Pay attention to your mental health. You may be at higher risk for depression.  ? Find ways to manage stress.  ? Talk to your health care provider about depression screening and treatment.  · Keep your vaccinations up to date.  ? Get the flu shot (influenza vaccine) every year.  ? Get the pneumococcal vaccine if you are age 65 or older.  · If directed, monitor your blood pressure at home.  · Keep all follow-up visits as told by your health care provider. This is important.  Contact a health care provider if you:  · Feel overwhelmed or sad.  · Have trouble doing your daily activities.  Get help right away if you:  · Have pain in your chest, neck, arm, jaw, stomach, or back that recurs, and:  ? It lasts for more than a few minutes.  ? It is not relieved by taking the medicineyour health care provider prescribed.  · Have unexplained:  ? Heavy sweating.  ? Heartburn or indigestion.  ? Nausea or vomiting.  ? Shortness of breath.  ? Difficulty breathing.  ? Fatigue.  ? Nervousness or anxiety.  ? Weakness.  ? Diarrhea.  ? Dark stools or blood in your stool.  · Have sudden light-headedness or dizziness.  · Have blood pressure that is higher than 180/120.  · Faint.  · Have thoughts about hurting yourself.  These symptoms may represent a serious problem that is an emergency. Do not wait to see if the symptoms will go away. Get medical help right away. Call your local emergency services (911 in the [place] Do not drive yourself to the hospital.   Summary  · Acute coronary syndrome (ACS) is when there is not enough blood and oxygen being supplied to the heart. ACS can result in chest pain or a heart attack.  · Acute coronary syndrome is a medical emergency. If you have any symptoms of this condition,  get help right away.  · Treatment includes medicines and procedures to open the blocked arteries and restore blood flow.  This information is not intended to replace advice given to you by your health care provider. Make sure you discuss any questions you have with your health care provider.  Document Revised: 05/20/2020 Document Reviewed: 12/30/2019  [employer] Patient Education © 2021 [employer]      Heart Failure, Diagnosis    Heart failure means that your heart is not able to pump blood in the right way. This makes it hard for your body to work well. Heart failure is usually a long-term (chronic) condition. You must take good care of yourself and follow your treatment plan from your doctor.  What are the causes?  This condition may be caused by:  · High blood pressure.  · Build up of cholesterol and fat in the arteries.  · Heart attack. This injures the heart muscle.  · Heart valves that do not open and close properly.  · Damage of the heart muscle. This is also called cardiomyopathy.  · Lung disease.  · Abnormal heart rhythms.  What increases the risk?  The risk of heart failure goes up as a person ages. This condition is also more likely to develop in people who:  · Are overweight.  · Are male.  · Smoke or chew tobacco.  · Abuse alcohol or illegal drugs.  · Have taken medicines that can damage the heart.  · Have diabetes.  · Have abnormal heart rhythms.  · Have thyroid problems.  · Have low blood counts (anemia).  What are the signs or symptoms?  Symptoms of this condition include:  · Shortness of breath.  · Coughing.  · Swelling of the feet, ankles, legs, or belly.  · Losing weight for no reason.  · Trouble breathing.  · Waking from sleep because of the need to sit up and get more air.  · Rapid heartbeat.  · Being very tired.  · Feeling dizzy, or feeling like you may pass out (faint).  · Having no desire to eat.  · Feeling like you may vomit (nauseous).  · Peeing (urinating) more at night.  · Feeling  confused.  How is this treated?         This condition may be treated with:  · Medicines. These can be given to treat blood pressure and to make the heart muscles stronger.  · Changes in your daily life. These may include eating a healthy diet, staying at a healthy body weight, quitting tobacco and illegal drug use, or doing exercises.  · Surgery. Surgery can be done to open blocked valves, or to put devices in the heart, such as pacemakers.  · A donor heart (heart transplant). You will receive a healthy heart from a donor.  Follow these instructions at home:  · Treat other conditions as told by your doctor. These may include high blood pressure, diabetes, thyroid disease, or abnormal heart rhythms.  · Learn as much as you can about heart failure.  · Get support as you need it.  · Keep all follow-up visits as told by your doctor. This is important.  Summary  · Heart failure means that your heart is not able to pump blood in the right way.  · This condition is caused by high blood pressure, heart attack, or damage of the heart muscle.  · Symptoms of this condition include shortness of breath and swelling of the feet, ankles, legs, or belly. You may also feel very tired or feel like you may vomit.  · You may be treated with medicines, surgery, or changes in your daily life.  · Treat other health conditions as told by your doctor.  This information is not intended to replace advice given to you by your health care provider. Make sure you discuss any questions you have with your health care provider.  Document Revised: 03/06/2020 Document Reviewed: 03/06/2020  [email or web address] Patient Education © 2021 [email or web address]      Hypertension, Adult  Hypertension is another name for high blood pressure. High blood pressure forces your heart to work harder to pump blood. This can cause problems over time.  There are two numbers in a blood pressure reading. There is a top number (systolic) over a bottom number (diastolic). It is best to  have a blood pressure that is below 120/80. Healthy choices can help lower your blood pressure, or you may need medicine to help lower it.  What are the causes?  The cause of this condition is not known. Some conditions may be related to high blood pressure.  What increases the risk?  · Smoking.  · Having type 2 diabetes mellitus, high cholesterol, or both.  · Not getting enough exercise or physical activity.  · Being overweight.  · Having too much fat, sugar, calories, or salt (sodium) in your diet.  · Drinking too much alcohol.  · Having long-term (chronic) kidney disease.  · Having a family history of high blood pressure.  · Age. Risk increases with age.  · Race. You may be at higher risk if you are .  · Gender. Men are at higher risk than women before age 45. After age 65, women are at higher risk than men.  · Having obstructive sleep apnea.  · Stress.  What are the signs or symptoms?  · High blood pressure may not cause symptoms. Very high blood pressure (hypertensive crisis) may cause:  ? Headache.  ? Feelings of worry or nervousness (anxiety).  ? Shortness of breath.  ? Nosebleed.  ? A feeling of being sick to your stomach (nausea).  ? Throwing up (vomiting).  ? Changes in how you see.  ? Very bad chest pain.  ? Seizures.  How is this treated?  · This condition is treated by making healthy lifestyle changes, such as:  ? Eating healthy foods.  ? Exercising more.  ? Drinking less alcohol.  · Your health care provider may prescribe medicine if lifestyle changes are not enough to get your blood pressure under control, and if:  ? Your top number is above 130.  ? Your bottom number is above 80.  · Your personal target blood pressure may vary.  Follow these instructions at home:  Eating and drinking    · If told, follow the DASH eating plan. To follow this plan:  ? Fill one half of your plate at each meal with fruits and vegetables.  ? Fill one fourth of your plate at each meal with whole grains.  Whole grains include whole-wheat pasta, brown rice, and whole-grain bread.  ? Eat or drink low-fat dairy products, such as skim milk or low-fat yogurt.  ? Fill one fourth of your plate at each meal with low-fat (lean) proteins. Low-fat proteins include fish, chicken without skin, eggs, beans, and tofu.  ? Avoid fatty meat, cured and processed meat, or chicken with skin.  ? Avoid pre-made or processed food.  · Eat less than 1,500 mg of salt each day.  · Do not drink alcohol if:  ? Your doctor tells you not to drink.  ? You are pregnant, may be pregnant, or are planning to become pregnant.  · If you drink alcohol:  ? Limit how much you use to:  § 0-1 drink a day for women.  § 0-2 drinks a day for men.  ? Be aware of how much alcohol is in your drink. In the [place] one drink equals one 12 oz bottle of beer (355 mL), one 5 oz glass of wine (148 mL), or one 1½ oz glass of hard liquor (44 mL).  Lifestyle    · Work with your doctor to stay at a healthy weight or to lose weight. Ask your doctor what the best weight is for you.  · Get at least 30 minutes of exercise most days of the week. This may include walking, swimming, or biking.  · Get at least 30 minutes of exercise that strengthens your muscles (resistance exercise) at least 3 days a week. This may include lifting weights or doing Pilates.  · Do not use any products that contain nicotine or tobacco, such as cigarettes, e-cigarettes, and chewing tobacco. If you need help quitting, ask your doctor.  · Check your blood pressure at home as told by your doctor.  · Keep all follow-up visits as told by your doctor. This is important.  Medicines  · Take over-the-counter and prescription medicines only as told by your doctor. Follow directions carefully.  · Do not skip doses of blood pressure medicine. The medicine does not work as well if you skip doses. Skipping doses also puts you at risk for problems.  · Ask your doctor about side effects or reactions to medicines that you  should watch for.  Contact a doctor if you:  · Think you are having a reaction to the medicine you are taking.  · Have headaches that keep coming back (recurring).  · Feel dizzy.  · Have swelling in your ankles.  · Have trouble with your vision.  Get help right away if you:  · Get a very bad headache.  · Start to feel mixed up (confused).  · Feel weak or numb.  · Feel faint.  · Have very bad pain in your:  ? Chest.  ? Belly (abdomen).  · Throw up more than once.  · Have trouble breathing.  Summary  · Hypertension is another name for high blood pressure.  · High blood pressure forces your heart to work harder to pump blood.  · For most people, a normal blood pressure is less than 120/80.  · Making healthy choices can help lower blood pressure. If your blood pressure does not get lower with healthy choices, you may need to take medicine.  This information is not intended to replace advice given to you by your health care provider. Make sure you discuss any questions you have with your health care provider.  Document Revised: 08/28/2019 Document Reviewed: 08/28/2019  Elsevier Patient Education © 2021 [employer]

## 2021-05-07 NOTE — OUTREACH NOTE
Sepsis Week 4 Survey      Responses   [facility] patient discharged from?  [facility]   Does the patient have one of the following disease processes/diagnoses(primary or secondary)?  Sepsis   Week 4 attempt successful?  Yes   Call start time  1634   Call end time  1638   Discharge diagnosis  A/C CHF, sepsis d/t RLE cellulitis, chronic anemia, IDDM, CKD   Is patient permission given to speak with other caregiver?  Yes   List who call center can speak with  spouse- Perri   Person spoke with today (if not patient) and relationship  spouse- Perri   Meds reviewed with patient/caregiver?  Yes   Is the patient taking all medications as directed (includes completed medication regime)?  Yes   Has the patient kept scheduled appointments due by today?  Yes   Comments  needs another echo   What is the patient's perception of their health status since discharge?  Improving   Nursing interventions  Nurse provided patient education   Is the patient/caregiver able to teach back Sepsis?  S - Shivering,fever or very cold   Nursing interventions  Nurse provided patient education   Is patient/caregiver able to teach back steps to recovery at home?  Rest and regain strength, Eat a balanced diet   Is the patient/caregiver able to teach back signs and symptoms of worsening condition:  Fever, Shortness of breath/rapid respiratory rate, Altered mental status(confusion/coma)   Is the patient/caregiver able to teach back the hierarchy of who to call/visit for symptoms/problems? PCP, Specialist, Home health nurse, Urgent Care, ED, 911  Yes   Week 4 call completed?  Yes   Would the patient like one additional call?  No   Graduated  Yes   Is the patient interested in additional calls from an ambulatory ?  NOTE:  applies to high risk patients requiring additional follow-up.  No   Did the patient feel the follow up calls were helpful during their recovery period?  Yes   Was the number of calls appropriate?  Yes   Wrap up additional  comments  Wife states he is doing well but then phone cut out on the call.          Norma Muller RN

## 2021-05-09 LAB
BACTERIA SPEC AEROBE CULT: ABNORMAL
GRAM STN SPEC: ABNORMAL
GRAM STN SPEC: ABNORMAL

## 2021-05-10 ENCOUNTER — HOSPITAL ENCOUNTER (OUTPATIENT)
Dept: CARDIOLOGY | Facility: HOSPITAL | Age: 71
Discharge: HOME | End: 2021-05-10
Admitting: NURSE PRACTITIONER

## 2021-05-10 VITALS
OXYGEN SATURATION: 97 % | BODY MASS INDEX: 30.45 KG/M2 | DIASTOLIC BLOOD PRESSURE: 70 MMHG | RESPIRATION RATE: 20 BRPM | WEIGHT: 206.2 LBS | HEART RATE: 78 BPM | SYSTOLIC BLOOD PRESSURE: 126 MMHG

## 2021-05-10 DIAGNOSIS — E66.9 OBESITY (BMI 30-39.9): Chronic | ICD-10-CM

## 2021-05-10 DIAGNOSIS — I10 ESSENTIAL HYPERTENSION: Chronic | ICD-10-CM

## 2021-05-10 DIAGNOSIS — Z95.1 S/P CABG X 4: ICD-10-CM

## 2021-05-10 DIAGNOSIS — I50.42 CHRONIC COMBINED SYSTOLIC AND DIASTOLIC CONGESTIVE HEART FAILURE (HCC): Primary | ICD-10-CM

## 2021-05-10 DIAGNOSIS — N18.30 STAGE 3 CHRONIC KIDNEY DISEASE, UNSPECIFIED WHETHER STAGE 3A OR 3B CKD (HCC): ICD-10-CM

## 2021-05-10 LAB
ABSOLUTE LUNG FLUID CONTENT: 31 % (ref 20–35)
ANION GAP SERPL CALCULATED.3IONS-SCNC: 11.1 MMOL/L (ref 5–15)
BUN SERPL-MCNC: 17 MG/DL (ref 8–23)
BUN/CREAT SERPL: 11 (ref 7–25)
CALCIUM SPEC-SCNC: 9.6 MG/DL (ref 8.6–10.5)
CHLORIDE SERPL-SCNC: 102 MMOL/L (ref 98–107)
CO2 SERPL-SCNC: 23.9 MMOL/L (ref 22–29)
CREAT SERPL-MCNC: 1.55 MG/DL (ref 0.76–1.27)
GFR SERPL CREATININE-BSD FRML MDRD: 44 ML/MIN/1.73
GLUCOSE SERPL-MCNC: 146 MG/DL (ref 65–99)
MAGNESIUM SERPL-MCNC: 1.8 MG/DL (ref 1.6–2.4)
NT-PROBNP SERPL-MCNC: 313.5 PG/ML (ref 0–900)
POTASSIUM SERPL-SCNC: 4.5 MMOL/L (ref 3.5–5.2)
SODIUM SERPL-SCNC: 137 MMOL/L (ref 136–145)

## 2021-05-10 PROCEDURE — 80048 BASIC METABOLIC PNL TOTAL CA: CPT | Performed by: NURSE PRACTITIONER

## 2021-05-10 PROCEDURE — 83880 ASSAY OF NATRIURETIC PEPTIDE: CPT

## 2021-05-10 PROCEDURE — 99214 OFFICE O/P EST MOD 30 MIN: CPT | Performed by: NURSE PRACTITIONER

## 2021-05-10 PROCEDURE — 36415 COLL VENOUS BLD VENIPUNCTURE: CPT | Performed by: NURSE PRACTITIONER

## 2021-05-10 PROCEDURE — 83735 ASSAY OF MAGNESIUM: CPT | Performed by: NURSE PRACTITIONER

## 2021-05-10 RX ORDER — GLIPIZIDE 5 MG/1
5 TABLET ORAL
COMMUNITY
End: 2021-06-16 | Stop reason: SDUPTHER

## 2021-05-10 RX ORDER — DAPAGLIFLOZIN 10 MG/1
10 TABLET, FILM COATED ORAL DAILY
COMMUNITY
End: 2021-06-21

## 2021-05-10 RX ORDER — ATORVASTATIN CALCIUM 40 MG/1
40 TABLET, FILM COATED ORAL NIGHTLY
COMMUNITY
End: 2021-06-16 | Stop reason: SDUPTHER

## 2021-05-13 PROBLEM — R94.30 CARDIAC LV EJECTION FRACTION OF 35-39%: Status: ACTIVE | Noted: 2021-05-13

## 2021-05-13 PROBLEM — I50.21 ACUTE SYSTOLIC HEART FAILURE: Status: ACTIVE | Noted: 2021-05-13

## 2021-05-13 PROBLEM — R93.1 CARDIAC LV EJECTION FRACTION OF 35-39%: Status: ACTIVE | Noted: 2021-05-13

## 2021-05-14 ENCOUNTER — PRIOR AUTHORIZATION (OUTPATIENT)
Dept: CARDIOLOGY | Facility: CLINIC | Age: 71
End: 2021-05-14

## 2021-05-14 NOTE — TELEPHONE ENCOUNTER
Entresto PA Case: [id number]  Status: Approved  Coverage Starts on: 1/1/2021  Coverage Ends on: 12/31/2021  Roseann Montemayor MA

## 2021-05-20 DIAGNOSIS — S81.801D WOUND OF RIGHT LOWER EXTREMITY, SUBSEQUENT ENCOUNTER: ICD-10-CM

## 2021-05-20 DIAGNOSIS — Z95.1 S/P CABG (CORONARY ARTERY BYPASS GRAFT): ICD-10-CM

## 2021-05-20 DIAGNOSIS — R94.30 CARDIAC LEFT VENTRICULAR EJECTION FRACTION 30-35 PERCENT: ICD-10-CM

## 2021-05-20 DIAGNOSIS — Z95.1 S/P CABG X 4: ICD-10-CM

## 2021-05-20 DIAGNOSIS — I50.42 CHRONIC COMBINED SYSTOLIC AND DIASTOLIC CONGESTIVE HEART FAILURE (HCC): Primary | ICD-10-CM

## 2021-05-20 RX ORDER — SACUBITRIL AND VALSARTAN 24; 26 MG/1; MG/1
1 TABLET, FILM COATED ORAL 2 TIMES DAILY
Qty: 60 TABLET | Refills: 0 | Status: SHIPPED | OUTPATIENT
Start: 2021-05-20 | End: 2021-06-29 | Stop reason: SDUPTHER

## 2021-05-21 RX ORDER — SULFAMETHOXAZOLE AND TRIMETHOPRIM 800; 160 MG/1; MG/1
TABLET ORAL
Qty: 20 TABLET | Refills: 0 | Status: SHIPPED | OUTPATIENT
Start: 2021-05-21 | End: 2021-05-26

## 2021-05-24 ENCOUNTER — TELEPHONE (OUTPATIENT)
Dept: FAMILY MEDICINE CLINIC | Facility: CLINIC | Age: 71
End: 2021-05-24

## 2021-05-26 ENCOUNTER — TELEPHONE (OUTPATIENT)
Dept: CARDIOLOGY | Facility: CLINIC | Age: 71
End: 2021-05-26

## 2021-05-26 ENCOUNTER — HOSPITAL ENCOUNTER (OUTPATIENT)
Dept: CARDIOLOGY | Facility: HOSPITAL | Age: 71
Discharge: HOME | End: 2021-05-26
Admitting: NURSE PRACTITIONER

## 2021-05-26 VITALS
BODY MASS INDEX: 30.51 KG/M2 | RESPIRATION RATE: 18 BRPM | DIASTOLIC BLOOD PRESSURE: 64 MMHG | OXYGEN SATURATION: 97 % | WEIGHT: 206.6 LBS | HEART RATE: 79 BPM | SYSTOLIC BLOOD PRESSURE: 122 MMHG

## 2021-05-26 DIAGNOSIS — Z95.1 S/P CABG X 4: ICD-10-CM

## 2021-05-26 DIAGNOSIS — R79.89 ELEVATED SERUM CREATININE: ICD-10-CM

## 2021-05-26 DIAGNOSIS — E66.09 CLASS 1 OBESITY DUE TO EXCESS CALORIES WITH BODY MASS INDEX (BMI) OF 30.0 TO 30.9 IN ADULT, UNSPECIFIED WHETHER SERIOUS COMORBIDITY PRESENT: ICD-10-CM

## 2021-05-26 DIAGNOSIS — I10 ESSENTIAL HYPERTENSION: Chronic | ICD-10-CM

## 2021-05-26 DIAGNOSIS — R94.30 CARDIAC LV EJECTION FRACTION OF 35-39%: ICD-10-CM

## 2021-05-26 DIAGNOSIS — I50.42 CHRONIC COMBINED SYSTOLIC AND DIASTOLIC CONGESTIVE HEART FAILURE (HCC): Primary | ICD-10-CM

## 2021-05-26 LAB
ABSOLUTE LUNG FLUID CONTENT: 36 % (ref 20–35)
ANION GAP SERPL CALCULATED.3IONS-SCNC: 11.8 MMOL/L (ref 5–15)
BUN SERPL-MCNC: 14 MG/DL (ref 8–23)
BUN/CREAT SERPL: 10.1 (ref 7–25)
CALCIUM SPEC-SCNC: 9.9 MG/DL (ref 8.6–10.5)
CHLORIDE SERPL-SCNC: 103 MMOL/L (ref 98–107)
CO2 SERPL-SCNC: 22.2 MMOL/L (ref 22–29)
CREAT SERPL-MCNC: 1.38 MG/DL (ref 0.76–1.27)
GFR SERPL CREATININE-BSD FRML MDRD: 51 ML/MIN/1.73
GLUCOSE SERPL-MCNC: 128 MG/DL (ref 65–99)
MAGNESIUM SERPL-MCNC: 1.8 MG/DL (ref 1.6–2.4)
NT-PROBNP SERPL-MCNC: 303.5 PG/ML (ref 0–900)
POTASSIUM SERPL-SCNC: 4.2 MMOL/L (ref 3.5–5.2)
SODIUM SERPL-SCNC: 137 MMOL/L (ref 136–145)

## 2021-05-26 PROCEDURE — 36415 COLL VENOUS BLD VENIPUNCTURE: CPT | Performed by: NURSE PRACTITIONER

## 2021-05-26 PROCEDURE — 80048 BASIC METABOLIC PNL TOTAL CA: CPT | Performed by: NURSE PRACTITIONER

## 2021-05-26 PROCEDURE — 94726 PLETHYSMOGRAPHY LUNG VOLUMES: CPT | Performed by: NURSE PRACTITIONER

## 2021-05-26 PROCEDURE — 83880 ASSAY OF NATRIURETIC PEPTIDE: CPT

## 2021-05-26 PROCEDURE — 83735 ASSAY OF MAGNESIUM: CPT | Performed by: NURSE PRACTITIONER

## 2021-05-26 PROCEDURE — 99214 OFFICE O/P EST MOD 30 MIN: CPT | Performed by: NURSE PRACTITIONER

## 2021-05-26 RX ORDER — BUMETANIDE 0.5 MG/1
0.5 TABLET ORAL DAILY
Qty: 30 TABLET | Refills: 0 | Status: SHIPPED | OUTPATIENT
Start: 2021-05-26 | End: 2021-05-26 | Stop reason: SDUPTHER

## 2021-05-26 RX ORDER — BUMETANIDE 0.5 MG/1
0.5 TABLET ORAL DAILY
Qty: 90 TABLET | Refills: 0 | Status: SHIPPED | OUTPATIENT
Start: 2021-05-26 | End: 2021-08-09 | Stop reason: SDUPTHER

## 2021-05-26 NOTE — PROGRESS NOTES
Heart Failure Pharmacy Note  Patient Name: William Villasenor   Referring Provider: Maddi Munoz  Primary Cardiologist: Dany PASCUAL  CHF: diastolic     Medication Use:   Adherence: No issues   Hx of med intolerances: None reported  Affordability: Reports issues affording Farxiga and Insulin (has since been taken off of insulin)   Retail Rx Management: None at this time     Past Medical History:   Diagnosis Date   • Arthritis    • Back pain    • BPH (benign prostatic hyperplasia)    • Chronic diastolic (congestive) heart failure (CMS/HCC)    • Diabetes mellitus (CMS/HCC)    • Diabetic peripheral neuropathy (CMS/HCC)    • Erectile dysfunction    • Former smoker    • GERD (gastroesophageal reflux disease)    • GERD (gastroesophageal reflux disease)    • Gout    • High cholesterol    • Hypertension    • Obesity      ALLERGIES: Lisinopril  Current Outpatient Medications   Medication Sig Dispense Refill   • acetaminophen (TYLENOL) 500 MG tablet Take 500 mg by mouth Every 6 (Six) Hours As Needed for Mild Pain .     • allopurinol (ZYLOPRIM) 300 MG tablet Take 1 tablet by mouth Daily. 90 tablet 1   • Alpha Lipoic Acid-Vitamin E 100-30 MG-UNIT capsule Take  by mouth.     • Ascorbic Acid (Vitamin C) 500 MG capsule Take 500 mg by mouth Daily.     • aspirin (Aspirin Adult Low Dose) 81 MG EC tablet Take 81 mg by mouth Daily.     • atorvastatin (LIPITOR) 40 MG tablet Take 40 mg by mouth Every Night.     • bumetanide (BUMEX) 0.5 MG tablet Take 1 tablet by mouth Daily. 90 tablet 0   • clopidogrel (PLAVIX) 75 MG tablet Take 1 tablet by mouth Daily. 90 tablet 1   • Dapagliflozin Propanediol (Farxiga) 10 MG tablet Take 10 mg by mouth Daily.     • doxazosin (CARDURA) 4 MG tablet Take 4 mg by mouth Every Night.     • finasteride (PROSCAR) 5 MG tablet Take 1 tablet by mouth Daily. 90 tablet 1   • folic acid (FOLVITE) 1 MG tablet Take 1 mg by mouth 2 (two) times a day.     • gabapentin (NEURONTIN) 300 MG capsule Take 1 capsule by mouth  2 (two) times a day. 60 capsule 0   • glipizide (GLUCOTROL) 5 MG tablet Take 5 mg by mouth 2 (Two) Times a Day Before Meals.     • magnesium oxide (MAG-OX) 400 MG tablet Take 1 tablet by mouth 2 (Two) Times a Day. Patient will get otc and take (Patient taking differently: Take 400 mg by mouth Daily. Patient will get otc and take) 60 tablet 0   • meclizine (ANTIVERT) 25 MG tablet TAKE 1 TABLET BY MOUTH 3 (THREE) TIMES A DAY AS NEEDED FOR DIZZINESS. 30 tablet 0   • metFORMIN (GLUCOPHAGE) 1000 MG tablet Take 1,000 mg by mouth 2 (Two) Times a Day With Meals.     • metoprolol succinate XL (Toprol XL) 100 MG 24 hr tablet Take 1 tablet by mouth Daily. 90 tablet 1   • Misc Natural Products (LUTEIN 20 PO) Take 1 capsule by mouth.     • multivitamin (multivitamin) tablet tablet Take 1 tablet by mouth Daily. 30 tablet    • nitroglycerin (NITROSTAT) 0.4 MG SL tablet Place 1 tablet under the tongue Every 5 (Five) Minutes As Needed for Chest Pain (Only if SBP Greater Than 100). Take no more than 3 doses in 15 minutes. 100 tablet 12   • pantoprazole (Protonix) 40 MG EC tablet Take 1 tablet by mouth Daily. 90 tablet 3   • sacubitril-valsartan (Entresto) 24-26 MG tablet Take 1 tablet by mouth 2 (Two) Times a Day. 180 tablet 3   • sacubitril-valsartan (Entresto) 24-26 MG tablet Take 1 tablet by mouth 2 (Two) Times a Day. 60 tablet 0   • senna (senna) 8.6 MG tablet Take 2 tablets by mouth 2 (Two) Times a Day.     • vitamin E 400 UNIT capsule Take 1 capsule by mouth Daily.     • fluconazole (DIFLUCAN) 200 MG tablet Take 1 tablet by mouth Daily. Take one pill and if still symptoms repeat in 72 hours 2 tablet 2   • glipizide (GLUCOTROL) 5 MG tablet Take 1 tablet by mouth 2 (Two) Times a Day Before Meals for 30 days. 60 tablet 0     No current facility-administered medications for this encounter.       Vaccination History:   Pneumonia: Needs Assessed  Annual Influenza: Needs Assessed     Objective  Vitals:    05/26/21 1455   BP: 122/64    BP Location: Left arm   Patient Position: Sitting   Cuff Size: Adult   Pulse: 79   Resp: 18   SpO2: 97%   Weight: 93.7 kg (206 lb 9.6 oz)     Wt Readings from Last 3 Encounters:   05/26/21 93.7 kg (206 lb 9.6 oz)   05/10/21 93.5 kg (206 lb 3.2 oz)   05/07/21 93.4 kg (206 lb)         05/26/21  1455   Weight: 93.7 kg (206 lb 9.6 oz)     Lab Results   Component Value Date    GLUCOSE 128 (H) 05/26/2021    BUN 14 05/26/2021    CREATININE 1.38 (H) 05/26/2021    EGFRIFNONA 51 (L) 05/26/2021    BCR 10.1 05/26/2021    K 4.2 05/26/2021    CO2 22.2 05/26/2021    CALCIUM 9.9 05/26/2021    ALBUMIN 4.16 04/21/2021    AST 11 04/21/2021    ALT 12 04/21/2021     Lab Results   Component Value Date    WBC 7.60 04/21/2021    HGB 12.2 (L) 04/21/2021    HCT 39.3 04/21/2021    MCV 86.8 04/21/2021     04/21/2021     Lab Results   Component Value Date    TROPONINI <0.006 08/07/2018    TROPONINT <0.010 04/21/2021     Lab Results   Component Value Date    PROBNP 303.5 05/26/2021     Results for orders placed during the hospital encounter of 04/21/21    Adult Transthoracic Echo Complete W/ Cont if Necessary Per Protocol    Interpretation Summary  Technically limited study.    1.  LV size is normal global LV systolic function appears mildly to moderately depressed.  Visually estimated ejection fraction is 35±5%.  There is marked LV dyssynergy, mild concentric left ventricular hypertrophy, and grade 1A diastolic dysfunction.  The atria appear at the upper limits of normal size.  RV size and function are grossly preserved.    2.  Valves are grossly morphologically and physiologically normal with no stenotic lesions, valve associated masses or thrombi, or hemodynamically significant regurgitation.    3.  There is no large pericardial effusion present.  The proximal aorta is mildly dilated at 3.9 cm.    4.  Pulmonary artery pressures cannot be calculated.           GDMT:       Class   Drug   Dose Last Dose Adjustment   ACEi/ARB/ARNI  Entresto 24-26 mg daily  5/7/21   Beta Blocker Toprol XL  100mg 4/19/21   MRA        Drug Therapy Problems    1. Unstable renal function  2. Needs patient assistance      Recommendations:      1. Continue close monitoring of renal function. Consider discontinuation of Entresto and initiation of alternative therapy if SCr worsens.  2. Recommended applying for LIS (Medicare Extra Help) for help with Farxiga co-pay. Wife states his cardiologist's office is working on this issue like they previously had done for his Entresto which was approved at $0 copay.       Patient was educated on heart failure medications and the importance of medication adherence. All questions were addressed and patient expressed understanding.     Thank you for allowing me to participate in the care of your patient,    Letty Mei, PharmD  05/26/21  15:51 EDT

## 2021-05-26 NOTE — PROGRESS NOTES
[facility] CHF CLINIC OFFICE VISIT    Subjective:   History of Present Illness   William Villasenor is a 70-year-old  male who presents to the clinic today for follow up on heart failure. He is accompanied by his wife.  He has underlying history of chronic combined systolic and diastolic congestive heart failure with an LVEF of 35% from echocardiogram on 4/21/2021 which has worsened since echocardiogram on 3/24/2021 when LVEF was 51-55%. He takes metoprolol  mg daily, Entresto 24/26 mg BID, Bumex 0.5 mg daily, Magnesium 400 mg BID.      History of three-vessel CABG in 3/2021   Feels like his breathing is doing better/stable  Swelling is improving/stable   Urine output good  He feels as if he is continuing to regain strength  Reports they monitor his sodium intake and fluid intake  He continues to express concern at his vein graft site and redness.  He apparently had a culture of the area and has just finished his second course of Bactrim. The redness is improving however there is a yellow drainage in the center of his wound.   Home weight stable 200 lbs    Home BP - 115/64, 113/64, 113/60, 118/62, 112/68, 123/69, 121/68, 125/74, 131/72, 112/59, 121/72  Home HR 90, 88, 82, 86, 82, 74, 76, 77, 83, 78, 77    PCP: Dr. Gatica  Cardiologist: Dr. Simms/ANKUR Cuevas    Hospitalizations: Discharged on 4/9/2021  Past Medical History:   Diagnosis Date   • Arthritis    • Back pain    • BPH (benign prostatic hyperplasia)    • Chronic diastolic (congestive) heart failure (CMS/HCC)    • Diabetes mellitus (CMS/HCC)    • Diabetic peripheral neuropathy (CMS/HCC)    • Erectile dysfunction    • Former smoker    • GERD (gastroesophageal reflux disease)    • GERD (gastroesophageal reflux disease)    • Gout    • High cholesterol    • Hypertension    • Obesity      Past Surgical History:   Procedure Laterality Date   • CARDIAC CATHETERIZATION N/A 3/15/2021    Procedure: Left Heart Cath;  Surgeon: Trent Zaragoza MD;  Location:   [facility];  Service: Cardiology;  Laterality: N/A;   • CARDIAC CATHETERIZATION N/A 3/15/2021    Procedure: Coronary angiography;  Surgeon: Trent Zaragoza MD;  Location:  [facility];  Service: Cardiology;  Laterality: N/A;   • COLONOSCOPY W/ BIOPSIES     • CORONARY ARTERY BYPASS GRAFT N/A 3/16/2021    Procedure: MEDIAN STERNOTOMY, CORONARY ARTERY BYPASS GRAFT X4, UTILIZATION OF LEFT INTERNAL MAMMARY ARTERY, AND ENDOSCOPIC VEIN HARVEST OF RIGHT GREATER SAPHENOUS VEIN;  Surgeon: Jorge Simms MD;  Location:  [facility];  Service: Cardiothoracic;  Laterality: N/A;  vein out- 1614  vein ready- 1628             Social History     Socioeconomic History   • Marital status:      Spouse name: Not on file   • Number of children: 3   • Years of education: Not on file   • Highest education level: Not on file   Tobacco Use   • Smoking status: Former Smoker     Packs/day: 1.00     Years: 5.00     Pack years: 5.00     Quit date: 1974     Years since quittin.3   • Smokeless tobacco: Never Used   Substance and Sexual Activity   • Alcohol use: Never     Comment: quit in    • Drug use: Never   • Sexual activity: Defer     Family History   Problem Relation Age of Onset   • Heart disease Mother    • Hypertension Mother    • Depression Mother    • Alcohol abuse Maternal Uncle    • Heart disease Maternal Grandmother    • Hypertension Maternal Grandmother    • Diabetes Maternal Grandmother    • Diabetes Paternal Grandmother    • No Known Problems Half-Brother    • No Known Problems Half-Brother    • No Known Problems Half-Sister    • No Known Problems Daughter    • No Known Problems Daughter    • Hyperlipidemia Daughter      Allergies:  Allergies   Allergen Reactions   • Lisinopril Cough     Review of Systems   Constitutional: Negative for chills, fever, weight gain and weight loss.   HENT: Negative for congestion, hoarse voice and sore throat.    Eyes: Negative for blurred  vision, pain and visual disturbance.   Cardiovascular: Negative for chest pain, claudication, cyanosis, dyspnea on exertion, irregular heartbeat, leg swelling, near-syncope, orthopnea, palpitations and syncope.   Respiratory: Negative for cough, shortness of breath and wheezing.    Endocrine: Negative for cold intolerance, heat intolerance and polyuria.   Hematologic/Lymphatic: Negative for bleeding problem. Does not bruise/bleed easily.   Skin: Positive for poor wound healing. Negative for color change and flushing.   Musculoskeletal: Negative for arthritis, back pain, joint pain and myalgias.   Gastrointestinal: Negative for abdominal pain, constipation, diarrhea, nausea and vomiting.   Genitourinary: Negative for dysuria, frequency, hesitancy and urgency.   Neurological: Negative for excessive daytime sleepiness, dizziness, headaches, numbness, vertigo and weakness.   Psychiatric/Behavioral: Negative for depression. The patient does not have insomnia and is not nervous/anxious.    All other systems reviewed and are negative.    Current Outpatient Medications   Medication Sig Dispense Refill   • acetaminophen (TYLENOL) 500 MG tablet Take 500 mg by mouth Every 6 (Six) Hours As Needed for Mild Pain .     • allopurinol (ZYLOPRIM) 300 MG tablet Take 1 tablet by mouth Daily. 90 tablet 1   • Alpha Lipoic Acid-Vitamin E 100-30 MG-UNIT capsule Take  by mouth.     • Ascorbic Acid (Vitamin C) 500 MG capsule Take 500 mg by mouth Daily.     • aspirin (Aspirin Adult Low Dose) 81 MG EC tablet Take 81 mg by mouth Daily.     • atorvastatin (LIPITOR) 40 MG tablet Take 40 mg by mouth Every Night.     • bumetanide (BUMEX) 0.5 MG tablet Take 1 tablet by mouth Daily. 90 tablet 0   • clopidogrel (PLAVIX) 75 MG tablet Take 1 tablet by mouth Daily. 90 tablet 1   • Dapagliflozin Propanediol (Farxiga) 10 MG tablet Take 10 mg by mouth Daily.     • doxazosin (CARDURA) 4 MG tablet Take 4 mg by mouth Every Night.     • finasteride (PROSCAR) 5  MG tablet Take 1 tablet by mouth Daily. 90 tablet 1   • folic acid (FOLVITE) 1 MG tablet Take 1 mg by mouth 2 (two) times a day.     • gabapentin (NEURONTIN) 300 MG capsule Take 1 capsule by mouth 2 (two) times a day. 60 capsule 0   • glipizide (GLUCOTROL) 5 MG tablet Take 5 mg by mouth 2 (Two) Times a Day Before Meals.     • magnesium oxide (MAG-OX) 400 MG tablet Take 1 tablet by mouth 2 (Two) Times a Day. Patient will get otc and take (Patient taking differently: Take 400 mg by mouth Daily. Patient will get otc and take) 60 tablet 0   • meclizine (ANTIVERT) 25 MG tablet TAKE 1 TABLET BY MOUTH 3 (THREE) TIMES A DAY AS NEEDED FOR DIZZINESS. 30 tablet 0   • metFORMIN (GLUCOPHAGE) 1000 MG tablet Take 1,000 mg by mouth 2 (Two) Times a Day With Meals.     • metoprolol succinate XL (Toprol XL) 100 MG 24 hr tablet Take 1 tablet by mouth Daily. 90 tablet 1   • Misc Natural Products (LUTEIN 20 PO) Take 1 capsule by mouth.     • multivitamin (multivitamin) tablet tablet Take 1 tablet by mouth Daily. 30 tablet    • nitroglycerin (NITROSTAT) 0.4 MG SL tablet Place 1 tablet under the tongue Every 5 (Five) Minutes As Needed for Chest Pain (Only if SBP Greater Than 100). Take no more than 3 doses in 15 minutes. 100 tablet 12   • pantoprazole (Protonix) 40 MG EC tablet Take 1 tablet by mouth Daily. 90 tablet 3   • sacubitril-valsartan (Entresto) 24-26 MG tablet Take 1 tablet by mouth 2 (Two) Times a Day. 180 tablet 3   • sacubitril-valsartan (Entresto) 24-26 MG tablet Take 1 tablet by mouth 2 (Two) Times a Day. 60 tablet 0   • senna (senna) 8.6 MG tablet Take 2 tablets by mouth 2 (Two) Times a Day.     • vitamin E 400 UNIT capsule Take 1 capsule by mouth Daily.     • fluconazole (DIFLUCAN) 200 MG tablet Take 1 tablet by mouth Daily. Take one pill and if still symptoms repeat in 72 hours 2 tablet 2   • glipizide (GLUCOTROL) 5 MG tablet Take 1 tablet by mouth 2 (Two) Times a Day Before Meals for 30 days. 60 tablet 0     No current  facility-administered medications for this encounter.      Objective:   Body mass index is 30.51 kg/m².    Vitals:    05/26/21 1455   BP: 122/64   Pulse: 79   Resp: 18   SpO2: 97%     Body mass index is 30.51 kg/m².  Lab Results   Component Value Date    ABSOLUTELUNG 36 (A) 05/26/2021       Wt Readings from Last 3 Encounters:   05/26/21 93.7 kg (206 lb 9.6 oz)   05/10/21 93.5 kg (206 lb 3.2 oz)   05/07/21 93.4 kg (206 lb)     Lab Results   Component Value Date    ABSOLUTELUNG 36 (A) 05/26/2021      ReDs - 34% (4/21/2021)    Vitals reviewed.   Constitutional:       Appearance: Normal appearance. Well-developed.   Eyes:      Conjunctiva/sclera: Conjunctivae normal.   HENT:      Head: Normocephalic.   Neck:      Vascular: No JVD or JVR.   Pulmonary:      Effort: Pulmonary effort is normal.      Breath sounds: Normal breath sounds.   Chest:      Comments: Healing CABG wound  Cardiovascular:      Normal rate. Regular rhythm.      Comments: Improved redness around his vein graft site, yellow center noted in the wound   Edema:     Ankle: bilateral 1+ edema of the ankle.     Feet: bilateral 1+ edema of the feet.  Abdominal:      General: Bowel sounds are normal.      Palpations: Abdomen is soft. There is no hepatomegaly or splenomegaly.   Musculoskeletal: Normal range of motion.      Cervical back: Normal range of motion and neck supple. Skin:     General: Skin is warm and dry.   Neurological:      Mental Status: Alert and oriented to person, place, and time.   Psychiatric:         Attention and Perception: Attention normal.         Mood and Affect: Mood normal.         Speech: Speech normal.         Behavior: Behavior normal. Behavior is cooperative.         Cognition and Memory: Cognition normal.       Cardiographics  Results for orders placed during the hospital encounter of 04/21/21    Adult Transthoracic Echo Complete W/ Cont if Necessary Per Protocol    Interpretation Summary  Technically limited study.    1.  LV size  is normal global LV systolic function appears mildly to moderately depressed.  Visually estimated ejection fraction is 35±5%.  There is marked LV dyssynergy, mild concentric left ventricular hypertrophy, and grade 1A diastolic dysfunction.  The atria appear at the upper limits of normal size.  RV size and function are grossly preserved.    2.  Valves are grossly morphologically and physiologically normal with no stenotic lesions, valve associated masses or thrombi, or hemodynamically significant regurgitation.    3.  There is no large pericardial effusion present.  The proximal aorta is mildly dilated at 3.9 cm.    4.  Pulmonary artery pressures cannot be calculated.    Imaging  XR Chest 2 View    Result Date: 2021  There is blunting of the costophrenic angles bilaterally. The lung fields are otherwise clear. No new focal parenchymal opacification present.  D:  2021 E:  2021  This report was finalized on 2021 4:47 PM by Dr. Tiffanie Page MD.      [id number]      Lab Review   Lab Results   Component Value Date    TSH 2.010 2021     Lab Results   Component Value Date    GLUCOSE 128 (H) 2021    BUN 14 2021    CREATININE 1.38 (H) 2021    EGFRIFNONA 51 (L) 2021    BCR 10.1 2021    K 4.2 2021    CO2 22.2 2021    CALCIUM 9.9 2021    ALBUMIN 4.16 2021    AST 11 2021    ALT 12 2021     Lab Results   Component Value Date    WBC 7.60 2021    HGB 12.2 (L) 2021    HCT 39.3 2021    MCV 86.8 2021     2021     Lab Results   Component Value Date    TROPONINI <0.006 2018    TROPONINT <0.010 2021     Lab Results   Component Value Date    PROBNP 303.5 2021      The following portions of the patient's history were reviewed and updated as appropriate: allergies, current medications, past family history, past medical history, past social history, past surgical history and problem  list.     Old records reviewed and pertinent information is included in the above objective data.   Assessment/Plan:      Diagnosis Plan   1. Chronic combined systolic and diastolic congestive heart failure (CMS/HCC)  BNP    ReDs Vest   2. Cardiac LV ejection fraction of 35-39%     3. Essential hypertension     4. Elevated serum creatinine     5. S/P CABG x 4  bumetanide (BUMEX) 0.5 MG tablet   6. Class 1 obesity due to excess calories with body mass index (BMI) of 30.0 to 30.9 in adult, unspecified whether serious comorbidity present       BMP, pro-BNP and magnesium today  Reviewed and discussed results with patient today  ReDs reviewed and discussed today  Monitor BP and heart rate  Continue on Bumex to 0.5 mg daily. Take an extra 0.5 mg Bumex if needed for 2 to 3 pound weight gain in 1 days or 5 pounds in 1 week. Refill authorized   Could consider spironolactone in the future if vitals and kidney function remain stable  Due to leg wound he is advised to be evaluated by PCP or cardiology or ID. He expresses understanding.   Continue on current medications   Continue to monitor SCr if persistent elevation would recommend neurology evaluation.   Counseled patient extensively on dietary Na+ intake, importance of activity, weight monitoring, compliance with medications and follow up appointments.  Follow-up in 4 weeks, sooner if needed

## 2021-05-26 NOTE — PROGRESS NOTES
Heart Failure Clinic    Date: 05/26/21     Vitals:    05/26/21 1455   BP: 122/64   Pulse: 79   Resp: 18   SpO2: 97%      Mask Worn: Yes     Method of arrival: Ambulatory    Weighing self daily: Yes    Monitoring Heart Failure Zones: Yes    Today's HF Zone: Yellow     Taking medications as prescribed: Yes    Edema No    Shortness of Air: Yes    Number of pillows used at night:<2    Educational Materials given:                                                                           ReDS Value: 36%  36-41 Possible Hypervolemic Status      Kathryn Gregory MA 05/26/21 14:56 EDT

## 2021-05-26 NOTE — TELEPHONE ENCOUNTER
Notified patient his application for Entresto was approved at no cost for the remainder of the year. Roseann Montemayor MA

## 2021-05-28 DIAGNOSIS — E11.42 TYPE 2 DIABETES MELLITUS WITH DIABETIC POLYNEUROPATHY, WITHOUT LONG-TERM CURRENT USE OF INSULIN (HCC): ICD-10-CM

## 2021-05-28 RX ORDER — GABAPENTIN 300 MG/1
300 CAPSULE ORAL 2 TIMES DAILY
Qty: 60 CAPSULE | Refills: 0 | Status: SHIPPED | OUTPATIENT
Start: 2021-05-28 | End: 2021-06-29 | Stop reason: SDUPTHER

## 2021-05-28 NOTE — PROGRESS NOTES
Benjamin # in epic  Reviewed and is consistent.  UDS 3/2021 reviewed and is consistent.  Patient comfort assessment guide reviewed and updated today.    As part of the patient's treatment plan they are being prescribed a controlled substance/ substances with potential for abuse.  This patient has been made aware of the appropriate use of such medications, including potential risk of somnolence, limited ability to drive and/or work safely, and potential for overdose.  It has also been made clear these medications are for use by the patient only, without concomitant use of alcohol or other substances unless prescribed/advised by medical provider.    Patient has completed prescribing agreement detailing terms of continued prescribing of controlled substances including monitoring BENJAMIN reports, urine drug screens, and pill counts.  The patient is aware BENJAMIN reports are reviewed on a regular basis and scanned into the chart.    History and physical exam exhibit continued safe and appropriate use of controlled substances.

## 2021-06-02 DIAGNOSIS — M1A.09X0 IDIOPATHIC CHRONIC GOUT OF MULTIPLE SITES WITHOUT TOPHUS: ICD-10-CM

## 2021-06-03 ENCOUNTER — TELEPHONE (OUTPATIENT)
Dept: CARDIOLOGY | Facility: CLINIC | Age: 71
End: 2021-06-03
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
## 2021-06-03 NOTE — TELEPHONE ENCOUNTER
"Woman LVM stating that pt is having CP and needs appt tomorrow.   #[phone]      Per chart review, nitro is on pt's rx list.         Called and spoke w/ William, he stated that the center of his chest hurts and has tightness. Pt has not tried nitro, stated that he will only take it if he has sharp pain. Pt stated that when he bends, it hurts. I asked if he injured himself, he stated that he hasn't. I asked if he has pain when he twists to the left or right, he stated he has pressure around rib cage when he twists to one side. I informed pt that it may be muscular in nature and advised him that nitro can help if it's not sharp.   Pt denies any rx changes since we saw him last, he said no. Pt denies sx of illness, palps. Stated he is SoA w/ exertion. Denies any numbness anywhere of radiating pain w/ the chest discomfort, stated it is not chest pain.     I explained that I would speak w/  and get back to him as soon as I can. I advised him to try nitro and go to ER if it gets worse. Pt stated this has been going on \"for a pretty good while.\"   "

## 2021-06-04 ENCOUNTER — TELEPHONE (OUTPATIENT)
Dept: CARDIOLOGY | Facility: CLINIC | Age: 71
End: 2021-06-04

## 2021-06-04 NOTE — TELEPHONE ENCOUNTER
Second attempt to reach pt. Left a detailed message w/ appt info and stated to call back and select option 2.

## 2021-06-04 NOTE — TELEPHONE ENCOUNTER
Patient's wife called stating patient is refusing to come to his appt with JR. She states he is being stubborn, I asked is he would be going to the ER, she said if they needed to they would. Told patient I would leave his appt on in case he changed his mind, wife voiced uncerstanding.

## 2021-06-04 NOTE — TELEPHONE ENCOUNTER
The PM called to reschedule pt due to him no showing his appt today.  Pt stated that he does not want to reschedule at this time due to issues between him and his wife.  The PM instructed the pt to call our office if he needs an appt.

## 2021-06-08 RX ORDER — ALLOPURINOL 300 MG/1
TABLET ORAL
Qty: 90 TABLET | Refills: 0 | Status: SHIPPED | OUTPATIENT
Start: 2021-06-08

## 2021-06-09 ENCOUNTER — OFFICE VISIT (OUTPATIENT)
Dept: CARDIAC SURGERY | Facility: CLINIC | Age: 71
End: 2021-06-09

## 2021-06-09 VITALS
SYSTOLIC BLOOD PRESSURE: 115 MMHG | WEIGHT: 207 LBS | BODY MASS INDEX: 30.66 KG/M2 | OXYGEN SATURATION: 98 % | HEART RATE: 77 BPM | TEMPERATURE: 95.7 F | DIASTOLIC BLOOD PRESSURE: 71 MMHG | HEIGHT: 69 IN

## 2021-06-09 DIAGNOSIS — Z95.1 S/P CABG X 4: ICD-10-CM

## 2021-06-09 PROCEDURE — 99024 POSTOP FOLLOW-UP VISIT: CPT | Performed by: NURSE PRACTITIONER

## 2021-06-09 PROCEDURE — 71046 X-RAY EXAM CHEST 2 VIEWS: CPT | Performed by: NURSE PRACTITIONER

## 2021-06-09 NOTE — PROGRESS NOTES
"     [facility] Cardiothoracic Surgery Office Follow Up Note     Date of Encounter: 06/09/2021     MRN Number: [id number]  Name: William Villasenor  Phone Number: [phone]     Referred By: No ref. provider found  PCP: Jacqueline Gatica MD    Chief Complaint:    Chief Complaint   Patient presents with   • Follow-up     1 mo f/u for wounc check for RT leg and sternal incision. Pt stated he is having SOA and chest pain along with issues with incisions.        Subjective      History of Present Illness:    William Villasenor is a 71 y.o. male former former smoker with a history of HTN, HLD on statin therapy, non insuline dependent DM (HA1C 8.3), obesity, CKD, CAD s/p CABG x 4 with right EVH by Dr Simms on 3/15/21.   His postoperative course was complicated by subacute cardiogenic pulmonary edema with acute hypoxia requiring diuresis,  RLE cellulitis in the setting of significant hyperglycemia (400s) (negative blood cultures no wound culture results in epic). He is being followed closely by the heart valve clinic in [place] last seen 5/26, his PCP Dr Gatica, and cardiologist in [place] (last saw his APRN Jaswinder). Last seeen in clinic 4/28 for reeval of his RLE cellulitis and right EVH site which was healing nicely without advancing past line of demarcation made on previous visit. Encouragement and reassurance provided to patient and wife regarding progressive wound healing and postoperative status.  Instructed to continue to increase his activity/ambulation as his strength returns and use IS while sitting in recliner.   Resents today for wound check. Patient and wife have many generalized questions today regarding health status. His ambulation and endurance is improved and he reports he is \"piddling\" around. He does still have some midsternal chest pain specifically after hitting a bump on a car ride. Wife reports SUAREZ after working in the garage that patient does not mention.    Review of Systems:  Review of Systems "   Constitutional: Negative for chills, decreased appetite, diaphoresis, fever, malaise/fatigue, night sweats, weight gain and weight loss.   HENT: Negative for hoarse voice.    Eyes: Negative for blurred vision, double vision and visual disturbance.   Cardiovascular: Positive for chest pain. Negative for claudication, dyspnea on exertion, irregular heartbeat, leg swelling, near-syncope, orthopnea, palpitations, paroxysmal nocturnal dyspnea and syncope.   Respiratory: Positive for cough and shortness of breath. Negative for hemoptysis, sputum production and wheezing.    Hematologic/Lymphatic: Negative for adenopathy and bleeding problem. Does not bruise/bleed easily.   Skin: Positive for poor wound healing ( RT leg incision and sternal incision has knot near the neck ). Negative for color change, nail changes and rash.   Musculoskeletal: Negative for back pain, falls and muscle cramps.   Gastrointestinal: Negative for abdominal pain, dysphagia and heartburn.   Genitourinary: Negative for flank pain.   Neurological: Positive for dizziness and loss of balance. Negative for brief paralysis, disturbances in coordination, focal weakness, headaches, light-headedness, numbness, paresthesias, sensory change, vertigo and weakness.   Psychiatric/Behavioral: Negative for depression and suicidal ideas.   Allergic/Immunologic: Negative for persistent infections.       I have reviewed the following portions of the patient's history: allergies, current medications, past family history, past medical history, past social history, past surgical history and problem list and confirm it's accurate.    Allergies:  Allergies   Allergen Reactions   • Lisinopril Cough       Medications:      Current Outpatient Medications:   •  acetaminophen (TYLENOL) 500 MG tablet, Take 500 mg by mouth Every 6 (Six) Hours As Needed for Mild Pain ., Disp: , Rfl:   •  allopurinol (ZYLOPRIM) 300 MG tablet, TAKE 1 TABLET EVERY DAY, Disp: 90 tablet, Rfl: 0  •   Alpha Lipoic Acid-Vitamin E 100-30 MG-UNIT capsule, Take  by mouth., Disp: , Rfl:   •  Ascorbic Acid (Vitamin C) 500 MG capsule, Take 500 mg by mouth Daily., Disp: , Rfl:   •  aspirin (Aspirin Adult Low Dose) 81 MG EC tablet, Take 81 mg by mouth Daily., Disp: , Rfl:   •  atorvastatin (LIPITOR) 40 MG tablet, Take 40 mg by mouth Every Night., Disp: , Rfl:   •  bumetanide (BUMEX) 0.5 MG tablet, Take 1 tablet by mouth Daily., Disp: 90 tablet, Rfl: 0  •  clopidogrel (PLAVIX) 75 MG tablet, Take 1 tablet by mouth Daily., Disp: 90 tablet, Rfl: 1  •  Dapagliflozin Propanediol (Farxiga) 10 MG tablet, Take 10 mg by mouth Daily., Disp: , Rfl:   •  doxazosin (CARDURA) 4 MG tablet, Take 4 mg by mouth Every Night., Disp: , Rfl:   •  finasteride (PROSCAR) 5 MG tablet, Take 1 tablet by mouth Daily., Disp: 90 tablet, Rfl: 1  •  fluconazole (DIFLUCAN) 200 MG tablet, Take 1 tablet by mouth Daily. Take one pill and if still symptoms repeat in 72 hours, Disp: 2 tablet, Rfl: 2  •  folic acid (FOLVITE) 1 MG tablet, Take 1 mg by mouth 2 (two) times a day., Disp: , Rfl:   •  gabapentin (NEURONTIN) 300 MG capsule, Take 1 capsule by mouth 2 (two) times a day., Disp: 60 capsule, Rfl: 0  •  glipizide (GLUCOTROL) 5 MG tablet, Take 5 mg by mouth 2 (Two) Times a Day Before Meals., Disp: , Rfl:   •  magnesium oxide (MAG-OX) 400 MG tablet, Take 1 tablet by mouth 2 (Two) Times a Day. Patient will get otc and take (Patient taking differently: Take 400 mg by mouth Daily. Patient will get otc and take), Disp: 60 tablet, Rfl: 0  •  meclizine (ANTIVERT) 25 MG tablet, TAKE 1 TABLET BY MOUTH 3 (THREE) TIMES A DAY AS NEEDED FOR DIZZINESS., Disp: 30 tablet, Rfl: 0  •  metFORMIN (GLUCOPHAGE) 1000 MG tablet, Take 1,000 mg by mouth 2 (Two) Times a Day With Meals., Disp: , Rfl:   •  metoprolol succinate XL (Toprol XL) 100 MG 24 hr tablet, Take 1 tablet by mouth Daily., Disp: 90 tablet, Rfl: 1  •  Misc Natural Products (LUTEIN 20 PO), Take 1 capsule by mouth.,  Disp: , Rfl:   •  multivitamin (multivitamin) tablet tablet, Take 1 tablet by mouth Daily., Disp: 30 tablet, Rfl:   •  nitroglycerin (NITROSTAT) 0.4 MG SL tablet, Place 1 tablet under the tongue Every 5 (Five) Minutes As Needed for Chest Pain (Only if SBP Greater Than 100). Take no more than 3 doses in 15 minutes., Disp: 100 tablet, Rfl: 12  •  pantoprazole (Protonix) 40 MG EC tablet, Take 1 tablet by mouth Daily., Disp: 90 tablet, Rfl: 3  •  sacubitril-valsartan (Entresto) 24-26 MG tablet, Take 1 tablet by mouth 2 (Two) Times a Day., Disp: 180 tablet, Rfl: 3  •  sacubitril-valsartan (Entresto) 24-26 MG tablet, Take 1 tablet by mouth 2 (Two) Times a Day., Disp: 60 tablet, Rfl: 0  •  senna (senna) 8.6 MG tablet, Take 2 tablets by mouth 2 (Two) Times a Day., Disp: , Rfl:   •  vitamin E 400 UNIT capsule, Take 1 capsule by mouth Daily., Disp: , Rfl:   •  glipizide (GLUCOTROL) 5 MG tablet, Take 1 tablet by mouth 2 (Two) Times a Day Before Meals for 30 days., Disp: 60 tablet, Rfl: 0    History:   Past Medical History:   Diagnosis Date   • Arthritis    • Back pain    • BPH (benign prostatic hyperplasia)    • Chronic diastolic (congestive) heart failure (CMS/HCC)    • Diabetes mellitus (CMS/HCC)    • Diabetic peripheral neuropathy (CMS/HCC)    • Erectile dysfunction    • Former smoker    • GERD (gastroesophageal reflux disease)    • GERD (gastroesophageal reflux disease)    • Gout    • High cholesterol    • Hypertension    • Obesity        Past Surgical History:   Procedure Laterality Date   • CARDIAC CATHETERIZATION N/A 3/15/2021    Procedure: Left Heart Cath;  Surgeon: Trent Zaragoza MD;  Location:  [facility];  Service: Cardiology;  Laterality: N/A;   • CARDIAC CATHETERIZATION N/A 3/15/2021    Procedure: Coronary angiography;  Surgeon: Trent Zaragoza MD;  Location:  [facility];  Service: Cardiology;  Laterality: N/A;   • COLONOSCOPY W/ BIOPSIES  2015   • CORONARY ARTERY BYPASS GRAFT  "N/A 3/16/2021    Procedure: MEDIAN STERNOTOMY, CORONARY ARTERY BYPASS GRAFT X4, UTILIZATION OF LEFT INTERNAL MAMMARY ARTERY, AND ENDOSCOPIC VEIN HARVEST OF RIGHT GREATER SAPHENOUS VEIN;  Surgeon: Jorge Simms MD;  Location: [facility];  Service: Cardiothoracic;  Laterality: N/A;  vein out- 1614  vein ready- 1628               Social History     Socioeconomic History   • Marital status:      Spouse name: Not on file   • Number of children: 3   • Years of education: Not on file   • Highest education level: Not on file   Tobacco Use   • Smoking status: Former Smoker     Packs/day: 1.00     Years: 5.00     Pack years: 5.00     Quit date: 1974     Years since quittin.3   • Smokeless tobacco: Never Used   Substance and Sexual Activity   • Alcohol use: Never     Comment: quit in    • Drug use: Never   • Sexual activity: Defer        Family History   Problem Relation Age of Onset   • Heart disease Mother    • Hypertension Mother    • Depression Mother    • Alcohol abuse Maternal Uncle    • Heart disease Maternal Grandmother    • Hypertension Maternal Grandmother    • Diabetes Maternal Grandmother    • Diabetes Paternal Grandmother    • No Known Problems Half-Brother    • No Known Problems Half-Brother    • No Known Problems Half-Sister    • No Known Problems Daughter    • No Known Problems Daughter    • Hyperlipidemia Daughter        Objective     Physical Exam:  Vitals:    21 0913   BP: 115/71   BP Location: Left arm   Pulse: 77   Temp: 95.7 °F (35.4 °C)   SpO2: 98%   Weight: 93.9 kg (207 lb)   Height: 175.3 cm (69\")      Body mass index is 30.57 kg/m².    Physical Exam  Vitals and nursing note reviewed.   Constitutional:       Appearance: Normal appearance. He is obese.      Comments: No longer in wheelchair   HENT:      Head: Normocephalic and atraumatic.   Eyes:      Pupils: Pupils are equal, round, and reactive to light.   Neck:      Vascular: No carotid bruit.   Cardiovascular:      Rate " and Rhythm: Normal rate and regular rhythm.      Pulses: Normal pulses.           Radial pulses are 2+ on the right side and 2+ on the left side.        Dorsalis pedis pulses are 2+ on the right side and 2+ on the left side.        Posterior tibial pulses are 2+ on the right side and 2+ on the left side.      Heart sounds: Normal heart sounds, S1 normal and S2 normal. No murmur heard.     Pulmonary:      Effort: Pulmonary effort is normal.      Breath sounds: Normal breath sounds.   Abdominal:      Palpations: Abdomen is soft.   Musculoskeletal:         General: Normal range of motion.      Cervical back: Normal range of motion and neck supple.      Right lower leg: No edema.      Left lower leg: No edema.   Skin:     General: Skin is warm and dry.      Capillary Refill: Capillary refill takes less than 2 seconds.      Comments: Mid-sternotomy incision: healed, wound edges well approximated, no erythema, edema, or induration.Proximal soft tissue prominence without erythema, warmth, tenderness, or underlying fluctuation   Mediastinal tubes sites: well healing without erythema, edema, or drainage. No skin opening or sternal wire punctures.  Endoscopic vein harvest site: healed without erythema, edema, underlying induration or drainage. No warmth noticed, nontender   Neurological:      General: No focal deficit present.      Mental Status: He is alert and oriented to person, place, and time. Mental status is at baseline.      GCS: GCS eye subscore is 4. GCS verbal subscore is 5. GCS motor subscore is 6.      Motor: Motor function is intact.      Coordination: Coordination is intact.      Gait: Gait is intact.   Psychiatric:         Mood and Affect: Mood normal.         Speech: Speech normal.         Behavior: Behavior normal. Behavior is cooperative.         Cognition and Memory: Cognition normal.               Imaging/Labs:  XR Chest 06/09/2021: Lungs fully expanded and well-inflated. No signs of pneumothorax, pleural  effusion, or acute airspace consolidation noted. All sternal wires closed, intact, without fracture. Last distal sternal wire deviated to right from midline. Present on previous comparison imaging. Cardiomediastinal structures appear within normal limits. Personally reviewed.  Official radiology report pending    Assessment / Plan      Assessment / Plan:  Diagnoses and all orders for this visit:    1. S/P CABG x 4    William Villasenor is a 71 y.o. male former former smoker with a history of HTN, HLD on statin therapy, non insuline dependent DM (HA1C 8.3), obesity, CKD, CAD s/p CABG x 4 with right EVH by Dr Simms on 3/15/21.   His postoperative course was complicated by subacute cardiogenic pulmonary edema with acute hypoxia requiring diuresis,  RLE cellulitis in the setting of significant hyperglycemia (400s) (negative blood cultures no wound culture results in epic). He is being followed closely by the heart valve clinic in [place] last seen 5/26, his PCP Dr Gatica, and cardiologist in [place] (last saw his APRAARON San). Last seen in clinic 4/28 for reeval of his RLE cellulitis and right EVH site which was healing nicely without advancing past line of demarcation made on previous visit. Encouragement and reassurance provided to patient and wife regarding progressive wound healing and postoperative status.  Instructed to continue to increase his activity/ambulation as his strength returns and use IS while sitting in recliner. Resents today for wound check.  Sternal incision completely healed with proximal soft tissue prominence around internal suture; no erythema, warmth, tenderness or underlying induration and reassurance provided that this will gradually dissipate.  RLE EVH site completely healed without surrounding erythema, warmth, or underlying induration.  Patient and wife instructed bandage is no longer required and to continue to wash with plain antibacterial soap and water.  CXR in clinic today is stable with  most distal sternal wire deviated to the right from midline with no tenderness on palpation on physical exam or skin protrusion.  Patient and wife have many generalized questions today regarding health status.  I spent over 30 minutes with patient and wife during today's encounter addressing these questions and concerns and mostly provided reassurance that patient is experiencing normal postoperative symptoms and is progressing nicely after a major surgery with extended hospitalization and complications.  From a surgical standpoint he is stable and can follow-up with us on as-needed basis.  He has follow-up with cardiology ANKUR San on the 21st for postoperative echo.    Follow Up:   Return on as neede basis.   Or sooner for any further concerns or worsening sign and symptoms. If unable to reach us in the office please dial 911 or go to the nearest emergency department.      Vianney PASCUAL  [facility] Cardiothoracic Surgery

## 2021-06-16 ENCOUNTER — OFFICE VISIT (OUTPATIENT)
Dept: FAMILY MEDICINE CLINIC | Facility: CLINIC | Age: 71
End: 2021-06-16

## 2021-06-16 DIAGNOSIS — E11.42 TYPE 2 DIABETES MELLITUS WITH DIABETIC POLYNEUROPATHY, WITHOUT LONG-TERM CURRENT USE OF INSULIN (HCC): Primary | ICD-10-CM

## 2021-06-16 DIAGNOSIS — E11.59 HYPERTENSION ASSOCIATED WITH DIABETES (HCC): ICD-10-CM

## 2021-06-16 DIAGNOSIS — I50.9 CHRONIC CONGESTIVE HEART FAILURE, UNSPECIFIED HEART FAILURE TYPE (HCC): ICD-10-CM

## 2021-06-16 DIAGNOSIS — K59.09 OTHER CONSTIPATION: ICD-10-CM

## 2021-06-16 DIAGNOSIS — E11.69 HYPERLIPIDEMIA ASSOCIATED WITH TYPE 2 DIABETES MELLITUS (HCC): ICD-10-CM

## 2021-06-16 DIAGNOSIS — I15.2 HYPERTENSION ASSOCIATED WITH DIABETES (HCC): ICD-10-CM

## 2021-06-16 DIAGNOSIS — E78.5 HYPERLIPIDEMIA ASSOCIATED WITH TYPE 2 DIABETES MELLITUS (HCC): ICD-10-CM

## 2021-06-16 DIAGNOSIS — N40.0 BENIGN PROSTATIC HYPERPLASIA WITHOUT LOWER URINARY TRACT SYMPTOMS: ICD-10-CM

## 2021-06-16 LAB — GLUCOSE BLDC GLUCOMTR-MCNC: 154 MG/DL (ref 70–130)

## 2021-06-16 PROCEDURE — 85025 COMPLETE CBC W/AUTO DIFF WBC: CPT | Performed by: FAMILY MEDICINE

## 2021-06-16 PROCEDURE — 83735 ASSAY OF MAGNESIUM: CPT | Performed by: FAMILY MEDICINE

## 2021-06-16 PROCEDURE — 36415 COLL VENOUS BLD VENIPUNCTURE: CPT | Performed by: FAMILY MEDICINE

## 2021-06-16 PROCEDURE — 99214 OFFICE O/P EST MOD 30 MIN: CPT | Performed by: FAMILY MEDICINE

## 2021-06-16 PROCEDURE — 80053 COMPREHEN METABOLIC PANEL: CPT | Performed by: FAMILY MEDICINE

## 2021-06-16 PROCEDURE — 82043 UR ALBUMIN QUANTITATIVE: CPT | Performed by: FAMILY MEDICINE

## 2021-06-16 PROCEDURE — 83880 ASSAY OF NATRIURETIC PEPTIDE: CPT | Performed by: FAMILY MEDICINE

## 2021-06-16 RX ORDER — FINASTERIDE 5 MG/1
5 TABLET, FILM COATED ORAL DAILY
Qty: 90 TABLET | Refills: 0 | Status: SHIPPED | OUTPATIENT
Start: 2021-06-16

## 2021-06-16 RX ORDER — GLIPIZIDE 5 MG/1
5 TABLET ORAL
Qty: 180 TABLET | Refills: 0 | Status: SHIPPED | OUTPATIENT
Start: 2021-06-16 | End: 2021-11-23 | Stop reason: SDUPTHER

## 2021-06-16 RX ORDER — ATORVASTATIN CALCIUM 40 MG/1
40 TABLET, FILM COATED ORAL NIGHTLY
Qty: 90 TABLET | Refills: 0 | Status: SHIPPED | OUTPATIENT
Start: 2021-06-16

## 2021-06-16 RX ORDER — SENNA PLUS 8.6 MG/1
2 TABLET ORAL 2 TIMES DAILY PRN
Qty: 360 TABLET | Refills: 0 | Status: SHIPPED | OUTPATIENT
Start: 2021-06-16 | End: 2021-11-24

## 2021-06-17 LAB
ALBUMIN SERPL-MCNC: 4.3 G/DL (ref 3.5–5.2)
ALBUMIN UR-MCNC: 1.5 MG/DL
ALBUMIN/GLOB SERPL: 1.5 G/DL
ALP SERPL-CCNC: 76 U/L (ref 39–117)
ALT SERPL W P-5'-P-CCNC: 8 U/L (ref 1–41)
ANION GAP SERPL CALCULATED.3IONS-SCNC: 11.4 MMOL/L (ref 5–15)
AST SERPL-CCNC: 11 U/L (ref 1–40)
BASOPHILS # BLD AUTO: 0.04 10*3/MM3 (ref 0–0.2)
BASOPHILS NFR BLD AUTO: 0.6 % (ref 0–1.5)
BILIRUB SERPL-MCNC: 0.3 MG/DL (ref 0–1.2)
BUN SERPL-MCNC: 15 MG/DL (ref 8–23)
BUN/CREAT SERPL: 12.9 (ref 7–25)
CALCIUM SPEC-SCNC: 9.4 MG/DL (ref 8.6–10.5)
CHLORIDE SERPL-SCNC: 102 MMOL/L (ref 98–107)
CO2 SERPL-SCNC: 25.6 MMOL/L (ref 22–29)
CREAT SERPL-MCNC: 1.16 MG/DL (ref 0.76–1.27)
DEPRECATED RDW RBC AUTO: 40.7 FL (ref 37–54)
EOSINOPHIL # BLD AUTO: 0.08 10*3/MM3 (ref 0–0.4)
EOSINOPHIL NFR BLD AUTO: 1.1 % (ref 0.3–6.2)
ERYTHROCYTE [DISTWIDTH] IN BLOOD BY AUTOMATED COUNT: 14.4 % (ref 12.3–15.4)
GFR SERPL CREATININE-BSD FRML MDRD: 62 ML/MIN/1.73
GLOBULIN UR ELPH-MCNC: 2.9 GM/DL
GLUCOSE SERPL-MCNC: 124 MG/DL (ref 65–99)
HCT VFR BLD AUTO: 38.1 % (ref 37.5–51)
HGB BLD-MCNC: 12.4 G/DL (ref 13–17.7)
IMM GRANULOCYTES # BLD AUTO: 0.02 10*3/MM3 (ref 0–0.05)
IMM GRANULOCYTES NFR BLD AUTO: 0.3 % (ref 0–0.5)
LYMPHOCYTES # BLD AUTO: 3.18 10*3/MM3 (ref 0.7–3.1)
LYMPHOCYTES NFR BLD AUTO: 44.9 % (ref 19.6–45.3)
MAGNESIUM SERPL-MCNC: 1.6 MG/DL (ref 1.6–2.4)
MCH RBC QN AUTO: 25.6 PG (ref 26.6–33)
MCHC RBC AUTO-ENTMCNC: 32.5 G/DL (ref 31.5–35.7)
MCV RBC AUTO: 78.6 FL (ref 79–97)
MONOCYTES # BLD AUTO: 0.61 10*3/MM3 (ref 0.1–0.9)
MONOCYTES NFR BLD AUTO: 8.6 % (ref 5–12)
NEUTROPHILS NFR BLD AUTO: 3.15 10*3/MM3 (ref 1.7–7)
NEUTROPHILS NFR BLD AUTO: 44.5 % (ref 42.7–76)
NRBC BLD AUTO-RTO: 0 /100 WBC (ref 0–0.2)
NT-PROBNP SERPL-MCNC: 261.9 PG/ML (ref 0–900)
PLATELET # BLD AUTO: 355 10*3/MM3 (ref 140–450)
PMV BLD AUTO: 9.5 FL (ref 6–12)
POTASSIUM SERPL-SCNC: 4.4 MMOL/L (ref 3.5–5.2)
PROT SERPL-MCNC: 7.2 G/DL (ref 6–8.5)
RBC # BLD AUTO: 4.85 10*6/MM3 (ref 4.14–5.8)
SODIUM SERPL-SCNC: 139 MMOL/L (ref 136–145)
WBC # BLD AUTO: 7.08 10*3/MM3 (ref 3.4–10.8)

## 2021-06-21 ENCOUNTER — HOSPITAL ENCOUNTER (OUTPATIENT)
Dept: CARDIOLOGY | Facility: HOSPITAL | Age: 71
Discharge: HOME | End: 2021-06-21

## 2021-06-21 VITALS
DIASTOLIC BLOOD PRESSURE: 72 MMHG | BODY MASS INDEX: 30.9 KG/M2 | OXYGEN SATURATION: 97 % | SYSTOLIC BLOOD PRESSURE: 124 MMHG | RESPIRATION RATE: 20 BRPM | HEART RATE: 73 BPM | HEIGHT: 69 IN | WEIGHT: 208.6 LBS

## 2021-06-21 DIAGNOSIS — Z95.1 S/P CABG X 4: ICD-10-CM

## 2021-06-21 DIAGNOSIS — N18.2 CKD (CHRONIC KIDNEY DISEASE) STAGE 2, GFR 60-89 ML/MIN: Chronic | ICD-10-CM

## 2021-06-21 DIAGNOSIS — I10 ESSENTIAL HYPERTENSION: Chronic | ICD-10-CM

## 2021-06-21 DIAGNOSIS — I50.42 CHRONIC COMBINED SYSTOLIC AND DIASTOLIC CHF (CONGESTIVE HEART FAILURE) (HCC): Primary | ICD-10-CM

## 2021-06-21 DIAGNOSIS — E66.9 OBESITY (BMI 30-39.9): Chronic | ICD-10-CM

## 2021-06-21 DIAGNOSIS — R94.30 CARDIAC LV EJECTION FRACTION OF 35-39%: ICD-10-CM

## 2021-06-21 DIAGNOSIS — R94.30 CARDIAC LEFT VENTRICULAR EJECTION FRACTION 30-35 PERCENT: ICD-10-CM

## 2021-06-21 DIAGNOSIS — I50.32 CHRONIC DIASTOLIC CONGESTIVE HEART FAILURE (HCC): ICD-10-CM

## 2021-06-21 LAB — ABSOLUTE LUNG FLUID CONTENT: 30 % (ref 20–35)

## 2021-06-21 PROCEDURE — 99213 OFFICE O/P EST LOW 20 MIN: CPT | Performed by: NURSE PRACTITIONER

## 2021-06-21 PROCEDURE — 93308 TTE F-UP OR LMTD: CPT

## 2021-06-21 PROCEDURE — 36415 COLL VENOUS BLD VENIPUNCTURE: CPT | Performed by: NURSE PRACTITIONER

## 2021-06-21 PROCEDURE — 94726 PLETHYSMOGRAPHY LUNG VOLUMES: CPT | Performed by: NURSE PRACTITIONER

## 2021-06-21 PROCEDURE — 93308 TTE F-UP OR LMTD: CPT | Performed by: INTERNAL MEDICINE

## 2021-06-21 RX ORDER — DAPAGLIFLOZIN 10 MG/1
10 TABLET, FILM COATED ORAL DAILY
Qty: 30 TABLET | Refills: 2 | Status: SHIPPED | OUTPATIENT
Start: 2021-06-21 | End: 2021-10-08 | Stop reason: SDUPTHER

## 2021-06-21 NOTE — PROGRESS NOTES
[facility], [place] CHF CLINIC OFFICE VISIT    Subjective:   History of Present Illness   William Villasenor is a 70-year-old  male who presents to the clinic today for follow up on heart failure. He has underlying history of chronic combined systolic and diastolic congestive heart failure with an LVEF of 35% from echocardiogram on 4/21/2021 which has worsened since echocardiogram on 3/24/2021 when LVEF was 51-55%. He takes metoprolol  mg daily, Entresto 24/26 mg BID, Bumex 0.5 mg daily, Magnesium 400 mg BID, Farxiga 10 mg daily.       History of three-vessel CABG in 3/2021   Feels like his breathing is doing better/stable  Swelling is improving/stable   Urine output good  He feels as if he is continuing to regain strength  Reports they monitor his sodium intake and fluid intake  He has had follow up with cardiothoracic surgeon and received a good report but feels he has swelling at the upper border of incision site   He is having trouble affording medications - farxiga and Entresto. He does report he is on a cost assistance program thru the end of 2021 for Entresto.    Home weight stable 200 lbs    Home BP - 110-125/50-75  Home HR 80's    PCP: Dr. Gatica  Cardiologist: Dr. Simms/ANKUR Cuevas    Hospitalizations: Discharged on 4/9/2021  Past Medical History:   Diagnosis Date   • Arthritis    • Back pain    • BPH (benign prostatic hyperplasia)    • Chronic diastolic (congestive) heart failure (CMS/HCC)    • Diabetes mellitus (CMS/HCC)    • Diabetic peripheral neuropathy (CMS/HCC)    • Erectile dysfunction    • Former smoker    • GERD (gastroesophageal reflux disease)    • GERD (gastroesophageal reflux disease)    • Gout    • High cholesterol    • Hypertension    • Obesity      Past Surgical History:   Procedure Laterality Date   • CARDIAC CATHETERIZATION N/A 3/15/2021    Procedure: Left Heart Cath;  Surgeon: Trent Zaragoza MD;  Location: [facility] CATH INVASIVE LOCATION;  Service: Cardiology;  Laterality: N/A;   •  CARDIAC CATHETERIZATION N/A 3/15/2021    Procedure: Coronary angiography;  Surgeon: Trent Zaragoza MD;  Location:  [facility] CATH INVASIVE LOCATION;  Service: Cardiology;  Laterality: N/A;   • COLONOSCOPY W/ BIOPSIES     • CORONARY ARTERY BYPASS GRAFT N/A 3/16/2021    Procedure: MEDIAN STERNOTOMY, CORONARY ARTERY BYPASS GRAFT X4, UTILIZATION OF LEFT INTERNAL MAMMARY ARTERY, AND ENDOSCOPIC VEIN HARVEST OF RIGHT GREATER SAPHENOUS VEIN;  Surgeon: Jorge Simms MD;  Location:  [facility];  Service: Cardiothoracic;  Laterality: N/A;  vein out- 1614  vein ready- 1628             Social History     Socioeconomic History   • Marital status:      Spouse name: Not on file   • Number of children: 3   • Years of education: Not on file   • Highest education level: Not on file   Tobacco Use   • Smoking status: Former Smoker     Packs/day: 1.00     Years: 5.00     Pack years: 5.00     Quit date: 1974     Years since quittin.4   • Smokeless tobacco: Never Used   Substance and Sexual Activity   • Alcohol use: Never     Comment: quit in    • Drug use: Never   • Sexual activity: Defer     Family History   Problem Relation Age of Onset   • Heart disease Mother    • Hypertension Mother    • Depression Mother    • Alcohol abuse Maternal Uncle    • Heart disease Maternal Grandmother    • Hypertension Maternal Grandmother    • Diabetes Maternal Grandmother    • Diabetes Paternal Grandmother    • No Known Problems Half-Brother    • No Known Problems Half-Brother    • No Known Problems Half-Sister    • No Known Problems Daughter    • No Known Problems Daughter    • Hyperlipidemia Daughter      Allergies:  Allergies   Allergen Reactions   • Lisinopril Cough     Review of Systems   Constitutional: Negative for chills, fever, weight gain and weight loss.   HENT: Negative for congestion, hoarse voice and sore throat.    Eyes: Negative for blurred vision, pain and visual disturbance.   Cardiovascular: Negative for chest  pain, claudication, cyanosis, dyspnea on exertion, irregular heartbeat, leg swelling, near-syncope, orthopnea, palpitations and syncope.   Respiratory: Negative for cough, shortness of breath and wheezing.    Endocrine: Negative for cold intolerance, heat intolerance and polyuria.   Hematologic/Lymphatic: Negative for bleeding problem. Does not bruise/bleed easily.   Skin: Negative for color change, flushing and poor wound healing.   Musculoskeletal: Negative for arthritis, back pain, joint pain and myalgias.   Gastrointestinal: Negative for abdominal pain, constipation, diarrhea, nausea and vomiting.   Genitourinary: Negative for dysuria, frequency, hesitancy and urgency.   Neurological: Negative for excessive daytime sleepiness, dizziness, headaches, numbness, vertigo and weakness.   Psychiatric/Behavioral: Negative for depression. The patient does not have insomnia and is not nervous/anxious.    All other systems reviewed and are negative.    Current Outpatient Medications   Medication Sig Dispense Refill   • acetaminophen (TYLENOL) 500 MG tablet Take 500 mg by mouth Every 6 (Six) Hours As Needed for Mild Pain .     • allopurinol (ZYLOPRIM) 300 MG tablet TAKE 1 TABLET EVERY DAY 90 tablet 0   • Alpha Lipoic Acid-Vitamin E 100-30 MG-UNIT capsule Take 1 capsule by mouth Daily.     • Ascorbic Acid (Vitamin C) 500 MG capsule Take 500 mg by mouth Daily.     • aspirin (Aspirin Adult Low Dose) 81 MG EC tablet Take 81 mg by mouth Daily.     • atorvastatin (LIPITOR) 40 MG tablet Take 1 tablet by mouth Every Night. 90 tablet 0   • bumetanide (BUMEX) 0.5 MG tablet Take 1 tablet by mouth Daily. 90 tablet 0   • clopidogrel (PLAVIX) 75 MG tablet Take 1 tablet by mouth Daily. 90 tablet 1   • Dapagliflozin Propanediol (Farxiga) 10 MG tablet Take one tablet by mouth Daily. 30 tablet 2   • doxazosin (CARDURA) 4 MG tablet Take 4 mg by mouth Every Night.     • finasteride (PROSCAR) 5 MG tablet Take 1 tablet by mouth Daily. 90 tablet  0   • folic acid (FOLVITE) 1 MG tablet Take 1 mg by mouth 2 (two) times a day.     • gabapentin (NEURONTIN) 300 MG capsule Take 1 capsule by mouth 2 (two) times a day. 60 capsule 0   • glipizide (GLUCOTROL) 5 MG tablet Take 1 tablet by mouth 2 (Two) Times a Day Before Meals. 180 tablet 0   • magnesium oxide (MAG-OX) 400 MG tablet Take 1 tablet by mouth 2 (Two) Times a Day. Patient will get otc and take 60 tablet 0   • meclizine (ANTIVERT) 25 MG tablet TAKE 1 TABLET BY MOUTH 3 (THREE) TIMES A DAY AS NEEDED FOR DIZZINESS. 30 tablet 0   • metFORMIN (GLUCOPHAGE) 1000 MG tablet Take 1 tablet by mouth 2 (Two) Times a Day With Meals. 180 tablet 0   • metoprolol succinate XL (Toprol XL) 100 MG 24 hr tablet Take 1 tablet by mouth Daily. 90 tablet 1   • Misc Natural Products (LUTEIN 20 PO) Take 1 capsule by mouth.     • multivitamin (multivitamin) tablet tablet Take 1 tablet by mouth Daily. 30 tablet    • nitroglycerin (NITROSTAT) 0.4 MG SL tablet Place 1 tablet under the tongue Every 5 (Five) Minutes As Needed for Chest Pain (Only if SBP Greater Than 100). Take no more than 3 doses in 15 minutes. 100 tablet 12   • pantoprazole (Protonix) 40 MG EC tablet Take 1 tablet by mouth Daily. 90 tablet 3   • sacubitril-valsartan (Entresto) 24-26 MG tablet Take 1 tablet by mouth 2 (Two) Times a Day. 60 tablet 0   • senna (senna) 8.6 MG tablet Take 2 tablets by mouth 2 (Two) Times a Day As Needed for Constipation. 360 tablet 0   • vitamin E 400 UNIT capsule Take 1 capsule by mouth Daily.       No current facility-administered medications for this encounter.      Objective:   Body mass index is 30.8 kg/m².    Vitals:    06/21/21 1124   BP: 124/72   Pulse: 73   Resp: 20   SpO2: 97%     Body mass index is 30.8 kg/m².  Lab Results   Component Value Date    ABSOLUTELUNG 30 06/21/2021     Wt Readings from Last 3 Encounters:   06/21/21 94.6 kg (208 lb 9.6 oz)   06/09/21 93.9 kg (207 lb)   05/26/21 93.7 kg (206 lb 9.6 oz)     Lab Results    Component Value Date    ABSOLUTELUNG 30 06/21/2021      ReDs - 34% (4/21/2021)    Vitals reviewed.   Constitutional:       Appearance: Normal appearance. Well-developed.   Eyes:      Conjunctiva/sclera: Conjunctivae normal.   HENT:      Head: Normocephalic.   Neck:      Vascular: No JVD or JVR.   Pulmonary:      Effort: Pulmonary effort is normal.      Breath sounds: Normal breath sounds.   Chest:      Breasts:         Left: No tenderness.      Comments: CABG incision noted   Cardiovascular:      Normal rate. Regular rhythm.   Edema:     Ankle: bilateral 1+ edema of the ankle.     Feet: bilateral 1+ edema of the feet.  Abdominal:      General: Bowel sounds are normal.      Palpations: Abdomen is soft. There is no hepatomegaly or splenomegaly.   Musculoskeletal: Normal range of motion.      Cervical back: Normal range of motion and neck supple. Skin:     General: Skin is warm and dry.   Neurological:      Mental Status: Alert and oriented to person, place, and time.   Psychiatric:         Attention and Perception: Attention normal.         Mood and Affect: Mood normal.         Speech: Speech normal.         Behavior: Behavior normal. Behavior is cooperative.         Cognition and Memory: Cognition normal.       Cardiographics  Results for orders placed during the hospital encounter of 06/21/21    Adult Transthoracic Echo Limited W/ Cont if Necessary Per Protocol    Interpretation Summary  Moderately to severely technically limited study.  Parasternal images are uninterpretable.    1.  Global LV systolic function appears to be mildly impaired.  There are no large dense focal wall motion abnormalities but visually estimated ejection fraction is 40±5%.  There is mild to moderate concentric left ventricular hypertrophy.  There is mild biatrial enlargement.  RV size and function are grossly preserved.    2.  Valves are grossly morphologically normal.    3.  No large pericardial effusion.  The proximal aorta is not  dilated.    Imaging  XR Chest 2 View    Result Date: 6/10/2021  Borderline cardiomegaly status post median sternotomy and CABG without overt edema or interval change. No acute process.  D:  2021 E:  2021  This report was finalized on 6/10/2021 5:07 PM by Dr. Ronal Garvey.      [id number]      Lab Review   Lab Results   Component Value Date    TSH 2.010 2021     Lab Results   Component Value Date    GLUCOSE 124 (H) 2021    BUN 15 2021    CREATININE 1.16 2021    EGFRIFNONA 62 2021    BCR 12.9 2021    K 4.4 2021    CO2 25.6 2021    CALCIUM 9.4 2021    ALBUMIN 4.30 2021    AST 11 2021    ALT 8 2021     Lab Results   Component Value Date    WBC 7.08 2021    HGB 12.4 (L) 2021    HCT 38.1 2021    MCV 78.6 (L) 2021     2021     Lab Results   Component Value Date    TROPONINI <0.006 2018    TROPONINT <0.010 2021     Lab Results   Component Value Date    PROBNP 261.9 2021      The following portions of the patient's history were reviewed and updated as appropriate: allergies, current medications, past family history, past medical history, past social history, past surgical history and problem list.     Old records reviewed and pertinent information is included in the above objective data.   Assessment/Plan:      Diagnosis Plan   1. Chronic combined systolic and diastolic CHF (congestive heart failure) (CMS/[facility])  ReDs Vest   2. Cardiac LV ejection fraction of 35-39%     3. Essential hypertension     4. CKD (chronic kidney disease) stage 2, GFR 60-89 ml/min     5. Obesity (BMI 30-39.9)       BMP, pro-BNP and magnesium today  Reviewed and discussed results with patient today  ReDs reviewed and discussed today  Monitor BP and heart rate  Continue Bumex 0.5 mg daily  Could consider spironolactone in the future if vitals and kidney function remain stable  Continue on current medications   Rx  for farxiga to assist with cost of medication for patient due to HF  Counseled patient extensively on dietary Na+ intake, importance of activity, weight monitoring, compliance with medications and follow up appointments.  Follow-up in 2 months, sooner if needed

## 2021-06-21 NOTE — PROGRESS NOTES
Heart Failure Pharmacy Note  Patient Name: William Villasenor   Referring Provider: Maddi Munoz  Primary Cardiologist: Dany PASCUAL  CHF: HFrEF    Medication Use:   Adherence: No issues   Hx of med intolerances: None reported  Affordability: Reports issues affording Farxiga - gets samples from PCP Dany San; reports PCP got Entresto approved through insurance until end of year  Retail Rx Management: [employer] to work on patient assistance for Farxiga    Past Medical History:   Diagnosis Date   • Arthritis    • Back pain    • BPH (benign prostatic hyperplasia)    • Chronic diastolic (congestive) heart failure (CMS/HCC)    • Diabetes mellitus (CMS/HCC)    • Diabetic peripheral neuropathy (CMS/HCC)    • Erectile dysfunction    • Former smoker    • GERD (gastroesophageal reflux disease)    • GERD (gastroesophageal reflux disease)    • Gout    • High cholesterol    • Hypertension    • Obesity      ALLERGIES: Lisinopril  Current Outpatient Medications   Medication Sig Dispense Refill   • acetaminophen (TYLENOL) 500 MG tablet Take 500 mg by mouth Every 6 (Six) Hours As Needed for Mild Pain .     • allopurinol (ZYLOPRIM) 300 MG tablet TAKE 1 TABLET EVERY DAY 90 tablet 0   • Alpha Lipoic Acid-Vitamin E 100-30 MG-UNIT capsule Take 1 capsule by mouth Daily.     • Ascorbic Acid (Vitamin C) 500 MG capsule Take 500 mg by mouth Daily.     • aspirin (Aspirin Adult Low Dose) 81 MG EC tablet Take 81 mg by mouth Daily.     • atorvastatin (LIPITOR) 40 MG tablet Take 1 tablet by mouth Every Night. 90 tablet 0   • bumetanide (BUMEX) 0.5 MG tablet Take 1 tablet by mouth Daily. 90 tablet 0   • clopidogrel (PLAVIX) 75 MG tablet Take 1 tablet by mouth Daily. 90 tablet 1   • Dapagliflozin Propanediol (Farxiga) 10 MG tablet Take 10 mg by mouth Daily.     • doxazosin (CARDURA) 4 MG tablet Take 4 mg by mouth Every Night.     • finasteride (PROSCAR) 5 MG tablet Take 1 tablet by mouth Daily. 90 tablet 0   • folic acid (FOLVITE) 1 MG tablet  "Take 1 mg by mouth 2 (two) times a day.     • gabapentin (NEURONTIN) 300 MG capsule Take 1 capsule by mouth 2 (two) times a day. 60 capsule 0   • glipizide (GLUCOTROL) 5 MG tablet Take 1 tablet by mouth 2 (Two) Times a Day Before Meals. 180 tablet 0   • magnesium oxide (MAG-OX) 400 MG tablet Take 1 tablet by mouth 2 (Two) Times a Day. Patient will get otc and take 60 tablet 0   • meclizine (ANTIVERT) 25 MG tablet TAKE 1 TABLET BY MOUTH 3 (THREE) TIMES A DAY AS NEEDED FOR DIZZINESS. 30 tablet 0   • metFORMIN (GLUCOPHAGE) 1000 MG tablet Take 1 tablet by mouth 2 (Two) Times a Day With Meals. 180 tablet 0   • metoprolol succinate XL (Toprol XL) 100 MG 24 hr tablet Take 1 tablet by mouth Daily. 90 tablet 1   • Misc Natural Products (LUTEIN 20 PO) Take 1 capsule by mouth.     • multivitamin (multivitamin) tablet tablet Take 1 tablet by mouth Daily. 30 tablet    • nitroglycerin (NITROSTAT) 0.4 MG SL tablet Place 1 tablet under the tongue Every 5 (Five) Minutes As Needed for Chest Pain (Only if SBP Greater Than 100). Take no more than 3 doses in 15 minutes. 100 tablet 12   • pantoprazole (Protonix) 40 MG EC tablet Take 1 tablet by mouth Daily. 90 tablet 3   • sacubitril-valsartan (Entresto) 24-26 MG tablet Take 1 tablet by mouth 2 (Two) Times a Day. 60 tablet 0   • senna (senna) 8.6 MG tablet Take 2 tablets by mouth 2 (Two) Times a Day As Needed for Constipation. 360 tablet 0   • vitamin E 400 UNIT capsule Take 1 capsule by mouth Daily.     • Dapagliflozin Propanediol (Farxiga) 10 MG tablet Take 10 mg by mouth Daily. 30 tablet 2     No current facility-administered medications for this encounter.       Vaccination History:   Pneumonia: Needs Assessed  Annual Influenza: Needs Assessed     Objective  Vitals:    06/21/21 1124   BP: 124/72   BP Location: Left arm   Patient Position: Sitting   Cuff Size: Adult   Pulse: 73   Resp: 20   SpO2: 97%   Weight: 94.6 kg (208 lb 9.6 oz)   Height: 175.3 cm (69\")     Wt Readings from Last " 3 Encounters:   06/21/21 94.6 kg (208 lb 9.6 oz)   06/09/21 93.9 kg (207 lb)   05/26/21 93.7 kg (206 lb 9.6 oz)         06/21/21  1124   Weight: 94.6 kg (208 lb 9.6 oz)     Lab Results   Component Value Date    GLUCOSE 124 (H) 06/16/2021    BUN 15 06/16/2021    CREATININE 1.16 06/16/2021    EGFRIFNONA 62 06/16/2021    BCR 12.9 06/16/2021    K 4.4 06/16/2021    CO2 25.6 06/16/2021    CALCIUM 9.4 06/16/2021    ALBUMIN 4.30 06/16/2021    AST 11 06/16/2021    ALT 8 06/16/2021     Lab Results   Component Value Date    WBC 7.08 06/16/2021    HGB 12.4 (L) 06/16/2021    HCT 38.1 06/16/2021    MCV 78.6 (L) 06/16/2021     06/16/2021     Lab Results   Component Value Date    TROPONINI <0.006 08/07/2018    TROPONINT <0.010 04/21/2021     Lab Results   Component Value Date    PROBNP 261.9 06/16/2021     Results for orders placed during the hospital encounter of 04/21/21    Adult Transthoracic Echo Complete W/ Cont if Necessary Per Protocol    Interpretation Summary  Technically limited study.    1.  LV size is normal global LV systolic function appears mildly to moderately depressed.  Visually estimated ejection fraction is 35±5%.  There is marked LV dyssynergy, mild concentric left ventricular hypertrophy, and grade 1A diastolic dysfunction.  The atria appear at the upper limits of normal size.  RV size and function are grossly preserved.    2.  Valves are grossly morphologically and physiologically normal with no stenotic lesions, valve associated masses or thrombi, or hemodynamically significant regurgitation.    3.  There is no large pericardial effusion present.  The proximal aorta is mildly dilated at 3.9 cm.    4.  Pulmonary artery pressures cannot be calculated.           GDMT:       Class   Drug   Dose Last Dose Adjustment   ACEi/ARB/ARNI Entresto 24-26 mg BID 5/7/21   Beta Blocker Toprol XL  100mg QDay 4/19/21   MRA        Drug Therapy Problems    1. GDMT  2. Needs patient assistance. Reports PCP office was not  able to get Farxiga approved for a reasonable copay.    Recommendations:      1. SCr improved. Consider addition of spironolactone with close monitoring of renal function/K  2. Prescription for Farxiga 10 mg daily sent to [facility] and have asked [employer] to work on getting patient assistance approved.      Patient was educated on heart failure medications and the importance of medication adherence. All questions were addressed and patient expressed understanding.     Thank you for allowing me to participate in the care of your patient,    Carina Eldridge, PharmD  06/21/21  12:41 EDT

## 2021-06-21 NOTE — PROGRESS NOTES
Heart Failure Clinic    Date: 06/21/21     Vitals:    06/21/21 1124   BP: 124/72   Pulse: 73   Resp: 20   SpO2: 97%    Mask Worn: Yes     Method of arrival: Ambulatory    Weighing self daily: Yes    Monitoring Heart Failure Zones: Yes    Today's HF Zone: Green    Taking medications as prescribed: Yes    Edema Yes    Shortness of Air: No    Number of pillows used at night:<2    Educational Materials given:                                                                           ReDS Value: 36%  36-41 Possible Hypervolemic Status      Kathryn Gregory MA 06/21/21 11:28 EDT

## 2021-06-23 ENCOUNTER — DOCUMENTATION (OUTPATIENT)
Dept: CARDIOLOGY | Facility: CLINIC | Age: 71
End: 2021-06-23

## 2021-06-23 NOTE — PROGRESS NOTES
L/M WANTING TO KNOW WHEN ECHO RESULTS WOULD BE AVAIL. ECHO DONE ON Monday. WIFE AWARE THAT JR'S NURSE WILL CALL THEM WITH RESULTS ONCE READ BY DR. VEGA AND REVIEWED BY JR, TIME SPAN UNKNOWN. VERBALIZED OK. PH,LPN

## 2021-06-24 LAB
BH CV ECHO MEAS - BSA(HAYCOCK): 2.2 M^2
BH CV ECHO MEAS - BSA: 2.1 M^2
BH CV ECHO MEAS - BZI_BMI: 30.6 KILOGRAMS/M^2
BH CV ECHO MEAS - BZI_METRIC_HEIGHT: 175.3 CM
BH CV ECHO MEAS - BZI_METRIC_WEIGHT: 93.9 KG
BH CV ECHO MEAS - EDV(CUBED): 74.1 ML
BH CV ECHO MEAS - EDV(MOD-SP4): 48.6 ML
BH CV ECHO MEAS - EDV(TEICH): 78.6 ML
BH CV ECHO MEAS - EF(CUBED): -7.3 %
BH CV ECHO MEAS - EF(MOD-SP4): 40.1 %
BH CV ECHO MEAS - EF(TEICH): -5.7 %
BH CV ECHO MEAS - ESV(CUBED): 79.5 ML
BH CV ECHO MEAS - ESV(MOD-SP4): 29.1 ML
BH CV ECHO MEAS - ESV(TEICH): 83.1 ML
BH CV ECHO MEAS - FS: -2.4 %
BH CV ECHO MEAS - IVS/LVPW: 1.2
BH CV ECHO MEAS - IVSD: 1.5 CM
BH CV ECHO MEAS - LV DIASTOLIC VOL/BSA (35-75): 23.2 ML/M^2
BH CV ECHO MEAS - LV MASS(C)D: 224.3 GRAMS
BH CV ECHO MEAS - LV MASS(C)DI: 107 GRAMS/M^2
BH CV ECHO MEAS - LV SYSTOLIC VOL/BSA (12-30): 13.9 ML/M^2
BH CV ECHO MEAS - LVIDD: 4.2 CM
BH CV ECHO MEAS - LVIDS: 4.3 CM
BH CV ECHO MEAS - LVLD AP4: 6.9 CM
BH CV ECHO MEAS - LVLS AP4: 6.4 CM
BH CV ECHO MEAS - LVPWD: 1.3 CM
BH CV ECHO MEAS - SI(CUBED): -2.6 ML/M^2
BH CV ECHO MEAS - SI(MOD-SP4): 9.3 ML/M^2
BH CV ECHO MEAS - SI(TEICH): -2.1 ML/M^2
BH CV ECHO MEAS - SV(CUBED): -5.4 ML
BH CV ECHO MEAS - SV(MOD-SP4): 19.5 ML
BH CV ECHO MEAS - SV(TEICH): -4.5 ML

## 2021-06-29 DIAGNOSIS — Z95.1 S/P CABG X 4: ICD-10-CM

## 2021-06-29 DIAGNOSIS — R94.30 CARDIAC LEFT VENTRICULAR EJECTION FRACTION 30-35 PERCENT: ICD-10-CM

## 2021-06-29 DIAGNOSIS — I50.42 CHRONIC COMBINED SYSTOLIC AND DIASTOLIC CONGESTIVE HEART FAILURE (HCC): ICD-10-CM

## 2021-06-29 DIAGNOSIS — E11.42 TYPE 2 DIABETES MELLITUS WITH DIABETIC POLYNEUROPATHY, WITHOUT LONG-TERM CURRENT USE OF INSULIN (HCC): ICD-10-CM

## 2021-06-29 RX ORDER — SACUBITRIL AND VALSARTAN 24; 26 MG/1; MG/1
1 TABLET, FILM COATED ORAL 2 TIMES DAILY
Qty: 60 TABLET | Refills: 0 | Status: SHIPPED | OUTPATIENT
Start: 2021-06-29 | End: 2021-06-29 | Stop reason: SDUPTHER

## 2021-06-29 RX ORDER — SACUBITRIL AND VALSARTAN 24; 26 MG/1; MG/1
1 TABLET, FILM COATED ORAL 2 TIMES DAILY
Qty: 60 TABLET | Refills: 0 | Status: SHIPPED | OUTPATIENT
Start: 2021-06-29 | End: 2021-06-30 | Stop reason: ALTCHOICE

## 2021-06-29 RX ORDER — SACUBITRIL AND VALSARTAN 24; 26 MG/1; MG/1
1 TABLET, FILM COATED ORAL 2 TIMES DAILY
Qty: 60 TABLET | Refills: 0 | Status: SHIPPED | OUTPATIENT
Start: 2021-06-29 | End: 2021-06-30 | Stop reason: SDUPTHER

## 2021-06-29 NOTE — TELEPHONE ENCOUNTER
LINDA Anguiano called requesting refill of entresto. Patient's last dose is tomorrow. Also stated we needed to send in refills to whomever he has gotten his patient assistance through.

## 2021-06-29 NOTE — TELEPHONE ENCOUNTER
Caller: William Villasenor    Relationship: Self    Best call back number: [phone]    Medication needed:   Requested Prescriptions     Pending Prescriptions Disp Refills   • gabapentin (NEURONTIN) 300 MG capsule 60 capsule 0     Sig: Take 1 capsule by mouth 2 (two) times a day.       When do you need the refill by: 6/29/21    What additional details did the patient provide when requesting the medication: OUT OF MEDICATION AS OF 6/28/21. PATIENT STATES HE THOUGHT PCP WAS GOING TO REFILL MEDICATION WHEN THEY DISCUSSED AT LAST APPOINTMENT 6/16/21.    Does the patient have less than a 3 day supply:  [x] Yes  [] No    What is the patient's preferred pharmacy: [facility] - [facility] [address] - [phone] [facility] [phone]

## 2021-06-30 DIAGNOSIS — I50.42 CHRONIC COMBINED SYSTOLIC AND DIASTOLIC CONGESTIVE HEART FAILURE (HCC): ICD-10-CM

## 2021-06-30 DIAGNOSIS — R94.30 CARDIAC LEFT VENTRICULAR EJECTION FRACTION 30-35 PERCENT: ICD-10-CM

## 2021-06-30 DIAGNOSIS — Z95.1 S/P CABG X 4: ICD-10-CM

## 2021-06-30 RX ORDER — GABAPENTIN 300 MG/1
300 CAPSULE ORAL 2 TIMES DAILY
Qty: 60 CAPSULE | Refills: 0 | Status: SHIPPED | OUTPATIENT
Start: 2021-06-30 | End: 2021-07-30 | Stop reason: SDUPTHER

## 2021-06-30 RX ORDER — SACUBITRIL AND VALSARTAN 24; 26 MG/1; MG/1
1 TABLET, FILM COATED ORAL 2 TIMES DAILY
Qty: 180 TABLET | Refills: 3 | Status: SHIPPED | OUTPATIENT
Start: 2021-06-30 | End: 2021-08-09 | Stop reason: SDUPTHER

## 2021-06-30 NOTE — TELEPHONE ENCOUNTER
Printed RX needed for Entresto patient assistance. Patient approved free of charge.  Roseann Montemayor MA

## 2021-07-01 RX ORDER — DOXAZOSIN MESYLATE 4 MG/1
TABLET ORAL
Qty: 90 TABLET | Refills: 0 | Status: SHIPPED | OUTPATIENT
Start: 2021-07-01

## 2021-07-09 ENCOUNTER — OFFICE VISIT (OUTPATIENT)
Dept: CARDIOLOGY | Facility: CLINIC | Age: 71
End: 2021-07-09

## 2021-07-09 VITALS
SYSTOLIC BLOOD PRESSURE: 126 MMHG | OXYGEN SATURATION: 98 % | HEART RATE: 66 BPM | HEIGHT: 69 IN | WEIGHT: 204 LBS | BODY MASS INDEX: 30.21 KG/M2 | DIASTOLIC BLOOD PRESSURE: 75 MMHG

## 2021-07-09 DIAGNOSIS — Z95.1 S/P CABG X 4: Primary | ICD-10-CM

## 2021-07-09 DIAGNOSIS — I49.8 VENTRICULAR TRIGEMINY: ICD-10-CM

## 2021-07-09 DIAGNOSIS — R07.89 CHEST PRESSURE: ICD-10-CM

## 2021-07-09 DIAGNOSIS — I10 ESSENTIAL HYPERTENSION: ICD-10-CM

## 2021-07-09 DIAGNOSIS — I49.8 VENTRICULAR BIGEMINY: ICD-10-CM

## 2021-07-09 DIAGNOSIS — R06.02 SHORTNESS OF BREATH: ICD-10-CM

## 2021-07-09 DIAGNOSIS — I47.1 PSVT (PAROXYSMAL SUPRAVENTRICULAR TACHYCARDIA) (HCC): ICD-10-CM

## 2021-07-09 PROCEDURE — 99214 OFFICE O/P EST MOD 30 MIN: CPT | Performed by: NURSE PRACTITIONER

## 2021-07-09 RX ORDER — ISOSORBIDE MONONITRATE 30 MG/1
30 TABLET, EXTENDED RELEASE ORAL DAILY
Qty: 90 TABLET | Refills: 3 | Status: SHIPPED | OUTPATIENT
Start: 2021-07-09 | End: 2021-08-09

## 2021-07-09 RX ORDER — ISOSORBIDE MONONITRATE 30 MG/1
30 TABLET, EXTENDED RELEASE ORAL DAILY
Qty: 30 TABLET | Refills: 11 | Status: SHIPPED | OUTPATIENT
Start: 2021-07-09 | End: 2021-07-09

## 2021-07-09 NOTE — PATIENT INSTRUCTIONS
Acute Coronary Syndrome  Acute coronary syndrome (ACS) is a serious problem in which there is suddenly not enough blood and oxygen reaching the heart. ACS can result in chest pain or a heart attack.  This condition is a medical emergency. If you have any symptoms of this condition, get help right away.  What are the causes?  This condition may be caused by:  · A buildup of fat and cholesterol inside the arteries (atherosclerosis). This is the most common cause. The buildup (plaque) can cause blood vessels in the heart (coronary arteries) to become narrow or blocked, which reduces blood flow to the heart. Plaque can also break off and lead to a clot, which can block an artery and cause a heart attack or stroke.  · Sudden tightening of the muscles around the coronary arteries (coronary spasm).  · Tearing of a coronary artery (spontaneous coronary artery dissection).  · Very low blood pressure (hypotension).  · An abnormal heartbeat (arrhythmia).  · Other medical conditions that cause a decrease of oxygen to the heart, such as anemiaorrespiratory failure.  · Using cocaine or methamphetamine.  What increases the risk?  The following factors may make you more likely to develop this condition:  · Age. The risk for ACS increases as you get older.  · History of chest pain, heart attack, peripheral artery disease, or stroke.  · Having taken chemotherapy or immune-suppressing medicines.  · Being male.  · Family history of chest pain, heart disease, or stroke.  · Smoking.  · Not exercising enough.  · Being overweight.  · High cholesterol.  · High blood pressure (hypertension).  · Diabetes.  · Excessive alcohol use.  What are the signs or symptoms?  Common symptoms of this condition include:  · Chest pain. The pain may last a long time, or it may stop and come back (recur). It may feel like:  ? Crushing or squeezing.  ? Tightness, pressure, fullness, or heaviness.  · Arm, neck, jaw, or back pain.  · Heartburn or  indigestion.  · Shortness of breath.  · Nausea.  · Sudden cold sweats.  · Light-headedness.  · Dizziness or passing out.  · Tiredness (fatigue).  Sometimes there are no symptoms.  How is this diagnosed?  This condition may be diagnosed based on:  · Your medical history and symptoms.  · Imaging tests, such as:  ? An electrocardiogram (ECG). This measures the heart's electrical activity.  ? X-rays.  ? CT scan.  ? A coronary angiogram. For this test, dye is injected into the heart arteries and then X-rays are taken.  ? Myocardial perfusion imaging. This test shows how well blood flows through your heart muscle.  · Blood tests. These may be repeated at certain time intervals.  · Exercise stress testing.  · Echocardiogram. This is a test that uses sound waves to produce detailed images of the heart.  How is this treated?  Treatment for this condition may include:  · Oxygen therapy.  · Medicines, such as:  ? Antiplatelet medicines and blood-thinning medicines, such as aspirin. These help prevent blood clots.  ? Medicine that dissolves any blood clots (fibrinolytic therapy).  ? Blood pressure medicines.  ? Nitroglycerin. This helps widen blood vessels to improve blood flow.  ? Pain medicine.  ? Cholesterol-lowering medicine.  · Surgery, such as:  ? Coronary angioplasty with stent placement. This involves placing a small piece of metal that looks like mesh or a spring into a narrow coronary artery. This widens the artery and keeps it open.  ? Coronary artery bypass surgery. This involves taking a section of a blood vessel from a different part of your body and placing it on the blocked coronary artery to allow blood to flow around the blockage.  · Cardiac rehabilitation. This is a program that includes exercise training, education, and counseling to help you recover.  Follow these instructions at home:  Eating and drinking  · Eat a heart-healthy diet that includes whole grains, fruits and vegetables, lean proteins, and  low-fat or nonfat dairy products.  · Limit how much salt (sodium) you eat as told by your health care provider. Follow instructions from your health care provider about any other eating or drinking restrictions, such as limiting foods that are high in fat and processed sugars.  · Use healthy cooking methods such as roasting, grilling, broiling, baking, poaching, steaming, or stir-frying.  · Work with a dietitian to follow a heart-healthy eating plan.  Medicines  · Take over-the-counter and prescription medicines only as told by your health care provider.  · Do not take these medicines unless your health care provider approves:  ? Vitamin supplements that contain vitamin A or vitamin E.  ? NSAIDs, such as ibuprofen, naproxen, or celecoxib.  ? Hormone replacement therapy that contains estrogen.  · If you are taking blood thinners:  ? Talk with your health care provider before you take any medicines that contain aspirin or NSAIDs. These medicines increase your risk for dangerous bleeding.  ? Take your medicine exactly as told, at the same time every day.  ? Avoid activities that could cause injury or bruising, and follow instructions about how to prevent falls.  ? Wear a medical alert bracelet, and carry a card that lists what medicines you take.  Activity  · Follow your cardiac rehabilitation program. Do exercises as told by your physical therapist.  · Ask your health care provider what activities and exercises are safe for you. Follow his or her instructions about lifting, driving, or climbing stairs.  Lifestyle  · Do not use any products that contain nicotine or tobacco, such as cigarettes, e-cigarettes, and chewing tobacco. If you need help quitting, ask your health care provider.  · Do not drink alcohol if your health care provider tells you not to drink.  · If you drink alcohol:  ? Limit how much you have to 0-1 drink a day.  ? Be aware of how much alcohol is in your drink. In the [place] one drink equals one 12 oz  bottle of beer (355 mL), one 5 oz glass of wine (148 mL), or one 1½ oz glass of hard liquor (44 mL).  · Maintain a healthy weight. If you need to lose weight, work with your health care provider to do so safely.  General instructions  · Tell all the health care providers who provide care for you about your heart condition, including your dentist. This may affect the medicines or treatment you receive.  · Manage any other health conditions you have, such as hypertension or diabetes. These conditions affect your heart.  · Pay attention to your mental health. You may be at higher risk for depression.  ? Find ways to manage stress.  ? Talk to your health care provider about depression screening and treatment.  · Keep your vaccinations up to date.  ? Get the flu shot (influenza vaccine) every year.  ? Get the pneumococcal vaccine if you are age 65 or older.  · If directed, monitor your blood pressure at home.  · Keep all follow-up visits as told by your health care provider. This is important.  Contact a health care provider if you:  · Feel overwhelmed or sad.  · Have trouble doing your daily activities.  Get help right away if you:  · Have pain in your chest, neck, arm, jaw, stomach, or back that recurs, and:  ? It lasts for more than a few minutes.  ? It is not relieved by taking the medicineyour health care provider prescribed.  · Have unexplained:  ? Heavy sweating.  ? Heartburn or indigestion.  ? Nausea or vomiting.  ? Shortness of breath.  ? Difficulty breathing.  ? Fatigue.  ? Nervousness or anxiety.  ? Weakness.  ? Diarrhea.  ? Dark stools or blood in your stool.  · Have sudden light-headedness or dizziness.  · Have blood pressure that is higher than 180/120.  · Faint.  · Have thoughts about hurting yourself.  These symptoms may represent a serious problem that is an emergency. Do not wait to see if the symptoms will go away. Get medical help right away. Call your local emergency services (911 in the [place] Do  not drive yourself to the hospital.   Summary  · Acute coronary syndrome (ACS) is when there is not enough blood and oxygen being supplied to the heart. ACS can result in chest pain or a heart attack.  · Acute coronary syndrome is a medical emergency. If you have any symptoms of this condition, get help right away.  · Treatment includes medicines and procedures to open the blocked arteries and restore blood flow.  This information is not intended to replace advice given to you by your health care provider. Make sure you discuss any questions you have with your health care provider.  Document Revised: 05/20/2020 Document Reviewed: 12/30/2019  [employer] Patient Education © 2021 [employer]      Heart Failure, Diagnosis    Heart failure means that your heart is not able to pump blood in the right way. This makes it hard for your body to work well. Heart failure is usually a long-term (chronic) condition. You must take good care of yourself and follow your treatment plan from your doctor.  What are the causes?  This condition may be caused by:  · High blood pressure.  · Build up of cholesterol and fat in the arteries.  · Heart attack. This injures the heart muscle.  · Heart valves that do not open and close properly.  · Damage of the heart muscle. This is also called cardiomyopathy.  · Lung disease.  · Abnormal heart rhythms.  What increases the risk?  The risk of heart failure goes up as a person ages. This condition is also more likely to develop in people who:  · Are overweight.  · Are male.  · Smoke or chew tobacco.  · Abuse alcohol or illegal drugs.  · Have taken medicines that can damage the heart.  · Have diabetes.  · Have abnormal heart rhythms.  · Have thyroid problems.  · Have low blood counts (anemia).  What are the signs or symptoms?  Symptoms of this condition include:  · Shortness of breath.  · Coughing.  · Swelling of the feet, ankles, legs, or belly.  · Losing weight for no reason.  · Trouble  breathing.  · Waking from sleep because of the need to sit up and get more air.  · Rapid heartbeat.  · Being very tired.  · Feeling dizzy, or feeling like you may pass out (faint).  · Having no desire to eat.  · Feeling like you may vomit (nauseous).  · Peeing (urinating) more at night.  · Feeling confused.  How is this treated?         This condition may be treated with:  · Medicines. These can be given to treat blood pressure and to make the heart muscles stronger.  · Changes in your daily life. These may include eating a healthy diet, staying at a healthy body weight, quitting tobacco and illegal drug use, or doing exercises.  · Surgery. Surgery can be done to open blocked valves, or to put devices in the heart, such as pacemakers.  · A donor heart (heart transplant). You will receive a healthy heart from a donor.  Follow these instructions at home:  · Treat other conditions as told by your doctor. These may include high blood pressure, diabetes, thyroid disease, or abnormal heart rhythms.  · Learn as much as you can about heart failure.  · Get support as you need it.  · Keep all follow-up visits as told by your doctor. This is important.  Summary  · Heart failure means that your heart is not able to pump blood in the right way.  · This condition is caused by high blood pressure, heart attack, or damage of the heart muscle.  · Symptoms of this condition include shortness of breath and swelling of the feet, ankles, legs, or belly. You may also feel very tired or feel like you may vomit.  · You may be treated with medicines, surgery, or changes in your daily life.  · Treat other health conditions as told by your doctor.  This information is not intended to replace advice given to you by your health care provider. Make sure you discuss any questions you have with your health care provider.  Document Revised: 03/06/2020 Document Reviewed: 03/06/2020  [employer] Patient Education © 2021 [employer]      Hypertension,  Adult  Hypertension is another name for high blood pressure. High blood pressure forces your heart to work harder to pump blood. This can cause problems over time.  There are two numbers in a blood pressure reading. There is a top number (systolic) over a bottom number (diastolic). It is best to have a blood pressure that is below 120/80. Healthy choices can help lower your blood pressure, or you may need medicine to help lower it.  What are the causes?  The cause of this condition is not known. Some conditions may be related to high blood pressure.  What increases the risk?  · Smoking.  · Having type 2 diabetes mellitus, high cholesterol, or both.  · Not getting enough exercise or physical activity.  · Being overweight.  · Having too much fat, sugar, calories, or salt (sodium) in your diet.  · Drinking too much alcohol.  · Having long-term (chronic) kidney disease.  · Having a family history of high blood pressure.  · Age. Risk increases with age.  · Race. You may be at higher risk if you are .  · Gender. Men are at higher risk than women before age 45. After age 65, women are at higher risk than men.  · Having obstructive sleep apnea.  · Stress.  What are the signs or symptoms?  · High blood pressure may not cause symptoms. Very high blood pressure (hypertensive crisis) may cause:  ? Headache.  ? Feelings of worry or nervousness (anxiety).  ? Shortness of breath.  ? Nosebleed.  ? A feeling of being sick to your stomach (nausea).  ? Throwing up (vomiting).  ? Changes in how you see.  ? Very bad chest pain.  ? Seizures.  How is this treated?  · This condition is treated by making healthy lifestyle changes, such as:  ? Eating healthy foods.  ? Exercising more.  ? Drinking less alcohol.  · Your health care provider may prescribe medicine if lifestyle changes are not enough to get your blood pressure under control, and if:  ? Your top number is above 130.  ? Your bottom number is above 80.  · Your  personal target blood pressure may vary.  Follow these instructions at home:  Eating and drinking    · If told, follow the DASH eating plan. To follow this plan:  ? Fill one half of your plate at each meal with fruits and vegetables.  ? Fill one fourth of your plate at each meal with whole grains. Whole grains include whole-wheat pasta, brown rice, and whole-grain bread.  ? Eat or drink low-fat dairy products, such as skim milk or low-fat yogurt.  ? Fill one fourth of your plate at each meal with low-fat (lean) proteins. Low-fat proteins include fish, chicken without skin, eggs, beans, and tofu.  ? Avoid fatty meat, cured and processed meat, or chicken with skin.  ? Avoid pre-made or processed food.  · Eat less than 1,500 mg of salt each day.  · Do not drink alcohol if:  ? Your doctor tells you not to drink.  ? You are pregnant, may be pregnant, or are planning to become pregnant.  · If you drink alcohol:  ? Limit how much you use to:  § 0-1 drink a day for women.  § 0-2 drinks a day for men.  ? Be aware of how much alcohol is in your drink. In the [place] one drink equals one 12 oz bottle of beer (355 mL), one 5 oz glass of wine (148 mL), or one 1½ oz glass of hard liquor (44 mL).  Lifestyle    · Work with your doctor to stay at a healthy weight or to lose weight. Ask your doctor what the best weight is for you.  · Get at least 30 minutes of exercise most days of the week. This may include walking, swimming, or biking.  · Get at least 30 minutes of exercise that strengthens your muscles (resistance exercise) at least 3 days a week. This may include lifting weights or doing Pilates.  · Do not use any products that contain nicotine or tobacco, such as cigarettes, e-cigarettes, and chewing tobacco. If you need help quitting, ask your doctor.  · Check your blood pressure at home as told by your doctor.  · Keep all follow-up visits as told by your doctor. This is important.  Medicines  · Take over-the-counter and  prescription medicines only as told by your doctor. Follow directions carefully.  · Do not skip doses of blood pressure medicine. The medicine does not work as well if you skip doses. Skipping doses also puts you at risk for problems.  · Ask your doctor about side effects or reactions to medicines that you should watch for.  Contact a doctor if you:  · Think you are having a reaction to the medicine you are taking.  · Have headaches that keep coming back (recurring).  · Feel dizzy.  · Have swelling in your ankles.  · Have trouble with your vision.  Get help right away if you:  · Get a very bad headache.  · Start to feel mixed up (confused).  · Feel weak or numb.  · Feel faint.  · Have very bad pain in your:  ? Chest.  ? Belly (abdomen).  · Throw up more than once.  · Have trouble breathing.  Summary  · Hypertension is another name for high blood pressure.  · High blood pressure forces your heart to work harder to pump blood.  · For most people, a normal blood pressure is less than 120/80.  · Making healthy choices can help lower blood pressure. If your blood pressure does not get lower with healthy choices, you may need to take medicine.  This information is not intended to replace advice given to you by your health care provider. Make sure you discuss any questions you have with your health care provider.  Document Revised: 08/28/2019 Document Reviewed: 08/28/2019  [employer] Patient Education © 2021 [employer]

## 2021-07-09 NOTE — PROGRESS NOTES
Subjective     William Villasenor is a 71 y.o. male who presents to day for Congestive Heart Failure (presents for follow up per Holter report (echo f/u)), Chest Pain, Shortness of Breath, and Coronary Artery Disease.    CHIEF COMPLIANT  Chief Complaint   Patient presents with   • Congestive Heart Failure     presents for follow up per Holter report (echo f/u)   • Chest Pain   • Shortness of Breath   • Coronary Artery Disease       Active Problems:  Problem List Items Addressed This Visit        Cardiac and Vasculature    Essential hypertension (Chronic)    S/P CABG x 4 - Primary    Relevant Medications    isosorbide mononitrate (IMDUR) 30 MG 24 hr tablet    Ventricular trigeminy    Relevant Medications    isosorbide mononitrate (IMDUR) 30 MG 24 hr tablet    PSVT (paroxysmal supraventricular tachycardia) (CMS/HCC)    Relevant Medications    isosorbide mononitrate (IMDUR) 30 MG 24 hr tablet       Pulmonary and Pneumonias    Shortness of breath       Symptoms and Signs    Chest pressure    Relevant Medications    isosorbide mononitrate (IMDUR) 30 MG 24 hr tablet      Other Visit Diagnoses     Ventricular bigeminy        Relevant Medications    isosorbide mononitrate (IMDUR) 30 MG 24 hr tablet      Problem list  1.  Coronary artery disease status post coronary artery bypass grafting x4  1.1 coronary artery bypass grafting 3/21: LIMA to LAD, SVG to diagonal, SVG to OM1, SVG to OM 2.  Residual  of RCA  1.2 echocardiogram 4/21: EF 35± percent, LV dyssynchrony  1.3 echocardiogram 6/21: EF 40±5% mild to moderate concentric left ventricular hypertrophy, mild biatrial enlargement no significant valvular disease  2.  Essential hypertension  3.  Diastolic dysfunction  4.  Chest pain  5.  Shortness of breath  6.  Lower extremity edema  7.  Dizziness/near syncope  7.1 cardiac event monitor 6/21: 2.1% ventricular ectopic burden, episodes of trigeminy, bigeminy, and PSVT    HPI  HPI  Mr. Villasenor is a 71-year-old male patient who is  being followed up today for coronary artery disease with coronary artery bypass grafting as well as systolic heart failure.  Patient did go under repeat echocardiogram which did identify that his ejection fraction has improved from 35± percent to 40±5%.  He is doing well on the Entresto and metoprolol.  He does feel as if there is an area of fluid collection above his sternal incision could be associated with Entresto however I think this is probably more consistent with a potential seroma.  He does report some chest pain that is midsternal to epigastric that occurs mainly with movement.  He says that he was mowing and did develop some pressure that is more of a constant type sensation he says it may go away at times but then it comes back and says it varies intensity it feels as someone strongly push out from the inside of his chest.  He did have coronary artery bypass grafting with residual  of the RCA.  He also reports shortness of breath that mainly occurs with activity but does occur at times with rest which he feels like he has to take deep breaths.  He also has an occasional cough.  Patient did undergo a cardiac event monitor did this show a significant ventricular ectopic burden of 2.1% with episodes of trigeminy and bigeminy and paroxysmal SVT.  Patient does not sense these episodes and he is currently on metoprolol 100 mg daily.  If he continues to have ventricular ectopy or runs of PSVT we will discuss antirhythmic therapy.  Patient infected incision from his bypass grafting looks a significant amount better that is closed with minimal erythema and no exudate at this time.  He denies any fatigue, dizziness, headaches, lightheadedness, syncope, PND, orthopnea, or other neurological problems.  We did review his cardiac event monitor and his echocardiogram in detail.  He denied any questions.      Patient brought in medicine bottles to appointment, they have been gone through with the patient. Med list  was updated in the chart.   PRIOR MEDS  Current Outpatient Medications on File Prior to Visit   Medication Sig Dispense Refill   • acetaminophen (TYLENOL) 500 MG tablet Take 500 mg by mouth Every 6 (Six) Hours As Needed for Mild Pain .     • allopurinol (ZYLOPRIM) 300 MG tablet TAKE 1 TABLET EVERY DAY 90 tablet 0   • Alpha Lipoic Acid-Vitamin E 100-30 MG-UNIT capsule Take 1 capsule by mouth Daily.     • APPLE CIDER VINEGAR PO Take  by mouth Daily.     • Ascorbic Acid (Vitamin C) 500 MG capsule Take 500 mg by mouth Daily.     • aspirin (Aspirin Adult Low Dose) 81 MG EC tablet Take 81 mg by mouth Daily.     • atorvastatin (LIPITOR) 40 MG tablet Take 1 tablet by mouth Every Night. 90 tablet 0   • bumetanide (BUMEX) 0.5 MG tablet Take 1 tablet by mouth Daily. 90 tablet 0   • clopidogrel (PLAVIX) 75 MG tablet Take 1 tablet by mouth Daily. 90 tablet 1   • Dapagliflozin Propanediol (Farxiga) 10 MG tablet Take one tablet by mouth Daily. 30 tablet 2   • doxazosin (CARDURA) 4 MG tablet TAKE 1 TABLET EVERY DAY 90 tablet 0   • finasteride (PROSCAR) 5 MG tablet Take 1 tablet by mouth Daily. 90 tablet 0   • folic acid (FOLVITE) 1 MG tablet Take 1 mg by mouth 2 (two) times a day.     • gabapentin (NEURONTIN) 300 MG capsule Take 1 capsule by mouth 2 (two) times a day. 60 capsule 0   • glipizide (GLUCOTROL) 5 MG tablet Take 1 tablet by mouth 2 (Two) Times a Day Before Meals. 180 tablet 0   • magnesium oxide (MAG-OX) 400 MG tablet Take 1 tablet by mouth 2 (Two) Times a Day. Patient will get otc and take 60 tablet 0   • meclizine (ANTIVERT) 25 MG tablet TAKE 1 TABLET BY MOUTH 3 (THREE) TIMES A DAY AS NEEDED FOR DIZZINESS. 30 tablet 0   • metFORMIN (GLUCOPHAGE) 1000 MG tablet Take 1 tablet by mouth 2 (Two) Times a Day With Meals. 180 tablet 0   • metoprolol succinate XL (Toprol XL) 100 MG 24 hr tablet Take 1 tablet by mouth Daily. 90 tablet 1   • Misc Natural Products (LUTEIN 20 PO) Take 1 capsule by mouth.     • multivitamin  (multivitamin) tablet tablet Take 1 tablet by mouth Daily. 30 tablet    • nitroglycerin (NITROSTAT) 0.4 MG SL tablet Place 1 tablet under the tongue Every 5 (Five) Minutes As Needed for Chest Pain (Only if SBP Greater Than 100). Take no more than 3 doses in 15 minutes. 100 tablet 12   • pantoprazole (Protonix) 40 MG EC tablet Take 1 tablet by mouth Daily. 90 tablet 3   • sacubitril-valsartan (Entresto) 24-26 MG tablet Take 1 tablet by mouth 2 (Two) Times a Day. 180 tablet 3   • senna (senna) 8.6 MG tablet Take 2 tablets by mouth 2 (Two) Times a Day As Needed for Constipation. 360 tablet 0   • vitamin E 400 UNIT capsule Take 1 capsule by mouth Daily.       No current facility-administered medications on file prior to visit.       ALLERGIES  Lisinopril    HISTORY  Past Medical History:   Diagnosis Date   • Arthritis    • Back pain    • BPH (benign prostatic hyperplasia)    • Chronic diastolic (congestive) heart failure (CMS/HCC)    • Diabetes mellitus (CMS/HCC)    • Diabetic peripheral neuropathy (CMS/HCC)    • Erectile dysfunction    • Former smoker    • GERD (gastroesophageal reflux disease)    • GERD (gastroesophageal reflux disease)    • Gout    • High cholesterol    • Hypertension    • Obesity        Social History     Socioeconomic History   • Marital status:      Spouse name: Not on file   • Number of children: 3   • Years of education: Not on file   • Highest education level: Not on file   Tobacco Use   • Smoking status: Former Smoker     Packs/day: 1.00     Years: 5.00     Pack years: 5.00     Quit date: 1974     Years since quittin.4   • Smokeless tobacco: Never Used   Substance and Sexual Activity   • Alcohol use: Never     Comment: quit in    • Drug use: Never   • Sexual activity: Defer       Family History   Problem Relation Age of Onset   • Heart disease Mother    • Hypertension Mother    • Depression Mother    • Alcohol abuse Maternal Uncle    • Heart disease Maternal Grandmother    •  "Hypertension Maternal Grandmother    • Diabetes Maternal Grandmother    • Diabetes Paternal Grandmother    • No Known Problems Half-Brother    • No Known Problems Half-Brother    • No Known Problems Half-Sister    • No Known Problems Daughter    • No Known Problems Daughter    • Hyperlipidemia Daughter        Review of Systems   Constitutional: Negative.  Negative for chills, diaphoresis, fatigue and fever.   HENT: Negative.         Lump and soreness at throat CABG site   Eyes: Positive for visual disturbance.   Respiratory: Positive for cough (mild) and shortness of breath ( on exertion and occas labored breathing while sitting). Negative for apnea, choking and wheezing.    Cardiovascular: Positive for chest pain (midsternal pressure at CABD site). Negative for palpitations and leg swelling.   Gastrointestinal: Negative.  Negative for blood in stool.   Endocrine: Negative.    Genitourinary: Negative.  Negative for hematuria.   Musculoskeletal: Positive for arthralgias and back pain. Negative for myalgias and neck pain.   Skin: Negative.    Allergic/Immunologic: Negative.    Neurological: Negative.  Negative for dizziness, syncope, weakness, light-headedness, numbness and headaches.   Hematological: Bruises/bleeds easily.   Psychiatric/Behavioral: Negative.  Negative for sleep disturbance (denies waking up smothering/SOA, no orthopnea).       Objective     VITALS: /75 (BP Location: Left arm, Patient Position: Sitting)   Pulse 66   Ht 175.3 cm (69.02\")   Wt 92.5 kg (204 lb)   SpO2 98%   BMI 30.11 kg/m²     LABS:   Lab Results (most recent)     None          IMAGING:   XR Chest 2 View    Result Date: 6/10/2021  Borderline cardiomegaly status post median sternotomy and CABG without overt edema or interval change. No acute process.  D:  06/09/2021 E:  06/09/2021  This report was finalized on 6/10/2021 5:07 PM by Dr. Ronal Garvey.      XR Chest 2 View    Result Date: 4/28/2021  There is blunting of the " costophrenic angles bilaterally. The lung fields are otherwise clear. No new focal parenchymal opacification present.  D:  04/28/2021 E:  04/28/2021  This report was finalized on 4/28/2021 4:47 PM by Dr. Tiffanie Page MD.      XR Chest 2 View    Result Date: 3/13/2021  No acute cardiopulmonary findings. Signer Name: Boaz Bowman MD  Signed: 3/13/2021 10:56 PM  Workstation Name: [id number]  [facility]    US Abdomen Complete    Result Date: 3/14/2021    No acute findings in the abdomen.  This report was finalized on 3/14/2021 11:10 AM by Dr. Catrachito Majano MD.      XR Chest 1 View    Result Date: 4/8/2021  1.  Tiny bilateral pleural effusions. 2.  Cardiomegaly.  This report was finalized on 4/8/2021 8:55 AM by Dr. Corby Cardoza MD.      XR Chest 1 View    Result Date: 3/29/2021  No definite acute cardiopulmonary findings. There is cardiomegaly. No definite interval change. Signer Name: Roseann Feliz MD  Signed: 3/29/2021 10:02 PM  Workstation Name: [id number]  [facility]    XR Chest 1 View    Result Date: 3/23/2021  Interval surgery. Lungs are adequately aerated  This report was finalized on 3/23/2021 7:45 PM by Dr. Dayo Kumar MD.      XR Chest 1 View    Result Date: 3/20/2021  Interval removal of left-sided pleural drain. No distinct pneumothorax. Right-sided IJ catheter projects unchanged. Persistent left basilar opacities similar to prior. Unchanged degree of cardiac enlargement.  This report was finalized on 3/20/2021 8:18 AM by Dany Montes.      XR Chest 1 View    Result Date: 3/19/2021  Mild remaining bibasilar discoid atelectasis. No new chest pathology is seen.   D:  03/19/2021 E:  03/19/2021  This report was finalized on 3/19/2021 10:40 PM by Dr. Walt Casas MD.      XR Chest 1 View    Result Date: 3/18/2021  No new chest disease.  D:  03/18/2021 E:  03/18/2021  This report was finalized on 3/18/2021 10:10 PM by Dr. Walt Casas MD.      XR Chest 1  View    Result Date: 3/18/2021  Status post extubation with overall preservation of lung volumes and support hardware otherwise noted.  D:  03/17/2021 E:  03/17/2021  This report was finalized on 3/18/2021 2:34 PM by Dr. Ronal Garvey.      XR Chest 1 View    Result Date: 3/16/2021  1. Tubes and lines in satisfactory position, detailed above. No pneumothorax. 2. Stable postoperative cardiomediastinal silhouette. 3. Mild central vascular congestion. Signer Name: Santiago Samaniego MD  Signed: 3/16/2021 7:47 PM  Workstation Name: [id number]  [facility]    XR Chest 1 View    Result Date: 3/15/2021  No acute cardiopulmonary findings. Signer Name: Roseann Feliz MD  Signed: 3/15/2021 8:38 PM  Workstation Name: [id number]  [facility]    US Venous Doppler Lower Extremity Bilateral (duplex)    Result Date: 3/30/2021  No deep venous thrombus seen in either lower extremity. There are some mildly prominent with nodes in the patient's upper thigh regions. This may be within normal limits for the patient. Signer Name: Roseann Feliz MD  Signed: 3/30/2021 12:03 AM  Workstation Name: [id number]  [facility]    XR Chest PA & Lateral    Result Date: 3/29/2021  Decreased opacifications left lung base of decreased atelectasis versus airspace disease from prior comparison 03/20/2021 with persistent small right and probable trace left pleural effusions.  D:  03/29/2021 E:  03/29/2021  This report was finalized on 3/29/2021 4:39 PM by Dr. Ronal Garvey.        EXAM:  Physical Exam  Vitals and nursing note reviewed.   Constitutional:       Appearance: He is well-developed.   HENT:      Head: Normocephalic.   Eyes:      Pupils: Pupils are equal, round, and reactive to light.   Neck:      Thyroid: No thyroid mass.      Vascular: No carotid bruit or JVD.      Trachea: Trachea and phonation normal.   Cardiovascular:      Rate and Rhythm: Normal rate and regular rhythm.      Pulses:            Radial pulses are 2+ on the right side and 2+ on the left side.        Posterior tibial pulses are 2+ on the right side and 2+ on the left side.      Heart sounds: Normal heart sounds. No murmur heard.   No friction rub. No gallop.    Pulmonary:      Effort: Pulmonary effort is normal. No respiratory distress.      Breath sounds: Normal breath sounds. No wheezing or rales.   Abdominal:      General: Bowel sounds are normal.      Palpations: Abdomen is soft.   Musculoskeletal:         General: Swelling present. Normal range of motion.      Cervical back: Neck supple.   Skin:     General: Skin is warm and dry.      Capillary Refill: Capillary refill takes less than 2 seconds.      Findings: No rash.   Neurological:      Mental Status: He is alert and oriented to person, place, and time.   Psychiatric:         Speech: Speech normal.         Behavior: Behavior normal.         Thought Content: Thought content normal.         Judgment: Judgment normal.         Procedure   Procedures       Assessment/Plan    Diagnosis Plan   1. S/P CABG x 4  isosorbide mononitrate (IMDUR) 30 MG 24 hr tablet   2. Chest pressure  isosorbide mononitrate (IMDUR) 30 MG 24 hr tablet   3. Shortness of breath     4. Essential hypertension     5. Ventricular bigeminy     6. Ventricular trigeminy     7. PSVT (paroxysmal supraventricular tachycardia) (CMS/[facility])     1.  Patient does have a history of coronary artery disease with coronary artery bypass grafting.  He is still experiencing some chest pressure and shortness of breath he did have a residual total occlusion of the RCA that was unable to be grafted.  Therefore I would like to start patient on isosorbide for antianginal therapy.  If we are unable to control patient's chest pain with antianginal therapy will consider sending patient to have his  of his RCA revascularized.  2.  Patient's blood pressure is well controlled on current blood pressure medication regimen.  No medication  changes are warranted at this time we will continue his metoprolol XL and Entresto.  Patient advised to monitor blood pressure on a daily basis and report any persistent highs or lows.  Set goal blood pressure for patient at 130/80 or below.  3.  Patient did undergo a cardiac event monitor that showed episodes of trigeminy and bigeminy as well as paroxysmal SVT.  At that time we did increase his metoprolol in which he does seem to be doing better.  He does not report any more episodes of near syncope and he denies any palpitations.  We will continue the metoprolol 100 mg daily and if he does experiencing any more symptoms that could be associated with these dysrhythmias will consider antiarrhythmic therapy at that time.  4.  Informed of signs and symptoms of ACS and advised to seek emergent treatment for any new worsening symptoms.  Patient also advised sooner follow-up as needed.  Also advised to follow-up with family doctor as needed  This note is dictated utilizing voice recognition software.  Although this record has been proof read, transcriptional errors may still be present. If questions occur regarding the content of this record please do not hesitate to call our office.  I have reviewed and confirmed the accuracy of the ROS as documented by the MA/LPN/RN ANKUR Cuevas    Return in about 4 weeks (around 8/6/2021), or if symptoms worsen or fail to improve.    Diagnoses and all orders for this visit:    1. S/P CABG x 4 (Primary)  -     Discontinue: isosorbide mononitrate (IMDUR) 30 MG 24 hr tablet; Take 1 tablet by mouth Daily.  Dispense: 30 tablet; Refill: 11  -     isosorbide mononitrate (IMDUR) 30 MG 24 hr tablet; Take 1 tablet by mouth Daily.  Dispense: 90 tablet; Refill: 3    2. Chest pressure  -     Discontinue: isosorbide mononitrate (IMDUR) 30 MG 24 hr tablet; Take 1 tablet by mouth Daily.  Dispense: 30 tablet; Refill: 11  -     isosorbide mononitrate (IMDUR) 30 MG 24 hr tablet; Take 1 tablet by  mouth Daily.  Dispense: 90 tablet; Refill: 3    3. Shortness of breath    4. Essential hypertension    5. Ventricular bigeminy    6. Ventricular trigeminy    7. PSVT (paroxysmal supraventricular tachycardia) (CMS/[facility])                 MEDS ORDERED DURING VISIT:  New Medications Ordered This Visit   Medications   • isosorbide mononitrate (IMDUR) 30 MG 24 hr tablet     Sig: Take 1 tablet by mouth Daily.     Dispense:  90 tablet     Refill:  3           This document has been electronically signed by aDny San Jr., APRN  July 11, 2021 00:28 EDT

## 2021-07-11 PROBLEM — I47.1 PSVT (PAROXYSMAL SUPRAVENTRICULAR TACHYCARDIA): Status: ACTIVE | Noted: 2021-07-11

## 2021-07-11 PROBLEM — I49.8 VENTRICULAR TRIGEMINY: Status: ACTIVE | Noted: 2021-07-11

## 2021-07-11 PROBLEM — I47.10 PSVT (PAROXYSMAL SUPRAVENTRICULAR TACHYCARDIA): Status: ACTIVE | Noted: 2021-07-11

## 2021-07-11 PROBLEM — R07.89 CHEST PRESSURE: Status: ACTIVE | Noted: 2021-07-11

## 2021-07-11 PROBLEM — R06.02 SHORTNESS OF BREATH: Status: ACTIVE | Noted: 2021-07-11

## 2021-07-14 ENCOUNTER — DOCUMENTATION (OUTPATIENT)
Dept: CARDIAC REHAB | Facility: HOSPITAL | Age: 71
End: 2021-07-14

## 2021-07-14 NOTE — PROGRESS NOTES
Cardiac Rehab is still on Covid 19 restrictions. We are calling patients as we get spaces open . Sorry for the inconvenience  .     Staff has tried to contact patient on 6/21/21 and 7/14/21. A message was left letting him know we are going to have open spaces in about two to three weeks if he is interested to call us back.

## 2021-07-19 ENCOUNTER — TELEPHONE (OUTPATIENT)
Dept: CARDIOLOGY | Facility: CLINIC | Age: 71
End: 2021-07-19

## 2021-07-19 DIAGNOSIS — I10 ESSENTIAL HYPERTENSION: ICD-10-CM

## 2021-07-19 DIAGNOSIS — R06.02 SHORTNESS OF BREATH: ICD-10-CM

## 2021-07-19 DIAGNOSIS — Z95.1 S/P CABG X 4: Primary | ICD-10-CM

## 2021-07-19 DIAGNOSIS — I25.10 CORONARY ARTERY DISEASE INVOLVING NATIVE HEART WITHOUT ANGINA PECTORIS, UNSPECIFIED VESSEL OR LESION TYPE: ICD-10-CM

## 2021-07-19 DIAGNOSIS — R07.89 CHEST PRESSURE: ICD-10-CM

## 2021-07-19 NOTE — TELEPHONE ENCOUNTER
Patient's wife called stating patient has experienced chest tightness, cough and weakness since starting Isosorbide. He would like to discontinue medication and proceed with cath as discussed with .    Verbal order per Stanford Mansfield PA-C for LHC, CBC,and BMP as JR San is out of office this week. Verbal instructions given to patient. He will pickup written instructions when labs are drawn. Roseann Montemayor MA

## 2021-07-26 ENCOUNTER — TELEPHONE (OUTPATIENT)
Dept: FAMILY MEDICINE CLINIC | Facility: CLINIC | Age: 71
End: 2021-07-26

## 2021-07-30 DIAGNOSIS — E11.42 TYPE 2 DIABETES MELLITUS WITH DIABETIC POLYNEUROPATHY, WITHOUT LONG-TERM CURRENT USE OF INSULIN (HCC): ICD-10-CM

## 2021-07-30 RX ORDER — GABAPENTIN 300 MG/1
300 CAPSULE ORAL 2 TIMES DAILY
Qty: 60 CAPSULE | Refills: 0 | Status: SHIPPED | OUTPATIENT
Start: 2021-07-30

## 2021-07-30 NOTE — PROGRESS NOTES
Benjamin # in epic  Reviewed and is consistent.  UDS  reviewed and is consistent.  Patient comfort assessment guide reviewed and updated today.    As part of the patient's treatment plan they are being prescribed a controlled substance/ substances with potential for abuse.  This patient has been made aware of the appropriate use of such medications, including potential risk of somnolence, limited ability to drive and/or work safely, and potential for overdose.  It has also been made clear these medications are for use by the patient only, without concomitant use of alcohol or other substances unless prescribed/advised by medical provider.    Patient has completed prescribing agreement detailing terms of continued prescribing of controlled substances including monitoring BENJAMIN reports, urine drug screens, and pill counts.  The patient is aware BENJAMIN reports are reviewed on a regular basis and scanned into the chart.    History and physical exam exhibit continued safe and appropriate use of controlled substances.

## 2021-08-09 ENCOUNTER — OFFICE VISIT (OUTPATIENT)
Dept: CARDIOLOGY | Facility: CLINIC | Age: 71
End: 2021-08-09

## 2021-08-09 ENCOUNTER — LAB (OUTPATIENT)
Dept: CARDIOLOGY | Facility: CLINIC | Age: 71
End: 2021-08-09

## 2021-08-09 VITALS
DIASTOLIC BLOOD PRESSURE: 75 MMHG | SYSTOLIC BLOOD PRESSURE: 134 MMHG | BODY MASS INDEX: 31.07 KG/M2 | OXYGEN SATURATION: 98 % | WEIGHT: 209.8 LBS | HEIGHT: 69 IN | HEART RATE: 77 BPM

## 2021-08-09 DIAGNOSIS — R07.89 CHEST PRESSURE: ICD-10-CM

## 2021-08-09 DIAGNOSIS — Z95.1 S/P CABG X 4: ICD-10-CM

## 2021-08-09 DIAGNOSIS — R06.02 SHORTNESS OF BREATH: ICD-10-CM

## 2021-08-09 DIAGNOSIS — T88.8XXD FLUID COLLECTION AT SURGICAL SITE, SUBSEQUENT ENCOUNTER: ICD-10-CM

## 2021-08-09 DIAGNOSIS — I10 ESSENTIAL HYPERTENSION: ICD-10-CM

## 2021-08-09 DIAGNOSIS — I50.32 CHRONIC DIASTOLIC CONGESTIVE HEART FAILURE (HCC): ICD-10-CM

## 2021-08-09 DIAGNOSIS — I50.42 CHRONIC COMBINED SYSTOLIC AND DIASTOLIC CONGESTIVE HEART FAILURE (HCC): ICD-10-CM

## 2021-08-09 DIAGNOSIS — I25.10 CORONARY ARTERY DISEASE INVOLVING NATIVE HEART WITHOUT ANGINA PECTORIS, UNSPECIFIED VESSEL OR LESION TYPE: ICD-10-CM

## 2021-08-09 DIAGNOSIS — R94.30 CARDIAC LEFT VENTRICULAR EJECTION FRACTION 30-35 PERCENT: ICD-10-CM

## 2021-08-09 DIAGNOSIS — I25.810 CORONARY ARTERY DISEASE INVOLVING CORONARY BYPASS GRAFT OF NATIVE HEART WITHOUT ANGINA PECTORIS: Primary | ICD-10-CM

## 2021-08-09 DIAGNOSIS — K64.9 HEMORRHOIDS, UNSPECIFIED HEMORRHOID TYPE: ICD-10-CM

## 2021-08-09 LAB
ANION GAP SERPL CALCULATED.3IONS-SCNC: 12.8 MMOL/L (ref 5–15)
BASOPHILS # BLD AUTO: 0.05 10*3/MM3 (ref 0–0.2)
BASOPHILS NFR BLD AUTO: 0.5 % (ref 0–1.5)
BUN SERPL-MCNC: 19 MG/DL (ref 8–23)
BUN/CREAT SERPL: 16.8 (ref 7–25)
CALCIUM SPEC-SCNC: 9.1 MG/DL (ref 8.6–10.5)
CHLORIDE SERPL-SCNC: 102 MMOL/L (ref 98–107)
CO2 SERPL-SCNC: 21.2 MMOL/L (ref 22–29)
CREAT SERPL-MCNC: 1.13 MG/DL (ref 0.76–1.27)
DEPRECATED RDW RBC AUTO: 54.7 FL (ref 37–54)
EOSINOPHIL # BLD AUTO: 0.17 10*3/MM3 (ref 0–0.4)
EOSINOPHIL NFR BLD AUTO: 1.8 % (ref 0.3–6.2)
ERYTHROCYTE [DISTWIDTH] IN BLOOD BY AUTOMATED COUNT: 15.6 % (ref 12.3–15.4)
GFR SERPL CREATININE-BSD FRML MDRD: 64 ML/MIN/1.73
GLUCOSE SERPL-MCNC: 188 MG/DL (ref 65–99)
HCT VFR BLD AUTO: 48.6 % (ref 37.5–51)
HGB BLD-MCNC: 12.4 G/DL (ref 13–17.7)
IMM GRANULOCYTES # BLD AUTO: 0.03 10*3/MM3 (ref 0–0.05)
IMM GRANULOCYTES NFR BLD AUTO: 0.3 % (ref 0–0.5)
LYMPHOCYTES # BLD AUTO: 2.83 10*3/MM3 (ref 0.7–3.1)
LYMPHOCYTES NFR BLD AUTO: 29.8 % (ref 19.6–45.3)
MCH RBC QN AUTO: 24.6 PG (ref 26.6–33)
MCHC RBC AUTO-ENTMCNC: 25.5 G/DL (ref 31.5–35.7)
MCV RBC AUTO: 96.2 FL (ref 79–97)
MONOCYTES # BLD AUTO: 0.67 10*3/MM3 (ref 0.1–0.9)
MONOCYTES NFR BLD AUTO: 7 % (ref 5–12)
NEUTROPHILS NFR BLD AUTO: 5.76 10*3/MM3 (ref 1.7–7)
NEUTROPHILS NFR BLD AUTO: 60.6 % (ref 42.7–76)
NRBC BLD AUTO-RTO: 0 /100 WBC (ref 0–0.2)
PLATELET # BLD AUTO: 331 10*3/MM3 (ref 140–450)
PMV BLD AUTO: 10.2 FL (ref 6–12)
POTASSIUM SERPL-SCNC: 4.8 MMOL/L (ref 3.5–5.2)
RBC # BLD AUTO: 5.05 10*6/MM3 (ref 4.14–5.8)
SODIUM SERPL-SCNC: 136 MMOL/L (ref 136–145)
WBC # BLD AUTO: 9.51 10*3/MM3 (ref 3.4–10.8)

## 2021-08-09 PROCEDURE — 36415 COLL VENOUS BLD VENIPUNCTURE: CPT

## 2021-08-09 PROCEDURE — 85025 COMPLETE CBC W/AUTO DIFF WBC: CPT | Performed by: PHYSICIAN ASSISTANT

## 2021-08-09 PROCEDURE — 99214 OFFICE O/P EST MOD 30 MIN: CPT | Performed by: NURSE PRACTITIONER

## 2021-08-09 PROCEDURE — 80048 BASIC METABOLIC PNL TOTAL CA: CPT | Performed by: PHYSICIAN ASSISTANT

## 2021-08-09 RX ORDER — LORATADINE 10 MG/1
10 TABLET ORAL DAILY PRN
COMMUNITY

## 2021-08-09 RX ORDER — FLUCONAZOLE 200 MG/1
200 TABLET ORAL AS NEEDED
COMMUNITY
End: 2021-11-24

## 2021-08-09 RX ORDER — METOPROLOL SUCCINATE 100 MG/1
100 TABLET, EXTENDED RELEASE ORAL DAILY
Qty: 90 TABLET | Refills: 3 | Status: SHIPPED | OUTPATIENT
Start: 2021-08-09 | End: 2021-08-09 | Stop reason: SDUPTHER

## 2021-08-09 RX ORDER — METOPROLOL SUCCINATE 100 MG/1
100 TABLET, EXTENDED RELEASE ORAL DAILY
Qty: 90 TABLET | Refills: 3 | Status: SHIPPED | OUTPATIENT
Start: 2021-08-09 | End: 2021-11-23 | Stop reason: SDUPTHER

## 2021-08-09 RX ORDER — ISOSORBIDE MONONITRATE 30 MG/1
30 TABLET, EXTENDED RELEASE ORAL DAILY
COMMUNITY
End: 2021-08-09 | Stop reason: SDUPTHER

## 2021-08-09 RX ORDER — TRIAMCINOLONE ACETONIDE 1 MG/G
CREAM TOPICAL AS NEEDED
COMMUNITY
End: 2021-11-24

## 2021-08-09 RX ORDER — HYDROCORTISONE 10 MG/G
GEL TOPICAL
COMMUNITY
End: 2021-11-24

## 2021-08-09 RX ORDER — SACUBITRIL AND VALSARTAN 24; 26 MG/1; MG/1
1 TABLET, FILM COATED ORAL 2 TIMES DAILY
Qty: 180 TABLET | Refills: 3 | Status: SHIPPED | OUTPATIENT
Start: 2021-08-09 | End: 2021-08-23 | Stop reason: SDUPTHER

## 2021-08-09 RX ORDER — BUMETANIDE 0.5 MG/1
0.5 TABLET ORAL DAILY
Qty: 90 TABLET | Refills: 2 | Status: SHIPPED | OUTPATIENT
Start: 2021-08-09 | End: 2022-03-04

## 2021-08-09 NOTE — PATIENT INSTRUCTIONS
Hypertension, Adult  Hypertension is another name for high blood pressure. High blood pressure forces your heart to work harder to pump blood. This can cause problems over time.  There are two numbers in a blood pressure reading. There is a top number (systolic) over a bottom number (diastolic). It is best to have a blood pressure that is below 120/80. Healthy choices can help lower your blood pressure, or you may need medicine to help lower it.  What are the causes?  The cause of this condition is not known. Some conditions may be related to high blood pressure.  What increases the risk?  · Smoking.  · Having type 2 diabetes mellitus, high cholesterol, or both.  · Not getting enough exercise or physical activity.  · Being overweight.  · Having too much fat, sugar, calories, or salt (sodium) in your diet.  · Drinking too much alcohol.  · Having long-term (chronic) kidney disease.  · Having a family history of high blood pressure.  · Age. Risk increases with age.  · Race. You may be at higher risk if you are .  · Gender. Men are at higher risk than women before age 45. After age 65, women are at higher risk than men.  · Having obstructive sleep apnea.  · Stress.  What are the signs or symptoms?  · High blood pressure may not cause symptoms. Very high blood pressure (hypertensive crisis) may cause:  ? Headache.  ? Feelings of worry or nervousness (anxiety).  ? Shortness of breath.  ? Nosebleed.  ? A feeling of being sick to your stomach (nausea).  ? Throwing up (vomiting).  ? Changes in how you see.  ? Very bad chest pain.  ? Seizures.  How is this treated?  · This condition is treated by making healthy lifestyle changes, such as:  ? Eating healthy foods.  ? Exercising more.  ? Drinking less alcohol.  · Your health care provider may prescribe medicine if lifestyle changes are not enough to get your blood pressure under control, and if:  ? Your top number is above 130.  ? Your bottom number is above  80.  · Your personal target blood pressure may vary.  Follow these instructions at home:  Eating and drinking    · If told, follow the DASH eating plan. To follow this plan:  ? Fill one half of your plate at each meal with fruits and vegetables.  ? Fill one fourth of your plate at each meal with whole grains. Whole grains include whole-wheat pasta, brown rice, and whole-grain bread.  ? Eat or drink low-fat dairy products, such as skim milk or low-fat yogurt.  ? Fill one fourth of your plate at each meal with low-fat (lean) proteins. Low-fat proteins include fish, chicken without skin, eggs, beans, and tofu.  ? Avoid fatty meat, cured and processed meat, or chicken with skin.  ? Avoid pre-made or processed food.  · Eat less than 1,500 mg of salt each day.  · Do not drink alcohol if:  ? Your doctor tells you not to drink.  ? You are pregnant, may be pregnant, or are planning to become pregnant.  · If you drink alcohol:  ? Limit how much you use to:  § 0-1 drink a day for women.  § 0-2 drinks a day for men.  ? Be aware of how much alcohol is in your drink. In the [place] one drink equals one 12 oz bottle of beer (355 mL), one 5 oz glass of wine (148 mL), or one 1½ oz glass of hard liquor (44 mL).  Lifestyle    · Work with your doctor to stay at a healthy weight or to lose weight. Ask your doctor what the best weight is for you.  · Get at least 30 minutes of exercise most days of the week. This may include walking, swimming, or biking.  · Get at least 30 minutes of exercise that strengthens your muscles (resistance exercise) at least 3 days a week. This may include lifting weights or doing Pilates.  · Do not use any products that contain nicotine or tobacco, such as cigarettes, e-cigarettes, and chewing tobacco. If you need help quitting, ask your doctor.  · Check your blood pressure at home as told by your doctor.  · Keep all follow-up visits as told by your doctor. This is important.  Medicines  · Take over-the-counter  and prescription medicines only as told by your doctor. Follow directions carefully.  · Do not skip doses of blood pressure medicine. The medicine does not work as well if you skip doses. Skipping doses also puts you at risk for problems.  · Ask your doctor about side effects or reactions to medicines that you should watch for.  Contact a doctor if you:  · Think you are having a reaction to the medicine you are taking.  · Have headaches that keep coming back (recurring).  · Feel dizzy.  · Have swelling in your ankles.  · Have trouble with your vision.  Get help right away if you:  · Get a very bad headache.  · Start to feel mixed up (confused).  · Feel weak or numb.  · Feel faint.  · Have very bad pain in your:  ? Chest.  ? Belly (abdomen).  · Throw up more than once.  · Have trouble breathing.  Summary  · Hypertension is another name for high blood pressure.  · High blood pressure forces your heart to work harder to pump blood.  · For most people, a normal blood pressure is less than 120/80.  · Making healthy choices can help lower blood pressure. If your blood pressure does not get lower with healthy choices, you may need to take medicine.  This information is not intended to replace advice given to you by your health care provider. Make sure you discuss any questions you have with your health care provider.  Document Revised: 08/28/2019 Document Reviewed: 08/28/2019  [employer] Patient Education © 2021 [employer]      Coronary Angiogram With Stent  Coronary angiogram with stent placement is a procedure to widen or open a narrow blood vessel of the heart (coronary artery). Arteries may become blocked by cholesterol buildup (plaques) in the lining of the artery wall. When a coronary artery becomes partially blocked, blood flow to that area decreases. This may lead to chest pain or a heart attack (myocardial infarction).  A stent is a small piece of metal that looks like mesh or spring. Stent placement may be done  as treatment after a heart attack, or to prevent a heart attack if a blocked artery is found by a coronary angiogram.  Let your health care provider know about:  · Any allergies you have, including allergies to medicines or contrast dye.  · All medicines you are taking, including vitamins, herbs, eye drops, creams, and over-the-counter medicines.  · Any problems you or family members have had with anesthetic medicines.  · Any blood disorders you have.  · Any surgeries you have had.  · Any medical conditions you have, including kidney problems or kidney failure.  · Whether you are pregnant or may be pregnant.  · Whether you are breastfeeding.  What are the risks?  Generally, this is a safe procedure. However, serious problems may occur, including:  · Damage to nearby structures or organs, such as the heart, blood vessels, or kidneys.  · A return of blockage.  · Bleeding, infection, or bruising at the insertion site.  · A collection of blood under the skin (hematoma) at the insertion site.  · A blood clot in another part of the body.  · Allergic reaction to medicines or dyes.  · Bleeding into the abdomen (retroperitoneal bleeding).  · Stroke (rare).  · Heart attack (rare).  What happens before the procedure?  Staying hydrated  Follow instructions from your health care provider about hydration, which may include:  · Up to 2 hours before the procedure - you may continue to drink clear liquids, such as water, clear fruit juice, black coffee, and plain tea.    Eating and drinking restrictions  Follow instructions from your health care provider about eating and drinking, which may include:  · 8 hours before the procedure - stop eating heavy meals or foods, such as meat, fried foods, or fatty foods.  · 6 hours before the procedure - stop eating light meals or foods, such as toast or cereal.  · 2 hours before the procedure - stop drinking clear liquids.  Medicines  Ask your health care provider about:  · Changing or stopping  your regular medicines. This is especially important if you are taking diabetes medicines or blood thinners.  · Taking medicines such as aspirin and ibuprofen. These medicines can thin your blood. Do not take these medicines unless your health care provider tells you to take them.  ? Generally, aspirin is recommended before a thin tube, called a catheter, is passed through a blood vessel and inserted into the heart (cardiac catheterization).  · Taking over-the-counter medicines, vitamins, herbs, and supplements.  General instructions  · Do not use any products that contain nicotine or tobacco for at least 4 weeks before the procedure. These products include cigarettes, e-cigarettes, and chewing tobacco. If you need help quitting, ask your health care provider.  · Plan to have someone take you home from the hospital or clinic.  · If you will be going home right after the procedure, plan to have someone with you for 24 hours.  · You may have tests and imaging procedures.  · Ask your health care provider:  ? How your insertion site will be marked. Ask which artery will be used for the procedure.  ? What steps will be taken to help prevent infection. These may include:  § Removing hair at the insertion site.  § Washing skin with a germ-killing soap.  § Taking antibiotic medicine.  What happens during the procedure?    · An IV will be inserted into one of your veins.  · Electrodes may be placed on your chest to monitor your heart rate during the procedure.  · You will be given one or more of the following:  ? A medicine to help you relax (sedative).  ? A medicine to numb the area (local anesthetic) for catheter insertion.  · A small incision will be made for catheter insertion.  · The catheter will be inserted into an artery using a guide wire. The location may be in your groin, your wrist, or the fold of your arm (near your elbow).  · An X-ray procedure (fluoroscopy) will be used to help guide the catheter to the  opening of the heart arteries.  · A dye will be injected into the catheter. X-rays will be taken. The dye helps to show where any narrowing or blockages are located in the arteries.  · Tell your health care provider if you have chest pain or trouble breathing.  · A tiny wire will be guided to the blocked spot, and a balloon will be inflated to make the artery wider.  · The stent will be expanded to crush the plaques into the wall of the vessel. The stent will hold the area open and improve the blood flow. Most stents have a drug coating to reduce the risk of the stent narrowing over time.  · The artery may be made wider using a drill, laser, or other tools that remove plaques.  · The catheter will be removed when the blood flow improves. The stent will stay where it was placed, and the lining of the artery will grow over it.  · A bandage (dressing) will be placed on the insertion site. Pressure will be applied to stop bleeding.  · The IV will be removed.  This procedure may vary among health care providers and hospitals.  What happens after the procedure?  · Your blood pressure, heart rate, breathing rate, and blood oxygen level will be monitored until you leave the hospital or clinic.  · If the procedure is done through the leg, you will lie flat in bed for a few hours or for as long as told by your health care provider. You will be instructed not to bend or cross your legs.  · The insertion site and the pulse in your foot or wrist will be checked often.  · You may have more blood tests, X-rays, and a test that records the electrical activity of your heart (electrocardiogram, or ECG).  · Do not drive for 24 hours if you were given a sedative during your procedure.  Summary  · Coronary angiogram with stent placement is a procedure to widen or open a narrowed coronary artery. This is done to treat heart problems.  · Before the procedure, let your health care provider know about all the medical conditions and  surgeries you have or have had.  · This is a safe procedure. However, some problems may occur, including damage to nearby structures or organs, bleeding, blood clots, or allergies.  · Follow your health care provider's instructions about eating, drinking, medicines, and other lifestyle changes, such as quitting tobacco use before the procedure.  This information is not intended to replace advice given to you by your health care provider. Make sure you discuss any questions you have with your health care provider.  Document Revised: 07/08/2020 Document Reviewed: 07/08/2020  [employer] Patient Education © 2021 [email or web address]      Acute Coronary Syndrome  Acute coronary syndrome (ACS) is a serious problem in which there is suddenly not enough blood and oxygen reaching the heart. ACS can result in chest pain or a heart attack.  This condition is a medical emergency. If you have any symptoms of this condition, get help right away.  What are the causes?  This condition may be caused by:  · A buildup of fat and cholesterol inside the arteries (atherosclerosis). This is the most common cause. The buildup (plaque) can cause blood vessels in the heart (coronary arteries) to become narrow or blocked, which reduces blood flow to the heart. Plaque can also break off and lead to a clot, which can block an artery and cause a heart attack or stroke.  · Sudden tightening of the muscles around the coronary arteries (coronary spasm).  · Tearing of a coronary artery (spontaneous coronary artery dissection).  · Very low blood pressure (hypotension).  · An abnormal heartbeat (arrhythmia).  · Other medical conditions that cause a decrease of oxygen to the heart, such as anemiaorrespiratory failure.  · Using cocaine or methamphetamine.  What increases the risk?  The following factors may make you more likely to develop this condition:  · Age. The risk for ACS increases as you get older.  · History of chest pain, heart attack, peripheral  artery disease, or stroke.  · Having taken chemotherapy or immune-suppressing medicines.  · Being male.  · Family history of chest pain, heart disease, or stroke.  · Smoking.  · Not exercising enough.  · Being overweight.  · High cholesterol.  · High blood pressure (hypertension).  · Diabetes.  · Excessive alcohol use.  What are the signs or symptoms?  Common symptoms of this condition include:  · Chest pain. The pain may last a long time, or it may stop and come back (recur). It may feel like:  ? Crushing or squeezing.  ? Tightness, pressure, fullness, or heaviness.  · Arm, neck, jaw, or back pain.  · Heartburn or indigestion.  · Shortness of breath.  · Nausea.  · Sudden cold sweats.  · Light-headedness.  · Dizziness or passing out.  · Tiredness (fatigue).  Sometimes there are no symptoms.  How is this diagnosed?  This condition may be diagnosed based on:  · Your medical history and symptoms.  · Imaging tests, such as:  ? An electrocardiogram (ECG). This measures the heart's electrical activity.  ? X-rays.  ? CT scan.  ? A coronary angiogram. For this test, dye is injected into the heart arteries and then X-rays are taken.  ? Myocardial perfusion imaging. This test shows how well blood flows through your heart muscle.  · Blood tests. These may be repeated at certain time intervals.  · Exercise stress testing.  · Echocardiogram. This is a test that uses sound waves to produce detailed images of the heart.  How is this treated?  Treatment for this condition may include:  · Oxygen therapy.  · Medicines, such as:  ? Antiplatelet medicines and blood-thinning medicines, such as aspirin. These help prevent blood clots.  ? Medicine that dissolves any blood clots (fibrinolytic therapy).  ? Blood pressure medicines.  ? Nitroglycerin. This helps widen blood vessels to improve blood flow.  ? Pain medicine.  ? Cholesterol-lowering medicine.  · Surgery, such as:  ? Coronary angioplasty with stent placement. This involves placing  a small piece of metal that looks like mesh or a spring into a narrow coronary artery. This widens the artery and keeps it open.  ? Coronary artery bypass surgery. This involves taking a section of a blood vessel from a different part of your body and placing it on the blocked coronary artery to allow blood to flow around the blockage.  · Cardiac rehabilitation. This is a program that includes exercise training, education, and counseling to help you recover.  Follow these instructions at home:  Eating and drinking  · Eat a heart-healthy diet that includes whole grains, fruits and vegetables, lean proteins, and low-fat or nonfat dairy products.  · Limit how much salt (sodium) you eat as told by your health care provider. Follow instructions from your health care provider about any other eating or drinking restrictions, such as limiting foods that are high in fat and processed sugars.  · Use healthy cooking methods such as roasting, grilling, broiling, baking, poaching, steaming, or stir-frying.  · Work with a dietitian to follow a heart-healthy eating plan.  Medicines  · Take over-the-counter and prescription medicines only as told by your health care provider.  · Do not take these medicines unless your health care provider approves:  ? Vitamin supplements that contain vitamin A or vitamin E.  ? NSAIDs, such as ibuprofen, naproxen, or celecoxib.  ? Hormone replacement therapy that contains estrogen.  · If you are taking blood thinners:  ? Talk with your health care provider before you take any medicines that contain aspirin or NSAIDs. These medicines increase your risk for dangerous bleeding.  ? Take your medicine exactly as told, at the same time every day.  ? Avoid activities that could cause injury or bruising, and follow instructions about how to prevent falls.  ? Wear a medical alert bracelet, and carry a card that lists what medicines you take.  Activity  · Follow your cardiac rehabilitation program. Do  exercises as told by your physical therapist.  · Ask your health care provider what activities and exercises are safe for you. Follow his or her instructions about lifting, driving, or climbing stairs.  Lifestyle  · Do not use any products that contain nicotine or tobacco, such as cigarettes, e-cigarettes, and chewing tobacco. If you need help quitting, ask your health care provider.  · Do not drink alcohol if your health care provider tells you not to drink.  · If you drink alcohol:  ? Limit how much you have to 0-1 drink a day.  ? Be aware of how much alcohol is in your drink. In the [place] one drink equals one 12 oz bottle of beer (355 mL), one 5 oz glass of wine (148 mL), or one 1½ oz glass of hard liquor (44 mL).  · Maintain a healthy weight. If you need to lose weight, work with your health care provider to do so safely.  General instructions  · Tell all the health care providers who provide care for you about your heart condition, including your dentist. This may affect the medicines or treatment you receive.  · Manage any other health conditions you have, such as hypertension or diabetes. These conditions affect your heart.  · Pay attention to your mental health. You may be at higher risk for depression.  ? Find ways to manage stress.  ? Talk to your health care provider about depression screening and treatment.  · Keep your vaccinations up to date.  ? Get the flu shot (influenza vaccine) every year.  ? Get the pneumococcal vaccine if you are age 65 or older.  · If directed, monitor your blood pressure at home.  · Keep all follow-up visits as told by your health care provider. This is important.  Contact a health care provider if you:  · Feel overwhelmed or sad.  · Have trouble doing your daily activities.  Get help right away if you:  · Have pain in your chest, neck, arm, jaw, stomach, or back that recurs, and:  ? It lasts for more than a few minutes.  ? It is not relieved by taking the medicineyour health  care provider prescribed.  · Have unexplained:  ? Heavy sweating.  ? Heartburn or indigestion.  ? Nausea or vomiting.  ? Shortness of breath.  ? Difficulty breathing.  ? Fatigue.  ? Nervousness or anxiety.  ? Weakness.  ? Diarrhea.  ? Dark stools or blood in your stool.  · Have sudden light-headedness or dizziness.  · Have blood pressure that is higher than 180/120.  · Faint.  · Have thoughts about hurting yourself.  These symptoms may represent a serious problem that is an emergency. Do not wait to see if the symptoms will go away. Get medical help right away. Call your local emergency services (911 in the [place] Do not drive yourself to the hospital.   Summary  · Acute coronary syndrome (ACS) is when there is not enough blood and oxygen being supplied to the heart. ACS can result in chest pain or a heart attack.  · Acute coronary syndrome is a medical emergency. If you have any symptoms of this condition, get help right away.  · Treatment includes medicines and procedures to open the blocked arteries and restore blood flow.  This information is not intended to replace advice given to you by your health care provider. Make sure you discuss any questions you have with your health care provider.  Document Revised: 05/20/2020 Document Reviewed: 12/30/2019  [employer] Patient Education © 2021 [employer]

## 2021-08-09 NOTE — PROGRESS NOTES
Subjective     William Villasenor is a 71 y.o. male who presents to day for Chest Pain (patient rq visit for meds (cath scheduled 8/27) no longer having problems with Isosorbide), Congestive Heart Failure, and Shortness of Breath.    CHIEF COMPLIANT  Chief Complaint   Patient presents with   • Chest Pain     patient rq visit for meds (cath scheduled 8/27) no longer having problems with Isosorbide   • Congestive Heart Failure   • Shortness of Breath       Active Problems:  Problem List Items Addressed This Visit        Cardiac and Vasculature    Essential hypertension (Chronic)    Relevant Medications    metoprolol succinate XL (Toprol XL) 100 MG 24 hr tablet    bumetanide (BUMEX) 0.5 MG tablet    CAD (coronary artery disease) - Primary    Relevant Medications    metoprolol succinate XL (Toprol XL) 100 MG 24 hr tablet    S/P CABG x 4    Relevant Medications    bumetanide (BUMEX) 0.5 MG tablet      Other Visit Diagnoses     Chronic diastolic congestive heart failure (CMS/[facility])        Relevant Medications    metoprolol succinate XL (Toprol XL) 100 MG 24 hr tablet    Fluid collection at surgical site, subsequent encounter        Relevant Orders    US soft tissue    Hemorrhoids, unspecified hemorrhoid type        Relevant Medications    phenylephrine-cocoa Butter (PREPARATION H) 0.25-88.44 % suppository suppository      Problem list  1.  Coronary artery disease status post coronary artery bypass grafting x4  1.1 coronary artery bypass grafting 3/21: LIMA to LAD, SVG to diagonal, SVG to OM1, SVG to OM 2.  Residual  of RCA  1.2 echocardiogram 4/21: EF 35± percent, LV dyssynchrony  1.3 echocardiogram 6/21: EF 40±5% mild to moderate concentric left ventricular hypertrophy, mild biatrial enlargement no significant valvular disease  2.  Essential hypertension  3.  Diastolic dysfunction  4.  Chest pain  5.  Shortness of breath  6.  Lower extremity edema  7.  Dizziness/near syncope  7.1 cardiac event monitor 6/21: 2.1% ventricular  ectopic burden, episodes of trigeminy, bigeminy, and PSVT    HPI  HPI  Mr. Villasenor is a 71-year-old male patient who is being followed up today for coronary artery disease, systolic heart failure, and medication review.  Patient does have a history of coronary artery disease in which she is underwent coronary artery bypass grafting in March 2021.  However patient has continued to have some chest tightness that is in the mid sternum and is occurring more frequently.  He does report that it did seem to increase once he was started on isosorbide.  He reports that activity does seem to make the pain worse.  He feels as if someone is pushing in on his chest from the inside.  He says that at times it even feels like a constant sensation.  He still has residual chronic total occlusion of his right coronary artery.  He is on metoprolol 100 mg daily, atorvastatin 40 mg daily, aspirin 81 mg daily, and Plavix 75 mg daily.  Patient does have a history of systolic heart failure.  He is on guideline driven medication therapy with Entresto 24-26 mg twice daily as well as metoprolol 100 mg daily, as well as Farxiga.  He does report chronic shortness of breath that is occurring with increased activity.  He also reports fatigue where he gets tired easily.  Dizziness is occurring with quick movements or in excess of feet.  His major reason for the visit today is to follow-up and discuss the isosorbide.  He feels as if his chest tightness is gotten worse wise been on the isosorbide.  He denies any lower extremity edema, palpitations, syncope, PND, orthopnea, or strokelike symptoms.  PRIOR MEDS  Current Outpatient Medications on File Prior to Visit   Medication Sig Dispense Refill   • acetaminophen (TYLENOL) 500 MG tablet Take 500 mg by mouth Every 6 (Six) Hours As Needed for Mild Pain .     • allopurinol (ZYLOPRIM) 300 MG tablet TAKE 1 TABLET EVERY DAY 90 tablet 0   • APPLE CIDER VINEGAR PO Take  by mouth Daily.     • Ascorbic Acid  (Vitamin C) 500 MG capsule Take 500 mg by mouth Daily.     • aspirin (Aspirin Adult Low Dose) 81 MG EC tablet Take 81 mg by mouth Daily.     • atorvastatin (LIPITOR) 40 MG tablet Take 1 tablet by mouth Every Night. 90 tablet 0   • CALCIUM-MAGNESIUM-ZINC PO Take  by mouth Daily.     • clopidogrel (PLAVIX) 75 MG tablet Take 1 tablet by mouth Daily. 90 tablet 1   • Dapagliflozin Propanediol (Farxiga) 10 MG tablet Take one tablet by mouth Daily. 30 tablet 2   • doxazosin (CARDURA) 4 MG tablet TAKE 1 TABLET EVERY DAY 90 tablet 0   • finasteride (PROSCAR) 5 MG tablet Take 1 tablet by mouth Daily. 90 tablet 0   • fluconazole (DIFLUCAN) 200 MG tablet Take 200 mg by mouth As Needed.     • folic acid (FOLVITE) 1 MG tablet Take 800 mcg by mouth 2 (two) times a day.     • gabapentin (NEURONTIN) 300 MG capsule Take 1 capsule by mouth 2 (two) times a day. 60 capsule 0   • glipizide (GLUCOTROL) 5 MG tablet Take 1 tablet by mouth 2 (Two) Times a Day Before Meals. 180 tablet 0   • Hydrocortisone (Cortizone-10) 1 % gel Apply  topically.     • loratadine (CLARITIN) 10 MG tablet Take 10 mg by mouth Daily.     • meclizine (ANTIVERT) 25 MG tablet TAKE 1 TABLET BY MOUTH 3 (THREE) TIMES A DAY AS NEEDED FOR DIZZINESS. 30 tablet 0   • metFORMIN (GLUCOPHAGE) 1000 MG tablet Take 1 tablet by mouth 2 (Two) Times a Day With Meals. 180 tablet 0   • Misc Natural Products (LUTEIN 20 PO) Take 1 capsule by mouth.     • multivitamin (multivitamin) tablet tablet Take 1 tablet by mouth Daily. 30 tablet    • nitroglycerin (NITROSTAT) 0.4 MG SL tablet Place 1 tablet under the tongue Every 5 (Five) Minutes As Needed for Chest Pain (Only if SBP Greater Than 100). Take no more than 3 doses in 15 minutes. 100 tablet 12   • pantoprazole (Protonix) 40 MG EC tablet Take 1 tablet by mouth Daily. 90 tablet 3   • senna (senna) 8.6 MG tablet Take 2 tablets by mouth 2 (Two) Times a Day As Needed for Constipation. 360 tablet 0   • triamcinolone (KENALOG) 0.1 % cream  Apply  topically to the appropriate area as directed As Needed.     • vitamin E 400 UNIT capsule Take 1 capsule by mouth Daily.       No current facility-administered medications on file prior to visit.       ALLERGIES  Lisinopril    HISTORY  Past Medical History:   Diagnosis Date   • Arthritis    • Back pain    • BPH (benign prostatic hyperplasia)    • Chronic diastolic (congestive) heart failure (CMS/HCC)    • Diabetes mellitus (CMS/HCC)    • Diabetic peripheral neuropathy (CMS/HCC)    • Erectile dysfunction    • Former smoker    • GERD (gastroesophageal reflux disease)    • GERD (gastroesophageal reflux disease)    • Gout    • Hemorrhoids    • High cholesterol    • Hypertension    • Obesity        Social History     Socioeconomic History   • Marital status:      Spouse name: Not on file   • Number of children: 3   • Years of education: Not on file   • Highest education level: Not on file   Tobacco Use   • Smoking status: Former Smoker     Packs/day: 1.00     Years: 5.00     Pack years: 5.00     Quit date: 1974     Years since quittin.5   • Smokeless tobacco: Never Used   Substance and Sexual Activity   • Alcohol use: Never     Comment: quit in    • Drug use: Never   • Sexual activity: Defer       Family History   Problem Relation Age of Onset   • Heart disease Mother    • Hypertension Mother    • Depression Mother    • Alcohol abuse Maternal Uncle    • Heart disease Maternal Grandmother    • Hypertension Maternal Grandmother    • Diabetes Maternal Grandmother    • Diabetes Paternal Grandmother    • No Known Problems Half-Brother    • No Known Problems Half-Brother    • No Known Problems Half-Sister    • No Known Problems Daughter    • No Known Problems Daughter    • Hyperlipidemia Daughter        Review of Systems   Constitutional: Positive for fatigue. Negative for chills, diaphoresis and fever.   HENT: Negative.    Eyes: Negative.    Respiratory: Positive for chest tightness and shortness of  "breath (with increased activity). Negative for apnea, cough and wheezing.    Cardiovascular: Positive for chest pain (occas pain and pressure, has cath scheduled this month  ). Negative for palpitations and leg swelling.   Gastrointestinal: Positive for anal bleeding ( Hemorrhoids, has not been seen by PCP ). Negative for blood in stool.   Endocrine: Negative.    Genitourinary: Negative.  Negative for hematuria.   Musculoskeletal: Positive for arthralgias, back pain, myalgias and neck pain.   Skin: Negative.    Allergic/Immunologic: Negative.    Neurological: Positive for dizziness (with quick movement while in the heat) and weakness. Negative for syncope, light-headedness, numbness and headaches.   Hematological: Bruises/bleeds easily.   Psychiatric/Behavioral: Negative.  Negative for sleep disturbance.       Objective     VITALS: /75 (BP Location: Left arm, Patient Position: Sitting)   Pulse 77   Ht 175.3 cm (69.02\")   Wt 95.2 kg (209 lb 12.8 oz)   SpO2 98%   BMI 30.97 kg/m²     LABS:   Lab Results (most recent)     None          IMAGING:   XR Chest 2 View    Result Date: 6/10/2021  Borderline cardiomegaly status post median sternotomy and CABG without overt edema or interval change. No acute process.  D:  06/09/2021 E:  06/09/2021  This report was finalized on 6/10/2021 5:07 PM by Dr. Ronal Garvey.      XR Chest 2 View    Result Date: 4/28/2021  There is blunting of the costophrenic angles bilaterally. The lung fields are otherwise clear. No new focal parenchymal opacification present.  D:  04/28/2021 E:  04/28/2021  This report was finalized on 4/28/2021 4:47 PM by Dr. Tiffanie Page MD.        EXAM:  Physical Exam  Vitals and nursing note reviewed.   Constitutional:       Appearance: He is well-developed.   HENT:      Head: Normocephalic.   Eyes:      Pupils: Pupils are equal, round, and reactive to light.   Neck:      Thyroid: No thyroid mass.      Vascular: Carotid bruit present. No JVD.      " Trachea: Trachea and phonation normal.   Cardiovascular:      Rate and Rhythm: Normal rate and regular rhythm.      Pulses:           Radial pulses are 2+ on the right side and 2+ on the left side.        Posterior tibial pulses are 2+ on the right side and 2+ on the left side.      Heart sounds: Murmur heard.   No friction rub. No gallop.    Pulmonary:      Effort: Pulmonary effort is normal. No respiratory distress.      Breath sounds: Normal breath sounds. No wheezing or rales.   Abdominal:      General: Bowel sounds are normal.      Palpations: Abdomen is soft.   Musculoskeletal:         General: Swelling (1-2+) present. Normal range of motion.      Cervical back: Neck supple.   Skin:     General: Skin is warm and dry.      Capillary Refill: Capillary refill takes less than 2 seconds.      Findings: No rash.   Neurological:      Mental Status: He is alert and oriented to person, place, and time.   Psychiatric:         Speech: Speech normal.         Behavior: Behavior normal.         Thought Content: Thought content normal.         Judgment: Judgment normal.         Procedure   Procedures       Assessment/Plan    Diagnosis Plan   1. Coronary artery disease involving coronary bypass graft of native heart without angina pectoris  metoprolol succinate XL (Toprol XL) 100 MG 24 hr tablet   2. S/P CABG x 4  bumetanide (BUMEX) 0.5 MG tablet   3. Chronic diastolic congestive heart failure (CMS/HCC)  metoprolol succinate XL (Toprol XL) 100 MG 24 hr tablet   4. Essential hypertension  metoprolol succinate XL (Toprol XL) 100 MG 24 hr tablet   5. Fluid collection at surgical site, subsequent encounter  US soft tissue   6. Hemorrhoids, unspecified hemorrhoid type  phenylephrine-cocoa Butter (PREPARATION H) 0.25-88.44 % suppository suppository   1.  Patient does have a history of coronary artery disease as well as coronary artery bypass grafting x4 with residual  of the right coronary artery.  He has had consistent chest  tightness which is actually increased with antianginal therapy of isosorbide.  Therefore we will discontinue the isosorbide.  We did discuss initiation of Ranexa which she wishes to defer at this time.  He is scheduled for heart cath in the near future for reevaluation of his coronary artery disease due to persistent symptoms.  2.  Patient does have chronic diastolic dysfunction and is on Bumex 0.5 mg daily.  This seems to be controlling his edema and pulmonary edema quite well.  We will continue this without any change.  3.  Patient does have chronic systolic heart failure as well in which she is on guideline driven therapy of metoprolol XL and Entresto.  He has seen an increase in his ejection fraction on his last echocardiogram.  We will continue this therapy at this time.  4.  Patient has a palpable fluctuant mass at the top of his sternal incision which may be consistent with a seroma.  Therefore I would like to get ultrasound of this area for evaluation of this mass.  5.  Patient's blood pressure is well controlled on current blood pressure medication regimen.  No medication changes are warranted at this time.  Patient advised to monitor blood pressure on a daily basis and report any persistent highs or lows.  Set goal blood pressure for patient at 130/80 or below.  6.  Patient does report a flareup of his hemorrhoids where he had one episode of hemorrhoidal bleeding.  He also reports some stomach discomfort.  Due to the fact that he will require dual antiplatelet therapy and his request for something for his hemorrhoids I will send in suppositories to help reduce the inflammation of his hemorrhoids.  Also recommended him follow-up with his family doctor for further management.  7.  Informed of signs and symptoms of ACS and advised to seek emergent treatment for any new worsening symptoms.  Patient also advised sooner follow-up as needed.  Also advised to follow-up with family doctor as needed  This note is  dictated utilizing voice recognition software.  Although this record has been proof read, transcriptional errors may still be present. If questions occur regarding the content of this record please do not hesitate to call our office.  I have reviewed and confirmed the accuracy of the ROS as documented by the MA/LPN/RN ANKUR Cuevas    Return if symptoms worsen or fail to improve, for cath follow up 1-2 weeks.    Diagnoses and all orders for this visit:    1. Coronary artery disease involving coronary bypass graft of native heart without angina pectoris (Primary)  -     Discontinue: metoprolol succinate XL (Toprol XL) 100 MG 24 hr tablet; Take 1 tablet by mouth Daily.  Dispense: 90 tablet; Refill: 3  -     metoprolol succinate XL (Toprol XL) 100 MG 24 hr tablet; Take 1 tablet by mouth Daily.  Dispense: 90 tablet; Refill: 3    2. S/P CABG x 4  -     bumetanide (BUMEX) 0.5 MG tablet; Take 1 tablet by mouth Daily.  Dispense: 90 tablet; Refill: 2    3. Chronic diastolic congestive heart failure (CMS/HCC)  -     Discontinue: metoprolol succinate XL (Toprol XL) 100 MG 24 hr tablet; Take 1 tablet by mouth Daily.  Dispense: 90 tablet; Refill: 3  -     metoprolol succinate XL (Toprol XL) 100 MG 24 hr tablet; Take 1 tablet by mouth Daily.  Dispense: 90 tablet; Refill: 3    4. Essential hypertension  -     Discontinue: metoprolol succinate XL (Toprol XL) 100 MG 24 hr tablet; Take 1 tablet by mouth Daily.  Dispense: 90 tablet; Refill: 3  -     metoprolol succinate XL (Toprol XL) 100 MG 24 hr tablet; Take 1 tablet by mouth Daily.  Dispense: 90 tablet; Refill: 3    5. Fluid collection at surgical site, subsequent encounter  -     Cancel: US soft tissue; Future  -     US soft tissue; Future    6. Hemorrhoids, unspecified hemorrhoid type  -     phenylephrine-cocoa Butter (PREPARATION H) 0.25-88.44 % suppository suppository; Insert 1 suppository into the rectum As Needed for Hemorrhoids.  Dispense: 24 suppository; Refill:  3          Advance Care Planning   ACP discussion was held with the patient during this visit. Patient does not have an advance directive, declines further assistance.       MEDS ORDERED DURING VISIT:  New Medications Ordered This Visit   Medications   • phenylephrine-cocoa Butter (PREPARATION H) 0.25-88.44 % suppository suppository     Sig: Insert 1 suppository into the rectum As Needed for Hemorrhoids.     Dispense:  24 suppository     Refill:  3   • metoprolol succinate XL (Toprol XL) 100 MG 24 hr tablet     Sig: Take 1 tablet by mouth Daily.     Dispense:  90 tablet     Refill:  3   • bumetanide (BUMEX) 0.5 MG tablet     Sig: Take 1 tablet by mouth Daily.     Dispense:  90 tablet     Refill:  2           This document has been electronically signed by Dany San Jr., APRN  August 22, 2021 17:40 EDT

## 2021-08-23 ENCOUNTER — HOSPITAL ENCOUNTER (OUTPATIENT)
Dept: CARDIOLOGY | Facility: HOSPITAL | Age: 71
Discharge: HOME | End: 2021-08-23
Admitting: NURSE PRACTITIONER

## 2021-08-23 VITALS
WEIGHT: 208.4 LBS | SYSTOLIC BLOOD PRESSURE: 120 MMHG | HEART RATE: 65 BPM | DIASTOLIC BLOOD PRESSURE: 60 MMHG | OXYGEN SATURATION: 97 % | HEIGHT: 69 IN | RESPIRATION RATE: 18 BRPM | BODY MASS INDEX: 30.87 KG/M2

## 2021-08-23 DIAGNOSIS — E66.9 OBESITY (BMI 30-39.9): Chronic | ICD-10-CM

## 2021-08-23 DIAGNOSIS — I10 ESSENTIAL HYPERTENSION: Chronic | ICD-10-CM

## 2021-08-23 DIAGNOSIS — I50.42 CHRONIC COMBINED SYSTOLIC AND DIASTOLIC CONGESTIVE HEART FAILURE (HCC): Primary | ICD-10-CM

## 2021-08-23 DIAGNOSIS — R94.30 CARDIAC LEFT VENTRICULAR EJECTION FRACTION 30-35 PERCENT: ICD-10-CM

## 2021-08-23 DIAGNOSIS — N18.2 CKD (CHRONIC KIDNEY DISEASE) STAGE 2, GFR 60-89 ML/MIN: Chronic | ICD-10-CM

## 2021-08-23 LAB
ABSOLUTE LUNG FLUID CONTENT: 38 % (ref 20–35)
ANION GAP SERPL CALCULATED.3IONS-SCNC: 12.4 MMOL/L (ref 5–15)
BUN SERPL-MCNC: 15 MG/DL (ref 8–23)
BUN/CREAT SERPL: 12 (ref 7–25)
CALCIUM SPEC-SCNC: 9.2 MG/DL (ref 8.6–10.5)
CHLORIDE SERPL-SCNC: 102 MMOL/L (ref 98–107)
CO2 SERPL-SCNC: 23.6 MMOL/L (ref 22–29)
CREAT SERPL-MCNC: 1.25 MG/DL (ref 0.76–1.27)
GFR SERPL CREATININE-BSD FRML MDRD: 57 ML/MIN/1.73
GLUCOSE SERPL-MCNC: 287 MG/DL (ref 65–99)
MAGNESIUM SERPL-MCNC: 1.7 MG/DL (ref 1.6–2.4)
NT-PROBNP SERPL-MCNC: 207.1 PG/ML (ref 0–900)
POTASSIUM SERPL-SCNC: 4.7 MMOL/L (ref 3.5–5.2)
SODIUM SERPL-SCNC: 138 MMOL/L (ref 136–145)

## 2021-08-23 PROCEDURE — 83880 ASSAY OF NATRIURETIC PEPTIDE: CPT

## 2021-08-23 PROCEDURE — 99213 OFFICE O/P EST LOW 20 MIN: CPT | Performed by: NURSE PRACTITIONER

## 2021-08-23 PROCEDURE — 94726 PLETHYSMOGRAPHY LUNG VOLUMES: CPT | Performed by: NURSE PRACTITIONER

## 2021-08-23 PROCEDURE — 80048 BASIC METABOLIC PNL TOTAL CA: CPT | Performed by: NURSE PRACTITIONER

## 2021-08-23 PROCEDURE — 83735 ASSAY OF MAGNESIUM: CPT | Performed by: NURSE PRACTITIONER

## 2021-08-23 PROCEDURE — 36415 COLL VENOUS BLD VENIPUNCTURE: CPT | Performed by: NURSE PRACTITIONER

## 2021-08-23 RX ORDER — SACUBITRIL AND VALSARTAN 24; 26 MG/1; MG/1
1 TABLET, FILM COATED ORAL 2 TIMES DAILY
Qty: 180 TABLET | Refills: 1 | Status: SHIPPED | OUTPATIENT
Start: 2021-08-23 | End: 2021-11-23 | Stop reason: SDUPTHER

## 2021-08-23 NOTE — PROGRESS NOTES
Heart Failure Pharmacy Note  Patient Name: William Villasenor   Referring Provider: Maddi Munoz  Primary Cardiologist: Dany PASCUAL  CHF: HFrEF    Medication Use:   Adherence: No issues   Hx of med intolerances: None reported  Affordability: Reports issues affording Farxiga - gets samples from PCP Dany San; reports PCP got Entresto approved through insurance until end of year  Retail Rx Management: Milvia to work on patient assistance for Farxiga    Past Medical History:   Diagnosis Date   • Arthritis    • Back pain    • BPH (benign prostatic hyperplasia)    • Chronic diastolic (congestive) heart failure (CMS/HCC)    • Diabetes mellitus (CMS/HCC)    • Diabetic peripheral neuropathy (CMS/HCC)    • Erectile dysfunction    • Former smoker    • GERD (gastroesophageal reflux disease)    • GERD (gastroesophageal reflux disease)    • Gout    • Hemorrhoids    • High cholesterol    • Hypertension    • Obesity      ALLERGIES: Lisinopril  Current Outpatient Medications   Medication Sig Dispense Refill   • acetaminophen (TYLENOL) 500 MG tablet Take 500 mg by mouth Every 6 (Six) Hours As Needed for Mild Pain .     • allopurinol (ZYLOPRIM) 300 MG tablet TAKE 1 TABLET EVERY DAY 90 tablet 0   • APPLE CIDER VINEGAR PO Take  by mouth Daily.     • Ascorbic Acid (Vitamin C) 500 MG capsule Take 500 mg by mouth Daily.     • aspirin (Aspirin Adult Low Dose) 81 MG EC tablet Take 81 mg by mouth Daily.     • atorvastatin (LIPITOR) 40 MG tablet Take 1 tablet by mouth Every Night. 90 tablet 0   • bumetanide (BUMEX) 0.5 MG tablet Take 1 tablet by mouth Daily. 90 tablet 2   • CALCIUM-MAGNESIUM-ZINC PO Take  by mouth 2 (two) times a day.     • clopidogrel (PLAVIX) 75 MG tablet Take 1 tablet by mouth Daily. 90 tablet 1   • Dapagliflozin Propanediol (Farxiga) 10 MG tablet Take one tablet by mouth Daily. 30 tablet 2   • doxazosin (CARDURA) 4 MG tablet TAKE 1 TABLET EVERY DAY 90 tablet 0   • finasteride (PROSCAR) 5 MG tablet Take 1 tablet  by mouth Daily. 90 tablet 0   • fluconazole (DIFLUCAN) 200 MG tablet Take 200 mg by mouth As Needed.     • folic acid (FOLVITE) 1 MG tablet Take 800 mcg by mouth 2 (two) times a day.     • gabapentin (NEURONTIN) 300 MG capsule Take 1 capsule by mouth 2 (two) times a day. 60 capsule 0   • glipizide (GLUCOTROL) 5 MG tablet Take 1 tablet by mouth 2 (Two) Times a Day Before Meals. (Patient taking differently: Take 5 mg by mouth 2 (Two) Times a Day Before Meals. Pt reports taking two tablets twice daily) 180 tablet 0   • Hydrocortisone (Cortizone-10) 1 % gel Apply  topically.     • loratadine (CLARITIN) 10 MG tablet Take 10 mg by mouth Daily.     • meclizine (ANTIVERT) 25 MG tablet TAKE 1 TABLET BY MOUTH 3 (THREE) TIMES A DAY AS NEEDED FOR DIZZINESS. 30 tablet 0   • metFORMIN (GLUCOPHAGE) 1000 MG tablet Take 1 tablet by mouth 2 (Two) Times a Day With Meals. 180 tablet 0   • metoprolol succinate XL (Toprol XL) 100 MG 24 hr tablet Take 1 tablet by mouth Daily. 90 tablet 3   • Misc Natural Products (LUTEIN 20 PO) Take 1 capsule by mouth.     • multivitamin (multivitamin) tablet tablet Take 1 tablet by mouth Daily. 30 tablet    • nitroglycerin (NITROSTAT) 0.4 MG SL tablet Place 1 tablet under the tongue Every 5 (Five) Minutes As Needed for Chest Pain (Only if SBP Greater Than 100). Take no more than 3 doses in 15 minutes. 100 tablet 12   • pantoprazole (Protonix) 40 MG EC tablet Take 1 tablet by mouth Daily. 90 tablet 3   • phenylephrine-cocoa Butter (PREPARATION H) 0.25-88.44 % suppository suppository Insert 1 suppository into the rectum As Needed for Hemorrhoids. 24 suppository 3   • sacubitril-valsartan (Entresto) 24-26 MG tablet Take 1 tablet by mouth 2 (Two) Times a Day. 180 tablet 1   • senna (senna) 8.6 MG tablet Take 2 tablets by mouth 2 (Two) Times a Day As Needed for Constipation. 360 tablet 0   • triamcinolone (KENALOG) 0.1 % cream Apply  topically to the appropriate area as directed As Needed.     • vitamin E 400  "UNIT capsule Take 1 capsule by mouth Daily.       No current facility-administered medications for this encounter.       Vaccination History:   Pneumonia: Needs Assessed  Annual Influenza: Needs Assessed     Objective  Vitals:    08/23/21 1310 08/23/21 1359   BP: 120/60    BP Location: Left arm    Patient Position: Sitting    Cuff Size: Adult    Pulse: 52 65   Resp: 18    SpO2: 97%    Weight: 94.5 kg (208 lb 6.4 oz)    Height: 175.3 cm (69\")      Wt Readings from Last 3 Encounters:   08/23/21 94.5 kg (208 lb 6.4 oz)   08/09/21 95.2 kg (209 lb 12.8 oz)   07/09/21 92.5 kg (204 lb)         08/23/21  1310   Weight: 94.5 kg (208 lb 6.4 oz)     Lab Results   Component Value Date    GLUCOSE 287 (H) 08/23/2021    BUN 15 08/23/2021    CREATININE 1.25 08/23/2021    EGFRIFNONA 57 (L) 08/23/2021    BCR 12.0 08/23/2021    K 4.7 08/23/2021    CO2 23.6 08/23/2021    CALCIUM 9.2 08/23/2021    ALBUMIN 4.30 06/16/2021    AST 11 06/16/2021    ALT 8 06/16/2021     Lab Results   Component Value Date    WBC 9.51 08/09/2021    HGB 12.4 (L) 08/09/2021    HCT 48.6 08/09/2021    MCV 96.2 08/09/2021     08/09/2021     Lab Results   Component Value Date    TROPONINI <0.006 08/07/2018    TROPONINT <0.010 04/21/2021     Lab Results   Component Value Date    PROBNP 207.1 08/23/2021     Results for orders placed during the hospital encounter of 06/21/21    Adult Transthoracic Echo Limited W/ Cont if Necessary Per Protocol    Interpretation Summary  Moderately to severely technically limited study.  Parasternal images are uninterpretable.    1.  Global LV systolic function appears to be mildly impaired.  There are no large dense focal wall motion abnormalities but visually estimated ejection fraction is 40±5%.  There is mild to moderate concentric left ventricular hypertrophy.  There is mild biatrial enlargement.  RV size and function are grossly preserved.    2.  Valves are grossly morphologically normal.    3.  No large pericardial " effusion.  The proximal aorta is not dilated.           GDMT:       Class   Drug   Dose Last Dose Adjustment   ACEi/ARB/ARNI Entresto 24-26 mg BID 5/7/21   Beta Blocker Toprol XL  100mg QDay 4/19/21   MRA        Drug Therapy Problems    1. GDMT  2. Needs patient assistance- Entresto    Recommendations:      1. Consider addition of spironolactone with close monitoring of renal function/K  2. Pt is currently taking samples of Milvia Paul working on Entresto assistance.      Patient was educated on heart failure medications and the importance of medication adherence. All questions were addressed and patient expressed understanding.     Thank you for allowing me to participate in the care of your patient,    Francisca Driscoll, Jaylene  08/23/21  14:13 EDT

## 2021-08-23 NOTE — PROGRESS NOTES
Heart Failure Clinic    Date: 08/23/21     Vitals:    08/23/21 1310   BP: 120/60   Pulse: 52   Resp: 18   SpO2: 97%        Method of arrival: Ambulatory    Weighing self daily: Most days    Monitoring Heart Failure Zones: Most days    Today's HF Zone: Yellow     Taking medications as prescribed: Yes    Edema Yes    Shortness of Air: Yes just when bending over.     Number of pillows used at night:<2    Educational Materials given:                                                                        ReDS Value: 38%  36-41 Possible Hypervolemic Status      Kathryn Hill MA 08/23/21 13:18 EDT

## 2021-08-23 NOTE — PROGRESS NOTES
[facility] CHF CLINIC OFFICE VISIT    Subjective:   History of Present Illness   William Villasenor is a 70-year-old  male who presents to the clinic today for follow up on heart failure. He has underlying history of chronic combined systolic and diastolic congestive heart failure with an LVEF of 35% from echocardiogram on 4/21/2021 which has worsened since echocardiogram on 3/24/2021 when LVEF was 51-55%. He takes metoprolol  mg daily, Entresto 24/26 mg BID, Bumex 0.5 mg daily, Magnesium 400 mg BID, Farxiga 10 mg daily.       History of three-vessel CABG in 3/2021 with some recurrent chest tightness. seen cardiology who stopped Imdur (due to possible side effects) and has suggesting proceeding with cardiac cath. Has some concerns about swelling at CABG incision area an is scheduled to have US    Repeat echo showed improved LVEF  Needing help with costs of Entresto   Shortness of air is stable however when he bends over it worsens   Leg swelling stable   Urine output good  He feels as if he is continuing to regain strength  Reports they monitor his sodium intake and fluid intake  Home weight stable   Home BP intermittent monitoring   Home HR 60-80 - denies bradycardia, dizziness, lightheadedness or syncope     PCP: Dr. Gatica  Cardiologist: Dr. Simms/Dany San, ANKUR    Hospitalizations: Discharged on 4/9/2021  Past Medical History:   Diagnosis Date   • Arthritis    • Back pain    • BPH (benign prostatic hyperplasia)    • Chronic diastolic (congestive) heart failure (CMS/HCC)    • Diabetes mellitus (CMS/HCC)    • Diabetic peripheral neuropathy (CMS/HCC)    • Erectile dysfunction    • Former smoker    • GERD (gastroesophageal reflux disease)    • GERD (gastroesophageal reflux disease)    • Gout    • Hemorrhoids    • High cholesterol    • Hypertension    • Obesity      Past Surgical History:   Procedure Laterality Date   • CARDIAC CATHETERIZATION N/A 3/15/2021    Procedure: Left Heart Cath;  Surgeon: Mila  Trent SIMON MD;  Location:  [facility];  Service: Cardiology;  Laterality: N/A;   • CARDIAC CATHETERIZATION N/A 3/15/2021    Procedure: Coronary angiography;  Surgeon: Trent Zaragoza MD;  Location:  [facility];  Service: Cardiology;  Laterality: N/A;   • COLONOSCOPY W/ BIOPSIES     • CORONARY ARTERY BYPASS GRAFT N/A 3/16/2021    Procedure: MEDIAN STERNOTOMY, CORONARY ARTERY BYPASS GRAFT X4, UTILIZATION OF LEFT INTERNAL MAMMARY ARTERY, AND ENDOSCOPIC VEIN HARVEST OF RIGHT GREATER SAPHENOUS VEIN;  Surgeon: Jorge Simms MD;  Location:  [facility];  Service: Cardiothoracic;  Laterality: N/A;  vein out- 1614  vein ready- 1628             Social History     Socioeconomic History   • Marital status:      Spouse name: Not on file   • Number of children: 3   • Years of education: Not on file   • Highest education level: Not on file   Tobacco Use   • Smoking status: Former Smoker     Packs/day: 1.00     Years: 5.00     Pack years: 5.00     Quit date: 1974     Years since quittin.5   • Smokeless tobacco: Never Used   Substance and Sexual Activity   • Alcohol use: Never     Comment: quit in    • Drug use: Never   • Sexual activity: Defer     Family History   Problem Relation Age of Onset   • Heart disease Mother    • Hypertension Mother    • Depression Mother    • Alcohol abuse Maternal Uncle    • Heart disease Maternal Grandmother    • Hypertension Maternal Grandmother    • Diabetes Maternal Grandmother    • Diabetes Paternal Grandmother    • No Known Problems Half-Brother    • No Known Problems Half-Brother    • No Known Problems Half-Sister    • No Known Problems Daughter    • No Known Problems Daughter    • Hyperlipidemia Daughter      Allergies:  Allergies   Allergen Reactions   • Lisinopril Cough     Review of Systems   Constitutional: Negative for chills, fever, weight gain and weight loss.   HENT: Negative for congestion, hoarse voice and sore throat.     Eyes: Negative for blurred vision, pain and visual disturbance.   Cardiovascular: Negative for chest pain, claudication, cyanosis, dyspnea on exertion, irregular heartbeat, leg swelling, near-syncope, orthopnea, palpitations and syncope.   Respiratory: Negative for cough, shortness of breath and wheezing.    Endocrine: Negative for cold intolerance, heat intolerance and polyuria.   Hematologic/Lymphatic: Negative for bleeding problem. Does not bruise/bleed easily.   Skin: Negative for color change, flushing and poor wound healing.   Musculoskeletal: Negative for arthritis, back pain, joint pain and myalgias.   Gastrointestinal: Negative for abdominal pain, constipation, diarrhea, nausea and vomiting.   Genitourinary: Negative for dysuria, frequency, hesitancy and urgency.   Neurological: Negative for excessive daytime sleepiness, dizziness, headaches, numbness, vertigo and weakness.   Psychiatric/Behavioral: Negative for depression. The patient does not have insomnia and is not nervous/anxious.    All other systems reviewed and are negative.    Current Outpatient Medications   Medication Sig Dispense Refill   • acetaminophen (TYLENOL) 500 MG tablet Take 500 mg by mouth Every 6 (Six) Hours As Needed for Mild Pain .     • allopurinol (ZYLOPRIM) 300 MG tablet TAKE 1 TABLET EVERY DAY 90 tablet 0   • APPLE CIDER VINEGAR PO Take  by mouth Daily.     • Ascorbic Acid (Vitamin C) 500 MG capsule Take 500 mg by mouth Daily.     • aspirin (Aspirin Adult Low Dose) 81 MG EC tablet Take 81 mg by mouth Daily.     • atorvastatin (LIPITOR) 40 MG tablet Take 1 tablet by mouth Every Night. 90 tablet 0   • bumetanide (BUMEX) 0.5 MG tablet Take 1 tablet by mouth Daily. 90 tablet 2   • CALCIUM-MAGNESIUM-ZINC PO Take  by mouth 2 (two) times a day.     • clopidogrel (PLAVIX) 75 MG tablet Take 1 tablet by mouth Daily. 90 tablet 1   • Dapagliflozin Propanediol (Farxiga) 10 MG tablet Take one tablet by mouth Daily. 30 tablet 2   • doxazosin  (CARDURA) 4 MG tablet TAKE 1 TABLET EVERY DAY 90 tablet 0   • finasteride (PROSCAR) 5 MG tablet Take 1 tablet by mouth Daily. 90 tablet 0   • fluconazole (DIFLUCAN) 200 MG tablet Take 200 mg by mouth As Needed.     • folic acid (FOLVITE) 1 MG tablet Take 800 mcg by mouth 2 (two) times a day.     • gabapentin (NEURONTIN) 300 MG capsule Take 1 capsule by mouth 2 (two) times a day. 60 capsule 0   • glipizide (GLUCOTROL) 5 MG tablet Take 1 tablet by mouth 2 (Two) Times a Day Before Meals. (Patient taking differently: Take 5 mg by mouth 2 (Two) Times a Day Before Meals. Pt reports taking two tablets twice daily) 180 tablet 0   • Hydrocortisone (Cortizone-10) 1 % gel Apply  topically.     • loratadine (CLARITIN) 10 MG tablet Take 10 mg by mouth Daily.     • meclizine (ANTIVERT) 25 MG tablet TAKE 1 TABLET BY MOUTH 3 (THREE) TIMES A DAY AS NEEDED FOR DIZZINESS. 30 tablet 0   • metFORMIN (GLUCOPHAGE) 1000 MG tablet Take 1 tablet by mouth 2 (Two) Times a Day With Meals. 180 tablet 0   • metoprolol succinate XL (Toprol XL) 100 MG 24 hr tablet Take 1 tablet by mouth Daily. 90 tablet 3   • Misc Natural Products (LUTEIN 20 PO) Take 1 capsule by mouth.     • multivitamin (multivitamin) tablet tablet Take 1 tablet by mouth Daily. 30 tablet    • nitroglycerin (NITROSTAT) 0.4 MG SL tablet Place 1 tablet under the tongue Every 5 (Five) Minutes As Needed for Chest Pain (Only if SBP Greater Than 100). Take no more than 3 doses in 15 minutes. 100 tablet 12   • pantoprazole (Protonix) 40 MG EC tablet Take 1 tablet by mouth Daily. 90 tablet 3   • phenylephrine-cocoa Butter (PREPARATION H) 0.25-88.44 % suppository suppository Insert 1 suppository into the rectum As Needed for Hemorrhoids. 24 suppository 3   • sacubitril-valsartan (Entresto) 24-26 MG tablet Take 1 tablet by mouth 2 (Two) Times a Day. 180 tablet 1   • senna (senna) 8.6 MG tablet Take 2 tablets by mouth 2 (Two) Times a Day As Needed for Constipation. 360 tablet 0   •  "triamcinolone (KENALOG) 0.1 % cream Apply  topically to the appropriate area as directed As Needed.     • vitamin E 400 UNIT capsule Take 1 capsule by mouth Daily.       No current facility-administered medications for this encounter.      Objective:   Body mass index is 30.78 kg/m².  Vitals:    08/23/21 1310 08/23/21 1359   BP: 120/60    BP Location: Left arm    Patient Position: Sitting    Cuff Size: Adult    Pulse: 52 65   Resp: 18    SpO2: 97%    Weight: 94.5 kg (208 lb 6.4 oz)    Height: 175.3 cm (69\")      Vitals:    08/23/21 1359   BP:    Pulse: 65   Resp:    SpO2:      Body mass index is 30.78 kg/m².  Lab Results   Component Value Date    ABSOLUTELUNG 38 (A) 08/23/2021     Wt Readings from Last 3 Encounters:   08/23/21 94.5 kg (208 lb 6.4 oz)   08/09/21 95.2 kg (209 lb 12.8 oz)   07/09/21 92.5 kg (204 lb)     Lab Results   Component Value Date    ABSOLUTELUNG 38 (A) 08/23/2021      ReDs - 34% (4/21/2021)    Vitals reviewed.   Constitutional:       Appearance: Normal appearance. Well-developed.   Eyes:      Conjunctiva/sclera: Conjunctivae normal.   HENT:      Head: Normocephalic.   Neck:      Vascular: No JVD or JVR.   Pulmonary:      Effort: Pulmonary effort is normal.      Breath sounds: Normal breath sounds.   Chest:      Breasts:         Left: No tenderness.      Comments: CABG incision healed   Cardiovascular:      Normal rate. Regular rhythm.   Edema:     Ankle: bilateral trace edema of the ankle.     Feet: bilateral trace edema of the feet.  Abdominal:      General: Bowel sounds are normal.      Palpations: Abdomen is soft. There is no hepatomegaly or splenomegaly.   Musculoskeletal: Normal range of motion.      Cervical back: Normal range of motion and neck supple. Skin:     General: Skin is warm and dry.   Neurological:      Mental Status: Alert and oriented to person, place, and time.   Psychiatric:         Attention and Perception: Attention normal.         Mood and Affect: Mood normal.         " Speech: Speech normal.         Behavior: Behavior normal. Behavior is cooperative.         Cognition and Memory: Cognition normal.       Cardiographics  Results for orders placed during the hospital encounter of 21    Adult Transthoracic Echo Limited W/ Cont if Necessary Per Protocol    Interpretation Summary  Moderately to severely technically limited study.  Parasternal images are uninterpretable.    1.  Global LV systolic function appears to be mildly impaired.  There are no large dense focal wall motion abnormalities but visually estimated ejection fraction is 40±5%.  There is mild to moderate concentric left ventricular hypertrophy.  There is mild biatrial enlargement.  RV size and function are grossly preserved.    2.  Valves are grossly morphologically normal.    3.  No large pericardial effusion.  The proximal aorta is not dilated.    [id number]      Lab Review   Lab Results   Component Value Date    TSH 2.010 2021     Lab Results   Component Value Date    GLUCOSE 287 (H) 2021    BUN 15 2021    CREATININE 1.25 2021    EGFRIFNONA 57 (L) 2021    BCR 12.0 2021    K 4.7 2021    CO2 23.6 2021    CALCIUM 9.2 2021    ALBUMIN 4.30 2021    AST 11 2021    ALT 8 2021     Lab Results   Component Value Date    WBC 9.51 2021    HGB 12.4 (L) 2021    HCT 48.6 2021    MCV 96.2 2021     2021     Lab Results   Component Value Date    TROPONINI <0.006 2018    TROPONINT <0.010 2021     Lab Results   Component Value Date    PROBNP 207.1 2021      The following portions of the patient's history were reviewed and updated as appropriate: allergies, current medications, past family history, past medical history, past social history, past surgical history and problem list.     Old records reviewed and pertinent information is included in the above objective data.   Assessment/Plan:      Diagnosis Plan    1. Chronic combined systolic and diastolic congestive heart failure (CMS/HCC)  Basic Metabolic Panel    BNP    Magnesium    ReDs Vest    Basic Metabolic Panel    Magnesium   2. Cardiac left ventricular ejection fraction 30-35 percent  sacubitril-valsartan (Entresto) 24-26 MG tablet   3. Essential hypertension     4. CKD (chronic kidney disease) stage 2, GFR 60-89 ml/min     5. Obesity (BMI 30-39.9)       BMP, pro-BNP and magnesium today  Reviewed and discussed results with patient today  ReDs reviewed and discussed today  Monitor BP and heart rate  Continue Bumex 0.5 mg daily  Await cardiac cath results and US results   Continue on current medications   Rx for Entresto to pharmacy for assistance with cost of medication for patient due to HF  Counseled patient extensively on dietary Na+ intake, importance of activity, weight monitoring, compliance with medications and follow up appointments.  Follow-up in 3 months, sooner if needed

## 2021-08-27 ENCOUNTER — OUTSIDE FACILITY SERVICE (OUTPATIENT)
Dept: CARDIOLOGY | Facility: CLINIC | Age: 71
End: 2021-08-27

## 2021-08-27 PROCEDURE — 93459 L HRT ART/GRFT ANGIO: CPT | Performed by: INTERNAL MEDICINE

## 2021-08-31 ENCOUNTER — HOSPITAL ENCOUNTER (OUTPATIENT)
Dept: ULTRASOUND IMAGING | Facility: HOSPITAL | Age: 71
Discharge: HOME | End: 2021-08-31
Admitting: NURSE PRACTITIONER

## 2021-08-31 DIAGNOSIS — T88.8XXD FLUID COLLECTION AT SURGICAL SITE, SUBSEQUENT ENCOUNTER: ICD-10-CM

## 2021-08-31 PROCEDURE — 76604 US EXAM CHEST: CPT

## 2021-08-31 PROCEDURE — 76604 US EXAM CHEST: CPT | Performed by: RADIOLOGY

## 2021-09-01 ENCOUNTER — TELEPHONE (OUTPATIENT)
Dept: CARDIOLOGY | Facility: CLINIC | Age: 71
End: 2021-09-01

## 2021-09-01 NOTE — TELEPHONE ENCOUNTER
Left message for patient to return call, otherwise results will be addressed at follow up. Roseann Montemayor MA      ----- Message from ANKUR Cuevas sent at 8/31/2021 10:10 PM EDT -----  Regarding: FW:  No abnormalities on us  ----- Message -----  From: Sky, Rad Results [place] In  Sent: 8/31/2021  10:58 AM EDT  To: ANKUR Cuevas

## 2021-09-10 ENCOUNTER — OFFICE VISIT (OUTPATIENT)
Dept: CARDIOLOGY | Facility: CLINIC | Age: 71
End: 2021-09-10

## 2021-09-10 VITALS
OXYGEN SATURATION: 97 % | SYSTOLIC BLOOD PRESSURE: 137 MMHG | HEIGHT: 69 IN | DIASTOLIC BLOOD PRESSURE: 83 MMHG | WEIGHT: 209.6 LBS | BODY MASS INDEX: 31.04 KG/M2 | HEART RATE: 66 BPM

## 2021-09-10 DIAGNOSIS — Z95.1 S/P CABG X 4: ICD-10-CM

## 2021-09-10 DIAGNOSIS — R07.89 CHEST PRESSURE: ICD-10-CM

## 2021-09-10 DIAGNOSIS — I25.119 CORONARY ARTERY DISEASE INVOLVING NATIVE CORONARY ARTERY OF NATIVE HEART WITH ANGINA PECTORIS (HCC): Primary | ICD-10-CM

## 2021-09-10 DIAGNOSIS — I10 ESSENTIAL HYPERTENSION: Chronic | ICD-10-CM

## 2021-09-10 DIAGNOSIS — R06.02 SHORTNESS OF BREATH: ICD-10-CM

## 2021-09-10 PROCEDURE — 93000 ELECTROCARDIOGRAM COMPLETE: CPT | Performed by: NURSE PRACTITIONER

## 2021-09-10 PROCEDURE — 99214 OFFICE O/P EST MOD 30 MIN: CPT | Performed by: NURSE PRACTITIONER

## 2021-09-10 RX ORDER — ISOSORBIDE MONONITRATE 30 MG/1
30 TABLET, EXTENDED RELEASE ORAL DAILY
Qty: 90 TABLET | Refills: 3 | Status: SHIPPED | OUTPATIENT
Start: 2021-09-10 | End: 2022-03-04 | Stop reason: SDUPTHER

## 2021-09-10 NOTE — PROGRESS NOTES
Subjective     William Villasenor is a 71 y.o. male who presents to day for Follow-up (1 month / testing results / cath 8/27/21).    CHIEF COMPLIANT  Chief Complaint   Patient presents with   • Follow-up     1 month / testing results / cath 8/27/21       Active Problems:  Problem List Items Addressed This Visit        Cardiac and Vasculature    Essential hypertension (Chronic)    Relevant Orders    XR Chest PA & Lateral    Coronary artery disease involving native coronary artery of native heart with angina pectoris (CMS/HCC) - Primary    Overview     Added automatically from request for surgery [id number]         Relevant Medications    isosorbide mononitrate (IMDUR) 30 MG 24 hr tablet    Other Relevant Orders    XR Chest PA & Lateral    S/P CABG x 4    Relevant Medications    isosorbide mononitrate (IMDUR) 30 MG 24 hr tablet    Other Relevant Orders    XR Chest PA & Lateral       Pulmonary and Pneumonias    Shortness of breath    Relevant Orders    XR Chest PA & Lateral       Symptoms and Signs    Chest pressure    Relevant Medications    isosorbide mononitrate (IMDUR) 30 MG 24 hr tablet    Other Relevant Orders    XR Chest PA & Lateral      Problem list  1.  Coronary artery disease status post coronary artery bypass grafting x4  1.1 coronary artery bypass grafting 3/21: LIMA to LAD, SVG to diagonal, SVG to OM1, SVG to OM 2.  Residual  of RCA  1.2 echocardiogram 4/21: EF 35± percent, LV dyssynchrony  1.3 echocardiogram 6/21: EF 40±5% mild to moderate concentric left ventricular hypertrophy, mild biatrial enlargement no significant valvular disease   1.4 left heart catheterization 8/21: Left main ostial distal tapering, circumflex diffusely disease, OM1 occluded, LAD occluded, RCA subtotally occluded, LIMA widely patent, SVG to posterior lateral patent, SVG to OM patent, EF 45±5%, LVEDP 28-30  2.  Essential hypertension  3.  Diastolic dysfunction  4.  Chest pain  5.  Shortness of breath  6.  Lower extremity edema  7.   Dizziness/near syncope  7.1 cardiac event monitor 6/21: 2.1% ventricular ectopic burden, episodes of trigeminy, bigeminy, and PSVT    HPI  HPI  Mr. Villasenor is a 71-year-old  male patient who is being followed up today for significant coronary artery disease in which he underwent coronary artery bypass grafting and recent left heart catheterization due to persistent symptoms.  Patient did go under left heart catheterization which showed a well-perfused coronary artery anatomy with the exception of a subtotally RCA.  Medication management was recommended at this time.  His ejection fraction has improved to 45± percent.  He is on guideline driven medication therapy for ischemic cardiomyopathy including Entresto, metoprolol, and Farxiga.  He is on high intensity statin therapy as well of a atorvastatin 40 mg daily.  He is on aspirin 81 mg daily,  Is also found to have diastolic dysfunction with an elevated LVEDP of 28-30.  He is on Bumex 0.5 mg daily for his lower extremity edema and fluid retention.  He also has chronic arterial hypertension which seems to be well controlled today at 137/83 heart rate of 66.  He is currently on metoprolol, Entresto.  Patient does report chest pain that occurs on left side of his chest especially with his cough.  He says he clears his throat he feels a pressure type sensation in the mid sternum that is worse with activity.  Also occurs at rest.  He does have some associated shortness of breath.  He also reports a chest tightness towards sore from where a wire helps become displaced in his upper sternum and the sternum but did appear to be stable on the last x-ray.  This is discussed with him by the CT surgery.  he reports he feels this may be the cause of his chest discomfort.  He also reports chronic shortness of breath that is about the same and is unchanged and nonprogressive.  He denies any fatigue, lower extremity edema, palpitations, dizziness, headaches, syncope, PND, orthopnea,  or strokelike symptoms.  We did also review his left heart catheterization in detail and he denied any questions.  PRIOR MEDS  Current Outpatient Medications on File Prior to Visit   Medication Sig Dispense Refill   • acetaminophen (TYLENOL) 500 MG tablet Take 500 mg by mouth Every 6 (Six) Hours As Needed for Mild Pain .     • allopurinol (ZYLOPRIM) 300 MG tablet TAKE 1 TABLET EVERY DAY 90 tablet 0   • APPLE CIDER VINEGAR PO Take  by mouth Daily.     • aspirin (Aspirin Adult Low Dose) 81 MG EC tablet Take 81 mg by mouth Daily.     • atorvastatin (LIPITOR) 40 MG tablet Take 1 tablet by mouth Every Night. 90 tablet 0   • bumetanide (BUMEX) 0.5 MG tablet Take 1 tablet by mouth Daily. 90 tablet 2   • CALCIUM-MAGNESIUM-ZINC PO Take  by mouth 2 (two) times a day.     • Dapagliflozin Propanediol (Farxiga) 10 MG tablet Take one tablet by mouth Daily. 30 tablet 2   • doxazosin (CARDURA) 4 MG tablet TAKE 1 TABLET EVERY DAY 90 tablet 0   • finasteride (PROSCAR) 5 MG tablet Take 1 tablet by mouth Daily. 90 tablet 0   • fluconazole (DIFLUCAN) 200 MG tablet Take 200 mg by mouth As Needed.     • gabapentin (NEURONTIN) 300 MG capsule Take 1 capsule by mouth 2 (two) times a day. 60 capsule 0   • glipizide (GLUCOTROL) 5 MG tablet Take 1 tablet by mouth 2 (Two) Times a Day Before Meals. (Patient taking differently: Take 5 mg by mouth 2 (Two) Times a Day Before Meals. Pt reports taking two tablets twice daily) 180 tablet 0   • Hydrocortisone (Cortizone-10) 1 % gel Apply  topically.     • loratadine (CLARITIN) 10 MG tablet Take 10 mg by mouth Daily.     • meclizine (ANTIVERT) 25 MG tablet TAKE 1 TABLET BY MOUTH 3 (THREE) TIMES A DAY AS NEEDED FOR DIZZINESS. 30 tablet 0   • metFORMIN (GLUCOPHAGE) 1000 MG tablet Take 1 tablet by mouth 2 (Two) Times a Day With Meals. 180 tablet 0   • metoprolol succinate XL (Toprol XL) 100 MG 24 hr tablet Take 1 tablet by mouth Daily. 90 tablet 3   • Misc Natural Products (LUTEIN 20 PO) Take 1 capsule by  mouth.     • multivitamin (multivitamin) tablet tablet Take 1 tablet by mouth Daily. 30 tablet    • nitroglycerin (NITROSTAT) 0.4 MG SL tablet Place 1 tablet under the tongue Every 5 (Five) Minutes As Needed for Chest Pain (Only if SBP Greater Than 100). Take no more than 3 doses in 15 minutes. 100 tablet 12   • pantoprazole (Protonix) 40 MG EC tablet Take 1 tablet by mouth Daily. 90 tablet 3   • sacubitril-valsartan (Entresto) 24-26 MG tablet Take 1 tablet by mouth 2 (Two) Times a Day. 180 tablet 1   • senna (senna) 8.6 MG tablet Take 2 tablets by mouth 2 (Two) Times a Day As Needed for Constipation. 360 tablet 0   • triamcinolone (KENALOG) 0.1 % cream Apply  topically to the appropriate area as directed As Needed.     • vitamin E 400 UNIT capsule Take 1 capsule by mouth Daily.     • Ascorbic Acid (Vitamin C) 500 MG capsule Take 500 mg by mouth Daily.       No current facility-administered medications on file prior to visit.       ALLERGIES  Lisinopril    HISTORY  Past Medical History:   Diagnosis Date   • Arthritis    • Back pain    • BPH (benign prostatic hyperplasia)    • Chronic diastolic (congestive) heart failure (CMS/HCC)    • Diabetes mellitus (CMS/HCC)    • Diabetic peripheral neuropathy (CMS/HCC)    • Erectile dysfunction    • Former smoker    • GERD (gastroesophageal reflux disease)    • GERD (gastroesophageal reflux disease)    • Gout    • Hemorrhoids    • High cholesterol    • Hypertension    • Obesity        Social History     Socioeconomic History   • Marital status:      Spouse name: Not on file   • Number of children: 3   • Years of education: Not on file   • Highest education level: Not on file   Tobacco Use   • Smoking status: Former Smoker     Packs/day: 1.00     Years: 5.00     Pack years: 5.00     Quit date: 1974     Years since quittin.6   • Smokeless tobacco: Never Used   Substance and Sexual Activity   • Alcohol use: Never     Comment: quit in    • Drug use: Never   •  "Sexual activity: Defer       Family History   Problem Relation Age of Onset   • Heart disease Mother    • Hypertension Mother    • Depression Mother    • Alcohol abuse Maternal Uncle    • Heart disease Maternal Grandmother    • Hypertension Maternal Grandmother    • Diabetes Maternal Grandmother    • Diabetes Paternal Grandmother    • No Known Problems Half-Brother    • No Known Problems Half-Brother    • No Known Problems Half-Sister    • No Known Problems Daughter    • No Known Problems Daughter    • Hyperlipidemia Daughter        Review of Systems   Constitutional: Negative.  Negative for chills, fatigue and fever.   HENT: Negative.  Negative for congestion, rhinorrhea and sore throat.    Eyes: Negative for visual disturbance.   Respiratory: Positive for chest tightness and shortness of breath. Negative for wheezing.    Cardiovascular: Negative.  Negative for chest pain, palpitations and leg swelling.   Gastrointestinal: Negative.    Endocrine: Negative.    Genitourinary: Negative.    Musculoskeletal: Positive for arthralgias. Negative for back pain and neck pain.   Skin: Negative.  Negative for rash and wound.   Allergic/Immunologic: Negative.  Negative for environmental allergies.   Neurological: Negative.  Negative for dizziness, weakness, numbness and headaches.   Hematological: Bruises/bleeds easily (bruises/ bleeds).   Psychiatric/Behavioral: Positive for sleep disturbance (staying asleep ).       Objective     VITALS: /83 (BP Location: Left arm, Patient Position: Sitting)   Pulse 66   Ht 175.3 cm (69\")   Wt 95.1 kg (209 lb 9.6 oz)   SpO2 97%   BMI 30.95 kg/m²     LABS:   Lab Results (most recent)     None          IMAGING:   XR Chest 2 View    Result Date: 6/10/2021  Borderline cardiomegaly status post median sternotomy and CABG without overt edema or interval change. No acute process.  D:  06/09/2021 E:  06/09/2021  This report was finalized on 6/10/2021 5:07 PM by Dr. Ronal Garvey.      US " Chest    Result Date: 8/31/2021  Unremarkable exam with no fluid collection or edema in the superficial surgical bed.  This report was finalized on 8/31/2021 10:55 AM by Dr. Catrachito Majano MD.        EXAM:  Physical Exam  Vitals and nursing note reviewed.   Constitutional:       Appearance: He is well-developed.   HENT:      Head: Normocephalic.   Eyes:      Pupils: Pupils are equal, round, and reactive to light.   Neck:      Thyroid: No thyroid mass.      Vascular: Carotid bruit present. No JVD.      Trachea: Trachea and phonation normal.   Cardiovascular:      Rate and Rhythm: Regular rhythm. Bradycardia present.      Pulses:           Radial pulses are 2+ on the right side and 2+ on the left side.        Posterior tibial pulses are 2+ on the right side and 2+ on the left side.      Heart sounds: Murmur heard.   No friction rub. No gallop.    Pulmonary:      Effort: Pulmonary effort is normal. No respiratory distress.      Breath sounds: Normal breath sounds. No wheezing or rales.   Abdominal:      General: Bowel sounds are normal.      Palpations: Abdomen is soft.   Musculoskeletal:         General: Swelling present. Normal range of motion.      Cervical back: Neck supple.   Skin:     General: Skin is warm and dry.      Capillary Refill: Capillary refill takes less than 2 seconds.      Findings: No rash.   Neurological:      Mental Status: He is alert and oriented to person, place, and time.   Psychiatric:         Speech: Speech normal.         Behavior: Behavior normal.         Thought Content: Thought content normal.         Judgment: Judgment normal.         Procedure     ECG 12 Lead    Date/Time: 9/10/2021 9:10 AM  Performed by: Dany San APRN  Authorized by: Dany San APRN   Comparison: compared with previous ECG from 4/8/2021  Rhythm: sinus bradycardia  Rate: bradycardic  BPM: 58  QRS axis: left  Other findings: non-specific ST-T wave changes  Comments:  ms  No acute changes                Assessment/Plan    Diagnosis Plan   1. Coronary artery disease involving native coronary artery of native heart with angina pectoris (CMS/HCC)  isosorbide mononitrate (IMDUR) 30 MG 24 hr tablet    XR Chest PA & Lateral   2. Essential hypertension  XR Chest PA & Lateral   3. S/P CABG x 4  isosorbide mononitrate (IMDUR) 30 MG 24 hr tablet    XR Chest PA & Lateral   4. Shortness of breath  XR Chest PA & Lateral   5. Chest pressure  isosorbide mononitrate (IMDUR) 30 MG 24 hr tablet    XR Chest PA & Lateral   1.  Patient does have coronary artery disease with history of bypass grafting and recent left heart catheterization.  Left heart catheterization showed a well-perfused coronary anatomy with the exception of a  of the RCA.  Medical management has been recommended.  Patient denies any complications of the right groin access site.  Pulses were palpable distal to the insertion site.  2.  Due to patient's chest tightness/chest pain we will start him on isosorbide 30 mg daily to see if we can relieve his chest tightness and discomfort.  3.  Patient's blood pressure is well controlled on current blood pressure medication regimen.  No medication changes are warranted at this time.  Patient advised to monitor blood pressure on a daily basis and report any persistent highs or lows.  Set goal blood pressure for patient at 130/80 or below.  4.  Patient is concerned about a sternal wire that he was informed that had become malpositioned and had rotated.  He feels this may be the cause of his pain.  We will get a chest x-ray to assess for stability.  5.  Informed of signs and symptoms of ACS and advised to seek emergent treatment for any new worsening symptoms.  Patient also advised sooner follow-up as needed.  Also advised to follow-up with family doctor as needed  This note is dictated utilizing voice recognition software.  Although this record has been proof read, transcriptional errors may still be present. If  questions occur regarding the content of this record please do not hesitate to call our office.  I have reviewed and confirmed the accuracy of the ROS as documented by the MA/LPN/RN ANKUR Cuevas    Return in about 2 years (around 9/10/2023), or if symptoms worsen or fail to improve.    Diagnoses and all orders for this visit:    1. Coronary artery disease involving native coronary artery of native heart with angina pectoris (CMS/HCC) (Primary)  -     isosorbide mononitrate (IMDUR) 30 MG 24 hr tablet; Take 1 tablet by mouth Daily.  Dispense: 90 tablet; Refill: 3  -     XR Chest PA & Lateral; Future    2. Essential hypertension  -     XR Chest PA & Lateral; Future    3. S/P CABG x 4  -     isosorbide mononitrate (IMDUR) 30 MG 24 hr tablet; Take 1 tablet by mouth Daily.  Dispense: 90 tablet; Refill: 3  -     XR Chest PA & Lateral; Future    4. Shortness of breath  -     XR Chest PA & Lateral; Future    5. Chest pressure  -     isosorbide mononitrate (IMDUR) 30 MG 24 hr tablet; Take 1 tablet by mouth Daily.  Dispense: 90 tablet; Refill: 3  -     XR Chest PA & Lateral; Future    Other orders  -     ECG 12 Lead              Advance Care Planning   ACP discussion was held with the patient during this visit. Patient does not have an advance directive, declines further assistance.    MEDS ORDERED DURING VISIT:  New Medications Ordered This Visit   Medications   • isosorbide mononitrate (IMDUR) 30 MG 24 hr tablet     Sig: Take 1 tablet by mouth Daily.     Dispense:  90 tablet     Refill:  3           This document has been electronically signed by ANKUR Cuevas Jr.  September 13, 2021 00:51 EDT

## 2021-09-10 NOTE — PATIENT INSTRUCTIONS
Heart-Healthy Eating Plan  Many factors influence your heart (coronary) health, including eating and exercise habits. Coronary risk increases with abnormal blood fat (lipid) levels. Heart-healthy meal planning includes limiting unhealthy fats, increasing healthy fats, and making other diet and lifestyle changes.  What is my plan?  Your health care provider may recommend that you:  · Limit your fat intake to _________% or less of your total calories each day.  · Limit your saturated fat intake to _________% or less of your total calories each day.  · Limit the amount of cholesterol in your diet to less than _________ mg per day.  What are tips for following this plan?  Cooking  Cook foods using methods other than frying. Baking, boiling, grilling, and broiling are all good options. Other ways to reduce fat include:  · Removing the skin from poultry.  · Removing all visible fats from meats.  · Steaming vegetables in water or broth.  Meal planning    · At meals, imagine dividing your plate into fourths:  ? Fill one-half of your plate with vegetables and green salads.  ? Fill one-fourth of your plate with whole grains.  ? Fill one-fourth of your plate with lean protein foods.  · Eat 4-5 servings of vegetables per day. One serving equals 1 cup raw or cooked vegetable, or 2 cups raw leafy greens.  · Eat 4-5 servings of fruit per day. One serving equals 1 medium whole fruit, ¼ cup dried fruit, ½ cup fresh, frozen, or canned fruit, or ½ cup 100% fruit juice.  · Eat more foods that contain soluble fiber. Examples include apples, broccoli, carrots, beans, peas, and barley. Aim to get 25-30 g of fiber per day.  · Increase your consumption of legumes, nuts, and seeds to 4-5 servings per week. One serving of dried beans or legumes equals ½ cup cooked, 1 serving of nuts is ¼ cup, and 1 serving of seeds equals 1 tablespoon.    Fats  · Choose healthy fats more often. Choose monounsaturated and polyunsaturated fats, such as olive  and canola oils, flaxseeds, walnuts, almonds, and seeds.  · Eat more omega-3 fats. Choose salmon, mackerel, sardines, tuna, flaxseed oil, and ground flaxseeds. Aim to eat fish at least 2 times each week.  · Check food labels carefully to identify foods with trans fats or high amounts of saturated fat.  · Limit saturated fats. These are found in animal products, such as meats, butter, and cream. Plant sources of saturated fats include palm oil, palm kernel oil, and coconut oil.  · Avoid foods with partially hydrogenated oils in them. These contain trans fats. Examples are stick margarine, some tub margarines, cookies, crackers, and other baked goods.  · Avoid fried foods.  General information  · Eat more home-cooked food and less restaurant, buffet, and fast food.  · Limit or avoid alcohol.  · Limit foods that are high in starch and sugar.  · Lose weight if you are overweight. Losing just 5-10% of your body weight can help your overall health and prevent diseases such as diabetes and heart disease.  · Monitor your salt (sodium) intake, especially if you have high blood pressure. Talk with your health care provider about your sodium intake.  · Try to incorporate more vegetarian meals weekly.  What foods can I eat?  Fruits  All fresh, canned (in natural juice), or frozen fruits.  Vegetables  Fresh or frozen vegetables (raw, steamed, roasted, or grilled). Green salads.  Grains  Most grains. Choose whole wheat and whole grains most of the time. Rice and pasta, including brown rice and pastas made with whole wheat.  Meats and other proteins  Lean, well-trimmed beef, veal, pork, and lamb. Chicken and turkey without skin. All fish and shellfish. Wild duck, rabbit, pheasant, and venison. Egg whites or low-cholesterol egg substitutes. Dried beans, peas, lentils, and tofu. Seeds and most nuts.  Dairy  Low-fat or nonfat cheeses, including ricotta and mozzarella. Skim or 1% milk (liquid, powdered, or evaporated). Buttermilk made  with low-fat milk. Nonfat or low-fat yogurt.  Fats and oils  Non-hydrogenated (trans-free) margarines. Vegetable oils, including soybean, sesame, sunflower, olive, peanut, safflower, corn, canola, and cottonseed. Salad dressings or mayonnaise made with a vegetable oil.  Beverages  Water (mineral or sparkling). Coffee and tea. Diet carbonated beverages.  Sweets and desserts  Sherbet, gelatin, and fruit ice. Small amounts of dark chocolate.  Limit all sweets and desserts.  Seasonings and condiments  All seasonings and condiments.  The items listed above may not be a complete list of foods and beverages you can eat. Contact a dietitian for more options.  What foods are not recommended?  Fruits  Canned fruit in heavy syrup. Fruit in cream or butter sauce. Fried fruit. Limit coconut.  Vegetables  Vegetables cooked in cheese, cream, or butter sauce. Fried vegetables.  Grains  Breads made with saturated or trans fats, oils, or whole milk. Croissants. Sweet rolls. Donuts. High-fat crackers, such as cheese crackers.  Meats and other proteins  Fatty meats, such as hot dogs, ribs, sausage, miller, rib-eye roast or steak. High-fat deli meats, such as salami and bologna. Caviar. Domestic duck and goose. Organ meats, such as liver.  Dairy  Cream, sour cream, cream cheese, and creamed cottage cheese. Whole milk cheeses. Whole or 2% milk (liquid, evaporated, or condensed). Whole buttermilk. Cream sauce or high-fat cheese sauce. Whole-milk yogurt.  Fats and oils  Meat fat, or shortening. Cocoa butter, hydrogenated oils, palm oil, coconut oil, palm kernel oil. Solid fats and shortenings, including miller fat, salt pork, lard, and butter. Nondairy cream substitutes. Salad dressings with cheese or sour cream.  Beverages  Regular sodas and any drinks with added sugar.  Sweets and desserts  Frosting. Pudding. Cookies. Cakes. Pies. Milk chocolate or white chocolate. Buttered syrups. Full-fat ice cream or ice cream drinks.  The items listed  above may not be a complete list of foods and beverages to avoid. Contact a dietitian for more information.  Summary  · Heart-healthy meal planning includes limiting unhealthy fats, increasing healthy fats, and making other diet and lifestyle changes.  · Lose weight if you are overweight. Losing just 5-10% of your body weight can help your overall health and prevent diseases such as diabetes and heart disease.  · Focus on eating a balance of foods, including fruits and vegetables, low-fat or nonfat dairy, lean protein, nuts and legumes, whole grains, and heart-healthy oils and fats.  This information is not intended to replace advice given to you by your health care provider. Make sure you discuss any questions you have with your health care provider.  Document Revised: 01/25/2019 Document Reviewed: 01/25/2019  [employer] Patient Education © 2021 [employer]    Acute Coronary Syndrome  Acute coronary syndrome (ACS) is a serious problem in which there is suddenly not enough blood and oxygen reaching the heart. ACS can result in chest pain or a heart attack.  This condition is a medical emergency. If you have any symptoms of this condition, get help right away.  What are the causes?  This condition may be caused by:  · A buildup of fat and cholesterol inside the arteries (atherosclerosis). This is the most common cause. The buildup (plaque) can cause blood vessels in the heart (coronary arteries) to become narrow or blocked, which reduces blood flow to the heart. Plaque can also break off and lead to a clot, which can block an artery and cause a heart attack or stroke.  · Sudden tightening of the muscles around the coronary arteries (coronary spasm).  · Tearing of a coronary artery (spontaneous coronary artery dissection).  · Very low blood pressure (hypotension).  · An abnormal heartbeat (arrhythmia).  · Other medical conditions that cause a decrease of oxygen to the heart, such as anemiaorrespiratory  failure.  · Using cocaine or methamphetamine.  What increases the risk?  The following factors may make you more likely to develop this condition:  · Age. The risk for ACS increases as you get older.  · History of chest pain, heart attack, peripheral artery disease, or stroke.  · Having taken chemotherapy or immune-suppressing medicines.  · Being male.  · Family history of chest pain, heart disease, or stroke.  · Smoking.  · Not exercising enough.  · Being overweight.  · High cholesterol.  · High blood pressure (hypertension).  · Diabetes.  · Excessive alcohol use.  What are the signs or symptoms?  Common symptoms of this condition include:  · Chest pain. The pain may last a long time, or it may stop and come back (recur). It may feel like:  ? Crushing or squeezing.  ? Tightness, pressure, fullness, or heaviness.  · Arm, neck, jaw, or back pain.  · Heartburn or indigestion.  · Shortness of breath.  · Nausea.  · Sudden cold sweats.  · Light-headedness.  · Dizziness or passing out.  · Tiredness (fatigue).  Sometimes there are no symptoms.  How is this diagnosed?  This condition may be diagnosed based on:  · Your medical history and symptoms.  · Imaging tests, such as:  ? An electrocardiogram (ECG). This measures the heart's electrical activity.  ? X-rays.  ? CT scan.  ? A coronary angiogram. For this test, dye is injected into the heart arteries and then X-rays are taken.  ? Myocardial perfusion imaging. This test shows how well blood flows through your heart muscle.  · Blood tests. These may be repeated at certain time intervals.  · Exercise stress testing.  · Echocardiogram. This is a test that uses sound waves to produce detailed images of the heart.  How is this treated?  Treatment for this condition may include:  · Oxygen therapy.  · Medicines, such as:  ? Antiplatelet medicines and blood-thinning medicines, such as aspirin. These help prevent blood clots.  ? Medicine that dissolves any blood clots (fibrinolytic  therapy).  ? Blood pressure medicines.  ? Nitroglycerin. This helps widen blood vessels to improve blood flow.  ? Pain medicine.  ? Cholesterol-lowering medicine.  · Surgery, such as:  ? Coronary angioplasty with stent placement. This involves placing a small piece of metal that looks like mesh or a spring into a narrow coronary artery. This widens the artery and keeps it open.  ? Coronary artery bypass surgery. This involves taking a section of a blood vessel from a different part of your body and placing it on the blocked coronary artery to allow blood to flow around the blockage.  · Cardiac rehabilitation. This is a program that includes exercise training, education, and counseling to help you recover.  Follow these instructions at home:  Eating and drinking  · Eat a heart-healthy diet that includes whole grains, fruits and vegetables, lean proteins, and low-fat or nonfat dairy products.  · Limit how much salt (sodium) you eat as told by your health care provider. Follow instructions from your health care provider about any other eating or drinking restrictions, such as limiting foods that are high in fat and processed sugars.  · Use healthy cooking methods such as roasting, grilling, broiling, baking, poaching, steaming, or stir-frying.  · Work with a dietitian to follow a heart-healthy eating plan.  Medicines  · Take over-the-counter and prescription medicines only as told by your health care provider.  · Do not take these medicines unless your health care provider approves:  ? Vitamin supplements that contain vitamin A or vitamin E.  ? NSAIDs, such as ibuprofen, naproxen, or celecoxib.  ? Hormone replacement therapy that contains estrogen.  · If you are taking blood thinners:  ? Talk with your health care provider before you take any medicines that contain aspirin or NSAIDs. These medicines increase your risk for dangerous bleeding.  ? Take your medicine exactly as told, at the same time every day.  ? Avoid  activities that could cause injury or bruising, and follow instructions about how to prevent falls.  ? Wear a medical alert bracelet, and carry a card that lists what medicines you take.  Activity  · Follow your cardiac rehabilitation program. Do exercises as told by your physical therapist.  · Ask your health care provider what activities and exercises are safe for you. Follow his or her instructions about lifting, driving, or climbing stairs.  Lifestyle  · Do not use any products that contain nicotine or tobacco, such as cigarettes, e-cigarettes, and chewing tobacco. If you need help quitting, ask your health care provider.  · Do not drink alcohol if your health care provider tells you not to drink.  · If you drink alcohol:  ? Limit how much you have to 0-1 drink a day.  ? Be aware of how much alcohol is in your drink. In the [place] one drink equals one 12 oz bottle of beer (355 mL), one 5 oz glass of wine (148 mL), or one 1½ oz glass of hard liquor (44 mL).  · Maintain a healthy weight. If you need to lose weight, work with your health care provider to do so safely.  General instructions  · Tell all the health care providers who provide care for you about your heart condition, including your dentist. This may affect the medicines or treatment you receive.  · Manage any other health conditions you have, such as hypertension or diabetes. These conditions affect your heart.  · Pay attention to your mental health. You may be at higher risk for depression.  ? Find ways to manage stress.  ? Talk to your health care provider about depression screening and treatment.  · Keep your vaccinations up to date.  ? Get the flu shot (influenza vaccine) every year.  ? Get the pneumococcal vaccine if you are age 65 or older.  · If directed, monitor your blood pressure at home.  · Keep all follow-up visits as told by your health care provider. This is important.  Contact a health care provider if you:  · Feel overwhelmed or  sad.  · Have trouble doing your daily activities.  Get help right away if you:  · Have pain in your chest, neck, arm, jaw, stomach, or back that recurs, and:  ? It lasts for more than a few minutes.  ? It is not relieved by taking the medicineyour health care provider prescribed.  · Have unexplained:  ? Heavy sweating.  ? Heartburn or indigestion.  ? Nausea or vomiting.  ? Shortness of breath.  ? Difficulty breathing.  ? Fatigue.  ? Nervousness or anxiety.  ? Weakness.  ? Diarrhea.  ? Dark stools or blood in your stool.  · Have sudden light-headedness or dizziness.  · Have blood pressure that is higher than 180/120.  · Faint.  · Have thoughts about hurting yourself.  These symptoms may represent a serious problem that is an emergency. Do not wait to see if the symptoms will go away. Get medical help right away. Call your local emergency services (911 in the [place] Do not drive yourself to the hospital.   Summary  · Acute coronary syndrome (ACS) is when there is not enough blood and oxygen being supplied to the heart. ACS can result in chest pain or a heart attack.  · Acute coronary syndrome is a medical emergency. If you have any symptoms of this condition, get help right away.  · Treatment includes medicines and procedures to open the blocked arteries and restore blood flow.  This information is not intended to replace advice given to you by your health care provider. Make sure you discuss any questions you have with your health care provider.  Document Revised: 05/20/2020 Document Reviewed: 12/30/2019  [employer] Patient Education © 2021 [employer]      Hypertension, Adult  Hypertension is another name for high blood pressure. High blood pressure forces your heart to work harder to pump blood. This can cause problems over time.  There are two numbers in a blood pressure reading. There is a top number (systolic) over a bottom number (diastolic). It is best to have a blood pressure that is below 120/80. Healthy  choices can help lower your blood pressure, or you may need medicine to help lower it.  What are the causes?  The cause of this condition is not known. Some conditions may be related to high blood pressure.  What increases the risk?  · Smoking.  · Having type 2 diabetes mellitus, high cholesterol, or both.  · Not getting enough exercise or physical activity.  · Being overweight.  · Having too much fat, sugar, calories, or salt (sodium) in your diet.  · Drinking too much alcohol.  · Having long-term (chronic) kidney disease.  · Having a family history of high blood pressure.  · Age. Risk increases with age.  · Race. You may be at higher risk if you are .  · Gender. Men are at higher risk than women before age 45. After age 65, women are at higher risk than men.  · Having obstructive sleep apnea.  · Stress.  What are the signs or symptoms?  · High blood pressure may not cause symptoms. Very high blood pressure (hypertensive crisis) may cause:  ? Headache.  ? Feelings of worry or nervousness (anxiety).  ? Shortness of breath.  ? Nosebleed.  ? A feeling of being sick to your stomach (nausea).  ? Throwing up (vomiting).  ? Changes in how you see.  ? Very bad chest pain.  ? Seizures.  How is this treated?  · This condition is treated by making healthy lifestyle changes, such as:  ? Eating healthy foods.  ? Exercising more.  ? Drinking less alcohol.  · Your health care provider may prescribe medicine if lifestyle changes are not enough to get your blood pressure under control, and if:  ? Your top number is above 130.  ? Your bottom number is above 80.  · Your personal target blood pressure may vary.  Follow these instructions at home:  Eating and drinking    · If told, follow the DASH eating plan. To follow this plan:  ? Fill one half of your plate at each meal with fruits and vegetables.  ? Fill one fourth of your plate at each meal with whole grains. Whole grains include whole-wheat pasta, brown rice, and  whole-grain bread.  ? Eat or drink low-fat dairy products, such as skim milk or low-fat yogurt.  ? Fill one fourth of your plate at each meal with low-fat (lean) proteins. Low-fat proteins include fish, chicken without skin, eggs, beans, and tofu.  ? Avoid fatty meat, cured and processed meat, or chicken with skin.  ? Avoid pre-made or processed food.  · Eat less than 1,500 mg of salt each day.  · Do not drink alcohol if:  ? Your doctor tells you not to drink.  ? You are pregnant, may be pregnant, or are planning to become pregnant.  · If you drink alcohol:  ? Limit how much you use to:  § 0-1 drink a day for women.  § 0-2 drinks a day for men.  ? Be aware of how much alcohol is in your drink. In the [place] one drink equals one 12 oz bottle of beer (355 mL), one 5 oz glass of wine (148 mL), or one 1½ oz glass of hard liquor (44 mL).    Lifestyle    · Work with your doctor to stay at a healthy weight or to lose weight. Ask your doctor what the best weight is for you.  · Get at least 30 minutes of exercise most days of the week. This may include walking, swimming, or biking.  · Get at least 30 minutes of exercise that strengthens your muscles (resistance exercise) at least 3 days a week. This may include lifting weights or doing Pilates.  · Do not use any products that contain nicotine or tobacco, such as cigarettes, e-cigarettes, and chewing tobacco. If you need help quitting, ask your doctor.  · Check your blood pressure at home as told by your doctor.  · Keep all follow-up visits as told by your doctor. This is important.    Medicines  · Take over-the-counter and prescription medicines only as told by your doctor. Follow directions carefully.  · Do not skip doses of blood pressure medicine. The medicine does not work as well if you skip doses. Skipping doses also puts you at risk for problems.  · Ask your doctor about side effects or reactions to medicines that you should watch for.  Contact a doctor if  you:  · Think you are having a reaction to the medicine you are taking.  · Have headaches that keep coming back (recurring).  · Feel dizzy.  · Have swelling in your ankles.  · Have trouble with your vision.  Get help right away if you:  · Get a very bad headache.  · Start to feel mixed up (confused).  · Feel weak or numb.  · Feel faint.  · Have very bad pain in your:  ? Chest.  ? Belly (abdomen).  · Throw up more than once.  · Have trouble breathing.  Summary  · Hypertension is another name for high blood pressure.  · High blood pressure forces your heart to work harder to pump blood.  · For most people, a normal blood pressure is less than 120/80.  · Making healthy choices can help lower blood pressure. If your blood pressure does not get lower with healthy choices, you may need to take medicine.  This information is not intended to replace advice given to you by your health care provider. Make sure you discuss any questions you have with your health care provider.  Document Revised: 08/28/2019 Document Reviewed: 08/28/2019  [employer] Patient Education © 2021 [employer]      Heart Failure, Diagnosis    Heart failure means that your heart is not able to pump blood in the right way. This makes it hard for your body to work well. Heart failure is usually a long-term (chronic) condition. You must take good care of yourself and follow your treatment plan from your doctor.  What are the causes?  This condition may be caused by:  · High blood pressure.  · Build up of cholesterol and fat in the arteries.  · Heart attack. This injures the heart muscle.  · Heart valves that do not open and close properly.  · Damage of the heart muscle. This is also called cardiomyopathy.  · Lung disease.  · Abnormal heart rhythms.  What increases the risk?  The risk of heart failure goes up as a person ages. This condition is also more likely to develop in people who:  · Are overweight.  · Are male.  · Smoke or chew tobacco.  · Abuse  alcohol or illegal drugs.  · Have taken medicines that can damage the heart.  · Have diabetes.  · Have abnormal heart rhythms.  · Have thyroid problems.  · Have low blood counts (anemia).  What are the signs or symptoms?  Symptoms of this condition include:  · Shortness of breath.  · Coughing.  · Swelling of the feet, ankles, legs, or belly.  · Losing weight for no reason.  · Trouble breathing.  · Waking from sleep because of the need to sit up and get more air.  · Rapid heartbeat.  · Being very tired.  · Feeling dizzy, or feeling like you may pass out (faint).  · Having no desire to eat.  · Feeling like you may vomit (nauseous).  · Peeing (urinating) more at night.  · Feeling confused.  How is this treated?         This condition may be treated with:  · Medicines. These can be given to treat blood pressure and to make the heart muscles stronger.  · Changes in your daily life. These may include eating a healthy diet, staying at a healthy body weight, quitting tobacco and illegal drug use, or doing exercises.  · Surgery. Surgery can be done to open blocked valves, or to put devices in the heart, such as pacemakers.  · A donor heart (heart transplant). You will receive a healthy heart from a donor.  Follow these instructions at home:  · Treat other conditions as told by your doctor. These may include high blood pressure, diabetes, thyroid disease, or abnormal heart rhythms.  · Learn as much as you can about heart failure.  · Get support as you need it.  · Keep all follow-up visits as told by your doctor. This is important.  Summary  · Heart failure means that your heart is not able to pump blood in the right way.  · This condition is caused by high blood pressure, heart attack, or damage of the heart muscle.  · Symptoms of this condition include shortness of breath and swelling of the feet, ankles, legs, or belly. You may also feel very tired or feel like you may vomit.  · You may be treated with medicines, surgery,  or changes in your daily life.  · Treat other health conditions as told by your doctor.  This information is not intended to replace advice given to you by your health care provider. Make sure you discuss any questions you have with your health care provider.  Document Revised: 03/06/2020 Document Reviewed: 03/06/2020  [employer] Patient Education © 2021 [employer]

## 2021-09-17 ENCOUNTER — HOSPITAL ENCOUNTER (OUTPATIENT)
Dept: GENERAL RADIOLOGY | Facility: HOSPITAL | Age: 71
Discharge: HOME | End: 2021-09-17
Admitting: NURSE PRACTITIONER

## 2021-09-17 DIAGNOSIS — R07.89 CHEST PRESSURE: ICD-10-CM

## 2021-09-17 DIAGNOSIS — I10 ESSENTIAL HYPERTENSION: Chronic | ICD-10-CM

## 2021-09-17 DIAGNOSIS — Z95.1 S/P CABG X 4: ICD-10-CM

## 2021-09-17 DIAGNOSIS — R06.02 SHORTNESS OF BREATH: ICD-10-CM

## 2021-09-17 DIAGNOSIS — I25.119 CORONARY ARTERY DISEASE INVOLVING NATIVE CORONARY ARTERY OF NATIVE HEART WITH ANGINA PECTORIS (HCC): ICD-10-CM

## 2021-09-17 PROCEDURE — 71046 X-RAY EXAM CHEST 2 VIEWS: CPT | Performed by: RADIOLOGY

## 2021-09-17 PROCEDURE — 71046 X-RAY EXAM CHEST 2 VIEWS: CPT

## 2021-09-20 ENCOUNTER — TELEPHONE (OUTPATIENT)
Dept: CARDIOLOGY | Facility: CLINIC | Age: 71
End: 2021-09-20

## 2021-09-20 NOTE — TELEPHONE ENCOUNTER
----- Message from Roseann Montemayor MA sent at 9/20/2021  8:26 AM EDT -----    ----- Message -----  From: Dany San APRN  Sent: 9/20/2021   1:35 AM EDT  To: Roseann Montemayor MA    No acute findings on chest x-ray.  Keep follow-up.  ----- Message -----  From: Interface, Rad Results Herreid In  Sent: 9/17/2021  12:34 PM EDT  To: ANKUR Cuevas

## 2021-09-20 NOTE — TELEPHONE ENCOUNTER
Pt notified of no acute findings. Provider will discuss results at f/u. Pt reminded of appt date and time.

## 2021-10-08 RX ORDER — DAPAGLIFLOZIN 10 MG/1
10 TABLET, FILM COATED ORAL DAILY
Qty: 30 TABLET | Refills: 5 | Status: SHIPPED | OUTPATIENT
Start: 2021-10-08 | End: 2021-11-23 | Stop reason: SDUPTHER

## 2021-11-22 ENCOUNTER — APPOINTMENT (OUTPATIENT)
Dept: CARDIOLOGY | Facility: HOSPITAL | Age: 71
End: 2021-11-22

## 2021-11-23 ENCOUNTER — OFFICE VISIT (OUTPATIENT)
Dept: CARDIOLOGY | Facility: CLINIC | Age: 71
End: 2021-11-23

## 2021-11-23 VITALS
WEIGHT: 214 LBS | HEART RATE: 69 BPM | OXYGEN SATURATION: 97 % | SYSTOLIC BLOOD PRESSURE: 155 MMHG | HEIGHT: 69 IN | BODY MASS INDEX: 31.7 KG/M2 | DIASTOLIC BLOOD PRESSURE: 77 MMHG

## 2021-11-23 DIAGNOSIS — I10 ESSENTIAL HYPERTENSION: ICD-10-CM

## 2021-11-23 DIAGNOSIS — I25.810 CORONARY ARTERY DISEASE INVOLVING CORONARY BYPASS GRAFT OF NATIVE HEART WITHOUT ANGINA PECTORIS: ICD-10-CM

## 2021-11-23 DIAGNOSIS — R94.30 CARDIAC LEFT VENTRICULAR EJECTION FRACTION 30-35 PERCENT: ICD-10-CM

## 2021-11-23 DIAGNOSIS — I50.32 CHRONIC DIASTOLIC CONGESTIVE HEART FAILURE (HCC): ICD-10-CM

## 2021-11-23 DIAGNOSIS — Z95.1 S/P CABG (CORONARY ARTERY BYPASS GRAFT): Primary | ICD-10-CM

## 2021-11-23 DIAGNOSIS — E11.42 TYPE 2 DIABETES MELLITUS WITH DIABETIC POLYNEUROPATHY, WITHOUT LONG-TERM CURRENT USE OF INSULIN (HCC): ICD-10-CM

## 2021-11-23 DIAGNOSIS — R07.89 CHEST PRESSURE: ICD-10-CM

## 2021-11-23 DIAGNOSIS — I50.42 CHRONIC COMBINED SYSTOLIC AND DIASTOLIC CONGESTIVE HEART FAILURE (HCC): ICD-10-CM

## 2021-11-23 PROCEDURE — 99213 OFFICE O/P EST LOW 20 MIN: CPT | Performed by: NURSE PRACTITIONER

## 2021-11-23 PROCEDURE — 93000 ELECTROCARDIOGRAM COMPLETE: CPT | Performed by: NURSE PRACTITIONER

## 2021-11-23 RX ORDER — METOPROLOL SUCCINATE 100 MG/1
100 TABLET, EXTENDED RELEASE ORAL DAILY
Qty: 90 TABLET | Refills: 3 | Status: SHIPPED | OUTPATIENT
Start: 2021-11-23 | End: 2022-06-24 | Stop reason: SDUPTHER

## 2021-11-23 RX ORDER — DAPAGLIFLOZIN 10 MG/1
10 TABLET, FILM COATED ORAL DAILY
Qty: 30 TABLET | Refills: 5 | Status: SHIPPED | OUTPATIENT
Start: 2021-11-23 | End: 2021-11-24

## 2021-11-23 RX ORDER — BUMETANIDE 2 MG/1
2 TABLET ORAL DAILY
Qty: 90 TABLET | Refills: 0 | Status: SHIPPED | OUTPATIENT
Start: 2021-11-23 | End: 2022-03-04

## 2021-11-23 RX ORDER — GLIPIZIDE 5 MG/1
5 TABLET ORAL
Qty: 180 TABLET | Refills: 3 | Status: SHIPPED | OUTPATIENT
Start: 2021-11-23

## 2021-11-23 RX ORDER — SACUBITRIL AND VALSARTAN 24; 26 MG/1; MG/1
1 TABLET, FILM COATED ORAL 2 TIMES DAILY
Qty: 60 TABLET | Refills: 6 | Status: SHIPPED | OUTPATIENT
Start: 2021-11-23 | End: 2021-11-24

## 2021-11-24 ENCOUNTER — HOSPITAL ENCOUNTER (OUTPATIENT)
Dept: CARDIOLOGY | Facility: HOSPITAL | Age: 71
Discharge: HOME | End: 2021-11-24
Admitting: NURSE PRACTITIONER

## 2021-11-24 VITALS
OXYGEN SATURATION: 98 % | DIASTOLIC BLOOD PRESSURE: 68 MMHG | RESPIRATION RATE: 20 BRPM | HEART RATE: 63 BPM | BODY MASS INDEX: 31.74 KG/M2 | WEIGHT: 215 LBS | SYSTOLIC BLOOD PRESSURE: 130 MMHG

## 2021-11-24 DIAGNOSIS — E66.9 OBESITY (BMI 30-39.9): Chronic | ICD-10-CM

## 2021-11-24 DIAGNOSIS — I10 ESSENTIAL HYPERTENSION: Chronic | ICD-10-CM

## 2021-11-24 DIAGNOSIS — N18.2 CKD (CHRONIC KIDNEY DISEASE) STAGE 2, GFR 60-89 ML/MIN: Chronic | ICD-10-CM

## 2021-11-24 DIAGNOSIS — Z95.1 S/P CABG X 4: ICD-10-CM

## 2021-11-24 DIAGNOSIS — R94.30 CARDIAC LV EJECTION FRACTION OF 40-49%: ICD-10-CM

## 2021-11-24 DIAGNOSIS — I50.42 CHRONIC COMBINED SYSTOLIC AND DIASTOLIC CONGESTIVE HEART FAILURE (HCC): Primary | ICD-10-CM

## 2021-11-24 LAB
ANION GAP SERPL CALCULATED.3IONS-SCNC: 12.3 MMOL/L (ref 5–15)
BUN SERPL-MCNC: 14 MG/DL (ref 8–23)
BUN/CREAT SERPL: 11.4 (ref 7–25)
CALCIUM SPEC-SCNC: 8.8 MG/DL (ref 8.6–10.5)
CHLORIDE SERPL-SCNC: 101 MMOL/L (ref 98–107)
CO2 SERPL-SCNC: 23.7 MMOL/L (ref 22–29)
CREAT SERPL-MCNC: 1.23 MG/DL (ref 0.76–1.27)
GFR SERPL CREATININE-BSD FRML MDRD: 58 ML/MIN/1.73
GLUCOSE SERPL-MCNC: 242 MG/DL (ref 65–99)
MAGNESIUM SERPL-MCNC: 1.7 MG/DL (ref 1.6–2.4)
NT-PROBNP SERPL-MCNC: 357.7 PG/ML (ref 0–900)
POTASSIUM SERPL-SCNC: 4.4 MMOL/L (ref 3.5–5.2)
SODIUM SERPL-SCNC: 137 MMOL/L (ref 136–145)

## 2021-11-24 PROCEDURE — 80048 BASIC METABOLIC PNL TOTAL CA: CPT | Performed by: NURSE PRACTITIONER

## 2021-11-24 PROCEDURE — 36415 COLL VENOUS BLD VENIPUNCTURE: CPT | Performed by: NURSE PRACTITIONER

## 2021-11-24 PROCEDURE — 83880 ASSAY OF NATRIURETIC PEPTIDE: CPT

## 2021-11-24 PROCEDURE — 99213 OFFICE O/P EST LOW 20 MIN: CPT | Performed by: NURSE PRACTITIONER

## 2021-11-24 PROCEDURE — 83735 ASSAY OF MAGNESIUM: CPT | Performed by: NURSE PRACTITIONER

## 2021-11-24 RX ORDER — SACUBITRIL AND VALSARTAN 24; 26 MG/1; MG/1
1 TABLET, FILM COATED ORAL 2 TIMES DAILY
Qty: 60 TABLET | Refills: 5 | Status: SHIPPED | OUTPATIENT
Start: 2021-11-24 | End: 2022-03-25 | Stop reason: SDUPTHER

## 2021-11-24 RX ORDER — DAPAGLIFLOZIN 10 MG/1
10 TABLET, FILM COATED ORAL DAILY
Qty: 30 TABLET | Refills: 5 | Status: SHIPPED | OUTPATIENT
Start: 2021-11-24 | End: 2022-06-06 | Stop reason: SDUPTHER

## 2021-11-24 NOTE — PROGRESS NOTES
[facility] CHF CLINIC OFFICE VISIT    Subjective:   History of Present Illness   William Villasenor is a 70-year-old  male who presents to the clinic today for follow up on heart failure. He has underlying history of chronic combined systolic and diastolic congestive heart failure with an LVEF of 40-45% from echocardiogram on 6/21/2021 ready by Dr. Kennedy. He takes metoprolol  mg daily, Entresto 24/26 mg BID, Bumex 0.5 mg daily (however was recently increased to 2 mg daily), Magnesium 400 mg BID, Farxiga 10 mg daily.       Needing help with costs of Entresto and Farxiga   Shortness of air is stable   Leg swelling stable   Has missed several doses of Bumex and he can feel a difference  Urine output good  He feels as if he is continuing to regain strength  Reports they monitor his sodium intake and fluid intake  Home weight stable   Home BP intermittent monitoring   Home HR 60-80 - denies bradycardia, dizziness, lightheadedness or syncope     PCP: Dr. Gatica  Cardiologist: Dr. Simms/ANKUR Cuevas    Hospitalizations: Discharged on 4/9/2021  Past Medical History:   Diagnosis Date   • Arthritis    • Back pain    • BPH (benign prostatic hyperplasia)    • Chronic diastolic (congestive) heart failure (HCC)    • Diabetes mellitus (HCC)    • Diabetic peripheral neuropathy (HCC)    • Erectile dysfunction    • Former smoker    • GERD (gastroesophageal reflux disease)    • GERD (gastroesophageal reflux disease)    • Gout    • Hemorrhoids    • High cholesterol    • Hypertension    • Obesity      Past Surgical History:   Procedure Laterality Date   • CARDIAC CATHETERIZATION N/A 3/15/2021    Procedure: Left Heart Cath;  Surgeon: Trent Zaragoza MD;  Location:  [facility];  Service: Cardiology;  Laterality: N/A;   • CARDIAC CATHETERIZATION N/A 3/15/2021    Procedure: Coronary angiography;  Surgeon: Trent Zaragoza MD;  Location:  [facility];  Service: Cardiology;  Laterality: N/A;    • COLONOSCOPY W/ BIOPSIES     • CORONARY ARTERY BYPASS GRAFT N/A 3/16/2021    Procedure: MEDIAN STERNOTOMY, CORONARY ARTERY BYPASS GRAFT X4, UTILIZATION OF LEFT INTERNAL MAMMARY ARTERY, AND ENDOSCOPIC VEIN HARVEST OF RIGHT GREATER SAPHENOUS VEIN;  Surgeon: Jorge Simms MD;  Location: [facility];  Service: Cardiothoracic;  Laterality: N/A;  vein out- 1614  vein ready- 1628             Social History     Socioeconomic History   • Marital status:    • Number of children: 3   Tobacco Use   • Smoking status: Former Smoker     Packs/day: 1.00     Years: 5.00     Pack years: 5.00     Quit date: 1974     Years since quittin.8   • Smokeless tobacco: Never Used   Substance and Sexual Activity   • Alcohol use: Never     Comment: quit in    • Drug use: Never   • Sexual activity: Defer     Family History   Problem Relation Age of Onset   • Heart disease Mother    • Hypertension Mother    • Depression Mother    • Alcohol abuse Maternal Uncle    • Heart disease Maternal Grandmother    • Hypertension Maternal Grandmother    • Diabetes Maternal Grandmother    • Diabetes Paternal Grandmother    • No Known Problems Half-Brother    • No Known Problems Half-Brother    • No Known Problems Half-Sister    • No Known Problems Daughter    • No Known Problems Daughter    • Hyperlipidemia Daughter      Allergies:  Allergies   Allergen Reactions   • Lisinopril Cough     Review of Systems   Constitutional: Negative for chills, fever, weight gain and weight loss.   HENT: Negative for congestion, hoarse voice and sore throat.    Eyes: Negative for blurred vision, pain and visual disturbance.   Cardiovascular: Negative for chest pain, claudication, cyanosis, dyspnea on exertion, irregular heartbeat, leg swelling, near-syncope, orthopnea, palpitations and syncope.   Respiratory: Negative for cough, shortness of breath and wheezing.    Endocrine: Negative for cold intolerance, heat intolerance and polyuria.    Hematologic/Lymphatic: Negative for bleeding problem. Does not bruise/bleed easily.   Skin: Negative for color change, flushing and poor wound healing.   Musculoskeletal: Negative for arthritis, back pain, joint pain and myalgias.   Gastrointestinal: Negative for abdominal pain, constipation, diarrhea, nausea and vomiting.   Genitourinary: Negative for dysuria, frequency, hesitancy and urgency.   Neurological: Negative for excessive daytime sleepiness, dizziness, headaches, numbness, vertigo and weakness.   Psychiatric/Behavioral: Negative for depression. The patient does not have insomnia and is not nervous/anxious.    All other systems reviewed and are negative.    Current Outpatient Medications   Medication Sig Dispense Refill   • acetaminophen (TYLENOL) 500 MG tablet Take 500 mg by mouth Every 6 (Six) Hours As Needed for Mild Pain .     • allopurinol (ZYLOPRIM) 300 MG tablet TAKE 1 TABLET EVERY DAY 90 tablet 0   • APPLE CIDER VINEGAR PO Take 1 tablet by mouth Daily.     • Ascorbic Acid (Vitamin C) 500 MG capsule Take 200 mg by mouth Daily.     • aspirin (Aspirin Adult Low Dose) 81 MG EC tablet Take 81 mg by mouth Daily.     • atorvastatin (LIPITOR) 40 MG tablet Take 1 tablet by mouth Every Night. 90 tablet 0   • bumetanide (BUMEX) 0.5 MG tablet Take 1 tablet by mouth Daily. 90 tablet 2   • CALCIUM-MAGNESIUM-ZINC PO Take  by mouth 2 (two) times a day.     • doxazosin (CARDURA) 4 MG tablet TAKE 1 TABLET EVERY DAY 90 tablet 0   • finasteride (PROSCAR) 5 MG tablet Take 1 tablet by mouth Daily. 90 tablet 0   • gabapentin (NEURONTIN) 300 MG capsule Take 1 capsule by mouth 2 (two) times a day. 60 capsule 0   • glipizide (GLUCOTROL) 5 MG tablet Take 1 tablet by mouth 2 (Two) Times a Day Before Meals. 180 tablet 3   • isosorbide mononitrate (IMDUR) 30 MG 24 hr tablet Take 1 tablet by mouth Daily. 90 tablet 3   • loratadine (CLARITIN) 10 MG tablet Take 10 mg by mouth Daily.     • meclizine (ANTIVERT) 25 MG tablet TAKE 1  TABLET BY MOUTH 3 (THREE) TIMES A DAY AS NEEDED FOR DIZZINESS. 30 tablet 0   • metFORMIN (GLUCOPHAGE) 1000 MG tablet Take 1 tablet by mouth 2 (Two) Times a Day With Meals. 180 tablet 0   • metoprolol succinate XL (Toprol XL) 100 MG 24 hr tablet Take 1 tablet by mouth Daily. 90 tablet 3   • Misc Natural Products (LUTEIN 20 PO) Take 1 capsule by mouth.     • multivitamin (multivitamin) tablet tablet Take 1 tablet by mouth Daily. 30 tablet    • nitroglycerin (NITROSTAT) 0.4 MG SL tablet Place 1 tablet under the tongue Every 5 (Five) Minutes As Needed for Chest Pain (Only if SBP Greater Than 100). Take no more than 3 doses in 15 minutes. 100 tablet 12   • pantoprazole (Protonix) 40 MG EC tablet Take 1 tablet by mouth Daily. 90 tablet 3   • vitamin E 400 UNIT capsule Take 1 capsule by mouth Daily.     • bumetanide (BUMEX) 2 MG tablet Take 1 tablet by mouth Daily. 90 tablet 0   • Dapagliflozin Propanediol (Farxiga) 10 MG tablet Take 1 tablet by mouth Daily. 30 tablet 5   • sacubitril-valsartan (Entresto) 24-26 MG tablet Take 1 tablet by mouth 2 (Two) Times a Day. 60 tablet 5     No current facility-administered medications for this encounter.      Objective:   Body mass index is 31.74 kg/m².  Vitals:    11/24/21 1107   BP: 130/68   BP Location: Left arm   Patient Position: Sitting   Cuff Size: Adult   Pulse: 63   Resp: 20   SpO2: 98%   Weight: 97.5 kg (215 lb)     Vitals:    11/24/21 1107   BP: 130/68   Pulse: 63   Resp: 20   SpO2: 98%     Body mass index is 31.74 kg/m².  Lab Results   Component Value Date    ABSOLUTELUNG 38 (A) 08/23/2021     Wt Readings from Last 3 Encounters:   11/24/21 97.5 kg (215 lb)   11/23/21 97.1 kg (214 lb)   09/10/21 95.1 kg (209 lb 9.6 oz)     Lab Results   Component Value Date    ABSOLUTELUNG 38 (A) 08/23/2021   ReDs - 39% 11/24/2021   ReDs - 34% (4/21/2021)    Vitals reviewed.   Constitutional:       Appearance: Normal appearance. Well-developed.   Eyes:      Conjunctiva/sclera: Conjunctivae  normal.   HENT:      Head: Normocephalic.   Neck:      Vascular: No JVD or JVR.   Pulmonary:      Effort: Pulmonary effort is normal.      Breath sounds: Normal breath sounds.   Chest:   Breasts:      Left: No tenderness.        Comments: CABG incision healed   Cardiovascular:      Normal rate. Regular rhythm.   Edema:     Ankle: bilateral trace edema of the ankle.     Feet: bilateral trace edema of the feet.  Abdominal:      General: Bowel sounds are normal.      Palpations: Abdomen is soft. There is no hepatomegaly or splenomegaly.   Musculoskeletal: Normal range of motion.      Cervical back: Normal range of motion and neck supple. Skin:     General: Skin is warm and dry.   Neurological:      Mental Status: Alert and oriented to person, place, and time.   Psychiatric:         Attention and Perception: Attention normal.         Mood and Affect: Mood normal.         Speech: Speech normal.         Behavior: Behavior normal. Behavior is cooperative.         Cognition and Memory: Cognition normal.       Cardiographics  Results for orders placed during the hospital encounter of 21    Adult Transthoracic Echo Limited W/ Cont if Necessary Per Protocol    Interpretation Summary  Moderately to severely technically limited study.  Parasternal images are uninterpretable.    1.  Global LV systolic function appears to be mildly impaired.  There are no large dense focal wall motion abnormalities but visually estimated ejection fraction is 40±5%.  There is mild to moderate concentric left ventricular hypertrophy.  There is mild biatrial enlargement.  RV size and function are grossly preserved.    2.  Valves are grossly morphologically normal.    3.  No large pericardial effusion.  The proximal aorta is not dilated.    [id number]      Lab Review   Lab Results   Component Value Date    TSH 2.010 2021     Lab Results   Component Value Date    GLUCOSE 242 (H) 2021    BUN 14 2021    CREATININE 1.23  11/24/2021    EGFRIFNONA 58 (L) 11/24/2021    BCR 11.4 11/24/2021    K 4.4 11/24/2021    CO2 23.7 11/24/2021    CALCIUM 8.8 11/24/2021    ALBUMIN 4.30 06/16/2021    AST 11 06/16/2021    ALT 8 06/16/2021     Lab Results   Component Value Date    WBC 9.51 08/09/2021    HGB 12.4 (L) 08/09/2021    HCT 48.6 08/09/2021    MCV 96.2 08/09/2021     08/09/2021     Lab Results   Component Value Date    TROPONINI <0.006 08/07/2018    TROPONINT <0.010 04/21/2021     Lab Results   Component Value Date    PROBNP 357.7 11/24/2021      The following portions of the patient's history were reviewed and updated as appropriate: allergies, current medications, past family history, past medical history, past social history, past surgical history and problem list.     Old records reviewed and pertinent information is included in the above objective data.   Assessment/Plan:      Diagnosis Plan   1. Chronic combined systolic and diastolic congestive heart failure (HCC)  Basic Metabolic Panel    BNP    Magnesium    ReDs Vest    Basic Metabolic Panel    Magnesium   2. Cardiac LV ejection fraction of 40-49%     3. Essential hypertension     4. CKD (chronic kidney disease) stage 2, GFR 60-89 ml/min     5. S/P CABG x 4     6. Obesity (BMI 30-39.9)       BMP, pro-BNP and magnesium today  Reviewed and discussed results with patient today  ReDs reviewed and discussed today  Monitor BP and heart rate  Continue on current medications   Cautioned patient in his current Bumex dosing as it was changed with a new refill.  He reports he and the cardiology APRN further discussed that yesterday.  Rx for Entresto and Farxiga to pharmacy for assistance  Keep routine scheduled follow-up with cardiology  Weightloss discussed and healthy lifestyle encouraged  Counseled patient extensively on dietary Na+ intake, importance of activity, weight monitoring, compliance with medications and follow up appointments.  Follow-up in 4 months, sooner if needed

## 2021-11-24 NOTE — PROGRESS NOTES
Heart Failure Pharmacy Note  Patient Name: William Villasenor   Referring Provider: Maddi Munoz  Primary Cardiologist: Dany PASCUAL  CHF: HFrEF    Medication Use:   Adherence: not taking Bumex x 2 weeks  Hx of med intolerances: None reported  Affordability: Farxiga, Entresto  Retail Rx Management: Farxiga, Entresto through [employer]'s patient assistance program    Past Medical History:   Diagnosis Date   • Arthritis    • Back pain    • BPH (benign prostatic hyperplasia)    • Chronic diastolic (congestive) heart failure (HCC)    • Diabetes mellitus (HCC)    • Diabetic peripheral neuropathy (HCC)    • Erectile dysfunction    • Former smoker    • GERD (gastroesophageal reflux disease)    • GERD (gastroesophageal reflux disease)    • Gout    • Hemorrhoids    • High cholesterol    • Hypertension    • Obesity      ALLERGIES: Lisinopril  Current Outpatient Medications   Medication Sig Dispense Refill   • acetaminophen (TYLENOL) 500 MG tablet Take 500 mg by mouth Every 6 (Six) Hours As Needed for Mild Pain .     • allopurinol (ZYLOPRIM) 300 MG tablet TAKE 1 TABLET EVERY DAY 90 tablet 0   • APPLE CIDER VINEGAR PO Take 1 tablet by mouth Daily.     • Ascorbic Acid (Vitamin C) 500 MG capsule Take 200 mg by mouth Daily.     • aspirin (Aspirin Adult Low Dose) 81 MG EC tablet Take 81 mg by mouth Daily.     • atorvastatin (LIPITOR) 40 MG tablet Take 1 tablet by mouth Every Night. 90 tablet 0   • bumetanide (BUMEX) 0.5 MG tablet Take 1 tablet by mouth Daily. 90 tablet 2   • CALCIUM-MAGNESIUM-ZINC PO Take  by mouth 2 (two) times a day.     • Dapagliflozin Propanediol (Farxiga) 10 MG tablet Take one tablet by mouth Daily. 30 tablet 5   • doxazosin (CARDURA) 4 MG tablet TAKE 1 TABLET EVERY DAY 90 tablet 0   • finasteride (PROSCAR) 5 MG tablet Take 1 tablet by mouth Daily. 90 tablet 0   • gabapentin (NEURONTIN) 300 MG capsule Take 1 capsule by mouth 2 (two) times a day. 60 capsule 0   • glipizide (GLUCOTROL) 5 MG tablet Take 1  tablet by mouth 2 (Two) Times a Day Before Meals. 180 tablet 3   • isosorbide mononitrate (IMDUR) 30 MG 24 hr tablet Take 1 tablet by mouth Daily. 90 tablet 3   • loratadine (CLARITIN) 10 MG tablet Take 10 mg by mouth Daily.     • meclizine (ANTIVERT) 25 MG tablet TAKE 1 TABLET BY MOUTH 3 (THREE) TIMES A DAY AS NEEDED FOR DIZZINESS. 30 tablet 0   • metFORMIN (GLUCOPHAGE) 1000 MG tablet Take 1 tablet by mouth 2 (Two) Times a Day With Meals. 180 tablet 0   • metoprolol succinate XL (Toprol XL) 100 MG 24 hr tablet Take 1 tablet by mouth Daily. 90 tablet 3   • Misc Natural Products (LUTEIN 20 PO) Take 1 capsule by mouth.     • multivitamin (multivitamin) tablet tablet Take 1 tablet by mouth Daily. 30 tablet    • nitroglycerin (NITROSTAT) 0.4 MG SL tablet Place 1 tablet under the tongue Every 5 (Five) Minutes As Needed for Chest Pain (Only if SBP Greater Than 100). Take no more than 3 doses in 15 minutes. 100 tablet 12   • pantoprazole (Protonix) 40 MG EC tablet Take 1 tablet by mouth Daily. 90 tablet 3   • sacubitril-valsartan (Entresto) 24-26 MG tablet Take 1 tablet by mouth 2 (Two) Times a Day. 60 tablet 6   • vitamin E 400 UNIT capsule Take 1 capsule by mouth Daily.     • bumetanide (BUMEX) 2 MG tablet Take 1 tablet by mouth Daily. 90 tablet 0     No current facility-administered medications for this encounter.       Vaccination History:   Pneumonia:   Annual Influenza: Needs - declines    Objective  Vitals:    11/24/21 1107   BP: 130/68   BP Location: Left arm   Patient Position: Sitting   Cuff Size: Adult   Pulse: 63   Resp: 20   SpO2: 98%   Weight: 97.5 kg (215 lb)     Wt Readings from Last 3 Encounters:   11/24/21 97.5 kg (215 lb)   11/23/21 97.1 kg (214 lb)   09/10/21 95.1 kg (209 lb 9.6 oz)         11/24/21  1107   Weight: 97.5 kg (215 lb)     Lab Results   Component Value Date    GLUCOSE 242 (H) 11/24/2021    BUN 14 11/24/2021    CREATININE 1.23 11/24/2021    EGFRIFNONA 58 (L) 11/24/2021    BCR 11.4 11/24/2021     K 4.4 11/24/2021    CO2 23.7 11/24/2021    CALCIUM 8.8 11/24/2021    ALBUMIN 4.30 06/16/2021    AST 11 06/16/2021    ALT 8 06/16/2021     Lab Results   Component Value Date    WBC 9.51 08/09/2021    HGB 12.4 (L) 08/09/2021    HCT 48.6 08/09/2021    MCV 96.2 08/09/2021     08/09/2021     Lab Results   Component Value Date    TROPONINI <0.006 08/07/2018    TROPONINT <0.010 04/21/2021     Lab Results   Component Value Date    PROBNP 357.7 11/24/2021     Results for orders placed during the hospital encounter of 06/21/21    Adult Transthoracic Echo Limited W/ Cont if Necessary Per Protocol    Interpretation Summary  Moderately to severely technically limited study.  Parasternal images are uninterpretable.    1.  Global LV systolic function appears to be mildly impaired.  There are no large dense focal wall motion abnormalities but visually estimated ejection fraction is 40±5%.  There is mild to moderate concentric left ventricular hypertrophy.  There is mild biatrial enlargement.  RV size and function are grossly preserved.    2.  Valves are grossly morphologically normal.    3.  No large pericardial effusion.  The proximal aorta is not dilated.           GDMT:       Class   Drug   Dose Last Dose Adjustment   ACEi/ARB/ARNI Entresto 24-26 mg BID 5/7/21   Beta Blocker Toprol XL  100mg QDay 4/19/21   MRA        Drug Therapy Problems    1. 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      Plavix no longer on med list. Removed 9/10 by ANKUR Cuevas; pt reports not taking currently but remembers being on it in the past  2. GDMT    Recommendations:      1. Patient denies any SOA since stopping Bumex, but reports some edema in right calf area yesterday. Consider resuming Bumex 0.5 mg daily prn.      Unclear if patient was supposed to stop taking Plavix. Recommend to follow up with  cardiologist  2. May consider addition of spironolactone in the future when patient's schedule allows.  May consider titration of Entresto in the future as well based on BP readings (no home readings available today)        Patient was educated on heart failure medications and the importance of medication adherence. All questions were addressed and patient expressed understanding.     Thank you for allowing me to participate in the care of your patient,    Carina Eldridge, PharmD  11/24/21  11:48 EST

## 2022-03-04 ENCOUNTER — OFFICE VISIT (OUTPATIENT)
Dept: CARDIOLOGY | Facility: CLINIC | Age: 72
End: 2022-03-04

## 2022-03-04 VITALS
DIASTOLIC BLOOD PRESSURE: 81 MMHG | OXYGEN SATURATION: 97 % | HEART RATE: 66 BPM | SYSTOLIC BLOOD PRESSURE: 164 MMHG | HEIGHT: 69 IN | WEIGHT: 218.4 LBS | BODY MASS INDEX: 32.35 KG/M2

## 2022-03-04 DIAGNOSIS — Z12.5 ENCOUNTER FOR SCREENING FOR MALIGNANT NEOPLASM OF PROSTATE: ICD-10-CM

## 2022-03-04 DIAGNOSIS — R07.89 CHEST PRESSURE: ICD-10-CM

## 2022-03-04 DIAGNOSIS — Z95.1 S/P CABG X 4: ICD-10-CM

## 2022-03-04 DIAGNOSIS — R35.89 POLYURIA: ICD-10-CM

## 2022-03-04 DIAGNOSIS — I25.119 CORONARY ARTERY DISEASE INVOLVING NATIVE CORONARY ARTERY OF NATIVE HEART WITH ANGINA PECTORIS: Primary | ICD-10-CM

## 2022-03-04 PROCEDURE — 93000 ELECTROCARDIOGRAM COMPLETE: CPT | Performed by: NURSE PRACTITIONER

## 2022-03-04 PROCEDURE — 99214 OFFICE O/P EST MOD 30 MIN: CPT | Performed by: NURSE PRACTITIONER

## 2022-03-04 RX ORDER — CHLORAL HYDRATE 500 MG
1400 CAPSULE ORAL 2 TIMES DAILY WITH MEALS
COMMUNITY
End: 2022-04-15

## 2022-03-04 RX ORDER — ISOSORBIDE MONONITRATE 60 MG/1
60 TABLET, EXTENDED RELEASE ORAL DAILY
Qty: 90 TABLET | Refills: 0 | Status: SHIPPED | OUTPATIENT
Start: 2022-03-04 | End: 2022-06-07 | Stop reason: SDUPTHER

## 2022-03-04 NOTE — PROGRESS NOTES
Subjective     William Villasenor is a 71 y.o. male who presents to day for Hypertension (3 month follow up ) and Chest Pain (is improved from last visit / is taking OTC Sugar Blocker ).    CHIEF COMPLIANT  Chief Complaint   Patient presents with   • Hypertension     3 month follow up    • Chest Pain     is improved from last visit / is taking OTC Sugar Blocker        Active Problems:  Problem List Items Addressed This Visit        Cardiac and Vasculature    Coronary artery disease involving native coronary artery of native heart with angina pectoris (HCC) - Primary    Overview     Added automatically from request for surgery [id number]         Relevant Medications    isosorbide mononitrate (IMDUR) 60 MG 24 hr tablet    Other Relevant Orders    CBC & Differential    Comprehensive Metabolic Panel    Lipid Panel    Magnesium    S/P CABG x 4    Relevant Medications    isosorbide mononitrate (IMDUR) 60 MG 24 hr tablet    Other Relevant Orders    CBC & Differential    Comprehensive Metabolic Panel    Lipid Panel    Magnesium       Symptoms and Signs    Chest pressure    Relevant Medications    isosorbide mononitrate (IMDUR) 60 MG 24 hr tablet    Other Relevant Orders    CBC & Differential    Comprehensive Metabolic Panel    Lipid Panel    Magnesium      Other Visit Diagnoses     Polyuria        Relevant Orders    PSA Screen    Encounter for screening for malignant neoplasm of prostate         Relevant Orders    PSA Screen           Problem list  1.  Coronary artery disease status post coronary artery bypass grafting x4  1.1 coronary artery bypass grafting 3/21: LIMA to LAD, SVG to diagonal, SVG to OM1, SVG to OM 2.  Residual  of RCA  1.2 echocardiogram 4/21: EF 35± percent, LV dyssynchrony  1.3 echocardiogram 6/21: EF 40±5% mild to moderate concentric left ventricular hypertrophy, mild biatrial enlargement no significant valvular disease   1.4 left heart catheterization 8/21: Left main ostial distal tapering, circumflex  diffusely disease, OM1 occluded, LAD occluded, RCA subtotally occluded, LIMA widely patent, SVG to posterior lateral patent, SVG to OM patent, EF 45±5%, LVEDP 28-30  2.  Essential hypertension  3.  Diastolic dysfunction  4.  Chest pain  5.  Shortness of breath  6.  Lower extremity edema  7.  Dizziness/near syncope  7.1 cardiac event monitor 6/21: 2.1% ventricular ectopic burden, episodes of trigeminy, bigeminy, and PSVT     HPI  HPI  Mr. Villasenor is a 71-year-old male patient who is being followed up today for coronary artery disease status post coronary artery bypass grafting, chronic arterial hypertension, and chest pain.  Patient does have a history of coronary artery disease which he under bypass grafting in March 2021.  He reports that he is doing relatively well.  He does say that he has an intermittent occasional tightness type sensation in his chest.  He says he feels like there is a band across his chest.  This occurs intermittently mainly when he is active but can occur at rest.  He denies any associated symptoms.  He also reports some shortness of breath that occurs mainly if he leans over overexerts himself.  He usually does not become dyspneic at rest.  He also reports lower extremity edema that occurs on a daily basis.  But due to frequent urination he has not been taking his diuretic.  He says when he is driving he does get some pain in his right lower extremity graft site at times.  His blood pressure is elevated today at 164/81 heart rate is 66.  He has not had his medications this morning.  He is currently being treated for his chronic arterial hypertension with Entresto and metoprolol.  He denies any palpitations, fatigue, dizziness, lightheadedness, syncope, PND, orthopnea, or strokelike symptoms.  PRIOR MEDS  Current Outpatient Medications on File Prior to Visit   Medication Sig Dispense Refill   • acetaminophen (TYLENOL) 500 MG tablet Take 500 mg by mouth Every 6 (Six) Hours As Needed for Mild  Pain .     • allopurinol (ZYLOPRIM) 300 MG tablet TAKE 1 TABLET EVERY DAY 90 tablet 0   • APPLE CIDER VINEGAR PO Take 1 tablet by mouth Daily.     • Ascorbic Acid (Vitamin C) 500 MG capsule Take 2,000 mg by mouth Daily. 2000 in am 1000 at night     • aspirin (Aspirin Adult Low Dose) 81 MG EC tablet Take 81 mg by mouth Daily.     • atorvastatin (LIPITOR) 40 MG tablet Take 1 tablet by mouth Every Night. 90 tablet 0   • CALCIUM-MAGNESIUM-ZINC PO Take  by mouth 2 (two) times a day.     • Dapagliflozin Propanediol (Farxiga) 10 MG tablet Take 1 tablet by mouth Daily. 30 tablet 5   • doxazosin (CARDURA) 4 MG tablet TAKE 1 TABLET EVERY DAY 90 tablet 0   • finasteride (PROSCAR) 5 MG tablet Take 1 tablet by mouth Daily. 90 tablet 0   • gabapentin (NEURONTIN) 300 MG capsule Take 1 capsule by mouth 2 (two) times a day. 60 capsule 0   • glipizide (GLUCOTROL) 5 MG tablet Take 1 tablet by mouth 2 (Two) Times a Day Before Meals. 180 tablet 3   • loratadine (CLARITIN) 10 MG tablet Take 10 mg by mouth Daily.     • meclizine (ANTIVERT) 25 MG tablet TAKE 1 TABLET BY MOUTH 3 (THREE) TIMES A DAY AS NEEDED FOR DIZZINESS. 30 tablet 0   • metFORMIN (GLUCOPHAGE) 1000 MG tablet Take 1 tablet by mouth 2 (Two) Times a Day With Meals. 180 tablet 0   • metoprolol succinate XL (Toprol XL) 100 MG 24 hr tablet Take 1 tablet by mouth Daily. 90 tablet 3   • Misc Natural Products (LUTEIN 20 PO) Take 1 capsule by mouth.     • multivitamin (multivitamin) tablet tablet Take 1 tablet by mouth Daily. 30 tablet    • nitroglycerin (NITROSTAT) 0.4 MG SL tablet Place 1 tablet under the tongue Every 5 (Five) Minutes As Needed for Chest Pain (Only if SBP Greater Than 100). Take no more than 3 doses in 15 minutes. 100 tablet 12   • Omega-3 Fatty Acids (fish oil) 1000 MG capsule capsule Take 1,000 mg by mouth 2 (Two) Times a Day With Meals. 1200 mg po bid     • pantoprazole (Protonix) 40 MG EC tablet Take 1 tablet by mouth Daily. 90 tablet 3   •  sacubitril-valsartan (Entresto) 24-26 MG tablet Take 1 tablet by mouth 2 (Two) Times a Day. 60 tablet 5   • vitamin E 400 UNIT capsule Take 1 capsule by mouth Daily.     • [DISCONTINUED] bumetanide (BUMEX) 0.5 MG tablet Take 1 tablet by mouth Daily. 90 tablet 2   • [DISCONTINUED] bumetanide (BUMEX) 2 MG tablet Take 1 tablet by mouth Daily. 90 tablet 0   • [DISCONTINUED] isosorbide mononitrate (IMDUR) 30 MG 24 hr tablet Take 1 tablet by mouth Daily. 90 tablet 3     No current facility-administered medications on file prior to visit.       ALLERGIES  Lisinopril    HISTORY  Past Medical History:   Diagnosis Date   • Arthritis    • Back pain    • BPH (benign prostatic hyperplasia)    • Chronic diastolic (congestive) heart failure (HCC)    • Diabetes mellitus (HCC)    • Diabetic peripheral neuropathy (HCC)    • Erectile dysfunction    • Former smoker    • GERD (gastroesophageal reflux disease)    • GERD (gastroesophageal reflux disease)    • Gout    • Hemorrhoids    • High cholesterol    • Hypertension    • Obesity        Social History     Socioeconomic History   • Marital status:    • Number of children: 3   Tobacco Use   • Smoking status: Former Smoker     Packs/day: 1.00     Years: 5.00     Pack years: 5.00     Quit date: 1974     Years since quittin.1   • Smokeless tobacco: Never Used   Substance and Sexual Activity   • Alcohol use: Never     Comment: quit in    • Drug use: Never   • Sexual activity: Defer       Family History   Problem Relation Age of Onset   • Heart disease Mother    • Hypertension Mother    • Depression Mother    • Alcohol abuse Maternal Uncle    • Heart disease Maternal Grandmother    • Hypertension Maternal Grandmother    • Diabetes Maternal Grandmother    • Diabetes Paternal Grandmother    • No Known Problems Half-Brother    • No Known Problems Half-Brother    • No Known Problems Half-Sister    • No Known Problems Daughter    • No Known Problems Daughter    • Hyperlipidemia  "Daughter        Review of Systems   Constitutional: Negative.  Negative for chills, fatigue and fever.   HENT: Negative.  Negative for congestion, rhinorrhea and sore throat.    Eyes: Negative.    Respiratory: Positive for chest tightness. Negative for apnea and shortness of breath.    Cardiovascular: Positive for chest pain and leg swelling. Negative for palpitations.   Gastrointestinal: Negative.  Negative for constipation, diarrhea and nausea.   Endocrine: Negative.    Genitourinary: Negative.  Negative for hematuria.   Musculoskeletal: Positive for arthralgias, back pain and myalgias. Negative for neck pain.   Allergic/Immunologic: Positive for environmental allergies. Negative for food allergies.   Neurological: Negative for dizziness, syncope, weakness (exertion) and light-headedness.   Hematological: Bruises/bleeds easily.   Psychiatric/Behavioral: Positive for sleep disturbance (has to get to go to the bathromm ).       Objective     VITALS: /81 (BP Location: Right arm, Patient Position: Sitting)   Pulse 66   Ht 175.3 cm (69\")   Wt 99.1 kg (218 lb 6.4 oz)   SpO2 97%   BMI 32.25 kg/m²     LABS:   Lab Results (most recent)     None          IMAGING:   No Images in the past 120 days found..    EXAM:  Physical Exam  Vitals and nursing note reviewed.   Constitutional:       Appearance: He is well-developed.   HENT:      Head: Normocephalic.   Eyes:      Pupils: Pupils are equal, round, and reactive to light.   Neck:      Thyroid: No thyroid mass.      Vascular: No carotid bruit or JVD.      Trachea: Trachea and phonation normal.   Cardiovascular:      Rate and Rhythm: Normal rate and regular rhythm.      Pulses:           Radial pulses are 2+ on the right side and 2+ on the left side.        Posterior tibial pulses are 2+ on the right side and 2+ on the left side.      Heart sounds: Normal heart sounds. No murmur heard.  No friction rub. No gallop.    Pulmonary:      Effort: Pulmonary effort is normal. " No respiratory distress.      Breath sounds: Normal breath sounds. No wheezing or rales.   Abdominal:      General: Bowel sounds are normal.      Palpations: Abdomen is soft.   Musculoskeletal:         General: Normal range of motion.      Cervical back: Neck supple.   Skin:     General: Skin is warm and dry.      Capillary Refill: Capillary refill takes less than 2 seconds.      Findings: No rash.   Neurological:      Mental Status: He is alert and oriented to person, place, and time.   Psychiatric:         Speech: Speech normal.         Behavior: Behavior normal.         Thought Content: Thought content normal.         Judgment: Judgment normal.         Procedure     ECG 12 Lead    Date/Time: 3/4/2022 2:11 PM  Performed by: Dany San APRN  Authorized by: Dany San APRN   Comparison: compared with previous ECG   Similar to previous ECG  Comparison to previous ECG: Axis changes anterior leads  Rhythm: sinus rhythm  Rate: normal  BPM: 61  QRS axis: right  Other findings: non-specific ST-T wave changes  Comments:  ms  No acute changes               Assessment/Plan    Diagnosis Plan   1. Coronary artery disease involving native coronary artery of native heart with angina pectoris (HCC)  isosorbide mononitrate (IMDUR) 60 MG 24 hr tablet    CBC & Differential    Comprehensive Metabolic Panel    Lipid Panel    Magnesium   2. S/P CABG x 4  isosorbide mononitrate (IMDUR) 60 MG 24 hr tablet    CBC & Differential    Comprehensive Metabolic Panel    Lipid Panel    Magnesium   3. Chest pressure  isosorbide mononitrate (IMDUR) 60 MG 24 hr tablet    CBC & Differential    Comprehensive Metabolic Panel    Lipid Panel    Magnesium   4. Polyuria  PSA Screen   5. Encounter for screening for malignant neoplasm of prostate   PSA Screen   1.  Patient does have a history of coronary artery disease in which he underwent CABG.  He does report some chest pain that occurs intermittently.  However he wishes to defer any  further testing at this time.  We will increase his isosorbide to 60 mg daily to see if we can help mitigate his symptoms.  2.  Patient's blood pressure is elevated today.  His blood pressure 164/81 heart rate is 66.  Hopefully by increasing the isosorbide will also see a reduction of his blood pressure as well.  He will monitor his blood pressure on a routine basis report any significant highs or lows.  3.  I would also like to get routine monitoring of his labs in including CBC, CMP, lipid panel, and magnesium.  He did request a PSA due to his polyuria.  4.  Informed of signs and symptoms of ACS and advised to seek emergent treatment for any new worsening symptoms.  Patient also advised sooner follow-up as needed.  Also advised to follow-up with family doctor as needed  This note is dictated utilizing voice recognition software.  Although this record has been proof read, transcriptional errors may still be present. If questions occur regarding the content of this record please do not hesitate to call our office.  I have reviewed and confirmed the accuracy of the ROS as documented by the MA/LPN/RN ANKUR Cuevas    Return in about 3 months (around 6/4/2022), or if symptoms worsen or fail to improve.    Diagnoses and all orders for this visit:    1. Coronary artery disease involving native coronary artery of native heart with angina pectoris (HCC) (Primary)  -     isosorbide mononitrate (IMDUR) 60 MG 24 hr tablet; Take 1 tablet by mouth Daily.  Dispense: 90 tablet; Refill: 0  -     CBC & Differential; Future  -     Comprehensive Metabolic Panel; Future  -     Lipid Panel; Future  -     Magnesium; Future    2. S/P CABG x 4  -     isosorbide mononitrate (IMDUR) 60 MG 24 hr tablet; Take 1 tablet by mouth Daily.  Dispense: 90 tablet; Refill: 0  -     CBC & Differential; Future  -     Comprehensive Metabolic Panel; Future  -     Lipid Panel; Future  -     Magnesium; Future    3. Chest pressure  -     isosorbide  mononitrate (IMDUR) 60 MG 24 hr tablet; Take 1 tablet by mouth Daily.  Dispense: 90 tablet; Refill: 0  -     CBC & Differential; Future  -     Comprehensive Metabolic Panel; Future  -     Lipid Panel; Future  -     Magnesium; Future    4. Polyuria  -     Cancel: PSA Screen; Future  -     PSA Screen; Future    5. Encounter for screening for malignant neoplasm of prostate   -     Cancel: PSA Screen; Future  -     PSA Screen; Future    Other orders  -     ECG 12 Lead        William Villasenor  reports that he quit smoking about 48 years ago. He has a 5.00 pack-year smoking history. He has never used smokeless tobacco...        Advance Care Planning   ACP discussion was held with the patient during this visit. Patient does not have an advance directive, declines further assistance.    MEDS ORDERED DURING VISIT:  New Medications Ordered This Visit   Medications   • isosorbide mononitrate (IMDUR) 60 MG 24 hr tablet     Sig: Take 1 tablet by mouth Daily.     Dispense:  90 tablet     Refill:  0           This document has been electronically signed by Dany San Jr., APRN  March 4, 2022 14:12 EST

## 2022-03-04 NOTE — PATIENT INSTRUCTIONS
Acute Coronary Syndrome  Acute coronary syndrome (ACS) is a serious problem in which there is suddenly not enough blood and oxygen reaching the heart. ACS can result in chest pain or a heart attack.  This condition is a medical emergency. If you have any symptoms of this condition, get help right away.  What are the causes?  This condition may be caused by:  · A buildup of fat and cholesterol inside the arteries (atherosclerosis). This is the most common cause. The buildup (plaque) can cause blood vessels in the heart (coronary arteries) to become narrow or blocked, which reduces blood flow to the heart. Plaque can also break off and lead to a clot, which can block an artery and cause a heart attack or stroke.  · Sudden tightening of the muscles around the coronary arteries (coronary spasm).  · Tearing of a coronary artery (spontaneous coronary artery dissection).  · Very low blood pressure (hypotension).  · An abnormal heartbeat (arrhythmia).  · Other medical conditions that cause a decrease of oxygen to the heart, such as anemiaorrespiratory failure.  · Using cocaine or methamphetamine.  What increases the risk?  The following factors may make you more likely to develop this condition:  · Age. The risk for ACS increases as you get older.  · History of chest pain, heart attack, peripheral artery disease, or stroke.  · Having taken chemotherapy or immune-suppressing medicines.  · Being male.  · Family history of chest pain, heart disease, or stroke.  · Smoking.  · Not exercising enough.  · Being overweight.  · High cholesterol.  · High blood pressure (hypertension).  · Diabetes.  · Excessive alcohol use.  What are the signs or symptoms?  Common symptoms of this condition include:  · Chest pain. The pain may last a long time, or it may stop and come back (recur). It may feel like:  ? Crushing or squeezing.  ? Tightness, pressure, fullness, or heaviness.  · Arm, neck, jaw, or back pain.  · Heartburn or  indigestion.  · Shortness of breath.  · Nausea.  · Sudden cold sweats.  · Light-headedness.  · Dizziness or passing out.  · Tiredness (fatigue).  Sometimes there are no symptoms.  How is this diagnosed?  This condition may be diagnosed based on:  · Your medical history and symptoms.  · Imaging tests, such as:  ? An electrocardiogram (ECG). This measures the heart's electrical activity.  ? X-rays.  ? CT scan.  ? A coronary angiogram. For this test, dye is injected into the heart arteries and then X-rays are taken.  ? Myocardial perfusion imaging. This test shows how well blood flows through your heart muscle.  · Blood tests. These may be repeated at certain time intervals.  · Exercise stress testing.  · Echocardiogram. This is a test that uses sound waves to produce detailed images of the heart.  How is this treated?  Treatment for this condition may include:  · Oxygen therapy.  · Medicines, such as:  ? Antiplatelet medicines and blood-thinning medicines, such as aspirin. These help prevent blood clots.  ? Medicine that dissolves any blood clots (fibrinolytic therapy).  ? Blood pressure medicines.  ? Nitroglycerin. This helps widen blood vessels to improve blood flow.  ? Pain medicine.  ? Cholesterol-lowering medicine.  · Surgery, such as:  ? Coronary angioplasty with stent placement. This involves placing a small piece of metal that looks like mesh or a spring into a narrow coronary artery. This widens the artery and keeps it open.  ? Coronary artery bypass surgery. This involves taking a section of a blood vessel from a different part of your body and placing it on the blocked coronary artery to allow blood to flow around the blockage.  · Cardiac rehabilitation. This is a program that includes exercise training, education, and counseling to help you recover.  Follow these instructions at home:  Eating and drinking  · Eat a heart-healthy diet that includes whole grains, fruits and vegetables, lean proteins, and  low-fat or nonfat dairy products.  · Limit how much salt (sodium) you eat as told by your health care provider. Follow instructions from your health care provider about any other eating or drinking restrictions, such as limiting foods that are high in fat and processed sugars.  · Use healthy cooking methods such as roasting, grilling, broiling, baking, poaching, steaming, or stir-frying.  · Work with a dietitian to follow a heart-healthy eating plan.  Medicines  · Take over-the-counter and prescription medicines only as told by your health care provider.  · Do not take these medicines unless your health care provider approves:  ? Vitamin supplements that contain vitamin A or vitamin E.  ? NSAIDs, such as ibuprofen, naproxen, or celecoxib.  ? Hormone replacement therapy that contains estrogen.  · If you are taking blood thinners:  ? Talk with your health care provider before you take any medicines that contain aspirin or NSAIDs. These medicines increase your risk for dangerous bleeding.  ? Take your medicine exactly as told, at the same time every day.  ? Avoid activities that could cause injury or bruising, and follow instructions about how to prevent falls.  ? Wear a medical alert bracelet, and carry a card that lists what medicines you take.  Activity  · Follow your cardiac rehabilitation program. Do exercises as told by your physical therapist.  · Ask your health care provider what activities and exercises are safe for you. Follow his or her instructions about lifting, driving, or climbing stairs.  Lifestyle  · Do not use any products that contain nicotine or tobacco, such as cigarettes, e-cigarettes, and chewing tobacco. If you need help quitting, ask your health care provider.  · Do not drink alcohol if your health care provider tells you not to drink.  · If you drink alcohol:  ? Limit how much you have to 0-1 drink a day.  ? Be aware of how much alcohol is in your drink. In the [place] one drink equals one 12 oz  bottle of beer (355 mL), one 5 oz glass of wine (148 mL), or one 1½ oz glass of hard liquor (44 mL).  · Maintain a healthy weight. If you need to lose weight, work with your health care provider to do so safely.  General instructions  · Tell all the health care providers who provide care for you about your heart condition, including your dentist. This may affect the medicines or treatment you receive.  · Manage any other health conditions you have, such as hypertension or diabetes. These conditions affect your heart.  · Pay attention to your mental health. You may be at higher risk for depression.  ? Find ways to manage stress.  ? Talk to your health care provider about depression screening and treatment.  · Keep your vaccinations up to date.  ? Get the flu shot (influenza vaccine) every year.  ? Get the pneumococcal vaccine if you are age 65 or older.  · If directed, monitor your blood pressure at home.  · Keep all follow-up visits as told by your health care provider. This is important.  Contact a health care provider if you:  · Feel overwhelmed or sad.  · Have trouble doing your daily activities.  Get help right away if you:  · Have pain in your chest, neck, arm, jaw, stomach, or back that recurs, and:  ? It lasts for more than a few minutes.  ? It is not relieved by taking the medicineyour health care provider prescribed.  · Have unexplained:  ? Heavy sweating.  ? Heartburn or indigestion.  ? Nausea or vomiting.  ? Shortness of breath.  ? Difficulty breathing.  ? Fatigue.  ? Nervousness or anxiety.  ? Weakness.  ? Diarrhea.  ? Dark stools or blood in your stool.  · Have sudden light-headedness or dizziness.  · Have blood pressure that is higher than 180/120.  · Faint.  · Have thoughts about hurting yourself.  These symptoms may represent a serious problem that is an emergency. Do not wait to see if the symptoms will go away. Get medical help right away. Call your local emergency services (911 in the [place] Do  not drive yourself to the hospital.   Summary  · Acute coronary syndrome (ACS) is when there is not enough blood and oxygen being supplied to the heart. ACS can result in chest pain or a heart attack.  · Acute coronary syndrome is a medical emergency. If you have any symptoms of this condition, get help right away.  · Treatment includes medicines and procedures to open the blocked arteries and restore blood flow.  This information is not intended to replace advice given to you by your health care provider. Make sure you discuss any questions you have with your health care provider.  Document Revised: 05/20/2020 Document Reviewed: 12/30/2019  [employer] Patient Education © 2021 [employer]      Hypertension, Adult  Hypertension is another name for high blood pressure. High blood pressure forces your heart to work harder to pump blood. This can cause problems over time.  There are two numbers in a blood pressure reading. There is a top number (systolic) over a bottom number (diastolic). It is best to have a blood pressure that is below 120/80. Healthy choices can help lower your blood pressure, or you may need medicine to help lower it.  What are the causes?  The cause of this condition is not known. Some conditions may be related to high blood pressure.  What increases the risk?  · Smoking.  · Having type 2 diabetes mellitus, high cholesterol, or both.  · Not getting enough exercise or physical activity.  · Being overweight.  · Having too much fat, sugar, calories, or salt (sodium) in your diet.  · Drinking too much alcohol.  · Having long-term (chronic) kidney disease.  · Having a family history of high blood pressure.  · Age. Risk increases with age.  · Race. You may be at higher risk if you are .  · Gender. Men are at higher risk than women before age 45. After age 65, women are at higher risk than men.  · Having obstructive sleep apnea.  · Stress.  What are the signs or symptoms?  · High blood  pressure may not cause symptoms. Very high blood pressure (hypertensive crisis) may cause:  ? Headache.  ? Feelings of worry or nervousness (anxiety).  ? Shortness of breath.  ? Nosebleed.  ? A feeling of being sick to your stomach (nausea).  ? Throwing up (vomiting).  ? Changes in how you see.  ? Very bad chest pain.  ? Seizures.  How is this treated?  · This condition is treated by making healthy lifestyle changes, such as:  ? Eating healthy foods.  ? Exercising more.  ? Drinking less alcohol.  · Your health care provider may prescribe medicine if lifestyle changes are not enough to get your blood pressure under control, and if:  ? Your top number is above 130.  ? Your bottom number is above 80.  · Your personal target blood pressure may vary.  Follow these instructions at home:  Eating and drinking    · If told, follow the DASH eating plan. To follow this plan:  ? Fill one half of your plate at each meal with fruits and vegetables.  ? Fill one fourth of your plate at each meal with whole grains. Whole grains include whole-wheat pasta, brown rice, and whole-grain bread.  ? Eat or drink low-fat dairy products, such as skim milk or low-fat yogurt.  ? Fill one fourth of your plate at each meal with low-fat (lean) proteins. Low-fat proteins include fish, chicken without skin, eggs, beans, and tofu.  ? Avoid fatty meat, cured and processed meat, or chicken with skin.  ? Avoid pre-made or processed food.  · Eat less than 1,500 mg of salt each day.  · Do not drink alcohol if:  ? Your doctor tells you not to drink.  ? You are pregnant, may be pregnant, or are planning to become pregnant.  · If you drink alcohol:  ? Limit how much you use to:  § 0-1 drink a day for women.  § 0-2 drinks a day for men.  ? Be aware of how much alcohol is in your drink. In the [place] one drink equals one 12 oz bottle of beer (355 mL), one 5 oz glass of wine (148 mL), or one 1½ oz glass of hard liquor (44 mL).    Lifestyle    · Work with your  doctor to stay at a healthy weight or to lose weight. Ask your doctor what the best weight is for you.  · Get at least 30 minutes of exercise most days of the week. This may include walking, swimming, or biking.  · Get at least 30 minutes of exercise that strengthens your muscles (resistance exercise) at least 3 days a week. This may include lifting weights or doing Pilates.  · Do not use any products that contain nicotine or tobacco, such as cigarettes, e-cigarettes, and chewing tobacco. If you need help quitting, ask your doctor.  · Check your blood pressure at home as told by your doctor.  · Keep all follow-up visits as told by your doctor. This is important.    Medicines  · Take over-the-counter and prescription medicines only as told by your doctor. Follow directions carefully.  · Do not skip doses of blood pressure medicine. The medicine does not work as well if you skip doses. Skipping doses also puts you at risk for problems.  · Ask your doctor about side effects or reactions to medicines that you should watch for.  Contact a doctor if you:  · Think you are having a reaction to the medicine you are taking.  · Have headaches that keep coming back (recurring).  · Feel dizzy.  · Have swelling in your ankles.  · Have trouble with your vision.  Get help right away if you:  · Get a very bad headache.  · Start to feel mixed up (confused).  · Feel weak or numb.  · Feel faint.  · Have very bad pain in your:  ? Chest.  ? Belly (abdomen).  · Throw up more than once.  · Have trouble breathing.  Summary  · Hypertension is another name for high blood pressure.  · High blood pressure forces your heart to work harder to pump blood.  · For most people, a normal blood pressure is less than 120/80.  · Making healthy choices can help lower blood pressure. If your blood pressure does not get lower with healthy choices, you may need to take medicine.  This information is not intended to replace advice given to you by your health  care provider. Make sure you discuss any questions you have with your health care provider.  Document Revised: 08/28/2019 Document Reviewed: 08/28/2019  [employer] Patient Education © 2021 [employer]

## 2022-03-11 DIAGNOSIS — I25.810 CORONARY ARTERY DISEASE INVOLVING CORONARY BYPASS GRAFT OF NATIVE HEART WITHOUT ANGINA PECTORIS: ICD-10-CM

## 2022-03-11 DIAGNOSIS — K21.9 CHEST PAIN DUE TO GERD: ICD-10-CM

## 2022-03-11 DIAGNOSIS — R07.9 CHEST PAIN DUE TO GERD: ICD-10-CM

## 2022-03-14 RX ORDER — PANTOPRAZOLE SODIUM 40 MG/1
TABLET, DELAYED RELEASE ORAL
Qty: 90 TABLET | Refills: 3 | Status: SHIPPED | OUTPATIENT
Start: 2022-03-14 | End: 2022-12-02 | Stop reason: SDUPTHER

## 2022-03-25 ENCOUNTER — LAB (OUTPATIENT)
Dept: LAB | Facility: HOSPITAL | Age: 72
End: 2022-03-25

## 2022-03-25 ENCOUNTER — HOSPITAL ENCOUNTER (OUTPATIENT)
Dept: CARDIOLOGY | Facility: HOSPITAL | Age: 72
Discharge: HOME | End: 2022-03-25

## 2022-03-25 VITALS
HEIGHT: 69 IN | BODY MASS INDEX: 32.79 KG/M2 | HEART RATE: 60 BPM | WEIGHT: 221.4 LBS | DIASTOLIC BLOOD PRESSURE: 80 MMHG | SYSTOLIC BLOOD PRESSURE: 142 MMHG | OXYGEN SATURATION: 97 %

## 2022-03-25 DIAGNOSIS — I50.42 CHRONIC COMBINED SYSTOLIC AND DIASTOLIC CONGESTIVE HEART FAILURE: Primary | ICD-10-CM

## 2022-03-25 DIAGNOSIS — N18.2 CKD (CHRONIC KIDNEY DISEASE) STAGE 2, GFR 60-89 ML/MIN: Chronic | ICD-10-CM

## 2022-03-25 DIAGNOSIS — Z95.1 S/P CABG X 4: ICD-10-CM

## 2022-03-25 DIAGNOSIS — Z12.5 ENCOUNTER FOR SCREENING FOR MALIGNANT NEOPLASM OF PROSTATE: ICD-10-CM

## 2022-03-25 DIAGNOSIS — R07.89 CHEST PRESSURE: ICD-10-CM

## 2022-03-25 DIAGNOSIS — R94.30 CARDIAC LV EJECTION FRACTION OF 35-39%: ICD-10-CM

## 2022-03-25 DIAGNOSIS — R35.89 POLYURIA: ICD-10-CM

## 2022-03-25 DIAGNOSIS — I25.119 CORONARY ARTERY DISEASE INVOLVING NATIVE CORONARY ARTERY OF NATIVE HEART WITH ANGINA PECTORIS: ICD-10-CM

## 2022-03-25 DIAGNOSIS — I10 ESSENTIAL HYPERTENSION: ICD-10-CM

## 2022-03-25 DIAGNOSIS — E66.9 OBESITY (BMI 30-39.9): Chronic | ICD-10-CM

## 2022-03-25 LAB
ABSOLUTE LUNG FLUID CONTENT: 42 % (ref 20–35)
ALBUMIN SERPL-MCNC: 4.3 G/DL (ref 3.5–5.2)
ALBUMIN/GLOB SERPL: 2 G/DL
ALP SERPL-CCNC: 63 U/L (ref 39–117)
ALT SERPL W P-5'-P-CCNC: 9 U/L (ref 1–41)
ANION GAP SERPL CALCULATED.3IONS-SCNC: 10.4 MMOL/L (ref 5–15)
ANION GAP SERPL CALCULATED.3IONS-SCNC: 11.8 MMOL/L (ref 5–15)
AST SERPL-CCNC: 9 U/L (ref 1–40)
BASOPHILS # BLD AUTO: 0.03 10*3/MM3 (ref 0–0.2)
BASOPHILS NFR BLD AUTO: 0.5 % (ref 0–1.5)
BILIRUB SERPL-MCNC: 0.3 MG/DL (ref 0–1.2)
BUN SERPL-MCNC: 18 MG/DL (ref 8–23)
BUN SERPL-MCNC: 18 MG/DL (ref 8–23)
BUN/CREAT SERPL: 14.3 (ref 7–25)
BUN/CREAT SERPL: 15.3 (ref 7–25)
CALCIUM SPEC-SCNC: 9.1 MG/DL (ref 8.6–10.5)
CALCIUM SPEC-SCNC: 9.1 MG/DL (ref 8.6–10.5)
CHLORIDE SERPL-SCNC: 101 MMOL/L (ref 98–107)
CHLORIDE SERPL-SCNC: 105 MMOL/L (ref 98–107)
CHOLEST SERPL-MCNC: 163 MG/DL (ref 0–200)
CO2 SERPL-SCNC: 24.2 MMOL/L (ref 22–29)
CO2 SERPL-SCNC: 25.6 MMOL/L (ref 22–29)
CREAT SERPL-MCNC: 1.18 MG/DL (ref 0.76–1.27)
CREAT SERPL-MCNC: 1.26 MG/DL (ref 0.76–1.27)
DEPRECATED RDW RBC AUTO: 44.3 FL (ref 37–54)
EGFRCR SERPLBLD CKD-EPI 2021: 60.6 ML/MIN/1.73
EGFRCR SERPLBLD CKD-EPI 2021: 65.6 ML/MIN/1.73
EOSINOPHIL # BLD AUTO: 0.08 10*3/MM3 (ref 0–0.4)
EOSINOPHIL NFR BLD AUTO: 1.3 % (ref 0.3–6.2)
ERYTHROCYTE [DISTWIDTH] IN BLOOD BY AUTOMATED COUNT: 14.6 % (ref 12.3–15.4)
GLOBULIN UR ELPH-MCNC: 2.2 GM/DL
GLUCOSE SERPL-MCNC: 209 MG/DL (ref 65–99)
GLUCOSE SERPL-MCNC: 215 MG/DL (ref 65–99)
HCT VFR BLD AUTO: 43.2 % (ref 37.5–51)
HDLC SERPL-MCNC: 37 MG/DL (ref 40–60)
HGB BLD-MCNC: 13.6 G/DL (ref 13–17.7)
IMM GRANULOCYTES # BLD AUTO: 0.02 10*3/MM3 (ref 0–0.05)
IMM GRANULOCYTES NFR BLD AUTO: 0.3 % (ref 0–0.5)
LDLC SERPL CALC-MCNC: 94 MG/DL (ref 0–100)
LDLC/HDLC SERPL: 2.4 {RATIO}
LYMPHOCYTES # BLD AUTO: 2.49 10*3/MM3 (ref 0.7–3.1)
LYMPHOCYTES NFR BLD AUTO: 39.2 % (ref 19.6–45.3)
MAGNESIUM SERPL-MCNC: 1.9 MG/DL (ref 1.6–2.4)
MAGNESIUM SERPL-MCNC: 1.9 MG/DL (ref 1.6–2.4)
MCH RBC QN AUTO: 26.2 PG (ref 26.6–33)
MCHC RBC AUTO-ENTMCNC: 31.5 G/DL (ref 31.5–35.7)
MCV RBC AUTO: 83.2 FL (ref 79–97)
MONOCYTES # BLD AUTO: 0.42 10*3/MM3 (ref 0.1–0.9)
MONOCYTES NFR BLD AUTO: 6.6 % (ref 5–12)
NEUTROPHILS NFR BLD AUTO: 3.32 10*3/MM3 (ref 1.7–7)
NEUTROPHILS NFR BLD AUTO: 52.1 % (ref 42.7–76)
NRBC BLD AUTO-RTO: 0 /100 WBC (ref 0–0.2)
NT-PROBNP SERPL-MCNC: 135.7 PG/ML (ref 0–900)
PLATELET # BLD AUTO: 279 10*3/MM3 (ref 140–450)
PMV BLD AUTO: 9.4 FL (ref 6–12)
POTASSIUM SERPL-SCNC: 4.7 MMOL/L (ref 3.5–5.2)
POTASSIUM SERPL-SCNC: 5.1 MMOL/L (ref 3.5–5.2)
PROT SERPL-MCNC: 6.5 G/DL (ref 6–8.5)
PSA SERPL-MCNC: 0.09 NG/ML (ref 0–4)
RBC # BLD AUTO: 5.19 10*6/MM3 (ref 4.14–5.8)
SODIUM SERPL-SCNC: 137 MMOL/L (ref 136–145)
SODIUM SERPL-SCNC: 141 MMOL/L (ref 136–145)
TRIGL SERPL-MCNC: 186 MG/DL (ref 0–150)
VLDLC SERPL-MCNC: 32 MG/DL (ref 5–40)
WBC NRBC COR # BLD: 6.36 10*3/MM3 (ref 3.4–10.8)

## 2022-03-25 PROCEDURE — 99214 OFFICE O/P EST MOD 30 MIN: CPT | Performed by: NURSE PRACTITIONER

## 2022-03-25 PROCEDURE — 83880 ASSAY OF NATRIURETIC PEPTIDE: CPT

## 2022-03-25 PROCEDURE — 85025 COMPLETE CBC W/AUTO DIFF WBC: CPT

## 2022-03-25 PROCEDURE — 83735 ASSAY OF MAGNESIUM: CPT

## 2022-03-25 PROCEDURE — 36415 COLL VENOUS BLD VENIPUNCTURE: CPT | Performed by: NURSE PRACTITIONER

## 2022-03-25 PROCEDURE — G0103 PSA SCREENING: HCPCS

## 2022-03-25 PROCEDURE — 80053 COMPREHEN METABOLIC PANEL: CPT | Performed by: NURSE PRACTITIONER

## 2022-03-25 PROCEDURE — 83735 ASSAY OF MAGNESIUM: CPT | Performed by: NURSE PRACTITIONER

## 2022-03-25 PROCEDURE — 94726 PLETHYSMOGRAPHY LUNG VOLUMES: CPT | Performed by: NURSE PRACTITIONER

## 2022-03-25 PROCEDURE — 80061 LIPID PANEL: CPT

## 2022-03-25 RX ORDER — SACUBITRIL AND VALSARTAN 24; 26 MG/1; MG/1
1 TABLET, FILM COATED ORAL 2 TIMES DAILY
Qty: 60 TABLET | Refills: 5 | COMMUNITY
Start: 2022-03-25 | End: 2022-04-13

## 2022-03-25 NOTE — PROGRESS NOTES
Heart Failure Clinic    Date: 03/25/22     Vitals:    03/25/22 0935   BP: 142/80   Pulse: 60   SpO2: 97%    weight 221.4    Method of arrival: Ambulatory    Weighing self daily: Most days    Monitoring Heart Failure Zones: Most days    Today's HF Zone: Green    Taking medications as prescribed: Yes    Edema No    Shortness of Air: Yes with activity    Number of pillows used at night:<2    Educational Materials given:  avs                                                                         ReDS Value: 42  Over 41 Hypervolemic Status      Beni Moran RN 03/25/22 09:39 EDT

## 2022-03-25 NOTE — PROGRESS NOTES
Heart Failure Pharmacy Note  Patient Name: William Villasenor   Referring Provider: Maddi Munoz  Primary Cardiologist: Dany PASCUAL  CHF: [id number]    Medication Use:   Adherence:Previously Bumex, however, at today's appt pt reports cardiology APRN had instructed not to take.   Hx of med intolerances: None reported  Affordability: Farxiga, Entresto  Retail Rx Management: Farxiga, Entresto through [employer]'s patient assistance program. Approval through 6/23/22 then will need review and new application     Past Medical History:   Diagnosis Date   • Arthritis    • Back pain    • BPH (benign prostatic hyperplasia)    • Chronic diastolic (congestive) heart failure (HCC)    • Diabetes mellitus (HCC)    • Diabetic peripheral neuropathy (HCC)    • Erectile dysfunction    • Former smoker    • GERD (gastroesophageal reflux disease)    • GERD (gastroesophageal reflux disease)    • Gout    • Hemorrhoids    • High cholesterol    • Hypertension    • Obesity      ALLERGIES: Lisinopril  Current Outpatient Medications   Medication Sig Dispense Refill   • acetaminophen (TYLENOL) 500 MG tablet Take 500 mg by mouth Every 6 (Six) Hours As Needed for Mild Pain .     • allopurinol (ZYLOPRIM) 300 MG tablet TAKE 1 TABLET EVERY DAY 90 tablet 0   • APPLE CIDER VINEGAR PO Take 1 tablet by mouth Daily.     • Ascorbic Acid (Vitamin C) 500 MG capsule Take 2,000 mg by mouth Daily. 2000 in am 1000 at night     • aspirin 81 MG EC tablet Take 81 mg by mouth Daily.     • atorvastatin (LIPITOR) 40 MG tablet Take 1 tablet by mouth Every Night. 90 tablet 0   • CALCIUM-MAGNESIUM-ZINC PO Take 1 tablet by mouth 2 (two) times a day.     • Chromium 200 MCG capsule Take 1 capsule by mouth 2 (Two) Times a Day.     • Dapagliflozin Propanediol (Farxiga) 10 MG tablet Take 1 tablet by mouth Daily. 30 tablet 5   • doxazosin (CARDURA) 4 MG tablet TAKE 1 TABLET EVERY DAY 90 tablet 0   • finasteride (PROSCAR) 5 MG tablet Take 1 tablet by mouth Daily. 90 tablet 0   •  gabapentin (NEURONTIN) 300 MG capsule Take 1 capsule by mouth 2 (two) times a day. 60 capsule 0   • glipizide (GLUCOTROL) 5 MG tablet Take 1 tablet by mouth 2 (Two) Times a Day Before Meals. 180 tablet 3   • isosorbide mononitrate (IMDUR) 60 MG 24 hr tablet Take 1 tablet by mouth Daily. 90 tablet 0   • loratadine (CLARITIN) 10 MG tablet Take 10 mg by mouth Daily As Needed for Allergies.     • metFORMIN (GLUCOPHAGE) 1000 MG tablet Take 1 tablet by mouth 2 (Two) Times a Day With Meals. 180 tablet 0   • metoprolol succinate XL (Toprol XL) 100 MG 24 hr tablet Take 1 tablet by mouth Daily. 90 tablet 3   • Misc Natural Products (LUTEIN 20 PO) Take 1 capsule by mouth.     • multivitamin (multivitamin) tablet tablet Take 1 tablet by mouth Daily. 30 tablet    • nitroglycerin (NITROSTAT) 0.4 MG SL tablet Place 1 tablet under the tongue Every 5 (Five) Minutes As Needed for Chest Pain (Only if SBP Greater Than 100). Take no more than 3 doses in 15 minutes. 100 tablet 12   • Omega-3 Fatty Acids (fish oil) 1000 MG capsule capsule Take 1,400 mg by mouth 2 (Two) Times a Day With Meals.     • pantoprazole (PROTONIX) 40 MG EC tablet TAKE 1 TABLET EVERY DAY 90 tablet 3   • sacubitril-valsartan (Entresto) 24-26 MG tablet Take 1 tablet by mouth 2 (Two) Times a Day. Take 49/51 by mouth in the am x 1 week while continuing on 24/26 by mouth in the pm x 1 week. If BP tolerates then after 1 week advance to 49/51 by mouth twice a day. 60 tablet 5   • vitamin E 400 UNIT capsule Take 1 capsule by mouth Daily.     • meclizine (ANTIVERT) 25 MG tablet TAKE 1 TABLET BY MOUTH 3 (THREE) TIMES A DAY AS NEEDED FOR DIZZINESS. 30 tablet 0     No current facility-administered medications for this encounter.       Vaccination History:   Pneumonia:   Annual Influenza: Needs - declines    Objective  Vitals:    03/25/22 0935   BP: 142/80   BP Location: Left arm   Patient Position: Sitting   Cuff Size: Adult   Pulse: 60   SpO2: 97%   Weight: 100 kg (221 lb 6.4  "oz)   Height: 175.3 cm (69\")     Wt Readings from Last 3 Encounters:   03/25/22 100 kg (221 lb 6.4 oz)   03/04/22 99.1 kg (218 lb 6.4 oz)   11/24/21 97.5 kg (215 lb)         03/25/22  0935   Weight: 100 kg (221 lb 6.4 oz)     Lab Results   Component Value Date    GLUCOSE 215 (H) 03/25/2022    BUN 18 03/25/2022    CREATININE 1.18 03/25/2022    EGFRIFNONA 58 (L) 11/24/2021    BCR 15.3 03/25/2022    K 4.7 03/25/2022    CO2 25.6 03/25/2022    CALCIUM 9.1 03/25/2022    ALBUMIN 4.30 06/16/2021    AST 11 06/16/2021    ALT 8 06/16/2021     Lab Results   Component Value Date    WBC 9.51 08/09/2021    HGB 12.4 (L) 08/09/2021    HCT 48.6 08/09/2021    MCV 96.2 08/09/2021     08/09/2021     Lab Results   Component Value Date    TROPONINI <0.006 08/07/2018    TROPONINT <0.010 04/21/2021     Lab Results   Component Value Date    PROBNP 135.7 03/25/2022     Results for orders placed during the hospital encounter of 06/21/21    Adult Transthoracic Echo Limited W/ Cont if Necessary Per Protocol    Interpretation Summary  Moderately to severely technically limited study.  Parasternal images are uninterpretable.    1.  Global LV systolic function appears to be mildly impaired.  There are no large dense focal wall motion abnormalities but visually estimated ejection fraction is 40±5%.  There is mild to moderate concentric left ventricular hypertrophy.  There is mild biatrial enlargement.  RV size and function are grossly preserved.    2.  Valves are grossly morphologically normal.    3.  No large pericardial effusion.  The proximal aorta is not dilated.           GDMT:       Class   Drug   Dose Last Dose Adjustment   ACEi/ARB/ARNI Entresto 24-26 mg BID 5/7/21   Beta Blocker Toprol XL  100mg QDay 4/19/21   MRA        Drug Therapy Problems      1. GDMT    Recommendations:      1. May consider addition of spironolactone in the future when patient's schedule allows in the future. Pt currently  again and diuretics pose " issues for him. Patient also has complaints of increased frequency of urination which he reports PCP is evaluating.   Would possibly consider titration of Entresto today (no home BP readings available today but pt reports consistently elevated BP's at home), had elevated BP at last PCP appt, and elevated BP in clinic today. Would recommend close monitoring of of labs for toleration of increased dose as well routine checking of BP as hypotension would pose risk with patients .       Patient was educated on heart failure medications and the importance of medication adherence. All questions were addressed and patient expressed understanding.     Thank you for allowing me to participate in the care of your patient,    Annie Prestons. Tavo, PharmD  03/25/22  11:24 EDT

## 2022-03-25 NOTE — PROGRESS NOTES
[facility] CHF CLINIC OFFICE VISIT    Subjective:   History of Present Illness   William Villasenor is a 70-year-old  male who presents to the clinic today for follow up on heart failure. He has underlying history of chronic combined systolic and diastolic congestive heart failure with an LVEF of 40-45% from echocardiogram on 6/21/2021 ready by Dr. Kennedy. He takes metoprolol  mg daily, Entresto 24/26 mg BID, Magnesium 400 mg BID, Farxiga 10 mg daily.        Needing help with costs of Entresto and Farxiga   Shortness of air is stable   Leg swelling stable, discontinued Bumex and doing well. Denies need for Bumex   Urine output good  He feels as if he is continuing to regain strength  Reports they monitor his sodium intake and fluid intake  Home weight stable   Home BP intermittent monitoring with noted elevated readings around 145-150/80  Home HR 60-80 - denies bradycardia, dizziness, lightheadedness or syncope   Recently seen cardiology who increased his Imdur and pt tolerating dosage changes well     PCP: Dr. Gatica  Cardiologist: Dr. Simms/ANKUR Cuevas    Hospitalizations: Discharged on 4/9/2021  Past Medical History:   Diagnosis Date   • Arthritis    • Back pain    • BPH (benign prostatic hyperplasia)    • Chronic diastolic (congestive) heart failure (HCC)    • Diabetes mellitus (HCC)    • Diabetic peripheral neuropathy (HCC)    • Erectile dysfunction    • Former smoker    • GERD (gastroesophageal reflux disease)    • GERD (gastroesophageal reflux disease)    • Gout    • Hemorrhoids    • High cholesterol    • Hypertension    • Obesity      Past Surgical History:   Procedure Laterality Date   • CARDIAC CATHETERIZATION N/A 3/15/2021    Procedure: Left Heart Cath;  Surgeon: Trent Zaragoza MD;  Location: [facility] INVASIVE LOCATION;  Service: Cardiology;  Laterality: N/A;   • CARDIAC CATHETERIZATION N/A 3/15/2021    Procedure: Coronary angiography;  Surgeon: Trent Zaragoza MD;  Location: [facility]  CATH INVASIVE LOCATION;  Service: Cardiology;  Laterality: N/A;   • COLONOSCOPY W/ BIOPSIES     • CORONARY ARTERY BYPASS GRAFT N/A 3/16/2021    Procedure: MEDIAN STERNOTOMY, CORONARY ARTERY BYPASS GRAFT X4, UTILIZATION OF LEFT INTERNAL MAMMARY ARTERY, AND ENDOSCOPIC VEIN HARVEST OF RIGHT GREATER SAPHENOUS VEIN;  Surgeon: Jorge Simms MD;  Location: [facility] OR;  Service: Cardiothoracic;  Laterality: N/A;  vein out- 1614  vein ready- 1628             Social History     Socioeconomic History   • Marital status:    • Number of children: 3   Tobacco Use   • Smoking status: Former Smoker     Packs/day: 1.00     Years: 5.00     Pack years: 5.00     Quit date: 1974     Years since quittin.1   • Smokeless tobacco: Never Used   Substance and Sexual Activity   • Alcohol use: Never     Comment: quit in    • Drug use: Never   • Sexual activity: Defer     Family History   Problem Relation Age of Onset   • Heart disease Mother    • Hypertension Mother    • Depression Mother    • Alcohol abuse Maternal Uncle    • Heart disease Maternal Grandmother    • Hypertension Maternal Grandmother    • Diabetes Maternal Grandmother    • Diabetes Paternal Grandmother    • No Known Problems Half-Brother    • No Known Problems Half-Brother    • No Known Problems Half-Sister    • No Known Problems Daughter    • No Known Problems Daughter    • Hyperlipidemia Daughter      Allergies:  Allergies   Allergen Reactions   • Lisinopril Cough     Review of Systems   Constitutional: Negative for chills, fever, weight gain and weight loss.   HENT: Negative for congestion, hoarse voice and sore throat.    Eyes: Negative for blurred vision, pain and visual disturbance.   Cardiovascular: Negative for chest pain, claudication, cyanosis, dyspnea on exertion, irregular heartbeat, leg swelling, near-syncope, orthopnea, palpitations and syncope.   Respiratory: Negative for cough, shortness of breath and wheezing.    Endocrine: Negative  for cold intolerance, heat intolerance and polyuria.   Hematologic/Lymphatic: Negative for bleeding problem. Does not bruise/bleed easily.   Skin: Negative for color change, flushing and poor wound healing.   Musculoskeletal: Negative for arthritis, back pain, joint pain and myalgias.   Gastrointestinal: Negative for abdominal pain, constipation, diarrhea, nausea and vomiting.   Genitourinary: Negative for dysuria, frequency, hesitancy and urgency.   Neurological: Negative for excessive daytime sleepiness, dizziness, headaches, numbness, vertigo and weakness.   Psychiatric/Behavioral: Negative for depression. The patient does not have insomnia and is not nervous/anxious.    All other systems reviewed and are negative.    Current Outpatient Medications   Medication Sig Dispense Refill   • acetaminophen (TYLENOL) 500 MG tablet Take 500 mg by mouth Every 6 (Six) Hours As Needed for Mild Pain .     • allopurinol (ZYLOPRIM) 300 MG tablet TAKE 1 TABLET EVERY DAY 90 tablet 0   • APPLE CIDER VINEGAR PO Take 1 tablet by mouth Daily.     • Ascorbic Acid (Vitamin C) 500 MG capsule Take 2,000 mg by mouth Daily. 2000 in am 1000 at night     • aspirin 81 MG EC tablet Take 81 mg by mouth Daily.     • atorvastatin (LIPITOR) 40 MG tablet Take 1 tablet by mouth Every Night. 90 tablet 0   • CALCIUM-MAGNESIUM-ZINC PO Take 1 tablet by mouth 2 (two) times a day.     • Chromium 200 MCG capsule Take 1 capsule by mouth 2 (Two) Times a Day.     • Dapagliflozin Propanediol (Farxiga) 10 MG tablet Take 1 tablet by mouth Daily. 30 tablet 5   • doxazosin (CARDURA) 4 MG tablet TAKE 1 TABLET EVERY DAY 90 tablet 0   • finasteride (PROSCAR) 5 MG tablet Take 1 tablet by mouth Daily. 90 tablet 0   • gabapentin (NEURONTIN) 300 MG capsule Take 1 capsule by mouth 2 (two) times a day. 60 capsule 0   • glipizide (GLUCOTROL) 5 MG tablet Take 1 tablet by mouth 2 (Two) Times a Day Before Meals. 180 tablet 3   • isosorbide mononitrate (IMDUR) 60 MG 24 hr tablet  "Take 1 tablet by mouth Daily. 90 tablet 0   • loratadine (CLARITIN) 10 MG tablet Take 10 mg by mouth Daily As Needed for Allergies.     • metFORMIN (GLUCOPHAGE) 1000 MG tablet Take 1 tablet by mouth 2 (Two) Times a Day With Meals. 180 tablet 0   • metoprolol succinate XL (Toprol XL) 100 MG 24 hr tablet Take 1 tablet by mouth Daily. 90 tablet 3   • Misc Natural Products (LUTEIN 20 PO) Take 1 capsule by mouth.     • multivitamin (multivitamin) tablet tablet Take 1 tablet by mouth Daily. 30 tablet    • nitroglycerin (NITROSTAT) 0.4 MG SL tablet Place 1 tablet under the tongue Every 5 (Five) Minutes As Needed for Chest Pain (Only if SBP Greater Than 100). Take no more than 3 doses in 15 minutes. 100 tablet 12   • Omega-3 Fatty Acids (fish oil) 1000 MG capsule capsule Take 1,400 mg by mouth 2 (Two) Times a Day With Meals.     • pantoprazole (PROTONIX) 40 MG EC tablet TAKE 1 TABLET EVERY DAY 90 tablet 3   • sacubitril-valsartan (Entresto) 24-26 MG tablet Take 1 tablet by mouth 2 (Two) Times a Day. Take 49/51 by mouth in the am x 1 week while continuing on 24/26 by mouth in the pm x 1 week. If BP tolerates then after 1 week advance to 49/51 by mouth twice a day. 60 tablet 5   • vitamin E 400 UNIT capsule Take 1 capsule by mouth Daily.     • meclizine (ANTIVERT) 25 MG tablet TAKE 1 TABLET BY MOUTH 3 (THREE) TIMES A DAY AS NEEDED FOR DIZZINESS. 30 tablet 0     No current facility-administered medications for this encounter.      Objective:   Body mass index is 32.7 kg/m².  Vitals:    03/25/22 0935   BP: 142/80   BP Location: Left arm   Patient Position: Sitting   Cuff Size: Adult   Pulse: 60   SpO2: 97%   Weight: 100 kg (221 lb 6.4 oz)   Height: 175.3 cm (69\")     Vitals:    03/25/22 0935   BP: 142/80   Pulse: 60   SpO2: 97%     Body mass index is 32.7 kg/m².      Lab Results   Component Value Date    ABSOLUTELUNG 42 (A) 03/25/2022     Wt Readings from Last 3 Encounters:   03/25/22 100 kg (221 lb 6.4 oz)   03/04/22 99.1 kg " (218 lb 6.4 oz)   11/24/21 97.5 kg (215 lb)     Lab Results   Component Value Date    ABSOLUTELUNG 42 (A) 03/25/2022   ReDs - 39% 11/24/2021   ReDs - 34% (4/21/2021)    Vitals reviewed.   Constitutional:       Appearance: Normal appearance. Well-developed.   Eyes:      Conjunctiva/sclera: Conjunctivae normal.   HENT:      Head: Normocephalic.   Neck:      Vascular: No JVD or JVR.   Pulmonary:      Effort: Pulmonary effort is normal.      Breath sounds: Normal breath sounds.   Chest:   Breasts:      Left: No tenderness.        Comments: CABG incision healed   Cardiovascular:      Normal rate. Regular rhythm.   Edema:     Ankle: bilateral trace edema of the ankle.     Feet: bilateral trace edema of the feet.  Abdominal:      General: Bowel sounds are normal.      Palpations: Abdomen is soft. There is no hepatomegaly or splenomegaly.   Musculoskeletal: Normal range of motion.      Cervical back: Normal range of motion and neck supple. Skin:     General: Skin is warm and dry.   Neurological:      Mental Status: Alert and oriented to person, place, and time.   Psychiatric:         Attention and Perception: Attention normal.         Mood and Affect: Mood normal.         Speech: Speech normal.         Behavior: Behavior normal. Behavior is cooperative.         Cognition and Memory: Cognition normal.       Cardiographics  Results for orders placed during the hospital encounter of 06/21/21    Adult Transthoracic Echo Limited W/ Cont if Necessary Per Protocol    Interpretation Summary  Moderately to severely technically limited study.  Parasternal images are uninterpretable.    1.  Global LV systolic function appears to be mildly impaired.  There are no large dense focal wall motion abnormalities but visually estimated ejection fraction is 40±5%.  There is mild to moderate concentric left ventricular hypertrophy.  There is mild biatrial enlargement.  RV size and function are grossly preserved.    2.  Valves are grossly  morphologically normal.    3.  No large pericardial effusion.  The proximal aorta is not dilated.    [id number]      Lab Review   Lab Results   Component Value Date    TSH 2.010 2021     Lab Results   Component Value Date    GLUCOSE 215 (H) 2022    BUN 18 2022    CREATININE 1.18 2022    EGFRIFNONA 58 (L) 2021    BCR 15.3 2022    K 4.7 2022    CO2 25.6 2022    CALCIUM 9.1 2022    ALBUMIN 4.30 2021    AST 11 2021    ALT 8 2021     Lab Results   Component Value Date    WBC 9.51 2021    HGB 12.4 (L) 2021    HCT 48.6 2021    MCV 96.2 2021     2021     Lab Results   Component Value Date    TROPONINI <0.006 2018    TROPONINT <0.010 2021     Lab Results   Component Value Date    PROBNP 135.7 2022      The following portions of the patient's history were reviewed and updated as appropriate: allergies, current medications, past family history, past medical history, past social history, past surgical history and problem list.     Old records reviewed and pertinent information is included in the above objective data.   Assessment/Plan:      Diagnosis Plan   1. Chronic combined systolic and diastolic congestive heart failure (HCC)  BNP    Basic Metabolic Panel    Magnesium    ReDs Vest    Basic Metabolic Panel    Magnesium   2. Cardiac LV ejection fraction of 40+%     3. Essential hypertension     4. CKD (chronic kidney disease) stage 2, GFR 60-89 ml/min     5. Obesity (BMI 30-39.9)       BMP, pro-BNP and magnesium today  Reviewed and discussed results with patient today  ReDs reviewed and discussed today  Due to elevated BP, recommend increasing his Entresto gradually with close monitoring of BP. Plan to increase Entresto to 49/51 po in the am and 24/ in the pm x 1 week and if BP tolerates then increase to 49/51 po BID.   Monitor BP and heart rate  Continue on current medications   Recheck BMP in  2-3 weeks  Entreo and Farxiga - pharmacy for assistance  Keep routine scheduled follow-up with cardiology  Weightloss discussed and healthy lifestyle encouraged  Counseled patient extensively on dietary Na+ intake, importance of activity, weight monitoring, compliance with medications and follow up appointments.  Follow-up in 3 weeks, sooner if needed

## 2022-03-29 ENCOUNTER — TELEPHONE (OUTPATIENT)
Dept: CARDIOLOGY | Facility: CLINIC | Age: 72
End: 2022-03-29

## 2022-03-29 NOTE — TELEPHONE ENCOUNTER
Called pt and avsd patient glucose and trigs were elevated and all other labs were normal. Avsd pt to keep routine follow up visits. Patient voiced understanding and is agreeable.    ----- Message from Philip Willis sent at 3/28/2022  5:27 PM EDT -----    ----- Message -----  From: Dnay San APRN  Sent: 3/28/2022   9:50 AM EDT  To: Roseann Montemayor MA    Other than elevated blood sugar and triglycerides labs are relatively within normal limits.  Keep follow-up.  ----- Message -----  From: Lab, Background User  Sent: 3/25/2022   6:43 PM EDT  To: ANKUR Cuevas

## 2022-03-29 NOTE — PROGRESS NOTES
Called pt and avsd patient glucose and trigs were elevated and all other labs were normal. Avsd pt to keep routine follow up visits. Patient voiced understanding and is agreeable.

## 2022-03-30 ENCOUNTER — TELEPHONE (OUTPATIENT)
Dept: CARDIOLOGY | Facility: CLINIC | Age: 72
End: 2022-03-30

## 2022-03-30 NOTE — TELEPHONE ENCOUNTER
----- Message from ANKUR Cuevas sent at 3/30/2022  8:26 AM EDT -----  Elevated glucose and triglycerides.  Otherwise labs are within normal limits.  Keep follow-up.  ----- Message -----  From: Julisa Corcoran APRN  Sent: 3/29/2022   8:31 AM EDT  To: ANKUR Cuevas      ----- Message -----  From: Julisa Antunez MA  Sent: 3/29/2022   8:29 AM EDT  To: ANKUR Barnes    Called pt and avsd patient glucose and trigs were elevated and all other labs were normal. Avsd pt to keep routine follow up visits. Patient voiced understanding and is agreeable.

## 2022-04-06 ENCOUNTER — TELEPHONE (OUTPATIENT)
Dept: CARDIOLOGY | Facility: CLINIC | Age: 72
End: 2022-04-06

## 2022-04-06 DIAGNOSIS — I10 ESSENTIAL HYPERTENSION: ICD-10-CM

## 2022-04-06 NOTE — TELEPHONE ENCOUNTER
[phone]    Patient walked into clinic stating his blood pressure is high 150/90. We explained that JR is out of office until Monday but that we would discuss with Dr James. I took patient blood pressure it was 118/65 hrt 63 pox 95%.  Patient is insistent that blood pressure will go back up. He was already on Entresto 49/51.

## 2022-04-08 DIAGNOSIS — I10 ESSENTIAL HYPERTENSION: Primary | ICD-10-CM

## 2022-04-08 RX ORDER — HYDROCHLOROTHIAZIDE 25 MG/1
25 TABLET ORAL DAILY
Qty: 90 TABLET | Refills: 3 | Status: SHIPPED | OUTPATIENT
Start: 2022-04-08 | End: 2022-05-11

## 2022-04-08 RX ORDER — HYDROCHLOROTHIAZIDE 25 MG/1
25 TABLET ORAL DAILY
Qty: 90 TABLET | Refills: 3 | Status: SHIPPED | OUTPATIENT
Start: 2022-04-08 | End: 2022-04-08 | Stop reason: SDUPTHER

## 2022-04-08 NOTE — TELEPHONE ENCOUNTER
Patient stated that even with Entresto increased to 49/51 his blood pressure is staying around 150/90's if it does come down it is only staying down a few hours. Patient would like something to take to hold him over

## 2022-04-13 RX ORDER — SACUBITRIL AND VALSARTAN 24; 26 MG/1; MG/1
1 TABLET, FILM COATED ORAL 2 TIMES DAILY
Qty: 60 TABLET | Refills: 5 | OUTPATIENT
Start: 2022-04-13

## 2022-04-15 ENCOUNTER — HOSPITAL ENCOUNTER (OUTPATIENT)
Dept: CARDIOLOGY | Facility: HOSPITAL | Age: 72
Discharge: HOME | End: 2022-04-15
Admitting: NURSE PRACTITIONER

## 2022-04-15 VITALS
WEIGHT: 217.8 LBS | BODY MASS INDEX: 32.26 KG/M2 | HEART RATE: 75 BPM | OXYGEN SATURATION: 97 % | HEIGHT: 69 IN | DIASTOLIC BLOOD PRESSURE: 66 MMHG | SYSTOLIC BLOOD PRESSURE: 121 MMHG

## 2022-04-15 DIAGNOSIS — I50.42 CHRONIC COMBINED SYSTOLIC AND DIASTOLIC CONGESTIVE HEART FAILURE: Primary | ICD-10-CM

## 2022-04-15 DIAGNOSIS — N18.2 CKD (CHRONIC KIDNEY DISEASE) STAGE 2, GFR 60-89 ML/MIN: Chronic | ICD-10-CM

## 2022-04-15 DIAGNOSIS — I10 ESSENTIAL HYPERTENSION: Chronic | ICD-10-CM

## 2022-04-15 DIAGNOSIS — I25.5 ISCHEMIC CARDIOMYOPATHY: ICD-10-CM

## 2022-04-15 DIAGNOSIS — E66.9 OBESITY (BMI 30-39.9): Chronic | ICD-10-CM

## 2022-04-15 LAB
ABSOLUTE LUNG FLUID CONTENT: 37 % (ref 20–35)
ANION GAP SERPL CALCULATED.3IONS-SCNC: 13.1 MMOL/L (ref 5–15)
BUN SERPL-MCNC: 22 MG/DL (ref 8–23)
BUN/CREAT SERPL: 16.5 (ref 7–25)
CALCIUM SPEC-SCNC: 9.4 MG/DL (ref 8.6–10.5)
CHLORIDE SERPL-SCNC: 98 MMOL/L (ref 98–107)
CO2 SERPL-SCNC: 22.9 MMOL/L (ref 22–29)
CREAT SERPL-MCNC: 1.33 MG/DL (ref 0.76–1.27)
EGFRCR SERPLBLD CKD-EPI 2021: 56.8 ML/MIN/1.73
GLUCOSE SERPL-MCNC: 389 MG/DL (ref 65–99)
MAGNESIUM SERPL-MCNC: 1.6 MG/DL (ref 1.6–2.4)
NT-PROBNP SERPL-MCNC: 184.6 PG/ML (ref 0–900)
POTASSIUM SERPL-SCNC: 4.3 MMOL/L (ref 3.5–5.2)
SODIUM SERPL-SCNC: 134 MMOL/L (ref 136–145)

## 2022-04-15 PROCEDURE — 83735 ASSAY OF MAGNESIUM: CPT | Performed by: NURSE PRACTITIONER

## 2022-04-15 PROCEDURE — 36415 COLL VENOUS BLD VENIPUNCTURE: CPT | Performed by: NURSE PRACTITIONER

## 2022-04-15 PROCEDURE — 83880 ASSAY OF NATRIURETIC PEPTIDE: CPT

## 2022-04-15 PROCEDURE — 80048 BASIC METABOLIC PNL TOTAL CA: CPT | Performed by: NURSE PRACTITIONER

## 2022-04-15 PROCEDURE — 99213 OFFICE O/P EST LOW 20 MIN: CPT | Performed by: NURSE PRACTITIONER

## 2022-04-15 PROCEDURE — 94726 PLETHYSMOGRAPHY LUNG VOLUMES: CPT | Performed by: NURSE PRACTITIONER

## 2022-04-15 RX ORDER — MAG HYDROX/ALUMINUM HYD/SIMETH 400-400-40
1200 SUSPENSION, ORAL (FINAL DOSE FORM) ORAL 2 TIMES DAILY
COMMUNITY

## 2022-04-15 RX ORDER — UREA 10 %
800 LOTION (ML) TOPICAL DAILY
COMMUNITY

## 2022-04-15 RX ORDER — CRANBERRY FRUIT EXTRACT 250 MG
25000 CAPSULE ORAL DAILY
COMMUNITY

## 2022-04-15 NOTE — PROGRESS NOTES
Heart Failure Pharmacy Note  Patient Name: William Villasenor   Referring Provider: Maddi Munoz  Primary Cardiologist: Dany PASCUAL  CHF: HFrEF    Medication Use:   Adherence:not currently  Hx of med intolerances: None reported  Affordability: Farxiga, Entresto  Retail Rx Management: Farxiga, Entresto through [employer] patient assistance program. Approval through 6/23/22 then will need review and new application.  Will need new Farxiga script sent in at May 2022 visit for patient assistance (will be out of refills by then     Past Medical History:   Diagnosis Date   • Arthritis    • Back pain    • BPH (benign prostatic hyperplasia)    • Chronic diastolic (congestive) heart failure (HCC)    • Diabetes mellitus (HCC)    • Diabetic peripheral neuropathy (HCC)    • Erectile dysfunction    • Former smoker    • GERD (gastroesophageal reflux disease)    • GERD (gastroesophageal reflux disease)    • Gout    • Hemorrhoids    • High cholesterol    • Hypertension    • Obesity      ALLERGIES: Lisinopril  Current Outpatient Medications   Medication Sig Dispense Refill   • acetaminophen (TYLENOL) 500 MG tablet Take 500 mg by mouth Every 6 (Six) Hours As Needed for Mild Pain .     • allopurinol (ZYLOPRIM) 300 MG tablet TAKE 1 TABLET EVERY DAY 90 tablet 0   • APPLE CIDER VINEGAR PO Take 1 tablet by mouth Daily.     • Ascorbic Acid (Vitamin C) 500 MG capsule Take 2,000 mg by mouth Daily. 2000 in am 1000 at night     • aspirin 81 MG EC tablet Take 81 mg by mouth Daily.     • atorvastatin (LIPITOR) 40 MG tablet Take 1 tablet by mouth Every Night. 90 tablet 0   • CALCIUM-MAGNESIUM-ZINC PO Take 1 tablet by mouth 2 (two) times a day.     • Chromium 200 MCG capsule Take 1 capsule by mouth 2 (Two) Times a Day.     • Cranberry 250 MG capsule Take 1 capsule by mouth Daily.     • Dapagliflozin Propanediol (Farxiga) 10 MG tablet Take 1 tablet by mouth Daily. 30 tablet 5   • doxazosin (CARDURA) 4 MG tablet TAKE 1 TABLET EVERY DAY  (Patient taking differently: Take 4 mg by mouth Every Night.) 90 tablet 0   • finasteride (PROSCAR) 5 MG tablet Take 1 tablet by mouth Daily. 90 tablet 0   • folic acid (FOLVITE) 800 MCG tablet Take 800 mcg by mouth Daily.     • gabapentin (NEURONTIN) 300 MG capsule Take 1 capsule by mouth 2 (two) times a day. 60 capsule 0   • glipizide (GLUCOTROL) 5 MG tablet Take 1 tablet by mouth 2 (Two) Times a Day Before Meals. 180 tablet 3   • hydroCHLOROthiazide (HYDRODIURIL) 25 MG tablet Take 1 tablet by mouth Daily. 90 tablet 3   • isosorbide mononitrate (IMDUR) 60 MG 24 hr tablet Take 1 tablet by mouth Daily. 90 tablet 0   • loratadine (CLARITIN) 10 MG tablet Take 10 mg by mouth Daily As Needed for Allergies.     • metFORMIN (GLUCOPHAGE) 1000 MG tablet Take 1 tablet by mouth 2 (Two) Times a Day With Meals. 180 tablet 0   • metoprolol succinate XL (Toprol XL) 100 MG 24 hr tablet Take 1 tablet by mouth Daily. 90 tablet 3   • Misc Natural Products (LUTEIN 20 PO) Take 20 mg by mouth Daily.     • multivitamin (multivitamin) tablet tablet Take 1 tablet by mouth Daily. 30 tablet    • nitroglycerin (NITROSTAT) 0.4 MG SL tablet Place 1 tablet under the tongue Every 5 (Five) Minutes As Needed for Chest Pain (Only if SBP Greater Than 100). Take no more than 3 doses in 15 minutes. 100 tablet 12   • Omega-3 Fatty Acids (Fish Oil Triple Strength) 1400 MG capsule Take 1,400 mg by mouth 2 (Two) Times a Day.     • pantoprazole (PROTONIX) 40 MG EC tablet TAKE 1 TABLET EVERY DAY 90 tablet 3   • sacubitril-valsartan (ENTRESTO) 49-51 MG tablet Take 1 tablet by mouth 2 (Two) Times a Day. 60 tablet 2   • vitamin E 400 UNIT capsule Take 1 capsule by mouth Daily.       No current facility-administered medications for this encounter.       Vaccination History:   Pneumonia: Declines  Annual Influenza: Needs - declines    Objective  Vitals:    04/15/22 1018   BP: 121/66   BP Location: Left arm   Patient Position: Sitting   Cuff Size: Adult   Pulse:  "75   SpO2: 97%   Weight: 98.8 kg (217 lb 12.8 oz)   Height: 175.3 cm (69\")     Wt Readings from Last 3 Encounters:   04/15/22 98.8 kg (217 lb 12.8 oz)   03/25/22 100 kg (221 lb 6.4 oz)   03/04/22 99.1 kg (218 lb 6.4 oz)         04/15/22  1018   Weight: 98.8 kg (217 lb 12.8 oz)     Lab Results   Component Value Date    GLUCOSE 389 (H) 04/15/2022    BUN 22 04/15/2022    CREATININE 1.33 (H) 04/15/2022    EGFRIFNONA 58 (L) 11/24/2021    BCR 16.5 04/15/2022    K 4.3 04/15/2022    CO2 22.9 04/15/2022    CALCIUM 9.4 04/15/2022    ALBUMIN 4.30 03/25/2022    AST 9 03/25/2022    ALT 9 03/25/2022     Lab Results   Component Value Date    WBC 6.36 03/25/2022    HGB 13.6 03/25/2022    HCT 43.2 03/25/2022    MCV 83.2 03/25/2022     03/25/2022     Lab Results   Component Value Date    TROPONINI <0.006 08/07/2018    TROPONINT <0.010 04/21/2021     Lab Results   Component Value Date    PROBNP 184.6 04/15/2022     Results for orders placed during the hospital encounter of 06/21/21    Adult Transthoracic Echo Limited W/ Cont if Necessary Per Protocol    Interpretation Summary  Moderately to severely technically limited study.  Parasternal images are uninterpretable.    1.  Global LV systolic function appears to be mildly impaired.  There are no large dense focal wall motion abnormalities but visually estimated ejection fraction is 40±5%.  There is mild to moderate concentric left ventricular hypertrophy.  There is mild biatrial enlargement.  RV size and function are grossly preserved.    2.  Valves are grossly morphologically normal.    3.  No large pericardial effusion.  The proximal aorta is not dilated.           GDMT:       Class   Drug   Dose Last Dose Adjustment   ACEi/ARB/ARNI Entresto 49-51 mg BID ~4/1/22   Beta Blocker Toprol XL  100mg QDay 4/19/21   MRA        Drug Therapy Problems      1. GDMT    Recommendations:      1. Pt recently started on HCTZ 25 mg daily by cardiology for hypertension. Patient reports improved " BP since starting the past few days (120s/60s-70s). Also recently increased Entresto to 49/51mg BID. Recommend to continue to monitor renal function; slight bump in SCr today.  Could consider spironolactone in the future if renal function/labs allow. 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      Patient was educated on heart failure medications and the importance of medication adherence. All questions were addressed and patient expressed understanding.     Thank you for allowing me to participate in the care of your patient,    Carina Eldridge, PharmD  04/15/22  12:02 EDT

## 2022-04-15 NOTE — PROGRESS NOTES
Heart Failure Clinic    Date: 04/15/22     Vitals:    04/15/22 1018   BP: 121/66   Pulse: 75   SpO2: 97%    weight 217.8    Method of arrival: Ambulatory    Weighing self daily: No    Monitoring Heart Failure Zones: Most days    Today's HF Zone: Green    Taking medications as prescribed: Yes    Edema No    Shortness of Air: No    Number of pillows used at night:<2    Educational Materials given:  avs                                                                          ReDS Value: 37  36-41 Possible Hypervolemic Status      Beni Moran RN 04/15/22 10:21 EDT

## 2022-04-15 NOTE — PROGRESS NOTES
[facility], [place] CHF CLINIC OFFICE VISIT    Subjective:   History of Present Illness   William Villasenor is a 70-year-old  male who presents to the clinic today for follow up on heart failure. He has underlying history of chronic combined systolic and diastolic congestive heart failure with an LVEF of 40-45% from echocardiogram on 6/21/2021 ready by Dr. Kennedy. He takes metoprolol  mg daily, Entresto 49/51 mg BID, Magnesium 400 mg BID, Farxiga 10 mg daily, HCTZ 25 mg daily     Needing help with costs of Entresto and Farxiga   Recently visit with medication changes due to elevated BP. Pt reports he called cardiology who added HCTZ and BP's have improved 120-130/80  Shortness of air is stable   Denies leg swelling. Discontinued Bumex and doing well without Bumex   Urine output good  He feels as if he is continuing to regain strength  Reports they monitor his sodium intake and fluid intake  Home weight stable   Home HR 60-80 - denies bradycardia, dizziness, lightheadedness or syncope     PCP: Dr. Gatica  Cardiologist: Dr. Simms/ANKUR Cuevas    Hospitalizations: Discharged on 4/9/2021  Past Medical History:   Diagnosis Date   • Arthritis    • Back pain    • BPH (benign prostatic hyperplasia)    • Chronic diastolic (congestive) heart failure (HCC)    • Diabetes mellitus (HCC)    • Diabetic peripheral neuropathy (HCC)    • Erectile dysfunction    • Former smoker    • GERD (gastroesophageal reflux disease)    • GERD (gastroesophageal reflux disease)    • Gout    • Hemorrhoids    • High cholesterol    • Hypertension    • Obesity      Past Surgical History:   Procedure Laterality Date   • CARDIAC CATHETERIZATION N/A 3/15/2021    Procedure: Left Heart Cath;  Surgeon: Trent Zaragoza MD;  Location: [facility] INVASIVE LOCATION;  Service: Cardiology;  Laterality: N/A;   • CARDIAC CATHETERIZATION N/A 3/15/2021    Procedure: Coronary angiography;  Surgeon: Trnet Zaragoza MD;  Location: [facility] INVASIVE LOCATION;   Service: Cardiology;  Laterality: N/A;   • COLONOSCOPY W/ BIOPSIES     • CORONARY ARTERY BYPASS GRAFT N/A 3/16/2021    Procedure: MEDIAN STERNOTOMY, CORONARY ARTERY BYPASS GRAFT X4, UTILIZATION OF LEFT INTERNAL MAMMARY ARTERY, AND ENDOSCOPIC VEIN HARVEST OF RIGHT GREATER SAPHENOUS VEIN;  Surgeon: Jorge Simms MD;  Location: [facility];  Service: Cardiothoracic;  Laterality: N/A;  vein out- 1614  vein ready- 1628             Social History     Socioeconomic History   • Marital status:    • Number of children: 3   Tobacco Use   • Smoking status: Former Smoker     Packs/day: 1.00     Years: 5.00     Pack years: 5.00     Quit date: 1974     Years since quittin.2   • Smokeless tobacco: Never Used   Substance and Sexual Activity   • Alcohol use: Never     Comment: quit in    • Drug use: Never   • Sexual activity: Defer     Family History   Problem Relation Age of Onset   • Heart disease Mother    • Hypertension Mother    • Depression Mother    • Alcohol abuse Maternal Uncle    • Heart disease Maternal Grandmother    • Hypertension Maternal Grandmother    • Diabetes Maternal Grandmother    • Diabetes Paternal Grandmother    • No Known Problems Half-Brother    • No Known Problems Half-Brother    • No Known Problems Half-Sister    • No Known Problems Daughter    • No Known Problems Daughter    • Hyperlipidemia Daughter      Allergies:  Allergies   Allergen Reactions   • Lisinopril Cough     Review of Systems   Constitutional: Negative for chills, fever, weight gain and weight loss.   HENT: Negative for congestion, hoarse voice and sore throat.    Eyes: Negative for blurred vision, pain and visual disturbance.   Cardiovascular: Negative for chest pain, claudication, cyanosis, dyspnea on exertion, irregular heartbeat, leg swelling, near-syncope, orthopnea, palpitations and syncope.   Respiratory: Negative for cough, shortness of breath and wheezing.    Endocrine: Negative for cold intolerance, heat  intolerance and polyuria.   Hematologic/Lymphatic: Negative for bleeding problem. Does not bruise/bleed easily.   Skin: Negative for color change, flushing and poor wound healing.   Musculoskeletal: Negative for arthritis, back pain, joint pain and myalgias.   Gastrointestinal: Negative for abdominal pain, constipation, diarrhea, nausea and vomiting.   Genitourinary: Negative for dysuria, frequency, hesitancy and urgency.   Neurological: Negative for excessive daytime sleepiness, dizziness, headaches, numbness, vertigo and weakness.   Psychiatric/Behavioral: Negative for depression. The patient does not have insomnia and is not nervous/anxious.    All other systems reviewed and are negative.    Current Outpatient Medications   Medication Sig Dispense Refill   • acetaminophen (TYLENOL) 500 MG tablet Take 500 mg by mouth Every 6 (Six) Hours As Needed for Mild Pain .     • allopurinol (ZYLOPRIM) 300 MG tablet TAKE 1 TABLET EVERY DAY 90 tablet 0   • APPLE CIDER VINEGAR PO Take 1 tablet by mouth Daily.     • Ascorbic Acid (Vitamin C) 500 MG capsule Take 2,000 mg by mouth Daily. 2000 in am 1000 at night     • aspirin 81 MG EC tablet Take 81 mg by mouth Daily.     • atorvastatin (LIPITOR) 40 MG tablet Take 1 tablet by mouth Every Night. 90 tablet 0   • CALCIUM-MAGNESIUM-ZINC PO Take 1 tablet by mouth 2 (two) times a day.     • Chromium 200 MCG capsule Take 1 capsule by mouth 2 (Two) Times a Day.     • Cranberry 250 MG capsule Take 1 capsule by mouth Daily.     • Dapagliflozin Propanediol (Farxiga) 10 MG tablet Take 1 tablet by mouth Daily. 30 tablet 5   • doxazosin (CARDURA) 4 MG tablet TAKE 1 TABLET EVERY DAY (Patient taking differently: Take 4 mg by mouth Every Night.) 90 tablet 0   • finasteride (PROSCAR) 5 MG tablet Take 1 tablet by mouth Daily. 90 tablet 0   • folic acid (FOLVITE) 800 MCG tablet Take 800 mcg by mouth Daily.     • gabapentin (NEURONTIN) 300 MG capsule Take 1 capsule by mouth 2 (two) times a day. 60  "capsule 0   • glipizide (GLUCOTROL) 5 MG tablet Take 1 tablet by mouth 2 (Two) Times a Day Before Meals. 180 tablet 3   • hydroCHLOROthiazide (HYDRODIURIL) 25 MG tablet Take 1 tablet by mouth Daily. 90 tablet 3   • isosorbide mononitrate (IMDUR) 60 MG 24 hr tablet Take 1 tablet by mouth Daily. 90 tablet 0   • loratadine (CLARITIN) 10 MG tablet Take 10 mg by mouth Daily As Needed for Allergies.     • metFORMIN (GLUCOPHAGE) 1000 MG tablet Take 1 tablet by mouth 2 (Two) Times a Day With Meals. 180 tablet 0   • metoprolol succinate XL (Toprol XL) 100 MG 24 hr tablet Take 1 tablet by mouth Daily. 90 tablet 3   • Misc Natural Products (LUTEIN 20 PO) Take 20 mg by mouth Daily.     • multivitamin (multivitamin) tablet tablet Take 1 tablet by mouth Daily. 30 tablet    • nitroglycerin (NITROSTAT) 0.4 MG SL tablet Place 1 tablet under the tongue Every 5 (Five) Minutes As Needed for Chest Pain (Only if SBP Greater Than 100). Take no more than 3 doses in 15 minutes. 100 tablet 12   • Omega-3 Fatty Acids (Fish Oil Triple Strength) 1400 MG capsule Take 1,400 mg by mouth 2 (Two) Times a Day.     • pantoprazole (PROTONIX) 40 MG EC tablet TAKE 1 TABLET EVERY DAY 90 tablet 3   • sacubitril-valsartan (ENTRESTO) 49-51 MG tablet Take 1 tablet by mouth 2 (Two) Times a Day. 60 tablet 2   • vitamin E 400 UNIT capsule Take 1 capsule by mouth Daily.       No current facility-administered medications for this encounter.      Objective:   Body mass index is 32.16 kg/m².  Vitals:    04/15/22 1018   BP: 121/66   BP Location: Left arm   Patient Position: Sitting   Cuff Size: Adult   Pulse: 75   SpO2: 97%   Weight: 98.8 kg (217 lb 12.8 oz)   Height: 175.3 cm (69\")     Vitals:    04/15/22 1018   BP: 121/66   Pulse: 75   SpO2: 97%     Body mass index is 32.16 kg/m².      Lab Results   Component Value Date    ABSOLUTELUNG 37 (A) 04/15/2022     Wt Readings from Last 3 Encounters:   04/15/22 98.8 kg (217 lb 12.8 oz)   03/25/22 100 kg (221 lb 6.4 oz) "   03/04/22 99.1 kg (218 lb 6.4 oz)     Lab Results   Component Value Date    ABSOLUTELUNG 37 (A) 04/15/2022   ReDs - 39% 11/24/2021   ReDs - 34% (4/21/2021)    Vitals reviewed.   Constitutional:       Appearance: Normal appearance. Well-developed.   Eyes:      Conjunctiva/sclera: Conjunctivae normal.   HENT:      Head: Normocephalic.   Neck:      Vascular: No JVD or JVR.   Pulmonary:      Effort: Pulmonary effort is normal.      Breath sounds: Normal breath sounds.   Chest:   Breasts:      Left: No tenderness.        Comments: CABG incision healed   Cardiovascular:      Normal rate. Regular rhythm.   Edema:     Ankle: bilateral trace edema of the ankle.     Feet: bilateral trace edema of the feet.  Abdominal:      General: Bowel sounds are normal.      Palpations: Abdomen is soft. There is no hepatomegaly or splenomegaly.   Musculoskeletal: Normal range of motion.      Cervical back: Normal range of motion and neck supple. Skin:     General: Skin is warm and dry.           Comments: Healed vein grafting site    Neurological:      Mental Status: Alert and oriented to person, place, and time.   Psychiatric:         Attention and Perception: Attention normal.         Mood and Affect: Mood normal.         Speech: Speech normal.         Behavior: Behavior normal. Behavior is cooperative.         Cognition and Memory: Cognition normal.       Cardiographics  Results for orders placed during the hospital encounter of 06/21/21    Adult Transthoracic Echo Limited W/ Cont if Necessary Per Protocol    Interpretation Summary  Moderately to severely technically limited study.  Parasternal images are uninterpretable.    1.  Global LV systolic function appears to be mildly impaired.  There are no large dense focal wall motion abnormalities but visually estimated ejection fraction is 40±5%.  There is mild to moderate concentric left ventricular hypertrophy.  There is mild biatrial enlargement.  RV size and function are grossly  preserved.    2.  Valves are grossly morphologically normal.    3.  No large pericardial effusion.  The proximal aorta is not dilated.    [id number]      Lab Review   Lab Results   Component Value Date    TSH 2.010 2021     Lab Results   Component Value Date    GLUCOSE 389 (H) 04/15/2022    BUN 22 04/15/2022    CREATININE 1.33 (H) 04/15/2022    EGFRIFNONA 58 (L) 2021    BCR 16.5 04/15/2022    K 4.3 04/15/2022    CO2 22.9 04/15/2022    CALCIUM 9.4 04/15/2022    ALBUMIN 4.30 2022    AST 9 2022    ALT 9 2022     Lab Results   Component Value Date    WBC 6.36 2022    HGB 13.6 2022    HCT 43.2 2022    MCV 83.2 2022     2022     Lab Results   Component Value Date    TROPONINI <0.006 2018    TROPONINT <0.010 2021     Lab Results   Component Value Date    PROBNP 184.6 04/15/2022      The following portions of the patient's history were reviewed and updated as appropriate: allergies, current medications, past family history, past medical history, past social history, past surgical history and problem list.     Old records reviewed and pertinent information is included in the above objective data.   Assessment/Plan:      Diagnosis Plan   1. Chronic combined systolic and diastolic congestive heart failure (HCC)  BNP    Basic Metabolic Panel    Magnesium    ReDs Vest    Basic Metabolic Panel    Magnesium   2. Ischemic cardiomyopathy     3. Essential hypertension     4. CKD (chronic kidney disease) stage 2, GFR 60-89 ml/min     5. Obesity (BMI 30-39.9)       BMP, pro-BNP and magnesium today  Reviewed and discussed results with patient today  ReDs reviewed and discussed today  Very mildly elevated serum creatinine, will plan to continue to monitor   BP improved, recommend continuing current doses of medications   Monitor BP and heart rate  [facility] - pharmacy for assistance  Keep routine scheduled follow-up with cardiology  Weightlo  discussed and healthy lifestyle encouraged  Counseled patient extensively on dietary Na+ intake, importance of activity, weight monitoring, compliance with medications and follow up appointments.  Follow-up in 6 weeks, sooner if needed

## 2022-05-11 ENCOUNTER — TELEPHONE (OUTPATIENT)
Dept: CARDIOLOGY | Facility: CLINIC | Age: 72
End: 2022-05-11

## 2022-05-11 DIAGNOSIS — I50.42 CHRONIC COMBINED SYSTOLIC AND DIASTOLIC CONGESTIVE HEART FAILURE: Primary | ICD-10-CM

## 2022-05-11 DIAGNOSIS — I25.5 ISCHEMIC CARDIOMYOPATHY: ICD-10-CM

## 2022-05-11 NOTE — TELEPHONE ENCOUNTER
Patient called and states since starting HCTZ, he has been having uncontrolled bowl movements and urinating, cant be in sunlight too long,belching,had a blister on right side of leg with pus. Sx x3 weeks.    135/80 yesterday with HCTZ  This am BP was 166/84 with no medication.    BP while on phone today 121/62 hr 66

## 2022-05-11 NOTE — TELEPHONE ENCOUNTER
Per ANKUR Cuevas  Stop HCTZ, increase Entresto      Pt verbally understands, I advised any new or worsening sx go to ER.    HEMA HUTCHINSON MA

## 2022-05-12 RX ORDER — SACUBITRIL AND VALSARTAN 97; 103 MG/1; MG/1
1 TABLET, FILM COATED ORAL 2 TIMES DAILY
Qty: 60 TABLET | Refills: 0 | Status: SHIPPED | OUTPATIENT
Start: 2022-05-12 | End: 2022-06-06 | Stop reason: SDUPTHER

## 2022-05-12 NOTE — PROGRESS NOTES
Pt dose of Entresto increased by Cardiology. Patient currently receiving assistance. Have spoke with ANKUR Del Toro, and she is good to send in increased dose of Entresto until next follow-up. Sent Rx in to the  [facility].       Thank you,    Annie Carter. Tavo, PharmD  05/12/22  12:46 EDT

## 2022-06-06 ENCOUNTER — HOSPITAL ENCOUNTER (OUTPATIENT)
Dept: CARDIOLOGY | Facility: HOSPITAL | Age: 72
Discharge: HOME | End: 2022-06-06
Admitting: NURSE PRACTITIONER

## 2022-06-06 ENCOUNTER — TELEPHONE (OUTPATIENT)
Dept: CARDIOLOGY | Facility: CLINIC | Age: 72
End: 2022-06-06

## 2022-06-06 VITALS
WEIGHT: 215.6 LBS | SYSTOLIC BLOOD PRESSURE: 140 MMHG | DIASTOLIC BLOOD PRESSURE: 82 MMHG | OXYGEN SATURATION: 97 % | BODY MASS INDEX: 31.93 KG/M2 | HEART RATE: 73 BPM | HEIGHT: 69 IN

## 2022-06-06 DIAGNOSIS — I50.42 CHRONIC COMBINED SYSTOLIC AND DIASTOLIC CONGESTIVE HEART FAILURE: Primary | ICD-10-CM

## 2022-06-06 DIAGNOSIS — I10 ESSENTIAL HYPERTENSION: Chronic | ICD-10-CM

## 2022-06-06 DIAGNOSIS — I25.5 ISCHEMIC CARDIOMYOPATHY: ICD-10-CM

## 2022-06-06 DIAGNOSIS — R73.09 ELEVATED GLUCOSE: ICD-10-CM

## 2022-06-06 DIAGNOSIS — N18.2 CKD (CHRONIC KIDNEY DISEASE) STAGE 2, GFR 60-89 ML/MIN: Chronic | ICD-10-CM

## 2022-06-06 LAB
ABSOLUTE LUNG FLUID CONTENT: 37 % (ref 20–35)
ANION GAP SERPL CALCULATED.3IONS-SCNC: 12.4 MMOL/L (ref 5–15)
BUN SERPL-MCNC: 15 MG/DL (ref 8–23)
BUN/CREAT SERPL: 11.3 (ref 7–25)
CALCIUM SPEC-SCNC: 9.2 MG/DL (ref 8.6–10.5)
CHLORIDE SERPL-SCNC: 98 MMOL/L (ref 98–107)
CO2 SERPL-SCNC: 22.6 MMOL/L (ref 22–29)
CREAT SERPL-MCNC: 1.33 MG/DL (ref 0.76–1.27)
EGFRCR SERPLBLD CKD-EPI 2021: 56.4 ML/MIN/1.73
GLUCOSE SERPL-MCNC: 424 MG/DL (ref 65–99)
MAGNESIUM SERPL-MCNC: 2 MG/DL (ref 1.6–2.4)
NT-PROBNP SERPL-MCNC: 115.6 PG/ML (ref 0–900)
POTASSIUM SERPL-SCNC: 4.9 MMOL/L (ref 3.5–5.2)
SODIUM SERPL-SCNC: 133 MMOL/L (ref 136–145)

## 2022-06-06 PROCEDURE — 99213 OFFICE O/P EST LOW 20 MIN: CPT | Performed by: NURSE PRACTITIONER

## 2022-06-06 PROCEDURE — 36415 COLL VENOUS BLD VENIPUNCTURE: CPT | Performed by: NURSE PRACTITIONER

## 2022-06-06 PROCEDURE — 83880 ASSAY OF NATRIURETIC PEPTIDE: CPT

## 2022-06-06 PROCEDURE — 80048 BASIC METABOLIC PNL TOTAL CA: CPT | Performed by: NURSE PRACTITIONER

## 2022-06-06 PROCEDURE — 83735 ASSAY OF MAGNESIUM: CPT | Performed by: NURSE PRACTITIONER

## 2022-06-06 PROCEDURE — 94726 PLETHYSMOGRAPHY LUNG VOLUMES: CPT | Performed by: NURSE PRACTITIONER

## 2022-06-06 RX ORDER — SACUBITRIL AND VALSARTAN 97; 103 MG/1; MG/1
1 TABLET, FILM COATED ORAL 2 TIMES DAILY
Qty: 60 TABLET | Refills: 5 | Status: SHIPPED | OUTPATIENT
Start: 2022-06-06 | End: 2022-09-12 | Stop reason: SDUPTHER

## 2022-06-06 RX ORDER — GUAIFENESIN/PHENYLPROPANOLAMIN
550 EXPECTORANT ORAL 2 TIMES DAILY
COMMUNITY
End: 2022-06-24

## 2022-06-06 RX ORDER — DAPAGLIFLOZIN 10 MG/1
10 TABLET, FILM COATED ORAL DAILY
Qty: 30 TABLET | Refills: 5 | Status: SHIPPED | OUTPATIENT
Start: 2022-06-06 | End: 2022-09-12 | Stop reason: SDUPTHER

## 2022-06-06 NOTE — PROGRESS NOTES
Heart Failure Clinic    Date: 06/06/22     Vitals:    06/06/22 1047   BP: 140/82   Pulse: 73   SpO2: 97%    [id number]    Method of arrival: Ambulatory    Weighing self daily: Yes    Monitoring Heart Failure Zones: Yes    Today's HF Zone: Green    Taking medications as prescribed: Yes    Edema No    Shortness of Air: No    Number of pillows used at night:<2    Educational Materials given:  avs                                                                         ReDS Value:   37  36-41 Possible Hypervolemic Status      Beni Moran RN 06/06/22 10:52 EDT

## 2022-06-06 NOTE — PROGRESS NOTES
[facility] CHF CLINIC OFFICE VISIT    Subjective:   History of Present Illness   William Villasenor is a 70-year-old  male who presents to the clinic today for follow up on heart failure. He has underlying history of chronic combined systolic and diastolic congestive heart failure with an LVEF of 40-45% from echocardiogram on 6/21/2021 ready by Dr. Kennedy. He takes metoprolol  mg daily, Entresto 97/103 mg BID, Magnesium 400 mg BID, Farxiga 10 mg daily.     Needing help with costs of Entresto and Farxiga   Recently cardiology added HCTZ for BP control however due to side effects its was discontinued and Entresto was increased. He reports doing well with transition. Home /60-70  Shortness of air is stable   Denies leg swelling.   Bumex prn and hasn't used diuretics in quite some time   Urine output good  He feels as if he is continuing to regain strength  Reports they monitor his sodium intake and fluid intake  Home weight stable   Home HR 60-80 - denies bradycardia, dizziness, lightheadedness or syncope     PCP: Dr. Gatica  Cardiologist: Dr. Simms/ANKUR Cuevas    Hospitalizations: Discharged on 4/9/2021  Past Medical History:   Diagnosis Date   • Arthritis    • Back pain    • BPH (benign prostatic hyperplasia)    • Chronic diastolic (congestive) heart failure (HCC)    • Diabetes mellitus (HCC)    • Diabetic peripheral neuropathy (HCC)    • Erectile dysfunction    • Former smoker    • GERD (gastroesophageal reflux disease)    • GERD (gastroesophageal reflux disease)    • Gout    • Hemorrhoids    • High cholesterol    • Hypertension    • Obesity      Past Surgical History:   Procedure Laterality Date   • CARDIAC CATHETERIZATION N/A 3/15/2021    Procedure: Left Heart Cath;  Surgeon: Trent Zaragoza MD;  Location: [facility] INVASIVE LOCATION;  Service: Cardiology;  Laterality: N/A;   • CARDIAC CATHETERIZATION N/A 3/15/2021    Procedure: Coronary angiography;  Surgeon: Trent Zaragoza MD;  Location:   [facility] CATH INVASIVE LOCATION;  Service: Cardiology;  Laterality: N/A;   • COLONOSCOPY W/ BIOPSIES     • CORONARY ARTERY BYPASS GRAFT N/A 3/16/2021    Procedure: MEDIAN STERNOTOMY, CORONARY ARTERY BYPASS GRAFT X4, UTILIZATION OF LEFT INTERNAL MAMMARY ARTERY, AND ENDOSCOPIC VEIN HARVEST OF RIGHT GREATER SAPHENOUS VEIN;  Surgeon: Jorge Simms MD;  [facility]: [facility];  Service: Cardiothoracic;  Laterality: N/A;  vein out- 1614  vein ready- 1628             Social History     Socioeconomic History   • Marital status:    • Number of children: 3   Tobacco Use   • Smoking status: Former Smoker     Packs/day: 1.00     Years: 5.00     Pack years: 5.00     Quit date: 1974     Years since quittin.3   • Smokeless tobacco: Never Used   Substance and Sexual Activity   • Alcohol use: Never     Comment: quit in    • Drug use: Never   • Sexual activity: Defer     Family History   Problem Relation Age of Onset   • Heart disease Mother    • Hypertension Mother    • Depression Mother    • Alcohol abuse Maternal Uncle    • Heart disease Maternal Grandmother    • Hypertension Maternal Grandmother    • Diabetes Maternal Grandmother    • Diabetes Paternal Grandmother    • No Known Problems Half-Brother    • No Known Problems Half-Brother    • No Known Problems Half-Sister    • No Known Problems Daughter    • No Known Problems Daughter    • Hyperlipidemia Daughter      Allergies:  Allergies   Allergen Reactions   • Lisinopril Cough     Review of Systems   Constitutional: Negative for chills, fever, weight gain and weight loss.   HENT: Negative for congestion, hoarse voice and sore throat.    Eyes: Negative for blurred vision, pain and visual disturbance.   Cardiovascular: Negative for chest pain, claudication, cyanosis, dyspnea on exertion, irregular heartbeat, leg swelling, near-syncope, orthopnea, palpitations and syncope.   Respiratory: Negative for cough, shortness of breath and wheezing.    Endocrine:  Negative for cold intolerance, heat intolerance and polyuria.   Hematologic/Lymphatic: Negative for bleeding problem. Does not bruise/bleed easily.   Skin: Negative for color change, flushing and poor wound healing.   Musculoskeletal: Negative for arthritis, back pain, joint pain and myalgias.   Gastrointestinal: Negative for abdominal pain, constipation, diarrhea, nausea and vomiting.   Genitourinary: Negative for dysuria, frequency, hesitancy and urgency.   Neurological: Negative for excessive daytime sleepiness, dizziness, headaches, numbness, vertigo and weakness.   Psychiatric/Behavioral: Negative for depression. The patient does not have insomnia and is not nervous/anxious.    All other systems reviewed and are negative.    Current Outpatient Medications   Medication Sig Dispense Refill   • allopurinol (ZYLOPRIM) 300 MG tablet TAKE 1 TABLET EVERY DAY 90 tablet 0   • APPLE CIDER VINEGAR PO Take 1 tablet by mouth Daily.     • Ascorbic Acid (Vitamin C) 500 MG capsule Take 2,000 mg by mouth Daily. 2000 in am 1000 at night     • aspirin 81 MG EC tablet Take 81 mg by mouth Daily.     • atorvastatin (LIPITOR) 40 MG tablet Take 1 tablet by mouth Every Night. 90 tablet 0   • CALCIUM-MAGNESIUM-ZINC PO Take 1 tablet by mouth 2 (two) times a day.     • Chromium 200 MCG capsule Take 1 capsule by mouth 2 (Two) Times a Day.     • Cranberry 250 MG capsule Take 25,000 mg by mouth Daily.     • dapagliflozin Propanediol (Farxiga) 10 MG tablet Take 1 tablet by mouth Daily. 30 tablet 5   • doxazosin (CARDURA) 4 MG tablet TAKE 1 TABLET EVERY DAY (Patient taking differently: Take 4 mg by mouth Every Night.) 90 tablet 0   • finasteride (PROSCAR) 5 MG tablet Take 1 tablet by mouth Daily. 90 tablet 0   • folic acid (FOLVITE) 800 MCG tablet Take 800 mcg by mouth Daily.     • gabapentin (NEURONTIN) 300 MG capsule Take 1 capsule by mouth 2 (two) times a day. 60 capsule 0   • glipizide (GLUCOTROL) 5 MG tablet Take 1 tablet by mouth 2 (Two)  "Times a Day Before Meals. 180 tablet 3   • metFORMIN (GLUCOPHAGE) 1000 MG tablet Take 1 tablet by mouth 2 (Two) Times a Day With Meals. 180 tablet 0   • metoprolol succinate XL (Toprol XL) 100 MG 24 hr tablet Take 1 tablet by mouth Daily. 90 tablet 3   • Misc Natural Products (LUTEIN 20 PO) Take 20 mg by mouth Daily.     • multivitamin (multivitamin) tablet tablet Take 1 tablet by mouth Daily. 30 tablet    • nitroglycerin (NITROSTAT) 0.4 MG SL tablet Place 1 tablet under the tongue Every 5 (Five) Minutes As Needed for Chest Pain (Only if SBP Greater Than 100). Take no more than 3 doses in 15 minutes. 100 tablet 12   • Omega-3 Fatty Acids (Fish Oil Triple Strength) 1400 MG capsule Take 1,400 mg by mouth 2 (Two) Times a Day.     • pantoprazole (PROTONIX) 40 MG EC tablet TAKE 1 TABLET EVERY DAY 90 tablet 3   • sacubitril-valsartan (Entresto)  MG tablet Take 1 tablet by mouth 2 (Two) Times a Day. 60 tablet 5   • Saw Palmetto 500 MG capsule Take 550 mg by mouth 2 (Two) Times a Day.     • vitamin E 400 UNIT capsule Take 1 capsule by mouth Daily.     • acetaminophen (TYLENOL) 500 MG tablet Take 500 mg by mouth Every 6 (Six) Hours As Needed for Mild Pain .     • isosorbide mononitrate (IMDUR) 60 MG 24 hr tablet Take 1 tablet by mouth Daily. 90 tablet 3   • loratadine (CLARITIN) 10 MG tablet Take 10 mg by mouth Daily As Needed for Allergies.       No current facility-administered medications for this encounter.      Objective:   Body mass index is 31.84 kg/m².  Vitals:    06/06/22 1047   BP: 140/82   BP Location: Left arm   Patient Position: Sitting   Cuff Size: Adult   Pulse: 73   SpO2: 97%   Weight: 97.8 kg (215 lb 9.6 oz)   Height: 175.3 cm (69\")     Vitals:    06/06/22 1047   BP: 140/82   Pulse: 73   SpO2: 97%     Body mass index is 31.84 kg/m².      Lab Results   Component Value Date    ABSOLUTELUNG 37 (A) 06/06/2022     Wt Readings from Last 3 Encounters:   06/06/22 97.8 kg (215 lb 9.6 oz)   04/15/22 98.8 kg (217 " lb 12.8 oz)   03/25/22 100 kg (221 lb 6.4 oz)     Lab Results   Component Value Date    ABSOLUTELUNG 37 (A) 06/06/2022   ReDs - 39% 11/24/2021   ReDs - 34% (4/21/2021)    Vitals reviewed.   Constitutional:       Appearance: Normal appearance. Well-developed.   Eyes:      Conjunctiva/sclera: Conjunctivae normal.   HENT:      Head: Normocephalic.   Neck:      Vascular: No JVD or JVR.   Pulmonary:      Effort: Pulmonary effort is normal.      Breath sounds: Normal breath sounds.   Chest:   Breasts:      Left: No tenderness.        Comments: CABG incision healed   Cardiovascular:      Normal rate. Regular rhythm.   Edema:     Ankle: bilateral trace edema of the ankle.     Feet: bilateral trace edema of the feet.  Abdominal:      General: Bowel sounds are normal.      Palpations: Abdomen is soft. There is no hepatomegaly or splenomegaly.   Musculoskeletal: Normal range of motion.      Cervical back: Normal range of motion and neck supple. Skin:     General: Skin is warm and dry.           Comments: Healed vein grafting site    Neurological:      Mental Status: Alert and oriented to person, place, and time.   Psychiatric:         Attention and Perception: Attention normal.         Mood and Affect: Mood normal.         Speech: Speech normal.         Behavior: Behavior normal. Behavior is cooperative.         Cognition and Memory: Cognition normal.       Cardiographics  Results for orders placed during the hospital encounter of 06/21/21    Adult Transthoracic Echo Limited W/ Cont if Necessary Per Protocol    Interpretation Summary  Moderately to severely technically limited study.  Parasternal images are uninterpretable.    1.  Global LV systolic function appears to be mildly impaired.  There are no large dense focal wall motion abnormalities but visually estimated ejection fraction is 40±5%.  There is mild to moderate concentric left ventricular hypertrophy.  There is mild biatrial enlargement.  RV size and function are  grossly preserved.    2.  Valves are grossly morphologically normal.    3.  No large pericardial effusion.  The proximal aorta is not dilated.    [id number]      Lab Review   Lab Results   Component Value Date    TSH 2.010 2021     Lab Results   Component Value Date    GLUCOSE 424 (C) 2022    BUN 15 2022    CREATININE 1.33 (H) 2022    EGFRIFNONA 58 (L) 2021    BCR 11.3 2022    K 4.9 2022    CO2 22.6 2022    CALCIUM 9.2 2022    ALBUMIN 4.30 2022    AST 9 2022    ALT 9 2022     Lab Results   Component Value Date    WBC 6.36 2022    HGB 13.6 2022    HCT 43.2 2022    MCV 83.2 2022     2022     Lab Results   Component Value Date    TROPONINI <0.006 2018    TROPONINT <0.010 2021     Lab Results   Component Value Date    PROBNP 115.6 2022      The following portions of the patient's history were reviewed and updated as appropriate: allergies, current medications, past family history, past medical history, past social history, past surgical history and problem list.     Old records reviewed and pertinent information is included in the above objective data.   Assessment/Plan:      Diagnosis Plan   1. Chronic combined systolic and diastolic congestive heart failure (HCC)  BNP    Basic Metabolic Panel    Magnesium    ReDs Vest    Basic Metabolic Panel    Magnesium   2. Ischemic cardiomyopathy     3. Essential hypertension     4. CKD (chronic kidney disease) stage 2, GFR 60-89 ml/min     5. Elevated glucose       BMP, pro-BNP and magnesium today  Reviewed and discussed results with patient today  ReDs reviewed and discussed today  Mildly elevated serum creatinine, will plan to continue to monitor, consider nephrology evaluation  Recommend routine monitoring of BP and heart rate  [facility] - pharmacy for assistance  Recommend compliance with DM medication and monitor closely, if it remains  persistently elevated he should seek urgent medical attention. He expresses understanding.   Keep routine scheduled follow-up with cardiology  Weightloss discussed and healthy lifestyle encouraged  Counseled patient extensively on dietary Na+ intake, importance of activity, weight monitoring, compliance with medications and follow up appointments.  Follow-up in 3 months, sooner if needed

## 2022-06-06 NOTE — TELEPHONE ENCOUNTER
Tried to contact back regarding a refill medication, as I was not sure which one he was needing, Spoke with wife Perri says she will have him return my call.      HEMA HUTCHINSON MA

## 2022-06-06 NOTE — PROGRESS NOTES
Heart Failure Clinic  Pharmacist Note     William Villasenor is a 72 y.o. male seen in the Heart Failure Clinic for HFrEF.  William Villasenor reports a great understanding of medications and has them specifically organized in his box to help with adherence. He recently stopped HCTZ due to frequent urination problems and the stress that it brought along with it. He did recently start a higher dose of entresto (97/103 bid) on 5/12/22 and is tolerating well. Pt reports his BP's at home have been running around the 130-135's/66-77's and denies any signs/symptoms of dizziness/lightheadedness.         Medication Use:   Hx of med intolerances: None related to HF  Retail Rx Management: Farxiga, Entresto through [employer] patient assistance program. Approval through 6/23/22 then will need review and new application.  Will need new Farxiga script sent in at May 2022 visit for patient assistance (will be out of refills by then     Past Medical History:   Diagnosis Date   • Arthritis    • Back pain    • BPH (benign prostatic hyperplasia)    • Chronic diastolic (congestive) heart failure (HCC)    • Diabetes mellitus (HCC)    • Diabetic peripheral neuropathy (HCC)    • Erectile dysfunction    • Former smoker    • GERD (gastroesophageal reflux disease)    • GERD (gastroesophageal reflux disease)    • Gout    • Hemorrhoids    • High cholesterol    • Hypertension    • Obesity      ALLERGIES: Lisinopril  Current Outpatient Medications   Medication Sig Dispense Refill   • allopurinol (ZYLOPRIM) 300 MG tablet TAKE 1 TABLET EVERY DAY 90 tablet 0   • APPLE CIDER VINEGAR PO Take 1 tablet by mouth Daily.     • Ascorbic Acid (Vitamin C) 500 MG capsule Take 2,000 mg by mouth Daily. 2000 in am 1000 at night     • aspirin 81 MG EC tablet Take 81 mg by mouth Daily.     • atorvastatin (LIPITOR) 40 MG tablet Take 1 tablet by mouth Every Night. 90 tablet 0   • CALCIUM-MAGNESIUM-ZINC PO Take 1 tablet by mouth 2 (two) times a day.     • Chromium 200 MCG  capsule Take 1 capsule by mouth 2 (Two) Times a Day.     • Cranberry 250 MG capsule Take 25,000 mg by mouth Daily.     • dapagliflozin Propanediol (Farxiga) 10 MG tablet Take 1 tablet by mouth Daily. 30 tablet 5   • doxazosin (CARDURA) 4 MG tablet TAKE 1 TABLET EVERY DAY (Patient taking differently: Take 4 mg by mouth Every Night.) 90 tablet 0   • finasteride (PROSCAR) 5 MG tablet Take 1 tablet by mouth Daily. 90 tablet 0   • folic acid (FOLVITE) 800 MCG tablet Take 800 mcg by mouth Daily.     • gabapentin (NEURONTIN) 300 MG capsule Take 1 capsule by mouth 2 (two) times a day. 60 capsule 0   • glipizide (GLUCOTROL) 5 MG tablet Take 1 tablet by mouth 2 (Two) Times a Day Before Meals. 180 tablet 3   • isosorbide mononitrate (IMDUR) 60 MG 24 hr tablet Take 1 tablet by mouth Daily. 90 tablet 0   • metFORMIN (GLUCOPHAGE) 1000 MG tablet Take 1 tablet by mouth 2 (Two) Times a Day With Meals. 180 tablet 0   • metoprolol succinate XL (Toprol XL) 100 MG 24 hr tablet Take 1 tablet by mouth Daily. 90 tablet 3   • Misc Natural Products (LUTEIN 20 PO) Take 20 mg by mouth Daily.     • multivitamin (multivitamin) tablet tablet Take 1 tablet by mouth Daily. 30 tablet    • nitroglycerin (NITROSTAT) 0.4 MG SL tablet Place 1 tablet under the tongue Every 5 (Five) Minutes As Needed for Chest Pain (Only if SBP Greater Than 100). Take no more than 3 doses in 15 minutes. 100 tablet 12   • Omega-3 Fatty Acids (Fish Oil Triple Strength) 1400 MG capsule Take 1,400 mg by mouth 2 (Two) Times a Day.     • pantoprazole (PROTONIX) 40 MG EC tablet TAKE 1 TABLET EVERY DAY 90 tablet 3   • sacubitril-valsartan (Entresto)  MG tablet Take 1 tablet by mouth 2 (Two) Times a Day. 60 tablet 5   • Saw Palmetto 500 MG capsule Take 550 mg by mouth 2 (Two) Times a Day.     • vitamin E 400 UNIT capsule Take 1 capsule by mouth Daily.     • acetaminophen (TYLENOL) 500 MG tablet Take 500 mg by mouth Every 6 (Six) Hours As Needed for Mild Pain .     •  "loratadine (CLARITIN) 10 MG tablet Take 10 mg by mouth Daily As Needed for Allergies.       No current facility-administered medications for this encounter.       Vaccination History:   Pneumonia: declines  Annual Influenza: declines  COVID 19: -didn't ask, try next time    Objective  Vitals:    06/06/22 1047   BP: 140/82   BP Location: Left arm   Patient Position: Sitting   Cuff Size: Adult   Pulse: 73   SpO2: 97%   Weight: 97.8 kg (215 lb 9.6 oz)   Height: 175.3 cm (69\")     Wt Readings from Last 3 Encounters:   06/06/22 97.8 kg (215 lb 9.6 oz)   04/15/22 98.8 kg (217 lb 12.8 oz)   03/25/22 100 kg (221 lb 6.4 oz)         06/06/22  1047   Weight: 97.8 kg (215 lb 9.6 oz)     Lab Results   Component Value Date    GLUCOSE 424 (C) 06/06/2022    BUN 15 06/06/2022    CREATININE 1.33 (H) 06/06/2022    EGFRIFNONA 58 (L) 11/24/2021    BCR 11.3 06/06/2022    K 4.9 06/06/2022    CO2 22.6 06/06/2022    CALCIUM 9.2 06/06/2022    ALBUMIN 4.30 03/25/2022    AST 9 03/25/2022    ALT 9 03/25/2022     Lab Results   Component Value Date    WBC 6.36 03/25/2022    HGB 13.6 03/25/2022    HCT 43.2 03/25/2022    MCV 83.2 03/25/2022     03/25/2022     Lab Results   Component Value Date    TROPONINI <0.006 08/07/2018    TROPONINT <0.010 04/21/2021     Lab Results   Component Value Date    PROBNP 115.6 06/06/2022     Results for orders placed during the hospital encounter of 06/21/21    Adult Transthoracic Echo Limited W/ Cont if Necessary Per Protocol    Interpretation Summary  Moderately to severely technically limited study.  Parasternal images are uninterpretable.    1.  Global LV systolic function appears to be mildly impaired.  There are no large dense focal wall motion abnormalities but visually estimated ejection fraction is 40±5%.  There is mild to moderate concentric left ventricular hypertrophy.  There is mild biatrial enlargement.  RV size and function are grossly preserved.    2.  Valves are grossly morphologically " normal.    3.  No large pericardial effusion.  The proximal aorta is not dilated.         GDMT    Drug Class   Drug   Dose Last Dose Adjustment Additional Titration   Notes   ACEi/ARB/ARNI Entresto  97/103 bid 5/12/22 At goal    Beta Blocker Toprol XL 100mg 4/19/22     MRA        SGLT2i Farxiga 10mg >3m At goal        Drug Therapy Problems      1. Needs refill and new PAP application  2. Pt wanting to add Saw Palmetto  3. GDMT  4. Doxazosin's potential to exacerbate HF    Recommendations:      1. Teri to refill his Farxiga and Entresto today and pharmacy to deliver to him. Pt filled out New PAP application and will put on file. I requested refill of Imdur 60 from [facility] for pt via phone call.  2. No drug-drug interactions found with adding this new medication. Patient encouraged to let PCP know about new med change as well.  3. Recent increase in Entresto to targeted goal is being well tolerated. Kidney function is stable. Continue to monitor labs. Could increase Metoprolol dosing as BP and HR would allow. Could consider spironolactone in the future if renal function/labs allow and if pt's occupation changes. 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  4. According to the 2022 AHA/ACC/HFSA Guideline for the Management of Heart Failure, doxazosin is a medication that may cause or exacerbate HF by Beta-1-receptor stimulation with increases in renin and aldosterone. Will forward this information on to PCP for further review.      Patient was educated on heart failure medications and the importance of medication adherence. All questions were addressed and patient expressed understanding.     Thank you for allowing me to participate in the care of your patient,    Anastasiyadenise Guzman, [facility]  06/06/22  11:48 EDT

## 2022-06-07 DIAGNOSIS — R07.89 CHEST PRESSURE: ICD-10-CM

## 2022-06-07 DIAGNOSIS — Z95.1 S/P CABG X 4: ICD-10-CM

## 2022-06-07 DIAGNOSIS — I25.119 CORONARY ARTERY DISEASE INVOLVING NATIVE CORONARY ARTERY OF NATIVE HEART WITH ANGINA PECTORIS: ICD-10-CM

## 2022-06-07 RX ORDER — ISOSORBIDE MONONITRATE 60 MG/1
60 TABLET, EXTENDED RELEASE ORAL DAILY
Qty: 90 TABLET | Refills: 3 | Status: SHIPPED | OUTPATIENT
Start: 2022-06-07 | End: 2022-06-24 | Stop reason: SDUPTHER

## 2022-06-24 ENCOUNTER — OFFICE VISIT (OUTPATIENT)
Dept: CARDIOLOGY | Facility: CLINIC | Age: 72
End: 2022-06-24

## 2022-06-24 VITALS
OXYGEN SATURATION: 96 % | SYSTOLIC BLOOD PRESSURE: 143 MMHG | HEIGHT: 69 IN | HEART RATE: 55 BPM | WEIGHT: 220 LBS | DIASTOLIC BLOOD PRESSURE: 80 MMHG | BODY MASS INDEX: 32.58 KG/M2

## 2022-06-24 DIAGNOSIS — R06.02 SHORTNESS OF BREATH: ICD-10-CM

## 2022-06-24 DIAGNOSIS — R07.89 CHEST PAIN, ATYPICAL: ICD-10-CM

## 2022-06-24 DIAGNOSIS — Z95.1 S/P CABG X 4: ICD-10-CM

## 2022-06-24 DIAGNOSIS — I25.810 CORONARY ARTERY DISEASE INVOLVING CORONARY BYPASS GRAFT OF NATIVE HEART WITHOUT ANGINA PECTORIS: ICD-10-CM

## 2022-06-24 DIAGNOSIS — I10 ESSENTIAL HYPERTENSION: ICD-10-CM

## 2022-06-24 DIAGNOSIS — I50.32 CHRONIC DIASTOLIC CONGESTIVE HEART FAILURE: ICD-10-CM

## 2022-06-24 DIAGNOSIS — K59.01 SLOW TRANSIT CONSTIPATION: Primary | ICD-10-CM

## 2022-06-24 PROCEDURE — 99214 OFFICE O/P EST MOD 30 MIN: CPT | Performed by: NURSE PRACTITIONER

## 2022-06-24 RX ORDER — SPIRONOLACTONE 25 MG/1
25 TABLET ORAL DAILY
Qty: 30 TABLET | Refills: 11 | Status: SHIPPED | OUTPATIENT
Start: 2022-06-24 | End: 2022-11-10

## 2022-06-24 RX ORDER — ISOSORBIDE MONONITRATE 120 MG/1
120 TABLET, EXTENDED RELEASE ORAL DAILY
Qty: 90 TABLET | Refills: 3 | Status: SHIPPED | OUTPATIENT
Start: 2022-06-24

## 2022-06-24 RX ORDER — METOPROLOL SUCCINATE 100 MG/1
100 TABLET, EXTENDED RELEASE ORAL DAILY
Qty: 90 TABLET | Refills: 3 | Status: SHIPPED | OUTPATIENT
Start: 2022-06-24

## 2022-06-24 RX ORDER — TRIAMCINOLONE ACETONIDE 1 MG/G
1 CREAM TOPICAL DAILY PRN
COMMUNITY

## 2022-06-24 RX ORDER — DOCUSATE SODIUM 100 MG/1
100 CAPSULE, LIQUID FILLED ORAL 2 TIMES DAILY PRN
Qty: 180 CAPSULE | Refills: 1 | Status: SHIPPED | OUTPATIENT
Start: 2022-06-24 | End: 2022-09-12

## 2022-06-24 RX ORDER — FLUCONAZOLE 200 MG/1
200 TABLET ORAL DAILY
COMMUNITY
End: 2022-09-12

## 2022-06-24 NOTE — PROGRESS NOTES
Subjective     William Villasenor is a 72 y.o. male who presents to day for 3 month follow up  and Hypertension.    CHIEF COMPLIANT  Chief Complaint   Patient presents with   • 3 month follow up    • Hypertension       Active Problems:  Problem List Items Addressed This Visit        Cardiac and Vasculature    Essential hypertension (Chronic)    Relevant Medications    metoprolol succinate XL (Toprol XL) 100 MG 24 hr tablet    spironolactone (ALDACTONE) 25 MG tablet    Other Relevant Orders    Basic Metabolic Panel    CAD (coronary artery disease)    Relevant Medications    metoprolol succinate XL (Toprol XL) 100 MG 24 hr tablet    isosorbide mononitrate (IMDUR) 120 MG 24 hr tablet    Other Relevant Orders    Basic Metabolic Panel    S/P CABG x 4    Relevant Orders    Basic Metabolic Panel       Pulmonary and Pneumonias    Shortness of breath    Relevant Orders    Overnight Sleep Oximetry Study    Basic Metabolic Panel      Other Visit Diagnoses     Slow transit constipation    -  Primary    Relevant Medications    docusate sodium (Colace) 100 MG capsule    Other Relevant Orders    Basic Metabolic Panel    Chest pain, atypical        Relevant Medications    isosorbide mononitrate (IMDUR) 120 MG 24 hr tablet    Other Relevant Orders    Basic Metabolic Panel    Chronic diastolic congestive heart failure (HCC)        Relevant Medications    metoprolol succinate XL (Toprol XL) 100 MG 24 hr tablet    isosorbide mononitrate (IMDUR) 120 MG 24 hr tablet    spironolactone (ALDACTONE) 25 MG tablet    Other Relevant Orders    Basic Metabolic Panel      Problem list  1.  Coronary artery disease status post coronary artery bypass grafting x4  1.1 coronary artery bypass grafting 3/21: LIMA to LAD, SVG to diagonal, SVG to OM1, SVG to OM 2.  Residual  of RCA  1.2 echocardiogram 4/21: EF 35± percent, LV dyssynchrony  1.3 echocardiogram 6/21: EF 40±5% mild to moderate concentric left ventricular hypertrophy, mild biatrial enlargement no  significant valvular disease   1.4 left heart catheterization 8/21: Left main ostial distal tapering, circumflex diffusely disease, OM1 occluded, LAD occluded, RCA subtotally occluded, LIMA widely patent, SVG to posterior lateral patent, SVG to OM patent, EF 45±5%, LVEDP 28-30  2.  Essential hypertension  3.  Diastolic dysfunction  4.  Chest pain  5.  Shortness of breath  6.  Lower extremity edema  7.  Dizziness/near syncope          7.1 cardiac event monitor 6/21: 2.1% ventricular ectopic burden,               episodes of trigeminy, bigeminy, and PSVT    HPI  HPI  Mr. Villasenor is being followed up today for coronary artery disease, chronic arterial hypertension, and ischemic cardiomyopathy.  Patient does have an extensive history of coronary artery disease in which she went under coronary artery bypass grafting in March 2021.  He did have a repeat left heart catheterization in August 2021 that identified patent grafts and no percutaneous coronary artery intervention took place.  He had a ejection fraction of 45± percent at that time.  He does have ischemic cardiomyopathy and originally had a EF of 35± percent in which we started him on guideline driven medication therapy including Entresto and metoprolol succinate.  He is also on Farxiga.  He does report improvement in his chest pain.  He says it occurs intermittently in his mid sternum on a very rare occasion.  He says that the intensity has decreased quite significantly to and it is very minimal and slight.  He has had no severe chest pain.  He does report shortness of breath that has been persistent that occurs both with rest and exertion.  He says at night that his oxygen saturation will drop down about 85%.  He currently does have home O2 but is at risk of losing this.  He also has some dizziness that occurs if he bends over.  His major complaint today is left lower quadrant pain and which is sharp in nature does not occur very often but is very concerning when  it does happen.  He is also had constipation in which he has not been able to have routine bowel movements.  He also has chronic arterial hypertension which his blood pressure is elevated today 143/80 heart rate of 57.  He is currently on Entresto and metoprolol succinate.  He does report fatigue where he gets tired easily.  He denies palpitations, syncope, or strokelike symptoms.              PRIOR MEDS  Current Outpatient Medications on File Prior to Visit   Medication Sig Dispense Refill   • allopurinol (ZYLOPRIM) 300 MG tablet TAKE 1 TABLET EVERY DAY 90 tablet 0   • APPLE CIDER VINEGAR PO Take 1 tablet by mouth Daily.     • Ascorbic Acid (Vitamin C) 500 MG capsule Take 2,000 mg by mouth Daily. 2000 in am 1000 at night     • aspirin 81 MG EC tablet Take 81 mg by mouth Daily.     • atorvastatin (LIPITOR) 40 MG tablet Take 1 tablet by mouth Every Night. 90 tablet 0   • CALCIUM-MAGNESIUM-ZINC PO Take 1 tablet by mouth 2 (two) times a day.     • Cranberry 250 MG capsule Take 25,000 mg by mouth Daily.     • dapagliflozin Propanediol (Farxiga) 10 MG tablet Take 1 tablet by mouth Daily. 30 tablet 5   • doxazosin (CARDURA) 4 MG tablet TAKE 1 TABLET EVERY DAY (Patient taking differently: Take 4 mg by mouth Every Night.) 90 tablet 0   • finasteride (PROSCAR) 5 MG tablet Take 1 tablet by mouth Daily. 90 tablet 0   • fluconazole (DIFLUCAN) 200 MG tablet Take 200 mg by mouth Daily.     • folic acid (FOLVITE) 800 MCG tablet Take 800 mcg by mouth Daily.     • gabapentin (NEURONTIN) 300 MG capsule Take 1 capsule by mouth 2 (two) times a day. 60 capsule 0   • glipizide (GLUCOTROL) 5 MG tablet Take 1 tablet by mouth 2 (Two) Times a Day Before Meals. 180 tablet 3   • loratadine (CLARITIN) 10 MG tablet Take 10 mg by mouth Daily As Needed for Allergies.     • metFORMIN (GLUCOPHAGE) 1000 MG tablet Take 1 tablet by mouth 2 (Two) Times a Day With Meals. 180 tablet 0   • Misc Natural Products (LUTEIN 20 PO) Take 20 mg by mouth Daily.      • multivitamin (multivitamin) tablet tablet Take 1 tablet by mouth Daily. 30 tablet    • NON FORMULARY Take 500 mg by mouth 2 (Two) Times a Day. 1 capsule po bid chromium 200mg / cinnamon 500mg     • Omega-3 Fatty Acids (Fish Oil Triple Strength) 1400 MG capsule Take 1,200 mg by mouth 2 (Two) Times a Day.     • pantoprazole (PROTONIX) 40 MG EC tablet TAKE 1 TABLET EVERY DAY 90 tablet 3   • sacubitril-valsartan (Entresto)  MG tablet Take 1 tablet by mouth 2 (Two) Times a Day. 60 tablet 5   • triamcinolone (KENALOG) 0.1 % cream Apply 1 application topically to the appropriate area as directed Daily As Needed.     • vitamin E 400 UNIT capsule Take 1 capsule by mouth Daily.     • nitroglycerin (NITROSTAT) 0.4 MG SL tablet Place 1 tablet under the tongue Every 5 (Five) Minutes As Needed for Chest Pain (Only if SBP Greater Than 100). Take no more than 3 doses in 15 minutes. 100 tablet 12     No current facility-administered medications on file prior to visit.       ALLERGIES  Lisinopril    HISTORY  Past Medical History:   Diagnosis Date   • Arthritis    • Back pain    • BPH (benign prostatic hyperplasia)    • Chronic diastolic (congestive) heart failure (HCC)    • Diabetes mellitus (HCC)    • Diabetic peripheral neuropathy (HCC)    • Erectile dysfunction    • Former smoker    • GERD (gastroesophageal reflux disease)    • GERD (gastroesophageal reflux disease)    • Gout    • Hemorrhoids    • High cholesterol    • Hypertension    • Obesity        Social History     Socioeconomic History   • Marital status:    • Number of children: 3   Tobacco Use   • Smoking status: Former Smoker     Packs/day: 1.00     Years: 5.00     Pack years: 5.00     Quit date: 1974     Years since quittin.4   • Smokeless tobacco: Never Used   Substance and Sexual Activity   • Alcohol use: Never     Comment: quit in    • Drug use: Never   • Sexual activity: Defer       Family History   Problem Relation Age of Onset   • Heart  "disease Mother    • Hypertension Mother    • Depression Mother    • Alcohol abuse Maternal Uncle    • Heart disease Maternal Grandmother    • Hypertension Maternal Grandmother    • Diabetes Maternal Grandmother    • Diabetes Paternal Grandmother    • No Known Problems Half-Brother    • No Known Problems Half-Brother    • No Known Problems Half-Sister    • No Known Problems Daughter    • No Known Problems Daughter    • Hyperlipidemia Daughter        Review of Systems   Constitutional: Positive for fatigue. Negative for chills and fever.   HENT: Negative for congestion, rhinorrhea and sore throat.    Respiratory: Positive for shortness of breath. Negative for chest tightness.    Cardiovascular: Positive for chest pain (slight rare). Negative for palpitations and leg swelling.   Gastrointestinal: Positive for constipation. Negative for diarrhea and nausea.   Musculoskeletal: Positive for arthralgias and back pain. Negative for neck pain.   Allergic/Immunologic: Positive for environmental allergies. Negative for food allergies.   Neurological: Positive for dizziness (with bending over), weakness and light-headedness (with bending over). Negative for syncope.   Hematological: Bruises/bleeds easily (bruise).   Psychiatric/Behavioral: Positive for sleep disturbance (has to get up to go to the bathroom).       Objective     VITALS: /80 (BP Location: Left arm, Patient Position: Sitting)   Pulse 55   Ht 175.3 cm (69.02\")   Wt 99.8 kg (220 lb)   SpO2 96%   BMI 32.47 kg/m²     LABS:   Lab Results (most recent)     None          IMAGING:   No Images in the past 120 days found..    EXAM:  Physical Exam  Vitals and nursing note reviewed.   Constitutional:       Appearance: He is well-developed.   HENT:      Head: Normocephalic.   Eyes:      Pupils: Pupils are equal, round, and reactive to light.   Neck:      Thyroid: No thyroid mass.      Vascular: No carotid bruit or JVD.      Trachea: Trachea and phonation normal. "   Cardiovascular:      Rate and Rhythm: Normal rate and regular rhythm.      Pulses:           Radial pulses are 2+ on the right side and 2+ on the left side.        Posterior tibial pulses are 2+ on the right side and 2+ on the left side.      Heart sounds: Normal heart sounds. No murmur heard.    No friction rub. No gallop.   Pulmonary:      Effort: Pulmonary effort is normal. No respiratory distress.      Breath sounds: Normal breath sounds. No wheezing or rales.   Abdominal:      General: Bowel sounds are normal.      Palpations: Abdomen is soft.   Musculoskeletal:         General: Swelling present. Normal range of motion.      Cervical back: Neck supple.   Skin:     General: Skin is warm and dry.      Capillary Refill: Capillary refill takes less than 2 seconds.      Findings: No rash.   Neurological:      Mental Status: He is alert and oriented to person, place, and time.   Psychiatric:         Speech: Speech normal.         Behavior: Behavior normal.         Thought Content: Thought content normal.         Judgment: Judgment normal.         Procedure   Procedures       Assessment & Plan    Diagnosis Plan   1. Slow transit constipation  docusate sodium (Colace) 100 MG capsule    Basic Metabolic Panel    Basic Metabolic Panel   2. S/P CABG x 4  Basic Metabolic Panel    Basic Metabolic Panel   3. Coronary artery disease involving coronary bypass graft of native heart without angina pectoris  metoprolol succinate XL (Toprol XL) 100 MG 24 hr tablet    Basic Metabolic Panel    Basic Metabolic Panel   4. Chest pain, atypical  isosorbide mononitrate (IMDUR) 120 MG 24 hr tablet    Basic Metabolic Panel    Basic Metabolic Panel   5. Chronic diastolic congestive heart failure (HCC)  metoprolol succinate XL (Toprol XL) 100 MG 24 hr tablet    spironolactone (ALDACTONE) 25 MG tablet    Basic Metabolic Panel    Basic Metabolic Panel   6. Essential hypertension  metoprolol succinate XL (Toprol XL) 100 MG 24 hr tablet     spironolactone (ALDACTONE) 25 MG tablet    Basic Metabolic Panel    Basic Metabolic Panel   7. Shortness of breath  Overnight Sleep Oximetry Study    Basic Metabolic Panel    Basic Metabolic Panel    Overnight Sleep Oximetry Study   1.  Patient does have extensive history of coronary artery disease and in which she went under coronary artery bypass grafting in March 2021.  He is on high intensity statin therapy of atorvastatin 40 and antianginal therapy of isosorbide.  Antiplatelet therapy of aspirin 81 mg daily.  His chest pain has dramatically improved and only occurs on a rare occasion.  We will try to increase his Imdur to 120 mg daily to see if we can mitigate his chest pain.  2.  Patient does have chronic diastolic heart failure in which she is on diuretic therapy.  He still has some lower extremity edema and shortness of breath therefore I would like to place him on Aldactone to further treat his heart failure with reduced ejection fraction.  This will be in addition to his metoprolol succinate, Entresto, and Farxiga.  3.  Due to patient's oxygen level dropping at night I would like for him to have an overnight sleep study to determine the need for continuation of his oxygen.  4.  Informed of signs and symptoms of ACS and advised to seek emergent treatment for any new worsening symptoms.  Patient also advised sooner follow-up as needed.  Also advised to follow-up with family doctor as needed  This note is dictated utilizing voice recognition software.  Although this record has been proof read, transcriptional errors may still be present. If questions occur regarding the content of this record please do not hesitate to call our office.  I have reviewed and confirmed the accuracy of the ROS as documented by the MA/LPN/RN ANKUR Cuevas    Return in about 3 months (around 9/24/2022), or if symptoms worsen or fail to improve.    Diagnoses and all orders for this visit:    1. Slow transit constipation  (Primary)  -     docusate sodium (Colace) 100 MG capsule; Take 1 capsule by mouth 2 (Two) Times a Day As Needed for Constipation.  Dispense: 180 capsule; Refill: 1  -     Basic Metabolic Panel; Future  -     Basic Metabolic Panel    2. S/P CABG x 4  -     Basic Metabolic Panel; Future  -     Basic Metabolic Panel    3. Coronary artery disease involving coronary bypass graft of native heart without angina pectoris  -     metoprolol succinate XL (Toprol XL) 100 MG 24 hr tablet; Take 1 tablet by mouth Daily.  Dispense: 90 tablet; Refill: 3  -     Basic Metabolic Panel; Future  -     Basic Metabolic Panel    4. Chest pain, atypical  -     isosorbide mononitrate (IMDUR) 120 MG 24 hr tablet; Take 1 tablet by mouth Daily.  Dispense: 90 tablet; Refill: 3  -     Basic Metabolic Panel; Future  -     Basic Metabolic Panel    5. Chronic diastolic congestive heart failure (HCC)  -     metoprolol succinate XL (Toprol XL) 100 MG 24 hr tablet; Take 1 tablet by mouth Daily.  Dispense: 90 tablet; Refill: 3  -     spironolactone (ALDACTONE) 25 MG tablet; Take 1 tablet by mouth Daily.  Dispense: 30 tablet; Refill: 11  -     Basic Metabolic Panel; Future  -     Basic Metabolic Panel    6. Essential hypertension  -     metoprolol succinate XL (Toprol XL) 100 MG 24 hr tablet; Take 1 tablet by mouth Daily.  Dispense: 90 tablet; Refill: 3  -     spironolactone (ALDACTONE) 25 MG tablet; Take 1 tablet by mouth Daily.  Dispense: 30 tablet; Refill: 11  -     Basic Metabolic Panel; Future  -     Basic Metabolic Panel    7. Shortness of breath  -     Overnight Sleep Oximetry Study; Future  -     Basic Metabolic Panel; Future  -     Basic Metabolic Panel  -     Overnight Sleep Oximetry Study        William Villasenor  reports that he quit smoking about 48 years ago. He has a 5.00 pack-year smoking history. He has never used smokeless tobacco.. I have educated him on the risk of diseases from using tobacco products .    Advance Care Planning   ACP  discussion was held with the patient during this visit. Patient does not have an advance directive, declines further assistance.            MEDS ORDERED DURING VISIT:  New Medications Ordered This Visit   Medications   • metoprolol succinate XL (Toprol XL) 100 MG 24 hr tablet     Sig: Take 1 tablet by mouth Daily.     Dispense:  90 tablet     Refill:  3   • docusate sodium (Colace) 100 MG capsule     Sig: Take 1 capsule by mouth 2 (Two) Times a Day As Needed for Constipation.     Dispense:  180 capsule     Refill:  1   • isosorbide mononitrate (IMDUR) 120 MG 24 hr tablet     Sig: Take 1 tablet by mouth Daily.     Dispense:  90 tablet     Refill:  3   • spironolactone (ALDACTONE) 25 MG tablet     Sig: Take 1 tablet by mouth Daily.     Dispense:  30 tablet     Refill:  11           This document has been electronically signed by Dany San Jr., APRN  June 25, 2022 16:56 EDT

## 2022-07-07 ENCOUNTER — TELEPHONE (OUTPATIENT)
Dept: CARDIOLOGY | Facility: CLINIC | Age: 72
End: 2022-07-07

## 2022-07-07 NOTE — TELEPHONE ENCOUNTER
Working on overdue results and pt has BMP order that we have not received results of/has not been drawn.       Called pt, he [place] and will get his labs drawn when he returns. He stated he will go to [facility].

## 2022-07-10 ENCOUNTER — LAB (OUTPATIENT)
Dept: LAB | Facility: HOSPITAL | Age: 72
End: 2022-07-10

## 2022-07-10 LAB
ANION GAP SERPL CALCULATED.3IONS-SCNC: 10 MMOL/L (ref 5–15)
BUN SERPL-MCNC: 17 MG/DL (ref 8–23)
BUN/CREAT SERPL: 15.3 (ref 7–25)
CALCIUM SPEC-SCNC: 9 MG/DL (ref 8.6–10.5)
CHLORIDE SERPL-SCNC: 102 MMOL/L (ref 98–107)
CO2 SERPL-SCNC: 22 MMOL/L (ref 22–29)
CREAT SERPL-MCNC: 1.11 MG/DL (ref 0.76–1.27)
EGFRCR SERPLBLD CKD-EPI 2021: 70.1 ML/MIN/1.73
GLUCOSE SERPL-MCNC: 197 MG/DL (ref 65–99)
POTASSIUM SERPL-SCNC: 4.7 MMOL/L (ref 3.5–5.2)
SODIUM SERPL-SCNC: 134 MMOL/L (ref 136–145)

## 2022-07-10 PROCEDURE — 80048 BASIC METABOLIC PNL TOTAL CA: CPT | Performed by: NURSE PRACTITIONER

## 2022-07-11 ENCOUNTER — TELEPHONE (OUTPATIENT)
Dept: CARDIOLOGY | Facility: CLINIC | Age: 72
End: 2022-07-11

## 2022-07-11 NOTE — TELEPHONE ENCOUNTER
Dany San APRN [place], [place], [place]  Elevated blood glucose however his renal function has improved and now within normal range.  Keep follow-up.      Called and spoke with patient. I advised of above results.He will keep regular scheduled apt.      Lucia OKEEFE

## 2022-09-12 ENCOUNTER — HOSPITAL ENCOUNTER (OUTPATIENT)
Dept: CARDIOLOGY | Facility: HOSPITAL | Age: 72
Discharge: HOME | End: 2022-09-12
Admitting: PHYSICIAN ASSISTANT

## 2022-09-12 VITALS
DIASTOLIC BLOOD PRESSURE: 79 MMHG | SYSTOLIC BLOOD PRESSURE: 162 MMHG | BODY MASS INDEX: 32.04 KG/M2 | WEIGHT: 216.3 LBS | HEIGHT: 69 IN | OXYGEN SATURATION: 95 % | HEART RATE: 59 BPM

## 2022-09-12 DIAGNOSIS — R07.89 CHEST PAIN, ATYPICAL: ICD-10-CM

## 2022-09-12 DIAGNOSIS — I50.43 ACUTE ON CHRONIC COMBINED SYSTOLIC AND DIASTOLIC CONGESTIVE HEART FAILURE: Primary | ICD-10-CM

## 2022-09-12 LAB
ABSOLUTE LUNG FLUID CONTENT: 38 % (ref 20–35)
ANION GAP SERPL CALCULATED.3IONS-SCNC: 13.3 MMOL/L (ref 5–15)
BUN SERPL-MCNC: 15 MG/DL (ref 8–23)
BUN/CREAT SERPL: 13.8 (ref 7–25)
CALCIUM SPEC-SCNC: 9 MG/DL (ref 8.6–10.5)
CHLORIDE SERPL-SCNC: 101 MMOL/L (ref 98–107)
CO2 SERPL-SCNC: 22.7 MMOL/L (ref 22–29)
CREAT SERPL-MCNC: 1.09 MG/DL (ref 0.76–1.27)
EGFRCR SERPLBLD CKD-EPI 2021: 71.7 ML/MIN/1.73
GLUCOSE SERPL-MCNC: 202 MG/DL (ref 65–99)
MAGNESIUM SERPL-MCNC: 2 MG/DL (ref 1.6–2.4)
NT-PROBNP SERPL-MCNC: 227.8 PG/ML (ref 0–900)
POTASSIUM SERPL-SCNC: 4.6 MMOL/L (ref 3.5–5.2)
SODIUM SERPL-SCNC: 137 MMOL/L (ref 136–145)

## 2022-09-12 PROCEDURE — 80048 BASIC METABOLIC PNL TOTAL CA: CPT | Performed by: PHYSICIAN ASSISTANT

## 2022-09-12 PROCEDURE — 83880 ASSAY OF NATRIURETIC PEPTIDE: CPT

## 2022-09-12 PROCEDURE — 83735 ASSAY OF MAGNESIUM: CPT | Performed by: PHYSICIAN ASSISTANT

## 2022-09-12 PROCEDURE — 36415 COLL VENOUS BLD VENIPUNCTURE: CPT | Performed by: PHYSICIAN ASSISTANT

## 2022-09-12 PROCEDURE — 99215 OFFICE O/P EST HI 40 MIN: CPT | Performed by: PHYSICIAN ASSISTANT

## 2022-09-12 PROCEDURE — 94726 PLETHYSMOGRAPHY LUNG VOLUMES: CPT | Performed by: PHYSICIAN ASSISTANT

## 2022-09-12 RX ORDER — DAPAGLIFLOZIN 10 MG/1
10 TABLET, FILM COATED ORAL DAILY
Qty: 30 TABLET | Refills: 5 | Status: SHIPPED | OUTPATIENT
Start: 2022-09-12 | End: 2023-01-06 | Stop reason: SDUPTHER

## 2022-09-12 RX ORDER — AMPICILLIN TRIHYDRATE 250 MG
500 CAPSULE ORAL DAILY
COMMUNITY

## 2022-09-12 RX ORDER — SACUBITRIL AND VALSARTAN 97; 103 MG/1; MG/1
1 TABLET, FILM COATED ORAL 2 TIMES DAILY
Qty: 60 TABLET | Refills: 5 | Status: SHIPPED | OUTPATIENT
Start: 2022-09-12 | End: 2023-01-06 | Stop reason: SDUPTHER

## 2022-09-12 NOTE — PROGRESS NOTES
[facility]  DAISY Garcia Theresa, APRN  [address] [place] [address]    Thank you for asking me to see William Villasenor for congestive heart failure.    HPI:     This is a 72 y.o. male with known past medical history of:    · Chronic combined systolic & diastolic HF with LVEF 40-45%  · ASCVD s/p CABG 4v  · Coronary artery disease status post coronary artery bypass grafting [id number] artery bypass grafting 3/21: LIMA to LAD, SVG to diagonal, SVG to OM1, SVG to OM 2.  Residual  of RCA  · echocardiogram 4/21: EF 35± percent, LV dyssynchrony  · echocardiogram 6/21: EF 40±5% mild to moderate concentric left ventricular hypertrophy, mild biatrial enlargement no significant valvular disease   · left heart catheterization 8/21: Left main ostial distal tapering, circumflex diffusely disease, OM1 occluded, LAD occluded, RCA subtotally occluded, LIMA widely patent, SVG to posterior lateral patent, SVG to OM patent, EF 45±5%, LVEDP 28-30   · DM type 2  · Peripheral neuropathy  · CKD stage II-III  · Gout   · BPH   · GERD  · Obesity    William Villasenor presents for today for HF clinic follow-up.  The patient is typically seen by Kaylee Cooper APRN.    • Last known EF 40-45%. Current medications prior to first visit directed toward underlying heart failure.       • Last known hospitalization and/or ED visit: Last hospitalized from March 29, 2021-April 9, 2021 with acute on chronic HFpEF and sepsis 2/2 RLE cellulitis from vein harvesting procedures.      • Accompanied by: Self    Initial visit data/details regarding:   • Dyspnea: Dyspnea on exertion, denies orthopnea  • Lower extremity swelling:No swelling of the extremities  • Abdominal swelling: No significant protuberant  • Home weight:  Stable  • Home BP: 140s-160s  • Home heart rate: 59  • Daily activities of living:  Performs independently  • Pillows/lying flat: 1 pillow  • Zone: Green  • Mr. Villasenor is chest pain free and  doing well. He continues to work as a .  He reports he is doing well.  His pressure is high this morning, but he reports he did not have his blood pressure medication yet this morning as he did not eat.      Specialists:   • Cardiology:  [facility] team.     Review of Systems - Review of Systems   Constitutional: Negative for decreased appetite and diaphoresis.   HENT: Negative for congestion, ear discharge and ear pain.    Eyes: Negative for blurred vision, discharge and double vision.   Cardiovascular: Negative for chest pain, claudication and cyanosis.   Respiratory: Negative for cough, hemoptysis and shortness of breath.    Endocrine: Negative for cold intolerance, heat intolerance and polydipsia.   Hematologic/Lymphatic: Negative for adenopathy and bleeding problem.   Skin: Negative for color change and nail changes.   Musculoskeletal: Negative for arthritis and back pain.   Gastrointestinal: Negative for bloating, abdominal pain and anorexia.   Genitourinary: Negative for bladder incontinence and decreased libido.   Neurological: Negative for aphonia and brief paralysis.   Psychiatric/Behavioral: Negative for altered mental status, depression and hallucinations.         All other systems were reviewed and were negative.    Patient Active Problem List   Diagnosis   • Idiopathic chronic gout of multiple sites without tophus   • Type II diabetes mellitus (HCC)   • Essential hypertension   • Benign prostatic hyperplasia without lower urinary tract symptoms   • Chest pain   • Obesity (BMI 30-39.9)   • GERD (gastroesophageal reflux disease)   • Diabetic neuropathy ([facility])   • Former smoker   • History of gout   • Coronary artery disease involving native coronary artery of native heart with angina pectoris ([facility])   • CAD (coronary artery disease)   • IZA (acute kidney injury) ([facility])   • Acute on chronic congestive heart failure ([facility])   • Hyperlipidemia   • CKD (chronic kidney disease) stage  2, GFR 60-89 ml/min   • Bradycardia   • Diabetic polyneuropathy ([facility])   • Heartburn   • Low back pain   • S/P CABG x 4   • Acute systolic heart failure (HCC)   • Cardiac LV ejection fraction of 35-39%   • Ventricular trigeminy   • PSVT (paroxysmal supraventricular tachycardia) ([facility])   • Shortness of breath   • Chest pressure       family history includes Alcohol abuse in his maternal uncle; Depression in his mother; Diabetes in his maternal grandmother and paternal grandmother; Heart disease in his maternal grandmother and mother; Hyperlipidemia in his daughter; Hypertension in his maternal grandmother and mother; No Known Problems in his daughter, daughter, half-brother, half-brother, and half-sister.     reports that he quit smoking about 48 years ago. He has a 5.00 pack-year smoking history. He has never used smokeless tobacco. He reports that he does not drink alcohol and does not use drugs.    Allergies   Allergen Reactions   • Lisinopril Cough         Current Outpatient Medications:   •  allopurinol (ZYLOPRIM) 300 MG tablet, TAKE 1 TABLET EVERY DAY, Disp: 90 tablet, Rfl: 0  •  APPLE CIDER VINEGAR PO, Take 1 tablet by mouth Daily., Disp: , Rfl:   •  Ascorbic Acid (Vitamin C) 500 MG capsule, Take 2,000 mg by mouth Daily. 2000 in am 1000 at night, Disp: , Rfl:   •  aspirin 81 MG EC tablet, Take 81 mg by mouth Daily., Disp: , Rfl:   •  atorvastatin (LIPITOR) 40 MG tablet, Take 1 tablet by mouth Every Night., Disp: 90 tablet, Rfl: 0  •  CALCIUM-MAGNESIUM-ZINC PO, Take 1 tablet by mouth 2 (two) times a day., Disp: , Rfl:   •  Chromium Picolinate (CHROMIUM PICOLATE PO), Take 1 tablet by mouth Daily., Disp: , Rfl:   •  Cinnamon 500 MG capsule, Take 500 mg by mouth Daily., Disp: , Rfl:   •  Cranberry 250 MG capsule, Take 25,000 mg by mouth Daily., Disp: , Rfl:   •  dapagliflozin Propanediol (Farxiga) 10 MG tablet, Take 1 tablet by mouth Daily., Disp: 30 tablet, Rfl: 5  •  doxazosin (CARDURA) 4 MG tablet, TAKE 1 TABLET  EVERY DAY (Patient taking differently: Take 4 mg by mouth Every Night.), Disp: 90 tablet, Rfl: 0  •  finasteride (PROSCAR) 5 MG tablet, Take 1 tablet by mouth Daily., Disp: 90 tablet, Rfl: 0  •  folic acid (FOLVITE) 800 MCG tablet, Take 800 mcg by mouth Daily., Disp: , Rfl:   •  gabapentin (NEURONTIN) 300 MG capsule, Take 1 capsule by mouth 2 (two) times a day., Disp: 60 capsule, Rfl: 0  •  glipizide (GLUCOTROL) 5 MG tablet, Take 1 tablet by mouth 2 (Two) Times a Day Before Meals., Disp: 180 tablet, Rfl: 3  •  isosorbide mononitrate (IMDUR) 120 MG 24 hr tablet, Take 1 tablet by mouth Daily., Disp: 90 tablet, Rfl: 3  •  loratadine (CLARITIN) 10 MG tablet, Take 10 mg by mouth Daily As Needed for Allergies., Disp: , Rfl:   •  metFORMIN (GLUCOPHAGE) 1000 MG tablet, Take 1 tablet by mouth 2 (Two) Times a Day With Meals., Disp: 180 tablet, Rfl: 0  •  metoprolol succinate XL (Toprol XL) 100 MG 24 hr tablet, Take 1 tablet by mouth Daily., Disp: 90 tablet, Rfl: 3  •  Misc Natural Products (LUTEIN 20 PO), Take 20 mg by mouth Daily., Disp: , Rfl:   •  multivitamin (multivitamin) tablet tablet, Take 1 tablet by mouth Daily., Disp: 30 tablet, Rfl:   •  nitroglycerin (NITROSTAT) 0.4 MG SL tablet, Place 1 tablet under the tongue Every 5 (Five) Minutes As Needed for Chest Pain (Only if SBP Greater Than 100). Take no more than 3 doses in 15 minutes., Disp: 100 tablet, Rfl: 12  •  NON FORMULARY, Take 500 mg by mouth 2 (Two) Times a Day. 1 capsule po bid chromium 200mg / cinnamon 500mg, Disp: , Rfl:   •  Omega-3 Fatty Acids (Fish Oil Triple Strength) 1400 MG capsule, Take 1,200 mg by mouth 2 (Two) Times a Day., Disp: , Rfl:   •  pantoprazole (PROTONIX) 40 MG EC tablet, TAKE 1 TABLET EVERY DAY, Disp: 90 tablet, Rfl: 3  •  sacubitril-valsartan (Entresto)  MG tablet, Take 1 tablet by mouth 2 (Two) Times a Day., Disp: 60 tablet, Rfl: 5  •  triamcinolone (KENALOG) 0.1 % cream, Apply 1 application topically to the appropriate area as  directed Daily As Needed., Disp: , Rfl:   •  vitamin E 400 UNIT capsule, Take 1 capsule by mouth Daily., Disp: , Rfl:   •  spironolactone (ALDACTONE) 25 MG tablet, Take 1 tablet by mouth Daily., Disp: 30 tablet, Rfl: 11      Physical Exam:  I have reviewed the patient's current vital signs as documented in the patient's EMR.   Vitals:    09/12/22 1056   BP: 162/79   Pulse: 59   SpO2: 95%     Body mass index is 31.94 kg/m².       09/12/22  1056   Weight: 98.1 kg (216 lb 4.8 oz)      Physical Exam  Vitals and nursing note reviewed.   Constitutional:       General: He is awake.   HENT:      Head: Normocephalic and atraumatic.   Eyes:      General: Lids are normal.      Conjunctiva/sclera:      Right eye: Right conjunctiva is not injected.      Left eye: Left conjunctiva is not injected.   Cardiovascular:      Rate and Rhythm: Normal rate and regular rhythm.      Heart sounds: S1 normal and S2 normal.      Comments: Well-healed mid sternal incision  Pulmonary:      Effort: No tachypnea or bradypnea.      Breath sounds: No stridor or transmitted upper airway sounds. No wheezing, rhonchi or rales.   Abdominal:      General: Bowel sounds are normal.      Palpations: Abdomen is soft.      Tenderness: There is no abdominal tenderness.   Musculoskeletal:      Right lower leg: No swelling. No edema.      Left lower leg: No swelling. No edema.   Skin:     General: Skin is warm and moist.   Neurological:      Mental Status: He is alert and oriented to person, place, and time.   Psychiatric:         Attention and Perception: Attention normal.         Mood and Affect: Mood normal.         Behavior: Behavior is cooperative.         JVP: Volume/Pulsation: Normal.        DATA REVIEWED:     EKG. I personally reviewed and interpreted the EKG.      EKG: normal EKG, normal sinus rhythm, unchanged from previous tracings.         ---------------------------------------------------  TTE/SAVANNAH:  Results for orders placed during the hospital  encounter of 06/21/21    Adult Transthoracic Echo Limited W/ Cont if Necessary Per Protocol    Interpretation Summary  Moderately to severely technically limited study.  Parasternal images are uninterpretable.    1.  Global LV systolic function appears to be mildly impaired.  There are no large dense focal wall motion abnormalities but visually estimated ejection fraction is 40±5%.  There is mild to moderate concentric left ventricular hypertrophy.  There is mild biatrial enlargement.  RV size and function are grossly preserved.    2.  Valves are grossly morphologically normal.    3.  No large pericardial effusion.  The proximal aorta is not dilated.        -----------------------------------------------------  CXR/Imaging:   Imaging Results (Most Recent)     None          I personally reviewed and interpreted the CXR.      -----------------------------------------------------  CT:   No radiology results for the last 30 days.  I personally reviewed the images of the CT scan.  My personal interpretation is below.      ----------------------------------------------------    PFTs:      --------------------------------------------------------------------------------------------------    Laboratory evaluations:    Lab Results   Component Value Date    GLUCOSE 202 (H) 09/12/2022    BUN 15 09/12/2022    CREATININE 1.09 09/12/2022    EGFRIFNONA 58 (L) 11/24/2021    BCR 13.8 09/12/2022    K 4.6 09/12/2022    CO2 22.7 09/12/2022    CALCIUM 9.0 09/12/2022    ALBUMIN 4.30 03/25/2022    AST 9 03/25/2022    ALT 9 03/25/2022     Lab Results   Component Value Date    WBC 6.36 03/25/2022    HGB 13.6 03/25/2022    HCT 43.2 03/25/2022    MCV 83.2 03/25/2022     03/25/2022     Lab Results   Component Value Date    CHOL 163 03/25/2022    TRIG 186 (H) 03/25/2022    HDL 37 (L) 03/25/2022    LDL 94 03/25/2022     Lab Results   Component Value Date    TSH 2.010 04/21/2021     Lab Results   Component Value Date    HGBA1C 8.30 (H)  04/21/2021     Lab Results   Component Value Date    ALT 9 03/25/2022     Lab Results   Component Value Date    HGBA1C 8.30 (H) 04/21/2021    HGBA1C 8.70 (H) 03/15/2021    HGBA1C 8.75 (H) 03/05/2021     Lab Results   Component Value Date    MICROALBUR 1.5 06/16/2021    CREATININE 1.09 09/12/2022     Lab Results   Component Value Date    IRON 46 (L) 03/30/2021    TIBC 292 (L) 03/30/2021    FERRITIN 208.80 03/30/2021     Lab Results   Component Value Date    INR 0.97 03/29/2021    INR 1.20 (H) 03/17/2021    INR 1.41 (H) 03/16/2021    PROTIME 12.7 03/29/2021    PROTIME 14.9 (H) 03/17/2021    PROTIME 16.9 (H) 03/16/2021        Lab Results   Component Value Date    ABSOLUTELUNG 38 (A) 09/12/2022    ABSOLUTELUNG 37 (A) 06/06/2022    ABSOLUTELUNG 37 (A) 04/15/2022       PAH RISK ASSESSMENT:      1. Acute on chronic combined systolic and diastolic congestive heart failure (HCC)    2. Chest pain, atypical          ORDERS PLACED TODAY:  Orders Placed This Encounter   Procedures   • BNP   • Basic Metabolic Panel   • Magnesium   • ReDs Vest        Diagnoses and all orders for this visit:    1. Acute on chronic combined systolic and diastolic congestive heart failure (HCC) (Primary)  -     BNP  -     Basic Metabolic Panel; Standing  -     Magnesium; Standing  -     ReDs Vest  -     Basic Metabolic Panel  -     Magnesium    2. Chest pain, atypical    Other orders  -     sacubitril-valsartan (Entresto)  MG tablet; Take 1 tablet by mouth 2 (Two) Times a Day.  Dispense: 60 tablet; Refill: 5  -     dapagliflozin Propanediol (Farxiga) 10 MG tablet; Take 1 tablet by mouth Daily.  Dispense: 30 tablet; Refill: 5             MEDS ORDERED TODAY:    New Medications Ordered This Visit   Medications   • sacubitril-valsartan (Entresto)  MG tablet     Sig: Take 1 tablet by mouth 2 (Two) Times a Day.     Dispense:  60 tablet     Refill:  5   • dapagliflozin Propanediol (Farxiga) 10 MG tablet     Sig: Take 1 tablet by mouth Daily.      Dispense:  30 tablet     Refill:  5        ---------------------------------------------------------------------------------------------------------------------------          ASSESSMENT/PLAN:      Diagnosis Plan   1. Acute on chronic combined systolic and diastolic congestive heart failure (HCC)  BNP    Basic Metabolic Panel    Magnesium    ReDs Vest    Basic Metabolic Panel    Magnesium   2. Chest pain, atypical         acute on chronic moderate systolic and diastolic heart failure. CHF. Etiology: Ischemic/CAD (Hx of AMI, CAD, or Ischemic Cardiomyopathy). LVEF 40-45%.      NYHA stage Stage C: Structural heart disease is present AND symptoms have occurredFC-Class III: Marked limitation of physical activity. Comfortable at rest. Less than ordinary activity causes fatigue, palpitation, or dyspnea.     Today, Patient appears euvolemic.and with  Moderate perfusion. The patient's hemodynamics are currently acceptable. HR is: bradycardic and is at goal. BP/MAP was reviewed and there isroom for medication up-titration.  Clinical trajectory was assessed and hasimproved.     CHF GOAL DIRECTED MEDICAL THERAPY FOR PATIENT ADDRESSED/ADJUSTED:     GDMT    Drug Class   Drug   Dose Last Dose Adjustment Additional Titration   Notes   ACEi/ARB/ARNI Entresto 97-103mg BID      Beta Blocker  Metoprolol Succinate   100mg      MRA Refuses MRA xx Patient refuses     SGLT2i Farxiga  10 mg  N/A      -CHF Specific BB:   •  Continue Toprol XL 100mg.      -ACE/ARB/ARNi:   • Current dose:  Entresto 97-103mg BID    -MRA:   • Refuses MRA as he is  and refuses diuretics unless acutely exacerbated and in need.   • Importance of MRA in HF discussed. Patient does not wish to try at present.      -SGLT2 inhibitor therapy:   • Continue Farxiga 10mg qd.   • Denies  side effects at present.    • Pt was advised SEs, some severe, including hypersensitivity and Mathew's; coupled with discussion regarding common side effects of UTIs and  female genital mycotic infections were discussed. If you will be NPO, or are sick (poor PO intake, N&V) please hold the medication until you are back to a normal diet.     -Diuretic regimen:   • ReDS Vest reading for 09/12/22 is 38; ReDs Vest reading reviewed with patient.    • No maintenance diuresis on board. Patient refuses diuretics unless acutely exacerbated.    • BMP, Mag, & ProBNP reviewed with patient.      -Fluid restriction/Sodium restriction:   • Requested 2000 ml restriction  • Patient has been asked to weigh daily and was provided with a printed diuretic strategy.  • 1,500 mg Na restriction was discussed.    -Acute vs. Chronic underlying conditions other than HF addressed during visit:     • ASCVD s/p 4v CABG:   o Continue statin & ASA.   o Continue beta blocker.         BMI is >= 30 and <35. (Class 1 Obesity). The following options were offered after discussion;: nutrition counseling/recommendations              45 minutes out of 60 minutes face to face spent counseling patient extensively on dietary Na+ intake, importance of activity, weight monitoring, compliance with medications in addition to importance of titration with goal directed medical therapy and follow up appointments.            This document has been electronically signed by Teri Pedraza PA-C  September 12, 2022 14:57 EDT      Dictated Utilizing Dragon Dictation: Part of this note may be an electronic transcription/translation of spoken language to printed text using the Dragon Dictation System.    Follow-up appointment and medication changes provided in hand delivered After Visit Summary as well as reviewed in the room.

## 2022-09-12 NOTE — PROGRESS NOTES
Heart Failure Clinic  Pharmacist Note     William Villasenor is a 72 y.o. male seen in the Heart Failure Clinic for HFrEF.  William Villasenor reports a great understanding of medications and has them specifically organized in his box to help with adherence. Pt denies any SOB or swelling.  Reports weight is stable.  Reports he will not take any type of water pill as he is a  and it is impossible for him to take.       Medication Use:   Hx of med intolerances: None related to HF  Retail Rx Management: Farxiga, Entresto through [employer] patient assistance program.     Past Medical History:   Diagnosis Date   • Arthritis    • Back pain    • BPH (benign prostatic hyperplasia)    • Chronic diastolic (congestive) heart failure (HCC)    • Diabetes mellitus (HCC)    • Diabetic peripheral neuropathy (HCC)    • Erectile dysfunction    • Former smoker    • GERD (gastroesophageal reflux disease)    • GERD (gastroesophageal reflux disease)    • Gout    • Hemorrhoids    • High cholesterol    • Hypertension    • Obesity      ALLERGIES: Lisinopril  Current Outpatient Medications   Medication Sig Dispense Refill   • allopurinol (ZYLOPRIM) 300 MG tablet TAKE 1 TABLET EVERY DAY 90 tablet 0   • APPLE CIDER VINEGAR PO Take 1 tablet by mouth Daily.     • Ascorbic Acid (Vitamin C) 500 MG capsule Take 2,000 mg by mouth Daily. 2000 in am 1000 at night     • aspirin 81 MG EC tablet Take 81 mg by mouth Daily.     • atorvastatin (LIPITOR) 40 MG tablet Take 1 tablet by mouth Every Night. 90 tablet 0   • CALCIUM-MAGNESIUM-ZINC PO Take 1 tablet by mouth 2 (two) times a day.     • Chromium Picolinate (CHROMIUM PICOLATE PO) Take 1 tablet by mouth Daily.     • Cinnamon 500 MG capsule Take 500 mg by mouth Daily.     • Cranberry 250 MG capsule Take 25,000 mg by mouth Daily.     • dapagliflozin Propanediol (Farxiga) 10 MG tablet Take 1 tablet by mouth Daily. 30 tablet 5   • doxazosin (CARDURA) 4 MG tablet TAKE 1 TABLET EVERY DAY (Patient taking  differently: Take 4 mg by mouth Every Night.) 90 tablet 0   • finasteride (PROSCAR) 5 MG tablet Take 1 tablet by mouth Daily. 90 tablet 0   • folic acid (FOLVITE) 800 MCG tablet Take 800 mcg by mouth Daily.     • gabapentin (NEURONTIN) 300 MG capsule Take 1 capsule by mouth 2 (two) times a day. 60 capsule 0   • glipizide (GLUCOTROL) 5 MG tablet Take 1 tablet by mouth 2 (Two) Times a Day Before Meals. 180 tablet 3   • isosorbide mononitrate (IMDUR) 120 MG 24 hr tablet Take 1 tablet by mouth Daily. 90 tablet 3   • loratadine (CLARITIN) 10 MG tablet Take 10 mg by mouth Daily As Needed for Allergies.     • metFORMIN (GLUCOPHAGE) 1000 MG tablet Take 1 tablet by mouth 2 (Two) Times a Day With Meals. 180 tablet 0   • metoprolol succinate XL (Toprol XL) 100 MG 24 hr tablet Take 1 tablet by mouth Daily. 90 tablet 3   • Misc Natural Products (LUTEIN 20 PO) Take 20 mg by mouth Daily.     • multivitamin (multivitamin) tablet tablet Take 1 tablet by mouth Daily. 30 tablet    • nitroglycerin (NITROSTAT) 0.4 MG SL tablet Place 1 tablet under the tongue Every 5 (Five) Minutes As Needed for Chest Pain (Only if SBP Greater Than 100). Take no more than 3 doses in 15 minutes. 100 tablet 12   • NON FORMULARY Take 500 mg by mouth 2 (Two) Times a Day. 1 capsule po bid chromium 200mg / cinnamon 500mg     • Omega-3 Fatty Acids (Fish Oil Triple Strength) 1400 MG capsule Take 1,200 mg by mouth 2 (Two) Times a Day.     • pantoprazole (PROTONIX) 40 MG EC tablet TAKE 1 TABLET EVERY DAY 90 tablet 3   • sacubitril-valsartan (Entresto)  MG tablet Take 1 tablet by mouth 2 (Two) Times a Day. 60 tablet 5   • triamcinolone (KENALOG) 0.1 % cream Apply 1 application topically to the appropriate area as directed Daily As Needed.     • vitamin E 400 UNIT capsule Take 1 capsule by mouth Daily.     • spironolactone (ALDACTONE) 25 MG tablet Take 1 tablet by mouth Daily. 30 tablet 11     No current facility-administered medications for this encounter.  "    Vaccination History:   Pneumonia: declines  Annual Influenza: declines  COVID 19: declines    Objective  Vitals:    09/12/22 1056   BP: 162/79   BP Location: Left arm   Patient Position: Sitting   Cuff Size: Adult   Pulse: 59   SpO2: 95%   Weight: 98.1 kg (216 lb 4.8 oz)   Height: 175.3 cm (69\")     Wt Readings from Last 3 Encounters:   09/12/22 98.1 kg (216 lb 4.8 oz)   06/24/22 99.8 kg (220 lb)   06/06/22 97.8 kg (215 lb 9.6 oz)         09/12/22  1056   Weight: 98.1 kg (216 lb 4.8 oz)     Lab Results   Component Value Date    GLUCOSE 202 (H) 09/12/2022    BUN 15 09/12/2022    CREATININE 1.09 09/12/2022    EGFRIFNONA 58 (L) 11/24/2021    BCR 13.8 09/12/2022    K 4.6 09/12/2022    CO2 22.7 09/12/2022    CALCIUM 9.0 09/12/2022    ALBUMIN 4.30 03/25/2022    AST 9 03/25/2022    ALT 9 03/25/2022     Lab Results   Component Value Date    WBC 6.36 03/25/2022    HGB 13.6 03/25/2022    HCT 43.2 03/25/2022    MCV 83.2 03/25/2022     03/25/2022     Lab Results   Component Value Date    TROPONINI <0.006 08/07/2018    TROPONINT <0.010 04/21/2021     Lab Results   Component Value Date    PROBNP 227.8 09/12/2022     Results for orders placed during the hospital encounter of 06/21/21    Adult Transthoracic Echo Limited W/ Cont if Necessary Per Protocol    Interpretation Summary  Moderately to severely technically limited study.  Parasternal images are uninterpretable.    1.  Global LV systolic function appears to be mildly impaired.  There are no large dense focal wall motion abnormalities but visually estimated ejection fraction is 40±5%.  There is mild to moderate concentric left ventricular hypertrophy.  There is mild biatrial enlargement.  RV size and function are grossly preserved.    2.  Valves are grossly morphologically normal.    3.  No large pericardial effusion.  The proximal aorta is not dilated.         GDMT    Drug Class   Drug   Dose Last Dose Adjustment Additional Titration   Notes   ACEi/ARB/ARNI " Entresto  97/103 bid 5/12/22 At goal    Beta Blocker Toprol XL 100mg 4/19/22     MRA     Started on Spironolactone by cardiology.  He absolutely refuses to take any diuretics.    SGLT2i Farxiga 10mg >3m At goal        Drug Therapy Problems    1. Needs vaccinations  2. GDMT: Would benefit from spironolactone    Recommendations:      1. Recommended flu, pneumonia & COVID vaccines.  Pt declines.   2. Pt prescribed spironolactone but is non-compliant.  He refuses to take.  Counseled him on benefits of taking spironolactone.     Patient was educated on heart failure medications and the importance of medication adherence. All questions were addressed and patient expressed understanding.     Thank you for allowing me to participate in the care of your patient,    Imani Jackson, PharmD  09/12/22  12:03 EDT

## 2022-09-12 NOTE — PROGRESS NOTES
Heart Failure Clinic    Date: 09/12/22     Vitals:    09/12/22 1056   BP: 162/79   Pulse: 59   SpO2: 95%    weight 216.3    Method of arrival: Ambulatory    Weighing self daily: No    Monitoring Heart Failure Zones: Most days    Today's HF Zone: Green    Taking medications as prescribed: Yes    Edema No    Shortness of Air: Yes with activity    Number of pillows used at night:<2    Educational Materials given:  avs                                                                         ReDS Value: 38  36-41 Possible Hypervolemic Status      Beni Moran RN 09/12/22 10:58 EDT

## 2022-11-09 ENCOUNTER — TELEPHONE (OUTPATIENT)
Dept: CARDIOLOGY | Facility: CLINIC | Age: 72
End: 2022-11-09

## 2022-11-09 NOTE — TELEPHONE ENCOUNTER
Caller: KHALIF    Relationship: SELF    Best call back number: [phone]    What is the best time to reach you: ANY        What was the call regarding: PT STATES HE HAS HAD SOB STARTING ABOUT A MONTH AGO AND TODAY NOTICED BOTH FEET HAVE STARTED TO SWELL. PT HAS ALSO HAD TO START SLEEPING WITH OXYGEN.  DOES NOT HAVE AN APPT TILL 12/2/22 AND WOULD LIKE TO KNOW IF HE SHOULD COME IN SOONER    Do you require a callback: YES

## 2022-11-09 NOTE — TELEPHONE ENCOUNTER
Please check and see if patient is on any other diuretics other than Aldactone for some reason I thought he was on Bumex but is not on his med list.  If he is not on any other diuretics.  Put him on Lasix 20 mg twice daily for 2 weeks to see if this helps with the swelling and his shortness of breath.  If not we need to bring him in either for nurse visit or follow-up if we have any openings.

## 2022-11-10 ENCOUNTER — OFFICE VISIT (OUTPATIENT)
Dept: CARDIOLOGY | Facility: CLINIC | Age: 72
End: 2022-11-10

## 2022-11-10 VITALS
BODY MASS INDEX: 31.7 KG/M2 | HEIGHT: 69 IN | SYSTOLIC BLOOD PRESSURE: 141 MMHG | OXYGEN SATURATION: 97 % | HEART RATE: 65 BPM | DIASTOLIC BLOOD PRESSURE: 79 MMHG | WEIGHT: 214 LBS

## 2022-11-10 DIAGNOSIS — I10 ESSENTIAL HYPERTENSION: ICD-10-CM

## 2022-11-10 DIAGNOSIS — I25.119 CORONARY ARTERY DISEASE INVOLVING NATIVE CORONARY ARTERY OF NATIVE HEART WITH ANGINA PECTORIS: ICD-10-CM

## 2022-11-10 DIAGNOSIS — I50.22 CHRONIC SYSTOLIC CHF (CONGESTIVE HEART FAILURE): Primary | ICD-10-CM

## 2022-11-10 DIAGNOSIS — R06.02 SHORTNESS OF BREATH: ICD-10-CM

## 2022-11-10 PROCEDURE — 99214 OFFICE O/P EST MOD 30 MIN: CPT | Performed by: PHYSICIAN ASSISTANT

## 2022-11-10 RX ORDER — FUROSEMIDE 20 MG/1
20 TABLET ORAL DAILY
Qty: 30 TABLET | Refills: 11 | Status: SHIPPED | OUTPATIENT
Start: 2022-11-10 | End: 2022-11-10

## 2022-11-10 RX ORDER — FUROSEMIDE 20 MG/1
20 TABLET ORAL 2 TIMES DAILY
Qty: 180 TABLET | Refills: 3 | Status: SHIPPED | OUTPATIENT
Start: 2022-11-10 | End: 2022-12-02 | Stop reason: SDUPTHER

## 2022-11-10 RX ORDER — FUROSEMIDE 20 MG/1
20 TABLET ORAL DAILY
Qty: 90 TABLET | Refills: 3 | Status: SHIPPED | OUTPATIENT
Start: 2022-11-10 | End: 2022-11-10

## 2022-11-10 RX ORDER — FUROSEMIDE 20 MG/1
20 TABLET ORAL DAILY
Qty: 30 TABLET | Refills: 11 | Status: SHIPPED | OUTPATIENT
Start: 2022-11-10 | End: 2022-11-10 | Stop reason: SDUPTHER

## 2022-11-10 NOTE — TELEPHONE ENCOUNTER
I called and notified patient that JR would like for him to try Lasix  20 mg po bid for two weeks. He was not taking any other diuretics except Aldactone. He would like this sent into [facility] in [place].      Lucia OKEEFE

## 2022-11-10 NOTE — PROGRESS NOTES
Problem list     Subjective   William Villasenor is a 72 y.o. male     Chief Complaint   Patient presents with   • Congestive Heart Failure   • Shortness of Breath        Problem list  1.  Coronary artery disease status post coronary artery bypass grafting x4  1.1 coronary artery bypass grafting 3/21: LIMA to LAD, SVG to diagonal, SVG to OM1, SVG to OM 2.  Residual  of RCA  1.2 echocardiogram 4/21: EF 35± percent, LV dyssynchrony  1.3 echocardiogram 6/21: EF 40±5% mild to moderate concentric left ventricular hypertrophy, mild biatrial enlargement no significant valvular disease   1.4 left heart catheterization 8/21: Left main ostial distal tapering, circumflex diffusely disease, OM1 occluded, LAD occluded, RCA subtotally occluded, LIMA widely patent, SVG to posterior lateral patent, SVG to OM patent, EF 45±5%, LVEDP 28-30  2.  Essential hypertension  3.  Diastolic dysfunction  4.  Chest pain  5.  Shortness of breath  6.  Lower extremity edema  7.  Dizziness/near syncope          7.1 cardiac event monitor 6/21: 2.1% ventricular ectopic burden,               episodes of trigeminy, bigeminy, and PSVT      HPI    Patient is a 72-year-old male who presents back to the office for follow-up.  Patient has history of coronary artery disease and bypass grafting as above in March 2021 with a follow-up catheterization demonstrating patent grafts.  He has ischemic cardiomyopathy with an EF near 40 to 45% and it seems by last echocardiogram.    Patient has been having worsening lower extremity edema and feels more short of breath.  Patient is quite concerned at times because of his symptoms.  Patient was on diuretic therapy in the past with spironolactone but apparently he stopped taking that medication.  He used to see congestive failure clinic.  It does not appear he is seeing them at this point.    Patient does experience occasional chest discomfort as a heaviness.  It is not severe but noticeable.  He also complains of worsening  shortness of breath when trying to exert or do activity.  He does not describe PND orthopnea.    Patient does not describe palpitating or complain of dysrhythmic symptoms.  Otherwise patient is doing well.      Current Outpatient Medications on File Prior to Visit   Medication Sig Dispense Refill   • allopurinol (ZYLOPRIM) 300 MG tablet TAKE 1 TABLET EVERY DAY 90 tablet 0   • APPLE CIDER VINEGAR PO Take 1 tablet by mouth Daily.     • Ascorbic Acid (Vitamin C) 500 MG capsule Take 2,000 mg by mouth Daily. 2000 in am 1000 at night     • aspirin 81 MG EC tablet Take 81 mg by mouth Daily.     • atorvastatin (LIPITOR) 40 MG tablet Take 1 tablet by mouth Every Night. 90 tablet 0   • CALCIUM-MAGNESIUM-ZINC PO Take 1 tablet by mouth 2 (two) times a day.     • Cinnamon 500 MG capsule Take 500 mg by mouth Daily.     • Cranberry 250 MG capsule Take 25,000 mg by mouth Daily.     • dapagliflozin Propanediol (Farxiga) 10 MG tablet Take 1 tablet by mouth Daily. 30 tablet 5   • doxazosin (CARDURA) 4 MG tablet TAKE 1 TABLET EVERY DAY (Patient taking differently: Take 1 tablet by mouth Every Night.) 90 tablet 0   • finasteride (PROSCAR) 5 MG tablet Take 1 tablet by mouth Daily. 90 tablet 0   • folic acid (FOLVITE) 800 MCG tablet Take 800 mcg by mouth Daily.     • gabapentin (NEURONTIN) 300 MG capsule Take 1 capsule by mouth 2 (two) times a day. 60 capsule 0   • glipizide (GLUCOTROL) 5 MG tablet Take 1 tablet by mouth 2 (Two) Times a Day Before Meals. 180 tablet 3   • isosorbide mononitrate (IMDUR) 120 MG 24 hr tablet Take 1 tablet by mouth Daily. 90 tablet 3   • loratadine (CLARITIN) 10 MG tablet Take 10 mg by mouth Daily As Needed for Allergies.     • metFORMIN (GLUCOPHAGE) 1000 MG tablet Take 1 tablet by mouth 2 (Two) Times a Day With Meals. 180 tablet 0   • metoprolol succinate XL (Toprol XL) 100 MG 24 hr tablet Take 1 tablet by mouth Daily. 90 tablet 3   • Misc Natural Products (LUTEIN 20 PO) Take 20 mg by mouth Daily.     •  multivitamin (multivitamin) tablet tablet Take 1 tablet by mouth Daily. 30 tablet    • nitroglycerin (NITROSTAT) 0.4 MG SL tablet Place 1 tablet under the tongue Every 5 (Five) Minutes As Needed for Chest Pain (Only if SBP Greater Than 100). Take no more than 3 doses in 15 minutes. 100 tablet 12   • NON FORMULARY Take 500 mg by mouth 2 (Two) Times a Day. 1 capsule po bid chromium 200mg / cinnamon 500mg     • Omega-3 Fatty Acids (Fish Oil Triple Strength) 1400 MG capsule Take 1,200 mg by mouth 2 (Two) Times a Day.     • pantoprazole (PROTONIX) 40 MG EC tablet TAKE 1 TABLET EVERY DAY 90 tablet 3   • sacubitril-valsartan (Entresto)  MG tablet Take 1 tablet by mouth 2 (Two) Times a Day. 60 tablet 5   • triamcinolone (KENALOG) 0.1 % cream Apply 1 application topically to the appropriate area as directed Daily As Needed.     • vitamin E 400 UNIT capsule Take 1 capsule by mouth Daily.     • [DISCONTINUED] Chromium Picolinate (CHROMIUM PICOLATE PO) Take 1 tablet by mouth Daily.     • [DISCONTINUED] spironolactone (ALDACTONE) 25 MG tablet Take 1 tablet by mouth Daily. 30 tablet 11     No current facility-administered medications on file prior to visit.       Lisinopril    Past Medical History:   Diagnosis Date   • Arthritis    • Back pain    • BPH (benign prostatic hyperplasia)    • Chronic diastolic (congestive) heart failure (HCC)    • Diabetes mellitus (HCC)    • Diabetic peripheral neuropathy (HCC)    • Erectile dysfunction    • Former smoker    • GERD (gastroesophageal reflux disease)    • GERD (gastroesophageal reflux disease)    • Gout    • Hemorrhoids    • High cholesterol    • Hypertension    • Obesity        Social History     Socioeconomic History   • Marital status:    • Number of children: 3   Tobacco Use   • Smoking status: Former     Packs/day: 1.00     Years: 5.00     Pack years: 5.00     Types: Cigarettes     Quit date: 1974     Years since quittin.8   • Smokeless tobacco: Never  "  Substance and Sexual Activity   • Alcohol use: Never     Comment: quit in 1988   • Drug use: Never   • Sexual activity: Defer       Family History   Problem Relation Age of Onset   • Heart disease Mother    • Hypertension Mother    • Depression Mother    • Alcohol abuse Maternal Uncle    • Heart disease Maternal Grandmother    • Hypertension Maternal Grandmother    • Diabetes Maternal Grandmother    • Diabetes Paternal Grandmother    • No Known Problems Half-Brother    • No Known Problems Half-Brother    • No Known Problems Half-Sister    • No Known Problems Daughter    • No Known Problems Daughter    • Hyperlipidemia Daughter        Review of Systems   Constitutional: Positive for diaphoresis and fatigue. Negative for chills and fever.   HENT: Negative.    Eyes: Negative.    Respiratory: Positive for apnea, cough, chest tightness, shortness of breath and wheezing.    Cardiovascular: Positive for chest pain and leg swelling. Negative for palpitations.   Gastrointestinal: Negative.  Negative for blood in stool.   Endocrine: Negative.    Genitourinary: Negative.  Negative for hematuria.   Musculoskeletal: Positive for arthralgias, back pain, myalgias and neck pain.   Skin: Negative.    Allergic/Immunologic: Positive for environmental allergies. Negative for food allergies.   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 Negative for dizziness, light-headedness, numbness and headaches.   Hematological: Bruises/bleeds easily.   Psychiatric/Behavioral: Negative.  Negative for sleep disturbance.       Objective   Vitals:    11/10/22 0949   BP: 141/79   BP Location: Left arm   Patient Position: Sitting   Pulse: 65   SpO2: 97%   Weight: 97.1 kg (214 lb)   Height: 175.3 cm (69\")      /79 (BP Location: Left arm, Patient Position: Sitting)   Pulse 65   Ht 175.3 cm (69\")   Wt 97.1 kg (214 lb)   SpO2 97%   BMI 31.60 kg/m²     Lab Results (most " recent)     None          Physical Exam  Vitals and nursing note reviewed.   Constitutional:       General: He is not in acute distress.     Appearance: Normal appearance. He is well-developed.   HENT:      Head: Normocephalic and atraumatic.   Eyes:      General: No scleral icterus.        Right eye: No discharge.         Left eye: No discharge.      Conjunctiva/sclera: Conjunctivae normal.   Neck:      Vascular: No carotid bruit.   Cardiovascular:      Rate and Rhythm: Normal rate and regular rhythm.      Heart sounds: Normal heart sounds. No murmur heard.    No friction rub. No gallop.   Pulmonary:      Effort: Pulmonary effort is normal. No respiratory distress.      Breath sounds: Normal breath sounds. No wheezing or rales.   Chest:      Chest wall: No tenderness.   Musculoskeletal:      Right lower leg: Edema present.      Left lower leg: Edema present.   Skin:     General: Skin is warm and dry.      Coloration: Skin is not pale.      Findings: No erythema or rash.   Neurological:      Mental Status: He is alert and oriented to person, place, and time.      Cranial Nerves: No cranial nerve deficit.   Psychiatric:         Behavior: Behavior normal.         Procedure   Procedures       Assessment & Plan     Problems Addressed this Visit        Cardiac and Vasculature    Essential hypertension (Chronic)    Relevant Medications    furosemide (LASIX) 20 MG tablet    furosemide (LASIX) 20 MG tablet    Other Relevant Orders    Adult Transthoracic Echo Complete W/ Cont if Necessary Per Protocol    Stress Test With Myocardial Perfusion One Day    Basic Metabolic Panel    proBNP    Coronary artery disease involving native coronary artery of native heart with angina pectoris (HCC)    Relevant Orders    Adult Transthoracic Echo Complete W/ Cont if Necessary Per Protocol    Stress Test With Myocardial Perfusion One Day    Basic Metabolic Panel    proBNP    Chronic systolic CHF (congestive heart failure) (HCC) - Primary     Relevant Orders    Adult Transthoracic Echo Complete W/ Cont if Necessary Per Protocol    Stress Test With Myocardial Perfusion One Day    Basic Metabolic Panel    proBNP       Pulmonary and Pneumonias    Shortness of breath    Relevant Orders    Adult Transthoracic Echo Complete W/ Cont if Necessary Per Protocol    Stress Test With Myocardial Perfusion One Day    Basic Metabolic Panel    proBNP   Diagnoses       Codes Comments    Chronic systolic CHF (congestive heart failure) (HCC)    -  Primary ICD-10-CM: I50.22  ICD-9-CM: 428.22, 428.0     Shortness of breath     ICD-10-CM: R06.02  ICD-9-CM: 786.05     Coronary artery disease involving native coronary artery of native heart with angina pectoris (HCC)     ICD-10-CM: I25.119  ICD-9-CM: 414.01, 413.9     Essential hypertension     ICD-10-CM: I10  ICD-9-CM: 401.9         Recommendation  1.  Patient is a 72-year-old male who presents for follow-up with history of coronary disease is complaining of chest pain worsening shortness of breath.  He also has experienced lower extremity edema possible mild congestive failure.    2.  I would like to schedule stress test to exclude the possibility of ischemia as a cause of patient's chest discomfort.    3.  Patient with echocardiogram to evaluate LV systolic diastolic function with history of congestive heart failure as well as ischemic cardiomyopathy to evaluate dyspnea and symptoms otherwise.     4. The patient will need diuretic therapy.  I am placing him on 20 mg of Lasix I want him to check labs in 1 to 2 weeks to ensure no azotemia or electrolyte abnormality.    5.  Any chest pain, not resolved with nitroglycerin, I recommend ER evaluation.  We will see patient back for follow-up after testing.  I have sent a message to front office staff and we will try to expedite testing.  Follow-up with primary as scheduled             William Villasenor  reports that he quit smoking about 48 years ago. His smoking use included cigarettes.  He has a 5.00 pack-year smoking history. He has never used smokeless tobacco..      Advance Care Planning   ACP discussion was held with the patient during this visit. Patient does not have an advance directive, declines further assistance.       Electronically signed by:

## 2022-11-11 ENCOUNTER — HOSPITAL ENCOUNTER (OUTPATIENT)
Dept: CARDIOLOGY | Facility: HOSPITAL | Age: 72
Discharge: HOME | End: 2022-11-11

## 2022-11-11 ENCOUNTER — HOSPITAL ENCOUNTER (OUTPATIENT)
Dept: NUCLEAR MEDICINE | Facility: HOSPITAL | Age: 72
Discharge: HOME | End: 2022-11-11

## 2022-11-11 DIAGNOSIS — I25.119 CORONARY ARTERY DISEASE INVOLVING NATIVE CORONARY ARTERY OF NATIVE HEART WITH ANGINA PECTORIS: ICD-10-CM

## 2022-11-11 DIAGNOSIS — I50.22 CHRONIC SYSTOLIC CHF (CONGESTIVE HEART FAILURE): ICD-10-CM

## 2022-11-11 DIAGNOSIS — R06.02 SHORTNESS OF BREATH: ICD-10-CM

## 2022-11-11 DIAGNOSIS — I10 ESSENTIAL HYPERTENSION: ICD-10-CM

## 2022-11-11 PROCEDURE — 78452 HT MUSCLE IMAGE SPECT MULT: CPT

## 2022-11-11 PROCEDURE — 0 TECHNETIUM SESTAMIBI: Performed by: PHYSICIAN ASSISTANT

## 2022-11-11 PROCEDURE — 93017 CV STRESS TEST TRACING ONLY: CPT

## 2022-11-11 PROCEDURE — 25010000002 REGADENOSON 0.4 MG/5ML SOLUTION: Performed by: PHYSICIAN ASSISTANT

## 2022-11-11 PROCEDURE — 93306 TTE W/DOPPLER COMPLETE: CPT

## 2022-11-11 PROCEDURE — 93018 CV STRESS TEST I&R ONLY: CPT | Performed by: INTERNAL MEDICINE

## 2022-11-11 PROCEDURE — 93306 TTE W/DOPPLER COMPLETE: CPT | Performed by: INTERNAL MEDICINE

## 2022-11-11 PROCEDURE — A9500 TC99M SESTAMIBI: HCPCS | Performed by: PHYSICIAN ASSISTANT

## 2022-11-11 PROCEDURE — 78452 HT MUSCLE IMAGE SPECT MULT: CPT | Performed by: INTERNAL MEDICINE

## 2022-11-11 RX ADMIN — REGADENOSON 0.4 MG: 0.08 INJECTION, SOLUTION INTRAVENOUS at 09:36

## 2022-11-11 RX ADMIN — TECHNETIUM TC 99M SESTAMIBI 1 DOSE: 1 INJECTION INTRAVENOUS at 08:00

## 2022-11-11 RX ADMIN — TECHNETIUM TC 99M SESTAMIBI 1 DOSE: 1 INJECTION INTRAVENOUS at 10:31

## 2022-11-14 ENCOUNTER — TELEPHONE (OUTPATIENT)
Dept: CARDIOLOGY | Facility: CLINIC | Age: 72
End: 2022-11-14

## 2022-11-14 NOTE — TELEPHONE ENCOUNTER
Advised patient test results are not available and he will receive a call with recommendations once received. Testing on previous business day and results can take up to 2 weeks to receive.

## 2022-11-14 NOTE — TELEPHONE ENCOUNTER
Caller: KHALIF VALLEJO     Relationship to patient: SELF    Best call back number: [phone]    Patient is needing: PT REQUESTING RESULTS OF RECENT TESTING.

## 2022-11-23 ENCOUNTER — TELEPHONE (OUTPATIENT)
Dept: CARDIOLOGY | Facility: CLINIC | Age: 72
End: 2022-11-23

## 2022-11-23 NOTE — TELEPHONE ENCOUNTER
Advised results are not available at this time and he will receive a call when recieved. He states he is stressed out and afraid he will lose his job if results and work release are not received soon. Advised I will route message to our  to see about getting results sooner if possible. Message also routed to  to advise once results are available.

## 2022-11-23 NOTE — TELEPHONE ENCOUNTER
Caller: William Villasenor    Relationship: Self    Best call back number: [phone]    PATIENT IS WANTING TO KNOW THE TEST RESULTS OF HIS STRESS TEST AND ECHO, HE IS ALSO WONDERING WHAT HIS HEART PUMP FUNCTION IS, THAT IN ORDER FOR HIM TO RETURN TO WORK IT NEEDS TO BE AT 40 OR ABOVE, ALSO IF HE IS OKAY FOR LONG DISTANCE DRIVES.

## 2022-11-28 ENCOUNTER — TELEPHONE (OUTPATIENT)
Dept: CARDIOLOGY | Facility: CLINIC | Age: 72
End: 2022-11-28

## 2022-11-28 NOTE — TELEPHONE ENCOUNTER
Dany San, Martha Wang MA  Caller: Unspecified (5 days ago, 10:05 AM)  I will be more than glad to look over them.  And clear him to go back to work as appropriate.  See if [employer] will add this to [facility]'s expedited list.     I called patient advised of above. He has apt on 12/2/22 @ 9:00. I will call patient with results if available before apt.    Lucia MCKINNEYA

## 2022-11-29 ENCOUNTER — LAB (OUTPATIENT)
Dept: LAB | Facility: HOSPITAL | Age: 72
End: 2022-11-29

## 2022-11-29 DIAGNOSIS — I50.22 CHRONIC SYSTOLIC CHF (CONGESTIVE HEART FAILURE): ICD-10-CM

## 2022-11-29 DIAGNOSIS — I25.119 CORONARY ARTERY DISEASE INVOLVING NATIVE CORONARY ARTERY OF NATIVE HEART WITH ANGINA PECTORIS: ICD-10-CM

## 2022-11-29 DIAGNOSIS — R06.02 SHORTNESS OF BREATH: ICD-10-CM

## 2022-11-29 DIAGNOSIS — I10 ESSENTIAL HYPERTENSION: ICD-10-CM

## 2022-11-29 LAB
ANION GAP SERPL CALCULATED.3IONS-SCNC: 10.2 MMOL/L (ref 5–15)
BH CV ECHO MEAS - ACS: 2.5 CM
BH CV ECHO MEAS - AO MAX PG: 4 MMHG
BH CV ECHO MEAS - AO MEAN PG: 2 MMHG
BH CV ECHO MEAS - AO ROOT DIAM: 3.6 CM
BH CV ECHO MEAS - AO V2 MAX: 100 CM/SEC
BH CV ECHO MEAS - AO V2 VTI: 21.2 CM
BH CV ECHO MEAS - EDV(CUBED): 68.9 ML
BH CV ECHO MEAS - EDV(MOD-SP4): 47 ML
BH CV ECHO MEAS - EF(MOD-SP4): 54.3 %
BH CV ECHO MEAS - ESV(CUBED): 15.6 ML
BH CV ECHO MEAS - ESV(MOD-SP4): 21.5 ML
BH CV ECHO MEAS - FS: 39 %
BH CV ECHO MEAS - IVS/LVPW: 0.64 CM
BH CV ECHO MEAS - IVSD: 0.9 CM
BH CV ECHO MEAS - LA DIMENSION: 2.9 CM
BH CV ECHO MEAS - LAT PEAK E' VEL: 8.7 CM/SEC
BH CV ECHO MEAS - LV DIASTOLIC VOL/BSA (35-75): 22.1 CM2
BH CV ECHO MEAS - LV MASS(C)D: 161.4 GRAMS
BH CV ECHO MEAS - LV SYSTOLIC VOL/BSA (12-30): 10.1 CM2
BH CV ECHO MEAS - LVIDD: 4.1 CM
BH CV ECHO MEAS - LVIDS: 2.5 CM
BH CV ECHO MEAS - LVOT AREA: 3.1 CM2
BH CV ECHO MEAS - LVOT DIAM: 2 CM
BH CV ECHO MEAS - LVPWD: 1.4 CM
BH CV ECHO MEAS - MED PEAK E' VEL: 4.1 CM/SEC
BH CV ECHO MEAS - MV A MAX VEL: 66.7 CM/SEC
BH CV ECHO MEAS - MV E MAX VEL: 76.6 CM/SEC
BH CV ECHO MEAS - MV E/A: 1.15
BH CV ECHO MEAS - PA ACC TIME: 0.08 SEC
BH CV ECHO MEAS - PA PR(ACCEL): 42.6 MMHG
BH CV ECHO MEAS - SI(MOD-SP4): 12 ML/M2
BH CV ECHO MEAS - SV(MOD-SP4): 25.5 ML
BH CV ECHO MEAS - TAPSE (>1.6): 1.55 CM
BH CV ECHO MEASUREMENTS AVERAGE E/E' RATIO: 11.97
BUN SERPL-MCNC: 14 MG/DL (ref 8–23)
BUN/CREAT SERPL: 13.6 (ref 7–25)
CALCIUM SPEC-SCNC: 9.1 MG/DL (ref 8.6–10.5)
CHLORIDE SERPL-SCNC: 101 MMOL/L (ref 98–107)
CO2 SERPL-SCNC: 26.8 MMOL/L (ref 22–29)
CREAT SERPL-MCNC: 1.03 MG/DL (ref 0.76–1.27)
EGFRCR SERPLBLD CKD-EPI 2021: 76.7 ML/MIN/1.73
GLUCOSE SERPL-MCNC: 214 MG/DL (ref 65–99)
LEFT ATRIUM VOLUME INDEX: 16.1 ML/M2
MAXIMAL PREDICTED HEART RATE: 147 BPM
NT-PROBNP SERPL-MCNC: 235 PG/ML (ref 0–900)
POTASSIUM SERPL-SCNC: 4.3 MMOL/L (ref 3.5–5.2)
SODIUM SERPL-SCNC: 138 MMOL/L (ref 136–145)
STRESS TARGET HR: 125 BPM

## 2022-11-29 PROCEDURE — 83880 ASSAY OF NATRIURETIC PEPTIDE: CPT

## 2022-11-29 PROCEDURE — 80048 BASIC METABOLIC PNL TOTAL CA: CPT

## 2022-12-01 ENCOUNTER — TELEPHONE (OUTPATIENT)
Dept: CARDIOLOGY | Facility: CLINIC | Age: 72
End: 2022-12-01

## 2022-12-02 ENCOUNTER — OFFICE VISIT (OUTPATIENT)
Dept: CARDIOLOGY | Facility: CLINIC | Age: 72
End: 2022-12-02

## 2022-12-02 VITALS
HEIGHT: 69 IN | DIASTOLIC BLOOD PRESSURE: 73 MMHG | OXYGEN SATURATION: 95 % | WEIGHT: 215.2 LBS | SYSTOLIC BLOOD PRESSURE: 128 MMHG | BODY MASS INDEX: 31.87 KG/M2 | HEART RATE: 66 BPM

## 2022-12-02 DIAGNOSIS — R07.9 CHEST PAIN DUE TO GERD: ICD-10-CM

## 2022-12-02 DIAGNOSIS — I50.22 CHRONIC SYSTOLIC CHF (CONGESTIVE HEART FAILURE): ICD-10-CM

## 2022-12-02 DIAGNOSIS — I10 ESSENTIAL HYPERTENSION: ICD-10-CM

## 2022-12-02 DIAGNOSIS — R06.02 SHORTNESS OF BREATH: ICD-10-CM

## 2022-12-02 DIAGNOSIS — K21.9 CHEST PAIN DUE TO GERD: ICD-10-CM

## 2022-12-02 DIAGNOSIS — I25.10 CORONARY ARTERY DISEASE INVOLVING NATIVE CORONARY ARTERY OF NATIVE HEART WITHOUT ANGINA PECTORIS: Primary | ICD-10-CM

## 2022-12-02 PROCEDURE — 99214 OFFICE O/P EST MOD 30 MIN: CPT | Performed by: NURSE PRACTITIONER

## 2022-12-02 RX ORDER — BUMETANIDE 1 MG/1
1 TABLET ORAL DAILY
Qty: 90 TABLET | Refills: 1 | Status: SHIPPED | OUTPATIENT
Start: 2022-12-02

## 2022-12-02 RX ORDER — NITROGLYCERIN 0.4 MG/1
0.4 TABLET SUBLINGUAL
Qty: 30 TABLET | Refills: 5 | Status: SHIPPED | OUTPATIENT
Start: 2022-12-02

## 2022-12-02 RX ORDER — PANTOPRAZOLE SODIUM 40 MG/1
40 TABLET, DELAYED RELEASE ORAL DAILY
Qty: 90 TABLET | Refills: 3 | Status: SHIPPED | OUTPATIENT
Start: 2022-12-02

## 2022-12-02 NOTE — PROGRESS NOTES
Subjective     William Villasenor is a 72 y.o. male who presents to day for Follow-up (Echo read, stress pending).    CHIEF COMPLIANT  Chief Complaint   Patient presents with   • Follow-up     Echo read, stress pending       Active Problems:  Problem List Items Addressed This Visit        Cardiac and Vasculature    Essential hypertension (Chronic)    Relevant Medications    bumetanide (Bumex) 1 MG tablet    CAD (coronary artery disease) - Primary    Relevant Medications    nitroglycerin (NITROSTAT) 0.4 MG SL tablet    bumetanide (Bumex) 1 MG tablet    Chronic systolic CHF (congestive heart failure) (HCC)    Relevant Medications    nitroglycerin (NITROSTAT) 0.4 MG SL tablet    bumetanide (Bumex) 1 MG tablet       Pulmonary and Pneumonias    Shortness of breath    Relevant Medications    bumetanide (Bumex) 1 MG tablet   Other Visit Diagnoses     Chest pain due to GERD        Relevant Medications    pantoprazole (PROTONIX) 40 MG EC tablet    bumetanide (Bumex) 1 MG tablet          HPI  William Villasenor a 72-year-old male patient who presents today for follow up for heart failure with reduced ejection fraction and coronary artery disease..     Patient does have a history of heart failure with reduced ejection fraction.  He is currently on Farxiga, Entresto, and metoprolol succinate. He recently underwent an echocardiogram that identified his ejection fraction has recovered to 45 to 50 percent with grade 1A diastolic dysfunction, trivial MR and his ascending aorta was in the upper limits of normal at 3.6 cm with no dissection or aneurysm.      The patient has chronic arterial hypertension, in which he is on Entresto, metoprolol, and Cardura. The patient's blood pressure today is 128/73 mmHg and his heart rate is 66 bpm.     Patient does have a history of coronary artery disease in which she went under bypass grafting in the past.  He reports chest pain, which he underwent a stress test.  The patient notes he has a slight stabbing  pain for a few seconds in bilateral sides of his chest. His stress test is currently still pending. The patient is taking atorvastatin 40 mg for high intensity statin therapy, aspirin for antiplatelet therapy, and Lasix for diuretic therapy. His recent blood work shows his Troponin was within normal range.   He reports having a loose wire in his chest. He states he gets short of breath with exertion or bending over. The patient reports being easily fatigued.     He is currently taking Lasix 40 mg in the morning and 20 mg in the afternoon or evening. The patient states he has swelling in the front of his toes constantly.   He reports looking in the mirror he notices his left side is more swollen than the right side. The patient's recent blood work shows he is tolerating the Lasix well. His creatinine has decrease and his GFR is 76.7 percent. His proBNP was 235 pg/ml.     He notes he had COVID-19 3 months ago and he has stayed in his recliner utilizing oxygen overnight until approximately 3 weeks ago.     He had a recent urinalysis that reveled his blood sugars were elevated in his urine.   The patient's blood volumes, magnesium, and lactic acid were within normal range.     He notes he needs a refill of nitroglycerin. The patient reports he has not had to take any nitroglycerin in quite some time.  He reports that he has only having to take 1 nitroglycerin since he has had surgery.    The patient states he needs a refill of pantoprazole. He request to return to work.     He denies any palpitations, dizziness, lightheadedness, syncope, or strokelike symptoms.  PRIOR MEDS  Current Outpatient Medications on File Prior to Visit   Medication Sig Dispense Refill   • allopurinol (ZYLOPRIM) 300 MG tablet TAKE 1 TABLET EVERY DAY 90 tablet 0   • APPLE CIDER VINEGAR PO Take 1 tablet by mouth Daily.     • Ascorbic Acid (Vitamin C) 500 MG capsule Take 2,000 mg by mouth Daily. 2000 in am 1000 at night     • aspirin 81 MG EC tablet  Take 81 mg by mouth Daily.     • atorvastatin (LIPITOR) 40 MG tablet Take 1 tablet by mouth Every Night. 90 tablet 0   • CALCIUM-MAGNESIUM-ZINC PO Take 1 tablet by mouth 2 (two) times a day.     • Cinnamon 500 MG capsule Take 500 mg by mouth Daily.     • Cranberry 250 MG capsule Take 25,000 mg by mouth Daily.     • dapagliflozin Propanediol (Farxiga) 10 MG tablet Take 1 tablet by mouth Daily. 30 tablet 5   • doxazosin (CARDURA) 4 MG tablet TAKE 1 TABLET EVERY DAY (Patient taking differently: Take 4 mg by mouth Every Night.) 90 tablet 0   • finasteride (PROSCAR) 5 MG tablet Take 1 tablet by mouth Daily. 90 tablet 0   • folic acid (FOLVITE) 800 MCG tablet Take 800 mcg by mouth Daily.     • gabapentin (NEURONTIN) 300 MG capsule Take 1 capsule by mouth 2 (two) times a day. 60 capsule 0   • glipizide (GLUCOTROL) 5 MG tablet Take 1 tablet by mouth 2 (Two) Times a Day Before Meals. 180 tablet 3   • isosorbide mononitrate (IMDUR) 120 MG 24 hr tablet Take 1 tablet by mouth Daily. 90 tablet 3   • loratadine (CLARITIN) 10 MG tablet Take 10 mg by mouth Daily As Needed for Allergies.     • metFORMIN (GLUCOPHAGE) 1000 MG tablet Take 1 tablet by mouth 2 (Two) Times a Day With Meals. 180 tablet 0   • metoprolol succinate XL (Toprol XL) 100 MG 24 hr tablet Take 1 tablet by mouth Daily. 90 tablet 3   • Misc Natural Products (LUTEIN 20 PO) Take 20 mg by mouth Daily.     • multivitamin (multivitamin) tablet tablet Take 1 tablet by mouth Daily. 30 tablet    • NON FORMULARY Take 500 mg by mouth 2 (Two) Times a Day. 1 capsule po bid chromium 200mg / cinnamon 500mg     • NON FORMULARY vitamin c 1000 w/ rosehips     • Omega-3 Fatty Acids (Fish Oil Triple Strength) 1400 MG capsule Take 1,200 mg by mouth 2 (Two) Times a Day.     • sacubitril-valsartan (Entresto)  MG tablet Take 1 tablet by mouth 2 (Two) Times a Day. 60 tablet 5   • triamcinolone (KENALOG) 0.1 % cream Apply 1 application topically to the appropriate area as directed  Daily As Needed.     • vitamin E 400 UNIT capsule Take 1 capsule by mouth Daily.     • [DISCONTINUED] furosemide (LASIX) 20 MG tablet Take 1 tablet by mouth 2 (Two) Times a Day. 180 tablet 3   • [DISCONTINUED] nitroglycerin (NITROSTAT) 0.4 MG SL tablet Place 1 tablet under the tongue Every 5 (Five) Minutes As Needed for Chest Pain (Only if SBP Greater Than 100). Take no more than 3 doses in 15 minutes. 100 tablet 12   • [DISCONTINUED] pantoprazole (PROTONIX) 40 MG EC tablet TAKE 1 TABLET EVERY DAY 90 tablet 3     No current facility-administered medications on file prior to visit.       ALLERGIES  Lisinopril    HISTORY  Past Medical History:   Diagnosis Date   • Arthritis    • Back pain    • BPH (benign prostatic hyperplasia)    • Chronic diastolic (congestive) heart failure (HCC)    • Diabetes mellitus (HCC)    • Diabetic peripheral neuropathy (HCC)    • Erectile dysfunction    • Former smoker    • GERD (gastroesophageal reflux disease)    • GERD (gastroesophageal reflux disease)    • Gout    • Hemorrhoids    • High cholesterol    • Hypertension    • Obesity        Social History     Socioeconomic History   • Marital status:    • Number of children: 3   Tobacco Use   • Smoking status: Former     Packs/day: 1.00     Years: 5.00     Pack years: 5.00     Types: Cigarettes     Quit date: 1974     Years since quittin.8   • Smokeless tobacco: Never   Substance and Sexual Activity   • Alcohol use: Never     Comment: quit in    • Drug use: Never   • Sexual activity: Defer       Family History   Problem Relation Age of Onset   • Heart disease Mother    • Hypertension Mother    • Depression Mother    • Alcohol abuse Maternal Uncle    • Heart disease Maternal Grandmother    • Hypertension Maternal Grandmother    • Diabetes Maternal Grandmother    • Diabetes Paternal Grandmother    • No Known Problems Half-Brother    • No Known Problems Half-Brother    • No Known Problems Half-Sister    • No Known Problems  "Daughter    • No Known Problems Daughter    • Hyperlipidemia Daughter        Review of Systems   Constitutional: Positive for fatigue.   HENT: Negative.  Negative for congestion, rhinorrhea and sore throat.    Eyes: Negative.  Negative for visual disturbance.   Respiratory: Positive for chest tightness and shortness of breath (With movement ).    Cardiovascular: Positive for leg swelling (Pt states he's still having a lot of swelling, mainly left sided. Does not go down overnight. ). Negative for chest pain and palpitations.        While going through pt's pill box, I noticed he had two different bottles of Lasix in his box, 20mg once daily and 20mg BID. Pt has been taking both unknowingly. States he has edema even with accidentally taking 60mg Lasix daily.    Gastrointestinal: Negative.  Negative for abdominal pain, blood in stool, constipation, diarrhea, nausea and vomiting.   Endocrine: Negative.  Negative for cold intolerance and heat intolerance.   Genitourinary: Negative.  Negative for difficulty urinating, dysuria, frequency, hematuria and urgency.   Musculoskeletal: Positive for arthralgias, back pain and neck pain.   Skin: Positive for wound (Small nicks and cuts from working ). Negative for rash.   Allergic/Immunologic: Positive for environmental allergies. Negative for food allergies.   Neurological: Positive for weakness (Feet and legs ). Negative for dizziness, syncope, light-headedness, numbness and headaches.   Hematological: Bruises/bleeds easily.   Psychiatric/Behavioral: Negative for sleep disturbance (Wakes up recurrently to urinate).       Objective     VITALS: /73   Pulse 66   Ht 175.3 cm (69\")   Wt 97.6 kg (215 lb 3.2 oz)   SpO2 95%   BMI 31.78 kg/m²     LABS:   Lab Results (most recent)     None          IMAGING:   CT Head Without Contrast    Result Date: 10/7/2022  .         . No evidence of an acute intracranial abnormality. Released By: AZEB DUNCAN MD 10/7/2022 2:34 PM    X-ray " Chest Portable - (1 View)    Result Date: 10/7/2022  .         . No radiographic abnormality identified. Released By: AZEB DUNCAN MD 10/7/2022 2:35 PM    CT Cervical Spine without IV Contrast    Result Date: 10/7/2022  .         . No acute fracture or degenerative changes. Released By: AZEB DUNCAN MD 10/7/2022 2:35 PM      EXAM:  Physical Exam  Vitals and nursing note reviewed.   Constitutional:       Appearance: He is well-developed.   HENT:      Head: Normocephalic.   Eyes:      Pupils: Pupils are equal, round, and reactive to light.   Neck:      Thyroid: No thyroid mass.      Vascular: No carotid bruit or JVD.      Trachea: Trachea and phonation normal.   Cardiovascular:      Rate and Rhythm: Normal rate and regular rhythm.      Pulses:           Radial pulses are 2+ on the right side and 2+ on the left side.        Posterior tibial pulses are 2+ on the right side and 2+ on the left side.      Heart sounds: Normal heart sounds. No murmur heard.    No friction rub. No gallop.   Pulmonary:      Effort: Pulmonary effort is normal. No respiratory distress.      Breath sounds: Normal breath sounds. No wheezing or rales.   Abdominal:      General: Bowel sounds are normal.      Palpations: Abdomen is soft.   Musculoskeletal:         General: Swelling (1+) present. Normal range of motion.      Cervical back: Neck supple.   Skin:     General: Skin is warm and dry.      Capillary Refill: Capillary refill takes less than 2 seconds.      Findings: No rash.   Neurological:      Mental Status: He is alert and oriented to person, place, and time.   Psychiatric:         Speech: Speech normal.         Behavior: Behavior normal.         Thought Content: Thought content normal.         Judgment: Judgment normal.         Procedure   Procedures       Assessment & Plan    Diagnosis Plan   1. Coronary artery disease involving native coronary artery of native heart without angina pectoris  bumetanide (Bumex) 1 MG tablet      2. Chest  pain due to GERD  pantoprazole (PROTONIX) 40 MG EC tablet    bumetanide (Bumex) 1 MG tablet      3. Chronic systolic CHF (congestive heart failure) (HCC)  bumetanide (Bumex) 1 MG tablet      4. Shortness of breath  bumetanide (Bumex) 1 MG tablet      5. Essential hypertension  bumetanide (Bumex) 1 MG tablet      1.  Patient does have a history of coronary artery disease in which he is underwent bypass surgery.  He is currently waiting upon stress test results.  His echocardiogram did show improvement in his ejection fraction up to 45 to 50%.  Overall he says he feels relatively well.  2.  Patient continues to have lower extremity edema that does not seem to be controlled on the Lasix.  The patient will discontinue Lasix, and will begin Bumex daily.   3.  Patient queries about going back to work.  His echocardiogram does show improvement.  If his stress test is negative I do think patient would be appropriate to return back to work.  4.  Patient's blood pressure is controlled on current blood pressure medication regimen.  No medication changes are warranted at this time.  Patient advised to monitor blood pressure on a daily basis and report any persistent highs or lows.  Set goal blood pressure for patient at 130/80 or below.  5.  Informed of signs and symptoms of ACS and advised to seek emergent treatment for any new worsening symptoms.  Patient also advised sooner follow-up as needed.  Also advised to follow-up with family doctor as needed  This note is dictated utilizing voice recognition software.  Although this record has been proof read, transcriptional errors may still be present. If questions occur regarding the content of this record please do not hesitate to call our office.  I have reviewed and confirmed the accuracy of the ROS as documented by the MA/LPN/RN ANKUR Cuevas    Return in about 6 months (around 6/2/2023), or if symptoms worsen or fail to improve.    Diagnoses and all orders for this  visit:    1. Coronary artery disease involving native coronary artery of native heart without angina pectoris (Primary)  -     bumetanide (Bumex) 1 MG tablet; Take 1 tablet by mouth Daily.  Dispense: 90 tablet; Refill: 1    2. Chest pain due to GERD  -     pantoprazole (PROTONIX) 40 MG EC tablet; Take 1 tablet by mouth Daily.  Dispense: 90 tablet; Refill: 3  -     bumetanide (Bumex) 1 MG tablet; Take 1 tablet by mouth Daily.  Dispense: 90 tablet; Refill: 1    3. Chronic systolic CHF (congestive heart failure) (HCC)  -     bumetanide (Bumex) 1 MG tablet; Take 1 tablet by mouth Daily.  Dispense: 90 tablet; Refill: 1    4. Shortness of breath  -     bumetanide (Bumex) 1 MG tablet; Take 1 tablet by mouth Daily.  Dispense: 90 tablet; Refill: 1    5. Essential hypertension  -     bumetanide (Bumex) 1 MG tablet; Take 1 tablet by mouth Daily.  Dispense: 90 tablet; Refill: 1    Other orders  -     nitroglycerin (NITROSTAT) 0.4 MG SL tablet; Place 1 tablet under the tongue Every 5 (Five) Minutes As Needed for Chest Pain (Only if SBP Greater Than 100). Take no more than 3 doses in 15 minutes.  Dispense: 30 tablet; Refill: 5        Assessment:  1. Heart failure.  2. Chronic arterial Hypertension.  3. Chest pain.    Advance Care Planning   ACP discussion was declined by the patient. Patient does not have an advance directive, declines further assistance.          MEDS ORDERED DURING VISIT:  New Medications Ordered This Visit   Medications   • nitroglycerin (NITROSTAT) 0.4 MG SL tablet     Sig: Place 1 tablet under the tongue Every 5 (Five) Minutes As Needed for Chest Pain (Only if SBP Greater Than 100). Take no more than 3 doses in 15 minutes.     Dispense:  30 tablet     Refill:  5   • pantoprazole (PROTONIX) 40 MG EC tablet     Sig: Take 1 tablet by mouth Daily.     Dispense:  90 tablet     Refill:  3   • bumetanide (Bumex) 1 MG tablet     Sig: Take 1 tablet by mouth Daily.     Dispense:  90 tablet     Refill:  1            This document has been electronically signed by ANKUR Cuevas Jr.  December 2, 2022 09:56 EST    Transcribed from ambient dictation for ANKUR Cuevas by Suellen Jean-Baptiste.  12/02/22   14:12 EST    Patient or patient representative verbalized consent to the visit recording.  I have personally performed the services described in this document as transcribed by the above individual, and it is both accurate and complete.

## 2022-12-03 LAB
BH CV NUCLEAR PRIOR STUDY: 3
BH CV REST NUCLEAR ISOTOPE DOSE: 9.9 MCI
BH CV STRESS BP STAGE 1: NORMAL
BH CV STRESS COMMENTS STAGE 1: NORMAL
BH CV STRESS DOSE REGADENOSON STAGE 1: 0.4
BH CV STRESS DURATION MIN STAGE 1: 0
BH CV STRESS DURATION SEC STAGE 1: 10
BH CV STRESS HR STAGE 1: 74
BH CV STRESS NUCLEAR ISOTOPE DOSE: 29.4 MCI
BH CV STRESS PROTOCOL 1: NORMAL
BH CV STRESS RECOVERY BP: NORMAL MMHG
BH CV STRESS RECOVERY HR: 70 BPM
BH CV STRESS STAGE 1: 1
MAXIMAL PREDICTED HEART RATE: 147 BPM
PERCENT MAX PREDICTED HR: 50.34 %
STRESS BASELINE BP: NORMAL MMHG
STRESS BASELINE HR: 65 BPM
STRESS PERCENT HR: 59 %
STRESS POST PEAK BP: NORMAL MMHG
STRESS POST PEAK HR: 74 BPM
STRESS TARGET HR: 125 BPM

## 2022-12-05 ENCOUNTER — TELEPHONE (OUTPATIENT)
Dept: CARDIOLOGY | Facility: CLINIC | Age: 72
End: 2022-12-05

## 2022-12-05 NOTE — TELEPHONE ENCOUNTER
DELETE AFTER REVIEWING: Telephone encounter to be sent to the clinical pool.    Caller: KHALIF    Relationship: SELF     Best call back number: [phone]    Caller requesting test results: STRESS TEST     When was the test performed: 11.11.22    Where was the test performed: [facility]    Additional notes: PATIENT CALLED IN WANTING TO KNOW THE RESULTS OF THE STRESS TEST TEST. PLEASE ADVISE.    ”

## 2022-12-06 ENCOUNTER — TELEPHONE (OUTPATIENT)
Dept: CARDIOLOGY | Facility: CLINIC | Age: 72
End: 2022-12-06

## 2022-12-06 NOTE — TELEPHONE ENCOUNTER
Called and notified patient that he can go back to work . Briefly went over stress test  results. No scintigraphic evidence of ischemia.  Preserved post stress ejection fraction of 55% with inferoseptal hypokinesis.       Lucia OKEEFE

## 2022-12-09 ENCOUNTER — TELEPHONE (OUTPATIENT)
Dept: CARDIOLOGY | Facility: CLINIC | Age: 72
End: 2022-12-09

## 2022-12-16 ENCOUNTER — APPOINTMENT (OUTPATIENT)
Dept: CARDIOLOGY | Facility: HOSPITAL | Age: 72
End: 2022-12-16

## 2022-12-19 ENCOUNTER — APPOINTMENT (OUTPATIENT)
Dept: CARDIOLOGY | Facility: HOSPITAL | Age: 72
End: 2022-12-19

## 2023-01-03 ENCOUNTER — APPOINTMENT (OUTPATIENT)
Dept: CARDIOLOGY | Facility: HOSPITAL | Age: 73
End: 2023-01-03
Payer: MEDICARE

## 2023-01-06 ENCOUNTER — HOSPITAL ENCOUNTER (OUTPATIENT)
Dept: CARDIOLOGY | Facility: HOSPITAL | Age: 73
Discharge: HOME | End: 2023-01-06
Admitting: PHYSICIAN ASSISTANT
Payer: MEDICARE

## 2023-01-06 VITALS
SYSTOLIC BLOOD PRESSURE: 155 MMHG | HEART RATE: 63 BPM | HEIGHT: 69 IN | WEIGHT: 215.6 LBS | OXYGEN SATURATION: 95 % | BODY MASS INDEX: 31.93 KG/M2 | DIASTOLIC BLOOD PRESSURE: 79 MMHG

## 2023-01-06 DIAGNOSIS — I50.23 ACUTE ON CHRONIC SYSTOLIC (CONGESTIVE) HEART FAILURE: Primary | ICD-10-CM

## 2023-01-06 DIAGNOSIS — E11.21 TYPE 2 DIABETES MELLITUS WITH DIABETIC NEPHROPATHY, UNSPECIFIED WHETHER LONG TERM INSULIN USE: Chronic | ICD-10-CM

## 2023-01-06 LAB
ABSOLUTE LUNG FLUID CONTENT: 42 % (ref 20–35)
ANION GAP SERPL CALCULATED.3IONS-SCNC: 12 MMOL/L (ref 5–15)
BUN SERPL-MCNC: 12 MG/DL (ref 8–23)
BUN/CREAT SERPL: 11.2 (ref 7–25)
CALCIUM SPEC-SCNC: 8.9 MG/DL (ref 8.6–10.5)
CHLORIDE SERPL-SCNC: 103 MMOL/L (ref 98–107)
CO2 SERPL-SCNC: 21 MMOL/L (ref 22–29)
CREAT SERPL-MCNC: 1.07 MG/DL (ref 0.76–1.27)
EGFRCR SERPLBLD CKD-EPI 2021: 73.3 ML/MIN/1.73
GLUCOSE SERPL-MCNC: 224 MG/DL (ref 65–99)
HBA1C MFR BLD: 9.5 % (ref 4.8–5.6)
MAGNESIUM SERPL-MCNC: 1.8 MG/DL (ref 1.6–2.4)
NT-PROBNP SERPL-MCNC: 201.9 PG/ML (ref 0–900)
POTASSIUM SERPL-SCNC: 4.8 MMOL/L (ref 3.5–5.2)
SODIUM SERPL-SCNC: 136 MMOL/L (ref 136–145)

## 2023-01-06 PROCEDURE — 94726 PLETHYSMOGRAPHY LUNG VOLUMES: CPT | Performed by: PHYSICIAN ASSISTANT

## 2023-01-06 PROCEDURE — 80048 BASIC METABOLIC PNL TOTAL CA: CPT | Performed by: PHYSICIAN ASSISTANT

## 2023-01-06 PROCEDURE — 83880 ASSAY OF NATRIURETIC PEPTIDE: CPT | Performed by: PHYSICIAN ASSISTANT

## 2023-01-06 PROCEDURE — 83036 HEMOGLOBIN GLYCOSYLATED A1C: CPT | Performed by: PHYSICIAN ASSISTANT

## 2023-01-06 PROCEDURE — 83735 ASSAY OF MAGNESIUM: CPT | Performed by: PHYSICIAN ASSISTANT

## 2023-01-06 PROCEDURE — 99215 OFFICE O/P EST HI 40 MIN: CPT | Performed by: PHYSICIAN ASSISTANT

## 2023-01-06 RX ORDER — DAPAGLIFLOZIN 10 MG/1
10 TABLET, FILM COATED ORAL DAILY
Qty: 30 TABLET | Refills: 5 | Status: SHIPPED | OUTPATIENT
Start: 2023-01-06

## 2023-01-06 RX ORDER — FLUCONAZOLE 200 MG/1
200 TABLET ORAL ONCE
COMMUNITY
Start: 2022-12-30

## 2023-01-06 RX ORDER — SACUBITRIL AND VALSARTAN 97; 103 MG/1; MG/1
1 TABLET, FILM COATED ORAL 2 TIMES DAILY
Qty: 60 TABLET | Refills: 5 | Status: SHIPPED | OUTPATIENT
Start: 2023-01-06

## 2023-01-06 RX ORDER — SPIRONOLACTONE 25 MG/1
12.5 TABLET ORAL DAILY
Qty: 15 TABLET | Refills: 0 | Status: SHIPPED | OUTPATIENT
Start: 2023-01-06 | End: 2023-02-05

## 2023-01-06 NOTE — PROGRESS NOTES
[facility]  Pharmacist Note     William Villasenor is a 72 y.o. male seen in the [facility] for HFrEF.  William Villasenor reports a great understanding of medications and has them specifically organized in his box to help with adherence. He denies SOB but reports some swelling in his feet that started about 1 month ago when he stopped his bumetanide because he felt it was causing diarrhea. He does report some redness and yeast but states his PCP Jennifer Cooper gave him medication (reconciled outside meds and verified it was fluconazole) and he took 2 days ago. He reports she told him to take 1 more in a week.       Medication Use:   Hx of med intolerances: None related to HF  Retail Rx Management: Uses our pharmacy for Farxiga and Entresto     Past Medical History:   Diagnosis Date   • Arthritis    • Back pain    • BPH (benign prostatic hyperplasia)    • Chronic diastolic (congestive) heart failure (HCC)    • Diabetes mellitus (HCC)    • Diabetic peripheral neuropathy (HCC)    • Erectile dysfunction    • Former smoker    • GERD (gastroesophageal reflux disease)    • GERD (gastroesophageal reflux disease)    • Gout    • Hemorrhoids    • High cholesterol    • Hypertension    • Obesity      ALLERGIES: Lisinopril  Current Outpatient Medications   Medication Sig Dispense Refill   • allopurinol (ZYLOPRIM) 300 MG tablet TAKE 1 TABLET EVERY DAY 90 tablet 0   • APPLE CIDER VINEGAR PO Take 1 tablet by mouth Daily.     • Ascorbic Acid (Vitamin C) 500 MG capsule Take 2,000 mg by mouth Daily. 2000 in am 1000 at night     • aspirin 81 MG EC tablet Take 81 mg by mouth Daily.     • atorvastatin (LIPITOR) 40 MG tablet Take 1 tablet by mouth Every Night. 90 tablet 0   • CALCIUM-MAGNESIUM-ZINC PO Take 1 tablet by mouth 2 (Two) Times a Day. 400 mg Mag per capsule     • Cinnamon 500 MG capsule Take 500 mg by mouth Daily.     • Cranberry 250 MG capsule Take 25,000 mg by mouth Daily.     • dapagliflozin Propanediol (Farxiga) 10 MG  tablet Take 1 tablet by mouth Daily. 30 tablet 5   • doxazosin (CARDURA) 4 MG tablet TAKE 1 TABLET EVERY DAY (Patient taking differently: Take 4 mg by mouth Every Night.) 90 tablet 0   • finasteride (PROSCAR) 5 MG tablet Take 1 tablet by mouth Daily. 90 tablet 0   • folic acid (FOLVITE) 800 MCG tablet Take 800 mcg by mouth Daily.     • gabapentin (NEURONTIN) 300 MG capsule Take 1 capsule by mouth 2 (two) times a day. 60 capsule 0   • glipizide (GLUCOTROL) 5 MG tablet Take 1 tablet by mouth 2 (Two) Times a Day Before Meals. 180 tablet 3   • isosorbide mononitrate (IMDUR) 120 MG 24 hr tablet Take 1 tablet by mouth Daily. 90 tablet 3   • loratadine (CLARITIN) 10 MG tablet Take 10 mg by mouth Daily As Needed for Allergies.     • metFORMIN (GLUCOPHAGE) 1000 MG tablet Take 1 tablet by mouth 2 (Two) Times a Day With Meals. 180 tablet 0   • metoprolol succinate XL (Toprol XL) 100 MG 24 hr tablet Take 1 tablet by mouth Daily. 90 tablet 3   • Misc Natural Products (LUTEIN 20 PO) Take 20 mg by mouth Daily.     • multivitamin (multivitamin) tablet tablet Take 1 tablet by mouth Daily. 30 tablet    • nitroglycerin (NITROSTAT) 0.4 MG SL tablet Place 1 tablet under the tongue Every 5 (Five) Minutes As Needed for Chest Pain (Only if SBP Greater Than 100). Take no more than 3 doses in 15 minutes. 30 tablet 5   • NON FORMULARY Take 500 mg by mouth 2 (Two) Times a Day. 1 capsule po bid chromium 200mg / cinnamon 500mg     • NON FORMULARY vitamin c 1000 w/ rosehips     • Omega-3 Fatty Acids (Fish Oil Triple Strength) 1400 MG capsule Take 1,200 mg by mouth 2 (Two) Times a Day.     • pantoprazole (PROTONIX) 40 MG EC tablet Take 1 tablet by mouth Daily. 90 tablet 3   • sacubitril-valsartan (Entresto)  MG tablet Take 1 tablet by mouth 2 (Two) Times a Day. 60 tablet 5   • triamcinolone (KENALOG) 0.1 % cream Apply 1 application topically to the appropriate area as directed Daily As Needed.     • vitamin E 400 UNIT capsule Take 1 capsule  by mouth Daily.     • bumetanide (Bumex) 1 MG tablet Take 1 tablet by mouth Daily. 90 tablet 1   • fluconazole (DIFLUCAN) 200 MG tablet Take 200 mg by mouth 1 (One) Time.     • spironolactone (Aldactone) 25 MG tablet Take 0.5 tablets by mouth Daily for 30 days. 15 tablet 0     No current facility-administered medications for this encounter.     Vaccination History:   Pneumonia: declines  Annual Influenza: declines  COVID 19: declines    Objective  Vitals:    01/06/23 0947   BP: 155/79   BP Location: Left arm   Patient Position: Sitting   Cuff Size: Adult   Pulse: 63   SpO2: 95%   Weight: 97.8 kg (215 lb 9.6 oz)   Height: 175.3 cm (69\")     Wt Readings from Last 3 Encounters:   01/06/23 97.8 kg (215 lb 9.6 oz)   12/02/22 97.6 kg (215 lb 3.2 oz)   11/10/22 97.1 kg (214 lb)         01/06/23  0947   Weight: 97.8 kg (215 lb 9.6 oz)     Lab Results   Component Value Date    GLUCOSE 224 (H) 01/06/2023    BUN 12 01/06/2023    CREATININE 1.07 01/06/2023    EGFRIFNONA 58 (L) 11/24/2021    BCR 11.2 01/06/2023    K 4.8 01/06/2023    CO2 21.0 (L) 01/06/2023    CALCIUM 8.9 01/06/2023    ALBUMIN 4.30 03/25/2022    AST 9 03/25/2022    ALT 9 03/25/2022     Lab Results   Component Value Date    WBC 5.15 10/07/2022    HGB 14.7 10/07/2022    HCT 43.4 10/07/2022    MCV 87.1 10/07/2022     10/07/2022     Lab Results   Component Value Date    CKMB 44 (L) 10/07/2022    TROPONINI <0.02 10/07/2022    TROPONINT <0.010 04/21/2021     Lab Results   Component Value Date    PROBNP 201.9 01/06/2023     Results for orders placed during the hospital encounter of 11/11/22    Adult Transthoracic Echo Complete W/ Cont if Necessary Per Protocol    Interpretation Summary  Poor technical quality.  The right atrium and right ventricle are never adequately imaged and there is poor endomyocardial imaging throughout.    1.  LV size appears somewhat increased global LV systolic function appears to be in the low normal range with a visually estimated  ejection fraction of 45 to 50%.  Formal regional wall motion assessment cannot be performed.  LV wall thickness is at the upper limits of normal and there is grade 1A diastolic dysfunction.  Mild left atrial enlargement.  Neither the right atrial nor the right ventricular free wall is adequately visualized for comment.    2.  The mitral valve is morphologically normal with no mitral stenosis and with trivial MR.  The aortic valve is not well visualized.  Doppler excludes aortic stenosis and significant aortic insufficiency.  Right-sided heart valves were not well visualized.  Limited Doppler sampling fails to demonstrate significant valve pathology.    3.  There is no pericardial effusion present.  The proximal aorta is at the upper limits of normal at 3.6 cm with no dissection or aneurysm.    4.  Pulmonary artery pressures cannot be calculated.         GDMT    Drug Class   Drug   Dose Last Dose Adjustment Additional Titration   Notes   ACEi/ARB/ARNI Entresto  97/103 bid 5/12/22 At goal    Beta Blocker Toprol XL 100mg 4/19/22     MRA Spironolactone 12.5mg  1/6/23     SGLT2i Farxiga 10mg >3m At goal        Drug Therapy Problems    1. Needs vaccinations  2. Potential medication adverse effect: Farxiga (yeast)  3. GDMT: Needs additional therapy     Recommendations:      1. Recommended flu, pneumonia & COVID vaccines.  Pt declines.   2. Continue to monitor for S/Sx of yeast/Sx improvement since on Farxiga.   3. Initiate MRA     Patient was educated on heart failure medications and the importance of medication adherence. All questions were addressed and patient expressed understanding.     Thank you for allowing me to participate in the care of your patient,    Imani Jackson, PharmD  01/06/23  11:26 EST

## 2023-01-06 NOTE — PROGRESS NOTES
[facility]  DAISY Garcia, ANKUR Agarwal  [address] [place] [address]    Thank you for asking me to see William Villasenor for congestive heart failure.    HPI:     This is a 72 y.o. male with known past medical history of:    · Chronic combined systolic & diastolic HF with LVEF 40-45%  · TTE from December 2022 reviewed with ejection fraction improved to 45 to 50% and evidence of grade 1A diastolic dysfunction with mild left atrial enlargement  · ASCVD s/p CABG 4v  · Coronary artery disease status post coronary artery bypass grafting [id number] artery bypass grafting 3/21: LIMA to LAD, SVG to diagonal, SVG to OM1, SVG to OM 2.  Residual  of RCA  · echocardiogram 4/21: EF 35± percent, LV dyssynchrony  · echocardiogram 6/21: EF 40±5% mild to moderate concentric left ventricular hypertrophy, mild biatrial enlargement no significant valvular disease   · left heart catheterization 8/21: Left main ostial distal tapering, circumflex diffusely disease, OM1 occluded, LAD occluded, RCA subtotally occluded, LIMA widely patent, SVG to posterior lateral patent, SVG to OM patent, EF 45±5%, LVEDP 28-30   · Nuclear stress test from November 2022 with no scintigriphic evidence of ischemia but with elevated transient dilation ratio of unknown significance.    · DM type 2  · Peripheral neuropathy  · CKD stage II-III  · Gout   · BPH   · GERD  · Obesity    William Villasenor presents for today for HF clinic follow-up.  The patient is typically seen by Kaylee Cooper APRN.    • Last known EF 40-45%. Current medications prior to first visit directed toward underlying heart failure.     • Last known hospitalization and/or ED visit: Last hospitalized from March 29, 2021-April 9, 2021 with acute on chronic HFpEF and sepsis 2/2 RLE cellulitis from vein harvesting procedures.    • Accompanied by: Self    Initial visit data/details regarding:   • Dyspnea: Dyspnea on exertion, denies  orthopnea  • Lower extremity swelling: Bilateral pitting pedal edema  • Abdominal swelling: No significant protuberant  • Home weight:  Stable  • Home BP: 140s-150s systolic  • Home heart rate: 60s  • Daily activities of living:  Performs independently  • Pillows/lying flat: 1 pillow  • Zone: Yellow  • Mr. Villasenor reports he was started on Bumex in [place] and had extreme diarrhea so he stopped medication.  He reports this was started after he had worsening swelling of his bilateral feet and legs.  He reports after stopping Bumex and the swelling did recur.  • He reports today that he is having bilateral pitting edema and slightly worsening shortness of breath.  He has previously been very hesitant to try diuretics.    • We discuss MRA addition today, and he is willing to try this as long as he does not have any diarrhea.      Specialists:   • Cardiology:  [facility] team.     Review of Systems - Review of Systems   Constitutional: Negative for decreased appetite and diaphoresis.   HENT: Negative for congestion, ear discharge and ear pain.    Eyes: Negative for blurred vision, discharge and double vision.   Cardiovascular: Positive for dyspnea on exertion and leg swelling. Negative for chest pain, claudication and cyanosis.   Respiratory: Negative for cough, hemoptysis and shortness of breath.    Endocrine: Negative for cold intolerance, heat intolerance and polydipsia.   Hematologic/Lymphatic: Negative for adenopathy and bleeding problem.   Skin: Negative for color change and nail changes.   Musculoskeletal: Negative for arthritis and back pain.   Gastrointestinal: Negative for bloating, abdominal pain and anorexia.   Genitourinary: Negative for bladder incontinence and decreased libido.   Neurological: Negative for aphonia and brief paralysis.   Psychiatric/Behavioral: Negative for altered mental status, depression and hallucinations.         All other systems were reviewed and were  negative.    Patient Active Problem List   Diagnosis   • Idiopathic chronic gout of multiple sites without tophus   • Type II diabetes mellitus ([facility]ly [facility])   • Essential hypertension   • Benign prostatic hyperplasia without lower urinary tract symptoms   • Chest pain   • Obesity (BMI 30-39.9)   • GERD (gastroesophageal reflux disease)   • Diabetic neuropathy ([facility]ly [facility])   • Former smoker   • History of gout   • Coronary artery disease involving native coronary artery of native heart with angina pectoris ([facility]ly [facility])   • CAD (coronary artery disease)   • IZA (acute kidney injury) (Formerly [facility])   • Acute on chronic congestive heart failure (Formerly [facility])   • Hyperlipidemia   • CKD (chronic kidney disease) stage 2, GFR 60-89 ml/min   • Bradycardia   • Diabetic polyneuropathy ([facility]ly [facility])   • Heartburn   • Low back pain   • S/P CABG x 4   • Acute systolic heart failure ([facility])   • Cardiac LV ejection fraction of 35-39%   • Ventricular trigeminy   • PSVT (paroxysmal supraventricular tachycardia) ([facility]ly [facility])   • Shortness of breath   • Chest pressure   • Chronic systolic CHF (congestive heart failure) (Formerly [facility])       family history includes Alcohol abuse in his maternal uncle; Depression in his mother; Diabetes in his maternal grandmother and paternal grandmother; Heart disease in his maternal grandmother and mother; Hyperlipidemia in his daughter; Hypertension in his maternal grandmother and mother; No Known Problems in his daughter, daughter, half-brother, half-brother, and half-sister.     reports that he quit smoking about 48 years ago. His smoking use included cigarettes. He has a 5.00 pack-year smoking history. He has never used smokeless tobacco. He reports that he does not drink alcohol and does not use drugs.    Allergies   Allergen Reactions   • Lisinopril Cough         Current Outpatient Medications:   •  allopurinol (ZYLOPRIM) 300 MG tablet, TAKE 1 TABLET EVERY DAY, Disp: 90 tablet, Rfl: 0  •  APPLE CIDER VINEGAR PO, Take 1 tablet by mouth Daily., Disp: , Rfl:   •   Ascorbic Acid (Vitamin C) 500 MG capsule, Take 2,000 mg by mouth Daily. 2000 in am 1000 at night, Disp: , Rfl:   •  aspirin 81 MG EC tablet, Take 81 mg by mouth Daily., Disp: , Rfl:   •  atorvastatin (LIPITOR) 40 MG tablet, Take 1 tablet by mouth Every Night., Disp: 90 tablet, Rfl: 0  •  bumetanide (Bumex) 1 MG tablet, Take 1 tablet by mouth Daily., Disp: 90 tablet, Rfl: 1  •  CALCIUM-MAGNESIUM-ZINC PO, Take 1 tablet by mouth 2 (two) times a day., Disp: , Rfl:   •  Cinnamon 500 MG capsule, Take 500 mg by mouth Daily., Disp: , Rfl:   •  Cranberry 250 MG capsule, Take 25,000 mg by mouth Daily., Disp: , Rfl:   •  dapagliflozin Propanediol (Farxiga) 10 MG tablet, Take 1 tablet by mouth Daily., Disp: 30 tablet, Rfl: 5  •  doxazosin (CARDURA) 4 MG tablet, TAKE 1 TABLET EVERY DAY (Patient taking differently: Take 4 mg by mouth Every Night.), Disp: 90 tablet, Rfl: 0  •  finasteride (PROSCAR) 5 MG tablet, Take 1 tablet by mouth Daily., Disp: 90 tablet, Rfl: 0  •  folic acid (FOLVITE) 800 MCG tablet, Take 800 mcg by mouth Daily., Disp: , Rfl:   •  gabapentin (NEURONTIN) 300 MG capsule, Take 1 capsule by mouth 2 (two) times a day., Disp: 60 capsule, Rfl: 0  •  glipizide (GLUCOTROL) 5 MG tablet, Take 1 tablet by mouth 2 (Two) Times a Day Before Meals., Disp: 180 tablet, Rfl: 3  •  isosorbide mononitrate (IMDUR) 120 MG 24 hr tablet, Take 1 tablet by mouth Daily., Disp: 90 tablet, Rfl: 3  •  loratadine (CLARITIN) 10 MG tablet, Take 10 mg by mouth Daily As Needed for Allergies., Disp: , Rfl:   •  metFORMIN (GLUCOPHAGE) 1000 MG tablet, Take 1 tablet by mouth 2 (Two) Times a Day With Meals., Disp: 180 tablet, Rfl: 0  •  metoprolol succinate XL (Toprol XL) 100 MG 24 hr tablet, Take 1 tablet by mouth Daily., Disp: 90 tablet, Rfl: 3  •  Misc Natural Products (LUTEIN 20 PO), Take 20 mg by mouth Daily., Disp: , Rfl:   •  multivitamin (multivitamin) tablet tablet, Take 1 tablet by mouth Daily., Disp: 30 tablet, Rfl:   •  nitroglycerin  (NITROSTAT) 0.4 MG SL tablet, Place 1 tablet under the tongue Every 5 (Five) Minutes As Needed for Chest Pain (Only if SBP Greater Than 100). Take no more than 3 doses in 15 minutes., Disp: 30 tablet, Rfl: 5  •  NON FORMULARY, Take 500 mg by mouth 2 (Two) Times a Day. 1 capsule po bid chromium 200mg / cinnamon 500mg, Disp: , Rfl:   •  NON FORMULARY, vitamin c 1000 w/ rosehips, Disp: , Rfl:   •  Omega-3 Fatty Acids (Fish Oil Triple Strength) 1400 MG capsule, Take 1,200 mg by mouth 2 (Two) Times a Day., Disp: , Rfl:   •  pantoprazole (PROTONIX) 40 MG EC tablet, Take 1 tablet by mouth Daily., Disp: 90 tablet, Rfl: 3  •  sacubitril-valsartan (Entresto)  MG tablet, Take 1 tablet by mouth 2 (Two) Times a Day., Disp: 60 tablet, Rfl: 5  •  triamcinolone (KENALOG) 0.1 % cream, Apply 1 application topically to the appropriate area as directed Daily As Needed., Disp: , Rfl:   •  vitamin E 400 UNIT capsule, Take 1 capsule by mouth Daily., Disp: , Rfl:       Physical Exam:  I have reviewed the patient's current vital signs as documented in the patient's EMR.   Vitals:    01/06/23 0947   BP: 155/79   Pulse: 63   SpO2: 95%     Body mass index is 31.84 kg/m².       01/06/23  0947   Weight: 97.8 kg (215 lb 9.6 oz)      Physical Exam  Vitals and nursing note reviewed.   Constitutional:       General: He is awake.   HENT:      Head: Normocephalic and atraumatic.   Eyes:      General: Lids are normal.      Conjunctiva/sclera:      Right eye: Right conjunctiva is not injected.      Left eye: Left conjunctiva is not injected.   Cardiovascular:      Rate and Rhythm: Normal rate and regular rhythm.      Heart sounds: S1 normal and S2 normal.      Comments: Well-healed mid sternal incision  Pulmonary:      Effort: No tachypnea or bradypnea.      Breath sounds: No stridor or transmitted upper airway sounds. No wheezing, rhonchi or rales.   Abdominal:      General: Bowel sounds are normal.      Palpations: Abdomen is soft.       Tenderness: There is no abdominal tenderness.   Musculoskeletal:      Right lower leg: No swelling. Edema present.      Left lower leg: No swelling. Edema present.      Right foot: Swelling present.      Left foot: Swelling present.   Skin:     General: Skin is warm and moist.   Neurological:      Mental Status: He is alert and oriented to person, place, and time.   Psychiatric:         Attention and Perception: Attention normal.         Mood and Affect: Mood normal.         Behavior: Behavior is cooperative.           JVP: Volume/Pulsation: Normal.        DATA REVIEWED:     EKG. I personally reviewed and interpreted the EKG.      EKG: normal EKG, normal sinus rhythm, unchanged from previous tracings.         ---------------------------------------------------  TTE/SAVANNAH:  Results for orders placed during the hospital encounter of 11/11/22    Adult Transthoracic Echo Complete W/ Cont if Necessary Per Protocol    Interpretation Summary  Poor technical quality.  The right atrium and right ventricle are never adequately imaged and there is poor endomyocardial imaging throughout.    1.  LV size appears somewhat increased global LV systolic function appears to be in the low normal range with a visually estimated ejection fraction of 45 to 50%.  Formal regional wall motion assessment cannot be performed.  LV wall thickness is at the upper limits of normal and there is grade 1A diastolic dysfunction.  Mild left atrial enlargement.  Neither the right atrial nor the right ventricular free wall is adequately visualized for comment.    2.  The mitral valve is morphologically normal with no mitral stenosis and with trivial MR.  The aortic valve is not well visualized.  Doppler excludes aortic stenosis and significant aortic insufficiency.  Right-sided heart valves were not well visualized.  Limited Doppler sampling fails to demonstrate significant valve pathology.    3.  There is no pericardial effusion present.  The proximal  aorta is at the upper limits of normal at 3.6 cm with no dissection or aneurysm.    4.  Pulmonary artery pressures cannot be calculated.        -----------------------------------------------------  CXR/Imaging:   Imaging Results (Most Recent)     None          I personally reviewed and interpreted the CXR.      -----------------------------------------------------  CT:   No radiology results for the last 30 days.  I personally reviewed the images of the CT scan.  My personal interpretation is below.      ----------------------------------------------------    PFTs:      --------------------------------------------------------------------------------------------------    Laboratory evaluations:    Lab Results   Component Value Date    GLUCOSE 214 (H) 11/29/2022    BUN 14 11/29/2022    CREATININE 1.03 11/29/2022    EGFRIFNONA 58 (L) 11/24/2021    BCR 13.6 11/29/2022    K 4.3 11/29/2022    CO2 26.8 11/29/2022    CALCIUM 9.1 11/29/2022    ALBUMIN 4.30 03/25/2022    AST 9 03/25/2022    ALT 9 03/25/2022     Lab Results   Component Value Date    WBC 5.15 10/07/2022    HGB 14.7 10/07/2022    HCT 43.4 10/07/2022    MCV 87.1 10/07/2022     10/07/2022     Lab Results   Component Value Date    CHOL 163 03/25/2022    TRIG 186 (H) 03/25/2022    HDL 37 (L) 03/25/2022    LDL 94 03/25/2022     Lab Results   Component Value Date    TSH 2.010 04/21/2021     Lab Results   Component Value Date    HGBA1C 8.30 (H) 04/21/2021     Lab Results   Component Value Date    ALT 9 03/25/2022     Lab Results   Component Value Date    HGBA1C 8.30 (H) 04/21/2021    HGBA1C 8.70 (H) 03/15/2021    HGBA1C 8.75 (H) 03/05/2021     Lab Results   Component Value Date    MICROALBUR 1.5 06/16/2021    CREATININE 1.03 11/29/2022     Lab Results   Component Value Date    IRON 46 (L) 03/30/2021    TIBC 292 (L) 03/30/2021    FERRITIN 208.80 03/30/2021     Lab Results   Component Value Date    INR 0.88 10/07/2022    INR 0.97 03/29/2021    INR 1.20 (H)  03/17/2021    PROTIME 10.2 (L) 10/07/2022    PROTIME 12.7 03/29/2021    PROTIME 14.9 (H) 03/17/2021        Lab Results   Component Value Date    ABSOLUTELUNG 42 (A) 01/06/2023    ABSOLUTELUNG 38 (A) 09/12/2022    ABSOLUTELUNG 37 (A) 06/06/2022       PAH RISK ASSESSMENT:      1. Chronic systolic CHF (congestive heart failure) (HCC)    2. Type 2 diabetes mellitus with diabetic nephropathy, unspecified whether long term insulin use (HCC)          ORDERS PLACED TODAY:  Orders Placed This Encounter   Procedures   • proBNP   • Basic Metabolic Panel   • Magnesium   • Hemoglobin A1c   • proBNP   • Basic Metabolic Panel   • Magnesium   • Hemoglobin A1c   • ReDs Vest        Diagnoses and all orders for this visit:    1. Chronic systolic CHF (congestive heart failure) (HCC) (Primary)  -     proBNP; Future  -     Basic Metabolic Panel; Future  -     Magnesium; Future  -     ReDs Vest  -     proBNP; Standing  -     proBNP  -     Basic Metabolic Panel; Standing  -     Basic Metabolic Panel  -     Magnesium; Standing  -     Magnesium    2. Type 2 diabetes mellitus with diabetic nephropathy, unspecified whether long term insulin use (HCC)  -     Hemoglobin A1c; Future  -     Hemoglobin A1c; Standing  -     Hemoglobin A1c             MEDS ORDERED TODAY:    No orders of the defined types were placed in this encounter.       ---------------------------------------------------------------------------------------------------------------------------          ASSESSMENT/PLAN:      Diagnosis Plan   1. Chronic systolic CHF (congestive heart failure) (HCC)  proBNP    Basic Metabolic Panel    Magnesium    ReDs Vest    proBNP    proBNP    Basic Metabolic Panel    Basic Metabolic Panel    Magnesium    Magnesium      2. Type 2 diabetes mellitus with diabetic nephropathy, unspecified whether long term insulin use (HCC)  Hemoglobin A1c    Hemoglobin A1c    Hemoglobin A1c          acute on chronic moderate systolic and diastolic heart failure. CHF.  Etiology: Ischemic/CAD (Hx of AMI, CAD, or Ischemic Cardiomyopathy). LVEF 40-45%.      NYHA stage Stage C: Structural heart disease is present AND symptoms have occurredFC-Class III: Marked limitation of physical activity. Comfortable at rest. Less than ordinary activity causes fatigue, palpitation, or dyspnea.     Today, Patient appears euvolemic.and with  Moderate perfusion. The patient's hemodynamics are currently acceptable. HR is: normal and is at goal. BP/MAP was reviewed and there is room for medication up-titration.  Clinical trajectory was assessed and hasimproved.     CHF GOAL DIRECTED MEDICAL THERAPY FOR PATIENT ADDRESSED/ADJUSTED:     GDMT    Drug Class   Drug   Dose Last Dose Adjustment Additional Titration   Notes   ACEi/ARB/ARNI Entresto 97-103mg BID      Beta Blocker  Metoprolol Succinate   100mg      MRA Spironolactone 12.5mg Started today     SGLT2i Farxiga  10 mg  N/A      -CHF Specific BB:   •  Continue Toprol XL 100mg.      -ACE/ARB/ARNi:   • Current dose:  Entresto 97-103mg BID    -MRA:   • Start MRA with Spironolactone 12.5mg qd.   • Patient asks for this via mail-in pharmacy.   As it will take around a week to arrive.  Will recheck labs in 2 weeks to give him time to have had MRA for one week's duration prior to rechecking potassium.    Declines to  at any local pharmacies.     -SGLT2 inhibitor therapy:   • Continue Farxiga 10mg qd.   • Recent yeast infection.  Will re-evaluate in two weeks.    • Pt was advised SEs, some severe, including hypersensitivity and Mathew's; coupled with discussion regarding common side effects of UTIs and female genital mycotic infections were discussed. If you will be NPO, or are sick (poor PO intake, N&V) please hold the medication until you are back to a normal diet.     -Diuretic regimen:   • ReDS Vest reading for 01/06/23 is 43; ReDs Vest reading reviewed with patient.    • No maintenance diuresis on board. Patient refuses diuretics unless acutely  exacerbated.  Reports he tried Bumex from \"some sara in [place]\" and developed diarrhea.   • Start MRA in one week after he receives medication via mail; refuses to  at [facility].    • RTC in 2 weeks with labs.    • BMP, Mag, & ProBNP reviewed with patient.      -Fluid restriction/Sodium restriction:   • Requested 2000 ml restriction  • Patient has been asked to weigh daily and was provided with a printed diuretic strategy.  • 1,500 mg Na restriction was discussed.    -Acute vs. Chronic underlying conditions other than HF addressed during visit:     • ASCVD s/p 4v CABG:   o Continue statin & ASA.   o Continue beta blocker.   o Recent stress test reviewed.       · DM type 2, NID:   · Hemoglobin a1c reviewed with patient.   · Copies of labs given to patient.   · DM diet discussed with patient, as he attempts to lower a1c for work.     BMI is >= 30 and <35. (Class 1 Obesity). The following options were offered after discussion;: nutrition counseling/recommendations              >45 minutes out of 60 minutes face to face spent counseling patient extensively on dietary Na+ intake, importance of activity, weight monitoring, compliance with medications in addition to importance of titration with goal directed medical therapy and follow up appointments.            This document has been electronically signed by Teri Pedraza PA-C  January 6, 2023 10:06 EST      Dictated Utilizing Dragon Dictation: Part of this note may be an electronic transcription/translation of spoken language to printed text using the Dragon Dictation System.    Follow-up appointment and medication changes provided in hand delivered After Visit Summary as well as reviewed in the room.

## 2023-01-06 NOTE — PROGRESS NOTES
Heart Failure Clinic    Date: 01/06/23     Vitals:    01/06/23 0947   BP: 155/79   Pulse: 63   SpO2: 95%    weight 215.6    Method of arrival: Ambulatory    Weighing self daily: No    Monitoring Heart Failure Zones: No    Today's HF Zone: Reviewed Zones, yellow    Taking medications as prescribed: Has questions regarding medications    Edema Yes    Shortness of Air: No    Number of pillows used at night:<2    Educational Materials given:  [facility]vs, Heart success patient booklet                                                                         ReDS Value:   Over 41 Hypervolemic Status      Beni Moran RN 01/06/23 09:49 EST

## 2023-03-16 NOTE — TELEPHONE ENCOUNTER
Met with patient's three daughters (Jerrod Alcala and Jesus) as well as grand-son in the family room. Meeting took place on three separate occasions over a period of about 45 minutes. Explained the severity of Ms Perezs condition and explained admission to the ICU. Discussed code status. They understood her severe HF and CKD. They were considering changing Ms Rasmussen's code status to DNR. Unfortunately, Ms Delilah De Guzman experienced a cardiac arrest while they were meeting as a family. I informed them of this and spent time discussing the situation with them. They continued to discuss best plan of action for Ms Delilah De Guzman. Ms Delilah De Guzman achieved ROSC. Family ultimately decided that although ROSC was achieved that it would be in the best interest of Ms Delilah De Guzman to make her code status DNR moving forward while continuing full medical treatment.     Outcome: Continue full medical management; change code status to DNR    Jade Trevizo NP STRESS  Pt notified of no acute findings. Provider will discuss results at f/u. Pt reminded of appt date and time.  ----- Message from JADA Paz sent at 12/6/2022 11:12 AM EST -----  Routine follow-up

## 2023-04-29 DIAGNOSIS — R07.9 CHEST PAIN DUE TO GERD: ICD-10-CM

## 2023-04-29 DIAGNOSIS — I25.10 CORONARY ARTERY DISEASE INVOLVING NATIVE CORONARY ARTERY OF NATIVE HEART WITHOUT ANGINA PECTORIS: ICD-10-CM

## 2023-04-29 DIAGNOSIS — I50.22 CHRONIC SYSTOLIC CHF (CONGESTIVE HEART FAILURE): ICD-10-CM

## 2023-04-29 DIAGNOSIS — K21.9 CHEST PAIN DUE TO GERD: ICD-10-CM

## 2023-04-29 DIAGNOSIS — I10 ESSENTIAL HYPERTENSION: ICD-10-CM

## 2023-04-29 DIAGNOSIS — R06.02 SHORTNESS OF BREATH: ICD-10-CM

## 2023-05-01 RX ORDER — BUMETANIDE 1 MG/1
TABLET ORAL
Qty: 90 TABLET | Refills: 1 | Status: SHIPPED | OUTPATIENT
Start: 2023-05-01

## 2023-05-26 DIAGNOSIS — R07.89 CHEST PAIN, ATYPICAL: ICD-10-CM

## 2023-05-26 DIAGNOSIS — I10 ESSENTIAL HYPERTENSION: ICD-10-CM

## 2023-05-26 DIAGNOSIS — I50.32 CHRONIC DIASTOLIC CONGESTIVE HEART FAILURE: ICD-10-CM

## 2023-05-26 DIAGNOSIS — I25.810 CORONARY ARTERY DISEASE INVOLVING CORONARY BYPASS GRAFT OF NATIVE HEART WITHOUT ANGINA PECTORIS: ICD-10-CM

## 2023-05-26 RX ORDER — METOPROLOL SUCCINATE 100 MG/1
TABLET, EXTENDED RELEASE ORAL
Qty: 30 TABLET | Refills: 0 | Status: SHIPPED | OUTPATIENT
Start: 2023-05-26 | End: 2023-06-02 | Stop reason: SDUPTHER

## 2023-05-26 RX ORDER — ISOSORBIDE MONONITRATE 120 MG/1
TABLET, EXTENDED RELEASE ORAL
Qty: 30 TABLET | Refills: 0 | Status: SHIPPED | OUTPATIENT
Start: 2023-05-26 | End: 2023-06-02 | Stop reason: SDUPTHER

## 2023-05-26 RX ORDER — HYDROCHLOROTHIAZIDE 25 MG/1
TABLET ORAL
Qty: 90 TABLET | Refills: 3 | OUTPATIENT
Start: 2023-05-26

## 2023-05-30 RX ORDER — SPIRONOLACTONE 25 MG/1
TABLET ORAL
Qty: 15 TABLET | Refills: 0 | OUTPATIENT
Start: 2023-05-30

## 2023-06-02 ENCOUNTER — OFFICE VISIT (OUTPATIENT)
Dept: CARDIOLOGY | Facility: CLINIC | Age: 73
End: 2023-06-02
Payer: MEDICARE

## 2023-06-02 VITALS
SYSTOLIC BLOOD PRESSURE: 122 MMHG | DIASTOLIC BLOOD PRESSURE: 65 MMHG | HEART RATE: 67 BPM | OXYGEN SATURATION: 95 % | BODY MASS INDEX: 31.46 KG/M2 | HEIGHT: 69 IN | WEIGHT: 212.4 LBS

## 2023-06-02 DIAGNOSIS — R55 SYNCOPE AND COLLAPSE: ICD-10-CM

## 2023-06-02 DIAGNOSIS — R94.30 CARDIAC LV EJECTION FRACTION OF 35-39%: ICD-10-CM

## 2023-06-02 DIAGNOSIS — R07.89 CHEST PAIN, ATYPICAL: ICD-10-CM

## 2023-06-02 DIAGNOSIS — I50.22 CHRONIC SYSTOLIC CHF (CONGESTIVE HEART FAILURE): Primary | ICD-10-CM

## 2023-06-02 DIAGNOSIS — I10 ESSENTIAL HYPERTENSION: Chronic | ICD-10-CM

## 2023-06-02 DIAGNOSIS — I50.32 CHRONIC DIASTOLIC CONGESTIVE HEART FAILURE: ICD-10-CM

## 2023-06-02 DIAGNOSIS — I25.810 CORONARY ARTERY DISEASE INVOLVING CORONARY BYPASS GRAFT OF NATIVE HEART WITHOUT ANGINA PECTORIS: ICD-10-CM

## 2023-06-02 DIAGNOSIS — I25.119 CORONARY ARTERY DISEASE INVOLVING NATIVE CORONARY ARTERY OF NATIVE HEART WITH ANGINA PECTORIS: ICD-10-CM

## 2023-06-02 RX ORDER — FAMOTIDINE 20 MG/1
20 TABLET, FILM COATED ORAL 2 TIMES DAILY
Qty: 180 TABLET | Refills: 0 | Status: SHIPPED | OUTPATIENT
Start: 2023-06-02

## 2023-06-02 RX ORDER — ISOSORBIDE MONONITRATE 120 MG/1
120 TABLET, EXTENDED RELEASE ORAL DAILY
Qty: 90 TABLET | Refills: 3 | Status: SHIPPED | OUTPATIENT
Start: 2023-06-02

## 2023-06-02 RX ORDER — METOPROLOL SUCCINATE 100 MG/1
100 TABLET, EXTENDED RELEASE ORAL DAILY
Qty: 90 TABLET | Refills: 3 | Status: SHIPPED | OUTPATIENT
Start: 2023-06-02

## 2023-06-02 NOTE — PROGRESS NOTES
Subjective     Wliliam Villasenor is a 73 y.o. male who presents to day for Congestive Heart Failure (6 month f/u), Coronary Artery Disease, Chest Pain, and Shortness of Breath.    CHIEF COMPLIANT  Chief Complaint   Patient presents with    Congestive Heart Failure     6 month f/u    Coronary Artery Disease    Chest Pain    Shortness of Breath       Active Problems:  Problem List Items Addressed This Visit    None    Problem list  1.  Coronary artery disease status post coronary artery bypass grafting x4  1.1 coronary artery bypass grafting 3/21: LIMA to LAD, SVG to diagonal, SVG to OM1, SVG to OM 2.  Residual  of RCA  1.2 echocardiogram 4/21: EF 35± percent, LV dyssynchrony  1.3 echocardiogram 6/21: EF 40±5% mild to moderate concentric left ventricular hypertrophy, mild biatrial enlargement no significant valvular disease   1.4 left heart catheterization 8/21: Left main ostial distal tapering, circumflex diffusely disease, OM1 occluded, LAD occluded, RCA subtotally occluded, LIMA widely patent, SVG to posterior lateral patent, SVG to OM patent, EF 45±5%, LVEDP 28-30  2.  Essential hypertension  3.  Diastolic dysfunction  4.  Chest pain  5.  Shortness of breath  6.  Lower extremity edema  7.  Dizziness/near syncope          7.1 cardiac event monitor 6/21: 2.1% ventricular ectopic burden, isodes of trigeminy, bigeminy, and PSVT  HPI  HPI    William Villasenor is a 73-year-old male patient being followed up today for coronary artery disease in which he has had a history of coronary artery bypass grafting. He is on atorvastatin for high intensity statin therapy, aspirin for antiplatelet therapy, and isosorbide for antianginal therapy.     He also has heart failure with reduced ejection fraction most recently being 45 plus or minus 5 percent in which he is on Entresto, metoprolol succinate, and spironolactone.     He also has chronic arterial hypertension in which his blood pressure is 122/65 mmHg with a heart rate of 67 beats per  minute. He is on the same medications as mentioned above for his heart failure. He is on diuretic therapy of Bumex. The patient is accompanied by an adult female.    The patient states he is doing well. He reports when he had the episode of passing out on 05/31/2023, he was heading to [place], [place] with no air conditioning, and he had the window down. The heat got so intense in the cab of the truck that he could hardly breathe. He denies passing out while driving, he stopped in time. He made it up to [facility] at 2:30 AM where he pull in to take a nap. He states that he turned the motor off and next thing he knew he was out. He was not aware whether he went to sleep or if he passed out. He notes that he was out for approximately 3 to 4 hours. He reports when he woke up he called his boss to pick him up. He states that he could not walk, and was feeling weak for a couple of days. While he was out he lost his bladder control. He states that he does not remember any of it, where he passed out or what he did. He denies shaking nor biting his tongue. He describes the top of his arms and joints being sore afterwards. He reports he has been sleeping all night and improving each day. He adds that he bounced back in 3 days. The adult female believes he had a heat stroke. She reports she tried calling him 3 times and he did not answer his phone. The truck stop manager went to check on him. He believes the truck stop manager woke him up because he was slumped over the steering wheel, and she asked if he was okay. He denies going to the ER or seeing anyone else involving him passing out.    The adult female reports yesterday, 06/01/2023 they went out and when they returned home, and the patient got out of the car his pants were soaking wet. The patient is concerned about his loss of his bladder control.     He reports that when he was taking the pantoprazole, he experienced aches and pains in his joints,  and diarrhea. He denies seeing a gastroenterologist. The adult female states the patient needs to see a gastroenterology for his acid reflux. Hs primary care physician, ANKUR Posey, wants him to make an appointment. He states that she wants to do a colonoscopy, and other test. He has not seen his primary care provider or anyone in 6 months.     The patient maintains that his shortness of breath remains the same. He reports that it is mainly with activity, such as bending over, exertion, and climbing a flight of stairs. He notes that if he gets short of breath while sitting around, it does not last long. He reports that he can lay flat without smothering. He adds that it has been several months since he has used his oxygen, around 10/07/2022.    He states that he has chest tightness with the smothering. He states that he thinks it is relating to the short of breath. He does not recall if the symptoms are the same since before his bypass on 03/16/2022.    The patient notes that the swelling in his legs is ongoing. He denies any palpitations.    He needs refills on metoprolol, isosorbide, and Bumex. He adds that he started taking vitamin D, vitamin B6, vitamin B12, and zinc for 2 weeks. He has noticed slight difference in his energy.    He adds that his oxygen saturation was 95 percent, and his blood pressure is good today. His stress test was approximately 6 months ago, which was negative. The adult female states the patient's CT scan in 08/2021 was normal.    He has been planning a trip in his big truck. He reports in the last 3 day he feels stronger and okay.  He was advised that testing would need to be performed before clearance for driving could be given.      PRIOR MEDS  Current Outpatient Medications on File Prior to Visit   Medication Sig Dispense Refill    allopurinol (ZYLOPRIM) 300 MG tablet TAKE 1 TABLET EVERY DAY 90 tablet 0    APPLE CIDER VINEGAR PO Take 1 tablet by mouth Daily.      Ascorbic Acid  (Vitamin C) 500 MG capsule Take 2,000 mg by mouth Daily. 2000 in am 1000 at night      aspirin 81 MG EC tablet Take 81 mg by mouth Daily.      atorvastatin (LIPITOR) 40 MG tablet Take 1 tablet by mouth Every Night. 90 tablet 0    bumetanide (BUMEX) 1 MG tablet TAKE 1 TABLET EVERY DAY 90 tablet 1    CALCIUM-MAGNESIUM-ZINC PO Take 1 tablet by mouth 2 (Two) Times a Day. 400 mg Mag per capsule      Cinnamon 500 MG capsule Take 500 mg by mouth Daily.      Cranberry 250 MG capsule Take 25,000 mg by mouth Daily.      dapagliflozin Propanediol (Farxiga) 10 MG tablet Take 1 tablet by mouth Daily. 30 tablet 5    doxazosin (CARDURA) 4 MG tablet TAKE 1 TABLET EVERY DAY (Patient taking differently: Take 4 mg by mouth Every Night.) 90 tablet 0    finasteride (PROSCAR) 5 MG tablet Take 1 tablet by mouth Daily. 90 tablet 0    fluconazole (DIFLUCAN) 200 MG tablet Take 200 mg by mouth 1 (One) Time.      folic acid (FOLVITE) 800 MCG tablet Take 800 mcg by mouth Daily.      gabapentin (NEURONTIN) 300 MG capsule Take 1 capsule by mouth 2 (two) times a day. 60 capsule 0    glipizide (GLUCOTROL) 5 MG tablet Take 1 tablet by mouth 2 (Two) Times a Day Before Meals. 180 tablet 3    isosorbide mononitrate (IMDUR) 120 MG 24 hr tablet TAKE 1 TABLET EVERY DAY 30 tablet 0    loratadine (CLARITIN) 10 MG tablet Take 10 mg by mouth Daily As Needed for Allergies.      metFORMIN (GLUCOPHAGE) 1000 MG tablet Take 1 tablet by mouth 2 (Two) Times a Day With Meals. 180 tablet 0    metoprolol succinate XL (TOPROL-XL) 100 MG 24 hr tablet TAKE 1 TABLET EVERY DAY 30 tablet 0    Misc Natural Products (LUTEIN 20 PO) Take 20 mg by mouth Daily.      multivitamin (multivitamin) tablet tablet Take 1 tablet by mouth Daily. 30 tablet     nitroglycerin (NITROSTAT) 0.4 MG SL tablet Place 1 tablet under the tongue Every 5 (Five) Minutes As Needed for Chest Pain (Only if SBP Greater Than 100). Take no more than 3 doses in 15 minutes. 30 tablet 5    NON FORMULARY Take 500  mg by mouth 2 (Two) Times a Day. 1 capsule po bid chromium 200mg / cinnamon 500mg      NON FORMULARY vitamin c 1000 w/ rosehips      Omega-3 Fatty Acids (Fish Oil Triple Strength) 1400 MG capsule Take 1,200 mg by mouth 2 (Two) Times a Day.      pantoprazole (PROTONIX) 40 MG EC tablet Take 1 tablet by mouth Daily. 90 tablet 3    sacubitril-valsartan (Entresto)  MG tablet Take 1 tablet by mouth 2 (Two) Times a Day. 60 tablet 5    spironolactone (Aldactone) 25 MG tablet Take 0.5 tablets by mouth Daily for 30 days. 15 tablet 0    triamcinolone (KENALOG) 0.1 % cream Apply 1 application topically to the appropriate area as directed Daily As Needed.      vitamin E 400 UNIT capsule Take 1 capsule by mouth Daily.       No current facility-administered medications on file prior to visit.       ALLERGIES  Lisinopril    HISTORY  Past Medical History:   Diagnosis Date    Arthritis     Back pain     BPH (benign prostatic hyperplasia)     Chronic diastolic (congestive) heart failure     Diabetes mellitus     Diabetic peripheral neuropathy     Erectile dysfunction     Former smoker     GERD (gastroesophageal reflux disease)     GERD (gastroesophageal reflux disease)     Gout     Hemorrhoids     High cholesterol     Hypertension     Obesity        Social History     Socioeconomic History    Marital status:     Number of children: 3   Tobacco Use    Smoking status: Former     Packs/day: 1.00     Years: 5.00     Pack years: 5.00     Types: Cigarettes     Quit date: 1974     Years since quittin.3    Smokeless tobacco: Never   Substance and Sexual Activity    Alcohol use: Never     Comment: quit in     Drug use: Never    Sexual activity: Defer       Family History   Problem Relation Age of Onset    Heart disease Mother     Hypertension Mother     Depression Mother     Alcohol abuse Maternal Uncle     Heart disease Maternal Grandmother     Hypertension Maternal Grandmother     Diabetes Maternal Grandmother      "Diabetes Paternal Grandmother     No Known Problems Half-Brother     No Known Problems Half-Brother     No Known Problems Half-Sister     No Known Problems Daughter     No Known Problems Daughter     Hyperlipidemia Daughter        Review of Systems   Constitutional: Positive for fatigue. Negative for chills, diaphoresis and fever.   Eyes: Negative.    Respiratory: Positive for chest tightness and shortness of breath (has 02 if needed). Negative for apnea, cough and wheezing.    Cardiovascular: Positive for chest pain (midsternal pain) and leg swelling ( BLE). Negative for palpitations.   Gastrointestinal: Positive for constipation and diarrhea. Negative for abdominal pain, blood in stool, nausea and vomiting.        Will have colonoscopy scheduled. Having problems with Pantoprazole.    Endocrine: Negative.    Genitourinary: Negative.  Negative for hematuria.   Musculoskeletal: Positive for arthralgias, back pain, joint swelling, myalgias and neck pain.   Skin: Negative.    Allergic/Immunologic: Negative.    Neurological: Positive for syncope (2 days ago while driving to [place] with no heat in truck, may have feel asleep, loss of bladder control  ), weakness and headaches. Negative for dizziness, light-headedness and numbness.   Hematological: Bruises/bleeds easily.   Psychiatric/Behavioral: Negative.  Negative for sleep disturbance (up to bathroom a couple times).       Objective     VITALS: /65 (BP Location: Left arm, Patient Position: Sitting)   Pulse 67   Ht 175.3 cm (69.02\")   Wt 96.3 kg (212 lb 6.4 oz)   SpO2 95%   BMI 31.35 kg/m²     LABS:   Lab Results (most recent)       None          Echocardiogram in 11/2022 showed an ejection fraction was mildly decreased to low normal 45 to 50 percent. His aorta was in the upper limits of normal but no problems. No extra fluid around the heart. The patient's echocardiogram from 06/2021 has improved with his pump function and the ejection fraction.    Heart " catheterization in 08/2021 was about the same as it was 45 plus or minus 5.    CAT scan from 08/2021 was normal.    IMAGING:   No Images in the past 120 days found..    EXAM:  Physical Exam  Vitals and nursing note reviewed.   Constitutional:       Appearance: He is well-developed.   HENT:      Head: Normocephalic.   Eyes:      Pupils: Pupils are equal, round, and reactive to light.   Neck:      Thyroid: No thyroid mass.      Vascular: No carotid bruit or JVD.      Trachea: Trachea and phonation normal.   Cardiovascular:      Rate and Rhythm: Normal rate and regular rhythm.      Pulses:           Radial pulses are 2+ on the right side and 2+ on the left side.        Posterior tibial pulses are 2+ on the right side and 2+ on the left side.      Heart sounds: Normal heart sounds. No murmur heard.    No friction rub. No gallop.   Pulmonary:      Effort: Pulmonary effort is normal. No respiratory distress.      Breath sounds: Normal breath sounds. No wheezing or rales.   Abdominal:      General: Bowel sounds are normal.      Palpations: Abdomen is soft.   Musculoskeletal:         General: Swelling present. Normal range of motion.      Cervical back: Neck supple.   Skin:     General: Skin is warm and dry.      Capillary Refill: Capillary refill takes less than 2 seconds.      Findings: No rash.   Neurological:      Mental Status: He is alert and oriented to person, place, and time.   Psychiatric:         Speech: Speech normal.         Behavior: Behavior normal.         Thought Content: Thought content normal.         Judgment: Judgment normal.       Procedure     ECG 12 Lead    Date/Time: 6/2/2023 11:44 AM  Performed by: Dany San APRN  Authorized by: Dany San APRN   Comparison: compared with previous ECG from 3/4/2022  Similar to previous ECG  Rhythm: sinus rhythm  Rate: normal  BPM: 69  Conduction: non-specific intraventricular conduction delay  QRS axis: normal  Comments: QTc 424 ms  No acute  changes       PER JR MIRELLA PT NOTIFIED THAT  IS ORDERING A 14 DAY HOLTER AND LEXISCAN.  PT REQUESTED TO HAVE IT PERFORMED @ [facility], NOTED IN ALL 3 REFERRALS.    Assessment & Plan    Diagnosis Plan   1. Chronic systolic CHF (congestive heart failure)  ECG 12 Lead    Adult Transthoracic Echo Complete W/ Cont if Necessary Per Protocol    Duplex Carotid Ultrasound CAR    CBC & Differential    Comprehensive Metabolic Panel    TSH    proBNP    Magnesium      2. Cardiac LV ejection fraction of 35-39%  ECG 12 Lead    Adult Transthoracic Echo Complete W/ Cont if Necessary Per Protocol    Duplex Carotid Ultrasound CAR    CBC & Differential    Comprehensive Metabolic Panel    TSH    proBNP    Magnesium      3. Coronary artery disease involving native coronary artery of native heart with angina pectoris  ECG 12 Lead    Adult Transthoracic Echo Complete W/ Cont if Necessary Per Protocol    Duplex Carotid Ultrasound CAR    CBC & Differential    Comprehensive Metabolic Panel    TSH    proBNP    Magnesium      4. Essential hypertension  ECG 12 Lead    Adult Transthoracic Echo Complete W/ Cont if Necessary Per Protocol    Duplex Carotid Ultrasound CAR    CBC & Differential    Comprehensive Metabolic Panel    TSH    proBNP    Magnesium    metoprolol succinate XL (TOPROL-XL) 100 MG 24 hr tablet      5. Syncope and collapse  ECG 12 Lead    Adult Transthoracic Echo Complete W/ Cont if Necessary Per Protocol    Duplex Carotid Ultrasound CAR    CBC & Differential    Comprehensive Metabolic Panel    TSH    proBNP    Magnesium      6. Chest pain, atypical  ECG 12 Lead    isosorbide mononitrate (IMDUR) 120 MG 24 hr tablet      7. Chronic diastolic congestive heart failure  ECG 12 Lead    metoprolol succinate XL (TOPROL-XL) 100 MG 24 hr tablet      8. Coronary artery disease involving coronary bypass graft of native heart without angina pectoris  ECG 12 Lead    metoprolol succinate XL (TOPROL-XL) 100 MG 24 hr tablet      PLAN  1. The  patient's most recent cardiac testing results were discussed in full detail during this visit.  2. An order for an echocardiogram and a carotid ultrasound has been placed for further evaluation of syncope. We will contact the patient with the results.  3. An order has been placed for the patient to obtain blood work.  4. The patient was prescribed Pepcid for his acid reflux.  5.\Patient will also undergo a stress test to evaluate for ischemia as a potential cause of his symptoms including shortness of breath chest tightness and his occurrence of syncope.  He does have a significant previous history of heart disease.  6.  Patient does have heart failure with reduced ejection fraction in which she is on guideline driven medication therapy he is on Farxiga, metoprolol succinate, and Entresto.  He will continue these without change.  7.  Patient's blood pressure is controlled on current blood pressure medication regimen.  No medication changes are warranted at this time.  Patient advised to monitor blood pressure on a daily basis and report any persistent highs or lows.  Set goal blood pressure for patient at 130/80 or below.  8.  Informed of signs and symptoms of ACS and advised to seek emergent treatment for any new worsening symptoms.  Patient also advised sooner follow-up as needed.  Also advised to follow-up with family doctor as needed  This note is dictated utilizing voice recognition software.  Although this record has been proof read, transcriptional errors may still be present. If questions occur regarding the content of this record please do not hesitate to call our office.  I have reviewed and confirmed the accuracy of the ROS as documented by the MA/LPN/RN ANKUR Cuevas    Return if symptoms worsen or fail to improve, for Next scheduled follow up.    Diagnoses and all orders for this visit:    1. Chronic systolic CHF (congestive heart failure) (Primary)  -     ECG 12 Lead  -     Adult Transthoracic  Echo Complete W/ Cont if Necessary Per Protocol; Future  -     Duplex Carotid Ultrasound CAR; Future  -     CBC & Differential; Future  -     Comprehensive Metabolic Panel; Future  -     TSH; Future  -     proBNP; Future  -     Magnesium; Future    2. Cardiac LV ejection fraction of 35-39%  -     ECG 12 Lead  -     Adult Transthoracic Echo Complete W/ Cont if Necessary Per Protocol; Future  -     Duplex Carotid Ultrasound CAR; Future  -     CBC & Differential; Future  -     Comprehensive Metabolic Panel; Future  -     TSH; Future  -     proBNP; Future  -     Magnesium; Future    3. Coronary artery disease involving native coronary artery of native heart with angina pectoris  -     ECG 12 Lead  -     Adult Transthoracic Echo Complete W/ Cont if Necessary Per Protocol; Future  -     Duplex Carotid Ultrasound CAR; Future  -     CBC & Differential; Future  -     Comprehensive Metabolic Panel; Future  -     TSH; Future  -     proBNP; Future  -     Magnesium; Future    4. Essential hypertension  -     ECG 12 Lead  -     Adult Transthoracic Echo Complete W/ Cont if Necessary Per Protocol; Future  -     Duplex Carotid Ultrasound CAR; Future  -     CBC & Differential; Future  -     Comprehensive Metabolic Panel; Future  -     TSH; Future  -     proBNP; Future  -     Magnesium; Future  -     metoprolol succinate XL (TOPROL-XL) 100 MG 24 hr tablet; Take 1 tablet by mouth Daily.  Dispense: 90 tablet; Refill: 3    5. Syncope and collapse  -     ECG 12 Lead  -     Adult Transthoracic Echo Complete W/ Cont if Necessary Per Protocol; Future  -     Duplex Carotid Ultrasound CAR; Future  -     CBC & Differential; Future  -     Comprehensive Metabolic Panel; Future  -     TSH; Future  -     proBNP; Future  -     Magnesium; Future    6. Chest pain, atypical  -     ECG 12 Lead  -     isosorbide mononitrate (IMDUR) 120 MG 24 hr tablet; Take 1 tablet by mouth Daily.  Dispense: 90 tablet; Refill: 3    7. Chronic diastolic congestive heart  failure  -     ECG 12 Lead  -     metoprolol succinate XL (TOPROL-XL) 100 MG 24 hr tablet; Take 1 tablet by mouth Daily.  Dispense: 90 tablet; Refill: 3    8. Coronary artery disease involving coronary bypass graft of native heart without angina pectoris  -     ECG 12 Lead  -     metoprolol succinate XL (TOPROL-XL) 100 MG 24 hr tablet; Take 1 tablet by mouth Daily.  Dispense: 90 tablet; Refill: 3    Other orders  -     famotidine (Pepcid) 20 MG tablet; Take 1 tablet by mouth 2 (Two) Times a Day.  Dispense: 180 tablet; Refill: 0        William Villasenor  reports that he quit smoking about 49 years ago. His smoking use included cigarettes. He has a 5.00 pack-year smoking history. He has never used smokeless tobacco..     Advance Care Planning   ACP discussion was held with the patient during this visit. Patient does not have an advance directive, declines further assistance.          MEDS ORDERED DURING VISIT:  New Medications Ordered This Visit   Medications    isosorbide mononitrate (IMDUR) 120 MG 24 hr tablet     Sig: Take 1 tablet by mouth Daily.     Dispense:  90 tablet     Refill:  3    metoprolol succinate XL (TOPROL-XL) 100 MG 24 hr tablet     Sig: Take 1 tablet by mouth Daily.     Dispense:  90 tablet     Refill:  3    famotidine (Pepcid) 20 MG tablet     Sig: Take 1 tablet by mouth 2 (Two) Times a Day.     Dispense:  180 tablet     Refill:  0             Assessment  1. Coronary artery disease  2. Heart failure with reduced ejection fraction  3. Syncope  4. Shortness of breath  5. Chest pain          This document has been electronically signed by ANKUR Cuevas Jr.  June 2, 2023 11:45 EDT        Transcribed from ambient dictation for ANKUR Cuevas by Lorena Eid.  06/02/23   15:05 EDT    Patient or patient representative verbalized consent to the visit recording.  I have personally performed the services described in this document as transcribed by the above individual, and it is both accurate  and complete.

## 2023-06-05 ENCOUNTER — LAB (OUTPATIENT)
Dept: LAB | Facility: HOSPITAL | Age: 73
End: 2023-06-05
Payer: MEDICARE

## 2023-06-05 DIAGNOSIS — R94.30 CARDIAC LV EJECTION FRACTION OF 35-39%: ICD-10-CM

## 2023-06-05 DIAGNOSIS — R55 SYNCOPE AND COLLAPSE: ICD-10-CM

## 2023-06-05 DIAGNOSIS — I25.119 CORONARY ARTERY DISEASE INVOLVING NATIVE CORONARY ARTERY OF NATIVE HEART WITH ANGINA PECTORIS: ICD-10-CM

## 2023-06-05 DIAGNOSIS — I50.22 CHRONIC SYSTOLIC CHF (CONGESTIVE HEART FAILURE): ICD-10-CM

## 2023-06-05 DIAGNOSIS — I10 ESSENTIAL HYPERTENSION: ICD-10-CM

## 2023-06-05 LAB
ALBUMIN SERPL-MCNC: 4 G/DL (ref 3.5–5.2)
ALBUMIN/GLOB SERPL: 1.3 G/DL
ALP SERPL-CCNC: 94 U/L (ref 39–117)
ALT SERPL W P-5'-P-CCNC: 9 U/L (ref 1–41)
ANION GAP SERPL CALCULATED.3IONS-SCNC: 12.6 MMOL/L (ref 5–15)
AST SERPL-CCNC: 8 U/L (ref 1–40)
BASOPHILS # BLD AUTO: 0.04 10*3/MM3 (ref 0–0.2)
BASOPHILS NFR BLD AUTO: 0.4 % (ref 0–1.5)
BILIRUB SERPL-MCNC: 0.7 MG/DL (ref 0–1.2)
BUN SERPL-MCNC: 11 MG/DL (ref 8–23)
BUN/CREAT SERPL: 10.2 (ref 7–25)
CALCIUM SPEC-SCNC: 9.4 MG/DL (ref 8.6–10.5)
CHLORIDE SERPL-SCNC: 101 MMOL/L (ref 98–107)
CO2 SERPL-SCNC: 24.4 MMOL/L (ref 22–29)
CREAT SERPL-MCNC: 1.08 MG/DL (ref 0.76–1.27)
DEPRECATED RDW RBC AUTO: 44.1 FL (ref 37–54)
EGFRCR SERPLBLD CKD-EPI 2021: 72 ML/MIN/1.73
EOSINOPHIL # BLD AUTO: 0.07 10*3/MM3 (ref 0–0.4)
EOSINOPHIL NFR BLD AUTO: 0.7 % (ref 0.3–6.2)
ERYTHROCYTE [DISTWIDTH] IN BLOOD BY AUTOMATED COUNT: 13.6 % (ref 12.3–15.4)
GLOBULIN UR ELPH-MCNC: 3 GM/DL
GLUCOSE SERPL-MCNC: 189 MG/DL (ref 65–99)
HCT VFR BLD AUTO: 44 % (ref 37.5–51)
HGB BLD-MCNC: 14.4 G/DL (ref 13–17.7)
IMM GRANULOCYTES # BLD AUTO: 0.04 10*3/MM3 (ref 0–0.05)
IMM GRANULOCYTES NFR BLD AUTO: 0.4 % (ref 0–0.5)
LYMPHOCYTES # BLD AUTO: 2.86 10*3/MM3 (ref 0.7–3.1)
LYMPHOCYTES NFR BLD AUTO: 29.8 % (ref 19.6–45.3)
MAGNESIUM SERPL-MCNC: 1.9 MG/DL (ref 1.6–2.4)
MCH RBC QN AUTO: 29.1 PG (ref 26.6–33)
MCHC RBC AUTO-ENTMCNC: 32.7 G/DL (ref 31.5–35.7)
MCV RBC AUTO: 88.9 FL (ref 79–97)
MONOCYTES # BLD AUTO: 0.79 10*3/MM3 (ref 0.1–0.9)
MONOCYTES NFR BLD AUTO: 8.2 % (ref 5–12)
NEUTROPHILS NFR BLD AUTO: 5.79 10*3/MM3 (ref 1.7–7)
NEUTROPHILS NFR BLD AUTO: 60.5 % (ref 42.7–76)
NRBC BLD AUTO-RTO: 0 /100 WBC (ref 0–0.2)
NT-PROBNP SERPL-MCNC: 315.9 PG/ML (ref 0–900)
PLATELET # BLD AUTO: 260 10*3/MM3 (ref 140–450)
PMV BLD AUTO: 8.7 FL (ref 6–12)
POTASSIUM SERPL-SCNC: 4.1 MMOL/L (ref 3.5–5.2)
PROT SERPL-MCNC: 7 G/DL (ref 6–8.5)
RBC # BLD AUTO: 4.95 10*6/MM3 (ref 4.14–5.8)
SODIUM SERPL-SCNC: 138 MMOL/L (ref 136–145)
TSH SERPL DL<=0.05 MIU/L-ACNC: 1.02 UIU/ML (ref 0.27–4.2)
WBC NRBC COR # BLD: 9.59 10*3/MM3 (ref 3.4–10.8)

## 2023-06-05 PROCEDURE — 84443 ASSAY THYROID STIM HORMONE: CPT

## 2023-06-05 PROCEDURE — 83735 ASSAY OF MAGNESIUM: CPT

## 2023-06-05 PROCEDURE — 85025 COMPLETE CBC W/AUTO DIFF WBC: CPT

## 2023-06-05 PROCEDURE — 80053 COMPREHEN METABOLIC PANEL: CPT

## 2023-06-05 PROCEDURE — 83880 ASSAY OF NATRIURETIC PEPTIDE: CPT

## 2023-06-06 ENCOUNTER — TELEPHONE (OUTPATIENT)
Dept: CARDIOLOGY | Facility: CLINIC | Age: 73
End: 2023-06-06
Payer: MEDICARE

## 2023-06-06 NOTE — PROGRESS NOTES
Other than elevated glucose at 189.  Labs are relatively within normal limits including renal function.  We will continue to monitor.

## 2023-06-06 NOTE — TELEPHONE ENCOUNTER
Unable to reach patient, number not in service. Results to be addressed at follow up or if patient returns call.     ----- Message from ANKUR Cuevas sent at 6/6/2023  8:19 AM EDT -----  Other than elevated glucose at 189.  Labs are relatively within normal limits including renal function.  We will continue to monitor.

## 2023-06-12 ENCOUNTER — HOSPITAL ENCOUNTER (OUTPATIENT)
Dept: CARDIOLOGY | Facility: HOSPITAL | Age: 73
Discharge: HOME | End: 2023-06-12
Payer: MEDICARE

## 2023-06-12 DIAGNOSIS — R55 SYNCOPE AND COLLAPSE: ICD-10-CM

## 2023-06-12 DIAGNOSIS — I50.22 CHRONIC SYSTOLIC CHF (CONGESTIVE HEART FAILURE): ICD-10-CM

## 2023-06-12 DIAGNOSIS — R94.30 CARDIAC LV EJECTION FRACTION OF 35-39%: ICD-10-CM

## 2023-06-12 DIAGNOSIS — I25.119 CORONARY ARTERY DISEASE INVOLVING NATIVE CORONARY ARTERY OF NATIVE HEART WITH ANGINA PECTORIS: ICD-10-CM

## 2023-06-12 DIAGNOSIS — I10 ESSENTIAL HYPERTENSION: Chronic | ICD-10-CM

## 2023-06-12 LAB
BH CV ECHO MEAS - ACS: 1.9 CM
BH CV ECHO MEAS - AO MAX PG: 6.6 MMHG
BH CV ECHO MEAS - AO MEAN PG: 3 MMHG
BH CV ECHO MEAS - AO ROOT DIAM: 3.9 CM
BH CV ECHO MEAS - AO V2 MAX: 128 CM/SEC
BH CV ECHO MEAS - AO V2 VTI: 23.8 CM
BH CV ECHO MEAS - EDV(CUBED): 103.8 ML
BH CV ECHO MEAS - EDV(MOD-SP4): 74.3 ML
BH CV ECHO MEAS - EF(MOD-SP4): 60.2 %
BH CV ECHO MEAS - ESV(CUBED): 39.3 ML
BH CV ECHO MEAS - ESV(MOD-SP4): 29.6 ML
BH CV ECHO MEAS - FS: 27.7 %
BH CV ECHO MEAS - IVS/LVPW: 1 CM
BH CV ECHO MEAS - IVSD: 1 CM
BH CV ECHO MEAS - LA DIMENSION: 3.9 CM
BH CV ECHO MEAS - LAT PEAK E' VEL: 8.4 CM/SEC
BH CV ECHO MEAS - LV DIASTOLIC VOL/BSA (35-75): 35.1 CM2
BH CV ECHO MEAS - LV MASS(C)D: 164.5 GRAMS
BH CV ECHO MEAS - LV SYSTOLIC VOL/BSA (12-30): 14 CM2
BH CV ECHO MEAS - LVIDD: 4.7 CM
BH CV ECHO MEAS - LVIDS: 3.4 CM
BH CV ECHO MEAS - LVOT AREA: 3.8 CM2
BH CV ECHO MEAS - LVOT DIAM: 2.2 CM
BH CV ECHO MEAS - LVPWD: 1 CM
BH CV ECHO MEAS - MED PEAK E' VEL: 5.2 CM/SEC
BH CV ECHO MEAS - MV A MAX VEL: 67.9 CM/SEC
BH CV ECHO MEAS - MV E MAX VEL: 71 CM/SEC
BH CV ECHO MEAS - MV E/A: 1.05
BH CV ECHO MEAS - PA ACC TIME: 0.09 SEC
BH CV ECHO MEAS - PI END-D VEL: 125 CM/SEC
BH CV ECHO MEAS - RAP SYSTOLE: 10 MMHG
BH CV ECHO MEAS - RVSP: 27.5 MMHG
BH CV ECHO MEAS - SI(MOD-SP4): 21.1 ML/M2
BH CV ECHO MEAS - SV(MOD-SP4): 44.7 ML
BH CV ECHO MEAS - TAPSE (>1.6): 1.35 CM
BH CV ECHO MEAS - TR MAX PG: 17.5 MMHG
BH CV ECHO MEAS - TR MAX VEL: 209 CM/SEC
BH CV ECHO MEASUREMENTS AVERAGE E/E' RATIO: 10.44
LEFT ATRIUM VOLUME INDEX: 20.5 ML/M2
MAXIMAL PREDICTED HEART RATE: 146 BPM
STRESS TARGET HR: 124 BPM

## 2023-06-12 PROCEDURE — 93306 TTE W/DOPPLER COMPLETE: CPT

## 2023-06-12 PROCEDURE — 93880 EXTRACRANIAL BILAT STUDY: CPT

## 2023-06-13 ENCOUNTER — TELEPHONE (OUTPATIENT)
Dept: CARDIOLOGY | Facility: CLINIC | Age: 73
End: 2023-06-13
Payer: MEDICARE

## 2023-06-13 NOTE — TELEPHONE ENCOUNTER
For the HUB to read to pt:     ECHO  Called patient to notify of no acute findings or abnormalities. Keep follow up as scheduled. If you have any problem between now and then give our office a call.   ----- Message from Philip Willis sent at 6/13/2023 10:29 AM EDT -----    ----- Message -----  From: Dany San APRN  Sent: 6/12/2023  10:44 PM EDT  To: Roseann Montemayor MA    There is no acute findings on the echocardiogram.  Keep follow-up.

## 2023-06-13 NOTE — PROGRESS NOTES
Potentially upstream stenosis of the subclavian.  Patient will need a CTA of his chest for further evaluation.  No significant carotid artery disease.

## 2023-06-14 ENCOUNTER — TELEPHONE (OUTPATIENT)
Dept: CARDIOLOGY | Facility: CLINIC | Age: 73
End: 2023-06-14
Payer: MEDICARE

## 2023-06-14 DIAGNOSIS — I77.1 SUBCLAVIAN ARTERY STENOSIS: Primary | ICD-10-CM

## 2023-06-14 NOTE — TELEPHONE ENCOUNTER
Patient aware of results and agreeable to proceed with CTA. Advised he will receive call with date, time and any special instructions.     ----- Message from ANKUR Cuevas sent at 6/12/2023 10:45 PM EDT -----  Potentially upstream stenosis of the subclavian.  Patient will need a CTA of his chest for further evaluation.  No significant carotid artery disease.

## 2023-07-24 ENCOUNTER — HOSPITAL ENCOUNTER (OUTPATIENT)
Dept: CARDIOLOGY | Facility: HOSPITAL | Age: 73
Discharge: HOME | End: 2023-07-24
Payer: MEDICARE

## 2023-07-24 ENCOUNTER — HOSPITAL ENCOUNTER (OUTPATIENT)
Dept: NUCLEAR MEDICINE | Facility: HOSPITAL | Age: 73
Discharge: HOME | End: 2023-07-24
Payer: MEDICARE

## 2023-07-24 DIAGNOSIS — I25.810 CORONARY ARTERY DISEASE INVOLVING CORONARY BYPASS GRAFT OF NATIVE HEART WITHOUT ANGINA PECTORIS: ICD-10-CM

## 2023-07-24 DIAGNOSIS — R55 SYNCOPE AND COLLAPSE: ICD-10-CM

## 2023-07-24 DIAGNOSIS — I10 ESSENTIAL HYPERTENSION: Chronic | ICD-10-CM

## 2023-07-24 DIAGNOSIS — I50.32 CHRONIC DIASTOLIC CONGESTIVE HEART FAILURE: ICD-10-CM

## 2023-07-24 DIAGNOSIS — R94.30 CARDIAC LV EJECTION FRACTION OF 35-39%: ICD-10-CM

## 2023-07-24 DIAGNOSIS — I50.22 CHRONIC SYSTOLIC CHF (CONGESTIVE HEART FAILURE): ICD-10-CM

## 2023-07-24 DIAGNOSIS — I25.119 CORONARY ARTERY DISEASE INVOLVING NATIVE CORONARY ARTERY OF NATIVE HEART WITH ANGINA PECTORIS: ICD-10-CM

## 2023-07-24 DIAGNOSIS — R07.89 CHEST PAIN, ATYPICAL: ICD-10-CM

## 2023-07-24 LAB
BH CV NUCLEAR PRIOR STUDY: 3
BH CV REST NUCLEAR ISOTOPE DOSE: 10.8 MCI
BH CV STRESS BP STAGE 1: NORMAL
BH CV STRESS COMMENTS STAGE 1: NORMAL
BH CV STRESS DOSE REGADENOSON STAGE 1: 0.4
BH CV STRESS DURATION MIN STAGE 1: 0
BH CV STRESS DURATION SEC STAGE 1: 10
BH CV STRESS HR STAGE 1: 70
BH CV STRESS NUCLEAR ISOTOPE DOSE: 30.8 MCI
BH CV STRESS PROTOCOL 1: NORMAL
BH CV STRESS RECOVERY BP: NORMAL MMHG
BH CV STRESS RECOVERY HR: 66 BPM
BH CV STRESS STAGE 1: 1
MAXIMAL PREDICTED HEART RATE: 146 BPM
PERCENT MAX PREDICTED HR: 47.95 %
STRESS BASELINE BP: NORMAL MMHG
STRESS BASELINE HR: 53 BPM
STRESS PERCENT HR: 56 %
STRESS POST PEAK BP: NORMAL MMHG
STRESS POST PEAK HR: 70 BPM
STRESS TARGET HR: 124 BPM

## 2023-07-24 PROCEDURE — 78452 HT MUSCLE IMAGE SPECT MULT: CPT

## 2023-07-24 PROCEDURE — 25010000002 REGADENOSON 0.4 MG/5ML SOLUTION: Performed by: NURSE PRACTITIONER

## 2023-07-24 PROCEDURE — 0 TECHNETIUM SESTAMIBI: Performed by: NURSE PRACTITIONER

## 2023-07-24 PROCEDURE — A9500 TC99M SESTAMIBI: HCPCS | Performed by: NURSE PRACTITIONER

## 2023-07-24 PROCEDURE — 93017 CV STRESS TEST TRACING ONLY: CPT

## 2023-07-24 PROCEDURE — 93880 EXTRACRANIAL BILAT STUDY: CPT

## 2023-07-24 PROCEDURE — 93880 EXTRACRANIAL BILAT STUDY: CPT | Performed by: RADIOLOGY

## 2023-07-24 RX ORDER — REGADENOSON 0.08 MG/ML
0.4 INJECTION, SOLUTION INTRAVENOUS
Status: COMPLETED | OUTPATIENT
Start: 2023-07-24 | End: 2023-07-24

## 2023-07-24 RX ADMIN — REGADENOSON 0.4 MG: 0.08 INJECTION, SOLUTION INTRAVENOUS at 11:08

## 2023-07-24 RX ADMIN — TECHNETIUM TC 99M SESTAMIBI 1 DOSE: 1 INJECTION INTRAVENOUS at 11:08

## 2023-07-24 RX ADMIN — TECHNETIUM TC 99M SESTAMIBI 1 DOSE: 1 INJECTION INTRAVENOUS at 09:28

## 2023-07-25 ENCOUNTER — TELEPHONE (OUTPATIENT)
Dept: CARDIOLOGY | Facility: CLINIC | Age: 73
End: 2023-07-25
Payer: MEDICARE

## 2023-07-25 NOTE — TELEPHONE ENCOUNTER
US CAROTID  Pt notified of no acute findings. Provider will discuss results at f/u. Pt reminded of appt date and time.  ----- Message from Martha Cordoba MA sent at 7/25/2023  2:45 PM EDT -----    ----- Message -----  From: Dany San APRN  Sent: 7/24/2023   2:04 PM EDT  To: Martha Cordoba MA    No hemodynamically significant carotid artery disease.  Keep follow-up.

## 2023-07-28 ENCOUNTER — TELEPHONE (OUTPATIENT)
Dept: CARDIOLOGY | Facility: CLINIC | Age: 73
End: 2023-07-28
Payer: MEDICARE

## 2023-07-28 NOTE — TELEPHONE ENCOUNTER
Caller: Khalif Vallejo    Relationship: Self    Best call back number: [phone]    What is the best time to reach you: ANYTIME    Who are you requesting to speak with (clinical staff, provider,  specific staff member): CLINICAL STAFF    Do you know the name of the person who called: KHALIF VALLEJO    What was the call regarding: PATIENT WOULD LIKE TO KNOW WHAT HIS HEART FUNCTION WAS AT HIS LAST ECHO.

## 2023-07-28 NOTE — TELEPHONE ENCOUNTER
Patient's wife returned call and made aware.     HUB to read...  Left detailed message for patient that EF 56-60% per echo in 6/12/23.       Echo study from 6/12/23.   Interpretation Summary         Normal left ventricular cavity size and wall thickness noted. All left ventricular wall segments contract normally.    Left ventricular ejection fraction appears to be 56 - 60%.    The aortic valve is structurally normal with no regurgitation or stenosis present.    The mitral valve is structurally normal with no significant stenosis present. Mild mitral valve regurgitation is present.    There is no evidence of pericardial effusion. .

## 2023-08-04 ENCOUNTER — HOSPITAL ENCOUNTER (OUTPATIENT)
Dept: CARDIOLOGY | Facility: HOSPITAL | Age: 73
Discharge: HOME | End: 2023-08-04
Payer: MEDICARE

## 2023-08-04 VITALS
WEIGHT: 212.4 LBS | SYSTOLIC BLOOD PRESSURE: 136 MMHG | DIASTOLIC BLOOD PRESSURE: 77 MMHG | OXYGEN SATURATION: 96 % | HEART RATE: 71 BPM | HEIGHT: 69 IN | BODY MASS INDEX: 31.46 KG/M2

## 2023-08-04 DIAGNOSIS — I50.42 CHRONIC COMBINED SYSTOLIC AND DIASTOLIC HEART FAILURE: Primary | ICD-10-CM

## 2023-08-04 DIAGNOSIS — Z95.1 S/P CABG X 4: ICD-10-CM

## 2023-08-04 LAB
ABSOLUTE LUNG FLUID CONTENT: 39 % (ref 20–35)
ANION GAP SERPL CALCULATED.3IONS-SCNC: 10.2 MMOL/L (ref 5–15)
BUN SERPL-MCNC: 15 MG/DL (ref 8–23)
BUN/CREAT SERPL: 14.9 (ref 7–25)
CALCIUM SPEC-SCNC: 9.3 MG/DL (ref 8.6–10.5)
CHLORIDE SERPL-SCNC: 103 MMOL/L (ref 98–107)
CO2 SERPL-SCNC: 23.8 MMOL/L (ref 22–29)
CREAT SERPL-MCNC: 1.01 MG/DL (ref 0.76–1.27)
EGFRCR SERPLBLD CKD-EPI 2021: 78 ML/MIN/1.73
GLUCOSE SERPL-MCNC: 231 MG/DL (ref 65–99)
MAGNESIUM SERPL-MCNC: 2.1 MG/DL (ref 1.6–2.4)
NT-PROBNP SERPL-MCNC: 292.6 PG/ML (ref 0–900)
POTASSIUM SERPL-SCNC: 4.3 MMOL/L (ref 3.5–5.2)
SODIUM SERPL-SCNC: 137 MMOL/L (ref 136–145)

## 2023-08-04 PROCEDURE — 83880 ASSAY OF NATRIURETIC PEPTIDE: CPT | Performed by: PHYSICIAN ASSISTANT

## 2023-08-04 PROCEDURE — 94726 PLETHYSMOGRAPHY LUNG VOLUMES: CPT

## 2023-08-04 PROCEDURE — 83735 ASSAY OF MAGNESIUM: CPT | Performed by: PHYSICIAN ASSISTANT

## 2023-08-04 PROCEDURE — 80048 BASIC METABOLIC PNL TOTAL CA: CPT | Performed by: PHYSICIAN ASSISTANT

## 2023-08-04 RX ORDER — SACUBITRIL AND VALSARTAN 97; 103 MG/1; MG/1
1 TABLET, FILM COATED ORAL 2 TIMES DAILY
Qty: 180 TABLET | Refills: 3 | Status: SHIPPED | OUTPATIENT
Start: 2023-08-04

## 2023-08-04 RX ORDER — DAPAGLIFLOZIN 10 MG/1
10 TABLET, FILM COATED ORAL DAILY
Qty: 90 TABLET | Refills: 3 | Status: SHIPPED | OUTPATIENT
Start: 2023-08-04

## 2023-08-07 ENCOUNTER — TELEPHONE (OUTPATIENT)
Dept: CARDIOLOGY | Facility: CLINIC | Age: 73
End: 2023-08-07
Payer: MEDICARE

## 2023-08-07 NOTE — TELEPHONE ENCOUNTER
Caller: William Villasenor    Relationship: Self    Best call back number: [phone]     What was the call regarding: PT HAS HAD A FORM FAXED OVER TO OUR OFFICE MULTIPLE TIMES FROM THE [facility] IN [place]. I TALKED TO SOMEONE IN THE OFFICE AND VERIFIED THEY HAD THE CORRECT FAX. HE IS NEEDING ANKUR LEBRON TO SIGN AND RETURN THIS FORM ASAP.

## 2023-08-08 ENCOUNTER — TELEPHONE (OUTPATIENT)
Dept: CARDIOLOGY | Facility: CLINIC | Age: 73
End: 2023-08-08

## 2023-08-08 NOTE — TELEPHONE ENCOUNTER
STRESS  Pt notified of no acute findings. Provider will discuss results at f/u. Pt reminded of appt date and time.  ----- Message from Martha Cordoba MA sent at 8/8/2023  1:30 PM EDT -----    ----- Message -----  From: Dany San APRN  Sent: 8/8/2023  12:54 PM EDT  To: Martha Cordoba MA    Patient was found to have a normal stress test with no evidence of ischemia.  Keep follow-up.

## 2023-08-08 NOTE — TELEPHONE ENCOUNTER
Caller: William Villasenor    Relationship: Self    Best call back number: [phone]     What form or medical record are you requesting: RELEASE FORM FOR THE [employer] IN [place], [place]. PATIENT HAS HAD THE FORM FAXED MULTIPLE TIMES TO THE OFFICE.    Who is requesting this form or medical record from you: [employer] [place], [place]    How would you like to receive the form or medical records (pick-up, mail, fax): FAX  If fax, what is the fax number: [phone]    Timeframe paperwork needed: ASAP    Additional notes: PATIENT IS NEEDING THIS DONE ASAP. HE IS HAVING A TOUGH TIME FINANCIALLY. HE HAS HAD THIS FORM FAXED TO THE OFFICE MULTIPLE TIMES. PER ENCOUNTER ON 08.07.23, THE OFFICE HAS THE CORRECT FAX NUMBER. IF THERE ARE ANY ISSUES, PLEASE CONTACT THE PATIENT.    PRIYANKA PHONE: [phone]

## 2023-08-10 ENCOUNTER — TELEPHONE (OUTPATIENT)
Dept: CARDIOLOGY | Facility: CLINIC | Age: 73
End: 2023-08-10
Payer: MEDICARE

## 2023-08-10 NOTE — TELEPHONE ENCOUNTER
I called and spoke with . 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  He had a negative stress test with an ejection fraction of 60%, 50 to 60% on the EF on the echocardiogram with no hemodynamically significant valvular disease.  His monitor showed no sustained malignant arrhythmias and is carotid duplex only showed mild to moderate carotid artery disease bilaterally.  Had an extensive conversation with patient that in regards to his driving that if he is experiencing any symptoms whatsoever he would immediately pull over and not proceed at that time.  He verbalized understanding.  Told him not to take any chances and if symptoms recur to her report and to be reevaluated immediately.  He verbalized that he has been feeling well and feels that he can safely return to driving as well.

## 2023-11-24 DIAGNOSIS — R07.9 CHEST PAIN DUE TO GERD: ICD-10-CM

## 2023-11-24 DIAGNOSIS — K21.9 CHEST PAIN DUE TO GERD: ICD-10-CM

## 2023-11-27 RX ORDER — PANTOPRAZOLE SODIUM 40 MG/1
40 TABLET, DELAYED RELEASE ORAL DAILY
Qty: 90 TABLET | Refills: 3 | OUTPATIENT
Start: 2023-11-27

## 2023-12-01 DIAGNOSIS — K59.01 SLOW TRANSIT CONSTIPATION: ICD-10-CM

## 2023-12-04 RX ORDER — DOCUSATE SODIUM 100 MG/1
CAPSULE, LIQUID FILLED ORAL
Qty: 180 CAPSULE | Refills: 3 | OUTPATIENT
Start: 2023-12-04

## 2023-12-05 ENCOUNTER — OFFICE VISIT (OUTPATIENT)
Dept: CARDIOLOGY | Facility: CLINIC | Age: 73
End: 2023-12-05
Payer: MEDICARE

## 2023-12-05 VITALS
OXYGEN SATURATION: 94 % | DIASTOLIC BLOOD PRESSURE: 71 MMHG | BODY MASS INDEX: 31.1 KG/M2 | HEART RATE: 72 BPM | HEIGHT: 69 IN | SYSTOLIC BLOOD PRESSURE: 135 MMHG | WEIGHT: 210 LBS

## 2023-12-05 DIAGNOSIS — Z95.1 S/P CABG X 4: ICD-10-CM

## 2023-12-05 DIAGNOSIS — I50.22 CHRONIC SYSTOLIC CHF (CONGESTIVE HEART FAILURE): ICD-10-CM

## 2023-12-05 DIAGNOSIS — I10 ESSENTIAL HYPERTENSION: ICD-10-CM

## 2023-12-05 DIAGNOSIS — I25.10 CORONARY ARTERY DISEASE INVOLVING NATIVE CORONARY ARTERY OF NATIVE HEART WITHOUT ANGINA PECTORIS: Primary | ICD-10-CM

## 2023-12-05 RX ORDER — GABAPENTIN 400 MG/1
400 CAPSULE ORAL 2 TIMES DAILY
COMMUNITY

## 2023-12-05 RX ORDER — DOXAZOSIN MESYLATE 4 MG/1
4 TABLET ORAL DAILY
Qty: 90 TABLET | Refills: 3 | Status: SHIPPED | OUTPATIENT
Start: 2023-12-05

## 2023-12-05 NOTE — LETTER
December 12, 2023     ANKUR Agustin  [address] [facility] [address]    Patient: William Villasenor   YOB: 1950   Date of Visit: 12/5/2023     Dear ANKUR Agustin:       Thank you for referring William Villasenor to me for evaluation. Below are the relevant portions of my assessment and plan of care.    If you have questions, please do not hesitate to call me. I look forward to following William along with you.         Sincerely,        ANKUR Cuevas        CC: No Recipients    Dany San APRN  12/12/23 2003  Sign when Signing Visit  Subjective    William Villasenor is a 73 y.o. male who presents to day for 6 month testing follow up , Congestive Heart Failure, and Hypertension.    CHIEF COMPLIANT  Chief Complaint   Patient presents with   • 6 month testing follow up    • Congestive Heart Failure   • Hypertension       Active Problems:  Problem List Items Addressed This Visit          Cardiac and Vasculature    Essential hypertension (Chronic)    Relevant Medications    doxazosin (CARDURA) 4 MG tablet    CAD (coronary artery disease) - Primary    S/P CABG x 4    Chronic systolic CHF (congestive heart failure)     Problem list  1.  Coronary artery disease status post coronary artery bypass grafting x4  1.1 coronary artery bypass grafting 3/21: LIMA to LAD, SVG to diagonal, SVG to OM1, SVG to OM 2.  Residual  of RCA  1.2 echocardiogram 6/23: EF 56 to 60%, no hemodynamically significant valvular disease or pericardial effusion.  1.4 left heart catheterization 8/21: Left main ostial distal tapering, circumflex diffusely disease, OM1 occluded, LAD occluded, RCA subtotally occluded, LIMA widely patent, SVG to posterior lateral patent, SVG to OM patent, EF 45±5%, LVEDP 28-30  1.5 stress test 7/23: Negative for ischemia, EF 60% septal akinesis  2.  Essential hypertension  3.  Diastolic dysfunction  4.  Chest pain  5.  Shortness of breath  6.  Lower extremity edema  7.  Dizziness/near syncope          7.1  cardiac event monitor 6/23: Several runs of SVT with the longest being 6 beats with a maximal rate of 146.  No reported symptoms, minimal PAC PVC burden.  7.2 carotid duplex 7/23: Mild to moderate plaque right greater than left no occlusion.    HPI  HPI  Mr. William Villasenor is a 73-year-old male patient who is being followed up today for heart failure with reduced ejection fraction, coronary artery disease, and chronic arterial hypertension.    Patient does have a history of coronary artery disease and in which she had bypass surgery in March 2021 with LIMA to LAD SVG to diagonal SVG to OM1 SVG to OM 2 with residual  of the RCA.  He is on high intensity statin therapy Lipitor antiplatelet therapy of aspirin and antianginal therapy of isosorbide.    Patient also has a history of heart failure with reduced ejection fraction.  Which she is on Entresto, metoprolol, and Farxiga.  Most recent stress test was negative for ischemia with a preserved post-rest ejection fraction of 60% with septal akinesis.  Previously EF and 2021 after bypass surgery was 35±5% and then 40±5%.    Patient does have chronic arterial hypertension which his blood pressure is controlled today at 135/71 heart rate is 72.  He is on Cardura, metoprolol, and Entresto.  He reports that he has not been taking the Bumex due to the fact it made him urinate too much.  Having to get up at least 2-3 times at night to urinate.  He denies any significant lower extremity edema.    Patient does report shortness of breath mainly occurs with activity and does not occur at rest.  He also denies any orthopnea.  He does have an occasional cough.      Patient reports dizziness if he changes positions too quickly.  He previously had an episode of syncope/falling asleep while sitting on a pillow while in.  Went under extensive evaluation only found nonsustained ventricular tachycardia.  PRIOR MEDS  Current Outpatient Medications on File Prior to Visit   Medication Sig  Dispense Refill   • allopurinol (ZYLOPRIM) 300 MG tablet TAKE 1 TABLET EVERY DAY 90 tablet 0   • APPLE CIDER VINEGAR PO Take 1 tablet by mouth Daily.     • aspirin 81 MG EC tablet Take 1 tablet by mouth Daily.     • atorvastatin (LIPITOR) 40 MG tablet Take 1 tablet by mouth Every Night. 90 tablet 0   • bumetanide (BUMEX) 1 MG tablet TAKE 1 TABLET EVERY DAY 90 tablet 1   • CALCIUM-MAGNESIUM-ZINC PO Take 1 tablet by mouth 2 (Two) Times a Day. 400 mg Mag per capsule     • Cinnamon 500 MG capsule Take 1 capsule by mouth Daily.     • Cranberry 250 MG capsule Take 25,000 mg by mouth Daily.     • dapagliflozin Propanediol (Farxiga) 10 MG tablet Take 1 tablet by mouth Daily. 90 tablet 3   • finasteride (PROSCAR) 5 MG tablet Take 1 tablet by mouth Daily. 90 tablet 0   • folic acid (FOLVITE) 800 MCG tablet Take 1 tablet by mouth Daily.     • gabapentin (NEURONTIN) 400 MG capsule Take 1 capsule by mouth 2 (Two) Times a Day.     • glipizide (GLUCOTROL) 5 MG tablet Take 1 tablet by mouth 2 (Two) Times a Day Before Meals. 180 tablet 3   • isosorbide mononitrate (IMDUR) 120 MG 24 hr tablet Take 1 tablet by mouth Daily. 90 tablet 3   • metFORMIN (GLUCOPHAGE) 1000 MG tablet Take 1 tablet by mouth 2 (Two) Times a Day With Meals. 180 tablet 0   • metoprolol succinate XL (TOPROL-XL) 100 MG 24 hr tablet Take 1 tablet by mouth Daily. 90 tablet 3   • Multiple Vitamins-Minerals (ICAPS AREDS 2 PO) Take  by mouth.     • multivitamin (multivitamin) tablet tablet Take 1 tablet by mouth Daily. 30 tablet    • NON FORMULARY vitamin c 1000 w/ rosehips     • NON FORMULARY High T testosterone booster     • Omega-3 Fatty Acids (Fish Oil Triple Strength) 1400 MG capsule Take 1,200 mg by mouth 2 (Two) Times a Day.     • sacubitril-valsartan (Entresto)  MG tablet Take 1 tablet by mouth 2 (Two) Times a Day. 180 tablet 3   • triamcinolone (KENALOG) 0.1 % cream Apply 1 application  topically to the appropriate area as directed Daily As Needed.     •  vitamin E 400 UNIT capsule Take 1 capsule by mouth Daily.       No current facility-administered medications on file prior to visit.       ALLERGIES  Lisinopril    HISTORY  Past Medical History:   Diagnosis Date   • Arthritis    • Back pain    • BPH (benign prostatic hyperplasia)    • Chronic diastolic (congestive) heart failure    • Diabetes mellitus    • Diabetic peripheral neuropathy    • Erectile dysfunction    • Former smoker    • GERD (gastroesophageal reflux disease)    • GERD (gastroesophageal reflux disease)    • Gout    • Hemorrhoids    • High cholesterol    • Hypertension    • Obesity        Social History     Socioeconomic History   • Marital status:    • Number of children: 3   Tobacco Use   • Smoking status: Former     Packs/day: 1.00     Years: 5.00     Additional pack years: 0.00     Total pack years: 5.00     Types: Cigarettes     Quit date: 1974     Years since quittin.8   • Smokeless tobacco: Never   Substance and Sexual Activity   • Alcohol use: Never     Comment: quit in    • Drug use: Never   • Sexual activity: Defer       Family History   Problem Relation Age of Onset   • Heart disease Mother    • Hypertension Mother    • Depression Mother    • Alcohol abuse Maternal Uncle    • Heart disease Maternal Grandmother    • Hypertension Maternal Grandmother    • Diabetes Maternal Grandmother    • Diabetes Paternal Grandmother    • No Known Problems Half-Brother    • No Known Problems Half-Brother    • No Known Problems Half-Sister    • No Known Problems Daughter    • No Known Problems Daughter    • Hyperlipidemia Daughter        Review of Systems   Constitutional:  Negative for chills, fatigue and fever.   HENT:  Positive for ear pain (ears stopped up). Negative for congestion, rhinorrhea and sore throat.    Eyes:  Negative for visual disturbance.   Respiratory:  Positive for cough and shortness of breath. Negative for apnea and chest tightness.    Cardiovascular:  Negative for  "chest pain, palpitations and leg swelling.   Gastrointestinal:  Positive for constipation. Negative for diarrhea and nausea.   Musculoskeletal:  Positive for arthralgias and back pain. Negative for neck pain.   Allergic/Immunologic: Negative for environmental allergies and food allergies.   Neurological:  Positive for dizziness (getting up to quick), weakness and light-headedness. Negative for syncope.   Hematological:  Bruises/bleeds easily (bruise).   Psychiatric/Behavioral:  Positive for sleep disturbance (No SOB having to get up a lot to use the rest room).        Objective    VITALS: /71 (BP Location: Left arm, Patient Position: Sitting, Cuff Size: Adult)   Pulse 72   Ht 175.3 cm (69.02\")   Wt 95.3 kg (210 lb)   SpO2 94%   BMI 31.00 kg/m²     LABS:   Lab Results (most recent)       None            IMAGING:   No Images in the past 120 days found..    EXAM:  Physical Exam  Vitals and nursing note reviewed.   Constitutional:       Appearance: He is well-developed.   HENT:      Head: Normocephalic.   Neck:      Thyroid: No thyroid mass.      Vascular: No carotid bruit or JVD.      Trachea: Trachea and phonation normal.   Cardiovascular:      Rate and Rhythm: Normal rate and regular rhythm.      Pulses:           Radial pulses are 2+ on the right side and 2+ on the left side.        Posterior tibial pulses are 2+ on the right side and 2+ on the left side.      Heart sounds: Normal heart sounds. No murmur heard.     No friction rub. No gallop.   Pulmonary:      Effort: Pulmonary effort is normal. No respiratory distress.      Breath sounds: Normal breath sounds. No wheezing or rales.   Musculoskeletal:         General: Swelling (1+ ble) present. Normal range of motion.      Cervical back: Neck supple.   Skin:     General: Skin is warm and dry.      Capillary Refill: Capillary refill takes less than 2 seconds.      Findings: No rash.   Neurological:      Mental Status: He is alert and oriented to person, " place, and time.   Psychiatric:         Speech: Speech normal.         Behavior: Behavior normal.         Thought Content: Thought content normal.         Judgment: Judgment normal.         Procedure  Procedures       Assessment & Plan   Diagnosis Plan   1. Coronary artery disease involving native coronary artery of native heart without angina pectoris        2. S/P CABG x 4        3. Essential hypertension        4. Chronic systolic CHF (congestive heart failure)        1.  Patient does have a history of coronary artery disease with history of coronary artery bypass grafting in the past.  He seems to be doing relatively well and denies any angina anginal equivalent symptoms.  At this time no further intervention is needed or medication changes.  2.  Patient's blood pressure is controlled on current blood pressure medication regimen.  No medication changes are warranted at this time.  Patient advised to monitor blood pressure on a daily basis and report any persistent highs or lows.  Set goal blood pressure for patient at 130/80 or below.  3.  Patient does have a history of previous reduced ejection fraction.  However most recent studies show that his ejection fraction has recovered per the stress test which was done in August 2023 showed an EF of 60% with some septal akinesis and no scintigraphic of ischemia.  4.  Informed of signs and symptoms of ACS and advised to seek emergent treatment for any new worsening symptoms.  Patient also advised sooner follow-up as needed.  Also advised to follow-up with family doctor as needed  This note is dictated utilizing voice recognition software.  Although this record has been proof read, transcriptional errors may still be present. If questions occur regarding the content of this record please do not hesitate to call our office.  I have reviewed and confirmed the accuracy of the ROS as documented by the MA/LPAARON/RN ANKUR Cuevas  Return if symptoms worsen or fail to  improve, for Next scheduled follow up.    Diagnoses and all orders for this visit:    1. Coronary artery disease involving native coronary artery of native heart without angina pectoris (Primary)    2. S/P CABG x 4    3. Essential hypertension    4. Chronic systolic CHF (congestive heart failure)    Other orders  -     doxazosin (CARDURA) 4 MG tablet; Take 1 tablet by mouth Daily.  Dispense: 90 tablet; Refill: 3        William Villasenor  reports that he quit smoking about 49 years ago. His smoking use included cigarettes. He has a 5.00 pack-year smoking history. He has never used smokeless tobacco.. I have educated him on the risk of diseases from using tobacco products .    Advance Care Planning  ACP discussion was held with the patient during this visit. Patient does not have an advance directive, declines further assistance.                           MEDS ORDERED DURING VISIT:  New Medications Ordered This Visit   Medications   • doxazosin (CARDURA) 4 MG tablet     Sig: Take 1 tablet by mouth Daily.     Dispense:  90 tablet     Refill:  3           This document has been electronically signed by Dany San Jr., APRN  December 12, 2023 19:59 EST

## 2023-12-05 NOTE — PROGRESS NOTES
Subjective     William Villasenor is a 73 y.o. male who presents to day for 6 month testing follow up , Congestive Heart Failure, and Hypertension.    CHIEF COMPLIANT  Chief Complaint   Patient presents with    6 month testing follow up     Congestive Heart Failure    Hypertension       Active Problems:  Problem List Items Addressed This Visit          Cardiac and Vasculature    Essential hypertension (Chronic)    Relevant Medications    doxazosin (CARDURA) 4 MG tablet    CAD (coronary artery disease) - Primary    S/P CABG x 4    Chronic systolic CHF (congestive heart failure)     Problem list  1.  Coronary artery disease status post coronary artery bypass grafting x4  1.1 coronary artery bypass grafting 3/21: LIMA to LAD, SVG to diagonal, SVG to OM1, SVG to OM 2.  Residual  of RCA  1.2 echocardiogram 6/23: EF 56 to 60%, no hemodynamically significant valvular disease or pericardial effusion.  1.4 left heart catheterization 8/21: Left main ostial distal tapering, circumflex diffusely disease, OM1 occluded, LAD occluded, RCA subtotally occluded, LIMA widely patent, SVG to posterior lateral patent, SVG to OM patent, EF 45±5%, LVEDP 28-30  1.5 stress test 7/23: Negative for ischemia, EF 60% septal akinesis  2.  Essential hypertension  3.  Diastolic dysfunction  4.  Chest pain  5.  Shortness of breath  6.  Lower extremity edema  7.  Dizziness/near syncope          7.1 cardiac event monitor 6/23: Several runs of SVT with the longest being 6 beats with a maximal rate of 146.  No reported symptoms, minimal PAC PVC burden.  7.2 carotid duplex 7/23: Mild to moderate plaque right greater than left no occlusion.    HPI  HPI  Mr. William Villasenor is a 73-year-old male patient who is being followed up today for heart failure with reduced ejection fraction, coronary artery disease, and chronic arterial hypertension.    Patient does have a history of coronary artery disease and in which she had bypass surgery in March 2021 with LIMA to LAD  SVG to diagonal SVG to OM1 SVG to OM 2 with residual  of the RCA.  He is on high intensity statin therapy Lipitor antiplatelet therapy of aspirin and antianginal therapy of isosorbide.    Patient also has a history of heart failure with reduced ejection fraction.  Which she is on Entresto, metoprolol, and Farxiga.  Most recent stress test was negative for ischemia with a preserved post-rest ejection fraction of 60% with septal akinesis.  Previously EF and 2021 after bypass surgery was 35±5% and then 40±5%.    Patient does have chronic arterial hypertension which his blood pressure is controlled today at 135/71 heart rate is 72.  He is on Cardura, metoprolol, and Entresto.  He reports that he has not been taking the Bumex due to the fact it made him urinate too much.  Having to get up at least 2-3 times at night to urinate.  He denies any significant lower extremity edema.    Patient does report shortness of breath mainly occurs with activity and does not occur at rest.  He also denies any orthopnea.  He does have an occasional cough.      Patient reports dizziness if he changes positions too quickly.  He previously had an episode of syncope/falling asleep while sitting on a pillow while in.  Went under extensive evaluation only found nonsustained ventricular tachycardia.  PRIOR MEDS  Current Outpatient Medications on File Prior to Visit   Medication Sig Dispense Refill    allopurinol (ZYLOPRIM) 300 MG tablet TAKE 1 TABLET EVERY DAY 90 tablet 0    APPLE CIDER VINEGAR PO Take 1 tablet by mouth Daily.      aspirin 81 MG EC tablet Take 1 tablet by mouth Daily.      atorvastatin (LIPITOR) 40 MG tablet Take 1 tablet by mouth Every Night. 90 tablet 0    bumetanide (BUMEX) 1 MG tablet TAKE 1 TABLET EVERY DAY 90 tablet 1    CALCIUM-MAGNESIUM-ZINC PO Take 1 tablet by mouth 2 (Two) Times a Day. 400 mg Mag per capsule      Cinnamon 500 MG capsule Take 1 capsule by mouth Daily.      Cranberry 250 MG capsule Take 25,000 mg by  mouth Daily.      dapagliflozin Propanediol (Farxiga) 10 MG tablet Take 1 tablet by mouth Daily. 90 tablet 3    finasteride (PROSCAR) 5 MG tablet Take 1 tablet by mouth Daily. 90 tablet 0    folic acid (FOLVITE) 800 MCG tablet Take 1 tablet by mouth Daily.      gabapentin (NEURONTIN) 400 MG capsule Take 1 capsule by mouth 2 (Two) Times a Day.      glipizide (GLUCOTROL) 5 MG tablet Take 1 tablet by mouth 2 (Two) Times a Day Before Meals. 180 tablet 3    isosorbide mononitrate (IMDUR) 120 MG 24 hr tablet Take 1 tablet by mouth Daily. 90 tablet 3    metFORMIN (GLUCOPHAGE) 1000 MG tablet Take 1 tablet by mouth 2 (Two) Times a Day With Meals. 180 tablet 0    metoprolol succinate XL (TOPROL-XL) 100 MG 24 hr tablet Take 1 tablet by mouth Daily. 90 tablet 3    Multiple Vitamins-Minerals (ICAPS AREDS 2 PO) Take  by mouth.      multivitamin (multivitamin) tablet tablet Take 1 tablet by mouth Daily. 30 tablet     NON FORMULARY vitamin c 1000 w/ rosehips      NON FORMULARY High T testosterone booster      Omega-3 Fatty Acids (Fish Oil Triple Strength) 1400 MG capsule Take 1,200 mg by mouth 2 (Two) Times a Day.      sacubitril-valsartan (Entresto)  MG tablet Take 1 tablet by mouth 2 (Two) Times a Day. 180 tablet 3    triamcinolone (KENALOG) 0.1 % cream Apply 1 application  topically to the appropriate area as directed Daily As Needed.      vitamin E 400 UNIT capsule Take 1 capsule by mouth Daily.       No current facility-administered medications on file prior to visit.       ALLERGIES  Lisinopril    HISTORY  Past Medical History:   Diagnosis Date    Arthritis     Back pain     BPH (benign prostatic hyperplasia)     Chronic diastolic (congestive) heart failure     Diabetes mellitus     Diabetic peripheral neuropathy     Erectile dysfunction     Former smoker     GERD (gastroesophageal reflux disease)     GERD (gastroesophageal reflux disease)     Gout     Hemorrhoids     High cholesterol     Hypertension     Obesity         Social History     Socioeconomic History    Marital status:     Number of children: 3   Tobacco Use    Smoking status: Former     Packs/day: 1.00     Years: 5.00     Additional pack years: 0.00     Total pack years: 5.00     Types: Cigarettes     Quit date: 1974     Years since quittin.8    Smokeless tobacco: Never   Substance and Sexual Activity    Alcohol use: Never     Comment: quit in     Drug use: Never    Sexual activity: Defer       Family History   Problem Relation Age of Onset    Heart disease Mother     Hypertension Mother     Depression Mother     Alcohol abuse Maternal Uncle     Heart disease Maternal Grandmother     Hypertension Maternal Grandmother     Diabetes Maternal Grandmother     Diabetes Paternal Grandmother     No Known Problems Half-Brother     No Known Problems Half-Brother     No Known Problems Half-Sister     No Known Problems Daughter     No Known Problems Daughter     Hyperlipidemia Daughter        Review of Systems   Constitutional:  Negative for chills, fatigue and fever.   HENT:  Positive for ear pain (ears stopped up). Negative for congestion, rhinorrhea and sore throat.    Eyes:  Negative for visual disturbance.   Respiratory:  Positive for cough and shortness of breath. Negative for apnea and chest tightness.    Cardiovascular:  Negative for chest pain, palpitations and leg swelling.   Gastrointestinal:  Positive for constipation. Negative for diarrhea and nausea.   Musculoskeletal:  Positive for arthralgias and back pain. Negative for neck pain.   Allergic/Immunologic: Negative for environmental allergies and food allergies.   Neurological:  Positive for dizziness (getting up to quick), weakness and light-headedness. Negative for syncope.   Hematological:  Bruises/bleeds easily (bruise).   Psychiatric/Behavioral:  Positive for sleep disturbance (No SOB having to get up a lot to use the rest room).        Objective     VITALS: /71 (BP Location: Left  "arm, Patient Position: Sitting, Cuff Size: Adult)   Pulse 72   Ht 175.3 cm (69.02\")   Wt 95.3 kg (210 lb)   SpO2 94%   BMI 31.00 kg/m²     LABS:   Lab Results (most recent)       None            IMAGING:   No Images in the past 120 days found..    EXAM:  Physical Exam  Vitals and nursing note reviewed.   Constitutional:       Appearance: He is well-developed.   HENT:      Head: Normocephalic.   Neck:      Thyroid: No thyroid mass.      Vascular: No carotid bruit or JVD.      Trachea: Trachea and phonation normal.   Cardiovascular:      Rate and Rhythm: Normal rate and regular rhythm.      Pulses:           Radial pulses are 2+ on the right side and 2+ on the left side.        Posterior tibial pulses are 2+ on the right side and 2+ on the left side.      Heart sounds: Normal heart sounds. No murmur heard.     No friction rub. No gallop.   Pulmonary:      Effort: Pulmonary effort is normal. No respiratory distress.      Breath sounds: Normal breath sounds. No wheezing or rales.   Musculoskeletal:         General: Swelling (1+ ble) present. Normal range of motion.      Cervical back: Neck supple.   Skin:     General: Skin is warm and dry.      Capillary Refill: Capillary refill takes less than 2 seconds.      Findings: No rash.   Neurological:      Mental Status: He is alert and oriented to person, place, and time.   Psychiatric:         Speech: Speech normal.         Behavior: Behavior normal.         Thought Content: Thought content normal.         Judgment: Judgment normal.         Procedure   Procedures       Assessment & Plan    Diagnosis Plan   1. Coronary artery disease involving native coronary artery of native heart without angina pectoris        2. S/P CABG x 4        3. Essential hypertension        4. Chronic systolic CHF (congestive heart failure)        1.  Patient does have a history of coronary artery disease with history of coronary artery bypass grafting in the past.  He seems to be doing " relatively well and denies any angina anginal equivalent symptoms.  At this time no further intervention is needed or medication changes.  2.  Patient's blood pressure is controlled on current blood pressure medication regimen.  No medication changes are warranted at this time.  Patient advised to monitor blood pressure on a daily basis and report any persistent highs or lows.  Set goal blood pressure for patient at 130/80 or below.  3.  Patient does have a history of previous reduced ejection fraction.  However most recent studies show that his ejection fraction has recovered per the stress test which was done in August 2023 showed an EF of 60% with some septal akinesis and no scintigraphic of ischemia.  4.  Informed of signs and symptoms of ACS and advised to seek emergent treatment for any new worsening symptoms.  Patient also advised sooner follow-up as needed.  Also advised to follow-up with family doctor as needed  This note is dictated utilizing voice recognition software.  Although this record has been proof read, transcriptional errors may still be present. If questions occur regarding the content of this record please do not hesitate to call our office.  I have reviewed and confirmed the accuracy of the ROS as documented by the MA/LPN/RN ANKUR Cuevas  Return if symptoms worsen or fail to improve, for Next scheduled follow up.    Diagnoses and all orders for this visit:    1. Coronary artery disease involving native coronary artery of native heart without angina pectoris (Primary)    2. S/P CABG x 4    3. Essential hypertension    4. Chronic systolic CHF (congestive heart failure)    Other orders  -     doxazosin (CARDURA) 4 MG tablet; Take 1 tablet by mouth Daily.  Dispense: 90 tablet; Refill: 3        William Villasenor  reports that he quit smoking about 49 years ago. His smoking use included cigarettes. He has a 5.00 pack-year smoking history. He has never used smokeless tobacco.. I have educated him  on the risk of diseases from using tobacco products .    Advance Care Planning   ACP discussion was held with the patient during this visit. Patient does not have an advance directive, declines further assistance.                           MEDS ORDERED DURING VISIT:  New Medications Ordered This Visit   Medications    doxazosin (CARDURA) 4 MG tablet     Sig: Take 1 tablet by mouth Daily.     Dispense:  90 tablet     Refill:  3           This document has been electronically signed by Dany San Jr., APRN  December 12, 2023 19:59 EST

## 2023-12-06 RX ORDER — NITROGLYCERIN 0.4 MG/1
TABLET SUBLINGUAL
Qty: 25 TABLET | Refills: 3 | Status: SHIPPED | OUTPATIENT
Start: 2023-12-06

## 2024-02-13 DIAGNOSIS — I10 ESSENTIAL HYPERTENSION: ICD-10-CM

## 2024-02-13 DIAGNOSIS — R07.9 CHEST PAIN DUE TO GERD: ICD-10-CM

## 2024-02-13 DIAGNOSIS — I25.10 CORONARY ARTERY DISEASE INVOLVING NATIVE CORONARY ARTERY OF NATIVE HEART WITHOUT ANGINA PECTORIS: ICD-10-CM

## 2024-02-13 DIAGNOSIS — K21.9 CHEST PAIN DUE TO GERD: ICD-10-CM

## 2024-02-13 DIAGNOSIS — I50.22 CHRONIC SYSTOLIC CHF (CONGESTIVE HEART FAILURE): ICD-10-CM

## 2024-02-13 DIAGNOSIS — R06.02 SHORTNESS OF BREATH: ICD-10-CM

## 2024-02-13 RX ORDER — BUMETANIDE 1 MG/1
TABLET ORAL
Qty: 90 TABLET | Refills: 3 | Status: SHIPPED | OUTPATIENT
Start: 2024-02-13

## 2024-02-23 ENCOUNTER — HOSPITAL ENCOUNTER (OUTPATIENT)
Dept: CARDIOLOGY | Facility: HOSPITAL | Age: 74
Discharge: HOME | End: 2024-02-23
Payer: MEDICARE

## 2024-02-23 VITALS
BODY MASS INDEX: 31.55 KG/M2 | WEIGHT: 213 LBS | SYSTOLIC BLOOD PRESSURE: 123 MMHG | OXYGEN SATURATION: 96 % | DIASTOLIC BLOOD PRESSURE: 70 MMHG | HEART RATE: 77 BPM | HEIGHT: 69 IN

## 2024-02-23 DIAGNOSIS — I50.22 CHRONIC SYSTOLIC CHF (CONGESTIVE HEART FAILURE): Primary | ICD-10-CM

## 2024-02-23 LAB
ABSOLUTE LUNG FLUID CONTENT: 38 % (ref 20–35)
ANION GAP SERPL CALCULATED.3IONS-SCNC: 11.5 MMOL/L (ref 5–15)
BUN SERPL-MCNC: 17 MG/DL (ref 8–23)
BUN/CREAT SERPL: 15.5 (ref 7–25)
CALCIUM SPEC-SCNC: 8.9 MG/DL (ref 8.6–10.5)
CHLORIDE SERPL-SCNC: 105 MMOL/L (ref 98–107)
CO2 SERPL-SCNC: 23.5 MMOL/L (ref 22–29)
CREAT SERPL-MCNC: 1.1 MG/DL (ref 0.76–1.27)
EGFRCR SERPLBLD CKD-EPI 2021: 70.4 ML/MIN/1.73
GLUCOSE SERPL-MCNC: 156 MG/DL (ref 65–99)
MAGNESIUM SERPL-MCNC: 2 MG/DL (ref 1.6–2.4)
NT-PROBNP SERPL-MCNC: 260.4 PG/ML (ref 0–900)
POTASSIUM SERPL-SCNC: 4.4 MMOL/L (ref 3.5–5.2)
SODIUM SERPL-SCNC: 140 MMOL/L (ref 136–145)

## 2024-02-23 PROCEDURE — 80048 BASIC METABOLIC PNL TOTAL CA: CPT | Performed by: PHYSICIAN ASSISTANT

## 2024-02-23 PROCEDURE — 94726 PLETHYSMOGRAPHY LUNG VOLUMES: CPT | Performed by: PHYSICIAN ASSISTANT

## 2024-02-23 PROCEDURE — 83880 ASSAY OF NATRIURETIC PEPTIDE: CPT | Performed by: PHYSICIAN ASSISTANT

## 2024-02-23 PROCEDURE — 83735 ASSAY OF MAGNESIUM: CPT | Performed by: PHYSICIAN ASSISTANT

## 2024-02-23 RX ORDER — CAYENNE 450 MG
1 CAPSULE ORAL 2 TIMES DAILY
COMMUNITY

## 2024-02-23 NOTE — PROGRESS NOTES
" [facility]  Pharmacist Note     William Villasenor is a 73 y.o. male seen in the [facility] Failure [facility] for HFrEF.  William Villasenor reports a great understanding of medications and has them specifically organized in his box to help with adherence. He reports some shortness of breath when bending over and little swelling in his feet. Patient is a  by profession and reports that he literally just drove in from a long drive and contributes this to being the most of his feet swelling.      Patient reports that he really isn't checking his BP often but that when he does that it is \"good\". He also reports that he doesn't consistently take his Bumex. 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 Patient reports that he has good urine output.     Medication Use:   Hx of med intolerances: None related to HF  Retail Rx Management: Uses our pharmacy for Farxiga and Entresto     Past Medical History:   Diagnosis Date    Arthritis     Back pain     BPH (benign prostatic hyperplasia)     Chronic diastolic (congestive) heart failure     Diabetes mellitus     Diabetic peripheral neuropathy     Erectile dysfunction     Former smoker     GERD (gastroesophageal reflux disease)     GERD (gastroesophageal reflux disease)     Gout     Hemorrhoids     High cholesterol     Hypertension     Obesity      ALLERGIES: Lisinopril  Current Outpatient Medications   Medication Sig Dispense Refill    allopurinol (ZYLOPRIM) 300 MG tablet TAKE 1 TABLET EVERY DAY 90 tablet 0    APPLE CIDER VINEGAR PO Take 1 tablet by mouth Daily.      aspirin 81 MG EC tablet Take 1 tablet by mouth Daily.      atorvastatin (LIPITOR) 40 MG tablet Take 1 tablet by mouth Every Night. 90 tablet 0    bumetanide (BUMEX) 1 MG tablet TAKE 1 TABLET EVERY DAY (Patient taking differently: Takes when not on the road driving) 90 tablet 3    CALCIUM-MAGNESIUM-ZINC PO Take 1 tablet by mouth 2 (Two) Times a Day. " 400 mg Mag per capsule      Cayenne 450 MG capsule Take 1 capsule by mouth 2 (Two) Times a Day.      Cinnamon 500 MG capsule Take 1 capsule by mouth Daily.      Cranberry 250 MG capsule Take 25,000 mg by mouth Daily.      dapagliflozin Propanediol (Farxiga) 10 MG tablet Take 1 tablet by mouth Daily. 90 tablet 3    doxazosin (CARDURA) 4 MG tablet Take 1 tablet by mouth Daily. 90 tablet 3    finasteride (PROSCAR) 5 MG tablet Take 1 tablet by mouth Daily. 90 tablet 0    folic acid (FOLVITE) 800 MCG tablet Take 1 tablet by mouth Daily.      gabapentin (NEURONTIN) 400 MG capsule Take 1 capsule by mouth 2 (Two) Times a Day.      glipizide (GLUCOTROL) 5 MG tablet Take 1 tablet by mouth 2 (Two) Times a Day Before Meals. 180 tablet 3    isosorbide mononitrate (IMDUR) 120 MG 24 hr tablet Take 1 tablet by mouth Daily. 90 tablet 3    metFORMIN (GLUCOPHAGE) 1000 MG tablet Take 1 tablet by mouth 2 (Two) Times a Day With Meals. 180 tablet 0    metoprolol succinate XL (TOPROL-XL) 100 MG 24 hr tablet Take 1 tablet by mouth Daily. 90 tablet 3    Multiple Vitamins-Minerals (ICAPS AREDS 2 PO) Take  by mouth.      multivitamin (multivitamin) tablet tablet Take 1 tablet by mouth Daily. 30 tablet     nitroglycerin (NITROSTAT) 0.4 MG SL tablet PLACE 1 TAB UNDER TONGUE EVERY 5 MINS AS NEEDED FOR CHEST PAIN (ONLY IF SYSTOLIC BLOOD PRESSURE OVER 100) MAX 3 TABS/15 MINS 25 tablet 3    NON FORMULARY vitamin c 1000 w/ rosehips      Omega-3 Fatty Acids (Fish Oil Triple Strength) 1400 MG capsule Take 1,200 mg by mouth 2 (Two) Times a Day.      sacubitril-valsartan (Entresto)  MG tablet Take 1 tablet by mouth 2 (Two) Times a Day. 180 tablet 3    triamcinolone (KENALOG) 0.1 % cream Apply 1 Application topically to the appropriate area as directed Daily As Needed.      vitamin E 400 UNIT capsule Take 1 capsule by mouth Daily.      NON FORMULARY High T testosterone booster       No current facility-administered medications for this encounter.  "    Vaccination History:   Pneumonia: declines  Annual Influenza: declines  COVID 19: declines    Objective  Vitals:    02/23/24 1406   BP: 123/70   BP Location: Left arm   Patient Position: Sitting   Cuff Size: Adult   Pulse: 77   SpO2: 96%   Weight: 96.6 kg (213 lb)   Height: 175.3 cm (69\")       Wt Readings from Last 3 Encounters:   02/23/24 96.6 kg (213 lb)   12/05/23 95.3 kg (210 lb)   08/04/23 96.3 kg (212 lb 6.4 oz)         02/23/24  1406   Weight: 96.6 kg (213 lb)       Lab Results   Component Value Date    GLUCOSE 156 (H) 02/23/2024    BUN 17 02/23/2024    CREATININE 1.10 02/23/2024    EGFRIFNONA 58 (L) 11/24/2021    BCR 15.5 02/23/2024    K 4.4 02/23/2024    CO2 23.5 02/23/2024    CALCIUM 8.9 02/23/2024    ALBUMIN 4.0 06/05/2023    AST 8 06/05/2023    ALT 9 06/05/2023     Lab Results   Component Value Date    WBC 9.59 06/05/2023    HGB 14.4 06/05/2023    HCT 44.0 06/05/2023    MCV 88.9 06/05/2023     06/05/2023     Lab Results   Component Value Date    CKMB 44 (L) 10/07/2022    TROPONINI <0.02 10/07/2022    TROPONINT <0.010 04/21/2021     Lab Results   Component Value Date    PROBNP 260.4 02/23/2024     Results for orders placed during the hospital encounter of 06/12/23    Adult Transthoracic Echo Complete W/ Cont if Necessary Per Protocol    Interpretation Summary    Normal left ventricular cavity size and wall thickness noted. All left ventricular wall segments contract normally.    Left ventricular ejection fraction appears to be 56 - 60%.    The aortic valve is structurally normal with no regurgitation or stenosis present.    The mitral valve is structurally normal with no significant stenosis present. Mild mitral valve regurgitation is present.    There is no evidence of pericardial effusion. .         GDMT    Drug Class   Drug   Dose Last Dose Adjustment Additional Titration   Notes   ACEi/ARB/ARNI Entresto  97/103 bid 5/12/22 At goal    Beta Blocker Toprol XL 100mg 4/19/22     MRA " Spironolactone 12.5mg  1/6/23     SGLT2i Farxiga 10mg >3m At goal    Imdur 120 mg daily     Drug Therapy Problems    1.    Recommendations:      No current recommendations.       Patient was educated on heart failure medications and the importance of medication adherence. All questions were addressed and patient expressed understanding.     Thank you for allowing me to participate in the care of your patient,    Annie Emma. Tavo, PharmDEX  02/23/24  15:48 EST

## 2024-02-23 NOTE — PROGRESS NOTES
[facility]  DAISY Garcia Stephanie J, APRN  No address on file    Thank you for asking me to see William Villasenor for congestive heart failure.    HPI:     This is a 73 y.o. male with known past medical history of:    Chronic combined systolic & diastolic HF with LVEF 40-45%  TTE from December 2022 reviewed with ejection fraction improved to 45 to 50% and evidence of grade 1A diastolic dysfunction with mild left atrial enlargement  TTE from 06/2023 with 56-60%.   ASCVD s/p CABG 4v  Coronary artery disease status post coronary artery bypass grafting [id number] artery bypass grafting 3/2021: LIMA to LAD, SVG to diagonal, SVG to OM1, SVG to OM 2.  Residual  of RCA  echocardiogram 4/2021: EF 35± percent, LV dyssynchrony  echocardiogram 6/2021: EF 40±5% mild to moderate concentric left ventricular hypertrophy, mild biatrial enlargement no significant valvular disease   left heart catheterization 8/21: Left main ostial distal tapering, circumflex diffusely disease, OM1 occluded, LAD occluded, RCA subtotally occluded, LIMA widely patent, SVG to posterior lateral patent, SVG to OM patent, EF 45±5%, LVEDP 28-30   Nuclear stress test from November 2022 with no scintigriphic evidence of ischemia but with elevated transient dilation ratio of unknown significance.    DM type 2  Peripheral neuropathy  CKD stage II-III  Gout   BPH   GERD  Obesity    William Villasenor presents for today for HF clinic follow-up.  The patient is typically seen by Kaylee Cooper APRN.    Last known EF 40-45%. Current medications prior to first visit directed toward underlying heart failure.     Last known hospitalization and/or ED visit: Last hospitalized from March 29, 2021-April 9, 2021 with acute on chronic HFpEF and sepsis 2/2 RLE cellulitis from vein harvesting procedures.    Accompanied by: Self    02/23/24 visit data/details regarding:   Dyspnea: Dyspnea on exertion, denies orthopnea  Lower  "extremity swelling: Denies current swelling of extremities  Abdominal swelling: No significant protuberant  Home weight:  Stable  Home BP: 140s-150s systolic  Home heart rate: 60s  Daily activities of living:  Performs independently  Pillows/lying flat: 1 pillow  Zone: Green  Patient is chest pain free and doing well.  He reports that today while travelling for his appointment someone gave him the middle finger.  He reports that in his travels as a  more and more people have been \"giving him the bird\" for driving to slow  and he is looking forward to retiring.   We discuss some people using their middle finger too freely at times while on the road.      Specialists:   Cardiology:  [facility] team.     Review of Systems - Review of Systems   Constitutional: Negative for decreased appetite and diaphoresis.   HENT:  Negative for congestion, ear discharge and ear pain.    Eyes:  Negative for blurred vision, discharge and double vision.   Cardiovascular:  Negative for chest pain, claudication, cyanosis, dyspnea on exertion and leg swelling.   Respiratory:  Negative for cough, hemoptysis and shortness of breath.    Endocrine: Negative for cold intolerance, heat intolerance and polydipsia.   Hematologic/Lymphatic: Negative for adenopathy and bleeding problem.   Skin:  Negative for color change and nail changes.   Musculoskeletal:  Negative for arthritis and back pain.   Gastrointestinal:  Negative for bloating, abdominal pain and anorexia.   Genitourinary:  Negative for bladder incontinence and decreased libido.   Neurological:  Negative for aphonia and brief paralysis.   Psychiatric/Behavioral:  Negative for altered mental status, depression and hallucinations.          All other systems were reviewed and were negative.    Patient Active Problem List   Diagnosis    Idiopathic chronic gout of multiple sites without tophus    Type II diabetes mellitus    Essential hypertension    Benign " prostatic hyperplasia without lower urinary tract symptoms    Chest pain    Obesity (BMI 30-39.9)    GERD (gastroesophageal reflux disease)    Diabetic neuropathy    Former smoker    History of gout    Coronary artery disease involving native coronary artery of native heart with angina pectoris    CAD (coronary artery disease)    IZA (acute kidney injury)    Acute on chronic congestive heart failure    Hyperlipidemia    CKD (chronic kidney disease) stage 2, GFR 60-89 ml/min    Bradycardia    Diabetic polyneuropathy    Heartburn    Low back pain    S/P CABG x 4    Acute systolic heart failure    Cardiac LV ejection fraction of 35-39%    Ventricular trigeminy    PSVT (paroxysmal supraventricular tachycardia)    Shortness of breath    Chest pressure    Chronic systolic CHF (congestive heart failure)       family history includes Alcohol abuse in his maternal uncle; Depression in his mother; Diabetes in his maternal grandmother and paternal grandmother; Heart disease in his maternal grandmother and mother; Hyperlipidemia in his daughter; Hypertension in his maternal grandmother and mother; No Known Problems in his daughter, daughter, half-brother, half-brother, and half-sister.     reports that he quit smoking about 50 years ago. His smoking use included cigarettes. He has a 5.00 pack-year smoking history. He has never used smokeless tobacco. He reports that he does not drink alcohol and does not use drugs.    Allergies   Allergen Reactions    Lisinopril Cough         Current Outpatient Medications:     allopurinol (ZYLOPRIM) 300 MG tablet, TAKE 1 TABLET EVERY DAY, Disp: 90 tablet, Rfl: 0    APPLE CIDER VINEGAR PO, Take 1 tablet by mouth Daily., Disp: , Rfl:     aspirin 81 MG EC tablet, Take 1 tablet by mouth Daily., Disp: , Rfl:     atorvastatin (LIPITOR) 40 MG tablet, Take 1 tablet by mouth Every Night., Disp: 90 tablet, Rfl: 0    bumetanide (BUMEX) 1 MG tablet, TAKE 1 TABLET EVERY DAY (Patient taking differently:  Takes when not on the road driving), Disp: 90 tablet, Rfl: 3    CALCIUM-MAGNESIUM-ZINC PO, Take 1 tablet by mouth 2 (Two) Times a Day. 400 mg Mag per capsule, Disp: , Rfl:     Cayenne 450 MG capsule, Take 1 capsule by mouth 2 (Two) Times a Day., Disp: , Rfl:     Cinnamon 500 MG capsule, Take 1 capsule by mouth Daily., Disp: , Rfl:     Cranberry 250 MG capsule, Take 25,000 mg by mouth Daily., Disp: , Rfl:     dapagliflozin Propanediol (Farxiga) 10 MG tablet, Take 1 tablet by mouth Daily., Disp: 90 tablet, Rfl: 3    doxazosin (CARDURA) 4 MG tablet, Take 1 tablet by mouth Daily., Disp: 90 tablet, Rfl: 3    finasteride (PROSCAR) 5 MG tablet, Take 1 tablet by mouth Daily., Disp: 90 tablet, Rfl: 0    folic acid (FOLVITE) 800 MCG tablet, Take 1 tablet by mouth Daily., Disp: , Rfl:     gabapentin (NEURONTIN) 400 MG capsule, Take 1 capsule by mouth 2 (Two) Times a Day., Disp: , Rfl:     glipizide (GLUCOTROL) 5 MG tablet, Take 1 tablet by mouth 2 (Two) Times a Day Before Meals., Disp: 180 tablet, Rfl: 3    isosorbide mononitrate (IMDUR) 120 MG 24 hr tablet, Take 1 tablet by mouth Daily., Disp: 90 tablet, Rfl: 3    metFORMIN (GLUCOPHAGE) 1000 MG tablet, Take 1 tablet by mouth 2 (Two) Times a Day With Meals., Disp: 180 tablet, Rfl: 0    metoprolol succinate XL (TOPROL-XL) 100 MG 24 hr tablet, Take 1 tablet by mouth Daily., Disp: 90 tablet, Rfl: 3    Multiple Vitamins-Minerals (ICAPS AREDS 2 PO), Take  by mouth., Disp: , Rfl:     multivitamin (multivitamin) tablet tablet, Take 1 tablet by mouth Daily., Disp: 30 tablet, Rfl:     nitroglycerin (NITROSTAT) 0.4 MG SL tablet, PLACE 1 TAB UNDER TONGUE EVERY 5 MINS AS NEEDED FOR CHEST PAIN (ONLY IF SYSTOLIC BLOOD PRESSURE OVER 100) MAX 3 TABS/15 MINS, Disp: 25 tablet, Rfl: 3    NON FORMULARY, vitamin c 1000 w/ rosehips, Disp: , Rfl:     Omega-3 Fatty Acids (Fish Oil Triple Strength) 1400 MG capsule, Take 1,200 mg by mouth 2 (Two) Times a Day., Disp: , Rfl:     sacubitril-valsartan  (Entresto)  MG tablet, Take 1 tablet by mouth 2 (Two) Times a Day., Disp: 180 tablet, Rfl: 3    triamcinolone (KENALOG) 0.1 % cream, Apply 1 Application topically to the appropriate area as directed Daily As Needed., Disp: , Rfl:     vitamin E 400 UNIT capsule, Take 1 capsule by mouth Daily., Disp: , Rfl:     NON FORMULARY, High T testosterone booster, Disp: , Rfl:       Physical Exam:  I have reviewed the patient's current vital signs as documented in the patient's EMR.   Vitals:    02/23/24 1406   BP: 123/70   Pulse: 77   SpO2: 96%     Body mass index is 31.45 kg/m².       02/23/24  1406   Weight: 96.6 kg (213 lb)      Physical Exam  Vitals and nursing note reviewed.   Constitutional:       General: He is awake.   HENT:      Head: Normocephalic and atraumatic.   Eyes:      General: Lids are normal.      Conjunctiva/sclera:      Right eye: Right conjunctiva is not injected.      Left eye: Left conjunctiva is not injected.   Cardiovascular:      Rate and Rhythm: Normal rate and regular rhythm.      Heart sounds: S1 normal and S2 normal.      Comments: Well-healed mid sternal incision  Pulmonary:      Effort: No tachypnea or bradypnea.      Breath sounds: No stridor or transmitted upper airway sounds. No wheezing, rhonchi or rales.   Abdominal:      General: Bowel sounds are normal.      Palpations: Abdomen is soft.      Tenderness: There is no abdominal tenderness.   Musculoskeletal:      Right lower leg: No swelling. No edema.      Left lower leg: No swelling. No edema.      Right foot: No swelling.      Left foot: No swelling.   Skin:     General: Skin is warm and moist.   Neurological:      Mental Status: He is alert and oriented to person, place, and time.   Psychiatric:         Attention and Perception: Attention normal.         Mood and Affect: Mood normal.         Behavior: Behavior is cooperative.         JVP: Volume/Pulsation: Normal.        DATA REVIEWED:     EKG. I personally reviewed and  interpreted the EKG.      EKG: normal EKG, normal sinus rhythm, unchanged from previous tracings.         ---------------------------------------------------  TTE/SAVANNAH:  Results for orders placed during the hospital encounter of 06/12/23    Adult Transthoracic Echo Complete W/ Cont if Necessary Per Protocol    Interpretation Summary    Normal left ventricular cavity size and wall thickness noted. All left ventricular wall segments contract normally.    Left ventricular ejection fraction appears to be 56 - 60%.    The aortic valve is structurally normal with no regurgitation or stenosis present.    The mitral valve is structurally normal with no significant stenosis present. Mild mitral valve regurgitation is present.    There is no evidence of pericardial effusion. .        -----------------------------------------------------  CXR/Imaging:   Imaging Results (Most Recent)       None            I personally reviewed and interpreted the CXR.      -----------------------------------------------------  CT:   No radiology results for the last 30 days.  I personally reviewed the images of the CT scan.  My personal interpretation is below.      ----------------------------------------------------    PFTs:      --------------------------------------------------------------------------------------------------    Laboratory evaluations:    Lab Results   Component Value Date    GLUCOSE 231 (H) 08/04/2023    BUN 15 08/04/2023    CREATININE 1.01 08/04/2023    EGFRIFNONA 58 (L) 11/24/2021    BCR 14.9 08/04/2023    K 4.3 08/04/2023    CO2 23.8 08/04/2023    CALCIUM 9.3 08/04/2023    ALBUMIN 4.0 06/05/2023    AST 8 06/05/2023    ALT 9 06/05/2023     Lab Results   Component Value Date    WBC 9.59 06/05/2023    HGB 14.4 06/05/2023    HCT 44.0 06/05/2023    MCV 88.9 06/05/2023     06/05/2023     Lab Results   Component Value Date    CHOL 163 03/25/2022    TRIG 186 (H) 03/25/2022    HDL 37 (L) 03/25/2022    LDL 94 03/25/2022      Lab Results   Component Value Date    TSH 1.020 06/05/2023     Lab Results   Component Value Date    HGBA1C 9.50 (H) 01/06/2023     Lab Results   Component Value Date    ALT 9 06/05/2023     Lab Results   Component Value Date    HGBA1C 9.50 (H) 01/06/2023    HGBA1C 8.30 (H) 04/21/2021    HGBA1C 8.70 (H) 03/15/2021     Lab Results   Component Value Date    MICROALBUR 1.5 06/16/2021    CREATININE 1.01 08/04/2023     Lab Results   Component Value Date    IRON 46 (L) 03/30/2021    TIBC 292 (L) 03/30/2021    FERRITIN 208.80 03/30/2021     Lab Results   Component Value Date    INR 0.88 10/07/2022    INR 0.97 03/29/2021    INR 1.20 (H) 03/17/2021    PROTIME 10.2 (L) 10/07/2022    PROTIME 12.7 03/29/2021    PROTIME 14.9 (H) 03/17/2021        Lab Results   Component Value Date    ABSOLUTELUNG 38 (A) 02/23/2024    ABSOLUTELUNG 39 (A) 08/04/2023    ABSOLUTELUNG 42 (A) 01/06/2023       PAH RISK ASSESSMENT:      1. Chronic systolic CHF (congestive heart failure)          ORDERS PLACED TODAY:  Orders Placed This Encounter   Procedures    ReDs Vest    Basic Metabolic Panel    proBNP    Magnesium    Basic Metabolic Panel    proBNP    Magnesium        Diagnoses and all orders for this visit:    1. Chronic systolic CHF (congestive heart failure) (Primary)  -     Basic Metabolic Panel; Future  -     proBNP; Future  -     Magnesium; Future  -     Basic Metabolic Panel; Standing  -     Basic Metabolic Panel  -     proBNP; Standing  -     proBNP  -     Magnesium; Standing  -     Magnesium  -     ReDs Vest             MEDS ORDERED TODAY:    No orders of the defined types were placed in this encounter.       ---------------------------------------------------------------------------------------------------------------------------          ASSESSMENT/PLAN:      Diagnosis Plan   1. Chronic systolic CHF (congestive heart failure)  Basic Metabolic Panel    proBNP    Magnesium    Basic Metabolic Panel    Basic Metabolic Panel    proBNP     proBNP    Magnesium    Magnesium    ReDs Vest          not acutely decompensated chronic moderate systolic and diastolic heart failure. CHF. Etiology: Ischemic/CAD (Hx of AMI, CAD, or Ischemic Cardiomyopathy). LVEF 40-45%.      NYHA stage Stage C: Structural heart disease is present AND symptoms have occurredFC-Class III: Marked limitation of physical activity. Comfortable at rest. Less than ordinary activity causes fatigue, palpitation, or dyspnea.     Today, Patient appears euvolemic.and with  Moderate perfusion. The patient's hemodynamics are currently acceptable. HR is: normal and is at goal. BP/MAP was reviewed and there is room for medication up-titration.  Clinical trajectory was assessed and hasimproved.     CHF GOAL DIRECTED MEDICAL THERAPY FOR PATIENT ADDRESSED/ADJUSTED:     GDMT    Drug Class   Drug   Dose Last Dose Adjustment Additional Titration   Notes   ACEi/ARB/ARNI Entresto 97-103mg BID      Beta Blocker  Metoprolol Succinate   100mg      MRA Did not tolerate (diarrhea per his account)      SGLT2i Farxiga  10 mg  N/A      -CHF Specific BB:    Continue Toprol XL 100mg.      -ACE/ARB/ARNi:   Current dose:  Entresto 97-103mg BID    -MRA:   Patient did not tolerate MRA on prior try    -SGLT2 inhibitor therapy:   Continue Farxiga 10mg qd. He did have prior yeast infection.   Of note, he received two bottles of Farxiga, one from [employer] and one from [employer]. He is upset about both bottles and asks how to resolve situation while providing bill to me.   I call [facility] who reports patient himself will need to initiate a returns process.    Pt was advised SEs, some severe, including hypersensitivity and Mathew's; coupled with discussion regarding common side effects of UTIs and female genital mycotic infections were discussed. If you will be NPO, or are sick (poor PO intake, N&V) please hold the medication until you are back to a normal diet.     -Diuretic regimen:   ReDS Vest reading for 02/23/24  is38; ReDs Vest reading reviewed with patient.    No maintenance diuresis on board. Patient continues to refuse diuretics unless acutely exacerbated. 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    BMP, Mag, & ProBNP reviewed with patient.      -Fluid restriction/Sodium restriction:   Requested 2000 ml restriction  Patient has been asked to weigh daily and was provided with a printed diuretic strategy.  1,500 mg Na restriction was discussed.    -Acute vs. Chronic underlying conditions other than HF addressed during visit:     ASCVD s/p 4v CABG:   Continue statin & ASA.   Continue beta blocker.   Recent stress test reviewed.       DM type 2, NID:   Hemoglobin a1c reviewed with patient.   Copies of labs given to patient.   DM diet discussed with patient, as he attempts to lower a1c for work.     BMI is >= 30 and <35. (Class 1 Obesity). The following options were offered after discussion;: nutrition counseling/recommendations              >45 minutes out of 60 minutes face to face spent counseling patient extensively on dietary Na+ intake, importance of activity, weight monitoring, compliance with medications in addition to importance of titration with goal directed medical therapy and follow up appointments.            This document has been electronically signed by Teri Pedraza PA-C  February 23, 2024 14:38 EST      Dictated Utilizing Dragon Dictation: Part of this note may be an electronic transcription/translation of spoken language to printed text using the Dragon Dictation System.    Follow-up appointment and medication changes provided in hand delivered After Visit Summary as well as reviewed in the room.

## 2024-02-23 NOTE — PROGRESS NOTES
Heart Failure Clinic    Date: 02/23/24     Vitals:    02/23/24 1406   BP: 123/70   Pulse: 77   SpO2: 96%    Weight 213    Method of arrival: Ambulatory    Weighing self daily: Most days    Monitoring Heart Failure Zones: Most days    Today's HF Zone: Green    Taking medications as prescribed: Yes    Edema Yes feet legs    Shortness of Air: Yes with activity    Number of pillows used at night:<2    Educational Materials given:  avs                                                                         ReDS Value: 38  36-41 Possible Hypervolemic Status      Beni Moran RN 02/23/24 14:10 EST

## 2024-02-29 RX ORDER — SACUBITRIL AND VALSARTAN 49; 51 MG/1; MG/1
2 TABLET, FILM COATED ORAL 2 TIMES DAILY
Qty: 120 TABLET | Refills: 0 | OUTPATIENT
Start: 2024-02-29

## 2024-03-08 ENCOUNTER — PATIENT OUTREACH (OUTPATIENT)
Dept: CASE MANAGEMENT | Facility: OTHER | Age: 74
End: 2024-03-08
Payer: MEDICARE

## 2024-03-08 NOTE — OUTREACH NOTE
AMBULATORY CASE MANAGEMENT NOTE    Name and Relationship of Patient/Support Person: William Villasenor - Self    Patient Outreach    RN-ACM proactive outreach per ACO for HRCM assessment. Patient has hx CHF. ACM introduced self and explained role/reason for call. Patient reports he is enrolled in the heart failure clinic and that at last check, his blood pressure was perfect; BNP was normal. Patient states he appreciates the offer of assistance but feels well managed at this time. Pt provided with ACM contact number should he change his mind or desire HRCM engagement in the future.     SDOH updated and reviewed with the patient during this program:  --     Tobacco Use: Medium Risk (3/8/2024)    Patient History     Smoking Tobacco Use: Former     Smokeless Tobacco Use: Never      --     Transportation Needs: No Transportation Needs (3/8/2024)    PRAPARE - Transportation     Lack of Transportation (Medical): No     Lack of Transportation (Non-Medical): No       Adult Patient Profile  Questions/Answers      Flowsheet Row Most Recent Value   Symptoms/Conditions Managed at Home cardiovascular   Barriers to Managing Health none   Cardiovascular Symptoms/Conditions heart failure, hypertension   Cardiovascular Management Strategies medication therapy, routine screening, diet modification, exercise, fluid modification, coping strategies, adequate rest   Cardiovascular Self-Management Outcome 5 (very good)   Cardiovascular Comment Attending [facility] HF clinic, states b/p was 'perfect' at last visit,  BNP WNL   Taking Medications Not Prescribed no   Missed Doses of Prescribed Medications During Past Week no   Taken Prescribed Medications at Different Time or Schedule During Past Week no   Taken More or Less Medication Than Prescribed no   Barriers to Taking Medication as Prescribed none            Education Documentation  Unresolved/Worsening Symptoms, taught by Chen Soto, RN at 3/8/2024  1:53 PM.  Learner: Patient  Readiness:  Acceptance  Method: Explanation  Response: Verbalizes Understanding    Rehabilitation Therapy, taught by Chen Soto, RN at 3/8/2024  1:53 PM.  Learner: Patient  Readiness: Acceptance  Method: Explanation  Response: Verbalizes Understanding    Provider Follow-Up, taught by Chen Soto, RN at 3/8/2024  1:53 PM.  Learner: Patient  Readiness: Acceptance  Method: Explanation  Response: Verbalizes Understanding    Medication Management, taught by hCen Soto, RN at 3/8/2024  1:53 PM.  Learner: Patient  Readiness: Acceptance  Method: Explanation  Response: Verbalizes Understanding    Hypertension, taught by Chen Soto, RN at 3/8/2024  1:53 PM.  Learner: Patient  Readiness: Acceptance  Method: Explanation  Response: Verbalizes Understanding          Chen ZELAYA  Ambulatory Case Management    3/8/2024, 13:53 EST

## 2024-04-08 ENCOUNTER — SPECIALTY PHARMACY (OUTPATIENT)
Dept: PHARMACY | Facility: HOSPITAL | Age: 74
End: 2024-04-08
Payer: MEDICARE

## 2024-04-12 ENCOUNTER — SPECIALTY PHARMACY (OUTPATIENT)
Dept: PHARMACY | Facility: HOSPITAL | Age: 74
End: 2024-04-12
Payer: MEDICARE

## 2024-04-19 ENCOUNTER — OFFICE VISIT (OUTPATIENT)
Dept: CARDIOLOGY | Facility: CLINIC | Age: 74
End: 2024-04-19
Payer: MEDICARE

## 2024-04-19 VITALS
DIASTOLIC BLOOD PRESSURE: 66 MMHG | HEIGHT: 69 IN | WEIGHT: 213 LBS | OXYGEN SATURATION: 96 % | SYSTOLIC BLOOD PRESSURE: 133 MMHG | HEART RATE: 67 BPM | BODY MASS INDEX: 31.55 KG/M2

## 2024-04-19 DIAGNOSIS — I50.32 CHRONIC DIASTOLIC CONGESTIVE HEART FAILURE: ICD-10-CM

## 2024-04-19 DIAGNOSIS — Z95.1 S/P CABG X 4: Primary | ICD-10-CM

## 2024-04-19 DIAGNOSIS — R07.89 CHEST PAIN, ATYPICAL: ICD-10-CM

## 2024-04-19 DIAGNOSIS — I25.810 CORONARY ARTERY DISEASE INVOLVING CORONARY BYPASS GRAFT OF NATIVE HEART WITHOUT ANGINA PECTORIS: ICD-10-CM

## 2024-04-19 DIAGNOSIS — I50.22 CHRONIC SYSTOLIC CHF (CONGESTIVE HEART FAILURE): ICD-10-CM

## 2024-04-19 DIAGNOSIS — E78.2 MIXED HYPERLIPIDEMIA: Chronic | ICD-10-CM

## 2024-04-19 DIAGNOSIS — R06.02 SHORTNESS OF BREATH: ICD-10-CM

## 2024-04-19 DIAGNOSIS — I25.119 CORONARY ARTERY DISEASE INVOLVING NATIVE CORONARY ARTERY OF NATIVE HEART WITH ANGINA PECTORIS: ICD-10-CM

## 2024-04-19 DIAGNOSIS — I10 ESSENTIAL HYPERTENSION: Chronic | ICD-10-CM

## 2024-04-19 RX ORDER — ISOSORBIDE MONONITRATE 120 MG/1
120 TABLET, EXTENDED RELEASE ORAL DAILY
Qty: 90 TABLET | Refills: 3 | Status: SHIPPED | OUTPATIENT
Start: 2024-04-19

## 2024-04-19 RX ORDER — METOPROLOL SUCCINATE 100 MG/1
100 TABLET, EXTENDED RELEASE ORAL DAILY
Qty: 90 TABLET | Refills: 3 | Status: SHIPPED | OUTPATIENT
Start: 2024-04-19

## 2024-04-19 RX ORDER — SACUBITRIL AND VALSARTAN 97; 103 MG/1; MG/1
1 TABLET, FILM COATED ORAL 2 TIMES DAILY
Qty: 180 TABLET | Refills: 3 | Status: SHIPPED | OUTPATIENT
Start: 2024-04-19

## 2024-04-19 RX ORDER — DAPAGLIFLOZIN 10 MG/1
10 TABLET, FILM COATED ORAL DAILY
Qty: 90 TABLET | Refills: 3 | Status: SHIPPED | OUTPATIENT
Start: 2024-04-19

## 2024-04-19 NOTE — PROGRESS NOTES
Subjective     William Villasenor is a 73 y.o. male who presents to day for Follow-up, Hypertension, Coronary Artery Disease, and Congestive Heart Failure.    CHIEF COMPLIANT  Chief Complaint   Patient presents with    Follow-up    Hypertension    Coronary Artery Disease    Congestive Heart Failure       Active Problems:  Problem List Items Addressed This Visit          Cardiac and Vasculature    Essential hypertension (Chronic)    Hyperlipidemia (Chronic)    Coronary artery disease involving native coronary artery of native heart with angina pectoris    Overview     Added automatically from request for surgery [id number]         S/P CABG x 4 - Primary    Chronic systolic CHF (congestive heart failure)   Problem list  1.  Coronary artery disease status post coronary artery bypass grafting x4  1.1 coronary artery bypass grafting 3/21: LIMA to LAD, SVG to diagonal, SVG to OM1, SVG to OM 2.  Residual  of RCA  1.2 echocardiogram 6/23: EF 56 to 60%, no hemodynamically significant valvular disease or pericardial effusion.  1.4 left heart catheterization 8/21: Left main ostial distal tapering, circumflex diffusely disease, OM1 occluded, LAD occluded, RCA subtotally occluded, LIMA widely patent, SVG to posterior lateral patent, SVG to OM patent, EF 45±5%, LVEDP 28-30  1.5 stress test 7/23: Negative for ischemia, EF 60% septal akinesis  2.  Essential hypertension  3.  Diastolic dysfunction  4.  Chest pain  5.  Shortness of breath  6.  Lower extremity edema  7.  Dizziness/near syncope          7.1 cardiac event monitor 6/23: Several runs of SVT with the longest being 6 beats with a maximal rate of 146.  No reported symptoms, minimal PAC PVC burden.  7.2 carotid duplex 7/23: Mild to moderate plaque right greater than left no occlusion.    HPI  HPI  Mr. William Villasenor is a 73-year-old male patient who is being followed up today for heart failure with reduced ejection fraction, coronary artery disease, and chronic arterial  hypertension.    Patient does have a history of heart failure with reduced ejection fraction for which she is on Entresto, metoprolol, and Farxiga.  Most recent echocardiogram done in June 2023 identified a recovered ejection fraction of 56 to 60%.    Patient does have a history of coronary artery disease which she went under bypass surgery in March 2021 with LIMA to LAD SVG diagonal SVG to OM1 SVG to OM 2 with residual  of the right coronary artery.  Patient is on high intensity statin therapy of atorvastatin and aspirin for antiplatelet therapy.  He is also on antianginal therapy of isosorbide.  We did perform a stress test in July 2023 that was negative for ischemia with a preserved post stress ejection fraction of 60% with septal akinesis.    Patient does have a history of chronic arterial hypertension in which she is being treated with Entresto, metoprolol, and doxazosin.  Today's blood pressure is 133/66 with a heart rate of 67.  He does have some potential associated dizziness that occurs if he changes positions too quickly.    Patient does report shortness of breath is unchanged nonprogressive.  It occurs with exertion and at rest.  He does have some level of orthopnea.  He reports activities such as bending over and ties his shoes to be causing to be dyspneic.  Also says at times he can just be sit in the recliner and-year-old also becomes short of breath.    Patient also reports intermittent chest pain that occurs in his mid sternum that occurs on a rare occurrence.  He reports that it occurs at random both with exercise and exertion.  He says it does not occur very often unable to quantify.  He does have some shortness of breath with it at times.  He says it is very mild and he just knows that it is there.    Patient does have lower extremity edema in which she has not been taking his diuretic therapy Bumex but is on Farxiga and Entresto for his history of heart failure with reduced ejection fraction  that has recovered.  He reports that he is not taking his Bumex but very rarely.  He says that he stays in the bathroom when he takes it.    PRIOR MEDS  Current Outpatient Medications on File Prior to Visit   Medication Sig Dispense Refill    allopurinol (ZYLOPRIM) 300 MG tablet TAKE 1 TABLET EVERY DAY 90 tablet 0    APPLE CIDER VINEGAR PO Take 1 tablet by mouth Daily.      aspirin 81 MG EC tablet Take 1 tablet by mouth Daily.      atorvastatin (LIPITOR) 40 MG tablet Take 1 tablet by mouth Every Night. 90 tablet 0    CALCIUM-MAGNESIUM-ZINC PO Take 1 tablet by mouth 2 (Two) Times a Day. 400 mg Mag per capsule      Cayenne 450 MG capsule Take 1 capsule by mouth 2 (Two) Times a Day.      Cinnamon 500 MG capsule Take 1 capsule by mouth Daily.      Cranberry 250 MG capsule Take 25,000 mg by mouth Daily.      dapagliflozin Propanediol (Farxiga) 10 MG tablet Take 1 tablet by mouth Daily. 90 tablet 3    doxazosin (CARDURA) 4 MG tablet Take 1 tablet by mouth Daily. 90 tablet 3    finasteride (PROSCAR) 5 MG tablet Take 1 tablet by mouth Daily. 90 tablet 0    folic acid (FOLVITE) 800 MCG tablet Take 1 tablet by mouth Daily.      gabapentin (NEURONTIN) 400 MG capsule Take 1 capsule by mouth 2 (Two) Times a Day.      glipizide (GLUCOTROL) 5 MG tablet Take 1 tablet by mouth 2 (Two) Times a Day Before Meals. 180 tablet 3    isosorbide mononitrate (IMDUR) 120 MG 24 hr tablet Take 1 tablet by mouth Daily. 90 tablet 3    metFORMIN (GLUCOPHAGE) 1000 MG tablet Take 1 tablet by mouth 2 (Two) Times a Day With Meals. 180 tablet 0    metoprolol succinate XL (TOPROL-XL) 100 MG 24 hr tablet Take 1 tablet by mouth Daily. 90 tablet 3    Multiple Vitamins-Minerals (ICAPS AREDS 2 PO) Take  by mouth.      multivitamin (multivitamin) tablet tablet Take 1 tablet by mouth Daily. 30 tablet     nitroglycerin (NITROSTAT) 0.4 MG SL tablet PLACE 1 TAB UNDER TONGUE EVERY 5 MINS AS NEEDED FOR CHEST PAIN (ONLY IF SYSTOLIC BLOOD PRESSURE OVER 100) MAX 3  TABS/15 MINS 25 tablet 3    NON FORMULARY vitamin c 1000 w/ rosehips      NON FORMULARY Horny goat weed 1000 mg po qd      Omega-3 Fatty Acids (Fish Oil Triple Strength) 1400 MG capsule Take 1,200 mg by mouth 2 (Two) Times a Day.      sacubitril-valsartan (Entresto)  MG tablet Take 1 tablet by mouth 2 (Two) Times a Day. 180 tablet 3    triamcinolone (KENALOG) 0.1 % cream Apply 1 Application topically to the appropriate area as directed Daily As Needed.      vitamin E 400 UNIT capsule Take 1 capsule by mouth Daily.      bumetanide (BUMEX) 1 MG tablet TAKE 1 TABLET EVERY DAY (Patient not taking: Reported on 2024) 90 tablet 3    [DISCONTINUED] NON FORMULARY High T testosterone booster       No current facility-administered medications on file prior to visit.       ALLERGIES  Lisinopril    HISTORY  Past Medical History:   Diagnosis Date    Arthritis     Back pain     BPH (benign prostatic hyperplasia)     Chronic diastolic (congestive) heart failure     Diabetes mellitus     Diabetic peripheral neuropathy     Erectile dysfunction     Former smoker     GERD (gastroesophageal reflux disease)     GERD (gastroesophageal reflux disease)     Gout     Hemorrhoids     High cholesterol     Hypertension     Obesity        Social History     Socioeconomic History    Marital status:     Number of children: 3   Tobacco Use    Smoking status: Former     Current packs/day: 0.00     Average packs/day: 1 pack/day for 5.0 years (5.0 ttl pk-yrs)     Types: Cigarettes     Start date: 1969     Quit date: 1974     Years since quittin.2    Smokeless tobacco: Never   Substance and Sexual Activity    Alcohol use: Never     Comment: quit in     Drug use: Never    Sexual activity: Defer       Family History   Problem Relation Age of Onset    Heart disease Mother     Hypertension Mother     Depression Mother     Alcohol abuse Maternal Uncle     Heart disease Maternal Grandmother     Hypertension Maternal  "Grandmother     Diabetes Maternal Grandmother     Diabetes Paternal Grandmother     No Known Problems Half-Brother     No Known Problems Half-Brother     No Known Problems Half-Sister     No Known Problems Daughter     No Known Problems Daughter     Hyperlipidemia Daughter        Review of Systems   Constitutional:  Negative for chills, fatigue and fever.   HENT:  Negative for congestion, rhinorrhea and sore throat.    Eyes:  Negative for visual disturbance.   Respiratory:  Positive for shortness of breath (with exertion and even with rest feels like he yuniel at times). Negative for apnea and chest tightness.    Cardiovascular:  Positive for chest pain (rare) and leg swelling (feet and legs). Negative for palpitations.   Gastrointestinal:  Positive for diarrhea. Negative for constipation and nausea.   Musculoskeletal:  Positive for arthralgias and back pain. Negative for neck pain.   Allergic/Immunologic: Positive for environmental allergies. Negative for food allergies.   Neurological:  Positive for dizziness (getting up to quickly) and weakness. Negative for syncope and light-headedness.   Hematological:  Bruises/bleeds easily.   Psychiatric/Behavioral:  Negative for sleep disturbance.        Objective     VITALS: /66 (BP Location: Left arm, Patient Position: Sitting, Cuff Size: Adult)   Pulse 67   Ht 175.3 cm (69.02\")   Wt 96.6 kg (213 lb)   SpO2 96%   BMI 31.44 kg/m²     LABS:   Lab Results (most recent)       None            IMAGING:   No Images in the past 120 days found..    EXAM:  Physical Exam  Vitals and nursing note reviewed.   Constitutional:       Appearance: He is well-developed.   HENT:      Head: Normocephalic.   Neck:      Thyroid: No thyroid mass.      Vascular: No carotid bruit or JVD.      Trachea: Trachea and phonation normal.   Cardiovascular:      Rate and Rhythm: Normal rate and regular rhythm.      Pulses:           Radial pulses are 2+ on the right side and 2+ on the left " side.        Posterior tibial pulses are 2+ on the right side and 2+ on the left side.      Heart sounds: Normal heart sounds. No murmur heard.     No friction rub. No gallop.   Pulmonary:      Effort: Pulmonary effort is normal. No respiratory distress.      Breath sounds: Normal breath sounds. No wheezing or rales.   Musculoskeletal:         General: Swelling present. Normal range of motion.      Cervical back: Neck supple.   Skin:     General: Skin is warm and dry.      Capillary Refill: Capillary refill takes less than 2 seconds.      Findings: No rash.   Neurological:      Mental Status: He is alert and oriented to person, place, and time.   Psychiatric:         Speech: Speech normal.         Behavior: Behavior normal.         Thought Content: Thought content normal.         Judgment: Judgment normal.         Procedure   Procedures       Assessment & Plan    Diagnosis Plan   1. S/P CABG x 4        2. Coronary artery disease involving native coronary artery of native heart with angina pectoris        3. Chronic systolic CHF (congestive heart failure)        4. Essential hypertension        5. Mixed hyperlipidemia        1.  Patient does have an extensive history of coronary artery disease in which she has had coronary artery bypass grafting in the past.  He has had a stress test in the recent past that showed no scintigraphic evidence of ischemia and an EF of 60% which has recovered.  He does have some dyspnea and chest pain that occurs rarely.  We will continue to monitor at this time.  2.  Due to patient's shortness of breath I would like for him to be evaluated for pulmonology to see if we can decrease his level of dyspnea.  3.  Patient's blood pressure is controlled on current blood pressure medication regimen.  No medication changes are warranted at this time.  Patient advised to monitor blood pressure on a daily basis and report any persistent highs or lows.  Set goal blood pressure for patient at 130/80  or below.  4.  Patient is going to have labs with his PCP in the near future.  Did request they evaluate his cholesterol renal and liver function.  Have a copy of those labs sent to us for further evaluation.  5.  Informed of signs and symptoms of ACS and advised to seek emergent treatment for any new worsening symptoms.  Patient also advised sooner follow-up as needed.  Also advised to follow-up with family doctor as needed  This note is dictated utilizing voice recognition software.  Although this record has been proof read, transcriptional errors may still be present. If questions occur regarding the content of this record please do not hesitate to call our office.  I have reviewed and confirmed the accuracy of the ROS as documented by the MA/LPN/RN ANKUR Cuevas    No follow-ups on file.    Diagnoses and all orders for this visit:    1. S/P CABG x 4 (Primary)    2. Coronary artery disease involving native coronary artery of native heart with angina pectoris    3. Chronic systolic CHF (congestive heart failure)    4. Essential hypertension    5. Mixed hyperlipidemia        William Villasenor  reports that he quit smoking about 50 years ago. His smoking use included cigarettes. He started smoking about 55 years ago. He has a 5 pack-year smoking history. He has never used smokeless tobacco. I have educated him on the risk of diseases from using tobacco products. Patient does not smoke.    Advance Care Planning   ACP discussion was held with the patient during this visit. Patient does not have an advance directive, declines further assistance.                          MEDS ORDERED DURING VISIT:  No orders of the defined types were placed in this encounter.          This document has been electronically signed by Dany San Jr., ANKUR  April 19, 2024 11:11 EDT

## 2024-06-05 ENCOUNTER — OFFICE VISIT (OUTPATIENT)
Dept: PULMONOLOGY | Facility: CLINIC | Age: 74
End: 2024-06-05
Payer: MEDICARE

## 2024-06-05 VITALS
DIASTOLIC BLOOD PRESSURE: 76 MMHG | WEIGHT: 213 LBS | TEMPERATURE: 97.8 F | OXYGEN SATURATION: 97 % | HEART RATE: 78 BPM | BODY MASS INDEX: 31.55 KG/M2 | SYSTOLIC BLOOD PRESSURE: 142 MMHG | HEIGHT: 69 IN

## 2024-06-05 DIAGNOSIS — R06.02 SHORTNESS OF BREATH: ICD-10-CM

## 2024-06-05 DIAGNOSIS — K21.9 GASTROESOPHAGEAL REFLUX DISEASE WITHOUT ESOPHAGITIS: Chronic | ICD-10-CM

## 2024-06-05 DIAGNOSIS — Z87.891 FORMER SMOKER: Chronic | ICD-10-CM

## 2024-06-05 DIAGNOSIS — R91.1 PULMONARY NODULE: ICD-10-CM

## 2024-06-05 DIAGNOSIS — Z95.1 S/P CABG X 4: ICD-10-CM

## 2024-06-05 DIAGNOSIS — R06.02 SOB (SHORTNESS OF BREATH): Primary | ICD-10-CM

## 2024-06-05 DIAGNOSIS — E66.9 OBESITY (BMI 30-39.9): Chronic | ICD-10-CM

## 2024-06-05 DIAGNOSIS — I25.10 CORONARY ARTERY DISEASE INVOLVING NATIVE CORONARY ARTERY OF NATIVE HEART WITHOUT ANGINA PECTORIS: ICD-10-CM

## 2024-06-05 PROCEDURE — 99204 OFFICE O/P NEW MOD 45 MIN: CPT | Performed by: INTERNAL MEDICINE

## 2024-06-05 PROCEDURE — 3077F SYST BP >= 140 MM HG: CPT | Performed by: INTERNAL MEDICINE

## 2024-06-05 PROCEDURE — 3078F DIAST BP <80 MM HG: CPT | Performed by: INTERNAL MEDICINE

## 2024-06-05 NOTE — PROGRESS NOTES
Subjective    William Villasenor presents for the following Shortness of Breath  .    History of Present Illness   Were you born premature?  no    Any Childhood infections? no      Breathing problems when you were a child? no    Any childhood allergies?    no             At what age did you begin smoking? no    Smoking marijuana? no    Any IV drugs? no    How many packs per day? 0  1974 quit   2 packs - of cigars for 10 years   20 cigars each day     Lung Function Test? no  Chest X-Ray? no    CT Chest? no Allergy Test? no    Family hx of Lung disease or Lung Cancer?no    If FHx is posivitive for lung cancer, what is the relationship of the family member?     Any hospitalization in the last year? no    How far can you walk without getting short of breath? Patient gets SOB on any exertion and just sitting.    Any coughing? no    Any wheezing? no    Acid Reflux? no    Do you snore? yes    Daytime Fatigue? no    Any pets? no   Any pet allergies? no    Occupation? Retired     Have you been exposed to any chemicals at your job? Patient is a  so, he says possibly.     What inhalers are you currently using? None     Have you had the Influenza Vaccine? no   Would you like to receive this Vaccine today? no    Have you had the Pneumonia Vaccine?  no  Would you like to receive this Vaccine today? no    Review of Systems  Positive for generalized fatigue and tiredness.  Shortness of breath on exertion otherwise negative  Active Problems:  Problem List Items Addressed This Visit          Cardiac and Vasculature    CAD (coronary artery disease)    S/P CABG x 4       Endocrine and Metabolic    Obesity (BMI 30-39.9) (Chronic)       Gastrointestinal Abdominal     GERD (gastroesophageal reflux disease) (Chronic)       Pulmonary and Pneumonias    Shortness of breath       Tobacco    Former smoker (Chronic)     Other Visit Diagnoses       SOB (shortness of breath)    -  Primary    Relevant Orders    Complete PFT - Pre & Post  Bronchodilator    Adult Transthoracic Echo Complete W/ Cont if Necessary Per Protocol    Pulmonary nodule        Relevant Orders    CT Chest Without Contrast            Past Medical History:  Past Medical History:   Diagnosis Date    Arthritis     Back pain     BPH (benign prostatic hyperplasia)     Chronic diastolic (congestive) heart failure     Diabetes mellitus     Diabetic peripheral neuropathy     Erectile dysfunction     Former smoker     GERD (gastroesophageal reflux disease)     GERD (gastroesophageal reflux disease)     Gout     Hemorrhoids     High cholesterol     Hypertension     Obesity        Family History:  Family History   Problem Relation Age of Onset    Heart disease Mother     Hypertension Mother     Depression Mother     Alcohol abuse Maternal Uncle     Heart disease Maternal Grandmother     Hypertension Maternal Grandmother     Diabetes Maternal Grandmother     Diabetes Paternal Grandmother     No Known Problems Half-Brother     No Known Problems Half-Brother     No Known Problems Half-Sister     No Known Problems Daughter     No Known Problems Daughter     Hyperlipidemia Daughter        Social History:  Social History     Tobacco Use    Smoking status: Former     Current packs/day: 0.00     Average packs/day: 1 pack/day for 5.0 years (5.0 ttl pk-yrs)     Types: Cigarettes     Start date: 1969     Quit date: 1974     Years since quittin.3    Smokeless tobacco: Never   Substance Use Topics    Alcohol use: Never     Comment: quit in        Current Medications:  Current Outpatient Medications   Medication Sig Dispense Refill    allopurinol (ZYLOPRIM) 300 MG tablet TAKE 1 TABLET EVERY DAY 90 tablet 0    APPLE CIDER VINEGAR PO Take 1 tablet by mouth Daily.      aspirin 81 MG EC tablet Take 1 tablet by mouth Daily.      atorvastatin (LIPITOR) 40 MG tablet Take 1 tablet by mouth Every Night. 90 tablet 0    bumetanide (BUMEX) 1 MG tablet TAKE 1 TABLET EVERY DAY 90 tablet 3     CALCIUM-MAGNESIUM-ZINC PO Take 1 tablet by mouth 2 (Two) Times a Day. 400 mg Mag per capsule      Cayenne 450 MG capsule Take 1 capsule by mouth 2 (Two) Times a Day.      Cinnamon 500 MG capsule Take 1 capsule by mouth Daily.      Cranberry 250 MG capsule Take 25,000 mg by mouth Daily.      dapagliflozin Propanediol (Farxiga) 10 MG tablet Take 1 tablet by mouth Daily. 90 tablet 3    dapagliflozin Propanediol (Farxiga) 10 MG tablet Take 1 tablet by mouth Daily. 90 tablet 3    doxazosin (CARDURA) 4 MG tablet Take 1 tablet by mouth Daily. 90 tablet 3    finasteride (PROSCAR) 5 MG tablet Take 1 tablet by mouth Daily. 90 tablet 0    folic acid (FOLVITE) 800 MCG tablet Take 1 tablet by mouth Daily.      gabapentin (NEURONTIN) 400 MG capsule Take 1 capsule by mouth 2 (Two) Times a Day.      glipizide (GLUCOTROL) 5 MG tablet Take 1 tablet by mouth 2 (Two) Times a Day Before Meals. 180 tablet 3    isosorbide mononitrate (IMDUR) 120 MG 24 hr tablet Take 1 tablet by mouth Daily. 90 tablet 3    metFORMIN (GLUCOPHAGE) 1000 MG tablet Take 1 tablet by mouth 2 (Two) Times a Day With Meals. 180 tablet 0    metoprolol succinate XL (TOPROL-XL) 100 MG 24 hr tablet Take 1 tablet by mouth Daily. 90 tablet 3    Multiple Vitamins-Minerals (ICAPS AREDS 2 PO) Take  by mouth.      multivitamin (multivitamin) tablet tablet Take 1 tablet by mouth Daily. 30 tablet     nitroglycerin (NITROSTAT) 0.4 MG SL tablet PLACE 1 TAB UNDER TONGUE EVERY 5 MINS AS NEEDED FOR CHEST PAIN (ONLY IF SYSTOLIC BLOOD PRESSURE OVER 100) MAX 3 TABS/15 MINS 25 tablet 3    NON FORMULARY vitamin c 1000 w/ rosehips      NON FORMULARY Horny goat weed 1000 mg po qd      Omega-3 Fatty Acids (Fish Oil Triple Strength) 1400 MG capsule Take 1,200 mg by mouth 2 (Two) Times a Day.      sacubitril-valsartan (Entresto)  MG tablet Take 1 tablet by mouth 2 (Two) Times a Day. 180 tablet 3    sacubitril-valsartan (Entresto)  MG tablet Take 1 tablet by mouth 2 (Two) Times a  "Day. 180 tablet 3    triamcinolone (KENALOG) 0.1 % cream Apply 1 Application topically to the appropriate area as directed Daily As Needed.      vitamin E 400 UNIT capsule Take 1 capsule by mouth Daily.       No current facility-administered medications for this visit.       Allergies:  Allergies   Allergen Reactions    Lisinopril Cough       Vitals:  /76   Pulse 78   Temp 97.8 °F (36.6 °C)   Ht 175.3 cm (69.02\")   Wt 96.6 kg (213 lb)   SpO2 97%   BMI 31.44 kg/m²     Imaging:    Imaging Results (Most Recent)       None            Pulmonary Functions Testing Results:    No results found for: \"FEV1\", \"FVC\", \"DXM2HXF\", \"TLC\", \"DLCO\"    Results for orders placed or performed during the hospital encounter of 02/23/24   ReDs Vest   Result Value Ref Range    Absolute Lung Fluid Content 38 (A) 20 - 35 %   Basic Metabolic Panel    Specimen: Arm, Right; Blood   Result Value Ref Range    Glucose 156 (H) 65 - 99 mg/dL    BUN 17 8 - 23 mg/dL    Creatinine 1.10 0.76 - 1.27 mg/dL    Sodium 140 136 - 145 mmol/L    Potassium 4.4 3.5 - 5.2 mmol/L    Chloride 105 98 - 107 mmol/L    CO2 23.5 22.0 - 29.0 mmol/L    Calcium 8.9 8.6 - 10.5 mg/dL    BUN/Creatinine Ratio 15.5 7.0 - 25.0    Anion Gap 11.5 5.0 - 15.0 mmol/L    eGFR 70.4 >60.0 mL/min/1.73   proBNP    Specimen: Arm, Right; Blood   Result Value Ref Range    proBNP 260.4 0.0 - 900.0 pg/mL   Magnesium    Specimen: Arm, Right; Blood   Result Value Ref Range    Magnesium 2.0 1.6 - 2.4 mg/dL       Objective   Physical Exam  General- normal in appearance, not in any acute distress    HEENT- pupils equally reactive to light, normal in size, no scleral icterus    Neck-supple    Respiratory-respirations normal-on auscultation no wheezing no crackles,     Cardiovascular-  Normal S1 and S2. No S3, S4 or murmurs. No JVD, no carotid bruit and no edema, pulses normal bilaterally     GI-nontender nondistended bowel sounds positive    CNS-nonfocal    Musculoskeletal -no " edema  Extremities- no obvious deformity noticed     Psychiatric-mood good, good eye contact, alert awake oriented  Skin- no visible rash      Assessment & Plan   Shortness of breath-likely multifactorial-likely related to patient's smoking cigars for almost 10 years -40 cigars each day and coronary artery disease patient is status post CABG.  Will get pulmonary function test and accordingly start him on inhalers.  Will get 2D echo to evaluate for ejection fraction.    Will get CT chest to evaluate for any parenchymal lung disease.    Pulmonary nodule-will repeat CT chest if still present will plan bronchoscopy.      Obese-did diet and exercise counseling.  Advised him to stay active.      Former smoker-encouraged him not to start smoking again    Coronary disease-status post CABG continue current medications.  Medication list reviewed.  Follow-up with cardiology and primary.    Does not believe in vaccination get vaccinated.    ICD-10-CM ICD-9-CM   1. SOB (shortness of breath)  R06.02 786.05   2. Pulmonary nodule  R91.1 793.11   3. Coronary artery disease involving native coronary artery of native heart without angina pectoris  I25.10 414.01   4. S/P CABG x 4  Z95.1 V45.81   5. Obesity (BMI 30-39.9)  E66.9 278.00   6. Gastroesophageal reflux disease without esophagitis  K21.9 530.81   7. Shortness of breath  R06.02 786.05   8. Former smoker  Z87.891 V15.82       No follow-ups on file.

## 2024-07-12 ENCOUNTER — OFFICE VISIT (OUTPATIENT)
Dept: PULMONOLOGY | Facility: CLINIC | Age: 74
End: 2024-07-12
Payer: MEDICARE

## 2024-07-12 VITALS
DIASTOLIC BLOOD PRESSURE: 84 MMHG | WEIGHT: 215.2 LBS | TEMPERATURE: 97.8 F | OXYGEN SATURATION: 98 % | HEART RATE: 70 BPM | HEIGHT: 69 IN | SYSTOLIC BLOOD PRESSURE: 140 MMHG | BODY MASS INDEX: 31.87 KG/M2

## 2024-07-12 DIAGNOSIS — E66.9 OBESITY (BMI 30-39.9): ICD-10-CM

## 2024-07-12 DIAGNOSIS — R06.02 SHORTNESS OF BREATH: Primary | ICD-10-CM

## 2024-07-12 DIAGNOSIS — I25.810 CORONARY ARTERY DISEASE INVOLVING CORONARY BYPASS GRAFT OF NATIVE HEART WITHOUT ANGINA PECTORIS: ICD-10-CM

## 2024-07-12 DIAGNOSIS — I50.22 CHRONIC SYSTOLIC CHF (CONGESTIVE HEART FAILURE): ICD-10-CM

## 2024-07-12 DIAGNOSIS — F17.211 CIGARETTE NICOTINE DEPENDENCE IN REMISSION: ICD-10-CM

## 2024-07-12 PROCEDURE — 1160F RVW MEDS BY RX/DR IN RCRD: CPT | Performed by: PHYSICIAN ASSISTANT

## 2024-07-12 PROCEDURE — 1159F MED LIST DOCD IN RCRD: CPT | Performed by: PHYSICIAN ASSISTANT

## 2024-07-12 PROCEDURE — 3077F SYST BP >= 140 MM HG: CPT | Performed by: PHYSICIAN ASSISTANT

## 2024-07-12 PROCEDURE — 3079F DIAST BP 80-89 MM HG: CPT | Performed by: PHYSICIAN ASSISTANT

## 2024-07-12 PROCEDURE — 99214 OFFICE O/P EST MOD 30 MIN: CPT | Performed by: PHYSICIAN ASSISTANT

## 2024-07-12 NOTE — PROGRESS NOTES
Subjective      Chief Complaint  Shortness of Breath    Subjective      History of Present Illness  William Villasenor is a 74 y.o. male who presents today to [facility] PULMONARY & CRITICAL CARE MEDICINE with past medical history of essential hypertension, hyperlipidemia, CAD status post CABG x 4, chronic systolic heart failure, PSVT, type 2 diabetes mellitus, GERD, BPH, CKD stage II, gout, and former tobacco abuse who presents today for Shortness of Breath. This visit is a follow up appointment.     Shortness of Breath:  Patient reports he is doing well.  He states that he has shortness of breath with exertion and when bending over.  He states that this is not changed or worsened since his last visit.  He does have upcoming test scheduled including an echocardiogram, CT of the chest, and PFT on July 19.  He is not currently on an inhaler regimen at this time.  He is a former smoker in which he smoked 2 packs of cigars daily for about 5 years. He states that he has supplemental oxygen supplies but doesn't use frequently. He states that he had to use for a small amount of time on July 4th but attributes this to the weather and it being hot and humid.       Current Outpatient Medications:     allopurinol (ZYLOPRIM) 300 MG tablet, TAKE 1 TABLET EVERY DAY, Disp: 90 tablet, Rfl: 0    APPLE CIDER VINEGAR PO, Take 1 tablet by mouth Daily., Disp: , Rfl:     aspirin 81 MG EC tablet, Take 1 tablet by mouth Daily., Disp: , Rfl:     atorvastatin (LIPITOR) 40 MG tablet, Take 1 tablet by mouth Every Night., Disp: 90 tablet, Rfl: 0    bumetanide (BUMEX) 1 MG tablet, TAKE 1 TABLET EVERY DAY, Disp: 90 tablet, Rfl: 3    CALCIUM-MAGNESIUM-ZINC PO, Take 1 tablet by mouth 2 (Two) Times a Day. 400 mg Mag per capsule, Disp: , Rfl:     Cayenne 450 MG capsule, Take 1 capsule by mouth 2 (Two) Times a Day., Disp: , Rfl:     Cinnamon 500 MG capsule, Take 1 capsule by mouth Daily., Disp: , Rfl:     Cranberry 250 MG capsule, Take  25,000 mg by mouth Daily., Disp: , Rfl:     dapagliflozin Propanediol (Farxiga) 10 MG tablet, Take 1 tablet by mouth Daily., Disp: 90 tablet, Rfl: 3    doxazosin (CARDURA) 4 MG tablet, Take 1 tablet by mouth Daily., Disp: 90 tablet, Rfl: 3    finasteride (PROSCAR) 5 MG tablet, Take 1 tablet by mouth Daily., Disp: 90 tablet, Rfl: 0    folic acid (FOLVITE) 800 MCG tablet, Take 1 tablet by mouth Daily., Disp: , Rfl:     gabapentin (NEURONTIN) 400 MG capsule, Take 1 capsule by mouth 2 (Two) Times a Day., Disp: , Rfl:     glipizide (GLUCOTROL) 5 MG tablet, Take 1 tablet by mouth 2 (Two) Times a Day Before Meals., Disp: 180 tablet, Rfl: 3    isosorbide mononitrate (IMDUR) 120 MG 24 hr tablet, Take 1 tablet by mouth Daily., Disp: 90 tablet, Rfl: 3    metFORMIN (GLUCOPHAGE) 1000 MG tablet, Take 1 tablet by mouth 2 (Two) Times a Day With Meals., Disp: 180 tablet, Rfl: 0    metoprolol succinate XL (TOPROL-XL) 100 MG 24 hr tablet, Take 1 tablet by mouth Daily., Disp: 90 tablet, Rfl: 3    Multiple Vitamins-Minerals (ICAPS AREDS 2 PO), Take  by mouth., Disp: , Rfl:     multivitamin (multivitamin) tablet tablet, Take 1 tablet by mouth Daily., Disp: 30 tablet, Rfl:     nitroglycerin (NITROSTAT) 0.4 MG SL tablet, PLACE 1 TAB UNDER TONGUE EVERY 5 MINS AS NEEDED FOR CHEST PAIN (ONLY IF SYSTOLIC BLOOD PRESSURE OVER 100) MAX 3 TABS/15 MINS, Disp: 25 tablet, Rfl: 3    NON FORMULARY, vitamin c 1000 w/ rosehips, Disp: , Rfl:     NON FORMULARY, Horny goat weed 1000 mg po qd, Disp: , Rfl:     Omega-3 Fatty Acids (Fish Oil Triple Strength) 1400 MG capsule, Take 1,200 mg by mouth 2 (Two) Times a Day., Disp: , Rfl:     sacubitril-valsartan (Entresto)  MG tablet, Take 1 tablet by mouth 2 (Two) Times a Day., Disp: 180 tablet, Rfl: 3    sacubitril-valsartan (Entresto)  MG tablet, Take 1 tablet by mouth 2 (Two) Times a Day., Disp: 180 tablet, Rfl: 3    triamcinolone (KENALOG) 0.1 % cream, Apply 1 Application topically to the  "appropriate area as directed Daily As Needed., Disp: , Rfl:     vitamin E 400 UNIT capsule, Take 1 capsule by mouth Daily., Disp: , Rfl:       Allergies   Allergen Reactions    Lisinopril Cough       Objective     Objective   Vital Signs:  /84   Pulse 70   Temp 97.8 °F (36.6 °C)   Ht 175.3 cm (69\")   Wt 97.6 kg (215 lb 3.2 oz)   SpO2 98%   BMI 31.78 kg/m²   Estimated body mass index is 31.78 kg/m² as calculated from the following:    Height as of this encounter: 175.3 cm (69\").    Weight as of this encounter: 97.6 kg (215 lb 3.2 oz).    Past Medical History:   Diagnosis Date    Arthritis     Back pain     BPH (benign prostatic hyperplasia)     Chronic diastolic (congestive) heart failure     Diabetes mellitus     Diabetic peripheral neuropathy     Erectile dysfunction     Former smoker     GERD (gastroesophageal reflux disease)     GERD (gastroesophageal reflux disease)     Gout     Hemorrhoids     High cholesterol     Hypertension     Obesity      Past Surgical History:   Procedure Laterality Date    CARDIAC CATHETERIZATION N/A 3/15/2021    Procedure: Left Heart Cath;  Surgeon: Trent Zaragoza MD;  Location: [facility] CATH INVASIVE LOCATION;  Service: Cardiology;  Laterality: N/A;    CARDIAC CATHETERIZATION N/A 3/15/2021    Procedure: Coronary angiography;  Surgeon: Trent Zaragoza MD;  Location:  [facility] CATH INVASIVE LOCATION;  Service: Cardiology;  Laterality: N/A;    COLONOSCOPY W/ BIOPSIES  2015    CORONARY ARTERY BYPASS GRAFT N/A 3/16/2021    Procedure: MEDIAN STERNOTOMY, CORONARY ARTERY BYPASS GRAFT X4, UTILIZATION OF LEFT INTERNAL MAMMARY ARTERY, AND ENDOSCOPIC VEIN HARVEST OF RIGHT GREATER SAPHENOUS VEIN;  Surgeon: Jorge Simms MD;  Location: [facility] OR;  Service: Cardiothoracic;  Laterality: N/A;  vein out- 1614  vein ready- 1628             Social History     Socioeconomic History    Marital status:     Number of children: 3   Tobacco Use    Smoking status: Former     Current " "packs/day: 0.00     Average packs/day: 1 pack/day for 5.0 years (5.0 ttl pk-yrs)     Types: Cigarettes     Start date: 1969     Quit date: 1974     Years since quittin.4     Passive exposure: Past    Smokeless tobacco: Never   Vaping Use    Vaping status: Never Used   Substance and Sexual Activity    Alcohol use: Never     Comment: quit in     Drug use: Never    Sexual activity: Defer        Physical Exam  Constitutional:       General: He is awake.      Appearance: Normal appearance. He is obese.   HENT:      Head: Normocephalic and atraumatic.      Nose: Nose normal.      Mouth/Throat:      Mouth: Mucous membranes are moist.      Pharynx: Oropharynx is clear.   Eyes:      Conjunctiva/sclera: Conjunctivae normal.      Pupils: Pupils are equal, round, and reactive to light.   Cardiovascular:      Rate and Rhythm: Normal rate and regular rhythm.      Pulses: Normal pulses.      Heart sounds: Normal heart sounds. No murmur heard.     No friction rub. No gallop.   Pulmonary:      Effort: Pulmonary effort is normal. No tachypnea, accessory muscle usage or respiratory distress.      Breath sounds: Normal breath sounds. No decreased breath sounds, wheezing, rhonchi or rales.   Musculoskeletal:         General: Normal range of motion.      Cervical back: Full passive range of motion without pain and normal range of motion.   Skin:     General: Skin is warm and dry.   Neurological:      General: No focal deficit present.      Mental Status: He is alert. Mental status is at baseline.   Psychiatric:         Mood and Affect: Mood normal.         Behavior: Behavior normal. Behavior is cooperative.         Thought Content: Thought content normal.          Result Review :  The following labs and radiology results have been reviewed.    Lab Review:   No results found for: \"FEV1\", \"FVC\", \"YWR0IDU\", \"TLC\", \"DLCO\"  No visits with results within 3 Month(s) from this visit.   Latest known visit with results is: "   Hospital Outpatient Visit on 02/23/2024   Component Date Value Ref Range Status    Glucose 02/23/2024 156 (H)  65 - 99 mg/dL Final    BUN 02/23/2024 17  8 - 23 mg/dL Final    Creatinine 02/23/2024 1.10  0.76 - 1.27 mg/dL Final    Sodium 02/23/2024 140  136 - 145 mmol/L Final    Potassium 02/23/2024 4.4  3.5 - 5.2 mmol/L Final    Slight hemolysis detected by analyzer. Result may be falsely elevated.    Chloride 02/23/2024 105  98 - 107 mmol/L Final    CO2 02/23/2024 23.5  22.0 - 29.0 mmol/L Final    Calcium 02/23/2024 8.9  8.6 - 10.5 mg/dL Final    BUN/Creatinine Ratio 02/23/2024 15.5  7.0 - 25.0 Final    Anion Gap 02/23/2024 11.5  5.0 - 15.0 mmol/L Final    eGFR 02/23/2024 70.4  >60.0 mL/min/1.73 Final    proBNP 02/23/2024 260.4  0.0 - 900.0 pg/mL Final    Magnesium 02/23/2024 2.0  1.6 - 2.4 mg/dL Final    Absolute Lung Fluid Content 02/23/2024 38 (A)  20 - 35 % Final        Imaging Results (Most Recent)       None            Radiology Review:   Imaging Results (Last 72 Hours)       ** No results found for the last 72 hours. **          Reviewed CT chest angiogram from 07/07/2023  Narrative & Impression   EXAM:    CT Angiography Chest With Intravenous Contrast     EXAM DATE:    7/7/2023 10:05 AM     CLINICAL HISTORY:    subclavian stenosis; I77.1-Stricture of artery     TECHNIQUE:    Axial computed tomographic angiography images of the chest with  intravenous contrast.  This CT exam was performed using one or more of  the following dose reduction techniques:  automated exposure control,  adjustment of the mA and/or kV according to patient size, and/or use of  iterative reconstruction technique.    MIP reconstructed images were created and reviewed.     COMPARISON:    No relevant prior studies available.     FINDINGS:    Pulmonary arteries:  No PE identified.    Aorta:  Three-vessel arch configuration is noted.  Ascending thoracic  aorta with maximal diameter of 3.6 cm and is within normal limits.   Descending  thoracic aorta with maximal diameter of 2.4 cm and is within  normal limits.  Minimal amounts of calcified plaque descending thoracic  aorta.  No thoracic aorta dissection or aneurysm.    Great vessels of aortic arch:  Left subclavian artery is unremarkable  with no stenosis or significant plaque.  The left CCA and right CCA  origins are unremarkable without significant stenosis.    Other arteries:  Right subclavian artery is unremarkable with no  significant stenosis or plaque.    Lungs and pleural spaces:  Calcified granulomas of the right lung.   Question a partially calcified nodule of the right middle lobe that is  5.1 mm, image #59. Favor granuloma.  No significant effusion.  No  pneumothorax.    Heart:  Mild cardiomegaly and changes of prior CABG are noted.  No  pericardial effusion.  No evidence of RV dysfunction.    Bones/joints:  Mild degenerative changes thoracic spine noted. No  acute bony findings identified.  No dislocation.    Soft tissues:  Unremarkable.    Lymph nodes:  No thoracic adenopathy by CT size criteria.    Kidneys and ureters:  Nonspecific perinephric stranding.     IMPRESSION:  1.  No significant stenosis or plaque involving the aortic arch vessel  origins. Specifically, subclavian arteries show no evidence of stenosis  or occlusion.  2.  CABG and mild cardiomegaly.  3.  5 mm nodule right middle lobe which may be partially calcified and  favors benign process such as granuloma. Twelve-month follow CT  recommended.  4.  No evidence of thoracic aortic aneurysm.  5.  Other nonacute/incidental findings as above.     This report was finalized on 7/7/2023 10:53 AM by Dr. Catrachito Majano MD.     Reviewed echocardiogram from 06/12/2023  Results for orders placed during the hospital encounter of 06/12/23    Adult Transthoracic Echo Complete W/ Cont if Necessary Per Protocol    Interpretation Summary    Normal left ventricular cavity size and wall thickness noted. All left ventricular wall segments  contract normally.    Left ventricular ejection fraction appears to be 56 - 60%.    The aortic valve is structurally normal with no regurgitation or stenosis present.    The mitral valve is structurally normal with no significant stenosis present. Mild mitral valve regurgitation is present.    There is no evidence of pericardial effusion. .       Assessment / Plan         Assessment   Diagnoses and all orders for this visit:    1. Shortness of breath (Primary)  2. Obesity (BMI 30-39.9)  3. Coronary artery disease involving coronary bypass graft of native heart without angina pectoris  4. Chronic systolic CHF (congestive heart failure)  5. Cigarette nicotine dependence in remission  Shortness of breath likely multifactorial due to body habitus, smoking history, and extensive underlying cardiac history.   Has supplemental oxygen supplies but only uses on as needed basis.   Has upcoming testing including echocardiogram, CT chest, and PFT on July 19th.   Not currently on inhaler regimen as we are waiting on results of PFT/CT chest to determine treatment.   Continue to follow with cardiology and heart failure clinic for management of underlying cardiac issues.   Will follow back up in approximately 2 weeks following testing to discuss results.     Medications Discontinued During This Encounter   Medication Reason    dapagliflozin Propanediol (Farxiga) 10 MG tablet *Re-Entry      No orders of the defined types were placed in this encounter.    I spent 35 minutes caring for Jack on this date of service. This time includes time spent by me in the following activities:preparing for the visit, reviewing tests, obtaining and/or reviewing a separately obtained history, performing a medically appropriate examination and/or evaluation , counseling and educating the patient/family/caregiver, ordering medications, tests, or procedures, referring and communicating with other health care professionals , documenting information in the  medical record, independently interpreting results and communicating that information with the patient/family/caregiver, and care coordination    Follow Up   Return in about 2 weeks (around 7/26/2024), or if symptoms worsen or fail to improve, for Next scheduled follow up.    Patient was given instructions and counseling regarding his condition or for health maintenance advice. Please see specific information pulled into the AVS if appropriate.       This document has been electronically signed by Bonita Tom PA-C   July 12, 2024 11:35 EDT    Dictated Utilizing Dragon Dictation: Part of this note may be an electronic transcription/translation of spoken language to printed text using the Dragon Dictation System.

## 2024-07-19 ENCOUNTER — HOSPITAL ENCOUNTER (OUTPATIENT)
Dept: CARDIOLOGY | Facility: HOSPITAL | Age: 74
Discharge: HOME | End: 2024-07-19
Payer: MEDICARE

## 2024-07-19 ENCOUNTER — HOSPITAL ENCOUNTER (OUTPATIENT)
Dept: CT IMAGING | Facility: HOSPITAL | Age: 74
Discharge: HOME | End: 2024-07-19
Payer: MEDICARE

## 2024-07-19 ENCOUNTER — HOSPITAL ENCOUNTER (OUTPATIENT)
Dept: RESPIRATORY THERAPY | Facility: HOSPITAL | Age: 74
Discharge: HOME | End: 2024-07-19
Payer: MEDICARE

## 2024-07-19 VITALS — HEART RATE: 67 BPM | OXYGEN SATURATION: 96 % | RESPIRATION RATE: 20 BRPM

## 2024-07-19 DIAGNOSIS — R91.1 PULMONARY NODULE: ICD-10-CM

## 2024-07-19 DIAGNOSIS — R06.02 SOB (SHORTNESS OF BREATH): ICD-10-CM

## 2024-07-19 PROCEDURE — 94799 UNLISTED PULMONARY SVC/PX: CPT

## 2024-07-19 PROCEDURE — 71250 CT THORAX DX C-: CPT

## 2024-07-19 PROCEDURE — 94761 N-INVAS EAR/PLS OXIMETRY MLT: CPT

## 2024-07-19 PROCEDURE — 93306 TTE W/DOPPLER COMPLETE: CPT

## 2024-07-19 PROCEDURE — 94640 AIRWAY INHALATION TREATMENT: CPT

## 2024-07-19 PROCEDURE — 94060 EVALUATION OF WHEEZING: CPT

## 2024-07-19 PROCEDURE — 94729 DIFFUSING CAPACITY: CPT

## 2024-07-19 PROCEDURE — 94726 PLETHYSMOGRAPHY LUNG VOLUMES: CPT

## 2024-07-19 RX ORDER — ALBUTEROL SULFATE 2.5 MG/3ML
2.5 SOLUTION RESPIRATORY (INHALATION) ONCE
Status: COMPLETED | OUTPATIENT
Start: 2024-07-19 | End: 2024-07-19

## 2024-07-19 RX ADMIN — ALBUTEROL SULFATE 2.5 MG: 2.5 SOLUTION RESPIRATORY (INHALATION) at 10:10

## 2024-07-20 LAB
BH CV ECHO MEAS - AO MAX PG: 4.8 MMHG
BH CV ECHO MEAS - AO MEAN PG: 3 MMHG
BH CV ECHO MEAS - AO ROOT DIAM: 3.7 CM
BH CV ECHO MEAS - AO V2 MAX: 110 CM/SEC
BH CV ECHO MEAS - AO V2 VTI: 23.7 CM
BH CV ECHO MEAS - EDV(CUBED): 97.3 ML
BH CV ECHO MEAS - EDV(MOD-SP4): 48.8 ML
BH CV ECHO MEAS - EF(MOD-SP4): 37.9 %
BH CV ECHO MEAS - ESV(CUBED): 54.9 ML
BH CV ECHO MEAS - ESV(MOD-SP4): 30.3 ML
BH CV ECHO MEAS - FS: 17.4 %
BH CV ECHO MEAS - IVS/LVPW: 1 CM
BH CV ECHO MEAS - IVSD: 1.5 CM
BH CV ECHO MEAS - LA DIMENSION: 3.6 CM
BH CV ECHO MEAS - LAT PEAK E' VEL: 6.7 CM/SEC
BH CV ECHO MEAS - LV DIASTOLIC VOL/BSA (35-75): 22.9 CM2
BH CV ECHO MEAS - LV MASS(C)D: 284.8 GRAMS
BH CV ECHO MEAS - LV SYSTOLIC VOL/BSA (12-30): 14.2 CM2
BH CV ECHO MEAS - LVIDD: 4.6 CM
BH CV ECHO MEAS - LVIDS: 3.8 CM
BH CV ECHO MEAS - LVOT AREA: 3.5 CM2
BH CV ECHO MEAS - LVOT DIAM: 2.1 CM
BH CV ECHO MEAS - LVPWD: 1.5 CM
BH CV ECHO MEAS - MED PEAK E' VEL: 5.8 CM/SEC
BH CV ECHO MEAS - MV A MAX VEL: 81.3 CM/SEC
BH CV ECHO MEAS - MV E MAX VEL: 50.2 CM/SEC
BH CV ECHO MEAS - MV E/A: 0.62
BH CV ECHO MEAS - PA ACC TIME: 0.09 SEC
BH CV ECHO MEAS - SV(MOD-SP4): 18.5 ML
BH CV ECHO MEAS - SVI(MOD-SP4): 8.7 ML/M2
BH CV ECHO MEAS - TAPSE (>1.6): 1.38 CM
BH CV ECHO MEASUREMENTS AVERAGE E/E' RATIO: 8.03
LEFT ATRIUM VOLUME INDEX: 6.9 ML/M2

## 2024-07-22 ENCOUNTER — TELEPHONE (OUTPATIENT)
Dept: PULMONOLOGY | Facility: CLINIC | Age: 74
End: 2024-07-22
Payer: MEDICARE

## 2024-07-22 ENCOUNTER — TELEPHONE (OUTPATIENT)
Dept: CARDIOLOGY | Facility: CLINIC | Age: 74
End: 2024-07-22
Payer: MEDICARE

## 2024-07-22 ENCOUNTER — TELEPHONE (OUTPATIENT)
Dept: CARDIOLOGY | Facility: CLINIC | Age: 74
End: 2024-07-22

## 2024-07-22 DIAGNOSIS — R93.1 ABNORMAL ECHOCARDIOGRAM: Primary | ICD-10-CM

## 2024-07-22 DIAGNOSIS — R06.02 SOB (SHORTNESS OF BREATH): ICD-10-CM

## 2024-07-22 NOTE — TELEPHONE ENCOUNTER
Hub staff attempted to follow warm transfer process and was unsuccessful     Caller: Kandy Gibson    Relationship to patient: Emergency Contact    Best call back number: [phone]    Patient is needing: PATIENTS DAUGHTER CALLED WITH CONCERNS ABOUT HER DADS ECHO RESULTS. JR MADE COMMENT IN CHART ABOUT IT ON 7.22.24 BUT HUB UNABLE TO RELAY. PLEASE CALL HER BACK AT EARLIEST CONVENIENCE TO ADVISE. THANK YOU

## 2024-07-22 NOTE — TELEPHONE ENCOUNTER
----- Message from Sg Carter sent at 7/22/2024  6:26 AM EDT -----  If patient is not seeing any cardiologist.    Please refer for abnormal echo.  Ty  ss  ----- Message -----  From: Parvin James MD  Sent: 7/20/2024  12:06 PM EDT  To: Sg Carter MD

## 2024-07-22 NOTE — TELEPHONE ENCOUNTER
·  Left ventricular systolic function is normal. Left ventricular ejection fraction appears to be 66 - 70%.  ·  Left ventricular wall thickness is consistent with mild to moderate concentric hypertrophy.  ·  Left ventricular diastolic function is consistent with (grade I) impaired relaxation.  Nothing overly significant.  His left ventricle is a little bit bigger than normal most likely due to hypertension.  He has grade 1 diastolic dysfunction which is equivalent to heart failure preserved ejection fraction.  This would be the tendency to hold onto fluid in which she is already on all the medications that he needs to be on further treatment of this.

## 2024-07-22 NOTE — TELEPHONE ENCOUNTER
Caller: Kandy Gibson    Relationship: Emergency Contact    Best call back number: [phone]    What is the best time to reach you: ANY    What was the call regarding: PATIENT'S DAUGHTER WOULD LIKE TO SPEAK WITH SOMEONE ASAP. CONCERNED ABOUT THE TEST RESULTS AND WHETHER OR NOT THE PATIENT SHOULD BE DRIVING A SEMI OR IF HE NEEDS TO COME HOME. PLEASE CALL THE ABOVE.     Is it okay if the provider responds through MyChart: NO

## 2024-07-25 NOTE — TELEPHONE ENCOUNTER
Caller: William Villasenor    Relationship: Self    Best call back number: [phone] (home)     What is the best time to reach you: ANYTIME    Who are you requesting to speak with (clinical staff, provider,  specific staff member): CLINICAL    What was the call regarding: PT THOUGHT HE MAY HAVE ANOTHER TEST SCHEDULED FOR THE NEAR FUTURE. NOTHING SHOWING IN CHART. PLEASE CALL PT BACK TO DISCUSS, IF NEEDED.

## 2024-07-26 ENCOUNTER — OFFICE VISIT (OUTPATIENT)
Dept: PULMONOLOGY | Facility: CLINIC | Age: 74
End: 2024-07-26
Payer: MEDICARE

## 2024-07-26 VITALS
BODY MASS INDEX: 31.84 KG/M2 | HEART RATE: 68 BPM | WEIGHT: 215 LBS | TEMPERATURE: 97.2 F | HEIGHT: 69 IN | DIASTOLIC BLOOD PRESSURE: 76 MMHG | OXYGEN SATURATION: 95 % | SYSTOLIC BLOOD PRESSURE: 142 MMHG

## 2024-07-26 DIAGNOSIS — I25.810 CORONARY ARTERY DISEASE INVOLVING CORONARY BYPASS GRAFT OF NATIVE HEART WITHOUT ANGINA PECTORIS: ICD-10-CM

## 2024-07-26 DIAGNOSIS — E66.9 OBESITY (BMI 30-39.9): ICD-10-CM

## 2024-07-26 DIAGNOSIS — J98.4 RESTRICTIVE LUNG DISEASE: Primary | ICD-10-CM

## 2024-07-26 DIAGNOSIS — R91.1 PULMONARY NODULE: ICD-10-CM

## 2024-07-26 DIAGNOSIS — I50.22 CHRONIC SYSTOLIC CHF (CONGESTIVE HEART FAILURE): ICD-10-CM

## 2024-07-26 DIAGNOSIS — G47.34 NOCTURNAL HYPOXIA: ICD-10-CM

## 2024-07-26 DIAGNOSIS — F17.211 CIGARETTE NICOTINE DEPENDENCE IN REMISSION: ICD-10-CM

## 2024-07-26 NOTE — PROGRESS NOTES
Subjective      Chief Complaint  Shortness of Breath    Subjective      History of Present Illness  William Villasenor is a 74 y.o. male who presents today to [facility] with past medical history of essential hypertension, hyperlipidemia, CAD status post CABG x 4, chronic systolic heart failure, PSVT, type 2 diabetes mellitus, GERD, BPH, CKD stage II, gout, and former tobacco abuse who presents today for Shortness of Breath. This visit is a follow up appointment.     Shortness of Breath:  Patient reports he is doing well.  He states that he has shortness of breath with exertion and when bending over.  He states that this is not changed or worsened since his last visit.  He does have upcoming test scheduled including an echocardiogram, CT of the chest, and PFT on July 19.  He is not currently on an inhaler regimen at this time.  He is a former smoker in which he smoked 2 packs of cigars daily for about 5 years. He states that he has supplemental oxygen supplies but doesn't use frequently. He states that he had to use for a small amount of time on July 4th but attributes this to the weather and it being hot and humid.     Interval history:  Patient's wife present during exam and provides supplemental history. Patient reports that his breathing hasn't changed since his previous visit and is currently at baseline. He reports that he has some lower extremity swelling at times. He hasn't noticed any recent significant weight gain. He had some recent testing performed including an echocardiogram, PFT, and CT chest imaging performed and wants to discuss the results today.         Current Outpatient Medications:     allopurinol (ZYLOPRIM) 300 MG tablet, TAKE 1 TABLET EVERY DAY, Disp: 90 tablet, Rfl: 0    APPLE CIDER VINEGAR PO, Take 1 tablet by mouth Daily., Disp: , Rfl:     aspirin 81 MG EC tablet, Take 1 tablet by mouth Daily., Disp: , Rfl:     atorvastatin (LIPITOR) 40 MG  tablet, Take 1 tablet by mouth Every Night., Disp: 90 tablet, Rfl: 0    bumetanide (BUMEX) 1 MG tablet, TAKE 1 TABLET EVERY DAY, Disp: 90 tablet, Rfl: 3    CALCIUM-MAGNESIUM-ZINC PO, Take 1 tablet by mouth 2 (Two) Times a Day. 400 mg Mag per capsule, Disp: , Rfl:     Cayenne 450 MG capsule, Take 1 capsule by mouth 2 (Two) Times a Day., Disp: , Rfl:     Cinnamon 500 MG capsule, Take 1 capsule by mouth Daily., Disp: , Rfl:     Cranberry 250 MG capsule, Take 25,000 mg by mouth Daily., Disp: , Rfl:     dapagliflozin Propanediol (Farxiga) 10 MG tablet, Take 1 tablet by mouth Daily., Disp: 90 tablet, Rfl: 3    dapagliflozin Propanediol (Farxiga) 10 MG tablet, Take 1 tablet by mouth Daily., Disp: 90 tablet, Rfl: 3    doxazosin (CARDURA) 4 MG tablet, Take 1 tablet by mouth Daily., Disp: 90 tablet, Rfl: 3    finasteride (PROSCAR) 5 MG tablet, Take 1 tablet by mouth Daily., Disp: 90 tablet, Rfl: 0    folic acid (FOLVITE) 800 MCG tablet, Take 1 tablet by mouth Daily., Disp: , Rfl:     gabapentin (NEURONTIN) 400 MG capsule, Take 1 capsule by mouth 2 (Two) Times a Day., Disp: , Rfl:     glipizide (GLUCOTROL) 5 MG tablet, Take 1 tablet by mouth 2 (Two) Times a Day Before Meals., Disp: 180 tablet, Rfl: 3    isosorbide mononitrate (IMDUR) 120 MG 24 hr tablet, Take 1 tablet by mouth Daily., Disp: 90 tablet, Rfl: 3    metFORMIN (GLUCOPHAGE) 1000 MG tablet, Take 1 tablet by mouth 2 (Two) Times a Day With Meals., Disp: 180 tablet, Rfl: 0    metoprolol succinate XL (TOPROL-XL) 100 MG 24 hr tablet, Take 1 tablet by mouth Daily., Disp: 90 tablet, Rfl: 3    Multiple Vitamins-Minerals (ICAPS AREDS 2 PO), Take  by mouth., Disp: , Rfl:     multivitamin (multivitamin) tablet tablet, Take 1 tablet by mouth Daily., Disp: 30 tablet, Rfl:     nitroglycerin (NITROSTAT) 0.4 MG SL tablet, PLACE 1 TAB UNDER TONGUE EVERY 5 MINS AS NEEDED FOR CHEST PAIN (ONLY IF SYSTOLIC BLOOD PRESSURE OVER 100) MAX 3 TABS/15 MINS, Disp: 25 tablet, Rfl: 3    NON  "FORMULARY, vitamin c 1000 w/ rosehips, Disp: , Rfl:     NON FORMULARY, Horny goat weed 1000 mg po qd, Disp: , Rfl:     Omega-3 Fatty Acids (Fish Oil Triple Strength) 1400 MG capsule, Take 1,200 mg by mouth 2 (Two) Times a Day., Disp: , Rfl:     sacubitril-valsartan (Entresto)  MG tablet, Take 1 tablet by mouth 2 (Two) Times a Day., Disp: 180 tablet, Rfl: 3    sacubitril-valsartan (Entresto)  MG tablet, Take 1 tablet by mouth 2 (Two) Times a Day., Disp: 180 tablet, Rfl: 3    triamcinolone (KENALOG) 0.1 % cream, Apply 1 Application topically to the appropriate area as directed Daily As Needed., Disp: , Rfl:     vitamin E 400 UNIT capsule, Take 1 capsule by mouth Daily., Disp: , Rfl:       Allergies   Allergen Reactions    Lisinopril Cough       Objective     Objective   Vital Signs:  /76   Pulse 68   Temp 97.2 °F (36.2 °C)   Ht 175.3 cm (69.02\")   Wt 97.5 kg (215 lb)   SpO2 95%   BMI 31.74 kg/m²   Estimated body mass index is 31.74 kg/m² as calculated from the following:    Height as of this encounter: 175.3 cm (69.02\").    Weight as of this encounter: 97.5 kg (215 lb).    Past Medical History:   Diagnosis Date    Arthritis     Back pain     BPH (benign prostatic hyperplasia)     Chronic diastolic (congestive) heart failure     Diabetes mellitus     Diabetic peripheral neuropathy     Erectile dysfunction     Former smoker     GERD (gastroesophageal reflux disease)     GERD (gastroesophageal reflux disease)     Gout     Hemorrhoids     High cholesterol     Hypertension     Obesity      Past Surgical History:   Procedure Laterality Date    CARDIAC CATHETERIZATION N/A 3/15/2021    Procedure: Left Heart Cath;  Surgeon: Trent Zaragoza MD;  Location:  [facility];  Service: Cardiology;  Laterality: N/A;    CARDIAC CATHETERIZATION N/A 3/15/2021    Procedure: Coronary angiography;  Surgeon: Trent Zaragoza MD;  Location:  [facility];  Service: Cardiology;  " Laterality: N/A;    COLONOSCOPY W/ BIOPSIES      CORONARY ARTERY BYPASS GRAFT N/A 3/16/2021    Procedure: MEDIAN STERNOTOMY, CORONARY ARTERY BYPASS GRAFT X4, UTILIZATION OF LEFT INTERNAL MAMMARY ARTERY, AND ENDOSCOPIC VEIN HARVEST OF RIGHT GREATER SAPHENOUS VEIN;  Surgeon: Jorge Simms MD;  Location: [facility];  Service: Cardiothoracic;  Laterality: N/A;  vein out- 1614  vein ready- 1628             Social History     Socioeconomic History    Marital status:     Number of children: 3   Tobacco Use    Smoking status: Former     Current packs/day: 0.00     Average packs/day: 1 pack/day for 5.0 years (5.0 ttl pk-yrs)     Types: Cigarettes     Start date: 1969     Quit date: 1974     Years since quittin.5     Passive exposure: Past    Smokeless tobacco: Never   Vaping Use    Vaping status: Never Used   Substance and Sexual Activity    Alcohol use: Never     Comment: quit in     Drug use: Never    Sexual activity: Defer        Physical Exam  Constitutional:       General: He is awake.      Appearance: Normal appearance. He is obese.   HENT:      Head: Normocephalic and atraumatic.      Nose: Nose normal.      Mouth/Throat:      Mouth: Mucous membranes are moist.      Pharynx: Oropharynx is clear.   Eyes:      Conjunctiva/sclera: Conjunctivae normal.      Pupils: Pupils are equal, round, and reactive to light.   Cardiovascular:      Rate and Rhythm: Normal rate and regular rhythm.      Pulses: Normal pulses.      Heart sounds: Normal heart sounds. No murmur heard.     No friction rub. No gallop.   Pulmonary:      Effort: Pulmonary effort is normal. No tachypnea, accessory muscle usage or respiratory distress.      Breath sounds: Normal breath sounds. No decreased breath sounds, wheezing, rhonchi or rales.   Musculoskeletal:         General: Normal range of motion.      Cervical back: Full passive range of motion without pain and normal range of motion.   Skin:     General: Skin is warm and  "dry.   Neurological:      General: No focal deficit present.      Mental Status: He is alert. Mental status is at baseline.   Psychiatric:         Mood and Affect: Mood normal.         Behavior: Behavior normal. Behavior is cooperative.         Thought Content: Thought content normal.          Result Review :  The following labs and radiology results have been reviewed.    Lab Review:   No results found for: \"FEV1\", \"FVC\", \"LSL0QVT\", \"TLC\", \"DLCO\"  Hospital Outpatient Visit on 07/19/2024   Component Date Value Ref Range Status    LVIDd 07/19/2024 4.6  cm Final    LVIDs 07/19/2024 3.8  cm Final    IVSd 07/19/2024 1.50  cm Final    LVPWd 07/19/2024 1.50  cm Final    FS 07/19/2024 17.4  % Final    IVS/LVPW 07/19/2024 1.00  cm Final    ESV(cubed) 07/19/2024 54.9  ml Final    LV Sys Vol (BSA corrected) 07/19/2024 14.2  cm2 Final    EDV(cubed) 07/19/2024 97.3  ml Final    LV Barrera Vol (BSA corrected) 07/19/2024 22.9  cm2 Final    LV mass(C)d 07/19/2024 284.8  grams Final    LVOT area 07/19/2024 3.5  cm2 Final    LVOT diam 07/19/2024 2.10  cm Final    EDV(MOD-sp4) 07/19/2024 48.8  ml Final    ESV(MOD-sp4) 07/19/2024 30.3  ml Final    SV(MOD-sp4) 07/19/2024 18.5  ml Final    SVi(MOD-SP4) 07/19/2024 8.7  ml/m2 Final    EF(MOD-sp4) 07/19/2024 37.9  % Final    MV E max [place] 07/19/2024 50.2  cm/sec Final    MV A max [place] 07/19/2024 81.3  cm/sec Final    MV E/A 07/19/2024 0.62   Final    LA ESV Index (BP) 07/19/2024 6.9  ml/m2 Final    Med Peak E' [place] 07/19/2024 5.8  cm/sec Final    Lat Peak E' [place] 07/19/2024 6.7  cm/sec Final    Avg E/e' ratio 07/19/2024 8.03   Final    TAPSE (>1.6) 07/19/2024 1.38  cm Final    LA dimension (2D)  07/19/2024 3.6  cm Final    Ao pk [place] 07/19/2024 110.0  cm/sec Final    Ao max PG 07/19/2024 4.8  mmHg Final    Ao mean PG 07/19/2024 3.0  mmHg Final    Ao V2 VTI 07/19/2024 23.7  cm Final    PA acc time 07/19/2024 0.09  sec Final    Ao root diam 07/19/2024 3.7  cm Final        Imaging Results (Most " Recent)       None            Radiology Review:   Imaging Results (Last 72 Hours)       ** No results found for the last 72 hours. **          Reviewed CT chest angiogram from 07/19/2024  Narrative & Impression   PROCEDURE: CT CHEST WO CONTRAST DIAGNOSTIC-     HISTORY: pulmonary nodule; R91.1-Solitary pulmonary nodule     COMPARISON: 7/7/2023.     PROCEDURE:  Multiple axial CT images were obtained from the thoracic  inlet through the upper abdomen without the use of contrast. This study  was performed with techniques to keep radiation doses as low as  reasonably achievable (ALARA). Individualized dose reduction techniques  using automated exposure control or adjustment of mA and/or kV according  to the patient size were employed.     FINDINGS:  Soft tissue windows reveal no axillary, hilar or mediastinal adenopathy.  The thyroid gland is unremarkable. The heart is normal in size. The  aorta is normal in caliber. There are no pleural or pericardial  effusions. Lung windows reveal scattered bilateral calcified granulomas.  The 5 mm partially calcified nodule of the periphery of the right middle  lobe is unchanged and is consistent with a granuloma. There are no new  or enlarging pulmonary nodules. In the visualized upper abdomen note is  made of gallstones. Bone windows reveal no acute osseous abnormalities.     IMPRESSION:  No suspicious pulmonary nodules.        This report was finalized on 7/20/2024 12:51 PM by Ralph Beltran M.D..     Reviewed PFT from 07/19/2024      Reviewed echocardiogram from 07/19/2024  Results for orders placed during the hospital encounter of 07/19/24    Adult Transthoracic Echo Complete W/ Cont if Necessary Per Protocol    Interpretation Summary    Left ventricular systolic function is normal. Left ventricular ejection fraction appears to be 66 - 70%.    Left ventricular wall thickness is consistent with mild to moderate concentric hypertrophy.    Left ventricular diastolic function  is consistent with (grade I) impaired relaxation.      Assessment / Plan         Assessment   Diagnoses and all orders for this visit:    1. Restrictive lung disease (Primary)  2. Obesity (BMI 30-39.9)  PFT revealed normal spirometry with no significant bronchodilator response, moderate reduced diffusion capacity, and mild restriction.   Findings from PFT likely due to underlying body habitus and also notable for mild atelectasis/scarring of bilateral lung bases appearing stable from 2023 (per personal review, not mentioned on CT report).  Can continue to monitor symptoms and if notices any worsening of symptoms repeat PFT and CT imaging at that time to evaluate for any changes.     3. Coronary artery disease involving coronary bypass graft of native heart without angina pectoris  4. Chronic systolic CHF (congestive heart failure)  Recently had echocardiogram with findings detailed above. Noted to have normal EF and grade I diastolic dysfunction.   Reports that he has noticed some occasional lower extremity edema but no recent significant weight gain. Also reports that his BP is intermittently elevated ~150's systolic. Educated to keep BP log and take to next appointment with cardiology to evaluate if any adjustments or additions of medication is needed.  Continue following with cardiology for additional management and treatment.    5. Cigarette nicotine dependence in remission  6. Pulmonary nodule  William Villasenor  reports that he quit smoking about 50 years ago. His smoking use included cigarettes. He started smoking about 55 years ago. He has a 5 pack-year smoking history. He has been exposed to tobacco smoke. He has never used smokeless tobacco.   Recent CT imaging from 07/19 revealed small pulmonary nodules which appear stable since previous imaging from 2023. Calcified nodules of right lower lobe measuring 4-5 mm (image 57, 59, and 60) and groundglass nodule of left lung measuring 3 mm (image 50).   No further  follow-up imaging  of nodules warranted per guidelines. Doesn't qualify for annual LDCT imaging due to >15 years since cessation. Can obtain imaging on as needed basis.       There are no discontinued medications.     No orders of the defined types were placed in this encounter.    I spent 45 minutes caring for Jack on this date of service. This time includes time spent by me in the following activities:preparing for the visit, reviewing tests, obtaining and/or reviewing a separately obtained history, performing a medically appropriate examination and/or evaluation , counseling and educating the patient/family/caregiver, ordering medications, tests, or procedures, referring and communicating with other health care professionals , documenting information in the medical record, independently interpreting results and communicating that information with the patient/family/caregiver, and care coordination    Follow Up   Return in about 6 months (around 1/26/2025), or if symptoms worsen or fail to improve, for Next scheduled follow up.    Patient was given instructions and counseling regarding his condition or for health maintenance advice. Please see specific information pulled into the AVS if appropriate.       This document has been electronically signed by Bonita Tom PA-C   July 26, 2024 12:55 EDT    Dictated Utilizing Dragon Dictation: Part of this note may be an electronic transcription/translation of spoken language to printed text using the Dragon Dictation System.

## 2024-07-30 ENCOUNTER — OFFICE VISIT (OUTPATIENT)
Dept: CARDIOLOGY | Facility: CLINIC | Age: 74
End: 2024-07-30
Payer: MEDICARE

## 2024-07-30 ENCOUNTER — TELEPHONE (OUTPATIENT)
Dept: CARDIOLOGY | Facility: CLINIC | Age: 74
End: 2024-07-30

## 2024-07-30 VITALS
WEIGHT: 208 LBS | HEIGHT: 69 IN | DIASTOLIC BLOOD PRESSURE: 71 MMHG | SYSTOLIC BLOOD PRESSURE: 125 MMHG | BODY MASS INDEX: 30.81 KG/M2 | OXYGEN SATURATION: 95 % | HEART RATE: 61 BPM

## 2024-07-30 DIAGNOSIS — Z95.1 S/P CABG X 4: ICD-10-CM

## 2024-07-30 DIAGNOSIS — I25.810 CORONARY ARTERY DISEASE INVOLVING CORONARY BYPASS GRAFT OF NATIVE HEART WITHOUT ANGINA PECTORIS: ICD-10-CM

## 2024-07-30 DIAGNOSIS — I25.119 CORONARY ARTERY DISEASE INVOLVING NATIVE CORONARY ARTERY OF NATIVE HEART WITH ANGINA PECTORIS: ICD-10-CM

## 2024-07-30 DIAGNOSIS — I10 ESSENTIAL HYPERTENSION: Chronic | ICD-10-CM

## 2024-07-30 DIAGNOSIS — I50.32 CHRONIC DIASTOLIC CONGESTIVE HEART FAILURE: ICD-10-CM

## 2024-07-30 DIAGNOSIS — I50.22 CHRONIC SYSTOLIC CHF (CONGESTIVE HEART FAILURE): Primary | ICD-10-CM

## 2024-07-30 PROCEDURE — 1159F MED LIST DOCD IN RCRD: CPT | Performed by: NURSE PRACTITIONER

## 2024-07-30 PROCEDURE — 99214 OFFICE O/P EST MOD 30 MIN: CPT | Performed by: NURSE PRACTITIONER

## 2024-07-30 PROCEDURE — 3078F DIAST BP <80 MM HG: CPT | Performed by: NURSE PRACTITIONER

## 2024-07-30 PROCEDURE — 3074F SYST BP LT 130 MM HG: CPT | Performed by: NURSE PRACTITIONER

## 2024-07-30 PROCEDURE — 1160F RVW MEDS BY RX/DR IN RCRD: CPT | Performed by: NURSE PRACTITIONER

## 2024-07-30 RX ORDER — METOPROLOL SUCCINATE 100 MG/1
100 TABLET, EXTENDED RELEASE ORAL DAILY
Qty: 90 TABLET | Refills: 3 | Status: SHIPPED | OUTPATIENT
Start: 2024-07-30

## 2024-07-30 RX ORDER — SERTRALINE HYDROCHLORIDE 25 MG/1
25 TABLET, FILM COATED ORAL DAILY
Qty: 90 TABLET | Refills: 1 | Status: SHIPPED | OUTPATIENT
Start: 2024-07-30

## 2024-07-30 RX ORDER — BUMETANIDE 2 MG/1
2 TABLET ORAL DAILY
Qty: 90 TABLET | Refills: 1 | Status: SHIPPED | OUTPATIENT
Start: 2024-07-30

## 2024-07-30 NOTE — TELEPHONE ENCOUNTER
That was given for educational purposes.  Acute coronary syndrome is a class of diagnoses including acute myocardial infarction on ST elevation myocardial infarction and unstable angina.  It has more or less.  The signs and symptoms if this occurs.

## 2024-07-30 NOTE — PROGRESS NOTES
Subjective     William Villasenor is a 74 y.o. male who presents to day for Abnormal Echo.    CHIEF COMPLIANT  Chief Complaint   Patient presents with    Abnormal Echo       Active Problems:  Problem List Items Addressed This Visit          Cardiac and Vasculature    Essential hypertension (Chronic)    Relevant Medications    bumetanide (BUMEX) 2 MG tablet    metoprolol succinate XL (TOPROL-XL) 100 MG 24 hr tablet    Coronary artery disease involving native coronary artery of native heart with angina pectoris    Overview     Added automatically from request for surgery [id number]         Relevant Medications    bumetanide (BUMEX) 2 MG tablet    metoprolol succinate XL (TOPROL-XL) 100 MG 24 hr tablet    CAD (coronary artery disease)    Relevant Medications    metoprolol succinate XL (TOPROL-XL) 100 MG 24 hr tablet    S/P CABG x 4    Relevant Medications    bumetanide (BUMEX) 2 MG tablet    Chronic systolic CHF (congestive heart failure) - Primary    Relevant Medications    bumetanide (BUMEX) 2 MG tablet    metoprolol succinate XL (TOPROL-XL) 100 MG 24 hr tablet     Other Visit Diagnoses       Chronic diastolic congestive heart failure        Relevant Medications    bumetanide (BUMEX) 2 MG tablet    metoprolol succinate XL (TOPROL-XL) 100 MG 24 hr tablet        Problem list  1.  Coronary artery disease status post coronary artery bypass grafting x4  1.1 coronary artery bypass grafting 3/21: LIMA to LAD, SVG to diagonal, SVG to OM1, SVG to OM 2. Residual  of RCA  1.2 left heart catheterization 8/21: Left main ostial distal tapering, circumflex diffusely disease, OM1 occluded, LAD occluded, RCA subtotally occluded, LIMA widely patent, SVG to posterior lateral patent, SVG to OM patent, EF 45±5%, LVEDP 28-30  1.3 stress test 7/23: Negative for ischemia, EF 60% septal akinesis  2.  Essential hypertension  2.1 echocardiogram 6/23: EF 56 to 60%, no hemodynamically significant valvular disease or pericardial effusion.  2.2 EF 7/24:  EF 66 to 70%, grade 1 diastolic dysfunction, trace MR  3.  Diastolic dysfunction  4.  Chest pain  5.  Shortness of breath  6.  Lower extremity edema  7.  Dizziness/near syncope  7.1 cardiac event monitor 6/23: Several runs of SVT with the longest being 6 beats with a maximal rate of 146.  No reported symptoms, minimal PAC PVC burden.  7.2 carotid duplex 7/23: Mild to moderate plaque right greater than left no occlusion.    HPI  HPI  Mr. William Villasenor is a 74-year-old male patient being followed up today for heart failure with reduced ejection fraction, coronary artery disease, and chronic arterial hypertension.    Patient does have a history of heart failure with reduced ejection fraction for which she is on Entresto, metoprolol, and Farxiga.  Ejection fraction has recovered to 65 to 70% with grade 1 diastolic dysfunction.    Patient does have a history of coronary artery disease which she went under bypass surgery in March 2021 with LIMA to LAD SVG diagonal SVG to OM1 SVG to OM 2 with residual  of the right coronary artery.  Patient is on high intensity statin therapy of atorvastatin and aspirin for antiplatelet therapy.  He is also on antianginal therapy of isosorbide.  We did perform a stress test in July 2023 that was negative for ischemia with a preserved post stress ejection fraction of 60% with septal akinesis.     Patient does have a history of chronic arterial hypertension in which she is being treated with Entresto, metoprolol, and doxazosin.      Patient did have a echocardiogram done by pulmonology.  That identified a recovered ejection fraction of 66 to 70% with grade 1 diastolic dysfunction no hemodynamically significant valvular disease.  At that time he was told to follow-up with cardiology due to abnormal echocardiogram.  In comparison to the echocardiogram that he had in June 2023 this is actually improved.    Patient does report chest pain that occurs intermittently on a daily basis in his mid  sternum.  He says it is a lot and very mild.  He says it may last 10 to 15 minutes.  He has not taken nitroglycerin due to the chest pain not being overly severe.  He does say that he does have associated shortness of breath.  Says this is no different than what it has been when he recently went under his stress test back in August of last year.    Patient does report shortness of breath that occurs mainly if he bends over.  Activity such as working walking also causes him to be dyspneic.  He does have some dyspnea at rest at times.  He says this is more at random.  He denies any orthopnea.    Patient also reports increased lower extremity edema where he is 1-2+ today.  He says that the left is usually worse than the right.  He comparable so far today.  PRIOR MEDS  Current Outpatient Medications on File Prior to Visit   Medication Sig Dispense Refill    allopurinol (ZYLOPRIM) 300 MG tablet TAKE 1 TABLET EVERY DAY 90 tablet 0    APPLE CIDER VINEGAR PO Take 1 tablet by mouth Daily.      aspirin 81 MG EC tablet Take 1 tablet by mouth Daily.      atorvastatin (LIPITOR) 40 MG tablet Take 1 tablet by mouth Every Night. 90 tablet 0    CALCIUM-MAGNESIUM-ZINC PO Take 1 tablet by mouth 2 (Two) Times a Day. 400 mg Mag per capsule      Cayenne 450 MG capsule Take 1 capsule by mouth 2 (Two) Times a Day.      Cinnamon 500 MG capsule Take 1 capsule by mouth Daily.      Cranberry 250 MG capsule Take 25,000 mg by mouth Daily.      dapagliflozin Propanediol (Farxiga) 10 MG tablet Take 1 tablet by mouth Daily. 90 tablet 3    dapagliflozin Propanediol (Farxiga) 10 MG tablet Take 1 tablet by mouth Daily. 90 tablet 3    doxazosin (CARDURA) 4 MG tablet Take 1 tablet by mouth Daily. 90 tablet 3    finasteride (PROSCAR) 5 MG tablet Take 1 tablet by mouth Daily. 90 tablet 0    folic acid (FOLVITE) 800 MCG tablet Take 1 tablet by mouth Daily.      gabapentin (NEURONTIN) 400 MG capsule Take 1 capsule by mouth 2 (Two) Times a Day.       glipizide (GLUCOTROL) 5 MG tablet Take 1 tablet by mouth 2 (Two) Times a Day Before Meals. 180 tablet 3    isosorbide mononitrate (IMDUR) 120 MG 24 hr tablet Take 1 tablet by mouth Daily. 90 tablet 3    metFORMIN (GLUCOPHAGE) 1000 MG tablet Take 1 tablet by mouth 2 (Two) Times a Day With Meals. 180 tablet 0    Multiple Vitamins-Minerals (ICAPS AREDS 2 PO) Take  by mouth.      multivitamin (multivitamin) tablet tablet Take 1 tablet by mouth Daily. 30 tablet     nitroglycerin (NITROSTAT) 0.4 MG SL tablet PLACE 1 TAB UNDER TONGUE EVERY 5 MINS AS NEEDED FOR CHEST PAIN (ONLY IF SYSTOLIC BLOOD PRESSURE OVER 100) MAX 3 TABS/15 MINS 25 tablet 3    NON FORMULARY vitamin c 1000 w/ rosehips      NON FORMULARY Horny goat weed 1000 mg po qd      Omega-3 Fatty Acids (Fish Oil Triple Strength) 1400 MG capsule Take 1,200 mg by mouth 2 (Two) Times a Day.      sacubitril-valsartan (Entresto)  MG tablet Take 1 tablet by mouth 2 (Two) Times a Day. 180 tablet 3    sacubitril-valsartan (Entresto)  MG tablet Take 1 tablet by mouth 2 (Two) Times a Day. 180 tablet 3    triamcinolone (KENALOG) 0.1 % cream Apply 1 Application topically to the appropriate area as directed Daily As Needed.      vitamin E 400 UNIT capsule Take 1 capsule by mouth Daily.      [DISCONTINUED] bumetanide (BUMEX) 1 MG tablet TAKE 1 TABLET EVERY DAY 90 tablet 3    [DISCONTINUED] metoprolol succinate XL (TOPROL-XL) 100 MG 24 hr tablet Take 1 tablet by mouth Daily. 90 tablet 3     No current facility-administered medications on file prior to visit.       ALLERGIES  Lisinopril    HISTORY  Past Medical History:   Diagnosis Date    Arthritis     Back pain     BPH (benign prostatic hyperplasia)     Chronic diastolic (congestive) heart failure     Diabetes mellitus     Diabetic peripheral neuropathy     Erectile dysfunction     Former smoker     GERD (gastroesophageal reflux disease)     GERD (gastroesophageal reflux disease)     Gout     Hemorrhoids     High  cholesterol     Hypertension     Obesity        Social History     Socioeconomic History    Marital status:     Number of children: 3   Tobacco Use    Smoking status: Former     Current packs/day: 0.00     Average packs/day: 1 pack/day for 5.0 years (5.0 ttl pk-yrs)     Types: Cigarettes     Start date: 1969     Quit date: 1974     Years since quittin.5     Passive exposure: Past    Smokeless tobacco: Never   Vaping Use    Vaping status: Never Used   Substance and Sexual Activity    Alcohol use: Never     Comment: quit in     Drug use: Never    Sexual activity: Defer       Family History   Problem Relation Age of Onset    Heart disease Mother     Hypertension Mother     Depression Mother     Alcohol abuse Maternal Uncle     Heart disease Maternal Grandmother     Hypertension Maternal Grandmother     Diabetes Maternal Grandmother     Diabetes Paternal Grandmother     No Known Problems Half-Brother     No Known Problems Half-Brother     No Known Problems Half-Sister     No Known Problems Daughter     No Known Problems Daughter     Hyperlipidemia Daughter        Review of Systems   Constitutional:  Negative for chills, fatigue and fever.   HENT:  Negative for congestion, rhinorrhea and sore throat.    Eyes:  Negative for visual disturbance.   Respiratory:  Positive for chest tightness and shortness of breath (bending over). Negative for apnea.    Cardiovascular:  Positive for chest pain (slight pain occasional) and leg swelling (feet). Negative for palpitations.   Gastrointestinal:  Negative for constipation, diarrhea and nausea.   Musculoskeletal:  Positive for arthralgias and back pain. Negative for neck pain.   Allergic/Immunologic: Positive for environmental allergies. Negative for food allergies.   Neurological:  Positive for dizziness (getting up to quickly), weakness and light-headedness. Negative for syncope.   Hematological:  Bruises/bleeds easily.   Psychiatric/Behavioral:  Negative for  "sleep disturbance.        Objective     VITALS: /71 (BP Location: Left arm, Patient Position: Sitting, Cuff Size: Adult)   Pulse 61   Ht 175.3 cm (69.02\")   Wt 94.3 kg (208 lb)   SpO2 95%   BMI 30.70 kg/m²     LABS:   Lab Results (most recent)       None            IMAGING:   CT Chest Without Contrast Diagnostic    Result Date: 7/20/2024  No suspicious pulmonary nodules.   This report was finalized on 7/20/2024 12:51 PM by Ralph Beltran M.D..        EXAM:  Physical Exam  Vitals and nursing note reviewed.   Constitutional:       Appearance: He is well-developed.   HENT:      Head: Normocephalic.   Neck:      Thyroid: No thyroid mass.      Vascular: No carotid bruit or JVD.      Trachea: Trachea and phonation normal.   Cardiovascular:      Rate and Rhythm: Normal rate and regular rhythm.      Pulses:           Radial pulses are 2+ on the right side and 2+ on the left side.        Posterior tibial pulses are 2+ on the right side and 2+ on the left side.      Heart sounds: Normal heart sounds. No murmur heard.     No friction rub. No gallop.   Pulmonary:      Effort: Pulmonary effort is normal. No respiratory distress.      Breath sounds: Normal breath sounds. No wheezing or rales.   Musculoskeletal:         General: No swelling. Normal range of motion.      Cervical back: Neck supple.      Right lower leg: Edema (2+) present.      Left lower leg: Edema (2+) present.   Skin:     General: Skin is warm and dry.      Capillary Refill: Capillary refill takes less than 2 seconds.      Findings: No rash.   Neurological:      Mental Status: He is alert and oriented to person, place, and time.   Psychiatric:         Speech: Speech normal.         Behavior: Behavior normal.         Thought Content: Thought content normal.         Judgment: Judgment normal.         Procedure   Procedures       Assessment & Plan    Diagnosis Plan   1. Chronic systolic CHF (congestive heart failure)  bumetanide (BUMEX) 2 MG tablet "      2. Coronary artery disease involving native coronary artery of native heart with angina pectoris  bumetanide (BUMEX) 2 MG tablet      3. S/P CABG x 4  bumetanide (BUMEX) 2 MG tablet      4. Chronic diastolic congestive heart failure  bumetanide (BUMEX) 2 MG tablet    metoprolol succinate XL (TOPROL-XL) 100 MG 24 hr tablet      5. Essential hypertension  metoprolol succinate XL (TOPROL-XL) 100 MG 24 hr tablet      6. Coronary artery disease involving coronary bypass graft of native heart without angina pectoris  metoprolol succinate XL (TOPROL-XL) 100 MG 24 hr tablet      1.  Patient does have a history of chronic diastolic heart failure as well as heart failure with recovered ejection fraction.  He is on guideline driven medication therapy of metoprolol succinate, Farxiga, and Entresto.  He is on diuretic therapy of Bumex.  He does request that the Bumex be increased to 2 mg daily to see if this will help with his swelling.  2.  Patient does have a history of coronary artery disease with history of CABG x 4.  He is having some atypical chest pain that occurs intermittently but quite frequently on a daily basis.  We did discuss repeating a stress test.  However at this time he wishes to defer.  3.  Patient's blood pressure is controlled on current blood pressure medication regimen.  No medication changes are warranted at this time.  Patient advised to monitor blood pressure on a daily basis and report any persistent highs or lows.  Set goal blood pressure for patient at 130/80 or below.  4.  Patient's wife feels as if anxiety and depression may be playing a large role in patient's symptoms.  She does ask about different medications that may help.  He would be interested in trying Zoloft.  He was advised to follow-up with his PCP to further discuss.  5.  Informed of signs and symptoms of ACS and advised to seek emergent treatment for any new worsening symptoms.  Patient also advised sooner follow-up as needed.   Also advised to follow-up with family doctor as needed  This note is dictated utilizing voice recognition software.  Although this record has been proof read, transcriptional errors may still be present. If questions occur regarding the content of this record please do not hesitate to call our office.  I have reviewed and confirmed the accuracy of the ROS as documented by the MA/LPN/RN ANKUR Cuevas    No follow-ups on file.    Diagnoses and all orders for this visit:    1. Chronic systolic CHF (congestive heart failure) (Primary)  -     bumetanide (BUMEX) 2 MG tablet; Take 1 tablet by mouth Daily.  Dispense: 90 tablet; Refill: 1    2. Coronary artery disease involving native coronary artery of native heart with angina pectoris  -     bumetanide (BUMEX) 2 MG tablet; Take 1 tablet by mouth Daily.  Dispense: 90 tablet; Refill: 1    3. S/P CABG x 4  -     bumetanide (BUMEX) 2 MG tablet; Take 1 tablet by mouth Daily.  Dispense: 90 tablet; Refill: 1    4. Chronic diastolic congestive heart failure  -     bumetanide (BUMEX) 2 MG tablet; Take 1 tablet by mouth Daily.  Dispense: 90 tablet; Refill: 1  -     metoprolol succinate XL (TOPROL-XL) 100 MG 24 hr tablet; Take 1 tablet by mouth Daily.  Dispense: 90 tablet; Refill: 3    5. Essential hypertension  -     metoprolol succinate XL (TOPROL-XL) 100 MG 24 hr tablet; Take 1 tablet by mouth Daily.  Dispense: 90 tablet; Refill: 3    6. Coronary artery disease involving coronary bypass graft of native heart without angina pectoris  -     metoprolol succinate XL (TOPROL-XL) 100 MG 24 hr tablet; Take 1 tablet by mouth Daily.  Dispense: 90 tablet; Refill: 3        William Villasenor  reports that he quit smoking about 50 years ago. His smoking use included cigarettes. He started smoking about 55 years ago. He has a 5 pack-year smoking history. He has been exposed to tobacco smoke. He has never used smokeless tobacco. I have educated him on the risk of diseases from using tobacco  products. Patient does not smoke.                      MEDS ORDERED DURING VISIT:  New Medications Ordered This Visit   Medications    bumetanide (BUMEX) 2 MG tablet     Sig: Take 1 tablet by mouth Daily.     Dispense:  90 tablet     Refill:  1    metoprolol succinate XL (TOPROL-XL) 100 MG 24 hr tablet     Sig: Take 1 tablet by mouth Daily.     Dispense:  90 tablet     Refill:  3           This document has been electronically signed by Dany San Jr., APRN  July 30, 2024 09:43 EDT

## 2024-07-30 NOTE — TELEPHONE ENCOUNTER
Caller: William Villasenor    Relationship: Self    Best call back number: [phone]     What is the best time to reach you: ANYTIME     Who are you requesting to speak with (clinical staff, provider,  specific staff member): CLINICAL     What was the call regarding: PT WAS IN OFFICE TODAY, THE REPORT STATES PT HAS acute coronary syndrome, PT IS WONDERING WHAT THIS MAY MEAN. HIS JOB IS REQUESTING THIS INFO FROM HIM     Is it okay if the provider responds through MyChart: CALL

## 2024-08-01 ENCOUNTER — TELEPHONE (OUTPATIENT)
Dept: CARDIOLOGY | Facility: CLINIC | Age: 74
End: 2024-08-01

## 2024-08-01 NOTE — TELEPHONE ENCOUNTER
Caller: William Villasenor    Relationship: Self    Best call back number: [phone]     What is the best time to reach you: ANYTIME     Who are you requesting to speak with (clinical staff, provider,  specific staff member): CLINICAL     What was the call regarding: PT WAS IN TO SEE ANKUR VU, WAS GIVEN SUMMARY WITH THE DX acute coronary syndrome, AND DUE TO THIS HE IS NOT ABLE TO WORK. HE IS NEEDING TO DISCUSS THIS WITH CLINICAL ASAP. PLEASE ADVISE.     [phone] FAX TO SEND REPORT TO FOR HIS JOB- PLEASE LET THEM KNOW IF THIS IS SOMETHING PT HAS OR SOMETHING TO BE AWARE OF     Is it okay if the provider responds through MyChart: CALL

## 2024-08-01 NOTE — TELEPHONE ENCOUNTER
Patient called and wanted Coronary artery disease removed from his chart . He also has a diagnosis of Diastolic heart failure. The patient wanted those diagnosis codes removed from his chart . I advised that we can not remove diagnosis codes from his history. I explained that he had bypass x 4 vessels and diastolic heart failure we can not remove those. 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 He said that if I can not remove those codes then it was my fault that we would lose his job and his house . He hung up on me. I notified  who also verified that those codes can not be removed. I will forward this to [facility] Practice Manager as well.

## 2024-08-01 NOTE — TELEPHONE ENCOUNTER
Caller: William Villasenor    Relationship: Self    Best call back number: [phone]    What is the best time to reach you: ANY    What was the call regarding: PATIENT IS ASKING IF THERE IS A MISTAKE ON THE SUMMARY STATING HE HAS A CORONARY PROBLEM?.  HE WILL NOT BE ABLE TO WORK. IF HE DOES NOT HAVE CORONARY PROBLEM PLEASE FAX A STATEMENT .[phone] PLEASE CALL THE PATIENT TO DISCUSS ASAP    Is it okay if the provider responds through MyChart: NO

## 2024-08-08 RX ORDER — SACUBITRIL AND VALSARTAN 97; 103 MG/1; MG/1
1 TABLET, FILM COATED ORAL 2 TIMES DAILY
Qty: 180 TABLET | Refills: 3 | Status: CANCELLED | OUTPATIENT
Start: 2024-08-08

## 2024-08-08 RX ORDER — DAPAGLIFLOZIN 10 MG/1
10 TABLET, FILM COATED ORAL DAILY
Qty: 90 TABLET | Refills: 3 | Status: CANCELLED | OUTPATIENT
Start: 2024-08-08

## 2024-08-09 RX ORDER — SACUBITRIL AND VALSARTAN 97; 103 MG/1; MG/1
1 TABLET, FILM COATED ORAL 2 TIMES DAILY
Qty: 180 TABLET | Refills: 3 | Status: SHIPPED | OUTPATIENT
Start: 2024-08-09

## 2024-08-09 RX ORDER — DAPAGLIFLOZIN 10 MG/1
10 TABLET, FILM COATED ORAL DAILY
Qty: 90 TABLET | Refills: 3 | Status: SHIPPED | OUTPATIENT
Start: 2024-08-09

## 2024-08-09 NOTE — TELEPHONE ENCOUNTER
Patient has been filling Entresto and Farxiga with us, as we are applying the  melba for a $0 copay. Last refills that were sent in by a different provider were for [facility] pharmacy and therefore, he needed new Rx's at our pharmacy.     Anastasiya Guzman RPH  08/09/24  16:17 EDT

## 2024-08-12 ENCOUNTER — TELEPHONE (OUTPATIENT)
Dept: CARDIOLOGY | Facility: CLINIC | Age: 74
End: 2024-08-12
Payer: MEDICARE

## 2024-08-12 ENCOUNTER — SPECIALTY PHARMACY (OUTPATIENT)
Dept: PHARMACY | Facility: HOSPITAL | Age: 74
End: 2024-08-12
Payer: MEDICARE

## 2024-08-12 NOTE — TELEPHONE ENCOUNTER
Caller: William Villasenor    Relationship: Self    Best call back number: [phone]    What is the best time to reach you: ANY    Who are you requesting to speak with (clinical staff, provider,  specific staff member): CLINICAL      What was the call regarding: PT STATES FRIDAY HE HAD SOME PAPERS FAXED OVER THE OFFICE IN REGARDS TO HIS WORK SITUATION AND HIS HEART. HE IS WONDERING IF WE RECEIVED ANY OF THE SUCH, SPECIFICS WERE SHALLOW, BELIEVE IT MIGHT BE FOR GETTING BACK TO WORK.

## 2024-08-13 ENCOUNTER — TELEPHONE (OUTPATIENT)
Dept: PULMONOLOGY | Facility: CLINIC | Age: 74
End: 2024-08-13
Payer: MEDICARE

## 2024-08-13 NOTE — TELEPHONE ENCOUNTER
I sent a message up front to Sada And to Maryse to see if they remember who they gave paperwork to or whose box it was put it. I do not have paperwork .

## 2024-08-13 NOTE — TELEPHONE ENCOUNTER
Patient dropped papers off by the office for DOT medical clearance for CHF. Bonita Tom reviewed forms and states that these need to be filled out by his cardiologist due to diagnosis.

## 2024-08-15 ENCOUNTER — TELEPHONE (OUTPATIENT)
Dept: CARDIOLOGY | Facility: CLINIC | Age: 74
End: 2024-08-15
Payer: MEDICARE

## 2024-08-15 NOTE — TELEPHONE ENCOUNTER
Called to notify the pt that JR reviewed his DOT clearance paperwork and we cannot clear him to return to work.  Pt stated he was awaiting an answer, either way and was appreciative of the update.  Paperwork faxed to the number on the clearance and the paperwork and confirmation placed in the misc scan box to be scanned to the pt's chart.

## 2024-08-23 ENCOUNTER — HOSPITAL ENCOUNTER (OUTPATIENT)
Dept: CARDIOLOGY | Facility: HOSPITAL | Age: 74
Discharge: HOME | End: 2024-08-23
Payer: MEDICARE

## 2024-08-23 VITALS
WEIGHT: 217 LBS | HEART RATE: 70 BPM | DIASTOLIC BLOOD PRESSURE: 66 MMHG | BODY MASS INDEX: 32.14 KG/M2 | HEIGHT: 69 IN | SYSTOLIC BLOOD PRESSURE: 134 MMHG | OXYGEN SATURATION: 93 %

## 2024-08-23 DIAGNOSIS — E11.9 TYPE 2 DIABETES MELLITUS WITHOUT COMPLICATION, WITHOUT LONG-TERM CURRENT USE OF INSULIN: ICD-10-CM

## 2024-08-23 DIAGNOSIS — I10 ESSENTIAL HYPERTENSION: Chronic | ICD-10-CM

## 2024-08-23 DIAGNOSIS — I50.32 CHRONIC DIASTOLIC HEART FAILURE: Primary | ICD-10-CM

## 2024-08-23 DIAGNOSIS — I25.10 ASCVD (ARTERIOSCLEROTIC CARDIOVASCULAR DISEASE): ICD-10-CM

## 2024-08-23 PROBLEM — R93.1 CARDIAC LV EJECTION FRACTION OF 35-39%: Status: RESOLVED | Noted: 2021-05-13 | Resolved: 2024-08-23

## 2024-08-23 PROBLEM — R94.30 CARDIAC LV EJECTION FRACTION OF 35-39%: Status: RESOLVED | Noted: 2021-05-13 | Resolved: 2024-08-23

## 2024-08-23 PROCEDURE — 94726 PLETHYSMOGRAPHY LUNG VOLUMES: CPT | Performed by: PHYSICIAN ASSISTANT

## 2024-08-23 NOTE — PROGRESS NOTES
Heart Failure Clinic  Pharmacist Note     William Villsaenor is a 74 y.o. male seen in the Heart Failure Clinic for HFimpEF with an EF of 66-70% as of 7/20/24.  William Villasenor reports a great understanding of medications and has them specifically organized in his box to help with adherence.     Patient reports some swelling where it is greater in the right lower extremity compared to the left. He reports that he has been taking his Bumex appropriately. Patient admits to good urine output and that bumex does help with any swelling he gets.     Patient reports that he has been checking his blood pressure daily in the morning and his SBP is usually in the 130's. Patient denies SBP >160 and <100.      Patient states that he has not been able to check his blood sugar for sometime because he is out of test strips and they are too expensive for him.      Medication Use:   Hx of med intolerances: None related to HF  Retail Rx Management: [facility] pharmacy [facility]     Past Medical History:   Diagnosis Date    Arthritis     Back pain     BPH (benign prostatic hyperplasia)     Chronic diastolic (congestive) heart failure     Diabetes mellitus     Diabetic peripheral neuropathy     Erectile dysfunction     Former smoker     GERD (gastroesophageal reflux disease)     GERD (gastroesophageal reflux disease)     Gout     Hemorrhoids     High cholesterol     Hypertension     Obesity      ALLERGIES: Lisinopril  Current Outpatient Medications   Medication Sig Dispense Refill    allopurinol (ZYLOPRIM) 300 MG tablet TAKE 1 TABLET EVERY DAY 90 tablet 0    APPLE CIDER VINEGAR PO Take 1 tablet by mouth Daily.      aspirin 81 MG EC tablet Take 1 tablet by mouth Daily.      atorvastatin (LIPITOR) 40 MG tablet Take 1 tablet by mouth Every Night. 90 tablet 0    bumetanide (BUMEX) 2 MG tablet Take 1 tablet by mouth Daily. 90 tablet 1    CALCIUM-MAGNESIUM-ZINC PO Take 1 tablet by mouth 2 (Two) Times a Day. 400 mg Mag per capsule       Cayenne 450 MG capsule Take 1 capsule by mouth 2 (Two) Times a Day.      Cinnamon 500 MG capsule Take 1 capsule by mouth 2 (Two) Times a Day.      Cranberry 250 MG capsule Take 25,000 mg by mouth Daily.      dapagliflozin Propanediol (Farxiga) 10 MG tablet Take 1 tablet by mouth Daily. 90 tablet 3    doxazosin (CARDURA) 4 MG tablet Take 1 tablet by mouth Daily. 90 tablet 3    finasteride (PROSCAR) 5 MG tablet Take 1 tablet by mouth Daily. 90 tablet 0    folic acid (FOLVITE) 800 MCG tablet Take 1 tablet by mouth Daily.      gabapentin (NEURONTIN) 400 MG capsule Take 1 capsule by mouth 2 (Two) Times a Day.      glipizide (GLUCOTROL) 5 MG tablet Take 1 tablet by mouth 2 (Two) Times a Day Before Meals. 180 tablet 3    isosorbide mononitrate (IMDUR) 120 MG 24 hr tablet Take 1 tablet by mouth Daily. 90 tablet 3    metFORMIN (GLUCOPHAGE) 1000 MG tablet Take 1 tablet by mouth 2 (Two) Times a Day With Meals. 180 tablet 0    metoprolol succinate XL (TOPROL-XL) 100 MG 24 hr tablet Take 1 tablet by mouth Daily. 90 tablet 3    Multiple Vitamins-Minerals (ICAPS AREDS 2 PO) Take  by mouth.      nitroglycerin (NITROSTAT) 0.4 MG SL tablet PLACE 1 TAB UNDER TONGUE EVERY 5 MINS AS NEEDED FOR CHEST PAIN (ONLY IF SYSTOLIC BLOOD PRESSURE OVER 100) MAX 3 TABS/15 MINS 25 tablet 3    NON FORMULARY vitamin c 1000 w/ rosehips      Omega-3 Fatty Acids (Fish Oil Triple Strength) 1400 MG capsule Take 1,200 mg by mouth 2 (Two) Times a Day.      sacubitril-valsartan (Entresto)  MG tablet Take 1 tablet by mouth 2 Times a Day. 180 tablet 3    sertraline (Zoloft) 25 MG tablet Take 1 tablet by mouth Daily. 90 tablet 1    triamcinolone (KENALOG) 0.1 % cream Apply 1 Application topically to the appropriate area as directed Daily As Needed.      vitamin E 400 UNIT capsule Take 1 capsule by mouth Daily.       No current facility-administered medications for this encounter.     Vaccination History:   Pneumonia: declines  Annual Influenza:  "declines  COVID 19: declines    Objective  Vitals:    08/23/24 1058   BP: 134/66   BP Location: Left arm   Patient Position: Sitting   Cuff Size: Adult   Pulse: 70   SpO2: 93%   Weight: 98.4 kg (217 lb)   Height: 175.3 cm (69\")       Wt Readings from Last 3 Encounters:   08/23/24 98.4 kg (217 lb)   07/30/24 94.3 kg (208 lb)   07/26/24 97.5 kg (215 lb)         08/23/24  1058   Weight: 98.4 kg (217 lb)       Lab Results   Component Value Date    GLUCOSE 156 (H) 02/23/2024    BUN 17 02/23/2024    CREATININE 1.10 02/23/2024    EGFRIFNONA 58 (L) 11/24/2021    BCR 15.5 02/23/2024    K 4.4 02/23/2024    CO2 23.5 02/23/2024    CALCIUM 8.9 02/23/2024    ALBUMIN 4.0 06/05/2023    AST 8 06/05/2023    ALT 9 06/05/2023     Lab Results   Component Value Date    WBC 9.59 06/05/2023    HGB 14.4 06/05/2023    HCT 44.0 06/05/2023    MCV 88.9 06/05/2023     06/05/2023     Lab Results   Component Value Date    CKMB 44 (L) 10/07/2022    TROPONINI <0.02 10/07/2022    TROPONINT <0.010 04/21/2021     Lab Results   Component Value Date    PROBNP 260.4 02/23/2024     Results for orders placed during the hospital encounter of 07/19/24    Adult Transthoracic Echo Complete W/ Cont if Necessary Per Protocol    Interpretation Summary    Left ventricular systolic function is normal. Left ventricular ejection fraction appears to be 66 - 70%.    Left ventricular wall thickness is consistent with mild to moderate concentric hypertrophy.    Left ventricular diastolic function is consistent with (grade I) impaired relaxation.         GDMT    Drug Class   Drug   Dose Last Dose Adjustment Additional Titration   Notes   ACEi/ARB/ARNI Entresto  97/103 bid 5/12/22 At goal    Beta Blocker Toprol XL 100mg 4/19/22     MRA Spironolactone 12.5mg  1/6/23     SGLT2i Farxiga 10mg >3m At goal    Imdur 120 mg daily     Drug Therapy Problems    GDMT  Financial assistance    Recommendations:      No current recommendations.   Informed Teri about the patient " not being able to afford test strips. Teri to refer patient to Endo clinic for assistance.       Patient was educated on heart failure medications and the importance of medication adherence. All questions were addressed and patient expressed understanding.     Thank you for allowing me to participate in the care of your patient,    Jori Coronel, PharmD  08/23/24  11:24 EDT

## 2024-08-23 NOTE — PROGRESS NOTES
[facility] Failure [facility]  DAISY Garcia Theresa, APRN  [address] [phone]    Thank you for asking me to see William Villasenor for congestive heart failure.    HPI:     This is a 74 y.o. male with known past medical history of:    Chronic combined systolic & diastolic HF with LVEF 40-45%  TTE from December 2022 reviewed with ejection fraction improved to 45 to 50% and evidence of grade 1A diastolic dysfunction with mild left atrial enlargement  TTE from 06/2023 with 56-60%.   TTE from July 2024 with EF improved to 66-70% and presence of known grade 1 diastolic dysfunction.   ASCVD s/p CABG 4v  Coronary artery disease status post coronary artery bypass grafting [id number] artery bypass grafting 3/2021: LIMA to LAD, SVG to diagonal, SVG to OM1, SVG to OM 2.  Residual  of RCA  echocardiogram 4/2021: EF 35± percent, LV dyssynchrony  echocardiogram 6/2021: EF 40±5% mild to moderate concentric left ventricular hypertrophy, mild biatrial enlargement no significant valvular disease   left heart catheterization 8/21: Left main ostial distal tapering, circumflex diffusely disease, OM1 occluded, LAD occluded, RCA subtotally occluded, LIMA widely patent, SVG to posterior lateral patent, SVG to OM patent, EF 45±5%, LVEDP 28-30   Nuclear stress test from November 2022 with no scintigriphic evidence of ischemia but with elevated transient dilation ratio of unknown significance.    Nuclear stress test from 07/24/2023 with no known evidence of pharmacologically induced transient ischemic dilation.   DM type 2  Peripheral neuropathy  CKD stage II-III  Gout   BPH   GERD  Obesity    William Villasenor presents for today for HF clinic follow-up.  The patient is typically seen by Kaylee Cooper APRN.    Last known EF 40-45%. Current medications prior to first visit directed toward underlying heart failure.     Last known hospitalization and/or ED visit: Last hospitalized from March 29, 2021-April  9, 2021 with acute on chronic HFpEF and sepsis 2/2 RLE cellulitis from vein harvesting procedures.    Accompanied by: Self    08/23/24 visit data/details regarding:   Dyspnea: Dyspnea on exertion, denies orthopnea  Lower extremity swelling: Denies current swelling of extremities  Abdominal swelling: No significant protuberant  Home weight:  Stable  Home BP: Well controlled BP   Home heart rate: 60s  Daily activities of living:  Performs independently  Pillows/lying flat: 1 pillow  Zone: Green  Patient is chest pain free.  He reports he is doing well.  Swelling is well controlled with Bumex.   He states he is anxious to return to work if possible.        Specialists:   Cardiology:  [facility] team.     Review of Systems - Review of Systems   Constitutional: Negative for decreased appetite and diaphoresis.   HENT:  Negative for congestion, ear discharge and ear pain.    Eyes:  Negative for blurred vision, discharge and double vision.   Cardiovascular:  Negative for chest pain, claudication, cyanosis, dyspnea on exertion and leg swelling.   Respiratory:  Negative for cough, hemoptysis and shortness of breath.    Endocrine: Negative for cold intolerance, heat intolerance and polydipsia.   Hematologic/Lymphatic: Negative for adenopathy and bleeding problem.   Skin:  Negative for color change and nail changes.   Musculoskeletal:  Negative for arthritis and back pain.   Gastrointestinal:  Negative for bloating, abdominal pain and anorexia.   Genitourinary:  Negative for bladder incontinence and decreased libido.   Neurological:  Negative for aphonia and brief paralysis.   Psychiatric/Behavioral:  Negative for altered mental status, depression and hallucinations.          All other systems were reviewed and were negative.    Patient Active Problem List   Diagnosis    Idiopathic chronic gout of multiple sites without tophus    Type II diabetes mellitus    Essential hypertension    Benign prostatic  hyperplasia without lower urinary tract symptoms    Chest pain    Obesity (BMI 30-39.9)    GERD (gastroesophageal reflux disease)    Diabetic neuropathy    Former smoker    History of gout    Coronary artery disease involving native coronary artery of native heart with angina pectoris    CAD (coronary artery disease)    IZA (acute kidney injury)    Acute on chronic congestive heart failure    Hyperlipidemia    CKD (chronic kidney disease) stage 2, GFR 60-89 ml/min    Bradycardia    Diabetic polyneuropathy    Heartburn    Low back pain    S/P CABG x 4    Acute systolic heart failure    Ventricular trigeminy    PSVT (paroxysmal supraventricular tachycardia)    Shortness of breath    Chest pressure    Chronic systolic CHF (congestive heart failure)       family history includes Alcohol abuse in his maternal uncle; Depression in his mother; Diabetes in his maternal grandmother and paternal grandmother; Heart disease in his maternal grandmother and mother; Hyperlipidemia in his daughter; Hypertension in his maternal grandmother and mother; No Known Problems in his daughter, daughter, half-brother, half-brother, and half-sister.     reports that he quit smoking about 50 years ago. His smoking use included cigarettes. He started smoking about 55 years ago. He has a 5 pack-year smoking history. He has been exposed to tobacco smoke. He has never used smokeless tobacco. He reports that he does not drink alcohol and does not use drugs.    Allergies   Allergen Reactions    Lisinopril Cough         Current Outpatient Medications:     allopurinol (ZYLOPRIM) 300 MG tablet, TAKE 1 TABLET EVERY DAY, Disp: 90 tablet, Rfl: 0    APPLE CIDER VINEGAR PO, Take 1 tablet by mouth Daily., Disp: , Rfl:     aspirin 81 MG EC tablet, Take 1 tablet by mouth Daily., Disp: , Rfl:     atorvastatin (LIPITOR) 40 MG tablet, Take 1 tablet by mouth Every Night., Disp: 90 tablet, Rfl: 0    bumetanide (BUMEX) 2 MG tablet, Take 1 tablet by mouth Daily.,  Disp: 90 tablet, Rfl: 1    CALCIUM-MAGNESIUM-ZINC PO, Take 1 tablet by mouth 2 (Two) Times a Day. 400 mg Mag per capsule, Disp: , Rfl:     Cayenne 450 MG capsule, Take 1 capsule by mouth 2 (Two) Times a Day., Disp: , Rfl:     Cinnamon 500 MG capsule, Take 1 capsule by mouth 2 (Two) Times a Day., Disp: , Rfl:     Cranberry 250 MG capsule, Take 25,000 mg by mouth Daily., Disp: , Rfl:     dapagliflozin Propanediol (Farxiga) 10 MG tablet, Take 1 tablet by mouth Daily., Disp: 90 tablet, Rfl: 3    doxazosin (CARDURA) 4 MG tablet, Take 1 tablet by mouth Daily., Disp: 90 tablet, Rfl: 3    finasteride (PROSCAR) 5 MG tablet, Take 1 tablet by mouth Daily., Disp: 90 tablet, Rfl: 0    folic acid (FOLVITE) 800 MCG tablet, Take 1 tablet by mouth Daily., Disp: , Rfl:     gabapentin (NEURONTIN) 400 MG capsule, Take 1 capsule by mouth 2 (Two) Times a Day., Disp: , Rfl:     glipizide (GLUCOTROL) 5 MG tablet, Take 1 tablet by mouth 2 (Two) Times a Day Before Meals., Disp: 180 tablet, Rfl: 3    isosorbide mononitrate (IMDUR) 120 MG 24 hr tablet, Take 1 tablet by mouth Daily., Disp: 90 tablet, Rfl: 3    metFORMIN (GLUCOPHAGE) 1000 MG tablet, Take 1 tablet by mouth 2 (Two) Times a Day With Meals., Disp: 180 tablet, Rfl: 0    metoprolol succinate XL (TOPROL-XL) 100 MG 24 hr tablet, Take 1 tablet by mouth Daily., Disp: 90 tablet, Rfl: 3    Multiple Vitamins-Minerals (ICAPS AREDS 2 PO), Take  by mouth., Disp: , Rfl:     nitroglycerin (NITROSTAT) 0.4 MG SL tablet, PLACE 1 TAB UNDER TONGUE EVERY 5 MINS AS NEEDED FOR CHEST PAIN (ONLY IF SYSTOLIC BLOOD PRESSURE OVER 100) MAX 3 TABS/15 MINS, Disp: 25 tablet, Rfl: 3    NON FORMULARY, vitamin c 1000 w/ rosehips, Disp: , Rfl:     Omega-3 Fatty Acids (Fish Oil Triple Strength) 1400 MG capsule, Take 1,200 mg by mouth 2 (Two) Times a Day., Disp: , Rfl:     sacubitril-valsartan (Entresto)  MG tablet, Take 1 tablet by mouth 2 Times a Day., Disp: 180 tablet, Rfl: 3    sertraline (Zoloft) 25 MG  tablet, Take 1 tablet by mouth Daily., Disp: 90 tablet, Rfl: 1    triamcinolone (KENALOG) 0.1 % cream, Apply 1 Application topically to the appropriate area as directed Daily As Needed., Disp: , Rfl:     vitamin E 400 UNIT capsule, Take 1 capsule by mouth Daily., Disp: , Rfl:       Physical Exam:  I have reviewed the patient's current vital signs as documented in the patient's EMR.   Vitals:    08/23/24 1058   BP: 134/66   Pulse: 70   SpO2: 93%       Body mass index is 32.05 kg/m².       08/23/24  1058   Weight: 98.4 kg (217 lb)        Physical Exam  Vitals and nursing note reviewed.   Constitutional:       General: He is awake.   HENT:      Head: Normocephalic and atraumatic.   Eyes:      General: Lids are normal.      Conjunctiva/sclera:      Right eye: Right conjunctiva is not injected.      Left eye: Left conjunctiva is not injected.   Cardiovascular:      Rate and Rhythm: Normal rate and regular rhythm.      Heart sounds: S1 normal and S2 normal.      Comments: Well-healed mid sternal incision  Pulmonary:      Effort: No tachypnea or bradypnea.      Breath sounds: No stridor or transmitted upper airway sounds. No wheezing, rhonchi or rales.   Abdominal:      General: Bowel sounds are normal.      Palpations: Abdomen is soft.      Tenderness: There is no abdominal tenderness.   Musculoskeletal:      Right lower leg: No swelling. No edema.      Left lower leg: No swelling. No edema.      Right foot: No swelling.      Left foot: No swelling.   Skin:     General: Skin is warm and moist.   Neurological:      Mental Status: He is alert and oriented to person, place, and time.   Psychiatric:         Attention and Perception: Attention normal.         Mood and Affect: Mood normal.         Behavior: Behavior is cooperative.           JVP: Volume/Pulsation: Normal.        DATA REVIEWED:     EKG. I personally reviewed and interpreted the EKG.      EKG: normal EKG, normal sinus rhythm, unchanged from previous  tracings.         ---------------------------------------------------  TTE/SAVANNAH:  Results for orders placed during the hospital encounter of 07/19/24    Adult Transthoracic Echo Complete W/ Cont if Necessary Per Protocol    Interpretation Summary    Left ventricular systolic function is normal. Left ventricular ejection fraction appears to be 66 - 70%.    Left ventricular wall thickness is consistent with mild to moderate concentric hypertrophy.    Left ventricular diastolic function is consistent with (grade I) impaired relaxation.        -----------------------------------------------------  CXR/Imaging:   Imaging Results (Most Recent)       None            I personally reviewed and interpreted the CXR.      -----------------------------------------------------  CT:   No radiology results for the last 30 days.  I personally reviewed the images of the CT scan.  My personal interpretation is below.      ----------------------------------------------------    PFTs:      --------------------------------------------------------------------------------------------------    Laboratory evaluations:    Lab Results   Component Value Date    GLUCOSE 156 (H) 02/23/2024    BUN 17 02/23/2024    CREATININE 1.10 02/23/2024    EGFRIFNONA 58 (L) 11/24/2021    BCR 15.5 02/23/2024    K 4.4 02/23/2024    CO2 23.5 02/23/2024    CALCIUM 8.9 02/23/2024    ALBUMIN 4.0 06/05/2023    AST 8 06/05/2023    ALT 9 06/05/2023     Lab Results   Component Value Date    WBC 9.59 06/05/2023    HGB 14.4 06/05/2023    HCT 44.0 06/05/2023    MCV 88.9 06/05/2023     06/05/2023     Lab Results   Component Value Date    CHOL 163 03/25/2022    TRIG 186 (H) 03/25/2022    HDL 37 (L) 03/25/2022    LDL 94 03/25/2022     Lab Results   Component Value Date    TSH 1.020 06/05/2023     Lab Results   Component Value Date    HGBA1C 9.50 (H) 01/06/2023     Lab Results   Component Value Date    ALT 9 06/05/2023     Lab Results   Component Value Date    HGBA1C  9.50 (H) 01/06/2023    HGBA1C 8.30 (H) 04/21/2021    HGBA1C 8.70 (H) 03/15/2021     Lab Results   Component Value Date    MICROALBUR 1.5 06/16/2021    CREATININE 1.10 02/23/2024     Lab Results   Component Value Date    IRON 46 (L) 03/30/2021    TIBC 292 (L) 03/30/2021    FERRITIN 208.80 03/30/2021     Lab Results   Component Value Date    INR 0.88 10/07/2022    INR 0.97 03/29/2021    INR 1.20 (H) 03/17/2021    PROTIME 10.2 (L) 10/07/2022    PROTIME 12.7 03/29/2021    PROTIME 14.9 (H) 03/17/2021        Lab Results   Component Value Date    ABSOLUTELUNG 38 (A) 02/23/2024    ABSOLUTELUNG 39 (A) 08/04/2023    ABSOLUTELUNG 42 (A) 01/06/2023       PAH RISK ASSESSMENT:      1. Chronic diastolic heart failure    2. ASCVD (arteriosclerotic cardiovascular disease)    3. Essential hypertension    4. Type 2 diabetes mellitus without complication, without long-term current use of insulin          ORDERS PLACED TODAY:  Orders Placed This Encounter   Procedures    ReDs Vest    Ambulatory Referral to Endocrinology        Diagnoses and all orders for this visit:    1. Chronic diastolic heart failure (Primary)  -     ReDs Vest  -     Ambulatory Referral to Endocrinology    2. ASCVD (arteriosclerotic cardiovascular disease)  Comments:  Hx of 4v CABG    3. Essential hypertension    4. Type 2 diabetes mellitus without complication, without long-term current use of insulin             MEDS ORDERED TODAY:    No orders of the defined types were placed in this encounter.       ---------------------------------------------------------------------------------------------------------------------------          ASSESSMENT/PLAN:      Diagnosis Plan   1. Chronic diastolic heart failure  ReDs Vest    Ambulatory Referral to Endocrinology      2. ASCVD (arteriosclerotic cardiovascular disease)      Hx of 4v CABG      3. Essential hypertension        4. Type 2 diabetes mellitus without complication, without long-term current use of insulin             not acutely decompensated chronic diastolic heart failure. CHF. Etiology: Ischemic/CAD (Hx of AMI, CAD, or Ischemic Cardiomyopathy). LVEF 40-45%.      NYHA stage Stage C: Structural heart disease is present AND symptoms have occurredFC-Class III: Marked limitation of physical activity. Comfortable at rest. Less than ordinary activity causes fatigue, palpitation, or dyspnea.     Today, Patient appears euvolemic.and with  Moderate perfusion. The patient's hemodynamics are currently acceptable. HR is: normal and is at goal. BP/MAP was reviewed and there is room for medication up-titration.  Clinical trajectory was assessed and hasimproved.     CHF GOAL DIRECTED MEDICAL THERAPY FOR PATIENT ADDRESSED/ADJUSTED:     GDMT    Drug Class   Drug   Dose Last Dose Adjustment Additional Titration   Notes   ACEi/ARB/ARNI Entresto 97-103mg BID      Beta Blocker  Metoprolol Succinate   100mg      MRA Did not tolerate (diarrhea per his account)      SGLT2i Farxiga  10 mg  N/A      -CHF Specific BB:    Continue Toprol XL 100mg.      -ACE/ARB/ARNi:   Current dose:  Entresto 97-103mg BID    -MRA:   Patient did not tolerate MRA on prior try    -SGLT2 inhibitor therapy:   Continue Farxiga 10mg qd. He did have prior yeast infection.   Of note, he received two bottles of Farxiga, one from [facility] and one from [facility]. He is upset about both bottles and asks how to resolve situation while providing bill to me.   I call [facility] who reports patient himself will need to initiate a returns process.    Pt was advised SEs, some severe, including hypersensitivity and Mathew's; coupled with discussion regarding common side effects of UTIs and female genital mycotic infections were discussed. If you will be NPO, or are sick (poor PO intake, N&V) please hold the medication until you are back to a normal diet.     -Diuretic regimen:   ReDS Vest reading for 08/23/24 is 40; ReDs Vest reading reviewed with patient.    No maintenance diuresis  on board. Patient continues to refuse diuretics unless acutely exacerbated. 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    BMP, Mag, & ProBNP reviewed with patient.      -Fluid restriction/Sodium restriction:   Requested 2000 ml restriction  Patient has been asked to weigh daily and was provided with a printed diuretic strategy.  1,500 mg Na restriction was discussed.    -Acute vs. Chronic underlying conditions other than HF addressed during visit:     ASCVD s/p 4v CABG:   Continue statin & ASA.   Continue beta blocker.     DM type 2, NID:   Hemoglobin a1c reviewed with patient.   Copies of labs given to patient.   DM diet discussed with patient, as he attempts to lower a1c for work.     BMI is >= 30 and <35. (Class 1 Obesity). The following options were offered after discussion;: nutrition counseling/recommendations              >45 minutes out of 60 minutes face to face spent counseling patient extensively on dietary Na+ intake, importance of activity, weight monitoring, compliance with medications in addition to importance of titration with goal directed medical therapy and follow up appointments.            This document has been electronically signed by Teri Pedraza PA-C  August 23, 2024 11:48 EDT      Dictated Utilizing Dragon Dictation: Part of this note may be an electronic transcription/translation of spoken language to printed text using the Dragon Dictation System.    Follow-up appointment and medication changes provided in hand delivered After Visit Summary as well as reviewed in the room.

## 2024-08-23 NOTE — PROGRESS NOTES
[facility]    Date: 08/23/24     Vitals:    08/23/24 1058   BP: 134/66   Pulse: 70   SpO2: 93%    WEIGHT 217    Method of arrival: Ambulatory    Weighing self daily: No    Monitoring Heart Failure Zones: No    Today's HF Zone: Green    Taking medications as prescribed: Yes    Edema Yes    Shortness of Air: No    Number of pillows used at night:<2    Educational Materials given:  AVS                                                                         ReDS Value: 40  36-41 Possible Hypervolemic Status      Celestine Johnson MA 08/23/24 10:58 EDT

## 2024-08-26 ENCOUNTER — TELEPHONE (OUTPATIENT)
Dept: CARDIOLOGY | Facility: CLINIC | Age: 74
End: 2024-08-26
Payer: MEDICARE

## 2024-08-26 NOTE — TELEPHONE ENCOUNTER
Returned the pt's call.  He inquired if he was cleared or not to return to work.  Reminded the patient that we discussed this last week and his provider cannot clear him.  Re-faxed the paperwork that was faxed last week.  Pt stated he don't understand, but there is not sense in beating a dead horse.  Explained to the office that the provider cannot do anything that could put him in jeopardy.  Pt said okay that he guess he couldn't return to work and ended the call.

## 2024-08-26 NOTE — TELEPHONE ENCOUNTER
RELAY  [employer] Physical Exam Medical Clearance request received.  Re-faxed original DOT clearance and called to notify the pt.  No answer, LVM.

## 2024-08-26 NOTE — TELEPHONE ENCOUNTER
Name: William Villasenor      Relationship: Self      Best Callback Number: [phone]       HUB PROVIDED THE RELAY MESSAGE FROM THE OFFICE      PATIENT: HAS FURTHER QUESTIONS AND WOULD LIKE A CALL BACK AT THE FOLLOWING PHONE NUMBER LISTED ABOVE     ADDITIONAL INFORMATION: PT IS WONDERING IF HE WAS APPROVED OR DENIED, PLEASE ADVISE. HAS BEEN OFF WORK FOR 2 MONTHS. NEEDS A CALL BACK ASAP.

## 2024-09-23 ENCOUNTER — OFFICE VISIT (OUTPATIENT)
Dept: SURGERY | Facility: CLINIC | Age: 74
End: 2024-09-23
Payer: MEDICARE

## 2024-09-23 VITALS
BODY MASS INDEX: 31.55 KG/M2 | WEIGHT: 213 LBS | SYSTOLIC BLOOD PRESSURE: 104 MMHG | HEIGHT: 69 IN | DIASTOLIC BLOOD PRESSURE: 70 MMHG

## 2024-09-23 DIAGNOSIS — Z12.11 SCREENING FOR COLON CANCER: Primary | ICD-10-CM

## 2024-09-23 PROCEDURE — 1160F RVW MEDS BY RX/DR IN RCRD: CPT

## 2024-09-23 PROCEDURE — 3078F DIAST BP <80 MM HG: CPT

## 2024-09-23 PROCEDURE — S0260 H&P FOR SURGERY: HCPCS

## 2024-09-23 PROCEDURE — 1159F MED LIST DOCD IN RCRD: CPT

## 2024-09-23 PROCEDURE — 3074F SYST BP LT 130 MM HG: CPT

## 2024-09-23 RX ORDER — SODIUM, POTASSIUM,MAG SULFATES 17.5-3.13G
2 SOLUTION, RECONSTITUTED, ORAL ORAL TAKE AS DIRECTED
Qty: 29736 ML | Refills: 0 | Status: SHIPPED | OUTPATIENT
Start: 2024-09-23

## 2024-09-23 NOTE — H&P (VIEW-ONLY)
Subjective   William Villasenor is a 74 y.o. male who presents today for Initial Evaluation    Chief Complaint:    Chief Complaint   Patient presents with    Colonoscopy        History of Present Illness:    History of Present Illness William is a 74-year-old male who presents for evaluation for screening colonoscopy.  He reports that he has had colonoscopies in the past.  Last one being about 5 years ago.  He reports he has a bowel movement approximately every 2 days.  Denies any blood in the stool.  Denies any known family history of colon cancer.  Denies any unintentional weight loss or any changes in his bowel habits.  He does take a daily aspirin.    The following portions of the patient's history were reviewed and updated as appropriate: allergies, current medications, past family history, past medical history, past social history, past surgical history and problem list.    Past Medical History:  Past Medical History:   Diagnosis Date    Arthritis     Back pain     BPH (benign prostatic hyperplasia)     Chronic diastolic (congestive) heart failure     Diabetes mellitus     Diabetic peripheral neuropathy     Erectile dysfunction     Former smoker     GERD (gastroesophageal reflux disease)     GERD (gastroesophageal reflux disease)     Gout     Hemorrhoids     High cholesterol     Hypertension     Obesity        Social History:  Social History     Socioeconomic History    Marital status:     Number of children: 3   Tobacco Use    Smoking status: Former     Current packs/day: 0.00     Average packs/day: 1 pack/day for 5.0 years (5.0 ttl pk-yrs)     Types: Cigarettes     Start date: 1969     Quit date: 1974     Years since quittin.6     Passive exposure: Past    Smokeless tobacco: Never   Vaping Use    Vaping status: Never Used   Substance and Sexual Activity    Alcohol use: Never     Comment: quit in     Drug use: Never    Sexual activity: Defer       Family History:  Family History   Problem Relation  Age of Onset    Heart disease Mother     Hypertension Mother     Depression Mother     Alcohol abuse Maternal Uncle     Heart disease Maternal Grandmother     Hypertension Maternal Grandmother     Diabetes Maternal Grandmother     Diabetes Paternal Grandmother     No Known Problems Half-Brother     No Known Problems Half-Brother     No Known Problems Half-Sister     No Known Problems Daughter     No Known Problems Daughter     Hyperlipidemia Daughter        Past Surgical History:  Past Surgical History:   Procedure Laterality Date    CARDIAC CATHETERIZATION N/A 3/15/2021    Procedure: Left Heart Cath;  Surgeon: Trent Zaragoza MD;  Location:  [facility];  Service: Cardiology;  Laterality: N/A;    CARDIAC CATHETERIZATION N/A 3/15/2021    Procedure: Coronary angiography;  Surgeon: Trent Zaragoza MD;  Location:  [facility];  Service: Cardiology;  Laterality: N/A;    COLONOSCOPY W/ BIOPSIES 2015    CORONARY ARTERY BYPASS GRAFT N/A 3/16/2021    Procedure: MEDIAN STERNOTOMY, CORONARY ARTERY BYPASS GRAFT X4, UTILIZATION OF LEFT INTERNAL MAMMARY ARTERY, AND ENDOSCOPIC VEIN HARVEST OF RIGHT GREATER SAPHENOUS VEIN;  Surgeon: Jorge Simms MD;  Location: [facility] OR;  Service: Cardiothoracic;  Laterality: N/A;  vein out- 1614  vein ready- 1628               Problem List:  Patient Active Problem List   Diagnosis    Idiopathic chronic gout of multiple sites without tophus    Type II diabetes mellitus    Essential hypertension    Benign prostatic hyperplasia without lower urinary tract symptoms    Chest pain    Obesity (BMI 30-39.9)    GERD (gastroesophageal reflux disease)    Diabetic neuropathy    Former smoker    History of gout    Coronary artery disease involving native coronary artery of native heart with angina pectoris    CAD (coronary artery disease)    IZA (acute kidney injury)    Acute on chronic congestive heart failure    Hyperlipidemia    CKD (chronic kidney disease) stage  2, GFR 60-89 ml/min    Bradycardia    Diabetic polyneuropathy    Heartburn    Low back pain    S/P CABG x 4    Acute systolic heart failure    Ventricular trigeminy    PSVT (paroxysmal supraventricular tachycardia)    Shortness of breath    Chest pressure    Chronic systolic CHF (congestive heart failure)       Allergy:   Allergies   Allergen Reactions    Lisinopril Cough        Current Medications:   Current Outpatient Medications   Medication Sig Dispense Refill    allopurinol (ZYLOPRIM) 300 MG tablet TAKE 1 TABLET EVERY DAY 90 tablet 0    APPLE CIDER VINEGAR PO Take 1 tablet by mouth Daily.      aspirin 81 MG EC tablet Take 1 tablet by mouth Daily.      atorvastatin (LIPITOR) 40 MG tablet Take 1 tablet by mouth Every Night. 90 tablet 0    bumetanide (BUMEX) 2 MG tablet Take 1 tablet by mouth Daily. 90 tablet 1    CALCIUM-MAGNESIUM-ZINC PO Take 1 tablet by mouth 2 (Two) Times a Day. 400 mg Mag per capsule      Cayenne 450 MG capsule Take 1 capsule by mouth 2 (Two) Times a Day.      Cinnamon 500 MG capsule Take 1 capsule by mouth 2 (Two) Times a Day.      Cranberry 250 MG capsule Take 25,000 mg by mouth Daily.      dapagliflozin Propanediol (Farxiga) 10 MG tablet Take 1 tablet by mouth Daily. 90 tablet 3    doxazosin (CARDURA) 4 MG tablet Take 1 tablet by mouth Daily. 90 tablet 3    finasteride (PROSCAR) 5 MG tablet Take 1 tablet by mouth Daily. 90 tablet 0    folic acid (FOLVITE) 800 MCG tablet Take 1 tablet by mouth Daily.      gabapentin (NEURONTIN) 400 MG capsule Take 1 capsule by mouth 2 (Two) Times a Day.      glipizide (GLUCOTROL) 5 MG tablet Take 1 tablet by mouth 2 (Two) Times a Day Before Meals. 180 tablet 3    isosorbide mononitrate (IMDUR) 120 MG 24 hr tablet Take 1 tablet by mouth Daily. 90 tablet 3    metFORMIN (GLUCOPHAGE) 1000 MG tablet Take 1 tablet by mouth 2 (Two) Times a Day With Meals. 180 tablet 0    metoprolol succinate XL (TOPROL-XL) 100 MG 24 hr tablet Take 1 tablet by mouth Daily. 90  tablet 3    Multiple Vitamins-Minerals (ICAPS AREDS 2 PO) Take  by mouth.      nitroglycerin (NITROSTAT) 0.4 MG SL tablet PLACE 1 TAB UNDER TONGUE EVERY 5 MINS AS NEEDED FOR CHEST PAIN (ONLY IF SYSTOLIC BLOOD PRESSURE OVER 100) MAX 3 TABS/15 MINS 25 tablet 3    NON FORMULARY vitamin c 1000 w/ rosehips      Omega-3 Fatty Acids (Fish Oil Triple Strength) 1400 MG capsule Take 1,200 mg by mouth 2 (Two) Times a Day.      sacubitril-valsartan (Entresto)  MG tablet Take 1 tablet by mouth 2 Times a Day. 180 tablet 3    sertraline (Zoloft) 25 MG tablet Take 1 tablet by mouth Daily. 90 tablet 1    triamcinolone (KENALOG) 0.1 % cream Apply 1 Application topically to the appropriate area as directed Daily As Needed.      vitamin E 400 UNIT capsule Take 1 capsule by mouth Daily.      sodium-potassium-magnesium sulfates (Suprep Bowel Prep Kit) 17.5-3.13-1.6 GM/177ML solution oral solution Take 2 bottles by mouth Take As Directed for 2 doses. 02011 mL 0     No current facility-administered medications for this visit.       Review of Systems:    Review of Systems   Constitutional:  Negative for unexpected weight loss.   Gastrointestinal:  Negative for blood in stool.         Physical Exam:   Physical Exam  Constitutional:       Appearance: Normal appearance.   HENT:      Head: Normocephalic and atraumatic.      Right Ear: External ear normal.      Left Ear: External ear normal.   Eyes:      Conjunctiva/sclera: Conjunctivae normal.   Pulmonary:      Effort: Pulmonary effort is normal.   Abdominal:      General: Abdomen is flat.      Palpations: Abdomen is soft.   Musculoskeletal:         General: Normal range of motion.      Cervical back: Normal range of motion and neck supple.   Skin:     General: Skin is warm and dry.      Capillary Refill: Capillary refill takes less than 2 seconds.   Neurological:      General: No focal deficit present.      Mental Status: He is alert and oriented to person, place, and time.  "  Psychiatric:         Mood and Affect: Mood normal.         Behavior: Behavior normal.         Vitals:  Blood pressure 104/70, height 175.3 cm (69.02\"), weight 96.6 kg (213 lb).   Body mass index is 31.44 kg/m².         Assessment & Plan   Diagnoses and all orders for this visit:    1. Screening for colon cancer (Primary)  -     Case Request; Standing  -     Case Request    Other orders  -     sodium-potassium-magnesium sulfates (Suprep Bowel Prep Kit) 17.5-3.13-1.6 GM/177ML solution oral solution; Take 2 bottles by mouth Take As Directed for 2 doses.  Dispense: 21334 mL; Refill: 0  -     Follow Anesthesia Guidelines / Protocol; Future  -     Follow Anesthesia Guidelines / Protocol; Standing  -     Verify / Perform Chlorhexidine Skin Prep; Standing  -     Obtain Informed Consent; Future  -     Provide NPO Instructions to Patient; Future  -     Chlorhexidine Skin Prep; Future  -     Place Sequential Compression Device; Standing  -     Maintain Sequential Compression Device; Standing      William is a 74-year-old male who presents for an evaluation for screening colonoscopy.  He will undergo colonoscopy Dr. Barnhart.  Verbalized understanding of prep instructions and procedure and wishes to proceed.    Visit Diagnoses:    ICD-10-CM ICD-9-CM   1. Screening for colon cancer  Z12.11 V76.51         MEDS ORDERED DURING VISIT:  New Medications Ordered This Visit   Medications    sodium-potassium-magnesium sulfates (Suprep Bowel Prep Kit) 17.5-3.13-1.6 GM/177ML solution oral solution     Sig: Take 2 bottles by mouth Take As Directed for 2 doses.     Dispense:  66392 mL     Refill:  0       Return for Follow-up postop.             This document has been electronically signed by ANKUR Luna  September 23, 2024 11:29 EDT    Please note that portions of this note were completed with a voice recognition program.   "

## 2024-09-27 PROBLEM — Z12.11 SCREENING FOR COLON CANCER: Status: ACTIVE | Noted: 2024-09-23

## 2024-10-04 ENCOUNTER — TELEPHONE (OUTPATIENT)
Dept: SURGERY | Facility: CLINIC | Age: 74
End: 2024-10-04
Payer: MEDICARE

## 2024-10-04 NOTE — TELEPHONE ENCOUNTER
William, you are scheduled for colonoscopy with Dr. Aly Barnhart on Thursday 10/10/2024 at 6:30 am. No food or drinks after midnight the night prior to surgery. You must have a  with you on the day of surgery. Remember to follow a clear liquid diet all day the day prior and drink your prep. You will need to check in at Outpatient Surgery. Please call the office with any questions at [phone].

## 2024-10-07 ENCOUNTER — TELEPHONE (OUTPATIENT)
Dept: SURGERY | Facility: CLINIC | Age: 74
End: 2024-10-07
Payer: MEDICARE

## 2024-10-10 ENCOUNTER — HOSPITAL ENCOUNTER (OUTPATIENT)
Facility: HOSPITAL | Age: 74
Setting detail: HOSPITAL OUTPATIENT SURGERY
Discharge: HOME | End: 2024-10-10
Attending: SURGERY | Admitting: SURGERY
Payer: MEDICARE

## 2024-10-10 ENCOUNTER — ANESTHESIA (OUTPATIENT)
Dept: PERIOP | Facility: HOSPITAL | Age: 74
End: 2024-10-10
Payer: MEDICARE

## 2024-10-10 ENCOUNTER — ANESTHESIA EVENT (OUTPATIENT)
Dept: PERIOP | Facility: HOSPITAL | Age: 74
End: 2024-10-10
Payer: MEDICARE

## 2024-10-10 VITALS
HEIGHT: 69 IN | TEMPERATURE: 97 F | BODY MASS INDEX: 30.66 KG/M2 | HEART RATE: 93 BPM | RESPIRATION RATE: 20 BRPM | WEIGHT: 207 LBS | OXYGEN SATURATION: 91 % | DIASTOLIC BLOOD PRESSURE: 53 MMHG | SYSTOLIC BLOOD PRESSURE: 129 MMHG

## 2024-10-10 LAB — GLUCOSE BLDC GLUCOMTR-MCNC: 173 MG/DL (ref 70–130)

## 2024-10-10 PROCEDURE — G0121 COLON CA SCRN NOT HI RSK IND: HCPCS | Performed by: SURGERY

## 2024-10-10 PROCEDURE — 25010000002 LIDOCAINE PF 2% 2 % SOLUTION: Performed by: NURSE ANESTHETIST, CERTIFIED REGISTERED

## 2024-10-10 PROCEDURE — 82948 REAGENT STRIP/BLOOD GLUCOSE: CPT

## 2024-10-10 PROCEDURE — 25010000002 PROPOFOL 200 MG/20ML EMULSION: Performed by: NURSE ANESTHETIST, CERTIFIED REGISTERED

## 2024-10-10 RX ORDER — MEPERIDINE HYDROCHLORIDE 25 MG/ML
12.5 INJECTION INTRAMUSCULAR; INTRAVENOUS; SUBCUTANEOUS
Status: DISCONTINUED | OUTPATIENT
Start: 2024-10-10 | End: 2024-10-10 | Stop reason: HOSPADM

## 2024-10-10 RX ORDER — OXYCODONE AND ACETAMINOPHEN 5; 325 MG/1; MG/1
1 TABLET ORAL ONCE AS NEEDED
Status: DISCONTINUED | OUTPATIENT
Start: 2024-10-10 | End: 2024-10-10 | Stop reason: HOSPADM

## 2024-10-10 RX ORDER — ONDANSETRON 2 MG/ML
4 INJECTION INTRAMUSCULAR; INTRAVENOUS AS NEEDED
Status: DISCONTINUED | OUTPATIENT
Start: 2024-10-10 | End: 2024-10-10 | Stop reason: HOSPADM

## 2024-10-10 RX ORDER — PROPOFOL 10 MG/ML
INJECTION, EMULSION INTRAVENOUS AS NEEDED
Status: DISCONTINUED | OUTPATIENT
Start: 2024-10-10 | End: 2024-10-10 | Stop reason: SURG

## 2024-10-10 RX ORDER — IPRATROPIUM BROMIDE AND ALBUTEROL SULFATE 2.5; .5 MG/3ML; MG/3ML
3 SOLUTION RESPIRATORY (INHALATION) ONCE AS NEEDED
Status: DISCONTINUED | OUTPATIENT
Start: 2024-10-10 | End: 2024-10-10 | Stop reason: HOSPADM

## 2024-10-10 RX ORDER — SODIUM CHLORIDE 0.9 % (FLUSH) 0.9 %
10 SYRINGE (ML) INJECTION AS NEEDED
Status: DISCONTINUED | OUTPATIENT
Start: 2024-10-10 | End: 2024-10-10 | Stop reason: HOSPADM

## 2024-10-10 RX ORDER — MIDAZOLAM HYDROCHLORIDE 1 MG/ML
0.5 INJECTION INTRAMUSCULAR; INTRAVENOUS
Status: DISCONTINUED | OUTPATIENT
Start: 2024-10-10 | End: 2024-10-10 | Stop reason: HOSPADM

## 2024-10-10 RX ORDER — LIDOCAINE HYDROCHLORIDE 20 MG/ML
INJECTION, SOLUTION EPIDURAL; INFILTRATION; INTRACAUDAL; PERINEURAL AS NEEDED
Status: DISCONTINUED | OUTPATIENT
Start: 2024-10-10 | End: 2024-10-10 | Stop reason: SURG

## 2024-10-10 RX ORDER — SODIUM CHLORIDE, SODIUM LACTATE, POTASSIUM CHLORIDE, CALCIUM CHLORIDE 600; 310; 30; 20 MG/100ML; MG/100ML; MG/100ML; MG/100ML
125 INJECTION, SOLUTION INTRAVENOUS ONCE
Status: DISCONTINUED | OUTPATIENT
Start: 2024-10-10 | End: 2024-10-10 | Stop reason: HOSPADM

## 2024-10-10 RX ORDER — FENTANYL CITRATE 50 UG/ML
50 INJECTION, SOLUTION INTRAMUSCULAR; INTRAVENOUS
Status: DISCONTINUED | OUTPATIENT
Start: 2024-10-10 | End: 2024-10-10 | Stop reason: HOSPADM

## 2024-10-10 RX ORDER — SODIUM CHLORIDE 0.9 % (FLUSH) 0.9 %
10 SYRINGE (ML) INJECTION EVERY 12 HOURS SCHEDULED
Status: DISCONTINUED | OUTPATIENT
Start: 2024-10-10 | End: 2024-10-10 | Stop reason: HOSPADM

## 2024-10-10 RX ORDER — SODIUM CHLORIDE 9 MG/ML
40 INJECTION, SOLUTION INTRAVENOUS AS NEEDED
Status: DISCONTINUED | OUTPATIENT
Start: 2024-10-10 | End: 2024-10-10 | Stop reason: HOSPADM

## 2024-10-10 RX ADMIN — PROPOFOL 25 MG: 10 INJECTION, EMULSION INTRAVENOUS at 07:49

## 2024-10-10 RX ADMIN — PROPOFOL 25 MG: 10 INJECTION, EMULSION INTRAVENOUS at 07:53

## 2024-10-10 RX ADMIN — PROPOFOL 25 MG: 10 INJECTION, EMULSION INTRAVENOUS at 07:43

## 2024-10-10 RX ADMIN — PROPOFOL 50 MG: 10 INJECTION, EMULSION INTRAVENOUS at 07:34

## 2024-10-10 RX ADMIN — PROPOFOL 25 MG: 10 INJECTION, EMULSION INTRAVENOUS at 07:38

## 2024-10-10 RX ADMIN — LIDOCAINE HYDROCHLORIDE 60 MG: 20 INJECTION, SOLUTION EPIDURAL; INFILTRATION; INTRACAUDAL; PERINEURAL at 07:34

## 2024-10-10 NOTE — ANESTHESIA PREPROCEDURE EVALUATION
Anesthesia Evaluation     Patient summary reviewed and Nursing notes reviewed   no history of anesthetic complications:   NPO Solid Status: > 8 hours  NPO Liquid Status: > 8 hours           Airway   Mallampati: II  TM distance: >3 FB  Neck ROM: full  No difficulty expected  Dental    (+) poor dentition        Pulmonary     breath sounds clear to auscultation  (+) ,shortness of breath  Cardiovascular     Patient on routine beta blocker and Beta blocker given within 24 hours of surgery  Rhythm: regular  Rate: normal    (+) hypertension, CAD, CABG >6 Months, angina, CHF , hyperlipidemia      Neuro/Psych  (+) numbness  GI/Hepatic/Renal/Endo    (+) obesity, morbid obesity, GERD, renal disease-, diabetes mellitus    Musculoskeletal     (+) back pain  Abdominal    Substance History      OB/GYN          Other   arthritis,     ROS/Med Hx Other: · Left ventricular ejection fraction appears to be 51 - 55%. Left ventricular systolic function is normal. Wall motion not assessed due to poor endocardial visualization  · Left ventricular diastolic function was normal.  · There is no evidence of pericardial effusion  · No significant functional valvular abnormalities                    Anesthesia Plan    ASA 3     general     (Silvia, PAC,SAVANNAH)  intravenous induction     Anesthetic plan, risks, benefits, and alternatives have been provided, discussed and informed consent has been obtained with: patient.    Plan discussed with CRNA.

## 2024-10-10 NOTE — ANESTHESIA POSTPROCEDURE EVALUATION
Patient: William Villasenor    Procedure Summary       Date: 10/10/24 Room / Location: [facility] OR [address] [place] [address] [facility] OR    Anesthesia Start: 0733 Anesthesia Stop: 0757    Procedure: COLONOSCOPY Diagnosis:       Screening for colon cancer      (Screening for colon cancer [Z12.11])    Surgeons: Aly Barnhart MD Provider: Raymond Huggins MD    Anesthesia Type: general ASA Status: 3            Anesthesia Type: general    Vitals  Vitals Value Taken Time   /51 10/10/24 0800   Temp 97 °F (36.1 °C) 10/10/24 0800   Pulse 93 10/10/24 0800   Resp 24 10/10/24 0800   SpO2 95 % 10/10/24 0800           Post Anesthesia Care and Evaluation    Patient location during evaluation: PHASE II  Patient participation: complete - patient participated  Level of consciousness: awake and alert  Pain score: 0  Pain management: adequate    Airway patency: patent  Anesthetic complications: No anesthetic complications    Cardiovascular status: acceptable  Respiratory status: acceptable  Hydration status: acceptable

## 2024-10-10 NOTE — OP NOTE
COLONOSCOPY  Procedure Note    William Villasenor  10/10/2024    Pre-op Diagnosis:   Screening for colon cancer [Z12.11]    Post-op Diagnosis: normal colon        Procedure(s):  COLONOSCOPY    Surgeon(s):  Aly Barnhart MD    Anesthesia: General    Staff:   Circulator: Francisca Wilkes RN  Endo Technician: Arie Vasquez    Estimated Blood Loss: none    Specimens:                * No orders in the log *      Drains: * No LDAs found *    Procedure: The scope was passed from the rectum to the cecum. The ileocecal valve was unremarkable. The scope was slowly withdrawn and no polyps or inflammation seen. There was minimal diveritculosis in the sigmoid. No other abnormalities seen.    Findings: normal colon           Complications: none   Grafts / Implants N/A    Aly Barnhart MD     Date: 10/10/2024  Time: 08:47 EDT

## 2024-10-10 NOTE — ANESTHESIA POSTPROCEDURE EVALUATION
Patient: William Villasenor    Procedure Summary       Date: 10/10/24 Room / Location: [facility] OR [address] [place] [address] COR OR    Anesthesia Start: 0733 Anesthesia Stop: 0757    Procedure: COLONOSCOPY Diagnosis:       Screening for colon cancer      (Screening for colon cancer [Z12.11])    Surgeons: Aly Barnhart MD Provider: Raymond Huggins MD    Anesthesia Type: general ASA Status: 3            Anesthesia Type: general    Vitals  Vitals Value Taken Time   /51 10/10/24 0800   Temp 97 °F (36.1 °C) 10/10/24 0800   Pulse 93 10/10/24 0800   Resp 24 10/10/24 0800   SpO2 95 % 10/10/24 0800           Post Anesthesia Care and Evaluation    Patient location during evaluation: PHASE II  Patient participation: complete - patient participated  Level of consciousness: awake and alert  Pain score: 1  Pain management: adequate    Airway patency: patent  Anesthetic complications: No anesthetic complications  PONV Status: controlled  Cardiovascular status: acceptable  Respiratory status: acceptable  Hydration status: acceptable

## 2024-10-17 ENCOUNTER — OFFICE VISIT (OUTPATIENT)
Dept: SURGERY | Facility: CLINIC | Age: 74
End: 2024-10-17
Payer: MEDICARE

## 2024-10-17 VITALS — HEIGHT: 69 IN | BODY MASS INDEX: 30.66 KG/M2 | WEIGHT: 207 LBS

## 2024-10-17 DIAGNOSIS — Z98.890 STATUS POST COLONOSCOPY: Primary | ICD-10-CM

## 2024-10-17 NOTE — PROGRESS NOTES
Subjective   William Villasenor is a 74 y.o. male.     Chief Complaint: post colonoscopy, constipation    History of Present Illness He is a 73 yo who had a colonoscopy. It had no inflammation or polyps. He is constipated and takes citrucel.     The following portions of the patient's history were reviewed and updated as appropriate: current medications, past family history, past medical history, past social history, past surgical history and problem list.    Review of Systems    Objective   Physical Exam abdomen soft and not tender    Past Medical History:   Diagnosis Date    Arthritis     Back pain     BPH (benign prostatic hyperplasia)     Chronic diastolic (congestive) heart failure     Coronary artery disease     Diabetes mellitus     Diabetic peripheral neuropathy     Elevated cholesterol     Erectile dysfunction     Former smoker     GERD (gastroesophageal reflux disease)     Gout     Hemorrhoids     High cholesterol     High triglycerides     Hypertension     Neuropathy     Obesity        Family History   Problem Relation Age of Onset    Heart disease Mother     Hypertension Mother     Depression Mother     Alcohol abuse Maternal Uncle     Heart disease Maternal Grandmother     Hypertension Maternal Grandmother     Diabetes Maternal Grandmother     Diabetes Paternal Grandmother     No Known Problems Half-Brother     No Known Problems Half-Brother     No Known Problems Half-Sister     No Known Problems Daughter     No Known Problems Daughter     Hyperlipidemia Daughter        Social History     Tobacco Use    Smoking status: Former     Current packs/day: 0.00     Average packs/day: 1 pack/day for 5.0 years (5.0 ttl pk-yrs)     Types: Cigarettes     Start date: 1969     Quit date: 1974     Years since quittin.7     Passive exposure: Past    Smokeless tobacco: Never   Vaping Use    Vaping status: Never Used   Substance Use Topics    Alcohol use: Never     Comment: quit in     Drug use: Never       Past  Surgical History:   Procedure Laterality Date    CARDIAC CATHETERIZATION N/A 3/15/2021    Procedure: Left Heart Cath;  Surgeon: Trent Zaragoza MD;  Location:  [facility];  Service: Cardiology;  Laterality: N/A;    CARDIAC CATHETERIZATION N/A 3/15/2021    Procedure: Coronary angiography;  Surgeon: Trent Zaragoza MD;  Location:  [facility];  Service: Cardiology;  Laterality: N/A;    COLONOSCOPY N/A 10/10/2024    Procedure: COLONOSCOPY;  Surgeon: Aly Barnhart MD;  Location:  [facility] OR;  Service: Gastroenterology;  Laterality: N/A;    COLONOSCOPY W/ BIOPSIES  2015    CORONARY ARTERY BYPASS GRAFT N/A 3/16/2021    Procedure: MEDIAN STERNOTOMY, CORONARY ARTERY BYPASS GRAFT X4, UTILIZATION OF LEFT INTERNAL MAMMARY ARTERY, AND ENDOSCOPIC VEIN HARVEST OF RIGHT GREATER SAPHENOUS VEIN;  Surgeon: Jorge Simms MD;  Location:  [facility] OR;  Service: Cardiothoracic;  Laterality: N/A;  vein out- 1614  vein ready- 1628               Current Outpatient Medications   Medication Instructions    allopurinol (ZYLOPRIM) 300 MG tablet TAKE 1 TABLET EVERY DAY    APPLE CIDER VINEGAR PO 1 tablet, Daily    aspirin 81 mg, Daily    atorvastatin (LIPITOR) 40 mg, Oral, Nightly    bumetanide (BUMEX) 2 mg, Oral, Daily    CALCIUM-MAGNESIUM-ZINC PO 1 tablet, 2 Times Daily    Cayenne 450 MG capsule 1 capsule, 2 Times Daily    Cinnamon 500 mg, 2 Times Daily    Cranberry 25,000 mg, Daily    dapagliflozin Propanediol (Farxiga) 10 MG tablet Take 1 tablet by mouth Daily.    doxazosin (CARDURA) 4 mg, Oral, Daily    finasteride (PROSCAR) 5 mg, Oral, Daily    Fish Oil Triple Strength 1,200 mg, 2 Times Daily    folic acid (FOLVITE) 800 mcg, Daily    gabapentin (NEURONTIN) 400 mg, 2 Times Daily    glipizide (GLUCOTROL) 5 mg, Oral, 2 Times Daily Before Meals    isosorbide mononitrate (IMDUR) 120 mg, Oral, Daily    metFORMIN (GLUCOPHAGE) 1,000 mg, Oral, 2 Times Daily With Meals    metoprolol succinate XL (TOPROL-XL) 100  mg, Oral, Daily    Multiple Vitamins-Minerals (ICAPS AREDS 2 PO) Take  by mouth.    nitroglycerin (NITROSTAT) 0.4 MG SL tablet PLACE 1 TAB UNDER TONGUE EVERY 5 MINS AS NEEDED FOR CHEST PAIN (ONLY IF SYSTOLIC BLOOD PRESSURE OVER 100) MAX 3 TABS/15 MINS    NON FORMULARY vitamin c 1000 w/ rosehips    sacubitril-valsartan (Entresto)  MG tablet Take 1 tablet by mouth 2 Times a Day.    sertraline (ZOLOFT) 25 mg, Oral, Daily    triamcinolone (KENALOG) 0.1 % cream Daily PRN    vitamin E 400 Units, Oral, Daily         Assessment & Plan   Diagnoses and all orders for this visit:    1. Status post colonoscopy (Primary)    Return prn  Recommend miralax for his constipation             This document has been electronically signed by Aly Barnhart MD   October 17, 2024 16:22 EDT

## 2024-10-18 ENCOUNTER — OFFICE VISIT (OUTPATIENT)
Dept: CARDIOLOGY | Facility: CLINIC | Age: 74
End: 2024-10-18
Payer: MEDICARE

## 2024-10-18 VITALS
BODY MASS INDEX: 30.33 KG/M2 | DIASTOLIC BLOOD PRESSURE: 70 MMHG | SYSTOLIC BLOOD PRESSURE: 125 MMHG | WEIGHT: 204.8 LBS | HEART RATE: 67 BPM | HEIGHT: 69 IN | OXYGEN SATURATION: 96 %

## 2024-10-18 DIAGNOSIS — Z95.1 S/P CABG X 4: ICD-10-CM

## 2024-10-18 DIAGNOSIS — I25.119 CORONARY ARTERY DISEASE INVOLVING NATIVE CORONARY ARTERY OF NATIVE HEART WITH ANGINA PECTORIS: Primary | ICD-10-CM

## 2024-10-18 DIAGNOSIS — R06.02 SOB (SHORTNESS OF BREATH): ICD-10-CM

## 2024-10-18 DIAGNOSIS — I10 ESSENTIAL HYPERTENSION: Chronic | ICD-10-CM

## 2024-10-18 PROCEDURE — 99214 OFFICE O/P EST MOD 30 MIN: CPT | Performed by: NURSE PRACTITIONER

## 2024-10-18 PROCEDURE — 3074F SYST BP LT 130 MM HG: CPT | Performed by: NURSE PRACTITIONER

## 2024-10-18 PROCEDURE — 1159F MED LIST DOCD IN RCRD: CPT | Performed by: NURSE PRACTITIONER

## 2024-10-18 PROCEDURE — 3078F DIAST BP <80 MM HG: CPT | Performed by: NURSE PRACTITIONER

## 2024-10-18 PROCEDURE — 1160F RVW MEDS BY RX/DR IN RCRD: CPT | Performed by: NURSE PRACTITIONER

## 2024-10-18 RX ORDER — LORATADINE 10 MG/1
10 TABLET ORAL DAILY PRN
COMMUNITY

## 2024-10-18 RX ORDER — MULTIVIT WITH MINERALS/LUTEIN
1000 TABLET ORAL 2 TIMES DAILY
COMMUNITY

## 2024-10-18 RX ORDER — DOXAZOSIN 4 MG/1
4 TABLET ORAL DAILY
Qty: 90 TABLET | Refills: 3 | Status: SHIPPED | OUTPATIENT
Start: 2024-10-18

## 2024-10-18 NOTE — PROGRESS NOTES
Subjective     William Villasenor is a 74 y.o. male who presents to day for 6 month follow up  and Chronic systolic CHF.    CHIEF COMPLIANT  Chief Complaint   Patient presents with    6 month follow up     Chronic systolic CHF       Active Problems:  Problem List Items Addressed This Visit          Cardiac and Vasculature    Essential hypertension (Chronic)    Relevant Medications    doxazosin (CARDURA) 4 MG tablet    Coronary artery disease involving native coronary artery of native heart with angina pectoris - Primary    Overview     Added automatically from request for surgery [id number]         S/P CABG x 4     Other Visit Diagnoses       SOB (shortness of breath)            Problem list  1.  Coronary artery disease status post coronary artery bypass grafting x4  1.1 coronary artery bypass grafting 3/21: LIMA to LAD, SVG to diagonal, SVG to OM1, SVG to OM 2. Residual  of RCA  1.2 left heart catheterization 8/21: Left main ostial distal tapering, circumflex diffusely disease, OM1 occluded, LAD occluded, RCA subtotally occluded, LIMA widely patent, SVG to posterior lateral patent, SVG to OM patent, EF 45±5%, LVEDP 28-30  1.3 stress test 7/23: Negative for ischemia, EF 60% septal akinesis  2.  Essential hypertension  2.1 echocardiogram 6/23: EF 56 to 60%, no hemodynamically significant valvular disease or pericardial effusion.  2.2 echo 7/24: EF 66 to 70%, grade 1 diastolic dysfunction, trace MR  3.  Diastolic dysfunction  4.  Chest pain  5.  Shortness of breath  6.  Lower extremity edema  7.  Dizziness/near syncope  7.1 cardiac event monitor 6/23: Several runs of SVT with the longest being 6 beats with a maximal rate of 146.  No reported symptoms, minimal PAC PVC burden.  7.2 carotid duplex 7/23: Mild to moderate plaque right greater than left no occlusion.    HPI  HPI  Mr. William Villasenor is a 74-year-old male patient being followed up today for heart failure with reduced ejection fraction, coronary artery disease, and  chronic arterial hypertension.     Patient does have a history of heart failure with reduced ejection fraction for which she is on Entresto, metoprolol, and Farxiga.  Ejection fraction has recovered to 66 to 70% with grade 1 diastolic dysfunction.     Patient does have a history of coronary artery disease which she went under bypass surgery in March 2021 with LIMA to LAD SVG diagonal SVG to OM1 SVG to OM 2 with residual  of the right coronary artery.  Patient is on high intensity statin therapy of atorvastatin and aspirin for antiplatelet therapy.  He is also on antianginal therapy of isosorbide.  We did perform a stress test in July 2023 that was negative for ischemia with a preserved post stress ejection fraction of 60% with septal akinesis.     Patient does have a history of chronic arterial hypertension in which she is being treated with Entresto, metoprolol, and doxazosin. Today his BP is well controlled at 125/70 with a HR of 67.  Dizzy when changes position to quickly at time.       Patient did have a echocardiogram. That identified a recovered ejection fraction of 66 to 70% with grade 1 diastolic dysfunction no hemodynamically significant valvular disease.  We did review in detail and compared to previous.    Patient does report shortness of breath that sort of comes and goes.  Does occur with activity and at rest at times.  Denies any orthopnea.    Patient does have some mild lower extremity edema that is intermittent and does not occur on a daily basis he has about 1+ bilateral lower extremity today.  He says he has not been very active due to getting out in the yard and getting turkey mites on his lower legs.    Patient feels like some of the shortness of breath may be associated with constipation.  He seen another provider yesterday and was told to start taking MiraLAX for constipation.    Patient does report dizziness if he changes positions too quickly.  Overall he says he is feeling pretty well  from the cardiovascular standpoint.  Patient denies any chest pain, palpitations,  fatigue, syncope, PND, orthopnea, or strokelike symptoms.    PRIOR MEDS  Current Outpatient Medications on File Prior to Visit   Medication Sig Dispense Refill    allopurinol (ZYLOPRIM) 300 MG tablet TAKE 1 TABLET EVERY DAY 90 tablet 0    APPLE CIDER VINEGAR PO Take 1 tablet by mouth 2 (Two) Times a Day.      ascorbic acid (VITAMIN C) 1000 MG tablet Take 1 tablet by mouth 2 (Two) Times a Day.      aspirin 81 MG EC tablet Take 1 tablet by mouth Daily.      atorvastatin (LIPITOR) 40 MG tablet Take 1 tablet by mouth Every Night. 90 tablet 0    bumetanide (BUMEX) 2 MG tablet Take 1 tablet by mouth Daily. 90 tablet 1    CALCIUM-MAGNESIUM-ZINC PO Take 1 tablet by mouth 2 (Two) Times a Day. 400 mg Mag per capsule      Cayenne 450 MG capsule Take 1 capsule by mouth 2 (Two) Times a Day.      Cinnamon 500 MG capsule Take 1 capsule by mouth 2 (Two) Times a Day.      Cranberry 250 MG capsule Take 25,000 mg by mouth Daily.      dapagliflozin Propanediol (Farxiga) 10 MG tablet Take 1 tablet by mouth Daily. 90 tablet 3    finasteride (PROSCAR) 5 MG tablet Take 1 tablet by mouth Daily. 90 tablet 0    folic acid (FOLVITE) 800 MCG tablet Take 1 tablet by mouth Daily.      gabapentin (NEURONTIN) 400 MG capsule Take 1 capsule by mouth 2 (Two) Times a Day.      glipizide (GLUCOTROL) 5 MG tablet Take 1 tablet by mouth 2 (Two) Times a Day Before Meals. 180 tablet 3    isosorbide mononitrate (IMDUR) 120 MG 24 hr tablet Take 1 tablet by mouth Daily. 90 tablet 3    loratadine (Claritin) 10 MG tablet Take 1 tablet by mouth Daily As Needed for Allergies.      metFORMIN (GLUCOPHAGE) 1000 MG tablet Take 1 tablet by mouth 2 (Two) Times a Day With Meals. 180 tablet 0    methylcellulose, Laxative, (CITRUCEL) 500 MG tablet tablet Take 2 tablets by mouth Every 4 (Four) Hours As Needed.      metoprolol succinate XL (TOPROL-XL) 100 MG 24 hr tablet Take 1 tablet by mouth  Daily. 90 tablet 3    Multiple Vitamins-Minerals (ICAPS AREDS 2 PO) Take  by mouth.      nitroglycerin (NITROSTAT) 0.4 MG SL tablet PLACE 1 TAB UNDER TONGUE EVERY 5 MINS AS NEEDED FOR CHEST PAIN (ONLY IF SYSTOLIC BLOOD PRESSURE OVER 100) MAX 3 TABS/15 MINS 25 tablet 3    NON FORMULARY Take 20 mg by mouth 2 (Two) Times a Day. Lutein      NON FORMULARY Sugar Blocker  1 tablet po bid      Omega-3 Fatty Acids (Fish Oil Triple Strength) 1400 MG capsule Take 1,200 mg by mouth 2 (Two) Times a Day.      sacubitril-valsartan (Entresto)  MG tablet Take 1 tablet by mouth 2 Times a Day. 180 tablet 3    vitamin E 400 UNIT capsule Take 1 capsule by mouth Daily.      [DISCONTINUED] doxazosin (CARDURA) 4 MG tablet Take 1 tablet by mouth Daily. 90 tablet 3    NON FORMULARY vitamin c 1000 w/ rosehips      sertraline (Zoloft) 25 MG tablet Take 1 tablet by mouth Daily. 90 tablet 1    triamcinolone (KENALOG) 0.1 % cream Apply 1 Application topically to the appropriate area as directed Daily As Needed.       No current facility-administered medications on file prior to visit.       ALLERGIES  Lisinopril    HISTORY  Past Medical History:   Diagnosis Date    Arthritis     Back pain     BPH (benign prostatic hyperplasia)     Chronic diastolic (congestive) heart failure     Coronary artery disease     Diabetes mellitus     Diabetic peripheral neuropathy     Elevated cholesterol     Erectile dysfunction     Former smoker     GERD (gastroesophageal reflux disease)     Gout     Hemorrhoids     High cholesterol     High triglycerides     Hypertension     Neuropathy     Obesity        Social History     Socioeconomic History    Marital status:     Number of children: 3   Tobacco Use    Smoking status: Former     Current packs/day: 0.00     Average packs/day: 1 pack/day for 5.0 years (5.0 ttl pk-yrs)     Types: Cigarettes     Start date: 1969     Quit date: 1974     Years since quittin.7     Passive exposure: Past     "Smokeless tobacco: Never   Vaping Use    Vaping status: Never Used   Substance and Sexual Activity    Alcohol use: Never     Comment: quit in 1988    Drug use: Never    Sexual activity: Defer       Family History   Problem Relation Age of Onset    Heart disease Mother     Hypertension Mother     Depression Mother     Alcohol abuse Maternal Uncle     Heart disease Maternal Grandmother     Hypertension Maternal Grandmother     Diabetes Maternal Grandmother     Diabetes Paternal Grandmother     No Known Problems Half-Brother     No Known Problems Half-Brother     No Known Problems Half-Sister     No Known Problems Daughter     No Known Problems Daughter     Hyperlipidemia Daughter        Review of Systems   Constitutional:  Negative for chills, fatigue and fever.   HENT:  Negative for congestion, rhinorrhea and sore throat.    Eyes:  Negative for visual disturbance.   Respiratory:  Positive for shortness of breath. Negative for apnea and chest tightness.    Cardiovascular:  Positive for leg swelling. Negative for chest pain and palpitations.   Gastrointestinal:  Positive for constipation. Negative for diarrhea and nausea.   Musculoskeletal:  Positive for arthralgias and back pain. Negative for neck pain.   Skin:  Negative for rash and wound.   Allergic/Immunologic: Positive for environmental allergies. Negative for food allergies.   Neurological:  Positive for dizziness (getting up quickly), weakness and light-headedness. Negative for syncope.   Hematological:  Bruises/bleeds easily.   Psychiatric/Behavioral:  Negative for sleep disturbance.        Objective     VITALS: /70 (BP Location: Left arm, Patient Position: Sitting, Cuff Size: Adult)   Pulse 67   Ht 175.3 cm (69.02\")   Wt 92.9 kg (204 lb 12.8 oz)   SpO2 96%   BMI 30.23 kg/m²     LABS:   Lab Results (most recent)       None            IMAGING:   CT Chest Without Contrast Diagnostic    Result Date: 7/20/2024  No suspicious pulmonary nodules.   This " report was finalized on 7/20/2024 12:51 PM by Ralph Beltran M.D..        EXAM:  Physical Exam  Vitals and nursing note reviewed.   Constitutional:       Appearance: He is well-developed.   HENT:      Head: Normocephalic.   Neck:      Thyroid: No thyroid mass.      Vascular: No carotid bruit or JVD.      Trachea: Trachea and phonation normal.   Cardiovascular:      Rate and Rhythm: Normal rate and regular rhythm.      Pulses:           Radial pulses are 2+ on the right side and 2+ on the left side.        Posterior tibial pulses are 2+ on the right side and 2+ on the left side.      Heart sounds: Normal heart sounds. No murmur heard.     No friction rub. No gallop.   Pulmonary:      Effort: Pulmonary effort is normal. No respiratory distress.      Breath sounds: Normal breath sounds. No wheezing or rales.   Musculoskeletal:         General: No swelling. Normal range of motion.      Cervical back: Neck supple.      Right lower leg: Edema present.      Left lower leg: Edema present.   Skin:     General: Skin is warm and dry.      Capillary Refill: Capillary refill takes less than 2 seconds.      Findings: No rash.   Neurological:      Mental Status: He is alert and oriented to person, place, and time.   Psychiatric:         Speech: Speech normal.         Behavior: Behavior normal.         Thought Content: Thought content normal.         Judgment: Judgment normal.         Procedure   Procedures       Assessment & Plan    Diagnosis Plan   1. Coronary artery disease involving native coronary artery of native heart with angina pectoris        2. S/P CABG x 4        3. SOB (shortness of breath)        4. Essential hypertension        1.  Patient does have a history of coronary artery disease which seem to be stable.  He denies any significant angina anginal equivalent symptoms.  Will continue to follow at this time.  We did discuss his echocardiogram that showed a preserved EF and grade 1 diastolic dysfunction.  No  hemodynamically significant valvular disease.    2.  Patient's blood pressure is controlled on current blood pressure medication regimen.  No medication changes are warranted at this time.  Patient advised to monitor blood pressure on a daily basis and report any persistent highs or lows.  Set goal blood pressure for patient at 130/80 or below.    3.  Patient shortness of breath stable nonprogressive.  Will continue to monitor.    4.  Informed of signs and symptoms of ACS and advised to seek emergent treatment for any new worsening symptoms.  Patient also advised sooner follow-up as needed.  Also advised to follow-up with family doctor as needed  This note is dictated utilizing voice recognition software.  Although this record has been proof read, transcriptional errors may still be present. If questions occur regarding the content of this record please do not hesitate to call our office.  I have reviewed and confirmed the accuracy of the ROS as documented by the MA/LPN/RN ANKUR Cuevas    Return in about 6 months (around 4/18/2025), or if symptoms worsen or fail to improve.    Diagnoses and all orders for this visit:    1. Coronary artery disease involving native coronary artery of native heart with angina pectoris (Primary)    2. S/P CABG x 4    3. SOB (shortness of breath)    4. Essential hypertension    Other orders  -     doxazosin (CARDURA) 4 MG tablet; Take 1 tablet by mouth Daily.  Dispense: 90 tablet; Refill: 3        William Villasenor  reports that he quit smoking about 50 years ago. His smoking use included cigarettes. He started smoking about 55 years ago. He has a 5 pack-year smoking history. He has been exposed to tobacco smoke. He has never used smokeless tobacco. I have educated him on the risk of diseases from using tobacco products. Patient does not smoke.    Advance Care Planning   ACP discussion was held with the patient during this visit. Patient does not have an advance directive, declines  further assistance.                          MEDS ORDERED DURING VISIT:  New Medications Ordered This Visit   Medications    doxazosin (CARDURA) 4 MG tablet     Sig: Take 1 tablet by mouth Daily.     Dispense:  90 tablet     Refill:  3           This document has been electronically signed by Dany San Jr., APRN  October 18, 2024 13:19 EDT

## 2024-10-21 ENCOUNTER — TELEPHONE (OUTPATIENT)
Dept: CARDIOLOGY | Facility: CLINIC | Age: 74
End: 2024-10-21
Payer: MEDICARE

## 2024-10-21 NOTE — TELEPHONE ENCOUNTER
"During the pt's appt on Friday JR requested that I speak with the pt regarding some concerns and frustrations he verbalized.  Met with the pt and he reported his Echo results stated he has a \"bad heart\" and this needs to be corrected so he can be cleared to return to work.  Pt states several providers that he's recently seen does not agree with this and stated his heart is actually improving.  Pt requested his Echo results to be corrected.  Reviewed the pt's Echo and different documentation in his chart and was unable to locate notation of a 'bad heart\" in the echo results or any documentation.  Reviewed the pt's clearance and the Heart Failure guidelines in his chart.  Discussed this with JR and due to the HF criteria and the pt experiencing dizziness when going from a sit to stand position the pt could not be cleared on his DOT clearance.  Called and explained this to the pt.  Explained to the pt that Dr. Kennedy is going to review his chart and make any additional recommendations.    Dr. Kennedy reviewed the pt's chart and his recommendations is to decrease Doxazosin to 2 mg daily and call back in 1 week with an update regarding his dizziness with sit to stand.  Pt notified and repeated and verified.  Per Dr. Kennedy pt will be cleared if this resolved his dizziness.    "

## 2024-10-28 ENCOUNTER — TELEPHONE (OUTPATIENT)
Dept: CARDIOLOGY | Facility: CLINIC | Age: 74
End: 2024-10-28
Payer: MEDICARE

## 2024-10-28 NOTE — TELEPHONE ENCOUNTER
Received cardiac clearance request from [employer] [facility] [employer] stating pt requires a DOT CLEARANCE and is requiring a cardiac clearance. Placed cardiac clearance request in DELON'S inbox to review and address with provider.

## 2024-10-28 NOTE — TELEPHONE ENCOUNTER
Caller: William Villasenor    Relationship: Self    Best call back number: [phone]    What is the best time to reach you: ANY    What was the call regarding: PATIENT WOULD LIKE TO SPEAK WITH ARMANI. HE HAD SPOKEN WITH HER ON 10.18.2024. PLEASE CALL THE PATIENT.     Is it okay if the provider responds through MyChart: NO

## 2024-10-28 NOTE — TELEPHONE ENCOUNTER
Returned the pt's call to obtain a symptom check.  Pt stated getting up and down at [employer], up out of the bed, & up and down and stated he hasn't noticed any dizziness since the medication modification.  Pt denies any other symptoms.  Reports B/P 120-130s/60s.    Explained to the pt that due to unforeseen circumstances Dr. Kennedy is out of the office until Wednesday and I don't anticipate an answer until around noon on Wednesday.  Pt instructed to contact the personnel and request an updated DOT clearance request to be faxed to our office.

## 2024-10-30 NOTE — TELEPHONE ENCOUNTER
PER DR. VEGA CLEARED TO RENEW HIS [employer] LICENSE, DOT PHYSICAL AND OPERATE A CMV ALONG WITH PERFORMING  DUTIES. CLEAR. LETTER FAXED TO [facility] [employer]. PH,LPN

## 2024-11-04 ENCOUNTER — SPECIALTY PHARMACY (OUTPATIENT)
Dept: PHARMACY | Facility: HOSPITAL | Age: 74
End: 2024-11-04
Payer: MEDICARE

## 2024-11-04 ENCOUNTER — OFFICE VISIT (OUTPATIENT)
Dept: ENDOCRINOLOGY | Facility: CLINIC | Age: 74
End: 2024-11-04
Payer: MEDICARE

## 2024-11-04 VITALS
DIASTOLIC BLOOD PRESSURE: 74 MMHG | WEIGHT: 219.2 LBS | SYSTOLIC BLOOD PRESSURE: 121 MMHG | HEIGHT: 69 IN | HEART RATE: 63 BPM | OXYGEN SATURATION: 94 % | BODY MASS INDEX: 32.47 KG/M2

## 2024-11-04 DIAGNOSIS — E78.5 HYPERLIPIDEMIA, UNSPECIFIED HYPERLIPIDEMIA TYPE: Chronic | ICD-10-CM

## 2024-11-04 DIAGNOSIS — E11.21 TYPE 2 DIABETES MELLITUS WITH DIABETIC NEPHROPATHY, UNSPECIFIED WHETHER LONG TERM INSULIN USE: Primary | Chronic | ICD-10-CM

## 2024-11-04 LAB
ALBUMIN SERPL-MCNC: 4.1 G/DL (ref 3.5–5.2)
ALBUMIN UR-MCNC: 1.5 MG/DL
ALBUMIN/GLOB SERPL: 1.5 G/DL
ALP SERPL-CCNC: 69 U/L (ref 39–117)
ALT SERPL W P-5'-P-CCNC: 11 U/L (ref 1–41)
ANION GAP SERPL CALCULATED.3IONS-SCNC: 11.6 MMOL/L (ref 5–15)
AST SERPL-CCNC: 10 U/L (ref 1–40)
BILIRUB SERPL-MCNC: 0.4 MG/DL (ref 0–1.2)
BUN SERPL-MCNC: 22 MG/DL (ref 8–23)
BUN/CREAT SERPL: 13.2 (ref 7–25)
CALCIUM SPEC-SCNC: 9.4 MG/DL (ref 8.6–10.5)
CHLORIDE SERPL-SCNC: 97 MMOL/L (ref 98–107)
CHOLEST SERPL-MCNC: 138 MG/DL (ref 0–200)
CO2 SERPL-SCNC: 28.4 MMOL/L (ref 22–29)
CREAT SERPL-MCNC: 1.67 MG/DL (ref 0.76–1.27)
CREAT UR-MCNC: 19.4 MG/DL
EGFRCR SERPLBLD CKD-EPI 2021: 42.4 ML/MIN/1.73
EXPIRATION DATE: ABNORMAL
GLOBULIN UR ELPH-MCNC: 2.8 GM/DL
GLUCOSE BLDC GLUCOMTR-MCNC: 440 MG/DL (ref 70–130)
GLUCOSE SERPL-MCNC: 399 MG/DL (ref 65–99)
HDLC SERPL-MCNC: 28 MG/DL (ref 40–60)
LDLC SERPL CALC-MCNC: 72 MG/DL (ref 0–100)
LDLC/HDLC SERPL: 2.28 {RATIO}
Lab: ABNORMAL
MICROALBUMIN/CREAT UR: 77.3 MG/G (ref 0–29)
POTASSIUM SERPL-SCNC: 3.9 MMOL/L (ref 3.5–5.2)
PROT SERPL-MCNC: 6.9 G/DL (ref 6–8.5)
SODIUM SERPL-SCNC: 137 MMOL/L (ref 136–145)
TRIGL SERPL-MCNC: 231 MG/DL (ref 0–150)
TSH SERPL DL<=0.05 MIU/L-ACNC: 2.5 UIU/ML (ref 0.27–4.2)
VLDLC SERPL-MCNC: 38 MG/DL (ref 5–40)

## 2024-11-04 PROCEDURE — 80061 LIPID PANEL: CPT

## 2024-11-04 PROCEDURE — 80053 COMPREHEN METABOLIC PANEL: CPT

## 2024-11-04 PROCEDURE — 82043 UR ALBUMIN QUANTITATIVE: CPT

## 2024-11-04 PROCEDURE — 82570 ASSAY OF URINE CREATININE: CPT

## 2024-11-04 PROCEDURE — 84443 ASSAY THYROID STIM HORMONE: CPT

## 2024-11-04 RX ORDER — GLIPIZIDE 10 MG/1
10 TABLET ORAL
Qty: 60 TABLET | Refills: 2 | Status: SHIPPED | OUTPATIENT
Start: 2024-11-04

## 2024-11-04 RX ORDER — ORAL SEMAGLUTIDE 3 MG/1
1 TABLET ORAL
Qty: 30 TABLET | Refills: 1 | Status: SHIPPED | OUTPATIENT
Start: 2024-11-04

## 2024-11-04 NOTE — ASSESSMENT & PLAN NOTE
-Diabetes is not controlled with A1c 9.6 on 9/9/24.   -Discussed referral to wound care for management of wound x3 months on patient's right foot.  He reports the wound is healing and declines referral at this time.  -Discussed insulin therapy, the patient declines at this time. Also discussed GLP-1 therapy. The patient declines at this time, but may be open to it in the future. The patient has no personal history of pancreatitis, no family history of MEN syndrome or medullary thyroid cancer.     -Will add Rybelsus 3 mg daily. Administer on an empty stomach, >=30 minutes before the first food, beverage, or other oral medications of the day with <=4 oz of plain water only. The  recommends eating 30 to 60 minutes after the dose. Swallow tablets whole; do not split, crush, or chew.   -Increase glipizide to 10 mg twice daily  -Continue metformin 1000 mg twice daily  -Continue Farxiga 10 mg daily  -Instructed patient to check fasting glucose daily and bring log to next appointment  -Discussed dietary and exercise guidelines with patient. Dietary print out given to patient in office today.   -Discussed the importance of yearly eye exams. Due and advised.  -S/S hypoglycemia reviewed with Rule of 15's advised.  -Discussed long term risks of untreated/undertreated diabetes including retinopathy, neuropathy, nephropathy, amputations, hospitalizations, MI, CVA.

## 2024-11-04 NOTE — PROGRESS NOTES
Specialty Pharmacy Patient Management Program  Endocrinology Initial Assessment       William Villasenor is a 74 y.o. male with Type 2 Diabetes seen by an Endocrinology provider and enrolled in the Endocrinology Patient Management program offered by [facility].  An initial outreach was conducted, including assessment of therapy appropriateness and specialty medication education for Metformin, Farxiga, Glipizide. The patient was introduced to services offered by [facility], including: regular assessments, refill coordination, curbside pick-up or mail order delivery options, prior authorization maintenance, and financial assistance programs as applicable. The patient was also provided with contact information for the pharmacy team.     Patient is currently taking Metformin 1000mg BID, Glipizide 5mg BID, Farxiga 10mg daily, and Glucocil supplement. Patient checks BG once daily with readings in the 230s, but reports his BG was 428 this morning, which he believes is from his dinner last night. Patient denies low BG <70. Patient reports he is not interested in any injections at this time.       Complications include: h/o HTN, HLD, history of MI in March 2021, denies history of stroke, and denies hospitalization due to diabetes    Patient denies personal/family history of thyroid cancer, denies history of pancreatitis, denies issues with recurrent UTI/yeast infections, denies history of gastroparesis      In the past, the patient has tried: Nothing     Insurance Coverage & Financial Support  No issues      Relevant Past Medical History and Comorbidities  Relevant medical history and concomitant health conditions were discussed with the patient. The patient's chart has been reviewed for relevant past medical history and comorbid health conditions and updated as necessary.   Past Medical History:   Diagnosis Date    Arthritis     Back pain     BPH (benign prostatic hyperplasia)      Chronic diastolic (congestive) heart failure     Coronary artery disease     Diabetes mellitus     Diabetic peripheral neuropathy     Elevated cholesterol     Erectile dysfunction     Former smoker     GERD (gastroesophageal reflux disease)     Gout     Hemorrhoids     High cholesterol     High triglycerides     Hypertension     Neuropathy     Obesity      Social History     Socioeconomic History    Marital status:     Number of children: 3   Tobacco Use    Smoking status: Former     Current packs/day: 0.00     Average packs/day: 1 pack/day for 5.0 years (5.0 ttl pk-yrs)     Types: Cigarettes     Start date: 1969     Quit date: 1974     Years since quittin.7     Passive exposure: Past    Smokeless tobacco: Never   Vaping Use    Vaping status: Never Used   Substance and Sexual Activity    Alcohol use: Never     Comment: quit in     Drug use: Never    Sexual activity: Defer       Problem list reviewed by Dilia Grossman RPH on 2024 at  3:22 PM    Allergies  Known allergies and reactions were discussed with the patient. The patient's chart has been reviewed for  allergy information and updated as necessary.   Allergies   Allergen Reactions    Lisinopril Cough       Allergies reviewed by Dilia Grossman RPH on 2024 at 10:53 AM    Relevant Laboratory Values    Lab Results   Component Value Date    HGBA1C 9.50 (H) 2023     Lab Results   Component Value Date    GLUCOSE 156 (H) 2024    CALCIUM 8.9 2024     2024    K 4.4 2024    CO2 23.5 2024     2024    BUN 17 2024    CREATININE 1.10 2024    EGFRIFNONA 58 (L) 2021    BCR 15.5 2024    ANIONGAP 11.5 2024     Lab Results   Component Value Date    CHOL 163 2022    TRIG 186 (H) 2022    HDL 37 (L) 2022    LDL 94 2022         Current Medication List  This medication list has been reviewed with the patient and evaluated for any interactions or  necessary modifications/recommendations, and updated to include all prescription medications, OTC medications, and supplements the patient is currently taking.  This list reflects what is contained in the patient's profile, which has also been marked as reviewed to communicate to other providers it is the most up to date version of the patient's current medication therapy.     Current Outpatient Medications:     allopurinol (ZYLOPRIM) 300 MG tablet, TAKE 1 TABLET EVERY DAY, Disp: 90 tablet, Rfl: 0    APPLE CIDER VINEGAR PO, Take 1 tablet by mouth 2 (Two) Times a Day., Disp: , Rfl:     ascorbic acid (VITAMIN C) 1000 MG tablet, Take 1 tablet by mouth 2 (Two) Times a Day., Disp: , Rfl:     aspirin 81 MG EC tablet, Take 1 tablet by mouth Daily., Disp: , Rfl:     atorvastatin (LIPITOR) 40 MG tablet, Take 1 tablet by mouth Every Night., Disp: 90 tablet, Rfl: 0    bumetanide (BUMEX) 2 MG tablet, Take 1 tablet by mouth Daily., Disp: 90 tablet, Rfl: 1    CALCIUM-MAGNESIUM-ZINC PO, Take 1 tablet by mouth 2 (Two) Times a Day. 400 mg Mag per capsule, Disp: , Rfl:     Cayenne 450 MG capsule, Take 1 capsule by mouth 2 (Two) Times a Day., Disp: , Rfl:     Cinnamon 500 MG capsule, Take 1 capsule by mouth 2 (Two) Times a Day. (Patient taking differently: Take 1,800 mg by mouth 2 (Two) Times a Day.), Disp: , Rfl:     Cranberry 250 MG capsule, Take 25,000 mg by mouth Daily., Disp: , Rfl:     dapagliflozin Propanediol (Farxiga) 10 MG tablet, Take 1 tablet by mouth Daily., Disp: 90 tablet, Rfl: 3    doxazosin (CARDURA) 4 MG tablet, Take 1 tablet by mouth Daily. (Patient taking differently: Take 0.5 tablets by mouth Daily.), Disp: 90 tablet, Rfl: 3    finasteride (PROSCAR) 5 MG tablet, Take 1 tablet by mouth Daily., Disp: 90 tablet, Rfl: 0    folic acid (FOLVITE) 800 MCG tablet, Take 1 tablet by mouth Daily., Disp: , Rfl:     gabapentin (NEURONTIN) 400 MG capsule, Take 1 capsule by mouth 2 (Two) Times a Day., Disp: , Rfl:     isosorbide  mononitrate (IMDUR) 120 MG 24 hr tablet, Take 1 tablet by mouth Daily., Disp: 90 tablet, Rfl: 3    loratadine (Claritin) 10 MG tablet, Take 1 tablet by mouth Daily As Needed for Allergies., Disp: , Rfl:     metFORMIN (GLUCOPHAGE) 1000 MG tablet, Take 1 tablet by mouth 2 (Two) Times a Day With Meals., Disp: 180 tablet, Rfl: 0    methylcellulose, Laxative, (CITRUCEL) 500 MG tablet tablet, Take 2 tablets by mouth Every 4 (Four) Hours As Needed., Disp: , Rfl:     metoprolol succinate XL (TOPROL-XL) 100 MG 24 hr tablet, Take 1 tablet by mouth Daily., Disp: 90 tablet, Rfl: 3    Multiple Vitamins-Minerals (ICAPS AREDS 2 PO), Take  by mouth., Disp: , Rfl:     nitroglycerin (NITROSTAT) 0.4 MG SL tablet, PLACE 1 TAB UNDER TONGUE EVERY 5 MINS AS NEEDED FOR CHEST PAIN (ONLY IF SYSTOLIC BLOOD PRESSURE OVER 100) MAX 3 TABS/15 MINS, Disp: 25 tablet, Rfl: 3    NON FORMULARY, Take 20 mg by mouth 2 (Two) Times a Day. Lutein, Disp: , Rfl:     NON FORMULARY, Sugar Blocker  1 tablet po bid, Disp: , Rfl:     Omega-3 Fatty Acids (Fish Oil Triple Strength) 1400 MG capsule, Take 1,200 mg by mouth 2 (Two) Times a Day., Disp: , Rfl:     sacubitril-valsartan (Entresto)  MG tablet, Take 1 tablet by mouth 2 Times a Day., Disp: 180 tablet, Rfl: 3    vitamin E 400 UNIT capsule, Take 1 capsule by mouth Daily., Disp: , Rfl:     glipizide (GLUCOTROL) 10 MG tablet, Take 1 tablet by mouth 2 (Two) Times a Day Before Meals., Disp: 60 tablet, Rfl: 2    Semaglutide (Rybelsus) 3 MG tablet, Take 1 tablet by mouth Every Morning Before Breakfast., Disp: 30 tablet, Rfl: 1  No current facility-administered medications for this visit.    Medicines reviewed by Dilia Grossman RPH on 11/4/2024 at  3:21 PM    Drug Interactions  No significant drug-drug interactions with diabetes medications expected according to literature.    Recommended Medications Assessment  Aspirin -  Currently Taking   Statin -  Currently Taking   ACEi/ARB - Not Taking  Currently    Current 10-Year ASCVD Risk: Patient has history of MI       Adherence and Self-Administration  Adherence related to the patient's specialty therapy was discussed with the patient. The Adherence segment of this outreach has been reviewed and updated.              Barriers to Patient Adherence and/or Self-Administration: None   Methods for Supporting Patient Adherence and/or Self-Administration: Patient has medications organized in container    Smoker?  Former    Goals of Therapy  Goals related to the patient's specialty therapy were discussed with the patient. The Patient Goals segment of this outreach has been reviewed and updated.    Goals Addressed Today        HEMOGLOBIN A1C < 7                     Reassessment Plan & Follow-Up  Patient's diabetes is uncontrolled with most recent A1C of 9.6%.  Medication Therapy Changes:  None discussed with patient   Related Plans, Therapy Recommendations or Therapy Problems to Be Addressed:    Recommended to provider patient is a good candidate for Rybelsus   Patient denies issues with affordability or adherence at this time.     Patient does not wish to use [facility] at this time due to: loyalty to home pharmacy     Attestation  I attest the patient was actively involved in and has agreed to the above plan of care. If the prescribed therapy is at any point deemed not appropriate based on the current or future assessments, a consultation will be initiated with the patient's specialty care provider to determine the best course of action. The revised plan of therapy will be documented along with any required assessments and/or additional patient education provided..    Dilia Grossman RPH  11/04/2024  15:39 EST

## 2024-11-04 NOTE — PROGRESS NOTES
"HPI   William Villasenor is a 74 y.o. male who presents to the clinic today for initial evaluation of type 2 diabetes. Last A1c was 9.6 on 9/9/24. Diabetes was diagnosed approximately 30 years ago.  He checks his glucose 2-3 times per week. Fasting glucose averages in the 230s.  He reports his fasting glucose was in the 400s this morning, he believes this is due to eating 2 bologna sandwiches before bed last night.  He denies any hypoglycemia. He currently takes metformin 1,000 mg twice daily, Farxiga 10 mg daily and glipizide 5 mg twice daily for his diabetes. He has a significant cardiac history and underwent four vessel CABG in March 2021. He does not want any injection therapy at this time.    Diabetic complications: peripheral neuropathy and cardiovascular disease  Last optho exam: a couple years ago. Due and advised.   Last microalbumin: today  Last foot exam: today  Statin: yes  Lipid panel: today  ACE/ARB: no/no     Nutrition:     Current diet: 2-3 meals  Breakfast: oatmeal  Lunch: [email or web address] or catfish, bernabe slaw and green beans  Dinner: banana or bologna, tomato and onion sandwich  Drinks: water, and coffee    The following portions of the patient's history were reviewed and updated as appropriate: allergies, current medications, past family history, past medical history, past social history, past surgical history, and problem list.    /74 (BP Location: Right arm, Patient Position: Sitting, Cuff Size: Adult)   Pulse 63   Ht 175.3 cm (69\")   Wt 99.4 kg (219 lb 3.2 oz)   SpO2 94%   BMI 32.37 kg/m²      Past Medical History:   Diagnosis Date    Arthritis     Back pain     BPH (benign prostatic hyperplasia)     Chronic diastolic (congestive) heart failure     Coronary artery disease     Diabetes mellitus     Diabetic peripheral neuropathy     Elevated cholesterol     Erectile dysfunction     Former smoker     GERD (gastroesophageal reflux disease)     Gout     Hemorrhoids     High cholesterol     " High triglycerides     Hypertension     Neuropathy     Obesity      Past Surgical History:   Procedure Laterality Date    CARDIAC CATHETERIZATION N/A 3/15/2021    Procedure: Left Heart Cath;  Surgeon: Trent Zaragoza MD;  Location: [facility] CATH INVASIVE LOCATION;  Service: Cardiology;  Laterality: N/A;    CARDIAC CATHETERIZATION N/A 3/15/2021    Procedure: Coronary angiography;  Surgeon: Trent Zaragoza MD;  Location:  [facility] CATH INVASIVE LOCATION;  Service: Cardiology;  Laterality: N/A;    COLONOSCOPY N/A 10/10/2024    Procedure: COLONOSCOPY;  Surgeon: Aly Barnhart MD;  Location:  [facility] OR;  Service: Gastroenterology;  Laterality: N/A;    COLONOSCOPY W/ 2015    CORONARY ARTERY BYPASS GRAFT N/A 3/16/2021    Procedure: MEDIAN STERNOTOMY, CORONARY ARTERY BYPASS GRAFT X4, UTILIZATION OF LEFT INTERNAL MAMMARY ARTERY, AND ENDOSCOPIC VEIN HARVEST OF RIGHT GREATER SAPHENOUS VEIN;  Surgeon: Jorge Simms MD;  Location:  [facility];  Service: Cardiothoracic;  Laterality: N/A;  vein out- 1614  vein ready- 1628              Family History   Problem Relation Age of Onset    Heart disease Mother     Hypertension Mother     Depression Mother     Alcohol abuse Maternal Uncle     Heart disease Maternal Grandmother     Hypertension Maternal Grandmother     Diabetes Maternal Grandmother     Diabetes Paternal Grandmother     No Known Problems Half-Brother     No Known Problems Half-Brother     No Known Problems Half-Sister     No Known Problems Daughter     No Known Problems Daughter     Hyperlipidemia Daughter       Social History     Socioeconomic History    Marital status:     Number of children: 3   Tobacco Use    Smoking status: Former     Current packs/day: 0.00     Average packs/day: 1 pack/day for 5.0 years (5.0 ttl pk-yrs)     Types: Cigarettes     Start date: 1969     Quit date: 1974     Years since quittin.7     Passive exposure: Past    Smokeless tobacco: Never   Vaping Use    Vaping  status: Never Used   Substance and Sexual Activity    Alcohol use: Never     Comment: quit in 1988    Drug use: Never    Sexual activity: Defer      Allergies   Allergen Reactions    Lisinopril Cough      Current Outpatient Medications on File Prior to Visit   Medication Sig Dispense Refill    allopurinol (ZYLOPRIM) 300 MG tablet TAKE 1 TABLET EVERY DAY 90 tablet 0    APPLE CIDER VINEGAR PO Take 1 tablet by mouth 2 (Two) Times a Day.      ascorbic acid (VITAMIN C) 1000 MG tablet Take 1 tablet by mouth 2 (Two) Times a Day.      aspirin 81 MG EC tablet Take 1 tablet by mouth Daily.      atorvastatin (LIPITOR) 40 MG tablet Take 1 tablet by mouth Every Night. 90 tablet 0    bumetanide (BUMEX) 2 MG tablet Take 1 tablet by mouth Daily. 90 tablet 1    CALCIUM-MAGNESIUM-ZINC PO Take 1 tablet by mouth 2 (Two) Times a Day. 400 mg Mag per capsule      Cayenne 450 MG capsule Take 1 capsule by mouth 2 (Two) Times a Day.      Cinnamon 500 MG capsule Take 1 capsule by mouth 2 (Two) Times a Day. (Patient taking differently: Take 1,800 mg by mouth 2 (Two) Times a Day.)      Cranberry 250 MG capsule Take 25,000 mg by mouth Daily.      dapagliflozin Propanediol (Farxiga) 10 MG tablet Take 1 tablet by mouth Daily. 90 tablet 3    doxazosin (CARDURA) 4 MG tablet Take 1 tablet by mouth Daily. (Patient taking differently: Take 0.5 tablets by mouth Daily.) 90 tablet 3    finasteride (PROSCAR) 5 MG tablet Take 1 tablet by mouth Daily. 90 tablet 0    folic acid (FOLVITE) 800 MCG tablet Take 1 tablet by mouth Daily.      gabapentin (NEURONTIN) 400 MG capsule Take 1 capsule by mouth 2 (Two) Times a Day.      isosorbide mononitrate (IMDUR) 120 MG 24 hr tablet Take 1 tablet by mouth Daily. 90 tablet 3    loratadine (Claritin) 10 MG tablet Take 1 tablet by mouth Daily As Needed for Allergies.      metFORMIN (GLUCOPHAGE) 1000 MG tablet Take 1 tablet by mouth 2 (Two) Times a Day With Meals. 180 tablet 0    methylcellulose, Laxative, (CITRUCEL) 500  MG tablet tablet Take 2 tablets by mouth Every 4 (Four) Hours As Needed.      metoprolol succinate XL (TOPROL-XL) 100 MG 24 hr tablet Take 1 tablet by mouth Daily. 90 tablet 3    Multiple Vitamins-Minerals (ICAPS AREDS 2 PO) Take  by mouth.      nitroglycerin (NITROSTAT) 0.4 MG SL tablet PLACE 1 TAB UNDER TONGUE EVERY 5 MINS AS NEEDED FOR CHEST PAIN (ONLY IF SYSTOLIC BLOOD PRESSURE OVER 100) MAX 3 TABS/15 MINS 25 tablet 3    NON FORMULARY Take 20 mg by mouth 2 (Two) Times a Day. Lutein      NON FORMULARY Sugar Blocker  1 tablet po bid      Omega-3 Fatty Acids (Fish Oil Triple Strength) 1400 MG capsule Take 1,200 mg by mouth 2 (Two) Times a Day.      sacubitril-valsartan (Entresto)  MG tablet Take 1 tablet by mouth 2 Times a Day. 180 tablet 3    vitamin E 400 UNIT capsule Take 1 capsule by mouth Daily.      [DISCONTINUED] glipizide (GLUCOTROL) 5 MG tablet Take 1 tablet by mouth 2 (Two) Times a Day Before Meals. 180 tablet 3     No current facility-administered medications on file prior to visit.        Review of Systems   Constitutional:  Positive for fatigue and unexpected weight gain.   HENT:  Negative for trouble swallowing.    Eyes:  Negative for blurred vision.   Gastrointestinal:  Positive for constipation. Negative for abdominal pain and diarrhea.   Endocrine: Positive for polydipsia, polyphagia and polyuria.   Musculoskeletal:  Positive for arthralgias.   Psychiatric/Behavioral:  Positive for sleep disturbance.         Physical Exam  Vitals reviewed.   Constitutional:       Appearance: Normal appearance.   HENT:      Head: Normocephalic.   Eyes:      Pupils: Pupils are equal, round, and reactive to light.   Cardiovascular:      Pulses: Normal pulses.           Dorsalis pedis pulses are 2+ on the right side and 2+ on the left side.        Posterior tibial pulses are 2+ on the right side and 2+ on the left side.   Pulmonary:      Effort: Pulmonary effort is normal.   Abdominal:      Palpations: Abdomen is  soft.   Musculoskeletal:      Cervical back: Normal range of motion.        Feet:    Feet:      Right foot:      Protective Sensation: 6 sites tested.  3 sites sensed.      Skin integrity: Ulcer and dry skin present.      Toenail Condition: Right toenails are abnormally thick.      Left foot:      Protective Sensation: 6 sites tested.  3 sites sensed.      Skin integrity: Dry skin present.      Toenail Condition: Left toenails are abnormally thick.      Comments: Wound on upper right foot.  Diabetic Foot Exam Performed and Monofilament Test Performed     Skin:     General: Skin is warm and dry.   Neurological:      Mental Status: He is alert and oriented to person, place, and time.   Psychiatric:         Mood and Affect: Mood normal.         Behavior: Behavior normal.          CMP:  Lab Results   Component Value Date    GLUCOSE 156 (H) 02/23/2024    BUN 17 02/23/2024    CREATININE 1.10 02/23/2024     02/23/2024    K 4.4 02/23/2024     02/23/2024    CALCIUM 8.9 02/23/2024    PROTEINTOT 7.0 06/05/2023    ALBUMIN 4.0 06/05/2023    ALT 9 06/05/2023    AST 8 06/05/2023    ALKPHOS 94 06/05/2023    BILITOT 0.7 06/05/2023    GLOB 3.0 06/05/2023    AGRATIO 1.3 06/05/2023    BCR 15.5 02/23/2024    ANIONGAP 11.5 02/23/2024    EGFR 70.4 02/23/2024        Lipid Panel:  Lab Results   Component Value Date    CHOL 163 03/25/2022    TRIG 186 (H) 03/25/2022    HDL 37 (L) 03/25/2022    VLDL 32 03/25/2022    LDL 94 03/25/2022     HbA1c:  Lab Results   Component Value Date    HGBA1C 9.50 (H) 01/06/2023    HGBA1C 8.30 (H) 04/21/2021     TSH:  Lab Results   Component Value Date    TSH 1.020 06/05/2023       Assessment and Plan  Diagnoses and all orders for this visit:    1. Type 2 diabetes mellitus with diabetic nephropathy, unspecified whether long term insulin use (Primary)  Assessment & Plan:  -Diabetes is not controlled with A1c 9.6 on 9/9/24.   -Discussed referral to wound care for management of wound x3 months on  patient's right foot.  He reports the wound is healing and declines referral at this time.  -Discussed insulin therapy, the patient declines at this time. Also discussed GLP-1 therapy. The patient declines at this time, but may be open to it in the future. The patient has no personal history of pancreatitis, no family history of MEN syndrome or medullary thyroid cancer.     -Will add Rybelsus 3 mg daily. Administer on an empty stomach, >=30 minutes before the first food, beverage, or other oral medications of the day with <=4 oz of plain water only. The  recommends eating 30 to 60 minutes after the dose. Swallow tablets whole; do not split, crush, or chew.   -Increase glipizide to 10 mg twice daily  -Continue metformin 1000 mg twice daily  -Continue Farxiga 10 mg daily  -Instructed patient to check fasting glucose daily and bring log to next appointment  -Discussed dietary and exercise guidelines with patient. Dietary print out given to patient in office today.   -Discussed the importance of yearly eye exams. Due and advised.  -S/S hypoglycemia reviewed with Rule of 15's advised.  -Discussed long term risks of untreated/undertreated diabetes including retinopathy, neuropathy, nephropathy, amputations, hospitalizations, MI, CVA.      Orders:  -     POC Glucose, Blood  -     Lipid Panel  -     TSH  -     Comprehensive Metabolic Panel  -     Microalbumin / Creatinine Urine Ratio - Urine, Clean Catch  -     Semaglutide (Rybelsus) 3 MG tablet; Take 1 tablet by mouth Every Morning Before Breakfast.  Dispense: 30 tablet; Refill: 1  -     glipizide (GLUCOTROL) 10 MG tablet; Take 1 tablet by mouth 2 (Two) Times a Day Before Meals.  Dispense: 60 tablet; Refill: 2    2. Hyperlipidemia, unspecified hyperlipidemia type  Assessment & Plan:  -Continue atorvastatin 40 mg daily  -Lipid panel today         Return in about 5 weeks (around 12/11/2024). The patient was instructed to contact the clinic with any interval  questions or concerns.    Please note that portions of this document were completed with a voice recognition program. Efforts were made to edit the dictations, but occasionally words are mis-transcribed.  This document has been electronically signed by ANKUR Waller  November 4, 2024 13:48 EST

## 2024-12-09 ENCOUNTER — OFFICE VISIT (OUTPATIENT)
Dept: ENDOCRINOLOGY | Facility: CLINIC | Age: 74
End: 2024-12-09
Payer: MEDICARE

## 2024-12-09 VITALS
WEIGHT: 213.6 LBS | SYSTOLIC BLOOD PRESSURE: 119 MMHG | HEART RATE: 69 BPM | OXYGEN SATURATION: 96 % | BODY MASS INDEX: 31.54 KG/M2 | DIASTOLIC BLOOD PRESSURE: 73 MMHG

## 2024-12-09 DIAGNOSIS — E11.21 TYPE 2 DIABETES MELLITUS WITH DIABETIC NEPHROPATHY, WITHOUT LONG-TERM CURRENT USE OF INSULIN: Primary | Chronic | ICD-10-CM

## 2024-12-09 LAB
EXPIRATION DATE: ABNORMAL
EXPIRATION DATE: ABNORMAL
GLUCOSE BLDC GLUCOMTR-MCNC: 221 MG/DL (ref 70–130)
HBA1C MFR BLD: 8.8 % (ref 4.5–5.7)
Lab: ABNORMAL
Lab: ABNORMAL

## 2024-12-09 PROCEDURE — 3078F DIAST BP <80 MM HG: CPT

## 2024-12-09 PROCEDURE — 3074F SYST BP LT 130 MM HG: CPT

## 2024-12-09 PROCEDURE — 1159F MED LIST DOCD IN RCRD: CPT

## 2024-12-09 PROCEDURE — 99214 OFFICE O/P EST MOD 30 MIN: CPT

## 2024-12-09 PROCEDURE — 3052F HG A1C>EQUAL 8.0%<EQUAL 9.0%: CPT

## 2024-12-09 PROCEDURE — 82947 ASSAY GLUCOSE BLOOD QUANT: CPT

## 2024-12-09 PROCEDURE — 1160F RVW MEDS BY RX/DR IN RCRD: CPT

## 2024-12-09 PROCEDURE — 83036 HEMOGLOBIN GLYCOSYLATED A1C: CPT

## 2024-12-09 RX ORDER — ORAL SEMAGLUTIDE 7 MG/1
1 TABLET ORAL DAILY
Qty: 90 TABLET | Refills: 1 | Status: SHIPPED | OUTPATIENT
Start: 2024-12-09

## 2024-12-09 NOTE — ASSESSMENT & PLAN NOTE
-Diabetes is not controlled with A1c 8.8, but improved from previous A1c of 9.6.   -Continue Rybelsus 3 mg through the end of this week.  Start Rybelsus 7 mg once daily next week.  -Continue metformin 1000 mg twice daily  -Continue glipizide 10 mg twice daily  -Continue Farxiga 10 mg daily  -Discussed dietary and exercise guidelines with patient. 150 g carbs per day and 150 minutes of exercise per week. Increase protein with complex carbs. Avoid foods high in refined sugars and sugary drinks.   -Discussed the importance of yearly eye exams.  -Discussed Glucagon use and appropriate timing for this.   -S/S hypoglycemia reviewed with Rule of 15's advised.  -Discussed long term risks of untreated/undertreated diabetes including retinopathy, neuropathy, nephropathy, amputations, hospitalizations, MI, CVA.    -Patient has no personal history of pancreatitis, no family history of MEN syndrome or medullary thyroid cancer. Possible side effects including nausea, bloating, other GI upset and rarely pancreatitis were discussed. He was advised to call the office with any symptoms or concerns.    -Discussed the medication's MOA and protective cardiovascular and renal benefits for diabetes. Medication may cause  more frequent urination particularly upon starting the medication. Take one tablet in the morning with or without food. Encouraged to drink plenty of water as to not get dehydrated, particularly in warmer weather or with activity. Also discussed there may be an increased incidence of UTIs or yeast infections and to monitor for signs/symptoms.

## 2024-12-09 NOTE — PROGRESS NOTES
Chief Complaint   Patient presents with    Diabetes     F/u dm2, A1c 09/09/24 value 9.6      HPI   William Villasenor is a 74 y.o. male who presents to the clinic today for follow up of type 2 diabetes. A1c today is 8.8. Diabetes was diagnosed approximately 30 years ago. He checks his glucose daily. He brought a log with him to his appointment today which shows his fasting blood sugars have improved from 200s- 300s and are now 150s to 200s since we increased his glipizide and added Rybelsus.  He is currently taking metformin 1,000 mg twice daily, glipizide 10 mg twice daily, Farxiga 10 mg daily and Rybelsus 3 mg daily for his diabetes. He denies any hypoglycemia. He reports his neuropathy is improving and the sore on his foot is healing. He denies any yeast infections, UTIs, or abdominal pain.     Diabetic complications: peripheral neuropathy and cardiovascular disease  Last optho exam: a couple years ago. Due and advised.   Last microalbumin: UTD  Last foot exam: UTD  Statin: yes  Lipid panel: UTD  ACE/ARB: no     The following portions of the patient's history were reviewed and updated as appropriate: allergies, current medications, past family history, past medical history, past social history, past surgical history, and problem list.    /73 (BP Location: Right arm, Patient Position: Sitting, Cuff Size: Adult)   Pulse 69   Wt 96.9 kg (213 lb 9.6 oz)   SpO2 96%   BMI 31.54 kg/m²      Past Medical History:   Diagnosis Date    Arthritis     Back pain     BPH (benign prostatic hyperplasia)     Chronic diastolic (congestive) heart failure     Coronary artery disease     Diabetes mellitus     Diabetic peripheral neuropathy     Elevated cholesterol     Erectile dysfunction     Former smoker     GERD (gastroesophageal reflux disease)     Gout     Hemorrhoids     High cholesterol     High triglycerides     Hypertension     Neuropathy     Obesity      Past Surgical History:   Procedure Laterality Date    CARDIAC  CATHETERIZATION N/A 3/15/2021    Procedure: Left Heart Cath;  Surgeon: Trent Zaragoza MD;  Location:  [facility];  Service: Cardiology;  Laterality: N/A;    CARDIAC CATHETERIZATION N/A 3/15/2021    Procedure: Coronary angiography;  Surgeon: Trent Zaragoza MD;  Location:  [facility];  Service: Cardiology;  Laterality: N/A;    COLONOSCOPY N/A 10/10/2024    Procedure: COLONOSCOPY;  Surgeon: Aly Barnhart MD;  Location:  [facility];  Service: Gastroenterology;  Laterality: N/A;    COLONOSCOPY W/ BIOPSIES      CORONARY ARTERY BYPASS GRAFT N/A 3/16/2021    Procedure: MEDIAN STERNOTOMY, CORONARY ARTERY BYPASS GRAFT X4, UTILIZATION OF LEFT INTERNAL MAMMARY ARTERY, AND ENDOSCOPIC VEIN HARVEST OF RIGHT GREATER SAPHENOUS VEIN;  Surgeon: Jorge Simms MD;  Location:  [facility];  Service: Cardiothoracic;  Laterality: N/A;  vein out- 1614  vein ready- 1628              Family History   Problem Relation Age of Onset    Heart disease Mother     Hypertension Mother     Depression Mother     Alcohol abuse Maternal Uncle     Heart disease Maternal Grandmother     Hypertension Maternal Grandmother     Diabetes Maternal Grandmother     Diabetes Paternal Grandmother     No Known Problems Half-Brother     No Known Problems Half-Brother     No Known Problems Half-Sister     No Known Problems Daughter     No Known Problems Daughter     Hyperlipidemia Daughter       Social History     Socioeconomic History    Marital status:     Number of children: 3   Tobacco Use    Smoking status: Former     Current packs/day: 0.00     Average packs/day: 1 pack/day for 5.0 years (5.0 ttl pk-yrs)     Types: Cigarettes     Start date: 1969     Quit date: 1974     Years since quittin.8     Passive exposure: Past    Smokeless tobacco: Never   Vaping Use    Vaping status: Never Used   Substance and Sexual Activity    Alcohol use: Never     Comment: quit in     Drug use: Never    Sexual  activity: Defer      Allergies   Allergen Reactions    Lisinopril Cough      Current Outpatient Medications on File Prior to Visit   Medication Sig Dispense Refill    allopurinol (ZYLOPRIM) 300 MG tablet TAKE 1 TABLET EVERY DAY 90 tablet 0    APPLE CIDER VINEGAR PO Take 1 tablet by mouth 2 (Two) Times a Day.      ascorbic acid (VITAMIN C) 1000 MG tablet Take 1 tablet by mouth 2 (Two) Times a Day.      aspirin 81 MG EC tablet Take 1 tablet by mouth Daily.      atorvastatin (LIPITOR) 40 MG tablet Take 1 tablet by mouth Every Night. 90 tablet 0    bumetanide (BUMEX) 2 MG tablet Take 1 tablet by mouth Daily. 90 tablet 1    CALCIUM-MAGNESIUM-ZINC PO Take 1 tablet by mouth 2 (Two) Times a Day. 400 mg Mag per capsule      Cayenne 450 MG capsule Take 1 capsule by mouth 2 (Two) Times a Day.      Cinnamon 500 MG capsule Take 1 capsule by mouth 2 (Two) Times a Day. (Patient taking differently: Take 1,800 mg by mouth 2 (Two) Times a Day.)      Cranberry 250 MG capsule Take 25,000 mg by mouth Daily.      dapagliflozin Propanediol (Farxiga) 10 MG tablet Take 1 tablet by mouth Daily. 90 tablet 3    doxazosin (CARDURA) 4 MG tablet Take 1 tablet by mouth Daily. (Patient taking differently: Take 0.5 tablets by mouth Daily.) 90 tablet 3    finasteride (PROSCAR) 5 MG tablet Take 1 tablet by mouth Daily. 90 tablet 0    folic acid (FOLVITE) 800 MCG tablet Take 1 tablet by mouth Daily.      gabapentin (NEURONTIN) 400 MG capsule Take 1 capsule by mouth 2 (Two) Times a Day.      glipizide (GLUCOTROL) 10 MG tablet Take 1 tablet by mouth 2 (Two) Times a Day Before Meals. 60 tablet 2    isosorbide mononitrate (IMDUR) 120 MG 24 hr tablet Take 1 tablet by mouth Daily. 90 tablet 3    loratadine (Claritin) 10 MG tablet Take 1 tablet by mouth Daily As Needed for Allergies.      metFORMIN (GLUCOPHAGE) 1000 MG tablet Take 1 tablet by mouth 2 (Two) Times a Day With Meals. 180 tablet 0    methylcellulose, Laxative, (CITRUCEL) 500 MG tablet tablet Take  2 tablets by mouth Every 4 (Four) Hours As Needed.      metoprolol succinate XL (TOPROL-XL) 100 MG 24 hr tablet Take 1 tablet by mouth Daily. 90 tablet 3    Multiple Vitamins-Minerals (ICAPS AREDS 2 PO) Take  by mouth.      nitroglycerin (NITROSTAT) 0.4 MG SL tablet PLACE 1 TAB UNDER TONGUE EVERY 5 MINS AS NEEDED FOR CHEST PAIN (ONLY IF SYSTOLIC BLOOD PRESSURE OVER 100) MAX 3 TABS/15 MINS 25 tablet 3    NON FORMULARY Take 20 mg by mouth 2 (Two) Times a Day. Lutein      NON FORMULARY Sugar Blocker  1 tablet po bid      Omega-3 Fatty Acids (Fish Oil Triple Strength) 1400 MG capsule Take 1,200 mg by mouth 2 (Two) Times a Day.      sacubitril-valsartan (Entresto)  MG tablet Take 1 tablet by mouth 2 Times a Day. 180 tablet 3    vitamin E 400 UNIT capsule Take 1 capsule by mouth Daily.      [DISCONTINUED] Semaglutide (Rybelsus) 3 MG tablet Take 1 tablet by mouth Every Morning Before Breakfast. 30 tablet 1     No current facility-administered medications on file prior to visit.      Review of Systems   Constitutional:  Positive for fatigue.   Gastrointestinal:  Negative for abdominal pain, constipation and diarrhea.   Endocrine: Positive for polydipsia, polyphagia and polyuria.      Physical Exam  Vitals reviewed.   Constitutional:       Appearance: Normal appearance.   HENT:      Head: Normocephalic.   Eyes:      Pupils: Pupils are equal, round, and reactive to light.   Cardiovascular:      Pulses: Normal pulses.   Pulmonary:      Effort: Pulmonary effort is normal.   Abdominal:      Palpations: Abdomen is soft.   Musculoskeletal:      Cervical back: Normal range of motion.   Skin:     General: Skin is warm and dry.   Neurological:      Mental Status: He is alert and oriented to person, place, and time.   Psychiatric:         Mood and Affect: Mood normal.         Behavior: Behavior normal.       CMP:  Lab Results   Component Value Date    GLUCOSE 399 (H) 11/04/2024    BUN 22 11/04/2024    CREATININE 1.67 (H)  "11/04/2024     11/04/2024    K 3.9 11/04/2024    CL 97 (L) 11/04/2024    CALCIUM 9.4 11/04/2024    PROTEINTOT 6.9 11/04/2024    ALBUMIN 4.1 11/04/2024    ALT 11 11/04/2024    AST 10 11/04/2024    ALKPHOS 69 11/04/2024    BILITOT 0.4 11/04/2024    GLOB 2.8 11/04/2024    AGRATIO 1.5 11/04/2024    BCR 13.2 11/04/2024    ANIONGAP 11.6 11/04/2024    EGFR 42.4 (L) 11/04/2024        Lipid Panel:  Lab Results   Component Value Date    CHOL 138 11/04/2024    TRIG 231 (H) 11/04/2024    HDL 28 (L) 11/04/2024    VLDL 38 11/04/2024    LDL 72 11/04/2024     HbA1c:  No components found for: \"HGBA1C\"    TSH:  Lab Results   Component Value Date    TSH 2.500 11/04/2024       Assessment and Plan  Diagnoses and all orders for this visit:    1. Type 2 diabetes mellitus with diabetic nephropathy, without long-term current use of insulin (Primary)  Assessment & Plan:  -Diabetes is not controlled with A1c 8.8, but improved from previous A1c of 9.6.   -Continue Rybelsus 3 mg through the end of this week.  Start Rybelsus 7 mg once daily next week.  -Continue metformin 1000 mg twice daily  -Continue glipizide 10 mg twice daily  -Continue Farxiga 10 mg daily  -Discussed dietary and exercise guidelines with patient. 150 g carbs per day and 150 minutes of exercise per week. Increase protein with complex carbs. Avoid foods high in refined sugars and sugary drinks.   -Discussed the importance of yearly eye exams.  -Discussed Glucagon use and appropriate timing for this.   -S/S hypoglycemia reviewed with Rule of 15's advised.  -Discussed long term risks of untreated/undertreated diabetes including retinopathy, neuropathy, nephropathy, amputations, hospitalizations, MI, CVA.    -Patient has no personal history of pancreatitis, no family history of MEN syndrome or medullary thyroid cancer. Possible side effects including nausea, bloating, other GI upset and rarely pancreatitis were discussed. He was advised to call the office with any symptoms " or concerns.    -Discussed the medication's MOA and protective cardiovascular and renal benefits for diabetes. Medication may cause  more frequent urination particularly upon starting the medication. Take one tablet in the morning with or without food. Encouraged to drink plenty of water as to not get dehydrated, particularly in warmer weather or with activity. Also discussed there may be an increased incidence of UTIs or yeast infections and to monitor for signs/symptoms.      Orders:  -     POC Glucose, Blood  -     POC Glycosylated Hemoglobin (Hb A1C)  -     Semaglutide (Rybelsus) 7 MG tablet; Take 7 mg by mouth Daily.  Dispense: 90 tablet; Refill: 1      Return in about 3 months (around 3/9/2025). The patient was instructed to contact the clinic with any interval questions or concerns.    Please note that portions of this document were completed with a voice recognition program. Efforts were made to edit the dictations, but occasionally words are mis-transcribed.  This document has been electronically signed by ANKUR Waller  December 9, 2024 13:03 EST

## 2024-12-10 ENCOUNTER — TELEPHONE (OUTPATIENT)
Dept: CARDIOLOGY | Facility: CLINIC | Age: 74
End: 2024-12-10
Payer: MEDICARE

## 2024-12-10 DIAGNOSIS — I50.32 CHRONIC DIASTOLIC CONGESTIVE HEART FAILURE: ICD-10-CM

## 2024-12-10 DIAGNOSIS — R06.02 SOB (SHORTNESS OF BREATH): ICD-10-CM

## 2024-12-10 DIAGNOSIS — I50.32 CHRONIC DIASTOLIC HEART FAILURE: ICD-10-CM

## 2024-12-10 DIAGNOSIS — I10 ESSENTIAL HYPERTENSION: ICD-10-CM

## 2024-12-10 DIAGNOSIS — I25.119 CORONARY ARTERY DISEASE INVOLVING NATIVE CORONARY ARTERY OF NATIVE HEART WITH ANGINA PECTORIS: Primary | ICD-10-CM

## 2024-12-10 DIAGNOSIS — I25.10 ASCVD (ARTERIOSCLEROTIC CARDIOVASCULAR DISEASE): ICD-10-CM

## 2024-12-10 NOTE — TELEPHONE ENCOUNTER
Caller: William Villasenor    Relationship: Self    Best call back number: [phone]    What orders are you requesting (i.e. lab or imaging): STRESS TEST    In what timeframe would the patient need to come in: ASAP    [facility]    Additional notes: PATIENT IS REQUESTING AN ORDER FOR A STRESS TEST FOR A [employer] PHYSICAL; REQUIRED. PLEASE CALL HIM TO ADVISE.

## 2024-12-18 ENCOUNTER — TELEPHONE (OUTPATIENT)
Dept: CARDIOLOGY | Facility: CLINIC | Age: 74
End: 2024-12-18
Payer: MEDICARE

## 2024-12-18 DIAGNOSIS — I25.119 CORONARY ARTERY DISEASE INVOLVING NATIVE CORONARY ARTERY OF NATIVE HEART WITH ANGINA PECTORIS: Primary | ICD-10-CM

## 2024-12-18 DIAGNOSIS — R06.02 SOB (SHORTNESS OF BREATH): ICD-10-CM

## 2024-12-18 DIAGNOSIS — I50.32 CHRONIC DIASTOLIC HEART FAILURE: ICD-10-CM

## 2024-12-18 DIAGNOSIS — I10 ESSENTIAL HYPERTENSION: ICD-10-CM

## 2024-12-18 NOTE — TELEPHONE ENCOUNTER
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 PATIENT STATES THEY MODIFY IT FOR HIM SO HE DOESN'T HAVE TO RUN. 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

## 2025-01-03 ENCOUNTER — SPECIALTY PHARMACY (OUTPATIENT)
Dept: PHARMACY | Facility: HOSPITAL | Age: 75
End: 2025-01-03
Payer: MEDICARE

## 2025-01-20 ENCOUNTER — HOSPITAL ENCOUNTER (OUTPATIENT)
Dept: CARDIOLOGY | Facility: HOSPITAL | Age: 75
Discharge: HOME | End: 2025-01-20
Admitting: NURSE PRACTITIONER
Payer: MEDICARE

## 2025-01-20 DIAGNOSIS — I50.32 CHRONIC DIASTOLIC HEART FAILURE: ICD-10-CM

## 2025-01-20 DIAGNOSIS — I10 ESSENTIAL HYPERTENSION: ICD-10-CM

## 2025-01-20 DIAGNOSIS — R06.02 SOB (SHORTNESS OF BREATH): ICD-10-CM

## 2025-01-20 DIAGNOSIS — I25.119 CORONARY ARTERY DISEASE INVOLVING NATIVE CORONARY ARTERY OF NATIVE HEART WITH ANGINA PECTORIS: ICD-10-CM

## 2025-01-20 PROCEDURE — 93017 CV STRESS TEST TRACING ONLY: CPT

## 2025-01-20 PROCEDURE — 93018 CV STRESS TEST I&R ONLY: CPT | Performed by: INTERNAL MEDICINE

## 2025-01-21 LAB
BH CV STRESS BP STAGE 1: NORMAL
BH CV STRESS DURATION MIN STAGE 1: 2
BH CV STRESS DURATION SEC STAGE 1: 19
BH CV STRESS GRADE STAGE 1: 10
BH CV STRESS HR STAGE 1: 113
BH CV STRESS METS STAGE 1: 5
BH CV STRESS PROTOCOL 1: NORMAL
BH CV STRESS RECOVERY BP: NORMAL MMHG
BH CV STRESS RECOVERY HR: 74 BPM
BH CV STRESS SPEED STAGE 1: 1.7
BH CV STRESS STAGE 1: 1
MAXIMAL PREDICTED HEART RATE: 145 BPM
PERCENT MAX PREDICTED HR: 77.93 %
STRESS BASELINE BP: NORMAL MMHG
STRESS BASELINE HR: 71 BPM
STRESS PERCENT HR: 92 %
STRESS POST ESTIMATED WORKLOAD: 4.6 METS
STRESS POST EXERCISE DUR MIN: 2 MIN
STRESS POST EXERCISE DUR SEC: 19 SEC
STRESS POST PEAK BP: NORMAL MMHG
STRESS POST PEAK HR: 113 BPM
STRESS TARGET HR: 123 BPM

## 2025-01-22 NOTE — PROGRESS NOTES
There was not adequate amount of time on the treadmill to ensure the patient had adequate chronotropic response.  Would recommend nuclear stress test if patient continues to have symptoms

## 2025-01-23 ENCOUNTER — TELEPHONE (OUTPATIENT)
Dept: CARDIOLOGY | Facility: CLINIC | Age: 75
End: 2025-01-23
Payer: MEDICARE

## 2025-01-23 NOTE — TELEPHONE ENCOUNTER
Patient notified of results and recommendations. 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 He does not want to proceed with nuclear stress test. Advised he call with any symptoms or concerns and orders can be placed at that time.     ----- Message from Martha OLIVA sent at 1/23/2025  8:48 AM EST -----    ----- Message -----  From: Dany San APRN  Sent: 1/22/2025   2:07 PM EST  To: Martha Cordoba MA    There was not adequate amount of time on the treadmill to ensure the patient had adequate chronotropic response.  Would recommend nuclear stress test if patient continues to have symptoms

## 2025-01-31 ENCOUNTER — OFFICE VISIT (OUTPATIENT)
Dept: PULMONOLOGY | Facility: CLINIC | Age: 75
End: 2025-01-31
Payer: MEDICARE

## 2025-01-31 VITALS
TEMPERATURE: 96.9 F | SYSTOLIC BLOOD PRESSURE: 150 MMHG | HEIGHT: 69 IN | OXYGEN SATURATION: 92 % | WEIGHT: 213.4 LBS | BODY MASS INDEX: 31.61 KG/M2 | HEART RATE: 76 BPM | DIASTOLIC BLOOD PRESSURE: 82 MMHG

## 2025-01-31 DIAGNOSIS — I50.22 CHRONIC SYSTOLIC CHF (CONGESTIVE HEART FAILURE): ICD-10-CM

## 2025-01-31 DIAGNOSIS — I25.810 CORONARY ARTERY DISEASE INVOLVING CORONARY BYPASS GRAFT OF NATIVE HEART WITHOUT ANGINA PECTORIS: ICD-10-CM

## 2025-01-31 DIAGNOSIS — F17.211 CIGARETTE NICOTINE DEPENDENCE IN REMISSION: ICD-10-CM

## 2025-01-31 DIAGNOSIS — R06.83 SNORING: ICD-10-CM

## 2025-01-31 DIAGNOSIS — J98.4 RESTRICTIVE LUNG DISEASE: Primary | ICD-10-CM

## 2025-01-31 DIAGNOSIS — E66.9 OBESITY (BMI 30-39.9): ICD-10-CM

## 2025-01-31 DIAGNOSIS — G47.19 EXCESSIVE DAYTIME SLEEPINESS: ICD-10-CM

## 2025-01-31 DIAGNOSIS — R91.1 PULMONARY NODULE: ICD-10-CM

## 2025-01-31 PROCEDURE — 1160F RVW MEDS BY RX/DR IN RCRD: CPT | Performed by: PHYSICIAN ASSISTANT

## 2025-01-31 PROCEDURE — 3079F DIAST BP 80-89 MM HG: CPT | Performed by: PHYSICIAN ASSISTANT

## 2025-01-31 PROCEDURE — 3077F SYST BP >= 140 MM HG: CPT | Performed by: PHYSICIAN ASSISTANT

## 2025-01-31 PROCEDURE — 99214 OFFICE O/P EST MOD 30 MIN: CPT | Performed by: PHYSICIAN ASSISTANT

## 2025-01-31 PROCEDURE — 1159F MED LIST DOCD IN RCRD: CPT | Performed by: PHYSICIAN ASSISTANT

## 2025-01-31 NOTE — PROGRESS NOTES
Subjective      Chief Complaint  Restrictive lung disease    Subjective      History of Present Illness  William Villasenor is a 74 y.o. male who presents today to [facility] with past medical history of essential hypertension, hyperlipidemia, CAD status post CABG x 4, chronic systolic heart failure, PSVT, type 2 diabetes mellitus, GERD, BPH, CKD stage II, gout, and former tobacco abuse who presents today for Restrictive lung disease. This visit is a follow up appointment.     Restrictive lung disease:  Patient reports he is doing well.  He states that he has shortness of breath with exertion and when bending over.  He states that this is not changed or worsened since his last visit.  He does have upcoming test scheduled including an echocardiogram, CT of the chest, and PFT on July 19.  He is not currently on an inhaler regimen at this time.  He is a former smoker in which he smoked 2 packs of cigars daily for about 5 years. He states that he has supplemental oxygen supplies but doesn't use frequently. He states that he had to use for a small amount of time on July 4th but attributes this to the weather and it being hot and humid.     Interval history:  Patient presents today for his follow-up appointment. He recently had cardiac workup including a stress test and echocardiogram. He reports that he feels fine from a respiratory standpoint. He denies any acute symptoms of shortness of breath, cough, or wheezing. He does reports difficulty sleeping, feeling fatigued during the day, and has been told by his wife that he snores. He denies any headaches when he wakes up in the morning. He has never been tested for sleep apnea in the past.         Current Outpatient Medications:   •  allopurinol (ZYLOPRIM) 300 MG tablet, TAKE 1 TABLET EVERY DAY, Disp: 90 tablet, Rfl: 0  •  APPLE CIDER VINEGAR PO, Take 1 tablet by mouth 2 (Two) Times a Day., Disp: , Rfl:   •  ascorbic acid  (VITAMIN C) 1000 MG tablet, Take 1 tablet by mouth 2 (Two) Times a Day., Disp: , Rfl:   •  aspirin 81 MG EC tablet, Take 1 tablet by mouth Daily., Disp: , Rfl:   •  atorvastatin (LIPITOR) 40 MG tablet, Take 1 tablet by mouth Every Night., Disp: 90 tablet, Rfl: 0  •  bumetanide (BUMEX) 2 MG tablet, Take 1 tablet by mouth Daily., Disp: 90 tablet, Rfl: 1  •  CALCIUM-MAGNESIUM-ZINC PO, Take 1 tablet by mouth 2 (Two) Times a Day. 400 mg Mag per capsule, Disp: , Rfl:   •  Cayenne 450 MG capsule, Take 1 capsule by mouth 2 (Two) Times a Day., Disp: , Rfl:   •  Cinnamon 500 MG capsule, Take 1 capsule by mouth 2 (Two) Times a Day. (Patient taking differently: Take 1,800 mg by mouth 2 (Two) Times a Day.), Disp: , Rfl:   •  Cranberry 250 MG capsule, Take 25,000 mg by mouth Daily., Disp: , Rfl:   •  dapagliflozin Propanediol (Farxiga) 10 MG tablet, Take 1 tablet by mouth Daily., Disp: 90 tablet, Rfl: 3  •  doxazosin (CARDURA) 4 MG tablet, Take 1 tablet by mouth Daily. (Patient taking differently: Take 0.5 tablets by mouth Daily.), Disp: 90 tablet, Rfl: 3  •  finasteride (PROSCAR) 5 MG tablet, Take 1 tablet by mouth Daily., Disp: 90 tablet, Rfl: 0  •  folic acid (FOLVITE) 800 MCG tablet, Take 1 tablet by mouth Daily., Disp: , Rfl:   •  gabapentin (NEURONTIN) 400 MG capsule, Take 1 capsule by mouth 2 (Two) Times a Day., Disp: , Rfl:   •  glipizide (GLUCOTROL) 10 MG tablet, Take 1 tablet by mouth 2 (Two) Times a Day Before Meals., Disp: 60 tablet, Rfl: 2  •  isosorbide mononitrate (IMDUR) 120 MG 24 hr tablet, Take 1 tablet by mouth Daily., Disp: 90 tablet, Rfl: 3  •  loratadine (Claritin) 10 MG tablet, Take 1 tablet by mouth Daily As Needed for Allergies., Disp: , Rfl:   •  metFORMIN (GLUCOPHAGE) 1000 MG tablet, Take 1 tablet by mouth 2 (Two) Times a Day With Meals., Disp: 180 tablet, Rfl: 0  •  methylcellulose, Laxative, (CITRUCEL) 500 MG tablet tablet, Take 2 tablets by mouth Every 4 (Four) Hours As Needed., Disp: , Rfl:   •   "metoprolol succinate XL (TOPROL-XL) 100 MG 24 hr tablet, Take 1 tablet by mouth Daily., Disp: 90 tablet, Rfl: 3  •  Multiple Vitamins-Minerals (ICAPS AREDS 2 PO), Take  by mouth., Disp: , Rfl:   •  nitroglycerin (NITROSTAT) 0.4 MG SL tablet, PLACE 1 TAB UNDER TONGUE EVERY 5 MINS AS NEEDED FOR CHEST PAIN (ONLY IF SYSTOLIC BLOOD PRESSURE OVER 100) MAX 3 TABS/15 MINS, Disp: 25 tablet, Rfl: 3  •  NON FORMULARY, Take 20 mg by mouth 2 (Two) Times a Day. Lutein, Disp: , Rfl:   •  NON FORMULARY, Sugar Blocker  1 tablet po bid, Disp: , Rfl:   •  Omega-3 Fatty Acids (Fish Oil Triple Strength) 1400 MG capsule, Take 1,200 mg by mouth 2 (Two) Times a Day., Disp: , Rfl:   •  sacubitril-valsartan (Entresto)  MG tablet, Take 1 tablet by mouth 2 Times a Day., Disp: 180 tablet, Rfl: 3  •  Semaglutide (Rybelsus) 7 MG tablet, Take 7 mg by mouth Daily., Disp: 90 tablet, Rfl: 1  •  vitamin E 400 UNIT capsule, Take 1 capsule by mouth Daily., Disp: , Rfl:       Allergies   Allergen Reactions   • Lisinopril Cough       Objective     Objective   Vital Signs:  /82   Pulse 76   Temp 96.9 °F (36.1 °C)   Ht 175.3 cm (69.02\")   Wt 96.8 kg (213 lb 6.4 oz)   SpO2 92%   BMI 31.50 kg/m²   Estimated body mass index is 31.5 kg/m² as calculated from the following:    Height as of this encounter: 175.3 cm (69.02\").    Weight as of this encounter: 96.8 kg (213 lb 6.4 oz).    Past Medical History:   Diagnosis Date   • Arthritis    • Back pain    • BPH (benign prostatic hyperplasia)    • Chronic diastolic (congestive) heart failure    • Coronary artery disease    • Diabetes mellitus    • Diabetic peripheral neuropathy    • Elevated cholesterol    • Erectile dysfunction    • Former smoker    • GERD (gastroesophageal reflux disease)    • Gout    • Hemorrhoids    • High cholesterol    • High triglycerides    • Hypertension    • Neuropathy    • Obesity      Past Surgical History:   Procedure Laterality Date   • CARDIAC CATHETERIZATION N/A " 3/15/2021    Procedure: Left Heart Cath;  Surgeon: Trent Zaragoza MD;  Location:  [facility];  Service: Cardiology;  Laterality: N/A;   • CARDIAC CATHETERIZATION N/A 3/15/2021    Procedure: Coronary angiography;  Surgeon: Trent Zaragoza MD;  Location:  [facility];  Service: Cardiology;  Laterality: N/A;   • COLONOSCOPY N/A 10/10/2024    Procedure: COLONOSCOPY;  Surgeon: Aly Barnhart MD;  Location:  [facility];  Service: Gastroenterology;  Laterality: N/A;   • COLONOSCOPY W/ BIOPSIES     • CORONARY ARTERY BYPASS GRAFT N/A 3/16/2021    Procedure: MEDIAN STERNOTOMY, CORONARY ARTERY BYPASS GRAFT X4, UTILIZATION OF LEFT INTERNAL MAMMARY ARTERY, AND ENDOSCOPIC VEIN HARVEST OF RIGHT GREATER SAPHENOUS VEIN;  Surgeon: Jorge Simms MD;  Location:  [facility];  Service: Cardiothoracic;  Laterality: N/A;  vein out- 1614  vein ready- 1628             Social History     Socioeconomic History   • Marital status:    • Number of children: 3   Tobacco Use   • Smoking status: Former     Current packs/day: 0.00     Average packs/day: 1 pack/day for 5.0 years (5.0 ttl pk-yrs)     Types: Cigarettes     Start date: 1969     Quit date: 1974     Years since quittin.0     Passive exposure: Past   • Smokeless tobacco: Never   Vaping Use   • Vaping status: Never Used   Substance and Sexual Activity   • Alcohol use: Never     Comment: quit in    • Drug use: Never   • Sexual activity: Defer        Physical Exam  Constitutional:       General: He is awake.      Appearance: Normal appearance. He is obese.   HENT:      Head: Normocephalic and atraumatic.      Nose: Nose normal.      Mouth/Throat:      Mouth: Mucous membranes are moist.      Pharynx: Oropharynx is clear.   Eyes:      Conjunctiva/sclera: Conjunctivae normal.      Pupils: Pupils are equal, round, and reactive to light.   Cardiovascular:      Rate and Rhythm: Normal rate and regular rhythm.      Pulses: Normal  "pulses.      Heart sounds: Normal heart sounds. No murmur heard.     No friction rub. No gallop.   Pulmonary:      Effort: Pulmonary effort is normal. No tachypnea, accessory muscle usage or respiratory distress.      Breath sounds: Normal breath sounds. No decreased breath sounds, wheezing, rhonchi or rales.   Musculoskeletal:         General: Normal range of motion.      Cervical back: Full passive range of motion without pain and normal range of motion.   Skin:     General: Skin is warm and dry.   Neurological:      General: No focal deficit present.      Mental Status: He is alert. Mental status is at baseline.   Psychiatric:         Mood and Affect: Mood normal.         Behavior: Behavior normal. Behavior is cooperative.         Thought Content: Thought content normal.          Result Review :  The following labs and radiology results have been reviewed.    Lab Review:   No results found for: \"FEV1\", \"FVC\", \"DFR3TWH\", \"TLC\", \"DLCO\"  Hospital Outpatient Visit on 01/20/2025   Component Date Value Ref Range Status   • Exercise duration (min) 01/20/2025 2  min Final   • Exercise duration (sec) 01/20/2025 19  sec Final   • Estimated workload 01/20/2025 4.6  METS Final   • BH CV STRESS PROTOCOL 1 01/20/2025 Moe   Final   • Stage 1 01/20/2025 1.0   Final   • HR Stage 1 01/20/2025 113   Final   • BP Stage 1 01/20/2025 112/61   Final   • Duration Min Stage 1 01/20/2025 2   Final   • Duration Sec Stage 1 01/20/2025 19   Final   • Grade Stage 1 01/20/2025 10   Final   • Speed Stage 1 01/20/2025 1.7   Final   • BH CV STRESS METS STAGE 1 01/20/2025 5.0   Final   • Baseline HR 01/20/2025 71  bpm Final   • Baseline BP 01/20/2025 134/73  mmHg Final   • Peak HR 01/20/2025 113  bpm Final   • Peak BP 01/20/2025 112/61  mmHg Final   • Recovery HR 01/20/2025 74  bpm Final   • Recovery BP 01/20/2025 157/76  mmHg Final   • Target HR (85%) 01/20/2025 123  bpm Final   • Max. Pred. HR (100%) 01/20/2025 145  bpm Final   • Percent Max " Pred HR 01/20/2025 77.93  % Final   • Percent Target HR 01/20/2025 92  % Final   Office Visit on 12/09/2024   Component Date Value Ref Range Status   • Glucose 12/09/2024 221 (A)  70 - 130 mg/dL Final   • Lot Number 12/09/2024 [id number]   Final   • Expiration Date 12/09/2024 06/02/25   Final   • Hemoglobin A1C 12/09/2024 8.8 (A)  4.5 - 5.7 % Final   • Lot Number 12/09/2024 [id number]   Final   • Expiration Date 12/09/2024 07/11/26   Final   Office Visit on 11/04/2024   Component Date Value Ref Range Status   • Glucose 11/04/2024 440 (A)  70 - 130 mg/dL Final    [facility]   • Lot Number 11/04/2024 [id number]   Final   • Expiration Date 11/04/2024 06/02/2025   Final   • Total Cholesterol 11/04/2024 138  0 - 200 mg/dL Final   • Triglycerides 11/04/2024 231 (H)  0 - 150 mg/dL Final   • HDL Cholesterol 11/04/2024 28 (L)  40 - 60 mg/dL Final   • LDL Cholesterol  11/04/2024 72  0 - 100 mg/dL Final   • VLDL Cholesterol 11/04/2024 38  5 - 40 mg/dL Final   • LDL/HDL Ratio 11/04/2024 2.28   Final   • TSH 11/04/2024 2.500  0.270 - 4.200 uIU/mL Final   • Glucose 11/04/2024 399 (H)  65 - 99 mg/dL Final   • BUN 11/04/2024 22  8 - 23 mg/dL Final   • Creatinine 11/04/2024 1.67 (H)  0.76 - 1.27 mg/dL Final   • Sodium 11/04/2024 137  136 - 145 mmol/L Final   • Potassium 11/04/2024 3.9  3.5 - 5.2 mmol/L Final   • Chloride 11/04/2024 97 (L)  98 - 107 mmol/L Final   • CO2 11/04/2024 28.4  22.0 - 29.0 mmol/L Final   • Calcium 11/04/2024 9.4  8.6 - 10.5 mg/dL Final   • Total Protein 11/04/2024 6.9  6.0 - 8.5 g/dL Final   • Albumin 11/04/2024 4.1  3.5 - 5.2 g/dL Final   • ALT (SGPT) 11/04/2024 11  1 - 41 U/L Final   • AST (SGOT) 11/04/2024 10  1 - 40 U/L Final   • Alkaline Phosphatase 11/04/2024 69  39 - 117 U/L Final   • Total Bilirubin 11/04/2024 0.4  0.0 - 1.2 mg/dL Final   • Globulin 11/04/2024 2.8  gm/dL Final   • A/G Ratio 11/04/2024 1.5  g/dL Final   • BUN/Creatinine Ratio 11/04/2024 13.2  7.0 - 25.0 Final   • Anion Gap 11/04/2024 11.6  5.0  - 15.0 mmol/L Final   • eGFR 11/04/2024 42.4 (L)  >60.0 mL/min/1.73 Final   • Microalbumin/Creatinine Ratio 11/04/2024 77.3 (H)  0.0 - 29.0 mg/g Final   • Creatinine, Urine 11/04/2024 19.4  mg/dL Final   • Microalbumin, Urine 11/04/2024 1.5  mg/dL Final        Imaging Results (Most Recent)       None            Radiology Review:   Imaging Results (Last 72 Hours)       ** No results found for the last 72 hours. **          Reviewed CT chest angiogram from 07/19/2024  Narrative & Impression   PROCEDURE: CT CHEST WO CONTRAST DIAGNOSTIC-     HISTORY: pulmonary nodule; R91.1-Solitary pulmonary nodule     COMPARISON: 7/7/2023.     PROCEDURE:  Multiple axial CT images were obtained from the thoracic  inlet through the upper abdomen without the use of contrast. This study  was performed with techniques to keep radiation doses as low as  reasonably achievable (ALARA). Individualized dose reduction techniques  using automated exposure control or adjustment of mA and/or kV according  to the patient size were employed.     FINDINGS:  Soft tissue windows reveal no axillary, hilar or mediastinal adenopathy.  The thyroid gland is unremarkable. The heart is normal in size. The  aorta is normal in caliber. There are no pleural or pericardial  effusions. Lung windows reveal scattered bilateral calcified granulomas.  The 5 mm partially calcified nodule of the periphery of the right middle  lobe is unchanged and is consistent with a granuloma. There are no new  or enlarging pulmonary nodules. In the visualized upper abdomen note is  made of gallstones. Bone windows reveal no acute osseous abnormalities.     IMPRESSION:  No suspicious pulmonary nodules.        This report was finalized on 7/20/2024 12:51 PM by Ralph Beltran M.D..     Reviewed PFT from 07/19/2024      Reviewed echocardiogram from 07/19/2024  Results for orders placed during the hospital encounter of 07/19/24    Adult Transthoracic Echo Complete W/ Cont if Necessary  Per Protocol    Interpretation Summary  •  Left ventricular systolic function is normal. Left ventricular ejection fraction appears to be 66 - 70%.  •  Left ventricular wall thickness is consistent with mild to moderate concentric hypertrophy.  •  Left ventricular diastolic function is consistent with (grade I) impaired relaxation.      Assessment / Plan         Assessment   Diagnoses and all orders for this visit:    1. Restrictive lung disease (Primary)  2. Obesity (BMI 30-39.9)  PFT revealed normal spirometry with no significant bronchodilator response, moderate reduced diffusion capacity, and mild restriction.   Findings from PFT likely due to underlying body habitus and also notable for mild atelectasis/scarring of bilateral lung bases appearing stable from 2023 (per personal review, not mentioned on CT report).  Can continue to monitor symptoms and if notices any worsening of symptoms repeat PFT and CT imaging at that time to evaluate for any changes.     3. Coronary artery disease involving coronary bypass graft of native heart without angina pectoris  4. Chronic systolic CHF (congestive heart failure)  Previous echocardiogram noted to have normal EF and grade I diastolic dysfunction.   Recent stress test performed and no evidence of ischemic noted.   Continue following with cardiology for additional management and treatment.     5. Cigarette nicotine dependence in remission  6. Pulmonary nodule  William Villasenor  reports that he quit smoking about 51 years ago. His smoking use included cigarettes. He started smoking about 56 years ago. He has a 5 pack-year smoking history. He has been exposed to tobacco smoke. He has never used smokeless tobacco.   Previous CT imaging from 07/19/24 revealed small pulmonary nodules which appear stable since previous imaging from 2023. Calcified nodules of right lower lobe measuring 4-5 mm (image 57, 59, and 60) and groundglass nodule of left lung measuring 3 mm (image 50).   No  further follow-up imaging  of nodules warranted per guidelines. Doesn't qualify for annual LDCT imaging due to >15 years since cessation. Can obtain imaging on as needed basis.     7. Excessive daytime sleepiness  8. Snoring  STOP-BANG score approximately 5 which indicates high risk for moderate to severe MADDY.   Will order home sleep study to further evaluate for underlying sleep apnea.     -     Home Sleep Study; Future    There are no discontinued medications.     No orders of the defined types were placed in this encounter.    I spent 30 minutes caring for Jack on this date of service. This time includes time spent by me in the following activities:preparing for the visit, reviewing tests, obtaining and/or reviewing a separately obtained history, performing a medically appropriate examination and/or evaluation , counseling and educating the patient/family/caregiver, ordering medications, tests, or procedures, referring and communicating with other health care professionals , documenting information in the medical record, independently interpreting results and communicating that information with the patient/family/caregiver, and care coordination    Follow Up   Return in about 3 months (around 4/30/2025), or if symptoms worsen or fail to improve, for Next scheduled follow up.    Patient was given instructions and counseling regarding his condition or for health maintenance advice. Please see specific information pulled into the AVS if appropriate.       This document has been electronically signed by Bonita Tom PA-C   January 31, 2025 14:42 EST    Dictated Utilizing Dragon Dictation: Part of this note may be an electronic transcription/translation of spoken language to printed text using the Dragon Dictation System.

## 2025-02-14 DIAGNOSIS — E11.21 TYPE 2 DIABETES MELLITUS WITH DIABETIC NEPHROPATHY, UNSPECIFIED WHETHER LONG TERM INSULIN USE: Chronic | ICD-10-CM

## 2025-02-14 RX ORDER — GLIPIZIDE 10 MG/1
10 TABLET ORAL
Qty: 60 TABLET | Refills: 2 | Status: SHIPPED | OUTPATIENT
Start: 2025-02-14

## 2025-02-28 ENCOUNTER — HOSPITAL ENCOUNTER (OUTPATIENT)
Dept: CARDIOLOGY | Facility: HOSPITAL | Age: 75
Discharge: HOME | End: 2025-02-28
Payer: MEDICARE

## 2025-02-28 VITALS
HEIGHT: 69 IN | HEART RATE: 80 BPM | BODY MASS INDEX: 32.29 KG/M2 | SYSTOLIC BLOOD PRESSURE: 122 MMHG | WEIGHT: 218 LBS | OXYGEN SATURATION: 97 % | DIASTOLIC BLOOD PRESSURE: 59 MMHG

## 2025-02-28 DIAGNOSIS — Z95.1 S/P CABG X 4: ICD-10-CM

## 2025-02-28 DIAGNOSIS — I50.32 CHRONIC DIASTOLIC CONGESTIVE HEART FAILURE: ICD-10-CM

## 2025-02-28 DIAGNOSIS — I25.119 CORONARY ARTERY DISEASE INVOLVING NATIVE CORONARY ARTERY OF NATIVE HEART WITH ANGINA PECTORIS: ICD-10-CM

## 2025-02-28 DIAGNOSIS — I50.32 CHRONIC HEART FAILURE WITH PRESERVED EJECTION FRACTION (HFPEF): Primary | ICD-10-CM

## 2025-02-28 PROBLEM — I47.10 PSVT (PAROXYSMAL SUPRAVENTRICULAR TACHYCARDIA): Status: RESOLVED | Noted: 2021-07-11 | Resolved: 2025-02-28

## 2025-02-28 PROBLEM — N17.9 AKI (ACUTE KIDNEY INJURY): Status: RESOLVED | Noted: 2021-03-21 | Resolved: 2025-02-28

## 2025-02-28 LAB
ABSOLUTE LUNG FLUID CONTENT: 40 % (ref 20–35)
ANION GAP SERPL CALCULATED.3IONS-SCNC: 12.8 MMOL/L (ref 5–15)
BUN SERPL-MCNC: 28 MG/DL (ref 8–23)
BUN/CREAT SERPL: 17.5 (ref 7–25)
CALCIUM SPEC-SCNC: 9.2 MG/DL (ref 8.6–10.5)
CHLORIDE SERPL-SCNC: 99 MMOL/L (ref 98–107)
CO2 SERPL-SCNC: 26.2 MMOL/L (ref 22–29)
CREAT SERPL-MCNC: 1.6 MG/DL (ref 0.76–1.27)
EGFRCR SERPLBLD CKD-EPI 2021: 44.7 ML/MIN/1.73
GLUCOSE SERPL-MCNC: 189 MG/DL (ref 65–99)
MAGNESIUM SERPL-MCNC: 1.9 MG/DL (ref 1.6–2.4)
NT-PROBNP SERPL-MCNC: 264.2 PG/ML (ref 0–1800)
POTASSIUM SERPL-SCNC: 4.3 MMOL/L (ref 3.5–5.2)
SODIUM SERPL-SCNC: 138 MMOL/L (ref 136–145)

## 2025-02-28 PROCEDURE — 83735 ASSAY OF MAGNESIUM: CPT | Performed by: PHYSICIAN ASSISTANT

## 2025-02-28 PROCEDURE — 94726 PLETHYSMOGRAPHY LUNG VOLUMES: CPT | Performed by: PHYSICIAN ASSISTANT

## 2025-02-28 PROCEDURE — 36415 COLL VENOUS BLD VENIPUNCTURE: CPT | Performed by: PHYSICIAN ASSISTANT

## 2025-02-28 PROCEDURE — 80048 BASIC METABOLIC PNL TOTAL CA: CPT | Performed by: PHYSICIAN ASSISTANT

## 2025-02-28 PROCEDURE — 83880 ASSAY OF NATRIURETIC PEPTIDE: CPT | Performed by: PHYSICIAN ASSISTANT

## 2025-02-28 RX ORDER — IBUPROFEN/PSEUDOEPHEDRINE HCL 200MG-30MG
3 TABLET ORAL DAILY PRN
COMMUNITY

## 2025-02-28 RX ORDER — BUMETANIDE 2 MG/1
2 TABLET ORAL DAILY
Qty: 90 TABLET | Refills: 1 | Status: SHIPPED | OUTPATIENT
Start: 2025-02-28

## 2025-02-28 NOTE — PROGRESS NOTES
Heart Failure Clinic  Pharmacist Note     William Villasenor is a 74 y.o. male seen in the Heart Failure Clinic for HFimpEF with an EF of 66-70% as of 7/20/24.      William Villasenor reports a great understanding of medications and has them specifically organized in his box to help with adherence.     Patient denies any swelling or SOB and reports that he had been taking Bumex 1mg daily, but found a bottle of the 2mg and started taking that 2-3 days ago. Of note- bottle was from 2021, of which I brought to his attention. Patient reports adherence to his medications. Spironolactone was no longer on his list or in his box and patient did not remember its name at all. He had his BP log and BP ranged from 110-130/70-80's with HR in the 60-70's.        Medication Use:   Hx of med intolerances: None related to HF  Retail Rx Management: Uses our pharmacy for [facility] and Entresto -ship    Past Medical History:   Diagnosis Date    Arthritis     Back pain     BPH (benign prostatic hyperplasia)     Chronic diastolic (congestive) heart failure     Coronary artery disease     Diabetes mellitus     Diabetic peripheral neuropathy     Elevated cholesterol     Erectile dysfunction     Former smoker     GERD (gastroesophageal reflux disease)     Gout     Hemorrhoids     High cholesterol     High triglycerides     Hypertension     Neuropathy     Obesity      ALLERGIES: Lisinopril  Current Outpatient Medications   Medication Sig Dispense Refill    allopurinol (ZYLOPRIM) 300 MG tablet TAKE 1 TABLET EVERY DAY 90 tablet 0    APPLE CIDER VINEGAR PO Take 1 tablet by mouth 2 (Two) Times a Day.      ascorbic acid (VITAMIN C) 1000 MG tablet Take 1 tablet by mouth 2 (Two) Times a Day.      aspirin 81 MG EC tablet Take 1 tablet by mouth Daily.      atorvastatin (LIPITOR) 40 MG tablet Take 1 tablet by mouth Every Night. 90 tablet 0    bumetanide (BUMEX) 2 MG tablet Take 1 tablet by mouth Daily. 90 tablet 1    CALCIUM-MAGNESIUM-ZINC PO Take 1 tablet by mouth  Hair Diameter: Coarse 2 (Two) Times a Day. 400 mg Mag per capsule      Cayenne 450 MG capsule Take 1 capsule by mouth 2 (Two) Times a Day.      Cranberry 250 MG capsule Take 25,000 mg by mouth Daily.      dapagliflozin Propanediol (Farxiga) 10 MG tablet Take 1 tablet by mouth Daily. 90 tablet 3    doxazosin (CARDURA) 4 MG tablet Take 1 tablet by mouth Daily. (Patient taking differently: Take 0.5 tablets by mouth Daily.) 90 tablet 3    finasteride (PROSCAR) 5 MG tablet Take 1 tablet by mouth Daily. 90 tablet 0    folic acid (FOLVITE) 800 MCG tablet Take 1 tablet by mouth Daily.      gabapentin (NEURONTIN) 400 MG capsule Take 1 capsule by mouth 2 (Two) Times a Day.      glipizide (GLUCOTROL) 10 MG tablet Take 1 tablet by mouth 2 (Two) Times a Day Before Meals. 60 tablet 2    isosorbide mononitrate (IMDUR) 120 MG 24 hr tablet Take 1 tablet by mouth Daily. 90 tablet 3    loratadine (Claritin) 10 MG tablet Take 1 tablet by mouth Daily As Needed for Allergies.      Melatonin 3 MG tablet dispersible Place 1 tablet on the tongue Daily As Needed.      metFORMIN (GLUCOPHAGE) 1000 MG tablet Take 1 tablet by mouth 2 (Two) Times a Day With Meals. 180 tablet 0    methylcellulose, Laxative, (CITRUCEL) 500 MG tablet tablet Take 2 tablets by mouth Every 4 (Four) Hours As Needed.      metoprolol succinate XL (TOPROL-XL) 100 MG 24 hr tablet Take 1 tablet by mouth Daily. 90 tablet 3    Multiple Vitamins-Minerals (ICAPS AREDS 2 PO) Take  by mouth.      nitroglycerin (NITROSTAT) 0.4 MG SL tablet PLACE 1 TAB UNDER TONGUE EVERY 5 MINS AS NEEDED FOR CHEST PAIN (ONLY IF SYSTOLIC BLOOD PRESSURE OVER 100) MAX 3 TABS/15 MINS 25 tablet 3    NON FORMULARY Take 20 mg by mouth 2 (Two) Times a Day. Lutein      NON FORMULARY Sugar Blocker  1 tablet po bid      Omega-3 Fatty Acids (Fish Oil Triple Strength) 1400 MG capsule Take 1,200 mg by mouth 2 (Two) Times a Day.      sacubitril-valsartan (Entresto)  MG tablet Take 1 tablet by mouth 2 Times a Day. 180 tablet 3     Detail Level: Detailed "Semaglutide (Rybelsus) 7 MG tablet Take 7 mg by mouth Daily. 90 tablet 1    vitamin E 400 UNIT capsule Take 1 capsule by mouth Daily.      Cinnamon 500 MG capsule Take 1 capsule by mouth 2 (Two) Times a Day. (Patient taking differently: Take 1,800 mg by mouth 2 (Two) Times a Day.)       No current facility-administered medications for this encounter.     Vaccination History:   Pneumonia: declines  Annual Influenza: declines  COVID 19: declines    Objective  Vitals:    02/28/25 1040   BP: 122/59   BP Location: Left arm   Patient Position: Sitting   Cuff Size: Adult   Pulse: 80   SpO2: 97%   Weight: 98.9 kg (218 lb)   Height: 175.3 cm (69\")       Wt Readings from Last 3 Encounters:   02/28/25 98.9 kg (218 lb)   01/31/25 96.8 kg (213 lb 6.4 oz)   12/09/24 96.9 kg (213 lb 9.6 oz)         02/28/25  1040   Weight: 98.9 kg (218 lb)       Lab Results   Component Value Date    GLUCOSE 189 (H) 02/28/2025    BUN 28 (H) 02/28/2025    CREATININE 1.60 (H) 02/28/2025    EGFRIFNONA 58 (L) 11/24/2021    BCR 17.5 02/28/2025    K 4.3 02/28/2025    CO2 26.2 02/28/2025    CALCIUM 9.2 02/28/2025    ALBUMIN 4.1 11/04/2024    AST 10 11/04/2024    ALT 11 11/04/2024     Lab Results   Component Value Date    WBC 9.59 06/05/2023    HGB 14.4 06/05/2023    HCT 44.0 06/05/2023    MCV 88.9 06/05/2023     06/05/2023     Lab Results   Component Value Date    CKMB 44 (L) 10/07/2022    TROPONINI <0.02 10/07/2022    TROPONINT <0.010 04/21/2021     Lab Results   Component Value Date    PROBNP 264.2 02/28/2025     Results for orders placed during the hospital encounter of 07/19/24    Adult Transthoracic Echo Complete W/ Cont if Necessary Per Protocol    Interpretation Summary    Left ventricular systolic function is normal. Left ventricular ejection fraction appears to be 66 - 70%.    Left ventricular wall thickness is consistent with mild to moderate concentric hypertrophy.    Left ventricular diastolic function is consistent with (grade I) " Number Of Prepaid Treatments (Will Not Render If 0): 0 Hair Density: High impaired relaxation.         GDMT    Drug Class   Drug   Dose Last Dose Adjustment Additional Titration   Notes   ACEi/ARB/ARNI Entresto  97/103 bid 5/12/22 At goal    Beta Blocker Toprol XL 100mg 4/19/22     MRA Pt non-adherent?    SGLT2i Farxiga 10mg >3m At goal     Imdur 120mg      Bumex 2mg daily      Drug Therapy Problems    Outdated Rx bottle that he is currently using from  Drug-disease interaction- Doxazosin      Recommendations:      Teri to send in new Rx for Bumex to mail-order  Patient has been on for a long time. Made Teri aware- but since patient is doing well on current medication regimen, will leave on for now.       Patient was educated on heart failure medications and the importance of medication adherence. All questions were addressed and patient expressed understanding.     Thank you for allowing me to participate in the care of your patient,    Anastasiya Guzman, PharmD  02/28/25  14:23 EST      Mode: Pulse Treatment Number: 2 Speed: 1.5 Hz Treatment Duration: 20 min Tip Size: Medium Hair Color: Black Tolerated Procedure (Optional): Tolerated Well Consent: Informed consent obtained, risks reviewed including but not limited to crusting, scabbing, blistering, scarring, darker or lighter pigmentary change, paradoxical hair regrowth, incomplete removal of hair and infection. Speed: 1 Hz Shaving (Optional): The patient shaved at home Tip Size: Large Total Pulses (Initial Settings): 36 Post-Procedure Care: Immediate endpoint: perifollicular erythema and edema. Post care reviewed with patient. Post treatment instructions provided to patient. Post-Care Instructions: I reviewed with the patient in detail post-care instructions. Patient should avoid sun for a minimum of 2 weeks before and after treatment. Comments: -\\n\\nTREATMENT AREA #1: axilla\\nFluence (J/cm2):  15\\nPulse duration (ms):  52\\nSpeed: 1Hz\\nPulses: 36\\n- Render Post-Care In The Note: Yes

## 2025-02-28 NOTE — PROGRESS NOTES
Heart Failure Clinic    Date: 02/28/25     Vitals:    02/28/25 1040   BP: 122/59   Pulse: 80   SpO2: 97%    Weight 218    Method of arrival: Ambulatory    Weighing self daily: Yes    Monitoring Heart Failure Zones: No    Today's HF Zone: Green    Taking medications as prescribed: Yes    Edema No    Shortness of Air: No    Number of pillows used at night:<2    Educational Materials given:  AVS                                                                         ReDS Value: 40   36-41 Possible Hypervolemic Status      Celestine Johnson MA 02/28/25 10:41 EST

## 2025-02-28 NOTE — PROGRESS NOTES
[facility]  DAISY Garcia Theresa, APRN  [address] [facility],  [facility]    Thank you for asking me to see William Villasenor for congestive heart failure.    HPI:     This is a 74 y.o. male with known past medical history of:    Chronic combined systolic & diastolic HF with LVEF 40-45%  TTE from 04/2021 with EF 40 +/- 5%  TTE from 04/21/2021 with EF 35 +/-5%  TTE from December 2022 reviewed with ejection fraction improved to 45 to 50% and evidence of grade 1A diastolic dysfunction with mild left atrial enlargement  TTE from 06/2023 with 56-60%.   TTE from July 2024 with EF improved to 66-70% and presence of known grade 1 diastolic dysfunction.   ASCVD s/p CABG 4v  Coronary artery disease status post coronary artery bypass grafting [id number] artery bypass grafting 3/2021: LIMA to LAD, SVG to diagonal, SVG to OM1, SVG to OM 2.  Residual  of RCA  echocardiogram 4/2021: EF 35± percent, LV dyssynchrony  echocardiogram 6/2021: EF 40±5% mild to moderate concentric left ventricular hypertrophy, mild biatrial enlargement no significant valvular disease   left heart catheterization 8/21: Left main ostial distal tapering, circumflex diffusely disease, OM1 occluded, LAD occluded, RCA subtotally occluded, LIMA widely patent, SVG to posterior lateral patent, SVG to OM patent, EF 45±5%, LVEDP 28-30   Nuclear stress test from November 2022 with no scintigriphic evidence of ischemia but with elevated transient dilation ratio of unknown significance.    Nuclear stress test from 07/24/2023 with no known evidence of pharmacologically induced transient ischemic dilation.   DM type 2  Peripheral neuropathy  CKD stage II-III  Gout   BPH   GERD  Obesity    William Villasenor presents for today for HF clinic follow-up.  The patient is typically seen by Kaylee Cooper APRN.    Last known EF 40-45%. Current medications prior to first visit directed toward underlying heart failure.     Last known  hospitalization and/or ED visit: Last hospitalized from March 29, 2021-April 9, 2021 with acute on chronic HFpEF and sepsis 2/2 RLE cellulitis from vein harvesting procedures.    Accompanied by: Self    02/28/25 visit data/details regarding:   Dyspnea: Dyspnea on exertion, denies orthopnea  Lower extremity swelling: Denies current swelling of extremities  Abdominal swelling: No significant protuberant  Home weight:  Stable  Home BP: Well controlled BP   Home heart rate: 60s  Daily activities of living:  Performs independently  Pillows/lying flat: 1 pillow  Zone: Green  Mr. Villasenor is chest pain free.  He is doing well from HF standpoint. He is without significant dyspnea on exertion or swelling of his extremities.        Specialists:   Cardiology: Dr. Kennedy     Review of Systems - Review of Systems   Constitutional: Negative for decreased appetite and diaphoresis.   HENT:  Negative for congestion, ear discharge and ear pain.    Eyes:  Negative for blurred vision, discharge and double vision.   Cardiovascular:  Negative for chest pain, claudication, cyanosis, dyspnea on exertion and leg swelling.   Respiratory:  Negative for cough, hemoptysis and shortness of breath.    Endocrine: Negative for cold intolerance, heat intolerance and polydipsia.   Hematologic/Lymphatic: Negative for adenopathy and bleeding problem.   Skin:  Negative for color change and nail changes.   Musculoskeletal:  Negative for arthritis and back pain.   Gastrointestinal:  Negative for bloating, abdominal pain and anorexia.   Genitourinary:  Negative for bladder incontinence and decreased libido.   Neurological:  Negative for aphonia and brief paralysis.   Psychiatric/Behavioral:  Negative for altered mental status, depression and hallucinations.          All other systems were reviewed and were negative.    Patient Active Problem List   Diagnosis    Idiopathic chronic gout of multiple sites without tophus    Type II diabetes mellitus     Essential hypertension    Benign prostatic hyperplasia without lower urinary tract symptoms    Chest pain    Obesity (BMI 30-39.9)    GERD (gastroesophageal reflux disease)    Diabetic neuropathy    Former smoker    History of gout    Coronary artery disease involving native coronary artery of native heart with angina pectoris    CAD (coronary artery disease)    Acute on chronic congestive heart failure    Hyperlipidemia    CKD (chronic kidney disease) stage 2, GFR 60-89 ml/min    Bradycardia    Diabetic polyneuropathy    Heartburn    Low back pain    S/P CABG x 4    Acute systolic heart failure    Ventricular trigeminy    Shortness of breath    Chest pressure    Chronic systolic CHF (congestive heart failure)    Screening for colon cancer    Status post colonoscopy       family history includes Alcohol abuse in his maternal uncle; Depression in his mother; Diabetes in his maternal grandmother and paternal grandmother; Heart disease in his maternal grandmother and mother; Hyperlipidemia in his daughter; Hypertension in his maternal grandmother and mother; No Known Problems in his daughter, daughter, half-brother, half-brother, and half-sister.     reports that he quit smoking about 51 years ago. His smoking use included cigarettes. He started smoking about 56 years ago. He has a 5 pack-year smoking history. He has been exposed to tobacco smoke. He has never used smokeless tobacco. He reports that he does not drink alcohol and does not use drugs.    Allergies   Allergen Reactions    Lisinopril Cough         Current Outpatient Medications:     allopurinol (ZYLOPRIM) 300 MG tablet, TAKE 1 TABLET EVERY DAY, Disp: 90 tablet, Rfl: 0    APPLE CIDER VINEGAR PO, Take 1 tablet by mouth 2 (Two) Times a Day., Disp: , Rfl:     ascorbic acid (VITAMIN C) 1000 MG tablet, Take 1 tablet by mouth 2 (Two) Times a Day., Disp: , Rfl:     aspirin 81 MG EC tablet, Take 1 tablet by mouth Daily., Disp: , Rfl:     atorvastatin (LIPITOR) 40 MG  tablet, Take 1 tablet by mouth Every Night., Disp: 90 tablet, Rfl: 0    bumetanide (BUMEX) 2 MG tablet, Take 1 tablet by mouth Daily., Disp: 90 tablet, Rfl: 1    CALCIUM-MAGNESIUM-ZINC PO, Take 1 tablet by mouth 2 (Two) Times a Day. 400 mg Mag per capsule, Disp: , Rfl:     Cayenne 450 MG capsule, Take 1 capsule by mouth 2 (Two) Times a Day., Disp: , Rfl:     Cranberry 250 MG capsule, Take 25,000 mg by mouth Daily., Disp: , Rfl:     dapagliflozin Propanediol (Farxiga) 10 MG tablet, Take 1 tablet by mouth Daily., Disp: 90 tablet, Rfl: 3    doxazosin (CARDURA) 4 MG tablet, Take 1 tablet by mouth Daily. (Patient taking differently: Take 0.5 tablets by mouth Daily.), Disp: 90 tablet, Rfl: 3    finasteride (PROSCAR) 5 MG tablet, Take 1 tablet by mouth Daily., Disp: 90 tablet, Rfl: 0    folic acid (FOLVITE) 800 MCG tablet, Take 1 tablet by mouth Daily., Disp: , Rfl:     gabapentin (NEURONTIN) 400 MG capsule, Take 1 capsule by mouth 2 (Two) Times a Day., Disp: , Rfl:     glipizide (GLUCOTROL) 10 MG tablet, Take 1 tablet by mouth 2 (Two) Times a Day Before Meals., Disp: 60 tablet, Rfl: 2    isosorbide mononitrate (IMDUR) 120 MG 24 hr tablet, Take 1 tablet by mouth Daily., Disp: 90 tablet, Rfl: 3    loratadine (Claritin) 10 MG tablet, Take 1 tablet by mouth Daily As Needed for Allergies., Disp: , Rfl:     Melatonin 3 MG tablet dispersible, Place 1 tablet on the tongue Daily As Needed., Disp: , Rfl:     metFORMIN (GLUCOPHAGE) 1000 MG tablet, Take 1 tablet by mouth 2 (Two) Times a Day With Meals., Disp: 180 tablet, Rfl: 0    methylcellulose, Laxative, (CITRUCEL) 500 MG tablet tablet, Take 2 tablets by mouth Every 4 (Four) Hours As Needed., Disp: , Rfl:     metoprolol succinate XL (TOPROL-XL) 100 MG 24 hr tablet, Take 1 tablet by mouth Daily., Disp: 90 tablet, Rfl: 3    Multiple Vitamins-Minerals (ICAPS AREDS 2 PO), Take  by mouth., Disp: , Rfl:     nitroglycerin (NITROSTAT) 0.4 MG SL tablet, PLACE 1 TAB UNDER TONGUE EVERY 5  MINS AS NEEDED FOR CHEST PAIN (ONLY IF SYSTOLIC BLOOD PRESSURE OVER 100) MAX 3 TABS/15 MINS, Disp: 25 tablet, Rfl: 3    NON FORMULARY, Take 20 mg by mouth 2 (Two) Times a Day. Lutein, Disp: , Rfl:     NON FORMULARY, Sugar Blocker  1 tablet po bid, Disp: , Rfl:     Omega-3 Fatty Acids (Fish Oil Triple Strength) 1400 MG capsule, Take 1,200 mg by mouth 2 (Two) Times a Day., Disp: , Rfl:     sacubitril-valsartan (Entresto)  MG tablet, Take 1 tablet by mouth 2 Times a Day., Disp: 180 tablet, Rfl: 3    Semaglutide (Rybelsus) 7 MG tablet, Take 7 mg by mouth Daily., Disp: 90 tablet, Rfl: 1    vitamin E 400 UNIT capsule, Take 1 capsule by mouth Daily., Disp: , Rfl:     Cinnamon 500 MG capsule, Take 1 capsule by mouth 2 (Two) Times a Day. (Patient taking differently: Take 1,800 mg by mouth 2 (Two) Times a Day.), Disp: , Rfl:       Physical Exam:  I have reviewed the patient's current vital signs as documented in the patient's EMR.   Vitals:    02/28/25 1040   BP: 122/59   Pulse: 80   SpO2: 97%       Body mass index is 32.19 kg/m².       02/28/25  1040   Weight: 98.9 kg (218 lb)        Physical Exam  Vitals and nursing note reviewed.   Constitutional:       General: He is awake.   HENT:      Head: Normocephalic and atraumatic.   Eyes:      General: Lids are normal.      Conjunctiva/sclera:      Right eye: Right conjunctiva is not injected.      Left eye: Left conjunctiva is not injected.   Cardiovascular:      Rate and Rhythm: Normal rate and regular rhythm.      Heart sounds: S1 normal and S2 normal.   Pulmonary:      Effort: No tachypnea or bradypnea.      Breath sounds: No stridor or transmitted upper airway sounds. No wheezing, rhonchi or rales.   Abdominal:      General: Bowel sounds are normal.      Palpations: Abdomen is soft.      Tenderness: There is no abdominal tenderness.   Musculoskeletal:      Right lower leg: No swelling. No edema.      Left lower leg: No swelling. No edema.      Right foot: No swelling.       Left foot: No swelling.   Skin:     General: Skin is warm and moist.   Neurological:      Mental Status: He is alert and oriented to person, place, and time.   Psychiatric:         Attention and Perception: Attention normal.         Mood and Affect: Mood normal.         Behavior: Behavior is cooperative.           JVP: Volume/Pulsation: Normal.        DATA REVIEWED:     EKG. I personally reviewed and interpreted the EKG.      EKG: normal EKG, normal sinus rhythm, unchanged from previous tracings.         ---------------------------------------------------  TTE/SAVANNAH:  Results for orders placed during the hospital encounter of 07/19/24    Adult Transthoracic Echo Complete W/ Cont if Necessary Per Protocol    Interpretation Summary    Left ventricular systolic function is normal. Left ventricular ejection fraction appears to be 66 - 70%.    Left ventricular wall thickness is consistent with mild to moderate concentric hypertrophy.    Left ventricular diastolic function is consistent with (grade I) impaired relaxation.        -----------------------------------------------------  CXR/Imaging:   Imaging Results (Most Recent)       None            I personally reviewed and interpreted the CXR.      -----------------------------------------------------  CT:   No radiology results for the last 30 days.  I personally reviewed the images of the CT scan.  My personal interpretation is below.      ----------------------------------------------------    PFTs:      --------------------------------------------------------------------------------------------------    Laboratory evaluations:    Lab Results   Component Value Date    GLUCOSE 189 (H) 02/28/2025    BUN 28 (H) 02/28/2025    CREATININE 1.60 (H) 02/28/2025    EGFRIFNONA 58 (L) 11/24/2021    BCR 17.5 02/28/2025    K 4.3 02/28/2025    CO2 26.2 02/28/2025    CALCIUM 9.2 02/28/2025    ALBUMIN 4.1 11/04/2024    AST 10 11/04/2024    ALT 11 11/04/2024     Lab Results    Component Value Date    WBC 9.59 06/05/2023    HGB 14.4 06/05/2023    HCT 44.0 06/05/2023    MCV 88.9 06/05/2023     06/05/2023     Lab Results   Component Value Date    CHOL 138 11/04/2024    TRIG 231 (H) 11/04/2024    HDL 28 (L) 11/04/2024    LDL 72 11/04/2024     Lab Results   Component Value Date    TSH 2.500 11/04/2024     Lab Results   Component Value Date    HGBA1C 8.8 (A) 12/09/2024     Lab Results   Component Value Date    ALT 11 11/04/2024     Lab Results   Component Value Date    HGBA1C 8.8 (A) 12/09/2024    HGBA1C 9.50 (H) 01/06/2023    HGBA1C 8.30 (H) 04/21/2021     Lab Results   Component Value Date    MICROALBUR 1.5 11/04/2024    CREATININE 1.60 (H) 02/28/2025     Lab Results   Component Value Date    IRON 46 (L) 03/30/2021    TIBC 292 (L) 03/30/2021    FERRITIN 208.80 03/30/2021     Lab Results   Component Value Date    INR 0.88 10/07/2022    INR 0.97 03/29/2021    INR 1.20 (H) 03/17/2021    PROTIME 10.2 (L) 10/07/2022    PROTIME 12.7 03/29/2021    PROTIME 14.9 (H) 03/17/2021        Lab Results   Component Value Date    ABSOLUTELUNG 40 (A) 02/28/2025    ABSOLUTELUNG 38 (A) 02/23/2024    ABSOLUTELUNG 39 (A) 08/04/2023           1. Chronic heart failure with preserved ejection fraction (HFpEF)    2. Coronary artery disease involving native coronary artery of native heart with angina pectoris    3. S/P CABG x 4    4. Chronic diastolic congestive heart failure          ORDERS PLACED TODAY:  Orders Placed This Encounter   Procedures    ReDs Vest    Basic Metabolic Panel    Magnesium    proBNP    Basic Metabolic Panel    Magnesium    proBNP        Diagnoses and all orders for this visit:    1. Chronic heart failure with preserved ejection fraction (HFpEF) (Primary)  -     Basic Metabolic Panel; Future  -     Magnesium; Future  -     proBNP; Future  -     Basic Metabolic Panel; Standing  -     Basic Metabolic Panel  -     Magnesium; Standing  -     Magnesium  -     proBNP; Standing  -     proBNP  -      ReDs Vest  -     bumetanide (BUMEX) 2 MG tablet; Take 1 tablet by mouth Daily.  Dispense: 90 tablet; Refill: 1    2. Coronary artery disease involving native coronary artery of native heart with angina pectoris  Overview:  Added automatically from request for surgery [id number]    Orders:  -     bumetanide (BUMEX) 2 MG tablet; Take 1 tablet by mouth Daily.  Dispense: 90 tablet; Refill: 1    3. S/P CABG x 4  -     bumetanide (BUMEX) 2 MG tablet; Take 1 tablet by mouth Daily.  Dispense: 90 tablet; Refill: 1    4. Chronic diastolic congestive heart failure  -     bumetanide (BUMEX) 2 MG tablet; Take 1 tablet by mouth Daily.  Dispense: 90 tablet; Refill: 1             MEDS ORDERED TODAY:    New Medications Ordered This Visit   Medications    bumetanide (BUMEX) 2 MG tablet     Sig: Take 1 tablet by mouth Daily.     Dispense:  90 tablet     Refill:  1        ---------------------------------------------------------------------------------------------------------------------------          ASSESSMENT/PLAN:      Diagnosis Plan   1. Chronic heart failure with preserved ejection fraction (HFpEF)  Basic Metabolic Panel    Magnesium    proBNP    Basic Metabolic Panel    Basic Metabolic Panel    Magnesium    Magnesium    proBNP    proBNP    ReDs Vest    bumetanide (BUMEX) 2 MG tablet      2. Coronary artery disease involving native coronary artery of native heart with angina pectoris  bumetanide (BUMEX) 2 MG tablet      3. S/P CABG x 4  bumetanide (BUMEX) 2 MG tablet      4. Chronic diastolic congestive heart failure  bumetanide (BUMEX) 2 MG tablet          not acutely decompensated chronic diastolic heart failure. CHF. Etiology: Ischemic/CAD (Hx of AMI, CAD, or Ischemic Cardiomyopathy). LVEF 40-45%.      NYHA stage Stage C: Structural heart disease is present AND symptoms have occurredFC-Class III: Marked limitation of physical activity. Comfortable at rest. Less than ordinary activity causes fatigue, palpitation, or dyspnea.      Today, Patient appears euvolemic.and with  Moderate perfusion. The patient's hemodynamics are currently acceptable. HR is: normal and is at goal. BP/MAP was reviewed and there is room for medication up-titration.  Clinical trajectory was assessed and hasimproved.     CHF GOAL DIRECTED MEDICAL THERAPY FOR PATIENT ADDRESSED/ADJUSTED:     GDMT    Drug Class   Drug   Dose Last Dose Adjustment Additional Titration   Notes   ACEi/ARB/ARNI Entresto 97-103mg BID      Beta Blocker  Metoprolol Succinate   100mg      MRA Did not tolerate (diarrhea per his account)      SGLT2i Farxiga  10 mg  N/A      -CHF Specific BB:    Continue Toprol XL 100mg.      -ACE/ARB/ARNi:   Current dose:  Entresto 97-103mg BID    -MRA:   Patient did not tolerate MRA on prior try    -SGLT2 inhibitor therapy:   Continue Farxiga 10mg qd. He did have prior yeast infection.     -Diuretic regimen:   ReDS Vest reading for 02/28/25 is 40; ReDs Vest reading reviewed with patient.    Continue Bumex 2mg qd.      BMP, Mag, & ProBNP reviewed with patient.      -Fluid restriction/Sodium restriction:   Requested 2000 ml restriction  Patient has been asked to weigh daily and was provided with a printed diuretic strategy.  1,500 mg Na restriction was discussed.    -Acute vs. Chronic underlying conditions other than HF addressed during visit:     ASCVD s/p 4v CABG:   Continue statin & ASA.   Continue beta blocker.       BMI is >= 30 and <35. (Class 1 Obesity). The following options were offered after discussion;: nutrition counseling/recommendations                    This document has been electronically signed by Teri Pedraza PA-C  February 28, 2025 15:21 EST      Dictated Utilizing Dragon Dictation: Part of this note may be an electronic transcription/translation of spoken language to printed text using the Dragon Dictation System.    Follow-up appointment and medication changes provided in hand delivered After Visit Summary as well as reviewed in the  room.

## 2025-03-10 ENCOUNTER — OFFICE VISIT (OUTPATIENT)
Dept: ENDOCRINOLOGY | Facility: CLINIC | Age: 75
End: 2025-03-10
Payer: MEDICARE

## 2025-03-10 VITALS
BODY MASS INDEX: 31.57 KG/M2 | OXYGEN SATURATION: 97 % | DIASTOLIC BLOOD PRESSURE: 61 MMHG | HEART RATE: 73 BPM | SYSTOLIC BLOOD PRESSURE: 115 MMHG | WEIGHT: 213.8 LBS

## 2025-03-10 DIAGNOSIS — E11.65 TYPE 2 DIABETES MELLITUS WITH HYPERGLYCEMIA, WITHOUT LONG-TERM CURRENT USE OF INSULIN: Primary | ICD-10-CM

## 2025-03-10 LAB
EXPIRATION DATE: ABNORMAL
GLUCOSE BLDC GLUCOMTR-MCNC: 202 MG/DL (ref 70–130)
Lab: ABNORMAL

## 2025-03-10 RX ORDER — DAPAGLIFLOZIN 10 MG/1
10 TABLET, FILM COATED ORAL DAILY
Qty: 90 TABLET | Refills: 0 | Status: SHIPPED | OUTPATIENT
Start: 2025-03-10 | End: 2025-03-17 | Stop reason: SDUPTHER

## 2025-03-10 RX ORDER — GLIPIZIDE 10 MG/1
10 TABLET ORAL
Qty: 180 TABLET | Refills: 0 | Status: SHIPPED | OUTPATIENT
Start: 2025-03-10 | End: 2025-03-17 | Stop reason: SDUPTHER

## 2025-03-10 RX ORDER — ORAL SEMAGLUTIDE 7 MG/1
1 TABLET ORAL DAILY
Qty: 90 TABLET | Refills: 1 | Status: CANCELLED | OUTPATIENT
Start: 2025-03-10

## 2025-03-10 RX ORDER — ORAL SEMAGLUTIDE 14 MG/1
14 TABLET ORAL DAILY
Qty: 90 TABLET | Refills: 0 | Status: SHIPPED | OUTPATIENT
Start: 2025-03-10 | End: 2025-03-17 | Stop reason: SDUPTHER

## 2025-03-10 NOTE — PROGRESS NOTES
Chief Complaint   Patient presents with    Diabetes     F/u, last A1c today 7.9, needing refill on Rybelsus      HPI   William Villasenor is a 74 y.o. male who presents to the clinic today for follow up of type 2 diabetes. Diabetes was diagnosed approximately 30 years ago. A1c today is 7.9. He brought his blood glucose log with him to his appointment today, fasting glucose is averaging 130s- 250s. He is currently taking metformin 1000 mg twice daily, glipizide 10 mg twice daily, Farxiga 10 mg daily, and Rybelsus 7 mg daily. He denies any hypoglycemia. He denies any yeast infections, UTIs, or abdominal pain.     Diabetic complications: peripheral neuropathy and cardiovascular disease  Last optho exam: a couple years ago. Due and advised.   Last microalbumin: UTD  Last foot exam: UTD  Statin: yes  Lipid panel: UTD  ACE/ARB: yes     The following portions of the patient's history were reviewed and updated as appropriate: allergies, current medications, past family history, past medical history, past social history, past surgical history, and problem list.    /61 (BP Location: Right arm, Patient Position: Sitting, Cuff Size: Adult)   Pulse 73   Wt 97 kg (213 lb 12.8 oz)   SpO2 97%   BMI 31.57 kg/m²      Past Medical History:   Diagnosis Date    Arthritis     Back pain     BPH (benign prostatic hyperplasia)     Chronic diastolic (congestive) heart failure     Coronary artery disease     Diabetes mellitus     Diabetic peripheral neuropathy     Elevated cholesterol     Erectile dysfunction     Former smoker     GERD (gastroesophageal reflux disease)     Gout     Hemorrhoids     High cholesterol     High triglycerides     Hypertension     Neuropathy     Obesity      Past Surgical History:   Procedure Laterality Date    CARDIAC CATHETERIZATION N/A 3/15/2021    Procedure: Left Heart Cath;  Surgeon: Trent Zaragoza MD;  Location: [facility] CATH INVASIVE LOCATION;  Service: Cardiology;  Laterality: N/A;    CARDIAC  CATHETERIZATION N/A 3/15/2021    Procedure: Coronary angiography;  Surgeon: Trent Zaragoza MD;  Location:  [facility] CATH INVASIVE LOCATION;  Service: Cardiology;  Laterality: N/A;    COLONOSCOPY N/A 10/10/2024    Procedure: COLONOSCOPY;  Surgeon: Aly Barnhart MD;  Location:  [facility] OR;  Service: Gastroenterology;  Laterality: N/A;    COLONOSCOPY W/ BIOPSIES      CORONARY ARTERY BYPASS GRAFT N/A 3/16/2021    Procedure: MEDIAN STERNOTOMY, CORONARY ARTERY BYPASS GRAFT X4, UTILIZATION OF LEFT INTERNAL MAMMARY ARTERY, AND ENDOSCOPIC VEIN HARVEST OF RIGHT GREATER SAPHENOUS VEIN;  Surgeon: Jorge Simms MD;  Location:  [facility];  Service: Cardiothoracic;  Laterality: N/A;  vein out- 1614  vein ready- 1628              Family History   Problem Relation Age of Onset    Heart disease Mother     Hypertension Mother     Depression Mother     Alcohol abuse Maternal Uncle     Heart disease Maternal Grandmother     Hypertension Maternal Grandmother     Diabetes Maternal Grandmother     Diabetes Paternal Grandmother     No Known Problems Half-Brother     No Known Problems Half-Brother     No Known Problems Half-Sister     No Known Problems Daughter     No Known Problems Daughter     Hyperlipidemia Daughter       Social History     Socioeconomic History    Marital status:     Number of children: 3   Tobacco Use    Smoking status: Former     Current packs/day: 0.00     Average packs/day: 1 pack/day for 5.0 years (5.0 ttl pk-yrs)     Types: Cigarettes     Start date: 1969     Quit date: 1974     Years since quittin.1     Passive exposure: Past    Smokeless tobacco: Never   Vaping Use    Vaping status: Never Used   Substance and Sexual Activity    Alcohol use: Never     Comment: quit in     Drug use: Never    Sexual activity: Defer      Allergies   Allergen Reactions    Lisinopril Cough      Current Outpatient Medications on File Prior to Visit   Medication Sig Dispense Refill    allopurinol (ZYLOPRIM)  300 MG tablet TAKE 1 TABLET EVERY DAY 90 tablet 0    APPLE CIDER VINEGAR PO Take 1 tablet by mouth 2 (Two) Times a Day.      ascorbic acid (VITAMIN C) 1000 MG tablet Take 1 tablet by mouth 2 (Two) Times a Day.      aspirin 81 MG EC tablet Take 1 tablet by mouth Daily.      atorvastatin (LIPITOR) 40 MG tablet Take 1 tablet by mouth Every Night. 90 tablet 0    bumetanide (BUMEX) 2 MG tablet Take 1 tablet by mouth Daily. 90 tablet 1    CALCIUM-MAGNESIUM-ZINC PO Take 1 tablet by mouth 2 (Two) Times a Day. 400 mg Mag per capsule      Cayenne 450 MG capsule Take 1 capsule by mouth 2 (Two) Times a Day.      Cinnamon 500 MG capsule Take 1 capsule by mouth 2 (Two) Times a Day. (Patient taking differently: Take 1,800 mg by mouth 2 (Two) Times a Day.)      Cranberry 250 MG capsule Take 25,000 mg by mouth Daily.      doxazosin (CARDURA) 4 MG tablet Take 1 tablet by mouth Daily. (Patient taking differently: Take 0.5 tablets by mouth Daily.) 90 tablet 3    finasteride (PROSCAR) 5 MG tablet Take 1 tablet by mouth Daily. 90 tablet 0    folic acid (FOLVITE) 800 MCG tablet Take 1 tablet by mouth Daily.      gabapentin (NEURONTIN) 400 MG capsule Take 1 capsule by mouth 2 (Two) Times a Day.      isosorbide mononitrate (IMDUR) 120 MG 24 hr tablet Take 1 tablet by mouth Daily. 90 tablet 3    loratadine (Claritin) 10 MG tablet Take 1 tablet by mouth Daily As Needed for Allergies.      Melatonin 3 MG tablet dispersible Place 1 tablet on the tongue Daily As Needed.      methylcellulose, Laxative, (CITRUCEL) 500 MG tablet tablet Take 2 tablets by mouth Every 4 (Four) Hours As Needed.      metoprolol succinate XL (TOPROL-XL) 100 MG 24 hr tablet Take 1 tablet by mouth Daily. 90 tablet 3    Multiple Vitamins-Minerals (ICAPS AREDS 2 PO) Take  by mouth.      nitroglycerin (NITROSTAT) 0.4 MG SL tablet PLACE 1 TAB UNDER TONGUE EVERY 5 MINS AS NEEDED FOR CHEST PAIN (ONLY IF SYSTOLIC BLOOD PRESSURE OVER 100) MAX 3 TABS/15 MINS 25 tablet 3    NON  FORMULARY Take 20 mg by mouth 2 (Two) Times a Day. Lutein      NON FORMULARY Sugar Blocker  1 tablet po bid      Omega-3 Fatty Acids (Fish Oil Triple Strength) 1400 MG capsule Take 1,200 mg by mouth 2 (Two) Times a Day.      sacubitril-valsartan (Entresto)  MG tablet Take 1 tablet by mouth 2 Times a Day. 180 tablet 3    vitamin E 400 UNIT capsule Take 1 capsule by mouth Daily.       No current facility-administered medications on file prior to visit.      Review of Systems   Constitutional:  Positive for fatigue.   Gastrointestinal:  Negative for abdominal pain, constipation, diarrhea, nausea and vomiting.   Endocrine: Negative for polydipsia, polyphagia and polyuria.      Physical Exam  Vitals reviewed.   Constitutional:       Appearance: Normal appearance.   HENT:      Head: Normocephalic.   Eyes:      Pupils: Pupils are equal, round, and reactive to light.   Cardiovascular:      Pulses: Normal pulses.   Pulmonary:      Effort: Pulmonary effort is normal.   Abdominal:      Palpations: Abdomen is soft.   Musculoskeletal:      Cervical back: Normal range of motion.   Skin:     General: Skin is warm and dry.   Neurological:      Mental Status: He is alert and oriented to person, place, and time.   Psychiatric:         Mood and Affect: Mood normal.         Behavior: Behavior normal.       CMP:  Lab Results   Component Value Date    GLUCOSE 189 (H) 02/28/2025    BUN 28 (H) 02/28/2025    CREATININE 1.60 (H) 02/28/2025     02/28/2025    K 4.3 02/28/2025    CL 99 02/28/2025    CALCIUM 9.2 02/28/2025    PROTEINTOT 6.9 11/04/2024    ALBUMIN 4.1 11/04/2024    ALT 11 11/04/2024    AST 10 11/04/2024    ALKPHOS 69 11/04/2024    BILITOT 0.4 11/04/2024    GLOB 2.8 11/04/2024    AGRATIO 1.5 11/04/2024    BCR 17.5 02/28/2025    ANIONGAP 12.8 02/28/2025    EGFR 44.7 (L) 02/28/2025        Lipid Panel:  Lab Results   Component Value Date    CHOL 138 11/04/2024    TRIG 231 (H) 11/04/2024    HDL 28 (L) 11/04/2024    VLDL 38  "11/04/2024    LDL 72 11/04/2024     HbA1c:  No components found for: \"HGBA1C\"    TSH:  Lab Results   Component Value Date    TSH 2.500 11/04/2024       Assessment and Plan  Diagnoses and all orders for this visit:    1. Type 2 diabetes mellitus with hyperglycemia, without long-term current use of insulin (Primary)  Assessment & Plan:  -Diabetes is improving with A1c 7.9.   -Will increase Rybelsus to 14 mg daily  -Continue metformin 1,000 mg BID  -Continue glipizide 10 mg daily  -Continue Farxiga 10 mg daily  -Discussed dietary and exercise guidelines with patient. 150 g carbs per day and 150 minutes of exercise per week. Increase protein with complex carbs. Avoid foods high in refined sugars and sugary drinks.   -Discussed the importance of yearly eye exams.  -Discussed Glucagon use and appropriate timing for this.   -S/S hypoglycemia reviewed with Rule of 15's advised.  -Discussed long term risks of untreated/undertreated diabetes including retinopathy, neuropathy, nephropathy, amputations, hospitalizations, MI, CVA.    -Patient has no personal history of pancreatitis, no family history of MEN syndrome or medullary thyroid cancer. Possible side effects including nausea, bloating, other GI upset and rarely pancreatitis were discussed. He was advised to call the office with any symptoms or concerns.    -Discussed the medication's MOA and protective cardiovascular and renal benefits for diabetes. Medication may cause  more frequent urination particularly upon starting the medication. Take one tablet in the morning with or without food. Encouraged to drink plenty of water as to not get dehydrated, particularly in warmer weather or with activity. Also discussed there may be an increased incidence of UTIs or yeast infections and to monitor for signs/symptoms.      Orders:  -     POC Glucose, Blood  -     dapagliflozin Propanediol (Farxiga) 10 MG tablet; Take 1 tablet by mouth Daily.  Dispense: 90 tablet; Refill: 0  -     " glipizide (GLUCOTROL) 10 MG tablet; Take 1 tablet by mouth 2 (Two) Times a Day Before Meals.  Dispense: 180 tablet; Refill: 0  -     metFORMIN (GLUCOPHAGE) 1000 MG tablet; Take 1 tablet by mouth 2 (Two) Times a Day With Meals.  Dispense: 180 tablet; Refill: 0  -     Semaglutide (Rybelsus) 14 MG tablet; Take 1 tablet by mouth Daily.  Dispense: 90 tablet; Refill: 0      Return in about 3 months (around 6/10/2025). The patient was instructed to contact the clinic with any interval questions or concerns.    Please note that portions of this document were completed with a voice recognition program. Efforts were made to edit the dictations, but occasionally words are mis-transcribed.  This document has been electronically signed by ANKUR Waller  March 11, 2025 08:35 EDT

## 2025-03-11 NOTE — ASSESSMENT & PLAN NOTE
-Diabetes is improving with A1c 7.9.   -Will increase Rybelsus to 14 mg daily  -Continue metformin 1,000 mg BID  -Continue glipizide 10 mg daily  -Continue Farxiga 10 mg daily  -Discussed dietary and exercise guidelines with patient. 150 g carbs per day and 150 minutes of exercise per week. Increase protein with complex carbs. Avoid foods high in refined sugars and sugary drinks.   -Discussed the importance of yearly eye exams.  -Discussed Glucagon use and appropriate timing for this.   -S/S hypoglycemia reviewed with Rule of 15's advised.  -Discussed long term risks of untreated/undertreated diabetes including retinopathy, neuropathy, nephropathy, amputations, hospitalizations, MI, CVA.    -Patient has no personal history of pancreatitis, no family history of MEN syndrome or medullary thyroid cancer. Possible side effects including nausea, bloating, other GI upset and rarely pancreatitis were discussed. He was advised to call the office with any symptoms or concerns.    -Discussed the medication's MOA and protective cardiovascular and renal benefits for diabetes. Medication may cause  more frequent urination particularly upon starting the medication. Take one tablet in the morning with or without food. Encouraged to drink plenty of water as to not get dehydrated, particularly in warmer weather or with activity. Also discussed there may be an increased incidence of UTIs or yeast infections and to monitor for signs/symptoms.

## 2025-03-14 ENCOUNTER — RESULTS FOLLOW-UP (OUTPATIENT)
Dept: PULMONOLOGY | Facility: CLINIC | Age: 75
End: 2025-03-14
Payer: MEDICARE

## 2025-03-14 DIAGNOSIS — G47.33 OSA (OBSTRUCTIVE SLEEP APNEA): Primary | ICD-10-CM

## 2025-03-14 NOTE — TELEPHONE ENCOUNTER
Dicussed sleep study results with patient. Sleep study revealed severe MADDY with AHI 56.1. Cheyne rivera respirations were noted during study. Educated patient that sleep apnea can contribute to uncontrolled hypertension, CHF, arrhythmias, etc. Patient is agreeable to try CPAP device. Informed to wear at least 4 hours per night.

## 2025-03-17 ENCOUNTER — SPECIALTY PHARMACY (OUTPATIENT)
Dept: PHARMACY | Facility: HOSPITAL | Age: 75
End: 2025-03-17
Payer: MEDICARE

## 2025-03-17 DIAGNOSIS — E11.65 TYPE 2 DIABETES MELLITUS WITH HYPERGLYCEMIA, WITHOUT LONG-TERM CURRENT USE OF INSULIN: ICD-10-CM

## 2025-03-17 DIAGNOSIS — E11.21 TYPE 2 DIABETES MELLITUS WITH DIABETIC NEPHROPATHY, UNSPECIFIED WHETHER LONG TERM INSULIN USE: Primary | Chronic | ICD-10-CM

## 2025-03-17 RX ORDER — BISMUTH SUBSALICYLATE 262 MG/1
262 TABLET, CHEWABLE ORAL DAILY PRN
COMMUNITY

## 2025-03-17 RX ORDER — DAPAGLIFLOZIN 10 MG/1
10 TABLET, FILM COATED ORAL DAILY
Qty: 30 TABLET | Refills: 5 | Status: SHIPPED | OUTPATIENT
Start: 2025-03-17

## 2025-03-17 RX ORDER — GLIPIZIDE 10 MG/1
10 TABLET ORAL
Qty: 60 TABLET | Refills: 5 | Status: SHIPPED | OUTPATIENT
Start: 2025-03-17

## 2025-03-17 RX ORDER — VIT C/E/ZN/COPPR/LUTEIN/ZEAXAN 60 MG-6 MG
1 CAPSULE ORAL DAILY
COMMUNITY

## 2025-03-17 RX ORDER — ORAL SEMAGLUTIDE 14 MG/1
14 TABLET ORAL DAILY
Qty: 30 TABLET | Refills: 5 | Status: SHIPPED | OUTPATIENT
Start: 2025-03-17

## 2025-03-17 NOTE — PROGRESS NOTES
Specialty Pharmacy Patient Management Program  Endocrinology Initial Assessment       William Villasenor is a 74 y.o. male with Type 2 Diabetes seen by an Endocrinology provider and enrolled in the Endocrinology Patient Management program offered by [facility].  An initial outreach was conducted, including assessment of therapy appropriateness and specialty medication education for Metformin, Farxiga, Glipizide, Rybelsus. The patient was introduced to services offered by [facility], including: regular assessments, refill coordination, curbside pick-up or mail order delivery options, prior authorization maintenance, and financial assistance programs as applicable. The patient was also provided with contact information for the pharmacy team.     Patient is currently taking Metformin 1000mg BID, Glipizide 10 mg BID, Farxiga 10mg daily, and Rybelsus 7mg po daily. Patient keeps a log of his BG readings and reports that he ranges between 130-210.  Patient denies low BG <70. Patient reports he is not interested in any injections at this time.  Pt denies any adherence issues at this time.  Pt does report some belching previously with the Rybelsus but states that this has improved at this time.  Pt denies experiencing any other side effects.    Pt is now requesting to fill his meds with [facility].    Complications include: h/o HTN, HLD, CABG x 4, HF, CKD, CAD, neuropathy    Patient denies personal/family history of thyroid cancer, denies history of pancreatitis, denies issues with recurrent UTI/yeast infections.    In the past, the patient has tried: Pt reports only trying the medications that he currently takes     Insurance Coverage & Financial Support  [employer] Part D    Relevant Past Medical History and Comorbidities  Relevant medical history and concomitant health conditions were discussed with the patient. The patient's chart has been reviewed for relevant past medical  history and comorbid health conditions and updated as necessary.   Past Medical History:   Diagnosis Date    Arthritis     Back pain     BPH (benign prostatic hyperplasia)     Chronic diastolic (congestive) heart failure     Coronary artery disease     Diabetes mellitus     Diabetic peripheral neuropathy     Elevated cholesterol     Erectile dysfunction     Former smoker     GERD (gastroesophageal reflux disease)     Gout     Hemorrhoids     High cholesterol     High triglycerides     Hypertension     Neuropathy     Obesity      Social History     Socioeconomic History    Marital status:     Number of children: 3   Tobacco Use    Smoking status: Former     Current packs/day: 0.00     Average packs/day: 1 pack/day for 5.0 years (5.0 ttl pk-yrs)     Types: Cigarettes     Start date: 1969     Quit date: 1974     Years since quittin.1     Passive exposure: Past    Smokeless tobacco: Never   Vaping Use    Vaping status: Never Used   Substance and Sexual Activity    Alcohol use: Never     Comment: quit in     Drug use: Never    Sexual activity: Defer       Problem list reviewed by Tashi Eddy RPH on 3/17/2025 at 11:01 AM    Allergies  Known allergies and reactions were discussed with the patient. The patient's chart has been reviewed for  allergy information and updated as necessary.   Allergies   Allergen Reactions    Lisinopril Cough       Allergies reviewed by Tashi Eddy RPH on 3/17/2025 at  1:04 PM    Relevant Laboratory Values  A1C Last 3 Results          2024    11:47   HGBA1C Last 3 Results   Hemoglobin A1C 8.8      Lab Results   Component Value Date    HGBA1C 8.8 (A) 2024     Lab Results   Component Value Date    GLUCOSE 189 (H) 2025    CALCIUM 9.2 2025     2025    K 4.3 2025    CO2 26.2 2025    CL 99 2025    BUN 28 (H) 2025    CREATININE 1.60 (H) 2025    EGFRIFNONA 58 (L) 2021    BCR 17.5 2025    ANIONGAP  12.8 02/28/2025     Lab Results   Component Value Date    CHOL 138 11/04/2024    TRIG 231 (H) 11/04/2024    HDL 28 (L) 11/04/2024    LDL 72 11/04/2024     Microalbumin          11/4/2024    12:05   Microalbumin   Microalbumin, Urine 1.5        Current Medication List  This medication list has been reviewed with the patient and evaluated for any interactions or necessary modifications/recommendations, and updated to include all prescription medications, OTC medications, and supplements the patient is currently taking.  This list reflects what is contained in the patient's profile, which has also been marked as reviewed to communicate to other providers it is the most up to date version of the patient's current medication therapy.     Current Outpatient Medications:     allopurinol (ZYLOPRIM) 300 MG tablet, TAKE 1 TABLET EVERY DAY, Disp: 90 tablet, Rfl: 0    APPLE CIDER VINEGAR PO, Take 450 mg by mouth 2 (Two) Times a Day., Disp: , Rfl:     ascorbic acid (VITAMIN C) 1000 MG tablet, Take 1 tablet by mouth 2 (Two) Times a Day., Disp: , Rfl:     aspirin 81 MG EC tablet, Take 1 tablet by mouth Daily., Disp: , Rfl:     atorvastatin (LIPITOR) 40 MG tablet, Take 1 tablet by mouth Every Night., Disp: 90 tablet, Rfl: 0    bismuth subsalicylate (PEPTO BISMOL) 262 MG chewable tablet, Chew 2 tablets Daily As Needed for Diarrhea., Disp: , Rfl:     bumetanide (BUMEX) 2 MG tablet, Take 1 tablet by mouth Daily. (Patient taking differently: Take 1 tablet by mouth 2 (Two) Times a Day.), Disp: 90 tablet, Rfl: 1    CALCIUM-MAGNESIUM-ZINC PO, Take 1 tablet by mouth 2 (Two) Times a Day. 400 mg Mag per 3 capsules, Disp: , Rfl:     Cayenne 450 MG capsule, Take 1 capsule by mouth 2 (Two) Times a Day., Disp: , Rfl:     Cinnamon 500 MG capsule, Take 1 capsule by mouth 2 (Two) Times a Day., Disp: , Rfl:     CRANBERRY PO, Take 12,600 mg by mouth Daily., Disp: , Rfl:     dapagliflozin Propanediol (Farxiga) 10 MG tablet, Take 1 tablet by mouth  Daily., Disp: 90 tablet, Rfl: 0    doxazosin (CARDURA) 4 MG tablet, Take 1 tablet by mouth Daily. (Patient taking differently: Take 0.5 tablets by mouth Daily.), Disp: 90 tablet, Rfl: 3    finasteride (PROSCAR) 5 MG tablet, Take 1 tablet by mouth Daily., Disp: 90 tablet, Rfl: 0    folic acid (FOLVITE) 800 MCG tablet, Take 1 tablet by mouth Daily., Disp: , Rfl:     gabapentin (NEURONTIN) 400 MG capsule, Take 1 capsule by mouth 2 (Two) Times a Day., Disp: , Rfl:     glipizide (GLUCOTROL) 10 MG tablet, Take 1 tablet by mouth 2 (Two) Times a Day Before Meals., Disp: 180 tablet, Rfl: 0    isosorbide mononitrate (IMDUR) 120 MG 24 hr tablet, Take 1 tablet by mouth Daily., Disp: 90 tablet, Rfl: 3    loratadine (Claritin) 10 MG tablet, Take 1 tablet by mouth Daily As Needed for Allergies., Disp: , Rfl:     metFORMIN (GLUCOPHAGE) 1000 MG tablet, Take 1 tablet by mouth 2 (Two) Times a Day With Meals., Disp: 180 tablet, Rfl: 0    methylcellulose, Laxative, (CITRUCEL) 500 MG tablet tablet, Take 1 tablet by mouth Every Morning., Disp: , Rfl:     metoprolol succinate XL (TOPROL-XL) 100 MG 24 hr tablet, Take 1 tablet by mouth Daily., Disp: 90 tablet, Rfl: 3    Multiple Vitamins-Minerals (Ocuvite-Lutein) capsule, Take 1 capsule by mouth Daily., Disp: , Rfl:     multivitamin with minerals (CENTRUM SILVER PO), Take 1 tablet by mouth Daily., Disp: , Rfl:     nitroglycerin (NITROSTAT) 0.4 MG SL tablet, PLACE 1 TAB UNDER TONGUE EVERY 5 MINS AS NEEDED FOR CHEST PAIN (ONLY IF SYSTOLIC BLOOD PRESSURE OVER 100) MAX 3 TABS/15 MINS, Disp: 25 tablet, Rfl: 3    NON FORMULARY, Sugar Blocker  1 tablet po bid, Disp: , Rfl:     Omega-3 Fatty Acids (Fish Oil Triple Strength) 1400 MG capsule, Take 1,200 mg by mouth 2 (Two) Times a Day., Disp: , Rfl:     sacubitril-valsartan (Entresto)  MG tablet, Take 1 tablet by mouth 2 Times a Day., Disp: 180 tablet, Rfl: 3    Semaglutide (Rybelsus) 14 MG tablet, Take 1 tablet by mouth Daily., Disp: 90 tablet,  Rfl: 0    vitamin E 400 UNIT capsule, Take 1 capsule by mouth Daily. (Patient taking differently: Take 184 Units by mouth Daily.), Disp: , Rfl:     Melatonin 3 MG tablet dispersible, Place 1 tablet on the tongue Daily As Needed. (Patient not taking: Reported on 3/17/2025), Disp: , Rfl:     Medicines reviewed by Tashi Eddy [facility] on 3/17/2025 at  1:18 PM    Drug Interactions with Diabetic Medications  -Farxiga and Rybelsus may enhance the hypoglycemic effect of sulfonylureas  -Aspirin and Pepto bismol may enhance the hypoglycemic effect of antidiabetic agents  -Bumex may diminish the effect of antidiabetic agents  -Beta blockers may mask the hypoglycemic symptoms of antidiabetic agents.      Recommended Medications Assessment  Aspirin -  Currently Taking   Statin -  Currently Taking   ACEi/ARB - Not Taking Currently      Adherence and Self-Administration  Adherence related to the patient's specialty therapy was discussed with the patient. The Adherence segment of this outreach has been reviewed and updated.   Is there a concern with patient's ability to self administer the medication correctly and without issue?: No  Were any potential barriers to adherence identified during the initial assessment or patient education?: No (Pt is signed up for HF melba for Farxiga; Rybelsus cost $0 with insurance)  Are there any concerns regarding the patient's understanding of the importance of medication adherence?: No    Barriers to Patient Adherence and/or Self-Administration: None   Methods for Supporting Patient Adherence and/or Self-Administration: Patient has medications organized in container    Smoker?  Former    Goals of Therapy  Goals related to the patient's specialty therapy were discussed with the patient. The Patient Goals segment of this outreach has been reviewed and updated.    Goals Addressed Today        Specialty Pharmacy General Goal      A1c < 7%-- Pt not at goal as most recent A1c = 7.9%.  Provider recently  increased Rybelsus dose to 14mg po daily to help improve A1c at most recent visit.              Reassessment Plan & Follow-Up  Patient's diabetes is uncontrolled with most recent A1C of 7.9%.  Medication Therapy Changes: Per Shandra PASCUAL:  Continue Glipizide 10mg po bid  Continue Farxiga 10mg po daily (HF manages this medication and no renal adjustment needed with current eGFT for HF diagnsosis)  Start Rybelsus 14mg po daily (pt starts this dose on 3/18/25 as he finished his 7mg supply today).  Lower metformin dose to 500mg po bid as most recent eGFR < 45.  Verbal order received from provider Shandra PASCUAL for dose change.  Related Plans, Therapy Recommendations or Therapy Problems to Be Addressed:   Sent rx's for Metformin, Rybelsus, Glipizide, and Farxiga to [facility] for pickup.   Pt's TG elevated with most recent lipid panel on file.  Recommended either increasing fish oil dose to 2g po bid or adding low dose fenofibrate.  Recommended flu vaccine and Prevnar-20.  Recommend to continue monitoring renal function.  Instructed pt to call Endocrinology clinic if he experiences any side effects with the increased Rybelsus dose or if the belching occurs again.  Patient denies issues with affordability or adherence at this time. Pt is signed up already for HF melba for Farxiga and the pt's current Rybelsus cost with insurance is $0.  Pharmacist to perform regular reassessments no more than (6) months from the previous assessment.  Care Coordinator to set up future refill outreaches, coordinate prescription delivery, and escalate clinical questions to pharmacist.   Welcome information and patient satisfaction survey to be sent by specialty pharmacy team with patient's initial fill.    Attestation  I attest the patient was actively involved in and has agreed to the above plan of care. If the prescribed therapy is at any point deemed not appropriate based on the current or future assessments, a  consultation will be initiated with the patient's specialty care provider to determine the best course of action. The revised plan of therapy will be documented along with any required assessments and/or additional patient education provided..    Tashi Eddy RPH  11/04/2024  15:58 EDT

## 2025-04-01 ENCOUNTER — DISEASE STATE MANAGEMENT VISIT (OUTPATIENT)
Dept: PHARMACY | Facility: HOSPITAL | Age: 75
End: 2025-04-01
Payer: MEDICARE

## 2025-04-17 ENCOUNTER — SPECIALTY PHARMACY (OUTPATIENT)
Dept: PHARMACY | Facility: HOSPITAL | Age: 75
End: 2025-04-17
Payer: MEDICARE

## 2025-04-17 NOTE — PROGRESS NOTES
Specialty Pharmacy Patient Management Program  Refill Outreach     William was contacted today regarding refills of their medication(s). Metformin & Rybelsus. Mr. Villasenor stated that he did not want either medication filled at this time. He has an upcoming appointment with his provider and would like to speak with her about his medications before filling. He stated he prefers 90 day supplies on medications. Rybelsus is also unaffordable for him at $283.26/ month and he fills like the medication is ineffective. Patient was instructed to contact [facility] team if he does decide to refill any medications or if further assistance is needed after his appointment.     Tatyana Gandhi CPhT  [address]  [address]  [phone]                 Follow-up: 60  day(s)     Tatyana Gandhi Pharmacy Technician  4/17/2025  11:11 EDT

## 2025-04-21 ENCOUNTER — OFFICE VISIT (OUTPATIENT)
Dept: CARDIOLOGY | Facility: CLINIC | Age: 75
End: 2025-04-21
Payer: MEDICARE

## 2025-04-21 VITALS
BODY MASS INDEX: 31.58 KG/M2 | OXYGEN SATURATION: 95 % | SYSTOLIC BLOOD PRESSURE: 133 MMHG | HEIGHT: 69 IN | WEIGHT: 213.2 LBS | HEART RATE: 66 BPM | DIASTOLIC BLOOD PRESSURE: 71 MMHG

## 2025-04-21 DIAGNOSIS — I25.810 CORONARY ARTERY DISEASE INVOLVING CORONARY BYPASS GRAFT OF NATIVE HEART WITHOUT ANGINA PECTORIS: ICD-10-CM

## 2025-04-21 DIAGNOSIS — I50.32 CHRONIC HEART FAILURE WITH PRESERVED EJECTION FRACTION (HFPEF): ICD-10-CM

## 2025-04-21 DIAGNOSIS — Z95.1 S/P CABG X 4: ICD-10-CM

## 2025-04-21 DIAGNOSIS — E11.21 TYPE 2 DIABETES MELLITUS WITH DIABETIC NEPHROPATHY, UNSPECIFIED WHETHER LONG TERM INSULIN USE: Chronic | ICD-10-CM

## 2025-04-21 DIAGNOSIS — E78.5 HYPERLIPIDEMIA ASSOCIATED WITH TYPE 2 DIABETES MELLITUS: ICD-10-CM

## 2025-04-21 DIAGNOSIS — E11.69 HYPERLIPIDEMIA ASSOCIATED WITH TYPE 2 DIABETES MELLITUS: ICD-10-CM

## 2025-04-21 DIAGNOSIS — I25.119 CORONARY ARTERY DISEASE INVOLVING NATIVE CORONARY ARTERY OF NATIVE HEART WITH ANGINA PECTORIS: Primary | ICD-10-CM

## 2025-04-21 DIAGNOSIS — R07.89 CHEST PAIN, ATYPICAL: ICD-10-CM

## 2025-04-21 DIAGNOSIS — I50.32 CHRONIC DIASTOLIC CONGESTIVE HEART FAILURE: ICD-10-CM

## 2025-04-21 DIAGNOSIS — I10 ESSENTIAL HYPERTENSION: ICD-10-CM

## 2025-04-21 PROCEDURE — 99214 OFFICE O/P EST MOD 30 MIN: CPT | Performed by: NURSE PRACTITIONER

## 2025-04-21 PROCEDURE — 3075F SYST BP GE 130 - 139MM HG: CPT | Performed by: NURSE PRACTITIONER

## 2025-04-21 PROCEDURE — 3078F DIAST BP <80 MM HG: CPT | Performed by: NURSE PRACTITIONER

## 2025-04-21 PROCEDURE — 1159F MED LIST DOCD IN RCRD: CPT | Performed by: NURSE PRACTITIONER

## 2025-04-21 PROCEDURE — 1160F RVW MEDS BY RX/DR IN RCRD: CPT | Performed by: NURSE PRACTITIONER

## 2025-04-21 RX ORDER — DAPAGLIFLOZIN 10 MG/1
10 TABLET, FILM COATED ORAL DAILY
Qty: 90 TABLET | Refills: 3 | Status: SHIPPED | OUTPATIENT
Start: 2025-04-21 | End: 2025-04-21

## 2025-04-21 RX ORDER — DAPAGLIFLOZIN 10 MG/1
10 TABLET, FILM COATED ORAL DAILY
Qty: 90 TABLET | Refills: 3 | Status: SHIPPED | OUTPATIENT
Start: 2025-04-21

## 2025-04-21 RX ORDER — ISOSORBIDE MONONITRATE 120 MG/1
120 TABLET, EXTENDED RELEASE ORAL DAILY
Qty: 90 TABLET | Refills: 3 | Status: SHIPPED | OUTPATIENT
Start: 2025-04-21

## 2025-04-21 RX ORDER — SACUBITRIL AND VALSARTAN 97; 103 MG/1; MG/1
1 TABLET, FILM COATED ORAL 2 TIMES DAILY
Qty: 180 TABLET | Refills: 3 | Status: SHIPPED | OUTPATIENT
Start: 2025-04-21 | End: 2025-04-21

## 2025-04-21 RX ORDER — ATORVASTATIN CALCIUM 40 MG/1
40 TABLET, FILM COATED ORAL NIGHTLY
Qty: 90 TABLET | Refills: 3 | Status: SHIPPED | OUTPATIENT
Start: 2025-04-21

## 2025-04-21 RX ORDER — SACUBITRIL AND VALSARTAN 97; 103 MG/1; MG/1
1 TABLET, FILM COATED ORAL 2 TIMES DAILY
Qty: 180 TABLET | Refills: 3 | Status: SHIPPED | OUTPATIENT
Start: 2025-04-21

## 2025-04-21 RX ORDER — METOPROLOL SUCCINATE 100 MG/1
100 TABLET, EXTENDED RELEASE ORAL DAILY
Qty: 90 TABLET | Refills: 3 | Status: SHIPPED | OUTPATIENT
Start: 2025-04-21

## 2025-04-21 RX ORDER — BUMETANIDE 2 MG/1
2 TABLET ORAL 2 TIMES DAILY
Qty: 180 TABLET | Refills: 1 | Status: SHIPPED | OUTPATIENT
Start: 2025-04-21

## 2025-04-21 RX ORDER — BUMETANIDE 2 MG/1
2 TABLET ORAL DAILY
Qty: 90 TABLET | Refills: 1 | Status: SHIPPED | OUTPATIENT
Start: 2025-04-21 | End: 2025-04-21

## 2025-04-21 RX ORDER — DOXAZOSIN 2 MG/1
2 TABLET ORAL DAILY
Qty: 90 TABLET | Refills: 3 | Status: SHIPPED | OUTPATIENT
Start: 2025-04-21

## 2025-04-21 NOTE — PROGRESS NOTES
Subjective     William Villasenor is a 74 y.o. male who presents to [facility] for Follow-up, Coronary Artery Disease, Congestive Heart Failure, and Hypertension.    CHIEF COMPLIANT  Chief Complaint   Patient presents with    Follow-up    Coronary Artery Disease    Congestive Heart Failure    Hypertension       Active Problems:  Problem List Items Addressed This Visit          Cardiac and Vasculature    Essential hypertension (Chronic)    Relevant Medications    bumetanide (BUMEX) 2 MG tablet    doxazosin (CARDURA) 2 MG tablet    metoprolol succinate XL (TOPROL-XL) 100 MG 24 hr tablet    Other Relevant Orders    Treadmill Stress Test    Coronary artery disease involving native coronary artery of native heart with angina pectoris - Primary    Overview   Added automatically from request for surgery [id number]         Relevant Medications    bumetanide (BUMEX) 2 MG tablet    isosorbide mononitrate (IMDUR) 120 MG 24 hr tablet    metoprolol succinate XL (TOPROL-XL) 100 MG 24 hr tablet    sacubitril-valsartan (Entresto)  MG tablet    Other Relevant Orders    Treadmill Stress Test    CAD (coronary artery disease)    Relevant Medications    isosorbide mononitrate (IMDUR) 120 MG 24 hr tablet    metoprolol succinate XL (TOPROL-XL) 100 MG 24 hr tablet    sacubitril-valsartan (Entresto)  MG tablet    Other Relevant Orders    Treadmill Stress Test    S/P CABG x 4    Relevant Medications    bumetanide (BUMEX) 2 MG tablet    Other Relevant Orders    Treadmill Stress Test       Endocrine and Metabolic    Type II diabetes mellitus (Chronic)    Relevant Medications    metFORMIN (GLUCOPHAGE) 1000 MG tablet    dapagliflozin Propanediol (Farxiga) 10 MG tablet     Other Visit Diagnoses         Chronic heart failure with preserved ejection fraction (HFpEF)        Relevant Medications    bumetanide (BUMEX) 2 MG tablet    isosorbide mononitrate (IMDUR) 120 MG 24 hr tablet    metoprolol succinate XL (TOPROL-XL) 100 MG 24 hr tablet     sacubitril-valsartan (Entresto)  MG tablet    Other Relevant Orders    Treadmill Stress Test      Hyperlipidemia associated with type 2 diabetes mellitus        Relevant Medications    metFORMIN (GLUCOPHAGE) 1000 MG tablet    atorvastatin (LIPITOR) 40 MG tablet    dapagliflozin Propanediol (Farxiga) 10 MG tablet      Chronic diastolic congestive heart failure        Relevant Medications    bumetanide (BUMEX) 2 MG tablet    isosorbide mononitrate (IMDUR) 120 MG 24 hr tablet    metoprolol succinate XL (TOPROL-XL) 100 MG 24 hr tablet    sacubitril-valsartan (Entresto)  MG tablet      Chest pain, atypical        Relevant Medications    isosorbide mononitrate (IMDUR) 120 MG 24 hr tablet        Problem list  1.  Coronary artery disease status post coronary artery bypass grafting x4  1.1 coronary artery bypass grafting 3/21: LIMA to LAD, SVG to diagonal, SVG to OM1, SVG to OM 2. Residual  of RCA  1.2 left heart catheterization 8/21: Left main ostial distal tapering, circumflex diffusely disease, OM1 occluded, LAD occluded, RCA subtotally occluded, LIMA widely patent, SVG to posterior lateral patent, SVG to OM patent, EF 45±5%, LVEDP 28-30  1.3 stress test 7/23: Negative for ischemia, EF 60% septal akinesis  2.  Essential hypertension  2.1 echocardiogram 6/23: EF 56 to 60%, no hemodynamically significant valvular disease or pericardial effusion.  2.2 echo 7/24: EF 66 to 70%, grade 1 diastolic dysfunction, trace MR  3.  Diastolic dysfunction  4.  Chest pain  5.  Shortness of breath  6.  Lower extremity edema  7.  Dizziness/near syncope  7.1 cardiac event monitor 6/23: Several runs of SVT with the longest being 6 beats with a maximal rate of 146.  No reported symptoms, minimal PAC PVC burden.  7.2 carotid duplex 7/23: Mild to moderate plaque right greater than left no occlusion.    HPI  HPI  Mr. William Villasenor is a 74-year-old male patient being followed up today for heart failure with reduced ejection fraction,  coronary artery disease, and chronic arterial hypertension.     Patient does have a history of heart failure with reduced ejection fraction for which she is on Entresto, metoprolol, and Farxiga.  Patient's most recent echocardiogram that was done in July 2024 showed an ejection fraction of 66 to 70% with mild to moderate concentric left ventricular hypertrophy and grade 1 diastolic dysfunction.    Patient does have a history of coronary artery disease which she went under bypass surgery in March 2021 with LIMA to LAD SVG diagonal SVG to OM1 SVG to OM 2 with residual  of the right coronary artery.  Patient is on high intensity statin therapy of atorvastatin and aspirin for antiplatelet therapy.  He is also on antianginal therapy of isosorbide.  We did perform a stress test in July 2023 that was negative for ischemia with a preserved post stress ejection fraction of 60% with septal akinesis.     Patient does have a history of chronic arterial hypertension in which she is being treated with Entresto, metoprolol, and doxazosin. Today his BP is 133/71 and hr 66.    Patient did have a stress test that he is only able to do 2 minutes and 19 seconds on the treadmill reaching 78% of age-adjusted maximum predicted heart rate.  Exercise was stopped due to shortness of breath and fatigue.  Physiological heart rate and blood pressure response exercise however there was not an adequate time on the treadmill to ensure patient had an adequate chronotropic response.  No EKG evidence of ischemia.  No sustained ectopy or block.  He did have occasional PVCs.  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    However patient does report that he is doing well from the cardiovascular standpoint.  He denies any chest pain shortness of breath or recurrent syncope.  PRIOR MEDS  Current Outpatient Medications on File Prior to Visit   Medication Sig Dispense Refill    allopurinol  (ZYLOPRIM) 300 MG tablet TAKE 1 TABLET EVERY DAY 90 tablet 0    APPLE CIDER VINEGAR PO Take 450 mg by mouth 2 (Two) Times a Day.      ascorbic acid (VITAMIN C) 1000 MG tablet Take 1 tablet by mouth 2 (Two) Times a Day.      aspirin 81 MG EC tablet Take 1 tablet by mouth Daily.      bismuth subsalicylate (PEPTO BISMOL) 262 MG chewable tablet Chew 1 tablet Daily As Needed for Diarrhea.      CALCIUM-MAGNESIUM-ZINC PO Take 1 tablet by mouth 2 (Two) Times a Day.      Cayenne 450 MG capsule Take 1 capsule by mouth 2 (Two) Times a Day.      Cinnamon 500 MG capsule Take 1 capsule by mouth 2 (Two) Times a Day.      CRANBERRY PO Take 12,600 mg by mouth Daily.      finasteride (PROSCAR) 5 MG tablet Take 1 tablet by mouth Daily. 90 tablet 0    folic acid (FOLVITE) 800 MCG tablet Take 1 tablet by mouth Daily.      gabapentin (NEURONTIN) 400 MG capsule Take 1 capsule by mouth 2 (Two) Times a Day.      glipizide (GLUCOTROL) 10 MG tablet Take 1 tablet by mouth 2 (Two) Times a Day Before Meals. 60 tablet 5    loratadine (Claritin) 10 MG tablet Take 1 tablet by mouth Daily As Needed for Allergies.      metFORMIN (GLUCOPHAGE) 1000 MG tablet Take 1 tablet by mouth 2 (Two) Times a Day With Meals.      methylcellulose, Laxative, (CITRUCEL) 500 MG tablet tablet Take 1 tablet by mouth Every Morning.      Multiple Vitamins-Minerals (Ocuvite-Lutein) capsule Take 1 capsule by mouth Daily.      multivitamin with minerals (CENTRUM SILVER PO) Take 1 tablet by mouth Daily.      nitroglycerin (NITROSTAT) 0.4 MG SL tablet PLACE 1 TAB UNDER TONGUE EVERY 5 MINS AS NEEDED FOR CHEST PAIN (ONLY IF SYSTOLIC BLOOD PRESSURE OVER 100) MAX 3 TABS/15 MINS 25 tablet 3    NON FORMULARY Sugar Blocker  1 tablet po bid      Omega-3 Fatty Acids (Fish Oil Triple Strength) 1400 MG capsule Take 1,200 mg by mouth 2 (Two) Times a Day.      vitamin E 400 UNIT capsule Take 1 capsule by mouth Daily. (Patient taking differently: Take 184 Units by mouth Daily.)       [DISCONTINUED] atorvastatin (LIPITOR) 40 MG tablet Take 1 tablet by mouth Every Night. 90 tablet 0    [DISCONTINUED] bumetanide (BUMEX) 2 MG tablet Take 1 tablet by mouth Daily. (Patient taking differently: Take 1 tablet by mouth 2 (Two) Times a Day.) 90 tablet 1    [DISCONTINUED] dapagliflozin Propanediol (Farxiga) 10 MG tablet Take 1 tablet by mouth Daily. 30 tablet 5    [DISCONTINUED] doxazosin (CARDURA) 4 MG tablet Take 1 tablet by mouth Daily. (Patient taking differently: Take 0.5 tablets by mouth Daily.) 90 tablet 3    [DISCONTINUED] metoprolol succinate XL (TOPROL-XL) 100 MG 24 hr tablet Take 1 tablet by mouth Daily. 90 tablet 3    [DISCONTINUED] sacubitril-valsartan (Entresto)  MG tablet Take 1 tablet by mouth 2 Times a Day. 180 tablet 3    [DISCONTINUED] isosorbide mononitrate (IMDUR) 120 MG 24 hr tablet Take 1 tablet by mouth Daily. 90 tablet 3    [DISCONTINUED] Melatonin 3 MG tablet dispersible Place 1 tablet on the tongue Daily As Needed. (Patient not taking: Reported on 3/17/2025)      [DISCONTINUED] metFORMIN (GLUCOPHAGE) 500 MG tablet Take 1 tablet by mouth 2 (Two) Times a Day With Meals. 60 tablet 5    [DISCONTINUED] Semaglutide (Rybelsus) 14 MG tablet Take 1 tablet by mouth Daily. 30 tablet 5     No current facility-administered medications on file prior to visit.       ALLERGIES  Lisinopril and Other    HISTORY  Past Medical History:   Diagnosis Date    Arthritis     Back pain     BPH (benign prostatic hyperplasia)     Chronic diastolic (congestive) heart failure     Coronary artery disease     Diabetes mellitus     Diabetic peripheral neuropathy     Elevated cholesterol     Erectile dysfunction     Former smoker     GERD (gastroesophageal reflux disease)     Gout     Hemorrhoids     High cholesterol     High triglycerides     Hypertension     Neuropathy     Obesity     Sleep apnea     wears CPAP       Social History     Socioeconomic History    Marital status:     Number of children:  3   Tobacco Use    Smoking status: Former     Current packs/day: 0.00     Average packs/day: 1 pack/day for 5.0 years (5.0 ttl pk-yrs)     Types: Cigarettes     Start date: 1969     Quit date: 1974     Years since quittin.2     Passive exposure: Past    Smokeless tobacco: Never   Vaping Use    Vaping status: Never Used   Substance and Sexual Activity    Alcohol use: Never     Comment: quit in     Drug use: Never    Sexual activity: Defer       Family History   Problem Relation Age of Onset    Heart disease Mother     Hypertension Mother     Depression Mother     Alcohol abuse Maternal Uncle     Heart disease Maternal Grandmother     Hypertension Maternal Grandmother     Diabetes Maternal Grandmother     Diabetes Paternal Grandmother     No Known Problems Half-Brother     No Known Problems Half-Brother     No Known Problems Half-Sister     No Known Problems Daughter     No Known Problems Daughter     Hyperlipidemia Daughter        Review of Systems   Constitutional:  Negative for chills, fatigue and fever.   HENT:  Negative for congestion, rhinorrhea and sore throat.    Eyes:  Negative for visual disturbance.   Respiratory:  Positive for apnea (CPAP). Negative for chest tightness and shortness of breath.    Cardiovascular:  Positive for leg swelling (not as long as he takes two water pills a day). Negative for chest pain.   Gastrointestinal:  Positive for constipation. Negative for diarrhea and nausea.   Musculoskeletal:  Positive for arthralgias, back pain (occasional) and neck pain.   Skin:  Negative for rash and wound.   Allergic/Immunologic: Positive for environmental allergies. Negative for food allergies.   Neurological:  Negative for dizziness, syncope, weakness and light-headedness.   Hematological:  Bruises/bleeds easily.   Psychiatric/Behavioral:  Negative for sleep disturbance (having trouble getting used to CPAP).        Objective     VITALS: /71 (BP Location: Left arm, Patient  "Position: Sitting, Cuff Size: Adult)   Pulse 66   Ht 175.3 cm (69.02\")   Wt 96.7 kg (213 lb 3.2 oz)   SpO2 95%   BMI 31.47 kg/m²     LABS:   Lab Results (most recent)       None            IMAGING:   No Images in the past 120 days found..    EXAM:  Physical Exam  Vitals and nursing note reviewed.   Constitutional:       Appearance: He is well-developed.   HENT:      Head: Normocephalic.   Neck:      Thyroid: No thyroid mass.      Vascular: No carotid bruit or JVD.      Trachea: Trachea and phonation normal.   Cardiovascular:      Rate and Rhythm: Normal rate and regular rhythm.      Pulses:           Radial pulses are 2+ on the right side and 2+ on the left side.        Posterior tibial pulses are 2+ on the right side and 2+ on the left side.      Heart sounds: Normal heart sounds. No murmur heard.     No friction rub. No gallop.   Pulmonary:      Effort: Pulmonary effort is normal. No respiratory distress.      Breath sounds: Normal breath sounds. No wheezing or rales.   Musculoskeletal:         General: Normal range of motion.      Cervical back: Neck supple.      Right lower leg: Edema (1+) present.      Left lower leg: Edema (1+) present.   Skin:     General: Skin is warm and dry.      Capillary Refill: Capillary refill takes less than 2 seconds.      Findings: No rash.   Neurological:      Mental Status: He is alert and oriented to person, place, and time.   Psychiatric:         Speech: Speech normal.         Behavior: Behavior normal.         Thought Content: Thought content normal.         Judgment: Judgment normal.         Procedure   Procedures       Assessment & Plan    Diagnosis Plan   1. Coronary artery disease involving native coronary artery of native heart with angina pectoris  Treadmill Stress Test    bumetanide (BUMEX) 2 MG tablet      2. S/P CABG x 4  Treadmill Stress Test    bumetanide (BUMEX) 2 MG tablet      3. Chronic heart failure with preserved ejection fraction (HFpEF)  Treadmill Stress " Test    bumetanide (BUMEX) 2 MG tablet      4. Essential hypertension  Treadmill Stress Test    metoprolol succinate XL (TOPROL-XL) 100 MG 24 hr tablet      5. Hyperlipidemia associated with type 2 diabetes mellitus  atorvastatin (LIPITOR) 40 MG tablet      6. Chronic diastolic congestive heart failure  bumetanide (BUMEX) 2 MG tablet    metoprolol succinate XL (TOPROL-XL) 100 MG 24 hr tablet      7. Type 2 diabetes mellitus with diabetic nephropathy, unspecified whether long term insulin use  dapagliflozin Propanediol (Farxiga) 10 MG tablet      8. Chest pain, atypical  isosorbide mononitrate (IMDUR) 120 MG 24 hr tablet      9. Coronary artery disease involving coronary bypass graft of native heart without angina pectoris  metoprolol succinate XL (TOPROL-XL) 100 MG 24 hr tablet      1.  Patient does have a history of coronary artery disease with coronary artery bypass grafting x 4.  He was unable to complete treadmill portion of the stress test.  However in the portion of today complete there is no indications of ischemia or exercise-induced arrhythmia other than occasional PVCs.  In order to try to attempt to get him cleared for his DOT's we will try to repeat treadmill stress test.    2.  Patient's blood pressure is controlled on current blood pressure medication regimen.  No medication changes are warranted at this time.  Patient advised to monitor blood pressure on a daily basis and report any persistent highs or lows.  Set goal blood pressure for patient at 130/80 or below.    3.  Patient does have heart failure with preserved ejection fraction in which she is on Bumex.  We did review his most recent renal function which did show a declining in his renal function advised him to try to cut back on his diuretic therapy to once daily.  He does have upcoming labs in the next month with his PCP.    4.  Also advised patient to try to get his lipid panel rechecked when he has labs done with his PCP and have a copy  sent to us at that time.    5.  Informed of signs and symptoms of ACS and advised to seek emergent treatment for any new worsening symptoms.  Patient also advised sooner follow-up as needed.  Also advised to follow-up with family doctor as needed  This note is dictated utilizing voice recognition software.  Although this record has been proof read, transcriptional errors may still be present. If questions occur regarding the content of this record please do not hesitate to call our office.  I have reviewed and confirmed the accuracy of the ROS as documented by the MA/ZIONN/RN ANKUR Cuevas    No follow-ups on file.    Diagnoses and all orders for this visit:    1. 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  Dispense: 90 tablet; Refill: 1    2. S/P CABG x 4  -     Treadmill Stress Test; Future  -     bumetanide (BUMEX) 2 MG tablet; Take 1 tablet by mouth Daily.  Dispense: 90 tablet; Refill: 1    3. Chronic heart failure with preserved ejection fraction (HFpEF)  -     Treadmill Stress Test; Future  -     bumetanide (BUMEX) 2 MG tablet; Take 1 tablet by mouth Daily.  Dispense: 90 tablet; Refill: 1    4. Essential hypertension  -     Treadmill Stress Test; Future  -     metoprolol succinate XL (TOPROL-XL) 100 MG 24 hr tablet; Take 1 tablet by mouth Daily.  Dispense: 90 tablet; Refill: 3    5. Hyperlipidemia associated with type 2 diabetes mellitus  -     atorvastatin (LIPITOR) 40 MG tablet; Take 1 tablet by mouth Every Night.  Dispense: 90 tablet; Refill: 3    6. Chronic diastolic congestive heart failure  -     bumetanide (BUMEX) 2 MG tablet; Take 1 tablet by mouth Daily.  Dispense: 90 tablet; Refill: 1  -     metoprolol succinate XL (TOPROL-XL) 100 MG 24 hr tablet; Take 1 tablet by mouth Daily.  Dispense: 90 tablet; Refill: 3    7. Type 2 diabetes  mellitus with diabetic nephropathy, unspecified whether long term insulin use  -     Discontinue: dapagliflozin Propanediol (Farxiga) 10 MG tablet; Take 1 tablet by mouth Daily.  Dispense: 90 tablet; Refill: 3  -     dapagliflozin Propanediol (Farxiga) 10 MG tablet; Take 1 tablet by mouth Daily.  Dispense: 90 tablet; Refill: 3    8. Chest pain, atypical  -     isosorbide mononitrate (IMDUR) 120 MG 24 hr tablet; Take 1 tablet by mouth Daily.  Dispense: 90 tablet; Refill: 3    9. Coronary artery disease involving coronary bypass graft of native heart without angina pectoris  -     metoprolol succinate XL (TOPROL-XL) 100 MG 24 hr tablet; Take 1 tablet by mouth Daily.  Dispense: 90 tablet; Refill: 3    Other orders  -     doxazosin (CARDURA) 2 MG tablet; Take 1 tablet by mouth Daily.  Dispense: 90 tablet; Refill: 3  -     sacubitril-valsartan (Entresto)  MG tablet; Take 1 tablet by mouth 2 Times a Day.  Dispense: 180 tablet; Refill: 3        William Villasenor  reports that he quit smoking about 51 years ago. His smoking use included cigarettes. He started smoking about 56 years ago. He has a 5 pack-year smoking history. He has been exposed to tobacco smoke. He has never used smokeless tobacco. I have educated him on the risk of diseases from using tobacco products. Patient does not smoke.           DO YOU VAPE OR USE SMOKELESS TOBACCO PRODUCTS?NO                 MEDS ORDERED DURING VISIT:  New Medications Ordered This Visit   Medications    atorvastatin (LIPITOR) 40 MG tablet     Sig: Take 1 tablet by mouth Every Night.     Dispense:  90 tablet     Refill:  3    bumetanide (BUMEX) 2 MG tablet     Sig: Take 1 tablet by mouth Daily.     Dispense:  90 tablet     Refill:  1    doxazosin (CARDURA) 2 MG tablet     Sig: Take 1 tablet by mouth Daily.     Dispense:  90 tablet     Refill:  3    isosorbide mononitrate (IMDUR) 120 MG 24 hr tablet     Sig: Take 1 tablet by mouth Daily.     Dispense:  90 tablet     Refill:  3     metoprolol succinate XL (TOPROL-XL) 100 MG 24 hr tablet     Sig: Take 1 tablet by mouth Daily.     Dispense:  90 tablet     Refill:  3    sacubitril-valsartan (Entresto)  MG tablet     Sig: Take 1 tablet by mouth 2 Times a Day.     Dispense:  180 tablet     Refill:  3    dapagliflozin Propanediol (Farxiga) 10 MG tablet     Sig: Take 1 tablet by mouth Daily.     Dispense:  90 tablet     Refill:  3     Error do not fill           This document has been electronically signed by Dany San Jr., APRN  April 21, 2025 12:13 EDT

## 2025-04-29 ENCOUNTER — DISEASE STATE MANAGEMENT VISIT (OUTPATIENT)
Dept: PHARMACY | Facility: HOSPITAL | Age: 75
End: 2025-04-29
Payer: MEDICARE

## 2025-05-01 ENCOUNTER — OFFICE VISIT (OUTPATIENT)
Dept: PULMONOLOGY | Facility: CLINIC | Age: 75
End: 2025-05-01
Payer: MEDICARE

## 2025-05-01 VITALS
WEIGHT: 214.4 LBS | HEART RATE: 87 BPM | HEIGHT: 69 IN | DIASTOLIC BLOOD PRESSURE: 76 MMHG | BODY MASS INDEX: 31.76 KG/M2 | SYSTOLIC BLOOD PRESSURE: 134 MMHG | OXYGEN SATURATION: 96 % | TEMPERATURE: 97.4 F

## 2025-05-01 DIAGNOSIS — G47.33 OSA (OBSTRUCTIVE SLEEP APNEA): ICD-10-CM

## 2025-05-01 DIAGNOSIS — I50.22 CHRONIC SYSTOLIC CHF (CONGESTIVE HEART FAILURE): ICD-10-CM

## 2025-05-01 DIAGNOSIS — R91.1 PULMONARY NODULE: ICD-10-CM

## 2025-05-01 DIAGNOSIS — E66.9 OBESITY (BMI 30-39.9): ICD-10-CM

## 2025-05-01 DIAGNOSIS — I25.810 CORONARY ARTERY DISEASE INVOLVING CORONARY BYPASS GRAFT OF NATIVE HEART WITHOUT ANGINA PECTORIS: ICD-10-CM

## 2025-05-01 DIAGNOSIS — F17.211 CIGARETTE NICOTINE DEPENDENCE IN REMISSION: ICD-10-CM

## 2025-05-01 DIAGNOSIS — J98.4 RESTRICTIVE LUNG DISEASE: Primary | ICD-10-CM

## 2025-05-01 PROCEDURE — 1159F MED LIST DOCD IN RCRD: CPT | Performed by: PHYSICIAN ASSISTANT

## 2025-05-01 PROCEDURE — 3075F SYST BP GE 130 - 139MM HG: CPT | Performed by: PHYSICIAN ASSISTANT

## 2025-05-01 PROCEDURE — 99214 OFFICE O/P EST MOD 30 MIN: CPT | Performed by: PHYSICIAN ASSISTANT

## 2025-05-01 PROCEDURE — 3078F DIAST BP <80 MM HG: CPT | Performed by: PHYSICIAN ASSISTANT

## 2025-05-01 PROCEDURE — 1160F RVW MEDS BY RX/DR IN RCRD: CPT | Performed by: PHYSICIAN ASSISTANT

## 2025-05-01 NOTE — PROGRESS NOTES
"    Subjective      Chief Complaint  Restrictive lung disease    Subjective      History of Present Illness  William Villasenor is a 75 y.o. male who presents today to [facility] with past medical history of essential hypertension, hyperlipidemia, CAD status post CABG x 4, chronic systolic heart failure, PSVT, type 2 diabetes mellitus, GERD, BPH, CKD stage II, gout, and former tobacco abuse who presents today for Restrictive lung disease. This visit is a follow up appointment.     Restrictive lung disease:  Patient reports he is doing well.  He states that he has shortness of breath with exertion and when bending over.  He states that this is not changed or worsened since his last visit.  He does have upcoming test scheduled including an echocardiogram, CT of the chest, and PFT on July 19.  He is not currently on an inhaler regimen at this time.  He is a former smoker in which he smoked 2 packs of cigars daily for about 5 years. He states that he has supplemental oxygen supplies but doesn't use frequently. He states that he had to use for a small amount of time on July 4th but attributes this to the weather and it being hot and humid.     Interval history:  Patient presents today fro his follow-up appointment. He received his AutoPAP device and states that he is having a difficult time tolerating it. He states that he will take it off during his sleep in the middle of the night. He also states that sometimes \"it blows air in his face.\" He states that he is having a difficult time with his breathing currently. He states that he is short of breath with any activity even when bending over to tie his shoes. He states that at times he will randomly feel short of breath at rest as well and states that he will start breathing fast. He denies any additional symptoms such as wheezing or cough. He reports that he has been having some chest discomfort over the center of his chest " and off to the right side for the past couple of weeks. He states that he had a treadmill stress test (01/20/2025) recently but wasn't able to complete it and is scheduled for a repeat tomorrow.     Current Outpatient Medications:     allopurinol (ZYLOPRIM) 300 MG tablet, TAKE 1 TABLET EVERY DAY, Disp: 90 tablet, Rfl: 0    APPLE CIDER VINEGAR PO, Take 450 mg by mouth 2 (Two) Times a Day., Disp: , Rfl:     ascorbic acid (VITAMIN C) 1000 MG tablet, Take 1 tablet by mouth 2 (Two) Times a Day., Disp: , Rfl:     aspirin 81 MG EC tablet, Take 1 tablet by mouth Daily., Disp: , Rfl:     atorvastatin (LIPITOR) 40 MG tablet, Take 1 tablet by mouth Every Night., Disp: 90 tablet, Rfl: 3    bismuth subsalicylate (PEPTO BISMOL) 262 MG chewable tablet, Chew 1 tablet Daily As Needed for Diarrhea., Disp: , Rfl:     bumetanide (BUMEX) 2 MG tablet, Take 1 tablet by mouth 2 (Two) Times a Day., Disp: 180 tablet, Rfl: 1    CALCIUM-MAGNESIUM-ZINC PO, Take 1 tablet by mouth 2 (Two) Times a Day., Disp: , Rfl:     Cayenne 450 MG capsule, Take 1 capsule by mouth 2 (Two) Times a Day., Disp: , Rfl:     Cinnamon 500 MG capsule, Take 1 capsule by mouth 2 (Two) Times a Day., Disp: , Rfl:     CRANBERRY PO, Take 12,600 mg by mouth Daily., Disp: , Rfl:     dapagliflozin Propanediol (Farxiga) 10 MG tablet, Take 1 tablet by mouth Daily., Disp: 30 tablet, Rfl: 5    dapagliflozin Propanediol (Farxiga) 10 MG tablet, Take 1 tablet by mouth Daily., Disp: 90 tablet, Rfl: 3    doxazosin (CARDURA) 2 MG tablet, Take 1 tablet by mouth Daily., Disp: 90 tablet, Rfl: 3    finasteride (PROSCAR) 5 MG tablet, Take 1 tablet by mouth Daily., Disp: 90 tablet, Rfl: 0    folic acid (FOLVITE) 800 MCG tablet, Take 1 tablet by mouth Daily., Disp: , Rfl:     gabapentin (NEURONTIN) 400 MG capsule, Take 1 capsule by mouth 2 (Two) Times a Day., Disp: , Rfl:     glipizide (GLUCOTROL) 10 MG tablet, Take 1 tablet by mouth 2 (Two) Times a Day Before Meals., Disp: 60 tablet, Rfl: 5     "isosorbide mononitrate (IMDUR) 120 MG 24 hr tablet, Take 1 tablet by mouth Daily., Disp: 90 tablet, Rfl: 3    loratadine (Claritin) 10 MG tablet, Take 1 tablet by mouth Daily As Needed for Allergies., Disp: , Rfl:     metFORMIN (GLUCOPHAGE) 1000 MG tablet, Take 1 tablet by mouth 2 (Two) Times a Day With Meals., Disp: , Rfl:     methylcellulose, Laxative, (CITRUCEL) 500 MG tablet tablet, Take 1 tablet by mouth Every Morning., Disp: , Rfl:     metoprolol succinate XL (TOPROL-XL) 100 MG 24 hr tablet, Take 1 tablet by mouth Daily., Disp: 90 tablet, Rfl: 3    Multiple Vitamins-Minerals (Ocuvite-Lutein) capsule, Take 1 capsule by mouth Daily., Disp: , Rfl:     multivitamin with minerals (CENTRUM SILVER PO), Take 1 tablet by mouth Daily., Disp: , Rfl:     nitroglycerin (NITROSTAT) 0.4 MG SL tablet, PLACE 1 TAB UNDER TONGUE EVERY 5 MINS AS NEEDED FOR CHEST PAIN (ONLY IF SYSTOLIC BLOOD PRESSURE OVER 100) MAX 3 TABS/15 MINS, Disp: 25 tablet, Rfl: 3    NON FORMULARY, Sugar Blocker  1 tablet po bid, Disp: , Rfl:     Omega-3 Fatty Acids (Fish Oil Triple Strength) 1400 MG capsule, Take 1,200 mg by mouth 2 (Two) Times a Day., Disp: , Rfl:     sacubitril-valsartan (Entresto)  MG tablet, Take 1 tablet by mouth 2 Times a Day., Disp: 180 tablet, Rfl: 3    vitamin E 400 UNIT capsule, Take 1 capsule by mouth Daily. (Patient taking differently: Take 184 Units by mouth Daily.), Disp: , Rfl:       Allergies   Allergen Reactions    Lisinopril Cough    Other Other (See Comments)     Causes a lot of gas Rybelsus       Objective     Objective   Vital Signs:  /76   Pulse 87   Temp 97.4 °F (36.3 °C)   Ht 175.3 cm (69.02\")   Wt 97.3 kg (214 lb 6.4 oz)   SpO2 96%   BMI 31.65 kg/m²   Estimated body mass index is 31.65 kg/m² as calculated from the following:    Height as of this encounter: 175.3 cm (69.02\").    Weight as of this encounter: 97.3 kg (214 lb 6.4 oz).    Past Medical History:   Diagnosis Date    Arthritis     Back pain "     BPH (benign prostatic hyperplasia)     Chronic diastolic (congestive) heart failure     Coronary artery disease     Diabetes mellitus     Diabetic peripheral neuropathy     Elevated cholesterol     Erectile dysfunction     Former smoker     GERD (gastroesophageal reflux disease)     Gout     Hemorrhoids     High cholesterol     High triglycerides     Hypertension     Neuropathy     Obesity     Sleep apnea     wears CPAP     Past Surgical History:   Procedure Laterality Date    CARDIAC CATHETERIZATION N/A 3/15/2021    Procedure: Left Heart Cath;  Surgeon: Trent Zaragoza MD;  Location: [facility] CATH INVASIVE LOCATION;  Service: Cardiology;  Laterality: N/A;    CARDIAC CATHETERIZATION N/A 3/15/2021    Procedure: Coronary angiography;  Surgeon: Trent Zaragoza MD;  Location: [facility] CATH INVASIVE LOCATION;  Service: Cardiology;  Laterality: N/A;    COLONOSCOPY N/A 10/10/2024    Procedure: COLONOSCOPY;  Surgeon: Aly Barnhart MD;  Location: [facility] OR;  Service: Gastroenterology;  Laterality: N/A;    COLONOSCOPY W/ 2015    CORONARY ARTERY BYPASS GRAFT N/A 3/16/2021    Procedure: MEDIAN STERNOTOMY, CORONARY ARTERY BYPASS GRAFT X4, UTILIZATION OF LEFT INTERNAL MAMMARY ARTERY, AND ENDOSCOPIC VEIN HARVEST OF RIGHT GREATER SAPHENOUS VEIN;  Surgeon: Jorge Simms MD;  Location:  [facility];  Service: Cardiothoracic;  Laterality: N/A;  vein out- 1614  vein ready- 1628             Social History     Socioeconomic History    Marital status:     Number of children: 3   Tobacco Use    Smoking status: Former     Current packs/day: 0.00     Average packs/day: 1 pack/day for 5.0 years (5.0 ttl pk-yrs)     Types: Cigarettes     Start date: 1969     Quit date: 1974     Years since quittin.2     Passive exposure: Past    Smokeless tobacco: Never   Vaping Use    Vaping status: Never Used   Substance and Sexual Activity    Alcohol use: Never     Comment: quit in     Drug use: Never    Sexual  "activity: Defer        Physical Exam  Constitutional:       General: He is awake.      Appearance: Normal appearance. He is obese.   HENT:      Head: Normocephalic and atraumatic.      Nose: Nose normal.      Mouth/Throat:      Mouth: Mucous membranes are moist.      Pharynx: Oropharynx is clear.   Eyes:      Conjunctiva/sclera: Conjunctivae normal.      Pupils: Pupils are equal, round, and reactive to light.   Cardiovascular:      Rate and Rhythm: Normal rate and regular rhythm.      Pulses: Normal pulses.      Heart sounds: Normal heart sounds. No murmur heard.     No friction rub. No gallop.   Pulmonary:      Effort: Pulmonary effort is normal. No tachypnea, accessory muscle usage or respiratory distress.      Breath sounds: Normal breath sounds. No decreased breath sounds, wheezing, rhonchi or rales.   Musculoskeletal:         General: Normal range of motion.      Cervical back: Full passive range of motion without pain and normal range of motion.   Skin:     General: Skin is warm and dry.   Neurological:      General: No focal deficit present.      Mental Status: He is alert. Mental status is at baseline.   Psychiatric:         Mood and Affect: Mood normal.         Behavior: Behavior normal. Behavior is cooperative.         Thought Content: Thought content normal.          Result Review :  The following labs and radiology results have been reviewed.    Lab Review:   No results found for: \"FEV1\", \"FVC\", \"JHT2WKQ\", \"TLC\", \"DLCO\"  Office Visit on 03/10/2025   Component Date Value Ref Range Status    Glucose 03/10/2025 202 (A)  70 - 130 mg/dL Final    Lot Number 03/10/2025 [id number]   Final    Expiration Date 03/10/2025 08/23/25   Final   Hospital Outpatient Visit on 02/28/2025   Component Date Value Ref Range Status    Glucose 02/28/2025 189 (H)  65 - 99 mg/dL Final    BUN 02/28/2025 28 (H)  8 - 23 mg/dL Final    Creatinine 02/28/2025 1.60 (H)  0.76 - 1.27 mg/dL Final    Sodium 02/28/2025 138  136 - 145 mmol/L " Final    Potassium 02/28/2025 4.3  3.5 - 5.2 mmol/L Final    Slight hemolysis detected by analyzer. Result may be falsely elevated.    Chloride 02/28/2025 99  98 - 107 mmol/L Final    CO2 02/28/2025 26.2  22.0 - 29.0 mmol/L Final    Calcium 02/28/2025 9.2  8.6 - 10.5 mg/dL Final    BUN/Creatinine Ratio 02/28/2025 17.5  7.0 - 25.0 Final    Anion Gap 02/28/2025 12.8  5.0 - 15.0 mmol/L Final    eGFR 02/28/2025 44.7 (L)  >60.0 mL/min/1.73 Final    Magnesium 02/28/2025 1.9  1.6 - 2.4 mg/dL Final    proBNP 02/28/2025 264.2  0.0 - 1,800.0 pg/mL Final    Absolute Lung Fluid Content 02/28/2025 40 (A)  20 - 35 % Final        Imaging Results (Most Recent)       None            Radiology Review:   Imaging Results (Last 72 Hours)       ** No results found for the last 72 hours. **          Reviewed CT chest angiogram from 07/19/2024  Narrative & Impression   PROCEDURE: CT CHEST WO CONTRAST DIAGNOSTIC-     HISTORY: pulmonary nodule; R91.1-Solitary pulmonary nodule     COMPARISON: 7/7/2023.     PROCEDURE:  Multiple axial CT images were obtained from the thoracic  inlet through the upper abdomen without the use of contrast. This study  was performed with techniques to keep radiation doses as low as  reasonably achievable (ALARA). Individualized dose reduction techniques  using automated exposure control or adjustment of mA and/or kV according  to the patient size were employed.     FINDINGS:  Soft tissue windows reveal no axillary, hilar or mediastinal adenopathy.  The thyroid gland is unremarkable. The heart is normal in size. The  aorta is normal in caliber. There are no pleural or pericardial  effusions. Lung windows reveal scattered bilateral calcified granulomas.  The 5 mm partially calcified nodule of the periphery of the right middle  lobe is unchanged and is consistent with a granuloma. There are no new  or enlarging pulmonary nodules. In the visualized upper abdomen note is  made of gallstones. Bone windows reveal no acute  osseous abnormalities.     IMPRESSION:  No suspicious pulmonary nodules.        This report was finalized on 7/20/2024 12:51 PM by Ralph Beltran M.D..     Reviewed PFT from 07/19/2024      Reviewed echocardiogram from 07/19/2024  Results for orders placed during the hospital encounter of 07/19/24    Adult Transthoracic Echo Complete W/ Cont if Necessary Per Protocol    Interpretation Summary    Left ventricular systolic function is normal. Left ventricular ejection fraction appears to be 66 - 70%.    Left ventricular wall thickness is consistent with mild to moderate concentric hypertrophy.    Left ventricular diastolic function is consistent with (grade I) impaired relaxation.    Reviewed sleep study from 03/06/2025    Assessment / Plan         Assessment   Diagnoses and all orders for this visit:    1. Restrictive lung disease (Primary)  2. Obesity (BMI 30-39.9)  3. Coronary artery disease involving coronary bypass graft of native heart without angina pectoris  4. Chronic systolic CHF (congestive heart failure)  Previous PFT 07/2024 revealed moderately reduced diffusion capacity, and mild restriction. No obstruction or significant bronchodilator response noted.   Findings from PFT likely due to underlying body habitus and also notable for mild atelectasis/scarring of bilateral lung bases appearing stable from 2023 (per personal review, not mentioned on CT report).  Suspect that current reports of shortness of breath may be related to cardiac etiology due to additional reports of chest discomfort and no associated cough or wheezing. Patient is scheduled for stress test tomorrow.   Will continue to monitor symptoms and if stress test is negative can consider trial of inhalers to evaluate for any improvement.     5. Cigarette nicotine dependence in remission  6. Pulmonary nodule  William Villasenor  reports that he quit smoking about 51 years ago. His smoking use included cigarettes. He started smoking about 56 years  ago. He has a 5 pack-year smoking history. He has been exposed to tobacco smoke. He has never used smokeless tobacco.   Previous CT imaging from 07/19/24 revealed small pulmonary nodules which appear stable since previous imaging from 2023. Calcified nodules of right lower lobe measuring 4-5 mm (image 57, 59, and 60) and groundglass nodule of left lung measuring 3 mm (image 50).   No further follow-up imaging  of nodules warranted per guidelines. Doesn't qualify for annual LDCT imaging due to >15 years since cessation. Can obtain imaging on as needed basis.     7. MADDY (obstructive sleep apnea)  Patient received AutoPAP device and has been trying to use as much as he can but reports taking off the mask during his sleep and reports leaking from mask. Reports that he is going to stop by [facility] to see if he needs to be sized for new mask or may need new style of mask that he can better tolerate.        There are no discontinued medications.     No orders of the defined types were placed in this encounter.    I spent 35 minutes caring for Jack on this date of service. This time includes time spent by me in the following activities:preparing for the visit, reviewing tests, obtaining and/or reviewing a separately obtained history, performing a medically appropriate examination and/or evaluation , counseling and educating the patient/family/caregiver, ordering medications, tests, or procedures, referring and communicating with other health care professionals , documenting information in the medical record, independently interpreting results and communicating that information with the patient/family/caregiver, and care coordination    Follow Up   Return in about 4 weeks (around 5/29/2025), or if symptoms worsen or fail to improve, for Next scheduled follow up.    Patient was given instructions and counseling regarding his condition or for health maintenance advice. Please see specific information pulled into the AVS if  appropriate.       This document has been electronically signed by Bonita Tom PA-C   May 2, 2025 13:37 EDT    Dictated Utilizing Dragon Dictation: Part of this note may be an electronic transcription/translation of spoken language to printed text using the Dragon Dictation System.

## 2025-05-02 ENCOUNTER — HOSPITAL ENCOUNTER (OUTPATIENT)
Dept: CARDIOLOGY | Facility: HOSPITAL | Age: 75
Discharge: HOME | End: 2025-05-02
Payer: MEDICARE

## 2025-05-02 DIAGNOSIS — I10 ESSENTIAL HYPERTENSION: ICD-10-CM

## 2025-05-02 DIAGNOSIS — I50.32 CHRONIC HEART FAILURE WITH PRESERVED EJECTION FRACTION (HFPEF): ICD-10-CM

## 2025-05-02 DIAGNOSIS — I25.119 CORONARY ARTERY DISEASE INVOLVING NATIVE CORONARY ARTERY OF NATIVE HEART WITH ANGINA PECTORIS: ICD-10-CM

## 2025-05-02 DIAGNOSIS — Z95.1 S/P CABG X 4: ICD-10-CM

## 2025-05-02 LAB
BH CV STRESS BP STAGE 1: NORMAL
BH CV STRESS DURATION MIN STAGE 1: 3
BH CV STRESS DURATION MIN STAGE 2: 0
BH CV STRESS DURATION SEC STAGE 1: 0
BH CV STRESS DURATION SEC STAGE 2: 41
BH CV STRESS GRADE STAGE 1: 10
BH CV STRESS GRADE STAGE 2: 12
BH CV STRESS HR STAGE 1: 96
BH CV STRESS HR STAGE 2: 114
BH CV STRESS METS STAGE 1: 5
BH CV STRESS METS STAGE 2: 7.5
BH CV STRESS PROTOCOL 1: NORMAL
BH CV STRESS RECOVERY BP: NORMAL MMHG
BH CV STRESS RECOVERY HR: 75 BPM
BH CV STRESS SPEED STAGE 1: 1.7
BH CV STRESS SPEED STAGE 2: 2.5
BH CV STRESS STAGE 1: 1
BH CV STRESS STAGE 2: 2
MAXIMAL PREDICTED HEART RATE: 144 BPM
PERCENT MAX PREDICTED HR: 79.17 %
STRESS BASELINE BP: NORMAL MMHG
STRESS BASELINE HR: 66 BPM
STRESS PERCENT HR: 93 %
STRESS POST ESTIMATED WORKLOAD: 4.9 METS
STRESS POST EXERCISE DUR MIN: 3 MIN
STRESS POST EXERCISE DUR SEC: 41 SEC
STRESS POST PEAK BP: NORMAL MMHG
STRESS POST PEAK HR: 114 BPM
STRESS TARGET HR: 122 BPM

## 2025-05-02 PROCEDURE — 93017 CV STRESS TEST TRACING ONLY: CPT

## 2025-05-05 ENCOUNTER — TELEPHONE (OUTPATIENT)
Dept: CARDIOLOGY | Facility: CLINIC | Age: 75
End: 2025-05-05
Payer: MEDICARE

## 2025-05-05 NOTE — TELEPHONE ENCOUNTER
Caller: William Villasenor    Relationship: Self    Best call back number: [phone] (home)     What is the best time to reach you: ANYTIME    Who are you requesting to speak with (clinical staff, provider,  specific staff member): CLINICAL    What was the call regarding: PT HAD A TREADMILL STRESS TREST SCHEDULED AT [facility] AND THE MACHINE MALFUNCTIONED WHILE HE WAS ON IT. PT SAID THE POWER STOPPED AND WAS RESTARTED ABRUPTLY AND HE ALMOST FELL OVER. PT WOULD LIKE TO HAVE THIS RESCHEDULED AT NO COST TO HIM IF HE NEEDS TO TAKE THIS TEST AGAIN. HE BELIEVES IT WORKED FOR 4 MINUTES BEFORE THE INTERRUPTION. PLEASE CALL PT WHEN AVAILABLE TO DISCUSS.

## 2025-05-05 NOTE — TELEPHONE ENCOUNTER
I called patient and went over information that he provided. He said that treadmill stopped working toward the end of the test. When machine came back on he almost fell off. I advised that I could let him know once test is resulted out if there was an issue.

## 2025-05-12 ENCOUNTER — DISEASE STATE MANAGEMENT VISIT (OUTPATIENT)
Dept: PHARMACY | Facility: HOSPITAL | Age: 75
End: 2025-05-12
Payer: MEDICARE

## 2025-05-14 ENCOUNTER — TELEPHONE (OUTPATIENT)
Dept: CARDIOLOGY | Facility: CLINIC | Age: 75
End: 2025-05-14
Payer: MEDICARE

## 2025-05-14 NOTE — TELEPHONE ENCOUNTER
I will send JR a message letting him know that stress is showing in the computer . I will call patient back once JR looks it over .

## 2025-05-14 NOTE — TELEPHONE ENCOUNTER
Caller: William Villasenor    Relationship: Self    Best call back number: [phone]     Caller requesting test results: ELF     What test was performed: TREADMILL STRESS     When was the test performed: 5/5/25    Where was the test performed: [facility]     Additional notes: BEFORE TEST WAS COMPLETED TREADMILL STOPPED, THIS WAS WORKED ON, GOT THIS GOING AGAIN. PT ALMOST FELL. THIS WAS TURNED OFF AFTER. PT UNSURE IF RESULTS WILL BE ACCURATE.   IS WILLING TO DO TEST AGAIN IF NEEDED BUT ISN'T WILLING TO REPAY FOR THIS DUE TO ERROR OF TESTING OFFICE

## 2025-05-15 ENCOUNTER — RESULTS FOLLOW-UP (OUTPATIENT)
Dept: CARDIOLOGY | Facility: CLINIC | Age: 75
End: 2025-05-15
Payer: MEDICARE

## 2025-05-15 NOTE — TELEPHONE ENCOUNTER
Dr. 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  Pt to obtain required form

## 2025-05-16 NOTE — TELEPHONE ENCOUNTER
Relay     I called patient but I did not get a answer.Dr. 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 Pt to obtain required form. I will give these forms to Pat to complete and Dr Kennedy sign these.

## 2025-05-27 ENCOUNTER — TELEPHONE (OUTPATIENT)
Dept: CARDIOLOGY | Facility: CLINIC | Age: 75
End: 2025-05-27
Payer: MEDICARE

## 2025-05-27 RX ORDER — DOXAZOSIN 2 MG/1
2 TABLET ORAL DAILY
Qty: 90 TABLET | Refills: 3 | Status: SHIPPED | OUTPATIENT
Start: 2025-05-27

## 2025-05-27 NOTE — TELEPHONE ENCOUNTER
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 He gave me a phone number for Nadine at DOT office number is [phone]. I advised that Dr Kennedy was going to clear him and that Pat has those forms to be completed. I will call Nadine after lunch and see what he needs.

## 2025-05-27 NOTE — TELEPHONE ENCOUNTER
Caller: William Villasenor    Relationship: Self    Best call back number: [phone]      What is the best time to reach you: ANY    Who are you requesting to speak with (clinical staff, provider,  specific staff member): CLINICAL     What was the call regarding: PATIENT SPOKE WITH DOT THIS MORNING. THEY TOLD HIM THEY WILL NOT CLEAR HIM FROM STRESS TEST ALTHOUGH MACHINE MALFUNCTIONED DURING TEST. PLEASE CALL PATIENT TO ADVISE ON WHAT HIS NEXT STEP SHOULD BE.     Is it okay if the provider responds through MyChart: PLEASE CALL

## 2025-05-27 NOTE — TELEPHONE ENCOUNTER
Caller: William Villasenor    Relationship: Self    Best call back number: [phone]     Requested Prescriptions:   Requested Prescriptions     Pending Prescriptions Disp Refills    doxazosin (CARDURA) 2 MG tablet 90 tablet 3     Sig: Take 1 tablet by mouth Daily.        Pharmacy where request should be sent:      [facility] - [facility] [address] - [phone]  - [phone] FX [[id number]]     Last office visit with prescribing clinician: 4/21/2025   Last telemedicine visit with prescribing clinician: Visit date not found   Next office visit with prescribing clinician: 9/10/2025     Additional details provided by patient: PT REPORTS NEVER RECEIVED MEDICATION FOR THIS.     Does the patient have less than a 3 day supply:  [x] Yes  [] No    Would you like a call back once the refill request has been completed: [x] Yes [] No    If the office needs to give you a call back, can they leave a voicemail: [x] Yes [] No    Philip Hoffman Rep   05/27/25 09:20 EDT

## 2025-06-02 ENCOUNTER — TELEPHONE (OUTPATIENT)
Dept: CARDIOLOGY | Facility: CLINIC | Age: 75
End: 2025-06-02
Payer: MEDICARE

## 2025-06-02 DIAGNOSIS — I25.119 CORONARY ARTERY DISEASE INVOLVING NATIVE CORONARY ARTERY OF NATIVE HEART WITH ANGINA PECTORIS: ICD-10-CM

## 2025-06-02 DIAGNOSIS — I50.32 CHRONIC HEART FAILURE WITH PRESERVED EJECTION FRACTION (HFPEF): ICD-10-CM

## 2025-06-02 DIAGNOSIS — Z95.1 S/P CABG X 4: ICD-10-CM

## 2025-06-02 DIAGNOSIS — I50.32 CHRONIC DIASTOLIC CONGESTIVE HEART FAILURE: ICD-10-CM

## 2025-06-02 RX ORDER — BUMETANIDE 2 MG/1
2 TABLET ORAL 2 TIMES DAILY
Qty: 180 TABLET | Refills: 1 | Status: SHIPPED | OUTPATIENT
Start: 2025-06-02

## 2025-06-02 NOTE — TELEPHONE ENCOUNTER
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  I have discussed this with Dr. Kennedy and he is in agreements.  If he would like to seek a second opinion that is fine as well.

## 2025-06-02 NOTE — TELEPHONE ENCOUNTER
Caller: William Villasenor    Relationship to patient: Self    Best call back number: [phone]    Chief complaint:     Type of visit: FOLLOW UP    Requested date: ASAP     If rescheduling, when is the original appointment: 9.10.2025     Additional notes:PATIENT ADVISING HE HAS REQUESTED REFILLS ON THE WATER PILL-UNKNOWN NAME AND IT HAS NOT BEEN SENT. HE IS GAINING 1 LB DAILY DUE TO THIS. 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 HE NEEDS TO SEE LIANNE HINTON. PLEASE CALL THE PATIENT TO RES.

## 2025-06-05 NOTE — TELEPHONE ENCOUNTER
"Called and spoke with the pt.  He requested \"water pill\" and stated he wanted to continue being seen by our practice.  Explained to the pt as far as we are concerned he is still a pt of our practice and reminded him of his appt in September.  Pt reminded that Dr. Kennedy said he understands if the pt wants to see if another cardiologist can clear him, but we are agreeable to continue providing his care until he notifies us otherwise.  Pt encournaged to call [facility] to obtain an update on the prescription we sent in on 6/2 and to let us know if he needs anything prior to his appt.  Pt verbalized an understanding.    "

## 2025-06-09 ENCOUNTER — OFFICE VISIT (OUTPATIENT)
Dept: PULMONOLOGY | Facility: CLINIC | Age: 75
End: 2025-06-09
Payer: MEDICARE

## 2025-06-09 VITALS
BODY MASS INDEX: 32.35 KG/M2 | SYSTOLIC BLOOD PRESSURE: 120 MMHG | TEMPERATURE: 97.3 F | WEIGHT: 218.4 LBS | OXYGEN SATURATION: 94 % | HEART RATE: 69 BPM | HEIGHT: 69 IN | DIASTOLIC BLOOD PRESSURE: 78 MMHG

## 2025-06-09 DIAGNOSIS — G47.33 OSA (OBSTRUCTIVE SLEEP APNEA): ICD-10-CM

## 2025-06-09 DIAGNOSIS — J98.4 RESTRICTIVE LUNG DISEASE: Primary | ICD-10-CM

## 2025-06-09 DIAGNOSIS — I50.22 CHRONIC SYSTOLIC CHF (CONGESTIVE HEART FAILURE): ICD-10-CM

## 2025-06-09 DIAGNOSIS — E66.9 OBESITY (BMI 30-39.9): ICD-10-CM

## 2025-06-09 DIAGNOSIS — F17.211 CIGARETTE NICOTINE DEPENDENCE IN REMISSION: ICD-10-CM

## 2025-06-09 DIAGNOSIS — R91.1 PULMONARY NODULE: ICD-10-CM

## 2025-06-09 DIAGNOSIS — I25.810 CORONARY ARTERY DISEASE INVOLVING CORONARY BYPASS GRAFT OF NATIVE HEART WITHOUT ANGINA PECTORIS: ICD-10-CM

## 2025-06-09 NOTE — PROGRESS NOTES
"    Subjective      Chief Complaint  Restrictive lung disease, Shortness of Breath, and Sleep Apnea    Subjective      History of Present Illness  William Villasenor is a 75 y.o. male who presents today to [facility] with past medical history of essential hypertension, hyperlipidemia, CAD status post CABG x 4, chronic systolic heart failure, PSVT, type 2 diabetes mellitus, GERD, BPH, CKD stage II, gout, and former tobacco abuse who presents today for Restrictive lung disease, Shortness of Breath, and Sleep Apnea. This visit is a follow up appointment.     Restrictive lung disease, Shortness of Breath, and Sleep Apnea:  Patient presents today for his follow-up appointment. He had his stress test 5/02/2025 but states that it stopped early during testing and isn't sure if the results were accurate. He has discussed concerns with his cardiologist. He states that he has shortness of breath with exertion. He denies any significant coughing or wheezing. He states that recently he had gained ~9 lbs as he didn't have his \"water pill\" but recently started taking it again and back to his baseline weight. He states that he is doing better with his CPAP device since his previous visit as he got a new mask and supplies and is able to tolerate wearing it for a full night's sleep. He states that on occasion he removes it without knowing while he is sleeping but states this doesn't happen frequently.      Current Outpatient Medications:     allopurinol (ZYLOPRIM) 300 MG tablet, TAKE 1 TABLET EVERY DAY, Disp: 90 tablet, Rfl: 0    APPLE CIDER VINEGAR PO, Take 450 mg by mouth 2 (Two) Times a Day., Disp: , Rfl:     ascorbic acid (VITAMIN C) 1000 MG tablet, Take 1 tablet by mouth 2 (Two) Times a Day., Disp: , Rfl:     aspirin 81 MG EC tablet, Take 1 tablet by mouth Daily., Disp: , Rfl:     atorvastatin (LIPITOR) 40 MG tablet, Take 1 tablet by mouth Every Night., Disp: 90 tablet, Rfl: 3    " bismuth subsalicylate (PEPTO BISMOL) 262 MG chewable tablet, Chew 1 tablet Daily As Needed for Diarrhea., Disp: , Rfl:     bumetanide (BUMEX) 2 MG tablet, Take 1 tablet by mouth 2 (Two) Times a Day., Disp: 180 tablet, Rfl: 1    CALCIUM-MAGNESIUM-ZINC PO, Take 1 tablet by mouth 2 (Two) Times a Day., Disp: , Rfl:     Cayenne 450 MG capsule, Take 1 capsule by mouth 2 (Two) Times a Day., Disp: , Rfl:     Cinnamon 500 MG capsule, Take 1 capsule by mouth 2 (Two) Times a Day., Disp: , Rfl:     CRANBERRY PO, Take 12,600 mg by mouth Daily., Disp: , Rfl:     dapagliflozin Propanediol (Farxiga) 10 MG tablet, Take 1 tablet by mouth Daily., Disp: 30 tablet, Rfl: 5    dapagliflozin Propanediol (Farxiga) 10 MG tablet, Take 1 tablet by mouth Daily., Disp: 90 tablet, Rfl: 3    doxazosin (CARDURA) 2 MG tablet, Take 1 tablet by mouth Daily., Disp: 90 tablet, Rfl: 3    finasteride (PROSCAR) 5 MG tablet, Take 1 tablet by mouth Daily., Disp: 90 tablet, Rfl: 0    folic acid (FOLVITE) 800 MCG tablet, Take 1 tablet by mouth Daily., Disp: , Rfl:     gabapentin (NEURONTIN) 400 MG capsule, Take 1 capsule by mouth 2 (Two) Times a Day., Disp: , Rfl:     glipizide (GLUCOTROL) 10 MG tablet, Take 1 tablet by mouth 2 (Two) Times a Day Before Meals., Disp: 60 tablet, Rfl: 5    isosorbide mononitrate (IMDUR) 120 MG 24 hr tablet, Take 1 tablet by mouth Daily., Disp: 90 tablet, Rfl: 3    loratadine (Claritin) 10 MG tablet, Take 1 tablet by mouth Daily As Needed for Allergies., Disp: , Rfl:     metFORMIN (GLUCOPHAGE) 1000 MG tablet, Take 1 tablet by mouth 2 (Two) Times a Day With Meals., Disp: , Rfl:     methylcellulose, Laxative, (CITRUCEL) 500 MG tablet tablet, Take 1 tablet by mouth Every Morning., Disp: , Rfl:     metoprolol succinate XL (TOPROL-XL) 100 MG 24 hr tablet, Take 1 tablet by mouth Daily., Disp: 90 tablet, Rfl: 3    Multiple Vitamins-Minerals (Ocuvite-Lutein) capsule, Take 1 capsule by mouth Daily., Disp: , Rfl:     multivitamin with  "minerals (CENTRUM SILVER PO), Take 1 tablet by mouth Daily., Disp: , Rfl:     nitroglycerin (NITROSTAT) 0.4 MG SL tablet, PLACE 1 TAB UNDER TONGUE EVERY 5 MINS AS NEEDED FOR CHEST PAIN (ONLY IF SYSTOLIC BLOOD PRESSURE OVER 100) MAX 3 TABS/15 MINS, Disp: 25 tablet, Rfl: 3    NON FORMULARY, Sugar Blocker  1 tablet po bid, Disp: , Rfl:     Omega-3 Fatty Acids (Fish Oil Triple Strength) 1400 MG capsule, Take 1,200 mg by mouth 2 (Two) Times a Day., Disp: , Rfl:     sacubitril-valsartan (Entresto)  MG tablet, Take 1 tablet by mouth 2 Times a Day., Disp: 180 tablet, Rfl: 3    sacubitril-valsartan (Entresto)  MG tablet, Take 1 tablet by mouth 2 Times a Day., Disp: 180 tablet, Rfl: 3    vitamin E 400 UNIT capsule, Take 1 capsule by mouth Daily. (Patient taking differently: Take 184 Units by mouth Daily.), Disp: , Rfl:       Allergies   Allergen Reactions    Lisinopril Cough    Other Other (See Comments)     Causes a lot of gas Rybelsus       Objective     Objective   Vital Signs:  /78   Pulse 69   Temp 97.3 °F (36.3 °C)   Ht 175.3 cm (69\")   Wt 99.1 kg (218 lb 6.4 oz)   SpO2 94%   BMI 32.25 kg/m²   Estimated body mass index is 32.25 kg/m² as calculated from the following:    Height as of this encounter: 175.3 cm (69\").    Weight as of this encounter: 99.1 kg (218 lb 6.4 oz).    Past Medical History:   Diagnosis Date    Arthritis     Back pain     BPH (benign prostatic hyperplasia)     Chronic diastolic (congestive) heart failure     Coronary artery disease     Diabetes mellitus     Diabetic peripheral neuropathy     Elevated cholesterol     Erectile dysfunction     Former smoker     GERD (gastroesophageal reflux disease)     Gout     Hemorrhoids     High cholesterol     High triglycerides     Hypertension     Neuropathy     Obesity     Sleep apnea     wears CPAP     Past Surgical History:   Procedure Laterality Date    CARDIAC CATHETERIZATION N/A 3/15/2021    Procedure: Left Heart Cath;  Surgeon: " Trent Zaragoza MD;  Location:  [facility];  Service: Cardiology;  Laterality: N/A;    CARDIAC CATHETERIZATION N/A 3/15/2021    Procedure: Coronary angiography;  Surgeon: Trent Zaragoza MD;  Location:  [facility];  Service: Cardiology;  Laterality: N/A;    COLONOSCOPY N/A 10/10/2024    Procedure: COLONOSCOPY;  Surgeon: Aly Barnhart MD;  Location:  [facility] OR;  Service: Gastroenterology;  Laterality: N/A;    COLONOSCOPY W/ BIOPSIES      CORONARY ARTERY BYPASS GRAFT N/A 3/16/2021    Procedure: MEDIAN STERNOTOMY, CORONARY ARTERY BYPASS GRAFT X4, UTILIZATION OF LEFT INTERNAL MAMMARY ARTERY, AND ENDOSCOPIC VEIN HARVEST OF RIGHT GREATER SAPHENOUS VEIN;  Surgeon: Jorge Simms MD;  Location: [facility] OR;  Service: Cardiothoracic;  Laterality: N/A;  vein out- 1614  vein ready- 1628             Social History     Socioeconomic History    Marital status:     Number of children: 3   Tobacco Use    Smoking status: Former     Current packs/day: 0.00     Average packs/day: 1 pack/day for 5.0 years (5.0 ttl pk-yrs)     Types: Cigarettes     Start date: 1969     Quit date: 1974     Years since quittin.3     Passive exposure: Past    Smokeless tobacco: Never   Vaping Use    Vaping status: Never Used   Substance and Sexual Activity    Alcohol use: Never     Comment: quit in     Drug use: Never    Sexual activity: Defer        Physical Exam  Constitutional:       General: He is awake.      Appearance: Normal appearance. He is obese.   HENT:      Head: Normocephalic and atraumatic.      Nose: Nose normal.      Mouth/Throat:      Mouth: Mucous membranes are moist.      Pharynx: Oropharynx is clear.   Eyes:      Conjunctiva/sclera: Conjunctivae normal.      Pupils: Pupils are equal, round, and reactive to light.   Cardiovascular:      Rate and Rhythm: Normal rate and regular rhythm.      Pulses: Normal pulses.      Heart sounds: Normal heart sounds. No murmur heard.      "No friction rub. No gallop.   Pulmonary:      Effort: Pulmonary effort is normal. No tachypnea, accessory muscle usage or respiratory distress.      Breath sounds: Normal breath sounds. No decreased breath sounds, wheezing, rhonchi or rales.   Musculoskeletal:         General: Normal range of motion.      Cervical back: Full passive range of motion without pain and normal range of motion.   Skin:     General: Skin is warm and dry.   Neurological:      General: No focal deficit present.      Mental Status: He is alert. Mental status is at baseline.   Psychiatric:         Mood and Affect: Mood normal.         Behavior: Behavior normal. Behavior is cooperative.         Thought Content: Thought content normal.          Result Review :  The following labs and radiology results have been reviewed.    Lab Review:   No results found for: \"FEV1\", \"FVC\", \"EEP6ZWY\", \"TLC\", \"DLCO\"  Hospital Outpatient Visit on 05/02/2025   Component Date Value Ref Range Status    Exercise duration (min) 05/02/2025 3  min Final    Exercise duration (sec) 05/02/2025 41  sec Final    Estimated workload 05/02/2025 4.9  METS Final    BH CV STRESS PROTOCOL 1 05/02/2025 Moe   Final    Stage 1 05/02/2025 1.0   Final    HR Stage 1 05/02/2025 96   Final    BP Stage 1 05/02/2025 132/64   Final    Duration Min Stage 1 05/02/2025 3   Final    Duration Sec Stage 1 05/02/2025 0   Final    Grade Stage 1 05/02/2025 10   Final    Speed Stage 1 05/02/2025 1.7   Final    BH CV STRESS METS STAGE 1 05/02/2025 5.0   Final    Stage 2 05/02/2025 2.0   Final    HR Stage 2 05/02/2025 114   Final    Duration Min Stage 2 05/02/2025 0   Final    Duration Sec Stage 2 05/02/2025 41   Final    Grade Stage 2 05/02/2025 12   Final    Speed Stage 2 05/02/2025 2.5   Final    BH CV STRESS METS STAGE 2 05/02/2025 7.5   Final    Baseline HR 05/02/2025 66  bpm Final    Baseline BP 05/02/2025 100/61  mmHg Final    Peak HR 05/02/2025 114  bpm Final    Peak BP 05/02/2025 162/69  mmHg " Final    Recovery HR 05/02/2025 75  bpm Final    Recovery BP 05/02/2025 157/56  mmHg Final    Target HR (85%) 05/02/2025 122  bpm Final    Max. Pred. HR (100%) 05/02/2025 144  bpm Final    Percent Max Pred HR 05/02/2025 79.17  % Final    Percent Target HR 05/02/2025 93  % Final   Office Visit on 03/10/2025   Component Date Value Ref Range Status    Glucose 03/10/2025 202 (A)  70 - 130 mg/dL Final    Lot Number 03/10/2025 [id number]   Final    Expiration Date 03/10/2025 08/23/25   Final        Imaging Results (Most Recent)       None            Radiology Review:   Imaging Results (Last 72 Hours)       ** No results found for the last 72 hours. **          Reviewed CT chest angiogram from 07/19/2024  Narrative & Impression   PROCEDURE: CT CHEST WO CONTRAST DIAGNOSTIC-     HISTORY: pulmonary nodule; R91.1-Solitary pulmonary nodule     COMPARISON: 7/7/2023.     PROCEDURE:  Multiple axial CT images were obtained from the thoracic  inlet through the upper abdomen without the use of contrast. This study  was performed with techniques to keep radiation doses as low as  reasonably achievable (ALARA). Individualized dose reduction techniques  using automated exposure control or adjustment of mA and/or kV according  to the patient size were employed.     FINDINGS:  Soft tissue windows reveal no axillary, hilar or mediastinal adenopathy.  The thyroid gland is unremarkable. The heart is normal in size. The  aorta is normal in caliber. There are no pleural or pericardial  effusions. Lung windows reveal scattered bilateral calcified granulomas.  The 5 mm partially calcified nodule of the periphery of the right middle  lobe is unchanged and is consistent with a granuloma. There are no new  or enlarging pulmonary nodules. In the visualized upper abdomen note is  made of gallstones. Bone windows reveal no acute osseous abnormalities.     IMPRESSION:  No suspicious pulmonary nodules.        This report was finalized on 7/20/2024 12:51  PM by Ralph Beltran M.D..     Reviewed PFT from 07/19/2024      Reviewed echocardiogram from 07/19/2024  Results for orders placed during the hospital encounter of 07/19/24    Adult Transthoracic Echo Complete W/ Cont if Necessary Per Protocol    Interpretation Summary    Left ventricular systolic function is normal. Left ventricular ejection fraction appears to be 66 - 70%.    Left ventricular wall thickness is consistent with mild to moderate concentric hypertrophy.    Left ventricular diastolic function is consistent with (grade I) impaired relaxation.    Reviewed sleep study from 03/06/2025    Assessment / Plan         Assessment   Diagnoses and all orders for this visit:    1. Restrictive lung disease (Primary)  2. Obesity (BMI 30-39.9)  3. Coronary artery disease involving coronary bypass graft of native heart without angina pectoris  4. Chronic systolic CHF (congestive heart failure)  Previous PFT 07/2024 revealed moderately reduced diffusion capacity, and mild restriction. No obstruction or significant bronchodilator response noted.   Findings from PFT likely due to underlying body habitus and also notable for mild atelectasis/scarring of bilateral lung bases appearing stable from 2023 (per personal review, not mentioned on CT report).  Will obtain repeat PFT and chest XR imaging to evaluate for any changes since previous testing.     -     Complete PFT - Pre & Post Bronchodilator; Future  -     XR Chest 2 View; Future    5. Cigarette nicotine dependence in remission  6. Pulmonary nodule  William Villasenor  reports that he quit smoking about 51 years ago. His smoking use included cigarettes. He started smoking about 56 years ago. He has a 5 pack-year smoking history. He has been exposed to tobacco smoke. He has never used smokeless tobacco.   Previous CT imaging from 07/19/24 revealed small pulmonary nodules which appear stable since previous imaging from 2023. Calcified nodules of right lower lobe measuring  4-5 mm (image 57, 59, and 60) and groundglass nodule of left lung measuring 3 mm (image 50).   No further follow-up imaging  of nodules warranted per guidelines. Doesn't qualify for annual LDCT imaging due to >15 years since cessation. Can obtain imaging on as needed basis.       7. MADDY (obstructive sleep apnea)  Reports improved compliance with CPAP device since getting new mask and supplies and notes benefit with use.      There are no discontinued medications.     No orders of the defined types were placed in this encounter.    I spent >40 minutes caring for Jack on this date of service. This time includes time spent by me in the following activities:preparing for the visit, reviewing tests, obtaining and/or reviewing a separately obtained history, performing a medically appropriate examination and/or evaluation , counseling and educating the patient/family/caregiver, ordering medications, tests, or procedures, referring and communicating with other health care professionals , documenting information in the medical record, independently interpreting results and communicating that information with the patient/family/caregiver, and care coordination    Follow Up   Return in about 3 months (around 9/9/2025), or if symptoms worsen or fail to improve, for Next scheduled follow up.    Patient was given instructions and counseling regarding his condition or for health maintenance advice. Please see specific information pulled into the AVS if appropriate.       This document has been electronically signed by Bonita Tom PA-C   June 9, 2025 16:30 EDT    Dictated Utilizing Dragon Dictation: Part of this note may be an electronic transcription/translation of spoken language to printed text using the Dragon Dictation System.

## 2025-06-11 ENCOUNTER — OFFICE VISIT (OUTPATIENT)
Dept: ENDOCRINOLOGY | Facility: CLINIC | Age: 75
End: 2025-06-11
Payer: MEDICARE

## 2025-06-11 ENCOUNTER — SPECIALTY PHARMACY (OUTPATIENT)
Dept: PHARMACY | Facility: HOSPITAL | Age: 75
End: 2025-06-11
Payer: MEDICARE

## 2025-06-11 VITALS
WEIGHT: 217.6 LBS | BODY MASS INDEX: 32.13 KG/M2 | SYSTOLIC BLOOD PRESSURE: 121 MMHG | DIASTOLIC BLOOD PRESSURE: 68 MMHG | OXYGEN SATURATION: 93 % | HEART RATE: 70 BPM

## 2025-06-11 DIAGNOSIS — E11.21 TYPE 2 DIABETES MELLITUS WITH DIABETIC NEPHROPATHY, UNSPECIFIED WHETHER LONG TERM INSULIN USE: Chronic | ICD-10-CM

## 2025-06-11 DIAGNOSIS — E11.65 TYPE 2 DIABETES MELLITUS WITH HYPERGLYCEMIA, WITHOUT LONG-TERM CURRENT USE OF INSULIN: Primary | ICD-10-CM

## 2025-06-11 LAB
EXPIRATION DATE: ABNORMAL
EXPIRATION DATE: ABNORMAL
GLUCOSE BLDC GLUCOMTR-MCNC: 231 MG/DL (ref 70–130)
HBA1C MFR BLD: 9.4 % (ref 4.5–5.7)
Lab: ABNORMAL
Lab: ABNORMAL

## 2025-06-11 RX ORDER — GLIPIZIDE 10 MG/1
10 TABLET ORAL
Qty: 180 TABLET | Refills: 1 | Status: SHIPPED | OUTPATIENT
Start: 2025-06-11

## 2025-06-11 RX ORDER — DAPAGLIFLOZIN 10 MG/1
10 TABLET, FILM COATED ORAL DAILY
Qty: 30 TABLET | Refills: 5 | Status: SHIPPED | OUTPATIENT
Start: 2025-06-11

## 2025-06-11 NOTE — PROGRESS NOTES
Specialty Pharmacy Patient Management Program  Program Disenrollment      William Villasenor is a 75 y.o. male seen by an Endocrinology provider for Type 2 Diabetes. Patient was previously enrolled in the Endocrinology Patient Management program offered by [facility].      Disenrolling patient today as he fills diabetes medications through mail order pharmacy and his Farxiga is managed by [facility] at this time.     Poly Gibson, PharmD, [facility]AP[facility]  Clinical Specialty Pharmacist, Endocrinology   6/11/2025  11:28 EDT

## 2025-06-11 NOTE — PROGRESS NOTES
Chief Complaint   Patient presents with    Follow-up      HPI   William Villasenor is a 75 y.o. male who presents to the clinic today for follow up of type 2 diabetes. Diabetes was diagnosed approximately 30 years ago.  A1c today is 9.4.  Fasting glucose is running 190s to 270s.  His medication regimen for his diabetes after his last appointment was metformin 1000 mg twice daily, glipizide 10 mg twice daily, Farxiga 10 mg daily and Rybelsus 14 mg.  He was experiencing gas and belching with 7 mg Rybelsus, we increased the dose in an to attempt to control his glucose, but his symptoms were too bothersome so he stopped the medication. He does not want to go back on it. Since his last office visit he ran out of metformin so he has not been taking it. It appears his prescriptions were sent to the wrong location, we discussed in office and have fixed that today.  He denies any hypoglycemia, yeast infections or UTIs.  He does report some vision changes since his glucose has increased.    History: The patient prefers medications to be sent to [facility] for 90 days  Diabetic complications: peripheral neuropathy and cardiovascular disease  Last optho exam: a couple years ago. Due and advised.   Last microalbumin: UTD  Last foot exam: UTD  Statin: yes  Lipid panel: UTD  ACE/ARB: yes     The following portions of the patient's history were reviewed and updated as appropriate: allergies, current medications, past family history, past medical history, past social history, past surgical history, and problem list.    /68 (BP Location: Right arm, Patient Position: Sitting, Cuff Size: Adult)   Pulse 70   Wt 98.7 kg (217 lb 9.6 oz)   SpO2 93%   BMI 32.13 kg/m²      Past Medical History:   Diagnosis Date    Arthritis     Back pain     BPH (benign prostatic hyperplasia)     Chronic diastolic (congestive) heart failure     Coronary artery disease     Diabetes mellitus     Diabetic peripheral neuropathy     Elevated cholesterol      Erectile dysfunction     Former smoker     GERD (gastroesophageal reflux disease)     Gout     Hemorrhoids     High cholesterol     High triglycerides     Hypertension     Neuropathy     Obesity     Sleep apnea     wears CPAP     Past Surgical History:   Procedure Laterality Date    CARDIAC CATHETERIZATION N/A 3/15/2021    Procedure: Left Heart Cath;  Surgeon: Trent Zaragoza MD;  Location:  [facility];  Service: Cardiology;  Laterality: N/A;    CARDIAC CATHETERIZATION N/A 3/15/2021    Procedure: Coronary angiography;  Surgeon: Trent Zaragoza MD;  Location:  [facility];  Service: Cardiology;  Laterality: N/A;    COLONOSCOPY N/A 10/10/2024    Procedure: COLONOSCOPY;  Surgeon: Aly Barnhart MD;  Location:  [facility];  Service: Gastroenterology;  Laterality: N/A;    COLONOSCOPY W/ BIOPSIES 2015    CORONARY ARTERY BYPASS GRAFT N/A 3/16/2021    Procedure: MEDIAN STERNOTOMY, CORONARY ARTERY BYPASS GRAFT X4, UTILIZATION OF LEFT INTERNAL MAMMARY ARTERY, AND ENDOSCOPIC VEIN HARVEST OF RIGHT GREATER SAPHENOUS VEIN;  Surgeon: Jorge Simms MD;  Location:  [facility];  Service: Cardiothoracic;  Laterality: N/A;  vein out- 1614  vein ready- 1628              Family History   Problem Relation Age of Onset    Heart disease Mother     Hypertension Mother     Depression Mother     Alcohol abuse Maternal Uncle     Heart disease Maternal Grandmother     Hypertension Maternal Grandmother     Diabetes Maternal Grandmother     Diabetes Paternal Grandmother     No Known Problems Half-Brother     No Known Problems Half-Brother     No Known Problems Half-Sister     No Known Problems Daughter     No Known Problems Daughter     Hyperlipidemia Daughter       Social History     Socioeconomic History    Marital status:     Number of children: 3   Tobacco Use    Smoking status: Former     Current packs/day: 0.00     Average packs/day: 1 pack/day for 5.0 years (5.0 ttl pk-yrs)     Types:  Cigarettes     Start date: 1969     Quit date: 1974     Years since quittin.3     Passive exposure: Past    Smokeless tobacco: Never   Vaping Use    Vaping status: Never Used   Substance and Sexual Activity    Alcohol use: Never     Comment: quit in     Drug use: Never    Sexual activity: Defer      Allergies   Allergen Reactions    Lisinopril Cough    Other Other (See Comments)     Causes a lot of gas Rybelsus      Current Outpatient Medications on File Prior to Visit   Medication Sig Dispense Refill    allopurinol (ZYLOPRIM) 300 MG tablet TAKE 1 TABLET EVERY DAY 90 tablet 0    APPLE CIDER VINEGAR PO Take 450 mg by mouth 2 (Two) Times a Day.      ascorbic acid (VITAMIN C) 1000 MG tablet Take 1 tablet by mouth 2 (Two) Times a Day.      aspirin 81 MG EC tablet Take 1 tablet by mouth Daily.      atorvastatin (LIPITOR) 40 MG tablet Take 1 tablet by mouth Every Night. 90 tablet 3    bismuth subsalicylate (PEPTO BISMOL) 262 MG chewable tablet Chew 1 tablet Daily As Needed for Diarrhea.      bumetanide (BUMEX) 2 MG tablet Take 1 tablet by mouth 2 (Two) Times a Day. 180 tablet 1    CALCIUM-MAGNESIUM-ZINC PO Take 1 tablet by mouth 2 (Two) Times a Day.      Cayenne 450 MG capsule Take 1 capsule by mouth 2 (Two) Times a Day.      Cinnamon 500 MG capsule Take 1 capsule by mouth 2 (Two) Times a Day.      CRANBERRY PO Take 12,600 mg by mouth Daily.      dapagliflozin Propanediol (Farxiga) 10 MG tablet Take 1 tablet by mouth Daily. 90 tablet 3    doxazosin (CARDURA) 2 MG tablet Take 1 tablet by mouth Daily. 90 tablet 3    finasteride (PROSCAR) 5 MG tablet Take 1 tablet by mouth Daily. 90 tablet 0    folic acid (FOLVITE) 800 MCG tablet Take 1 tablet by mouth Daily.      gabapentin (NEURONTIN) 400 MG capsule Take 1 capsule by mouth 2 (Two) Times a Day.      isosorbide mononitrate (IMDUR) 120 MG 24 hr tablet Take 1 tablet by mouth Daily. 90 tablet 3    loratadine (Claritin) 10 MG tablet Take 1 tablet by mouth Daily As  Needed for Allergies.      methylcellulose, Laxative, (CITRUCEL) 500 MG tablet tablet Take 1 tablet by mouth Every Morning.      metoprolol succinate XL (TOPROL-XL) 100 MG 24 hr tablet Take 1 tablet by mouth Daily. 90 tablet 3    Multiple Vitamins-Minerals (Ocuvite-Lutein) capsule Take 1 capsule by mouth Daily.      multivitamin with minerals (CENTRUM SILVER PO) Take 1 tablet by mouth Daily.      nitroglycerin (NITROSTAT) 0.4 MG SL tablet PLACE 1 TAB UNDER TONGUE EVERY 5 MINS AS NEEDED FOR CHEST PAIN (ONLY IF SYSTOLIC BLOOD PRESSURE OVER 100) MAX 3 TABS/15 MINS 25 tablet 3    NON FORMULARY Sugar Blocker  1 tablet po bid      Omega-3 Fatty Acids (Fish Oil Triple Strength) 1400 MG capsule Take 1,200 mg by mouth 2 (Two) Times a Day.      sacubitril-valsartan (Entresto)  MG tablet Take 1 tablet by mouth 2 Times a Day. 180 tablet 3    sacubitril-valsartan (Entresto)  MG tablet Take 1 tablet by mouth 2 Times a Day. 180 tablet 3    vitamin E 400 UNIT capsule Take 1 capsule by mouth Daily. (Patient taking differently: Take 184 Units by mouth Daily.)      [DISCONTINUED] dapagliflozin Propanediol (Farxiga) 10 MG tablet Take 1 tablet by mouth Daily. 30 tablet 5    [DISCONTINUED] glipizide (GLUCOTROL) 10 MG tablet Take 1 tablet by mouth 2 (Two) Times a Day Before Meals. 60 tablet 5    [DISCONTINUED] metFORMIN (GLUCOPHAGE) 1000 MG tablet Take 1 tablet by mouth 2 (Two) Times a Day With Meals.       No current facility-administered medications on file prior to visit.      Review of Systems   Constitutional:  Positive for fatigue.   Gastrointestinal:  Negative for abdominal pain, constipation, diarrhea, nausea and vomiting.   Endocrine: Negative for polydipsia, polyphagia and polyuria.   Genitourinary:  Negative for dysuria and frequency.      Physical Exam  Vitals reviewed.   Constitutional:       Appearance: Normal appearance.   HENT:      Head: Normocephalic.   Eyes:      Pupils: Pupils are equal, round, and reactive  "to light.   Cardiovascular:      Pulses: Normal pulses.   Pulmonary:      Effort: Pulmonary effort is normal.   Abdominal:      Palpations: Abdomen is soft.   Musculoskeletal:      Cervical back: Normal range of motion.   Skin:     General: Skin is warm and dry.   Neurological:      Mental Status: He is alert and oriented to person, place, and time.   Psychiatric:         Mood and Affect: Mood normal.         Behavior: Behavior normal.       CMP:  Lab Results   Component Value Date    GLUCOSE 189 (H) 02/28/2025    BUN 28 (H) 02/28/2025    CREATININE 1.60 (H) 02/28/2025     02/28/2025    K 4.3 02/28/2025    CL 99 02/28/2025    CALCIUM 9.2 02/28/2025    PROTEINTOT 6.9 11/04/2024    ALBUMIN 4.1 11/04/2024    ALT 11 11/04/2024    AST 10 11/04/2024    ALKPHOS 69 11/04/2024    BILITOT 0.4 11/04/2024    GLOB 2.8 11/04/2024    AGRATIO 1.5 11/04/2024    BCR 17.5 02/28/2025    ANIONGAP 12.8 02/28/2025    EGFR 44.7 (L) 02/28/2025        Lipid Panel:  Lab Results   Component Value Date    CHOL 138 11/04/2024    TRIG 231 (H) 11/04/2024    HDL 28 (L) 11/04/2024    VLDL 38 11/04/2024    LDL 72 11/04/2024     HbA1c:  No components found for: \"HGBA1C\"    TSH:  Lab Results   Component Value Date    TSH 2.500 11/04/2024       Assessment and Plan  Diagnoses and all orders for this visit:    1. Type 2 diabetes mellitus with hyperglycemia, without long-term current use of insulin (Primary)  Assessment & Plan:  -Diabetes is worsening with A1c 9.4.   -Start Januvia 100 mg daily  -Restart metformin 1000 mg twice daily. Will refill today.   -Continue glipizide 10 mg twice daily  -Continue Farxiga 10 mg.  Heart failure clinic writes this prescription for him.  -Discussed dietary and exercise guidelines with patient. 150 g carbs per day and 150 minutes of exercise per week. Increase protein with complex carbs. Avoid foods high in refined sugars and sugary drinks.   -Discussed the importance of yearly eye exams.  -Discussed Glucagon use " and appropriate timing for this.   -S/S hypoglycemia reviewed with Rule of 15's advised.  -Discussed long term risks of untreated/undertreated diabetes including retinopathy, neuropathy, nephropathy, amputations, hospitalizations, MI, CVA.    -Discussed the medication's MOA and protective cardiovascular and renal benefits for diabetes. Medication may cause  more frequent urination particularly upon starting the medication. Take one tablet in the morning with or without food. Encouraged to drink plenty of water as to not get dehydrated, particularly in warmer weather or with activity. Also discussed there may be an increased incidence of UTIs or yeast infections and to monitor for signs/symptoms.      Orders:  -     POC Glycosylated Hemoglobin (Hb A1C)  -     POC Glucose, Blood  -     Microalbumin / Creatinine Urine Ratio - Urine, Clean Catch  -     metFORMIN (GLUCOPHAGE) 1000 MG tablet; Take 1 tablet by mouth 2 (Two) Times a Day With Meals.  Dispense: 180 tablet; Refill: 1  -     glipizide (GLUCOTROL) 10 MG tablet; Take 1 tablet by mouth 2 (Two) Times a Day Before Meals.  Dispense: 180 tablet; Refill: 1  -     SITagliptin (Januvia) 100 MG tablet; Take 1 tablet by mouth Daily.  Dispense: 90 tablet; Refill: 1      Return in about 8 weeks (around 8/6/2025). The patient was instructed to contact the clinic with any interval questions or concerns.    Please note that portions of this document were completed with a voice recognition program. Efforts were made to edit the dictations, but occasionally words are mis-transcribed.  This document has been electronically signed by ANKUR Waller  June 11, 2025 14:06 EDT

## 2025-06-11 NOTE — ASSESSMENT & PLAN NOTE
-Diabetes is worsening with A1c 9.4.   -Start Januvia 100 mg daily  -Restart metformin 1000 mg twice daily. Will refill today.   -Continue glipizide 10 mg twice daily  -Continue Farxiga 10 mg.  Heart failure clinic writes this prescription for him.  -Discussed dietary and exercise guidelines with patient. 150 g carbs per day and 150 minutes of exercise per week. Increase protein with complex carbs. Avoid foods high in refined sugars and sugary drinks.   -Discussed the importance of yearly eye exams.  -Discussed Glucagon use and appropriate timing for this.   -S/S hypoglycemia reviewed with Rule of 15's advised.  -Discussed long term risks of untreated/undertreated diabetes including retinopathy, neuropathy, nephropathy, amputations, hospitalizations, MI, CVA.    -Discussed the medication's MOA and protective cardiovascular and renal benefits for diabetes. Medication may cause  more frequent urination particularly upon starting the medication. Take one tablet in the morning with or without food. Encouraged to drink plenty of water as to not get dehydrated, particularly in warmer weather or with activity. Also discussed there may be an increased incidence of UTIs or yeast infections and to monitor for signs/symptoms.

## 2025-06-23 ENCOUNTER — APPOINTMENT (OUTPATIENT)
Dept: CT IMAGING | Facility: HOSPITAL | Age: 75
End: 2025-06-23
Payer: MEDICARE

## 2025-06-23 ENCOUNTER — HOSPITAL ENCOUNTER (OUTPATIENT)
Facility: HOSPITAL | Age: 75
Setting detail: OBSERVATION
Discharge: HOME | End: 2025-06-25
Admitting: STUDENT IN AN ORGANIZED HEALTH CARE EDUCATION/TRAINING PROGRAM
Payer: MEDICARE

## 2025-06-23 ENCOUNTER — APPOINTMENT (OUTPATIENT)
Dept: GENERAL RADIOLOGY | Facility: HOSPITAL | Age: 75
End: 2025-06-23
Payer: MEDICARE

## 2025-06-23 DIAGNOSIS — I50.32 CHRONIC DIASTOLIC CONGESTIVE HEART FAILURE: ICD-10-CM

## 2025-06-23 DIAGNOSIS — I50.32 CHRONIC HEART FAILURE WITH PRESERVED EJECTION FRACTION (HFPEF): ICD-10-CM

## 2025-06-23 DIAGNOSIS — E11.65 TYPE 2 DIABETES MELLITUS WITH HYPERGLYCEMIA, WITHOUT LONG-TERM CURRENT USE OF INSULIN: ICD-10-CM

## 2025-06-23 DIAGNOSIS — I25.119 CORONARY ARTERY DISEASE INVOLVING NATIVE CORONARY ARTERY OF NATIVE HEART WITH ANGINA PECTORIS: ICD-10-CM

## 2025-06-23 DIAGNOSIS — N17.9 AKI (ACUTE KIDNEY INJURY): Primary | ICD-10-CM

## 2025-06-23 DIAGNOSIS — I50.22 CHRONIC SYSTOLIC CHF (CONGESTIVE HEART FAILURE): ICD-10-CM

## 2025-06-23 DIAGNOSIS — Z95.1 S/P CABG X 4: ICD-10-CM

## 2025-06-23 LAB
ALBUMIN SERPL-MCNC: 4 G/DL (ref 3.5–5.2)
ALBUMIN/GLOB SERPL: 1.5 G/DL
ALP SERPL-CCNC: 68 U/L (ref 39–117)
ALT SERPL W P-5'-P-CCNC: 9 U/L (ref 1–41)
ANION GAP SERPL CALCULATED.3IONS-SCNC: 12.5 MMOL/L (ref 5–15)
AST SERPL-CCNC: 13 U/L (ref 1–40)
B PARAPERT DNA SPEC QL NAA+PROBE: NOT DETECTED
B PERT DNA SPEC QL NAA+PROBE: NOT DETECTED
BACTERIA UR QL AUTO: NORMAL /HPF
BASOPHILS # BLD AUTO: 0.03 10*3/MM3 (ref 0–0.2)
BASOPHILS NFR BLD AUTO: 0.3 % (ref 0–1.5)
BILIRUB SERPL-MCNC: 0.3 MG/DL (ref 0–1.2)
BILIRUB UR QL STRIP: NEGATIVE
BUN SERPL-MCNC: 44.4 MG/DL (ref 8–23)
BUN/CREAT SERPL: 18.1 (ref 7–25)
C PNEUM DNA NPH QL NAA+NON-PROBE: NOT DETECTED
CALCIUM SPEC-SCNC: 9.6 MG/DL (ref 8.6–10.5)
CHLORIDE SERPL-SCNC: 96 MMOL/L (ref 98–107)
CLARITY UR: CLEAR
CO2 SERPL-SCNC: 26.5 MMOL/L (ref 22–29)
COLOR UR: YELLOW
CREAT SERPL-MCNC: 2.45 MG/DL (ref 0.76–1.27)
CRP SERPL-MCNC: 0.53 MG/DL (ref 0–0.5)
DEPRECATED RDW RBC AUTO: 45.3 FL (ref 37–54)
EGFRCR SERPLBLD CKD-EPI 2021: 26.6 ML/MIN/1.73
EOSINOPHIL # BLD AUTO: 0.16 10*3/MM3 (ref 0–0.4)
EOSINOPHIL NFR BLD AUTO: 1.8 % (ref 0.3–6.2)
ERYTHROCYTE [DISTWIDTH] IN BLOOD BY AUTOMATED COUNT: 13.2 % (ref 12.3–15.4)
FLUAV SUBTYP SPEC NAA+PROBE: NOT DETECTED
FLUBV RNA ISLT QL NAA+PROBE: NOT DETECTED
GEN 5 1HR TROPONIN T REFLEX: 24 NG/L
GLOBULIN UR ELPH-MCNC: 2.7 GM/DL
GLUCOSE SERPL-MCNC: 140 MG/DL (ref 65–99)
GLUCOSE UR STRIP-MCNC: ABNORMAL MG/DL
HADV DNA SPEC NAA+PROBE: NOT DETECTED
HCOV 229E RNA SPEC QL NAA+PROBE: NOT DETECTED
HCOV HKU1 RNA SPEC QL NAA+PROBE: NOT DETECTED
HCOV NL63 RNA SPEC QL NAA+PROBE: NOT DETECTED
HCOV OC43 RNA SPEC QL NAA+PROBE: NOT DETECTED
HCT VFR BLD AUTO: 39.2 % (ref 37.5–51)
HGB BLD-MCNC: 13 G/DL (ref 13–17.7)
HGB UR QL STRIP.AUTO: NEGATIVE
HMPV RNA NPH QL NAA+NON-PROBE: NOT DETECTED
HOLD SPECIMEN: NORMAL
HOLD SPECIMEN: NORMAL
HPIV1 RNA ISLT QL NAA+PROBE: NOT DETECTED
HPIV2 RNA SPEC QL NAA+PROBE: NOT DETECTED
HPIV3 RNA NPH QL NAA+PROBE: NOT DETECTED
HPIV4 P GENE NPH QL NAA+PROBE: NOT DETECTED
HYALINE CASTS UR QL AUTO: NORMAL /LPF
IMM GRANULOCYTES # BLD AUTO: 0.05 10*3/MM3 (ref 0–0.05)
IMM GRANULOCYTES NFR BLD AUTO: 0.6 % (ref 0–0.5)
INR PPP: 0.91 (ref 0.9–1.1)
KETONES UR QL STRIP: NEGATIVE
LEUKOCYTE ESTERASE UR QL STRIP.AUTO: ABNORMAL
LYMPHOCYTES # BLD AUTO: 3.4 10*3/MM3 (ref 0.7–3.1)
LYMPHOCYTES NFR BLD AUTO: 38.5 % (ref 19.6–45.3)
M PNEUMO IGG SER IA-ACNC: NOT DETECTED
MAGNESIUM SERPL-MCNC: 2.4 MG/DL (ref 1.6–2.4)
MCH RBC QN AUTO: 31.3 PG (ref 26.6–33)
MCHC RBC AUTO-ENTMCNC: 33.2 G/DL (ref 31.5–35.7)
MCV RBC AUTO: 94.5 FL (ref 79–97)
MONOCYTES # BLD AUTO: 0.6 10*3/MM3 (ref 0.1–0.9)
MONOCYTES NFR BLD AUTO: 6.8 % (ref 5–12)
NEUTROPHILS NFR BLD AUTO: 4.59 10*3/MM3 (ref 1.7–7)
NEUTROPHILS NFR BLD AUTO: 52 % (ref 42.7–76)
NITRITE UR QL STRIP: NEGATIVE
NRBC BLD AUTO-RTO: 0 /100 WBC (ref 0–0.2)
PH UR STRIP.AUTO: 6 [PH] (ref 5–8)
PLATELET # BLD AUTO: 272 10*3/MM3 (ref 140–450)
PMV BLD AUTO: 8.8 FL (ref 6–12)
POTASSIUM SERPL-SCNC: 4.7 MMOL/L (ref 3.5–5.2)
PROCALCITONIN SERPL-MCNC: 0.11 NG/ML (ref 0–0.25)
PROT SERPL-MCNC: 6.7 G/DL (ref 6–8.5)
PROT UR QL STRIP: NEGATIVE
PROTHROMBIN TIME: 12.8 SECONDS (ref 12.5–15.2)
RBC # BLD AUTO: 4.15 10*6/MM3 (ref 4.14–5.8)
RBC # UR STRIP: NORMAL /HPF
REF LAB TEST METHOD: NORMAL
RHINOVIRUS RNA SPEC NAA+PROBE: NOT DETECTED
RSV RNA NPH QL NAA+NON-PROBE: NOT DETECTED
SARS-COV-2 RNA RESP QL NAA+PROBE: NOT DETECTED
SODIUM SERPL-SCNC: 135 MMOL/L (ref 136–145)
SP GR UR STRIP: 1.01 (ref 1–1.03)
SQUAMOUS #/AREA URNS HPF: NORMAL /HPF
TROPONIN T % DELTA: -8
TROPONIN T NUMERIC DELTA: -2 NG/L
TROPONIN T SERPL HS-MCNC: 26 NG/L
TSH SERPL DL<=0.05 MIU/L-ACNC: 2.09 UIU/ML (ref 0.27–4.2)
UROBILINOGEN UR QL STRIP: ABNORMAL
WBC # UR STRIP: NORMAL /HPF
WBC NRBC COR # BLD AUTO: 8.83 10*3/MM3 (ref 3.4–10.8)
WHOLE BLOOD HOLD COAG: NORMAL
WHOLE BLOOD HOLD SPECIMEN: NORMAL

## 2025-06-23 PROCEDURE — 94660 CPAP INITIATION&MGMT: CPT

## 2025-06-23 PROCEDURE — 94799 UNLISTED PULMONARY SVC/PX: CPT

## 2025-06-23 PROCEDURE — 85610 PROTHROMBIN TIME: CPT | Performed by: PHYSICIAN ASSISTANT

## 2025-06-23 PROCEDURE — 93005 ELECTROCARDIOGRAM TRACING: CPT | Performed by: PHYSICIAN ASSISTANT

## 2025-06-23 PROCEDURE — 36415 COLL VENOUS BLD VENIPUNCTURE: CPT | Performed by: PHYSICIAN ASSISTANT

## 2025-06-23 PROCEDURE — 99222 1ST HOSP IP/OBS MODERATE 55: CPT

## 2025-06-23 PROCEDURE — 83735 ASSAY OF MAGNESIUM: CPT | Performed by: PHYSICIAN ASSISTANT

## 2025-06-23 PROCEDURE — G0378 HOSPITAL OBSERVATION PER HR: HCPCS

## 2025-06-23 PROCEDURE — 86140 C-REACTIVE PROTEIN: CPT | Performed by: PHYSICIAN ASSISTANT

## 2025-06-23 PROCEDURE — 80053 COMPREHEN METABOLIC PANEL: CPT | Performed by: PHYSICIAN ASSISTANT

## 2025-06-23 PROCEDURE — 25810000003 SODIUM CHLORIDE 0.9 % SOLUTION

## 2025-06-23 PROCEDURE — 81001 URINALYSIS AUTO W/SCOPE: CPT | Performed by: PHYSICIAN ASSISTANT

## 2025-06-23 PROCEDURE — 25010000002 ENOXAPARIN PER 10 MG: Performed by: STUDENT IN AN ORGANIZED HEALTH CARE EDUCATION/TRAINING PROGRAM

## 2025-06-23 PROCEDURE — 93010 ELECTROCARDIOGRAM REPORT: CPT | Performed by: INTERNAL MEDICINE

## 2025-06-23 PROCEDURE — 71045 X-RAY EXAM CHEST 1 VIEW: CPT | Performed by: RADIOLOGY

## 2025-06-23 PROCEDURE — 74176 CT ABD & PELVIS W/O CONTRAST: CPT | Performed by: RADIOLOGY

## 2025-06-23 PROCEDURE — 0202U NFCT DS 22 TRGT SARS-COV-2: CPT

## 2025-06-23 PROCEDURE — 96372 THER/PROPH/DIAG INJ SC/IM: CPT

## 2025-06-23 PROCEDURE — 99285 EMERGENCY DEPT VISIT HI MDM: CPT

## 2025-06-23 PROCEDURE — 94761 N-INVAS EAR/PLS OXIMETRY MLT: CPT

## 2025-06-23 PROCEDURE — 85025 COMPLETE CBC W/AUTO DIFF WBC: CPT | Performed by: PHYSICIAN ASSISTANT

## 2025-06-23 PROCEDURE — 84484 ASSAY OF TROPONIN QUANT: CPT | Performed by: PHYSICIAN ASSISTANT

## 2025-06-23 PROCEDURE — 84443 ASSAY THYROID STIM HORMONE: CPT | Performed by: PHYSICIAN ASSISTANT

## 2025-06-23 PROCEDURE — 84145 PROCALCITONIN (PCT): CPT | Performed by: PHYSICIAN ASSISTANT

## 2025-06-23 PROCEDURE — 74176 CT ABD & PELVIS W/O CONTRAST: CPT

## 2025-06-23 PROCEDURE — 71045 X-RAY EXAM CHEST 1 VIEW: CPT

## 2025-06-23 RX ORDER — SODIUM CHLORIDE 9 MG/ML
40 INJECTION, SOLUTION INTRAVENOUS AS NEEDED
Status: DISCONTINUED | OUTPATIENT
Start: 2025-06-23 | End: 2025-06-25 | Stop reason: HOSPADM

## 2025-06-23 RX ORDER — BUMETANIDE 1 MG/1
2 TABLET ORAL 2 TIMES DAILY
Status: CANCELLED | OUTPATIENT
Start: 2025-06-23

## 2025-06-23 RX ORDER — ISOSORBIDE MONONITRATE 60 MG/1
120 TABLET, EXTENDED RELEASE ORAL
Status: CANCELLED | OUTPATIENT
Start: 2025-06-24

## 2025-06-23 RX ORDER — ALLOPURINOL 100 MG/1
100 TABLET ORAL DAILY
Status: DISCONTINUED | OUTPATIENT
Start: 2025-06-24 | End: 2025-06-25 | Stop reason: HOSPADM

## 2025-06-23 RX ORDER — SODIUM CHLORIDE 0.9 % (FLUSH) 0.9 %
10 SYRINGE (ML) INJECTION EVERY 12 HOURS SCHEDULED
Status: DISCONTINUED | OUTPATIENT
Start: 2025-06-23 | End: 2025-06-25 | Stop reason: HOSPADM

## 2025-06-23 RX ORDER — ASPIRIN 81 MG/1
81 TABLET ORAL DAILY
Status: DISCONTINUED | OUTPATIENT
Start: 2025-06-24 | End: 2025-06-25 | Stop reason: HOSPADM

## 2025-06-23 RX ORDER — BISMUTH SUBSALICYLATE 262 MG/1
262 TABLET, CHEWABLE ORAL DAILY PRN
Status: CANCELLED | OUTPATIENT
Start: 2025-06-23

## 2025-06-23 RX ORDER — GABAPENTIN 100 MG/1
200 CAPSULE ORAL 2 TIMES DAILY
Status: DISCONTINUED | OUTPATIENT
Start: 2025-06-23 | End: 2025-06-25 | Stop reason: HOSPADM

## 2025-06-23 RX ORDER — ISOSORBIDE MONONITRATE 120 MG/1
120 TABLET, EXTENDED RELEASE ORAL 2 TIMES DAILY
Status: ON HOLD | COMMUNITY
End: 2025-06-25

## 2025-06-23 RX ORDER — ATORVASTATIN CALCIUM 40 MG/1
40 TABLET, FILM COATED ORAL NIGHTLY
Status: DISCONTINUED | OUTPATIENT
Start: 2025-06-23 | End: 2025-06-25 | Stop reason: HOSPADM

## 2025-06-23 RX ORDER — TERAZOSIN 1 MG/1
2 CAPSULE ORAL NIGHTLY
Status: DISCONTINUED | OUTPATIENT
Start: 2025-06-24 | End: 2025-06-25 | Stop reason: HOSPADM

## 2025-06-23 RX ORDER — METOPROLOL SUCCINATE 100 MG/1
100 TABLET, EXTENDED RELEASE ORAL 2 TIMES DAILY
Status: ON HOLD | COMMUNITY
End: 2025-06-25

## 2025-06-23 RX ORDER — GLIPIZIDE 5 MG/1
10 TABLET ORAL
Status: CANCELLED | OUTPATIENT
Start: 2025-06-24

## 2025-06-23 RX ORDER — ASPIRIN 81 MG/1
324 TABLET, CHEWABLE ORAL ONCE
Status: COMPLETED | OUTPATIENT
Start: 2025-06-23 | End: 2025-06-23

## 2025-06-23 RX ORDER — ENOXAPARIN SODIUM 100 MG/ML
30 INJECTION SUBCUTANEOUS NIGHTLY
Status: DISCONTINUED | OUTPATIENT
Start: 2025-06-23 | End: 2025-06-24

## 2025-06-23 RX ORDER — ALLOPURINOL 300 MG/1
300 TABLET ORAL DAILY
COMMUNITY

## 2025-06-23 RX ORDER — SODIUM CHLORIDE 0.9 % (FLUSH) 0.9 %
10 SYRINGE (ML) INJECTION AS NEEDED
Status: DISCONTINUED | OUTPATIENT
Start: 2025-06-23 | End: 2025-06-25 | Stop reason: HOSPADM

## 2025-06-23 RX ORDER — METOPROLOL SUCCINATE 50 MG/1
100 TABLET, EXTENDED RELEASE ORAL
Status: CANCELLED | OUTPATIENT
Start: 2025-06-24

## 2025-06-23 RX ADMIN — Medication 10 ML: at 22:14

## 2025-06-23 RX ADMIN — SODIUM CHLORIDE 1000 ML: 9 INJECTION, SOLUTION INTRAVENOUS at 15:22

## 2025-06-23 RX ADMIN — GABAPENTIN 200 MG: 100 CAPSULE ORAL at 22:13

## 2025-06-23 RX ADMIN — ASPIRIN 324 MG: 81 TABLET, CHEWABLE ORAL at 14:16

## 2025-06-23 RX ADMIN — ENOXAPARIN SODIUM 30 MG: 30 INJECTION SUBCUTANEOUS at 22:13

## 2025-06-23 RX ADMIN — ATORVASTATIN CALCIUM 40 MG: 40 TABLET, FILM COATED ORAL at 22:13

## 2025-06-23 NOTE — H&P
[facility] Medicine Services  History & Physical    Patient Identification:  Name:  William Villasenor  Age:  75 y.o.  Sex:  male  :  1950  MRN:  [id number]   Visit Number:  [id number]  Admit Date: 2025   Primary Care Physician:  Kaylee Cooper APRN    Subjective     Chief complaint: Hypotension    History of presenting illness:      William Villasenor is a 75 y.o. male who presented for further evaluation of general malaise, weakness. He was seen and examined in the ED with his spouse present at the bedside. He reported for the past several days he has been increasingly weak. His wife added that he seems to be fatiguing more easily on exertion than previously. He denies new specific complaint but does report ongoing dyspnea and swelling. He states about 7 weeks ago his provider advised him to hold his home bumex. Medication was held for about 5 weeks however he became increasingly swollen and more short of breath prompting return visit about 2 weeks ago at which time his bumex was resumed at 2 mg BID. He states lower extremity swelling quickly began improving- he still feels somewhat short of breath but more consistent with his chronic baseline. He denies chest pain today, no palpitations recently.   He admits that he probably does not drink enough fluids. He denied any NSAID use. He was recently started on Januvia but otherwise no new medications. He denied difficulty with urination or change in urine output. No reported abdominal or flank pain. Has not had any significant nausea or vomiting. No diarrhea he actually complains of constipation with last BM about three days ago.     Past medical history is significant for T2DM, HTN, BPH, GERD, hyperlipidemia, CKD, CAD s/p CABG, CHF    Upon arrival to the ED, vital signs were temp 97.4, heart rate 66, respirations 18, /68, SpO2 97% on RA.  His initial HS troponin was 26 with repeat at 24.  His baseline creatinine appears to be uncertain  likely around 1.6 and is 2.45 today with BUN elevated.  His CRP is 0.53, Pro-Madhu is normal, no leukocytosis or left shift.  His UA showed trace leukocytes, 2+ glucose.  CT of the abdomen and pelvis negative for renal or ureteral stones.  No hydronephrosis.  Findings consistent with constipation.  Distended urinary bladder without wall thickening.    Known Emergency Department medications received prior to my evaluation included full dose aspirin, 1 L normal saline.   Emergency Department Room location at the time of my evaluation was [facility].     ---------------------------------------------------------------------------------------------------------------------   Review of Systems   Constitutional:  Positive for activity change and fatigue. Negative for chills.   HENT:  Negative for congestion and rhinorrhea.    Respiratory:  Positive for shortness of breath (chronic).    Cardiovascular:  Positive for leg swelling. Negative for palpitations.   Gastrointestinal:  Positive for constipation. Negative for abdominal pain and nausea.   Genitourinary:  Negative for difficulty urinating and dysuria.   Musculoskeletal:  Negative for arthralgias and myalgias.   Skin:  Negative for rash and wound.   Neurological:  Positive for weakness. Negative for dizziness and light-headedness.        ---------------------------------------------------------------------------------------------------------------------   Past Medical History:   Diagnosis Date    Arthritis     Back pain     BPH (benign prostatic hyperplasia)     Chronic diastolic (congestive) heart failure     Coronary artery disease     Diabetes mellitus     Diabetic peripheral neuropathy     Elevated cholesterol     Erectile dysfunction     Former smoker     GERD (gastroesophageal reflux disease)     Gout     Hemorrhoids     High cholesterol     High triglycerides     Hypertension     Neuropathy     Obesity     Sleep apnea     wears CPAP     Past Surgical History:   Procedure  Laterality Date    CARDIAC CATHETERIZATION N/A 3/15/2021    Procedure: Left Heart Cath;  Surgeon: Trent Zaragoza MD;  Location:  [facility]ATH [facility];  Service: Cardiology;  Laterality: N/A;    CARDIAC CATHETERIZATION N/A 3/15/2021    Procedure: Coronary angiography;  Surgeon: Trent Zaragoza MD;  Location:  [facility];  Service: Cardiology;  Laterality: N/A;    COLONOSCOPY N/A 10/10/2024    Procedure: COLONOSCOPY;  Surgeon: Aly Barnhart MD;  Location:  [facility] OR;  Service: Gastroenterology;  Laterality: N/A;    COLONOSCOPY W/ BIOPSIES      CORONARY ARTERY BYPASS GRAFT N/A 3/16/2021    Procedure: MEDIAN STERNOTOMY, CORONARY ARTERY BYPASS GRAFT X4, UTILIZATION OF LEFT INTERNAL MAMMARY ARTERY, AND ENDOSCOPIC VEIN HARVEST OF RIGHT GREATER SAPHENOUS VEIN;  Surgeon: Jorge Simms MD;  Location:  [facility];  Service: Cardiothoracic;  Laterality: N/A;  vein out- 1614  vein ready- 1628             Family History   Problem Relation Age of Onset    Heart disease Mother     Hypertension Mother     Depression Mother     Alcohol abuse Maternal Uncle     Heart disease Maternal Grandmother     Hypertension Maternal Grandmother     Diabetes Maternal Grandmother     Diabetes Paternal Grandmother     No Known Problems Half-Brother     No Known Problems Half-Brother     No Known Problems Half-Sister     No Known Problems Daughter     No Known Problems Daughter     Hyperlipidemia Daughter      Social History     Socioeconomic History    Marital status:     Number of children: 3   Tobacco Use    Smoking status: Former     Current packs/day: 0.00     Average packs/day: 1 pack/day for 5.0 years (5.0 ttl pk-yrs)     Types: Cigarettes     Start date: 1969     Quit date: 1974     Years since quittin.4     Passive exposure: Past    Smokeless tobacco: Never   Vaping Use    Vaping status: Never Used   Substance and Sexual Activity    Alcohol use: Never     Comment: quit in     Drug  use: Never    Sexual activity: Defer     ---------------------------------------------------------------------------------------------------------------------   Allergies:  Lisinopril and Other  ---------------------------------------------------------------------------------------------------------------------   Home medications:    Medications below are reported home medications pulling from within the system; at this time, these medications have not been reconciled unless otherwise specified and are in the verification process for further verifcation as current home medications.  (Not in a hospital admission)      Hospital Scheduled Meds:          Current listed hospital scheduled medications may not yet reflect those currently placed in orders that are signed and held awaiting patient's arrival to floor.   ---------------------------------------------------------------------------------------------------------------------     Objective     Vital Signs:  Temp:  [97.4 °F (36.3 °C)] 97.4 °F (36.3 °C)  Heart Rate:  [53-66] 58  Resp:  [18] 18  BP: (103-139)/(68-86) 139/82      06/23/25  1319   Weight: 95.5 kg (210 lb 9.6 oz)     Body mass index is 31.1 kg/m².  ---------------------------------------------------------------------------------------------------------------------       Physical Exam  Vitals and nursing note reviewed.   Constitutional:       General: He is not in acute distress.     Appearance: He is obese.   HENT:      Head: Normocephalic and atraumatic.   Eyes:      Extraocular Movements: Extraocular movements intact.   Cardiovascular:      Rate and Rhythm: Normal rate and regular rhythm.   Pulmonary:      Effort: Pulmonary effort is normal. No respiratory distress.   Abdominal:      Palpations: Abdomen is soft.      Tenderness: There is no abdominal tenderness.   Musculoskeletal:      Right lower leg: Edema present.      Left lower leg: Edema present.      Comments: 1+ pitting   Skin:     General: Skin  "is warm and dry.      Coloration: Skin is pale.   Neurological:      Mental Status: He is alert. Mental status is at baseline.   Psychiatric:         Mood and Affect: Mood normal.         Behavior: Behavior normal.       ---------------------------------------------------------------------------------------------------------------------  EKG:        I have personally looked at the EKG.  ---------------------------------------------------------------------------------------------------------------------   Results from last 7 days   Lab Units 06/23/25  1330   CRP mg/dL 0.53*   WBC 10*3/mm3 8.83   HEMOGLOBIN g/dL 13.0   HEMATOCRIT % 39.2   MCV fL 94.5   MCHC g/dL 33.2   PLATELETS 10*3/mm3 272   INR  0.91         Results from last 7 days   Lab Units 06/23/25  1330   SODIUM mmol/L 135*   POTASSIUM mmol/L 4.7   MAGNESIUM mg/dL 2.4   CHLORIDE mmol/L 96*   CO2 mmol/L 26.5   BUN mg/dL 44.4*   CREATININE mg/dL 2.45*   CALCIUM mg/dL 9.6   GLUCOSE mg/dL 140*   ALBUMIN g/dL 4.0   BILIRUBIN mg/dL 0.3   ALK PHOS U/L 68   AST (SGOT) U/L 13   ALT (SGPT) U/L 9   Estimated Creatinine Clearance: 29.2 mL/min (A) (by C-G formula based on SCr of 2.45 mg/dL (H)).  No results found for: \"AMMONIA\"  Results from last 7 days   Lab Units 06/23/25  1438 06/23/25  1330   HSTROP T ng/L 24* 26*         Lab Results   Component Value Date    HGBA1C 9.4 (A) 06/11/2025     Lab Results   Component Value Date    TSH 2.090 06/23/2025     No results found for: \"PREGTESTUR\", \"PREGSERUM\", \"HCG\", \"HCGQUANT\"  Pain Management Panel  More data exists         Latest Ref Rng & Units 11/4/2024 3/15/2021   Pain Management Panel   Creatinine, Urine mg/dL 19.4  -   Amphetamine, Urine Qual Negative - Negative    Barbiturates Screen, Urine Negative - Negative    Benzodiazepine Screen, Urine Negative - Negative    Buprenorphine, Screen, Urine Negative - Negative    Cocaine Screen, Urine Negative - Negative    Methadone Screen , Urine Negative - Negative  " "  Methamphetamine, Ur Negative - Negative      No results found for: \"BLOODCX\"  No results found for: \"URINECX\"  No results found for: \"WOUNDCX\"  No results found for: \"STOOLCX\"      ---------------------------------------------------------------------------------------------------------------------  Imaging Results (Last 7 Days)       Procedure Component Value Units Date/Time    CT Abdomen Pelvis Without Contrast [[id number]] Collected: 06/23/25 1646     Updated: 06/23/25 1650    Narrative:      EXAM:    CT Abdomen and Pelvis Without Intravenous Contrast     EXAM DATE:    6/23/2025 4:08 PM     CLINICAL HISTORY:    IZA     TECHNIQUE:    Axial computed tomography images of the abdomen and pelvis without  intravenous contrast.  Sagittal and coronal reformatted images were  created and reviewed.  This CT exam was performed using one or more of  the following dose reduction techniques:  automated exposure control,  adjustment of the mA and/or kV according to patient size, and/or use of  iterative reconstruction technique.     COMPARISON:    No relevant prior studies available.     FINDINGS:    Lung bases:  Bibasilar scarring and granulomas.  No consolidation.    Heart:  Cardiomegaly and changes of previous CABG.      ABDOMEN:    Liver:  Unremarkable.    Gallbladder and bile ducts:  Cholelithiasis.  No ductal dilation.    Pancreas:  Unremarkable.  No ductal dilation.    Spleen:  Unremarkable.  No splenomegaly.    Adrenals:  Unremarkable.  No mass.    Kidneys and ureters:  Unremarkable.  No renal or ureteral stones. No  hydronephrosis.    Stomach and bowel:  Moderate volume fecal retention in the colon  consistent with constipation.  No mucosal thickening.  No evidence of  bowel obstruction.      PELVIS:    Appendix:  Appendectomy changes are noted.    Bladder:  Distended urinary bladder without wall thickening.  No  stones.    Reproductive:  Unremarkable as visualized.      ABDOMEN and PELVIS:    Intraperitoneal space:  " "Unremarkable.  No significant fluid  collection.  No pneumoperitoneum identified.    Bones/joints:  Mild degenerative changes lumbar spine. No acute bony  findings identified.  No dislocation.    Soft tissues:  Unremarkable.    Vasculature:  Atherosclerosis aorta without evidence of aneurysm.    Lymph nodes:  Unremarkable.  No enlarged lymph nodes.       Impression:      1.  No renal or ureteral stones. No hydronephrosis.  2.  Cholelithiasis.  3.  Moderate volume fecal retention in the colon consistent with  constipation.  4.  No evidence of bowel obstruction.  5.  Distended urinary bladder without wall thickening.  6. Other incidental/nonacute findings above.     This report was finalized on 6/23/2025 4:48 PM by Dr. Catrachito Majano MD.       XR Chest 1 View [[id number]] Collected: 06/23/25 1343     Updated: 06/23/25 1346    Narrative:      EXAM:    XR Chest, 1 View     EXAM DATE:    6/23/2025 1:18 PM     CLINICAL HISTORY:    Chest Pain Protocol     TECHNIQUE:    Frontal view of the chest.     COMPARISON:    9/17/2021     FINDINGS:    Lungs and pleural spaces:  Unremarkable.  No consolidation.  No  pneumothorax.    Heart:  Mild cardiomegaly.  CABG.    Mediastinum:  Unremarkable.  Normal mediastinal contour.    Bones/joints:  Unremarkable.  No acute fracture.       Impression:        Stable chest. No acute cardiopulmonary findings.     This report was finalized on 6/23/2025 1:44 PM by Dr. Catrachito Majano MD.               Cultures:  No results found for: \"BLOODCX\", \"URINECX\", \"WOUNDCX\", \"MRSACX\", \"RESPCX\", \"STOOLCX\"    Last echocardiogram:  Results for orders placed during the hospital encounter of 07/19/24    Adult Transthoracic Echo Complete W/ Cont if Necessary Per Protocol    Interpretation Summary    Left ventricular systolic function is normal. Left ventricular ejection fraction appears to be 66 - 70%.    Left ventricular wall thickness is consistent with mild to moderate concentric hypertrophy.    Left " ventricular diastolic function is consistent with (grade I) impaired relaxation.          I have personally reviewed the above radiology images and read the final radiology report on 06/23/25  ---------------------------------------------------------------------------------------------------------------------  Assessment / Plan     Active Hospital Problems    Diagnosis  POA    IZA (acute kidney injury) [N17.9]  Yes       ASSESSMENT/PLAN:    IZA on CKD suspect stage IIIa or IIIb, suspect prerenal  Generalized weakness  Patient presented to the ED secondary to several day history of worsening generalized weakness, easy fatigability.  He did advise that his diuretic was recently stopped however after he developed worsening edema, shortness of breath was resumed about 2 weeks ago-currently taking 2 mg Bumex twice daily.  He admits to poor fluid intake chronically, denied NSAID use.  Was recently started on Januvia but no new medications otherwise.  Her most recent data available most recent baseline creatinine appears to be around 1.6 and is 2.45 today with BUN 44.4.  Imaging does not reveal any concern for obstructive uropathy.  He was given 1 L of normal saline in the ED  Will admit to the telemetry unit for further management  Will continue to monitor renal function closely-repeat labs in the a.m.  Will avoid nephrotoxins as able, avoid hypotension.  Will hold Lasix on admission.  Continue to trend I's and O's  Continue supportive care measures    Chronic:  T2DM  HTN  BPH  GERD  Hyperlipidemia  CAD s/p CABG  CHF, currently clinically compensated  MADDY  Will continue home medication regimen as indicated  Avoid nephrotoxins/hypotension as above  Monitor clinical volume status closely while replacing fluids  Cont CPAP nightly    ----------  -DVT prophylaxis: Lovenox  -Activity: Up in chair  -Expected length of stay: Less than 2 midnights  -Disposition pending further clinical course    High risk secondary to acute kidney  injury    Code Status and Medical Interventions: CPR (Attempt to Resuscitate); Full Support   Ordered at: 06/23/25 1730     Code Status (Patient has no pulse and is not breathing):    CPR (Attempt to Resuscitate)     Medical Interventions (Patient has pulse or is breathing):    Full Support       Caden Luevano PA-C   06/23/25  18:17 EDT

## 2025-06-23 NOTE — ED NOTES
MEDICAL SCREENING:    Reason for Visit: hypotension and SOA x 1 week    Patient initially seen in triage.  The patient was advised further evaluation and diagnostic testing will be needed, some of the treatment and testing will be initiated in the lobby in order to begin the process.  The patient will be returned to the waiting area for the time being and possibly be re-assessed by a subsequent ED provider.  The patient will be brought back to the treatment area in as timely manner as possible.       Philip Barraza, PA-C  06/23/25 [id number]

## 2025-06-23 NOTE — ED PROVIDER NOTES
"Subjective   History of Present Illness  Patient is a 75 year old male who presents with complaints of generalized weakness, illness, and hypotension. He reports he woke up this morning and did not feel well, he reports his blood pressure to be \"101/54\" and he felt weak. Denies headache, dizziness, vomiting, chest pain or shortness of breath. Denies numbness, tingling, or any other complaints. He presents private vehicle with his wife.         Review of Systems   HENT: Negative.     Respiratory: Negative.     Cardiovascular: Negative.    Gastrointestinal:  Positive for nausea.   Neurological:  Positive for weakness. Negative for seizures, facial asymmetry and headaches.       Past Medical History:   Diagnosis Date    Arthritis     Back pain     BPH (benign prostatic hyperplasia)     Chronic diastolic (congestive) heart failure     Coronary artery disease     Diabetes mellitus     Diabetic peripheral neuropathy     Elevated cholesterol     Erectile dysfunction     Former smoker     GERD (gastroesophageal reflux disease)     Gout     Hemorrhoids     High cholesterol     High triglycerides     Hypertension     Neuropathy     Obesity     Sleep apnea     wears CPAP       Allergies   Allergen Reactions    Lisinopril Cough    Other Other (See Comments)     Causes a lot of gas Rybelsus       Past Surgical History:   Procedure Laterality Date    CARDIAC CATHETERIZATION N/A 3/15/2021    Procedure: Left Heart Cath;  Surgeon: Trent Zaragoza MD;  Location: [facility] CATH INVASIVE LOCATION;  Service: Cardiology;  Laterality: N/A;    CARDIAC CATHETERIZATION N/A 3/15/2021    Procedure: Coronary angiography;  Surgeon: Trent Zaragoza MD;  Location: [facility] INVASIVE LOCATION;  Service: Cardiology;  Laterality: N/A;    COLONOSCOPY N/A 10/10/2024    Procedure: COLONOSCOPY;  Surgeon: Aly Barnhart MD;  Location: [facility] OR;  Service: Gastroenterology;  Laterality: N/A;    COLONOSCOPY W/ BIOPSIES 2015    CORONARY ARTERY BYPASS GRAFT " N/A 3/16/2021    Procedure: MEDIAN STERNOTOMY, CORONARY ARTERY BYPASS GRAFT X4, UTILIZATION OF LEFT INTERNAL MAMMARY ARTERY, AND ENDOSCOPIC VEIN HARVEST OF RIGHT GREATER SAPHENOUS VEIN;  Surgeon: Jorge Simms MD;  Location: [facility];  Service: Cardiothoracic;  Laterality: N/A;  vein out- 1614  vein ready- 1628               Family History   Problem Relation Age of Onset    Heart disease Mother     Hypertension Mother     Depression Mother     Alcohol abuse Maternal Uncle     Heart disease Maternal Grandmother     Hypertension Maternal Grandmother     Diabetes Maternal Grandmother     Diabetes Paternal Grandmother     No Known Problems Half-Brother     No Known Problems Half-Brother     No Known Problems Half-Sister     No Known Problems Daughter     No Known Problems Daughter     Hyperlipidemia Daughter        Social History     Socioeconomic History    Marital status:     Number of children: 3   Tobacco Use    Smoking status: Former     Current packs/day: 0.00     Average packs/day: 1 pack/day for 5.0 years (5.0 ttl pk-yrs)     Types: Cigarettes     Start date: 1969     Quit date: 1974     Years since quittin.4     Passive exposure: Past    Smokeless tobacco: Never   Vaping Use    Vaping status: Never Used   Substance and Sexual Activity    Alcohol use: Never     Comment: quit in     Drug use: Never    Sexual activity: Defer           Objective   Physical Exam  Constitutional:       Appearance: He is well-developed.   HENT:      Head: Normocephalic.   Cardiovascular:      Rate and Rhythm: Normal rate and regular rhythm.   Pulmonary:      Effort: Pulmonary effort is normal.   Musculoskeletal:         General: Normal range of motion.      Cervical back: Normal range of motion.   Skin:     General: Skin is warm and dry.      Capillary Refill: Capillary refill takes less than 2 seconds.   Neurological:      Mental Status: He is alert and oriented to person, place, and time.   Psychiatric:          Mood and Affect: Mood normal.       Results for orders placed or performed during the hospital encounter of 06/23/25   ECG 12 Lead Chest Pain    Collection Time: 06/23/25  1:09 PM   Result Value Ref Range    QT Interval 406 ms    QTC Interval 429 ms   Comprehensive Metabolic Panel    Collection Time: 06/23/25  1:30 PM    Specimen: Blood   Result Value Ref Range    Glucose 140 (H) 65 - 99 mg/dL    BUN 44.4 (H) 8.0 - 23.0 mg/dL    Creatinine 2.45 (H) 0.76 - 1.27 mg/dL    Sodium 135 (L) 136 - 145 mmol/L    Potassium 4.7 3.5 - 5.2 mmol/L    Chloride 96 (L) 98 - 107 mmol/L    CO2 26.5 22.0 - 29.0 mmol/L    Calcium 9.6 8.6 - 10.5 mg/dL    Total Protein 6.7 6.0 - 8.5 g/dL    Albumin 4.0 3.5 - 5.2 g/dL    ALT (SGPT) 9 1 - 41 U/L    AST (SGOT) 13 1 - 40 U/L    Alkaline Phosphatase 68 39 - 117 U/L    Total Bilirubin 0.3 0.0 - 1.2 mg/dL    Globulin 2.7 gm/dL    A/G Ratio 1.5 g/dL    BUN/Creatinine Ratio 18.1 7.0 - 25.0    Anion Gap 12.5 5.0 - 15.0 mmol/L    eGFR 26.6 (L) >60.0 mL/min/1.73   High Sensitivity Troponin T    Collection Time: 06/23/25  1:30 PM    Specimen: Blood   Result Value Ref Range    HS Troponin T 26 (H) <22 ng/L   Protime-INR    Collection Time: 06/23/25  1:30 PM    Specimen: Blood   Result Value Ref Range    Protime 12.8 12.5 - 15.2 Seconds    INR 0.91 0.90 - 1.10   TSH    Collection Time: 06/23/25  1:30 PM    Specimen: Blood   Result Value Ref Range    TSH 2.090 0.270 - 4.200 uIU/mL   Magnesium    Collection Time: 06/23/25  1:30 PM    Specimen: Blood   Result Value Ref Range    Magnesium 2.4 1.6 - 2.4 mg/dL   CBC Auto Differential    Collection Time: 06/23/25  1:30 PM    Specimen: Blood   Result Value Ref Range    WBC 8.83 3.40 - 10.80 10*3/mm3    RBC 4.15 4.14 - 5.80 10*6/mm3    Hemoglobin 13.0 13.0 - 17.7 g/dL    Hematocrit 39.2 37.5 - 51.0 %    MCV 94.5 79.0 - 97.0 fL    MCH 31.3 26.6 - 33.0 pg    MCHC 33.2 31.5 - 35.7 g/dL    RDW 13.2 12.3 - 15.4 %    RDW-SD 45.3 37.0 - 54.0 fl    MPV 8.8 6.0 -  12.0 fL    Platelets 272 140 - 450 10*3/mm3    Neutrophil % 52.0 42.7 - 76.0 %    Lymphocyte % 38.5 19.6 - 45.3 %    Monocyte % 6.8 5.0 - 12.0 %    Eosinophil % 1.8 0.3 - 6.2 %    Basophil % 0.3 0.0 - 1.5 %    Immature Grans % 0.6 (H) 0.0 - 0.5 %    Neutrophils, Absolute 4.59 1.70 - 7.00 10*3/mm3    Lymphocytes, Absolute 3.40 (H) 0.70 - 3.10 10*3/mm3    Monocytes, Absolute 0.60 0.10 - 0.90 10*3/mm3    Eosinophils, Absolute 0.16 0.00 - 0.40 10*3/mm3    Basophils, Absolute 0.03 0.00 - 0.20 10*3/mm3    Immature Grans, Absolute 0.05 0.00 - 0.05 10*3/mm3    nRBC 0.0 0.0 - 0.2 /100 WBC   C-reactive Protein    Collection Time: 06/23/25  1:30 PM    Specimen: Blood   Result Value Ref Range    C-Reactive Protein 0.53 (H) 0.00 - 0.50 mg/dL   Procalcitonin    Collection Time: 06/23/25  1:30 PM    Specimen: Blood   Result Value Ref Range    Procalcitonin 0.11 0.00 - 0.25 ng/mL   Green Top (Gel)    Collection Time: 06/23/25  1:30 PM   Result Value Ref Range    Extra Tube Hold for add-ons.    Lavender Top    Collection Time: 06/23/25  1:30 PM   Result Value Ref Range    Extra Tube hold for add-on    Gold Top - SST    Collection Time: 06/23/25  1:30 PM   Result Value Ref Range    Extra Tube Hold for add-ons.    Light Blue Top    Collection Time: 06/23/25  1:30 PM   Result Value Ref Range    Extra Tube Hold for add-ons.    High Sensitivity Troponin T 1Hr    Collection Time: 06/23/25  2:38 PM    Specimen: Blood   Result Value Ref Range    HS Troponin T 24 (H) <22 ng/L    Troponin T Numeric Delta -2 ng/L    Troponin T % Delta -8 Abnormal if >/= 20%   Urinalysis With Microscopic If Indicated (No Culture) - Urine, Clean Catch    Collection Time: 06/23/25  2:58 PM    Specimen: Urine, Clean Catch   Result Value Ref Range    Color, UA Yellow Yellow, Straw    Appearance, UA Clear Clear    pH, UA 6.0 5.0 - 8.0    Specific Gravity, UA 1.012 1.005 - 1.030    Glucose,  mg/dL (2+) (A) Negative    Ketones, UA Negative Negative    Bilirubin,  UA Negative Negative    Blood, UA Negative Negative    Protein, UA Negative Negative    Leuk Esterase, UA Trace (A) Negative    Nitrite, UA Negative Negative    Urobilinogen, UA 0.2 E.U./dL 0.2 - 1.0 E.U./dL   Urinalysis, Microscopic Only - Urine, Clean Catch    Collection Time: 06/23/25  2:58 PM    Specimen: Urine, Clean Catch   Result Value Ref Range    RBC, UA None Seen None Seen, 0-2 /HPF    WBC, UA 0-2 None Seen, 0-2 /HPF    Bacteria, UA None Seen None Seen /HPF    Squamous Epithelial Cells, UA 0-2 None Seen, 0-2 /HPF    Hyaline Casts, UA None Seen None Seen /LPF    Methodology Automated Microscopy    Respiratory Panel PCR w/COVID-19(SARS-CoV-2) JOVANNA/FERMIN/ADALBERTO/PAD/COR/DEBBIE In-House, NP Swab in UTM/VTM, 2 HR TAT - Swab, Nasopharynx    Collection Time: 06/23/25  3:08 PM    Specimen: Nasopharynx; Swab   Result Value Ref Range    ADENOVIRUS, PCR Not Detected Not Detected    Coronavirus 229E Not Detected Not Detected    Coronavirus HKU1 Not Detected Not Detected    Coronavirus NL63 Not Detected Not Detected    Coronavirus OC43 Not Detected Not Detected    COVID19 Not Detected Not Detected - Ref. Range    Human Metapneumovirus Not Detected Not Detected    Human Rhinovirus/Enterovirus Not Detected Not Detected    Influenza A PCR Not Detected Not Detected    Influenza B PCR Not Detected Not Detected    Parainfluenza Virus 1 Not Detected Not Detected    Parainfluenza Virus 2 Not Detected Not Detected    Parainfluenza Virus 3 Not Detected Not Detected    Parainfluenza Virus 4 Not Detected Not Detected    RSV, PCR Not Detected Not Detected    Bordetella pertussis pcr Not Detected Not Detected    Bordetella parapertussis PCR Not Detected Not Detected    Chlamydophila pneumoniae PCR Not Detected Not Detected    Mycoplasma pneumo by PCR Not Detected Not Detected     CT Abdomen Pelvis Without Contrast  Result Date: 6/23/2025  1.  No renal or ureteral stones. No hydronephrosis. 2.  Cholelithiasis. 3.  Moderate volume fecal  "retention in the colon consistent with constipation. 4.  No evidence of bowel obstruction. 5.  Distended urinary bladder without wall thickening. 6. Other incidental/nonacute findings above.  This report was finalized on 6/23/2025 4:48 PM by Dr. Catrachito Majano MD.      XR Chest 1 View  Result Date: 6/23/2025    Stable chest. No acute cardiopulmonary findings.  This report was finalized on 6/23/2025 1:44 PM by Dr. Catrachito Majano MD.        Procedures           ED Course  ED Course as of 06/23/25 1927 Mon Jun 23, 2025   1325 EKG interpretation normal sinus rhythm 67 bpm QTc is 429 no ST elevations or depressions.  Electronically signed by Maxi Rocha DO, 06/23/25, 1:26 PM EDT.   [GF]   0634 Paged hospitalist, awaiting call back [KH]   1969 Spoke with Dr. Davis who accepts the patient to the hospitalist team.  [KH]      ED Course User Index  [GF] Maxi Rocha DO  [KH] Claudine Sullivan, APRN                                                       Medical Decision Making  Patient is a 75 year old male who presents with complaints of generalized weakness, illness, and hypotension. He reports he woke up this morning and did not feel well, he reports his blood pressure to be \"101/54\" and he felt weak. Denies headache, dizziness, vomiting, chest pain or shortness of breath. Denies numbness, tingling, or any other complaints. He presents private vehicle with his wife.     Problems Addressed:  IZA (acute kidney injury): complicated acute illness or injury    Amount and/or Complexity of Data Reviewed  Labs: ordered.  Radiology: ordered.  ECG/medicine tests: ordered.    Risk  OTC drugs.  Prescription drug management.  Decision regarding hospitalization.        Final diagnoses:   IZA (acute kidney injury)       ED Disposition  ED Disposition       ED Disposition   Decision to Admit    Condition   --    Comment   Level of Care: Telemetry [5]   Diagnosis: IZA (acute kidney injury) [188090]                 No " follow-up provider specified.       Medication List        ASK your doctor about these medications      allopurinol 300 MG tablet  Commonly known as: ZYLOPRIM  Ask about: Which instructions should I use?     dapagliflozin Propanediol 10 MG tablet  Commonly known as: Farxiga  Take 1 tablet by mouth Daily.  Ask about: Which instructions should I use?     Entresto  MG tablet  Generic drug: sacubitril-valsartan  Take 1 tablet by mouth 2 Times a Day.  Ask about: Which instructions should I use?     isosorbide mononitrate 120 MG 24 hr tablet  Commonly known as: IMDUR  Ask about: Which instructions should I use?     metoprolol succinate  MG 24 hr tablet  Commonly known as: TOPROL-XL  Ask about: Which instructions should I use?                 Claudine Sullivan, APRN  06/23/25 1928

## 2025-06-24 LAB
ANION GAP SERPL CALCULATED.3IONS-SCNC: 13.2 MMOL/L (ref 5–15)
BUN SERPL-MCNC: 41.3 MG/DL (ref 8–23)
BUN/CREAT SERPL: 20.1 (ref 7–25)
CALCIUM SPEC-SCNC: 9.3 MG/DL (ref 8.6–10.5)
CHLORIDE SERPL-SCNC: 99 MMOL/L (ref 98–107)
CO2 SERPL-SCNC: 25.8 MMOL/L (ref 22–29)
CREAT SERPL-MCNC: 2.05 MG/DL (ref 0.76–1.27)
EGFRCR SERPLBLD CKD-EPI 2021: 33 ML/MIN/1.73
GLUCOSE SERPL-MCNC: 121 MG/DL (ref 65–99)
POTASSIUM SERPL-SCNC: 4.4 MMOL/L (ref 3.5–5.2)
SODIUM SERPL-SCNC: 138 MMOL/L (ref 136–145)

## 2025-06-24 PROCEDURE — 94799 UNLISTED PULMONARY SVC/PX: CPT

## 2025-06-24 PROCEDURE — 94660 CPAP INITIATION&MGMT: CPT

## 2025-06-24 PROCEDURE — 99232 SBSQ HOSP IP/OBS MODERATE 35: CPT

## 2025-06-24 PROCEDURE — 25010000002 ENOXAPARIN PER 10 MG

## 2025-06-24 PROCEDURE — 96372 THER/PROPH/DIAG INJ SC/IM: CPT

## 2025-06-24 PROCEDURE — 94761 N-INVAS EAR/PLS OXIMETRY MLT: CPT

## 2025-06-24 PROCEDURE — 80048 BASIC METABOLIC PNL TOTAL CA: CPT | Performed by: STUDENT IN AN ORGANIZED HEALTH CARE EDUCATION/TRAINING PROGRAM

## 2025-06-24 PROCEDURE — G0378 HOSPITAL OBSERVATION PER HR: HCPCS

## 2025-06-24 RX ORDER — METOPROLOL SUCCINATE 50 MG/1
50 TABLET, EXTENDED RELEASE ORAL
Status: DISCONTINUED | OUTPATIENT
Start: 2025-06-24 | End: 2025-06-25 | Stop reason: HOSPADM

## 2025-06-24 RX ORDER — ISOSORBIDE MONONITRATE 60 MG/1
60 TABLET, EXTENDED RELEASE ORAL
Status: DISCONTINUED | OUTPATIENT
Start: 2025-06-24 | End: 2025-06-25 | Stop reason: HOSPADM

## 2025-06-24 RX ORDER — ENOXAPARIN SODIUM 100 MG/ML
40 INJECTION SUBCUTANEOUS NIGHTLY
Status: DISCONTINUED | OUTPATIENT
Start: 2025-06-24 | End: 2025-06-25 | Stop reason: HOSPADM

## 2025-06-24 RX ADMIN — ATORVASTATIN CALCIUM 40 MG: 40 TABLET, FILM COATED ORAL at 21:47

## 2025-06-24 RX ADMIN — ALLOPURINOL 100 MG: 100 TABLET ORAL at 09:08

## 2025-06-24 RX ADMIN — ENOXAPARIN SODIUM 40 MG: 40 INJECTION SUBCUTANEOUS at 21:47

## 2025-06-24 RX ADMIN — METOPROLOL SUCCINATE 50 MG: 50 TABLET, EXTENDED RELEASE ORAL at 09:51

## 2025-06-24 RX ADMIN — Medication 10 ML: at 09:09

## 2025-06-24 RX ADMIN — ASPIRIN 81 MG: 81 TABLET, COATED ORAL at 09:06

## 2025-06-24 RX ADMIN — ISOSORBIDE MONONITRATE 60 MG: 60 TABLET, EXTENDED RELEASE ORAL at 18:40

## 2025-06-24 RX ADMIN — GABAPENTIN 200 MG: 100 CAPSULE ORAL at 21:47

## 2025-06-24 RX ADMIN — SACUBITRIL AND VALSARTAN 1 TABLET: 49; 51 TABLET, FILM COATED ORAL at 21:47

## 2025-06-24 RX ADMIN — TERAZOSIN HYDROCHLORIDE 2 MG: 1 CAPSULE ORAL at 21:47

## 2025-06-24 RX ADMIN — GABAPENTIN 200 MG: 100 CAPSULE ORAL at 09:08

## 2025-06-24 RX ADMIN — Medication 10 ML: at 21:48

## 2025-06-24 RX ADMIN — EMPAGLIFLOZIN 10 MG: 25 TABLET, FILM COATED ORAL at 09:06

## 2025-06-24 NOTE — CASE MANAGEMENT/SOCIAL WORK
Discharge Planning Assessment   Silvio     Patient Name: William Villasenor  MRN: [id number]  Today's Date: 6/24/2025    Admit Date: 6/23/2025    Plan: SS received consult per ns for discharge planning.  SS spoke with pt and spouse at bedside.  Pt resides at home with spouse and plans to return home at discharge.  Pt currently does not utilize home health services.  Pt has home 02 at 2 liters nc (pt has no portable 02 tanks as pt refused tanks per [facility]), cpap and walker via [facility].  Pt's PCP is Kaylee Cooper.  Pt transports via private auto per family.  SS will follow.   Discharge Needs Assessment       Row Name 06/24/25 1631       Living Environment    People in Home spouse    Name(s) of People in Home Lives with spouse Perri    Current Living Arrangements home    Potentially Unsafe Housing Conditions none    In the past 12 months has the electric, gas, oil, or water company threatened to shut off services in your home? No    Primary Care Provided by self    Provides Primary Care For no one    Family Caregiver if Needed spouse    Quality of Family Relationships helpful;involved;supportive    Able to Return to Prior Arrangements yes       Resource/Environmental Concerns    Resource/Environmental Concerns none       Transition Planning    Patient/Family Anticipates Transition to home with family    Patient/Family Anticipated Services at Transition     Transportation Anticipated family or friend will provide       Discharge Needs Assessment    Equipment Currently Used at Home oxygen;cpap;walker, standard    Concerns to be Addressed discharge planning                   Discharge Plan       Row Name 06/24/25 8107       Plan    Plan SS received consult per nsg for discharge planning.  SS spoke with pt and spouse at bedside.  Pt resides at home with spouse and plans to return home at discharge.  Pt currently does not utilize home health services.  Pt has home 02 at 2 liters nc (pt has no portable  02 tanks as pt refused tanks per [facility]), cpap and walker via [facility].  Pt's PCP is Kaylee Cooper.  Pt transports via private auto per family.  SS will follow.                  Selected Continued Care - Episodes Includes continued care and service providers with selected services from the active episodes listed below          Expected Discharge Date and Time       Expected Discharge Date Expected Discharge Time    Jun 24, 2025            Demographic Summary       Row Name 06/24/25 1631       General Information    Admission Type observation    Arrived From home    Referral Source emergency department    Reason for Consult discharge planning    Preferred Language English                      Yris Kennedy, BSW

## 2025-06-24 NOTE — PROGRESS NOTES
Lovenox dose was increased to 40mg subq nightly due to CrCl increased to greater than 30 ml/min today. Pharamcy will continue to follow. Thank you.

## 2025-06-24 NOTE — CONSULTS
"Diabetes Education  Assessment/Teaching    Patient Name:  William Villasenor  YOB: 1950  MRN: [id number]  Admit Date:  6/23/2025      Assessment Date:  6/24/2025  Flowsheet Row Most Recent Value   General Information     Height 175.3 cm (69.02\")   Height Method Stated   Weight 98.7 kg (217 lb 9.5 oz)  [new admit less than 24 hours]   Weight Method Bed scale   Pregnancy Assessment    Diabetes History    Feeling down, depressed, or hopeless Not at all   Little interest or pleasure in doing things Not at all   Education Preferences    Nutrition Information    Assessment Topics    DM Goals             Flowsheet Row Most Recent Value   DM Education Needs    Meter Has own   Frequency of Testing Daily   Medication Oral   Problem Solving Hypoglycemia, Hyperglycemia, Sick days, Signs, Symptoms, Treatment   Reducing Risks Cardiovascular, Immunizations, Foot care, Dental exam, Eye exam, Blood pressure, Lipids, A1C testing, Neuropathy, Retinopathy   Healthy Eating Basic meal plan provided   Physical Activity Walking   Physical Activity Frequency Occasionally   Healthy Coping Appropriate   Discharge Plan Follow-up with PCP   Motivation Moderate   Teaching Method Explanation, Discussion, Handouts   Patient Response Verbalized understanding              Other Comments:  A1C 9.4 Patient was educated and received AADE7 and ADA handouts on diet, activity, checking blood glucose, taking medication as prescribed, checking feet daily and S/S of hypo/hyperglycemia. Patient was educated on sick rule days. Patient had no questions or concerns. Please re-consult or call for concerns or questions. Thank you.        Electronically signed by:  Melissa Wu RN  06/24/25 13:05 EDT  "

## 2025-06-24 NOTE — PROGRESS NOTES
Patient Identification:  Name:  William Villasenor  Age:  75 y.o.  Sex:  male  :  1950  MRN:  [id number]  Visit Number:  [id number]  Primary Care Provider:  Kaylee Cooper APRN    Length of stay:  0    Subjective/Interval History/Consultants/Procedures     Chief Complaint:   Chief Complaint   Patient presents with    Hypotension    Shortness of Breath       Subjective/Interval History:    75 y.o. male who was admitted on 2025 with IZA    PMH is significant for T2DM, HTN, BPH, GERD, hyperlipidemia, CKD, CAD s/p CABG, CHF   For complete admission information, please see history and physical.     Consultations:  CM/SW    Procedures/Scans:  CXR  CT abdomen and pelvis without contrast    Today, the patient was seen and examined sitting up in bed in no acute distress.  He reported he did not sleep well but had no new or acute complaints.  He reported eating and drinking well, no increased leg swelling, no increased shortness of breath.  No change in urine output.    Room location at the time of evaluation was [facility].    ----------------------------------------------------------------------------------------------------------------------  Current Hospital Meds:  allopurinol, 100 mg, Oral, Daily  aspirin, 81 mg, Oral, Daily  atorvastatin, 40 mg, Oral, Nightly  empagliflozin, 10 mg, Oral, Daily  enoxaparin sodium, 40 mg, Subcutaneous, Nightly  gabapentin, 200 mg, Oral, BID  metoprolol succinate XL, 50 mg, Oral, Q24H  sodium chloride, 10 mL, Intravenous, Q12H  terazosin, 2 mg, Oral, Nightly         ----------------------------------------------------------------------------------------------------------------------      Objective     Vital Signs:  Temp:  [97.7 °F (36.5 °C)-99.3 °F (37.4 °C)] 98.2 °F (36.8 °C)  Heart Rate:  [52-67] 65  Resp:  [16-21] 18  BP: ()/(57-86) 131/78      25  1319 259 25  0500   Weight: 95.5 kg (210 lb 9.6 oz) 98.7 kg (217 lb 9.5 oz) 98.7 kg (217 lb 9.5 oz) (new  admit less than 24 hours)     Body mass index is 32.12 kg/m².    Intake/Output Summary (Last 24 hours) at 6/24/2025 1418  Last data filed at 6/24/2025 0915  Gross per 24 hour   Intake 250 ml   Output --   Net 250 ml     I/O this shift:  In: 250 [P.O.:250]  Out: -   Diet: Cardiac, Diabetic; Healthy Heart (2-3 Na+); Consistent Carbohydrate; Fluid Consistency: Thin (IDDSI 0)  ----------------------------------------------------------------------------------------------------------------------    Physical Exam  Vitals and nursing note reviewed.   Constitutional:       General: He is not in acute distress.     Appearance: He is obese.   HENT:      Head: Normocephalic and atraumatic.   Eyes:      Extraocular Movements: Extraocular movements intact.   Cardiovascular:      Rate and Rhythm: Normal rate.   Pulmonary:      Effort: Pulmonary effort is normal. No respiratory distress.   Abdominal:      Palpations: Abdomen is soft.   Skin:     General: Skin is warm and dry.   Neurological:      Mental Status: He is alert. Mental status is at baseline.   Psychiatric:         Mood and Affect: Mood normal.          ----------------------------------------------------------------------------------------------------------------------  Tele:      ----------------------------------------------------------------------------------------------------------------------  Results from last 7 days   Lab Units 06/23/25  1438 06/23/25  1330   HSTROP T ng/L 24* 26*     Results from last 7 days   Lab Units 06/23/25  1330   CRP mg/dL 0.53*   WBC 10*3/mm3 8.83   HEMOGLOBIN g/dL 13.0   HEMATOCRIT % 39.2   MCV fL 94.5   MCHC g/dL 33.2   PLATELETS 10*3/mm3 272   INR  0.91         Results from last 7 days   Lab Units 06/24/25  0532 06/23/25  1330   SODIUM mmol/L 138 135*   POTASSIUM mmol/L 4.4 4.7   MAGNESIUM mg/dL  --  2.4   CHLORIDE mmol/L 99 96*   CO2 mmol/L 25.8 26.5   BUN mg/dL 41.3* 44.4*   CREATININE mg/dL 2.05* 2.45*   CALCIUM mg/dL 9.3 9.6  "  GLUCOSE mg/dL 121* 140*   ALBUMIN g/dL  --  4.0   BILIRUBIN mg/dL  --  0.3   ALK PHOS U/L  --  68   AST (SGOT) U/L  --  13   ALT (SGPT) U/L  --  9   Estimated Creatinine Clearance: 35.5 mL/min (A) (by C-G formula based on SCr of 2.05 mg/dL (H)).  No results found for: \"AMMONIA\"      No results found for: \"BLOODCX\"  No results found for: \"URINECX\"  No results found for: \"WOUNDCX\"  No results found for: \"STOOLCX\"  ----------------------------------------------------------------------------------------------------------------------  Imaging Results (Last 24 Hours)       Procedure Component Value Units Date/Time    CT Abdomen Pelvis Without Contrast [[id number]] Collected: 06/23/25 1646     Updated: 06/23/25 1650    Narrative:      EXAM:    CT Abdomen and Pelvis Without Intravenous Contrast     EXAM DATE:    6/23/2025 4:08 PM     CLINICAL HISTORY:    IZA     TECHNIQUE:    Axial computed tomography images of the abdomen and pelvis without  intravenous contrast.  Sagittal and coronal reformatted images were  created and reviewed.  This CT exam was performed using one or more of  the following dose reduction techniques:  automated exposure control,  adjustment of the mA and/or kV according to patient size, and/or use of  iterative reconstruction technique.     COMPARISON:    No relevant prior studies available.     FINDINGS:    Lung bases:  Bibasilar scarring and granulomas.  No consolidation.    Heart:  Cardiomegaly and changes of previous CABG.      ABDOMEN:    Liver:  Unremarkable.    Gallbladder and bile ducts:  Cholelithiasis.  No ductal dilation.    Pancreas:  Unremarkable.  No ductal dilation.    Spleen:  Unremarkable.  No splenomegaly.    Adrenals:  Unremarkable.  No mass.    Kidneys and ureters:  Unremarkable.  No renal or ureteral stones. No  hydronephrosis.    Stomach and bowel:  Moderate volume fecal retention in the colon  consistent with constipation.  No mucosal thickening.  No evidence of  bowel " obstruction.      PELVIS:    Appendix:  Appendectomy changes are noted.    Bladder:  Distended urinary bladder without wall thickening.  No  stones.    Reproductive:  Unremarkable as visualized.      ABDOMEN and PELVIS:    Intraperitoneal space:  Unremarkable.  No significant fluid  collection.  No pneumoperitoneum identified.    Bones/joints:  Mild degenerative changes lumbar spine. No acute bony  findings identified.  No dislocation.    Soft tissues:  Unremarkable.    Vasculature:  Atherosclerosis aorta without evidence of aneurysm.    Lymph nodes:  Unremarkable.  No enlarged lymph nodes.       Impression:      1.  No renal or ureteral stones. No hydronephrosis.  2.  Cholelithiasis.  3.  Moderate volume fecal retention in the colon consistent with  constipation.  4.  No evidence of bowel obstruction.  5.  Distended urinary bladder without wall thickening.  6. Other incidental/nonacute findings above.     This report was finalized on 6/23/2025 4:48 PM by Dr. Catarchito Majano MD.             ----------------------------------------------------------------------------------------------------------------------   I have reviewed the above laboratory values for 06/24/25    Assessment/Plan     Active Hospital Problems    Diagnosis  POA    IZA (acute kidney injury) [N17.9]  Yes         ASSESSMENT/PLAN:    IZA on CKD suspect stage IIIa or IIIb, suspect prerenal and improving  Generalized weakness  Patient presented to the ED secondary to several day history of worsening generalized weakness, easy fatigability.  He did advise that his diuretic was recently stopped however after he developed worsening edema, shortness of breath was resumed about 2 weeks ago-currently taking 2 mg Bumex twice daily.  He admits to poor fluid intake chronically, denied NSAID use.  Was recently started on Januvia but no new medications otherwise.  Per most recent data available most recent baseline creatinine appears to be around 1.6 and was 2.45  on admission with BUN 44.4.  Imaging does not reveal any concern for obstructive uropathy.  He was given 1 L of normal saline in the ED and admitted to the telemetry unit for further management  On follow-up labs this morning his creatinine has improved to 2.05 with BUN improved as well at 41.3.  Will avoid nephrotoxins as able, avoid hypotension.  Continue to hold home diuretic today.  Will plan to adjust dosing at discharge and schedule close follow-up in heart failure clinic.  Patient has demonstrated overall poor understanding of home medication regimen and question if taking home medications correctly and as prescribed-concern this could have led to significant bradycardia and hypotension which may have been contributing to ZIA and presenting weakness/fatigue.  Resuming home regimen in a stepwise fashion and will adjust as indicated.  Metoprolol succinate resumed at reduced dose of 50 mg daily  Continue to trend I's and O's  Continue supportive care measures  Will repeat BMP in the a.m.     Chronic:  T2DM  HTN  BPH  GERD  Hyperlipidemia  CAD s/p CABG  CHF, currently clinically compensated  MADDY  Continue home medication regimen as indicated  Avoid nephrotoxins/hypotension as above.  Initiating meds in a stepwise fashion as above.  Monitor clinical volume status closely while replacing fluids  Cont CPAP nightly  Oral diabetes medications are held.  Will cover with SSI if needed.    -----------  -DVT prophylaxis: Lovenox  -Disposition plans/anticipated needs: Pending course-anticipate discharge home within the next 24 to 48 hours pending further clinical improvement        The patient is high risk due to the following diagnoses/reasons: IZA        Caden Luevano PA-C  06/24/25  14:14 EDT

## 2025-06-24 NOTE — PLAN OF CARE
Goal Outcome Evaluation:               Patient has been resting this shift. No signs or symptoms of acute distress notes at this time. Plan of care ongoing.

## 2025-06-24 NOTE — PLAN OF CARE
Goal Outcome Evaluation:           Progress: no change   Pt resting in bed. No acute distress noted. No complaints voiced. VSS, o2 >90% on room air. Up ad bola, independent uses cane. A&ox4, denies pain at this time. Plan of care on going.

## 2025-06-25 ENCOUNTER — READMISSION MANAGEMENT (OUTPATIENT)
Dept: CALL CENTER | Facility: HOSPITAL | Age: 75
End: 2025-06-25
Payer: MEDICARE

## 2025-06-25 ENCOUNTER — TELEPHONE (OUTPATIENT)
Dept: CARDIOLOGY | Facility: CLINIC | Age: 75
End: 2025-06-25

## 2025-06-25 VITALS
SYSTOLIC BLOOD PRESSURE: 144 MMHG | OXYGEN SATURATION: 95 % | HEART RATE: 64 BPM | HEIGHT: 69 IN | DIASTOLIC BLOOD PRESSURE: 79 MMHG | TEMPERATURE: 97.6 F | WEIGHT: 217.9 LBS | BODY MASS INDEX: 32.27 KG/M2 | RESPIRATION RATE: 16 BRPM

## 2025-06-25 PROBLEM — N17.9 AKI (ACUTE KIDNEY INJURY): Status: RESOLVED | Noted: 2025-06-23 | Resolved: 2025-06-25

## 2025-06-25 LAB
ANION GAP SERPL CALCULATED.3IONS-SCNC: 9.5 MMOL/L (ref 5–15)
BUN SERPL-MCNC: 35.7 MG/DL (ref 8–23)
BUN/CREAT SERPL: 21.5 (ref 7–25)
CALCIUM SPEC-SCNC: 8.6 MG/DL (ref 8.6–10.5)
CHLORIDE SERPL-SCNC: 105 MMOL/L (ref 98–107)
CO2 SERPL-SCNC: 21.5 MMOL/L (ref 22–29)
CREAT SERPL-MCNC: 1.66 MG/DL (ref 0.76–1.27)
EGFRCR SERPLBLD CKD-EPI 2021: 42.5 ML/MIN/1.73
GLUCOSE SERPL-MCNC: 171 MG/DL (ref 65–99)
POTASSIUM SERPL-SCNC: 4.7 MMOL/L (ref 3.5–5.2)
QT INTERVAL: 406 MS
QTC INTERVAL: 429 MS
SODIUM SERPL-SCNC: 136 MMOL/L (ref 136–145)

## 2025-06-25 PROCEDURE — 80048 BASIC METABOLIC PNL TOTAL CA: CPT | Performed by: STUDENT IN AN ORGANIZED HEALTH CARE EDUCATION/TRAINING PROGRAM

## 2025-06-25 PROCEDURE — 99239 HOSP IP/OBS DSCHRG MGMT >30: CPT

## 2025-06-25 PROCEDURE — G0378 HOSPITAL OBSERVATION PER HR: HCPCS

## 2025-06-25 RX ORDER — METOPROLOL SUCCINATE 50 MG/1
50 TABLET, EXTENDED RELEASE ORAL DAILY
Qty: 30 TABLET | Refills: 0 | Status: SHIPPED | OUTPATIENT
Start: 2025-06-25 | End: 2025-07-25

## 2025-06-25 RX ORDER — ISOSORBIDE MONONITRATE 60 MG/1
60 TABLET, EXTENDED RELEASE ORAL DAILY
Qty: 30 TABLET | Refills: 0 | Status: SHIPPED | OUTPATIENT
Start: 2025-06-25 | End: 2025-07-25

## 2025-06-25 RX ORDER — BUMETANIDE 2 MG/1
1 TABLET ORAL 2 TIMES DAILY
Qty: 180 TABLET | Refills: 1 | Status: SHIPPED | OUTPATIENT
Start: 2025-06-25 | End: 2025-06-27 | Stop reason: DRUGHIGH

## 2025-06-25 RX ORDER — METOPROLOL SUCCINATE 50 MG/1
100 TABLET, EXTENDED RELEASE ORAL DAILY
Qty: 60 TABLET | Refills: 0 | Status: SHIPPED | OUTPATIENT
Start: 2025-06-25 | End: 2025-06-25

## 2025-06-25 RX ADMIN — ALLOPURINOL 100 MG: 100 TABLET ORAL at 09:38

## 2025-06-25 RX ADMIN — GABAPENTIN 200 MG: 100 CAPSULE ORAL at 09:39

## 2025-06-25 RX ADMIN — SACUBITRIL AND VALSARTAN 1 TABLET: 97; 103 TABLET, FILM COATED ORAL at 09:55

## 2025-06-25 RX ADMIN — ISOSORBIDE MONONITRATE 60 MG: 60 TABLET, EXTENDED RELEASE ORAL at 09:37

## 2025-06-25 RX ADMIN — EMPAGLIFLOZIN 10 MG: 25 TABLET, FILM COATED ORAL at 09:39

## 2025-06-25 RX ADMIN — METOPROLOL SUCCINATE 50 MG: 50 TABLET, EXTENDED RELEASE ORAL at 09:39

## 2025-06-25 RX ADMIN — ASPIRIN 81 MG: 81 TABLET, COATED ORAL at 09:37

## 2025-06-25 NOTE — DISCHARGE INSTR - APPOINTMENTS
[facility] in [place] states that they don't have an appointment for 2 weeks at this time and they will have to call patient per [place] unit secretary.

## 2025-06-25 NOTE — TELEPHONE ENCOUNTER
Caller: William Villasenor    Relationship to patient: Self    Best call back number: [phone]    Type of visit: HOSPITAL FOLLOW UP    Requested date: 2 WEEKS    If rescheduling, when is the original appointment: NONE AVAILABLE

## 2025-06-25 NOTE — PLAN OF CARE
Goal Outcome Evaluation:  Plan of Care Reviewed With: patient        Progress: improving     Pt resting in bed. No acute distress noted. No complaints voiced. Tolerating all interventions well. VSS. Plan of care on going.

## 2025-06-25 NOTE — DISCHARGE SUMMARY
[facility] HOSPITALIST DISCHARGE SUMMARY    Patient Identification:  Name:  William Villasenor  Age:  75 y.o.  Sex:  male  :  1950  MRN:  [id number]  Visit Number:  [id number]    Date of Admission: 2025  Date of Discharge:  25     PCP: Kaylee Cooper APRN    Discharging Provider: Sergei Luevano PA-C / Dr. Davis    Discharge Diagnoses     Discharge Diagnoses:  IZA on CKD IIIb, prerenal and resolved  Generalized weakness, improved    Secondary Diagnoses:  T2DM  HTN  BPH  GERD  Hyperlipidemia  CAD s/p CABG  CHF, currently clinically compensated  MADDY    Needs on follow up:  PCP 1 week   Cardiology 2 weeks  Heart failure clinic  Consults/Procedures     Consults:   Consults       No orders found from 2025 to 2025.            Procedures/Scans Performed:  CXR  CT abdomen and pelvis w/out contrast       History of Presenting Illness     Chief Complaint   Patient presents with    Hypotension    Shortness of Breath       Patient is a 75 y.o. male who presented to [facility] complaining of hypotension, shortness of breath.  Please see the admitting history and physical for further details.      Hospital Course     Patient was admitted to [facility] on 25 following presentation to [facility] ED  for further evaluation of generalized weakness, easy fatigability worsening over the prior several days. He did advise his bumex had recently been held, about 7 weeks ago but restarted and increased to 2 mg BID about two weeks prior to presentation after he had developed worsening shortness of breath and edema. He admitted to poor fluid intake chronically.  Limited baseline data was available for review but prior creatinine baseline suspected to be around 1.6 and his creatinine was 2.45 on admission.  Imaging was obtained and negative for obstructive uropathy.  He was given fluids and admitted to the telemetry unit for further management.  He was noted with relative hypotension, bradycardia in the  ED as well and there was concern this may have been contributing to his renal dysfunction and his antihypertensives, diuretics were held on admission.  With these medications held his creatinine has improved day over day, was 1.6 by the date of this documentation.  His home antihypertensives, nitrate have been resumed incrementally.  At discharge we will continue Imdur at decreased dose of 60 mg daily, metoprolol at decreased dose of 50 mg daily.  Will also decrease his home metformin and Januvia doses by half.  Resume Bumex at 1 mg twice daily.  Continue Entresto as previously written.  Given he is clinically improved voicing no new concerns with no further inpatient workup planned Case was discussed with attending physician and agree he is stable for discharge home on this date.  Will schedule 1 week follow-up with his PCP and recommend repeat BMP and BP check at that time.  Will schedule follow-up with his cardiologist in 2 weeks and also refer to the heart failure clinic for close follow-up.  This discharge and follow-up plan as well as medication changes were discussed with the patient and he expressed agreement and understanding.  Patient advised that he has a BP cuff and scales at home and will continue to monitor his blood pressures and daily weights.      Discharge Vitals/Physical Examination     Vital Signs:  Temp:  [97.7 °F (36.5 °C)-98.7 °F (37.1 °C)] 97.7 °F (36.5 °C)  Heart Rate:  [56-76] 64  Resp:  [16-18] 16  BP: (116-137)/(64-78) 116/64  Mean Arterial Pressure (Non-Invasive) for the past 24 hrs (Last 3 readings):   Noninvasive MAP (mmHg)   06/25/25 0319 97   06/24/25 2338 104   06/24/25 1917 89     SpO2 Percentage    06/25/25 0036 06/25/25 0319 06/25/25 0731   SpO2: 92% 91% 92%     SpO2:  [90 %-94 %] 92 %  on   ;   Device (Oxygen Therapy): room air    Body mass index is 32.16 kg/m².  Wt Readings from Last 3 Encounters:   06/25/25 98.8 kg (217 lb 14.4 oz)   06/11/25 98.7 kg (217 lb 9.6 oz)   06/09/25  "99.1 kg (218 lb 6.4 oz)         Physical Exam:  Physical Exam  Vitals and nursing note reviewed.   Constitutional:       General: He is not in acute distress.     Appearance: He is obese.   HENT:      Head: Normocephalic and atraumatic.   Eyes:      Extraocular Movements: Extraocular movements intact.   Cardiovascular:      Rate and Rhythm: Normal rate.   Pulmonary:      Effort: Pulmonary effort is normal. No respiratory distress.   Abdominal:      Palpations: Abdomen is soft.   Musculoskeletal:      Right lower leg: No edema.      Left lower leg: No edema.   Skin:     General: Skin is warm and dry.   Neurological:      Mental Status: He is alert. Mental status is at baseline.   Psychiatric:         Mood and Affect: Mood normal.         Behavior: Behavior normal.         Pertinent Laboratory/Radiology Results     Pertinent Laboratory Results:  Results from last 7 days   Lab Units 06/23/25  1438 06/23/25  1330   HSTROP T ng/L 24* 26*           Results from last 7 days   Lab Units 06/23/25  1330   CRP mg/dL 0.53*   WBC 10*3/mm3 8.83   HEMOGLOBIN g/dL 13.0   HEMATOCRIT % 39.2   MCV fL 94.5   MCHC g/dL 33.2   PLATELETS 10*3/mm3 272   INR  0.91     Results from last 7 days   Lab Units 06/25/25  0543 06/24/25  0532 06/23/25  1330   SODIUM mmol/L 136 138 135*   POTASSIUM mmol/L 4.7 4.4 4.7   MAGNESIUM mg/dL  --   --  2.4   CHLORIDE mmol/L 105 99 96*   CO2 mmol/L 21.5* 25.8 26.5   BUN mg/dL 35.7* 41.3* 44.4*   CREATININE mg/dL 1.66* 2.05* 2.45*   CALCIUM mg/dL 8.6 9.3 9.6   GLUCOSE mg/dL 171* 121* 140*   ALBUMIN g/dL  --   --  4.0   BILIRUBIN mg/dL  --   --  0.3   ALK PHOS U/L  --   --  68   AST (SGOT) U/L  --   --  13   ALT (SGPT) U/L  --   --  9   Estimated Creatinine Clearance: 43.9 mL/min (A) (by C-G formula based on SCr of 1.66 mg/dL (H)).  No results found for: \"AMMONIA\"    No results found for: \"HGBA1C\", \"POCGLU\"  Lab Results   Component Value Date    HGBA1C 9.4 (A) 06/11/2025     Lab Results   Component Value Date " "   TSH 2.090 06/23/2025       No results found for: \"BLOODCX\"  No results found for: \"URINECX\"  No results found for: \"WOUNDCX\"  No results found for: \"STOOLCX\"  No results found for: \"RESPCX\"  Pain Management Panel  More data exists         Latest Ref Rng & Units 11/4/2024 3/15/2021   Pain Management Panel   Creatinine, Urine mg/dL 19.4  -   Amphetamine, Urine Qual Negative - Negative    Barbiturates Screen, Urine Negative - Negative    Benzodiazepine Screen, Urine Negative - Negative    Buprenorphine, Screen, Urine Negative - Negative    Cocaine Screen, Urine Negative - Negative    Methadone Screen , Urine Negative - Negative    Methamphetamine, Ur Negative - Negative        Pertinent Radiology Results:  Imaging Results (All)       Procedure Component Value Units Date/Time    CT Abdomen Pelvis Without Contrast [[id number]] Collected: 06/23/25 1646     Updated: 06/23/25 1650    Narrative:      EXAM:    CT Abdomen and Pelvis Without Intravenous Contrast     EXAM DATE:    6/23/2025 4:08 PM     CLINICAL HISTORY:    IZA     TECHNIQUE:    Axial computed tomography images of the abdomen and pelvis without  intravenous contrast.  Sagittal and coronal reformatted images were  created and reviewed.  This CT exam was performed using one or more of  the following dose reduction techniques:  automated exposure control,  adjustment of the mA and/or kV according to patient size, and/or use of  iterative reconstruction technique.     COMPARISON:    No relevant prior studies available.     FINDINGS:    Lung bases:  Bibasilar scarring and granulomas.  No consolidation.    Heart:  Cardiomegaly and changes of previous CABG.      ABDOMEN:    Liver:  Unremarkable.    Gallbladder and bile ducts:  Cholelithiasis.  No ductal dilation.    Pancreas:  Unremarkable.  No ductal dilation.    Spleen:  Unremarkable.  No splenomegaly.    Adrenals:  Unremarkable.  No mass.    Kidneys and ureters:  Unremarkable.  No renal or ureteral stones. " No  hydronephrosis.    Stomach and bowel:  Moderate volume fecal retention in the colon  consistent with constipation.  No mucosal thickening.  No evidence of  bowel obstruction.      PELVIS:    Appendix:  Appendectomy changes are noted.    Bladder:  Distended urinary bladder without wall thickening.  No  stones.    Reproductive:  Unremarkable as visualized.      ABDOMEN and PELVIS:    Intraperitoneal space:  Unremarkable.  No significant fluid  collection.  No pneumoperitoneum identified.    Bones/joints:  Mild degenerative changes lumbar spine. No acute bony  findings identified.  No dislocation.    Soft tissues:  Unremarkable.    Vasculature:  Atherosclerosis aorta without evidence of aneurysm.    Lymph nodes:  Unremarkable.  No enlarged lymph nodes.       Impression:      1.  No renal or ureteral stones. No hydronephrosis.  2.  Cholelithiasis.  3.  Moderate volume fecal retention in the colon consistent with  constipation.  4.  No evidence of bowel obstruction.  5.  Distended urinary bladder without wall thickening.  6. Other incidental/nonacute findings above.     This report was finalized on 6/23/2025 4:48 PM by Dr. Catrachito Majano MD.       XR Chest 1 View [[id number]] Collected: 06/23/25 1343     Updated: 06/23/25 1346    Narrative:      EXAM:    XR Chest, 1 View     EXAM DATE:    6/23/2025 1:18 PM     CLINICAL HISTORY:    Chest Pain Protocol     TECHNIQUE:    Frontal view of the chest.     COMPARISON:    9/17/2021     FINDINGS:    Lungs and pleural spaces:  Unremarkable.  No consolidation.  No  pneumothorax.    Heart:  Mild cardiomegaly.  CABG.    Mediastinum:  Unremarkable.  Normal mediastinal contour.    Bones/joints:  Unremarkable.  No acute fracture.       Impression:        Stable chest. No acute cardiopulmonary findings.     This report was finalized on 6/23/2025 1:44 PM by Dr. Catrachito Majano MD.               Discharge Disposition/Discharge Medications/Discharge Appointments     Discharge Disposition:    Home or Self Care    Discharge Follow up:   Additional Instructions for the Follow-ups that You Need to Schedule       Discharge Follow-up with PCP   As directed       Currently Documented PCP:    Kaylee Cooper APRN    PCP Phone Number:    [phone]     Follow Up Details: 1 week hospital follow up        Discharge Follow-up with Specialty: Cardiology; 2 Weeks   As directed      Specialty: Cardiology   Follow Up: 2 Weeks   Follow Up Details: 2-3 week follow up with Dany San in [place]               Follow-up Information       Kaylee Cooper APRN .    Specialties: Nurse Practitioner, Family Medicine  Contact information:  [address] [place] [address]  [phone]               Kaylee Cooper APRN Follow up in 1 week(s).    Specialties: Nurse Practitioner, Family Medicine  Why: pt  has  an  apointment  with  kaylee cooper  for  july 7 at   10 :15   and  heartt  failure  for  june 27 at  1 :20  Contact information:  [address] [place] [address]  [phone]               Kaylee Cooper APRN .    Specialties: Nurse Practitioner, Family Medicine  Why: 1 week hospital follow up  Contact information:  [address] [place] [address]  [phone]                             Condition at Discharge:  Stable     Discharge Medications:     Your medication list        CHANGE how you take these medications        Instructions Last Dose Given Next Dose Due   bumetanide 2 MG tablet  Commonly known as: BUMEX  What changed: how much to take      Take 1/2 tablet by mouth 2 (Two) Times a Day.       isosorbide mononitrate 60 MG 24 hr tablet  Commonly known as: IMDUR  What changed:   medication strength  how much to take  when to take this      Take 1 tablet by mouth Daily for 30 days.       metFORMIN 1000 MG tablet  Commonly known as: GLUCOPHAGE  What changed: how much to take      Take 1/2 tablet by mouth 2 (Two) Times a Day With Meals.       metoprolol succinate XL 50 MG 24 hr tablet  Commonly known as:  TOPROL-XL  What changed:   medication strength  how much to take  when to take this      Take 1 tablet by mouth Daily for 30 days.       SITagliptin 100 MG tablet  Commonly known as: Januvia  What changed: how much to take      Take 1/2 tablet by mouth Daily.              CONTINUE taking these medications        Instructions Last Dose Given Next Dose Due   allopurinol 300 MG tablet  Commonly known as: ZYLOPRIM      Take 1 tablet by mouth Daily.       aspirin 81 MG EC tablet      Take 1 tablet by mouth Daily.       atorvastatin 40 MG tablet  Commonly known as: LIPITOR      Take 1 tablet by mouth Every Night.       bismuth subsalicylate 262 MG chewable tablet  Commonly known as: PEPTO BISMOL      Chew 1 tablet Daily As Needed for Diarrhea.       dapagliflozin Propanediol 10 MG tablet  Commonly known as: Farxiga      Take 1 tablet by mouth Daily.       doxazosin 2 MG tablet  Commonly known as: CARDURA      Take 1 tablet by mouth Daily.       Entresto  MG tablet  Generic drug: sacubitril-valsartan      Take 1 tablet by mouth 2 Times a Day.       gabapentin 400 MG capsule  Commonly known as: NEURONTIN      Take 1 capsule by mouth 2 (Two) Times a Day.       glipizide 10 MG tablet  Commonly known as: GLUCOTROL      Take 1 tablet by mouth 2 (Two) Times a Day Before Meals.                 Where to Get Your Medications        These medications were sent to [facility] [place] [id number]      Hours: Monday to Friday 7 AM to 6 PM Phone: [phone]   bumetanide 2 MG tablet  isosorbide mononitrate 60 MG 24 hr tablet  metFORMIN 1000 MG tablet  metoprolol succinate XL 50 MG 24 hr tablet  SITagliptin 100 MG tablet          Discharge Diet:    Diet Order   Procedures    Diet: Cardiac, Diabetic; Healthy Heart (2-3 Na+); Consistent Carbohydrate; Fluid Consistency: Thin (IDDSI 0)        Discharge Activity:  ad bola    The 10-year ASCVD risk score (Andi PAN, et al., 2019) is: 46.3%    Values used  to calculate the score:      Age: 75 years      Sex: Male      Is Non- : No      Diabetic: Yes      Tobacco smoker: No      Systolic Blood Pressure: 116 mmHg      Is BP treated: Yes      HDL Cholesterol: 28 mg/dL      Total Cholesterol: 138 mg/dL     Time spent on this discharge exceeded 30 minutes.

## 2025-06-25 NOTE — CASE MANAGEMENT/SOCIAL WORK
Discharge Planning Assessment   [facility]     Patient Name: William Villasenor  MRN: [id number]  Today's Date: 6/25/2025    Admit Date: 6/23/2025    Plan: SS received consult per [facility] for discharge planning.  SS spoke with pt and spouse at bedside.  Pt resides at home with spouse and plans to return home at discharge.  Pt currently does not utilize home health services.  Pt has home 02 at 2 liters nc (pt has no portable 02 tanks as pt refused tanks per [facility]), cpap and walker via [facility].  Pt's PCP is Kaylee Cooper.  Pt transports via private auto per family.  SS will follow.       Discharge Plan       Row Name 06/25/25 0915       Plan    Final Discharge Disposition Code 01 - home or self-care    Final Note Pt to be discharged home on this date.                  Selected Continued Care - Episodes Includes continued care and service providers with selected services from the active episodes listed below          Expected Discharge Date and Time       Expected Discharge Date Expected Discharge Time    Jun 25, 2025             KEVIN RojoW

## 2025-06-26 NOTE — OUTREACH NOTE
Prep Survey      Flowsheet Row Responses   [facility] patient discharged from? [facility]   Is LACE score < 7 ? No   Eligibility Readm Mgmt   Discharge diagnosis IZA (acute kidney injury)   Does the patient have one of the following disease processes/diagnoses(primary or secondary)? Other   Does the patient have Home health ordered? No   Is there a DME ordered? No   Prep survey completed? Yes            Sandie BLAIR - Registered Nurse

## 2025-06-27 ENCOUNTER — HOSPITAL ENCOUNTER (OUTPATIENT)
Dept: CARDIOLOGY | Facility: HOSPITAL | Age: 75
Discharge: HOME | End: 2025-06-27
Payer: MEDICARE

## 2025-06-27 VITALS
DIASTOLIC BLOOD PRESSURE: 73 MMHG | WEIGHT: 217.4 LBS | SYSTOLIC BLOOD PRESSURE: 118 MMHG | HEART RATE: 72 BPM | HEIGHT: 69 IN | BODY MASS INDEX: 32.2 KG/M2 | OXYGEN SATURATION: 96 %

## 2025-06-27 DIAGNOSIS — E11.65 TYPE 2 DIABETES MELLITUS WITH HYPERGLYCEMIA, WITHOUT LONG-TERM CURRENT USE OF INSULIN: ICD-10-CM

## 2025-06-27 DIAGNOSIS — I50.32 HEART FAILURE WITH IMPROVED EJECTION FRACTION (HFIMPEF): ICD-10-CM

## 2025-06-27 DIAGNOSIS — N18.30 STAGE 3 CHRONIC KIDNEY DISEASE, UNSPECIFIED WHETHER STAGE 3A OR 3B CKD: ICD-10-CM

## 2025-06-27 DIAGNOSIS — E66.811 CLASS 1 OBESITY: ICD-10-CM

## 2025-06-27 DIAGNOSIS — Z95.1 S/P CABG X 4: ICD-10-CM

## 2025-06-27 DIAGNOSIS — E11.21 TYPE 2 DIABETES MELLITUS WITH DIABETIC NEPHROPATHY, UNSPECIFIED WHETHER LONG TERM INSULIN USE: Chronic | ICD-10-CM

## 2025-06-27 DIAGNOSIS — I50.42 CHRONIC COMBINED SYSTOLIC AND DIASTOLIC HEART FAILURE: Primary | ICD-10-CM

## 2025-06-27 LAB
ABSOLUTE LUNG FLUID CONTENT: 34 % (ref 20–35)
ANION GAP SERPL CALCULATED.3IONS-SCNC: 12.3 MMOL/L (ref 5–15)
BUN SERPL-MCNC: 26.9 MG/DL (ref 8–23)
BUN/CREAT SERPL: 17.6 (ref 7–25)
CALCIUM SPEC-SCNC: 9.1 MG/DL (ref 8.6–10.5)
CHLORIDE SERPL-SCNC: 104 MMOL/L (ref 98–107)
CO2 SERPL-SCNC: 21.7 MMOL/L (ref 22–29)
CREAT SERPL-MCNC: 1.53 MG/DL (ref 0.76–1.27)
EGFRCR SERPLBLD CKD-EPI 2021: 46.8 ML/MIN/1.73
FERRITIN SERPL-MCNC: 143 NG/ML (ref 30–400)
GLUCOSE SERPL-MCNC: 220 MG/DL (ref 65–99)
IRON 24H UR-MRATE: 63 MCG/DL (ref 59–158)
IRON SATN MFR SERPL: 24 % (ref 20–50)
MAGNESIUM SERPL-MCNC: 2.2 MG/DL (ref 1.6–2.4)
NT-PROBNP SERPL-MCNC: 239 PG/ML (ref 0–1800)
POTASSIUM SERPL-SCNC: 4.3 MMOL/L (ref 3.5–5.2)
SODIUM SERPL-SCNC: 138 MMOL/L (ref 136–145)
TIBC SERPL-MCNC: 265 MCG/DL (ref 298–536)
TRANSFERRIN SERPL-MCNC: 178 MG/DL (ref 200–360)

## 2025-06-27 PROCEDURE — 83735 ASSAY OF MAGNESIUM: CPT | Performed by: PHYSICIAN ASSISTANT

## 2025-06-27 PROCEDURE — 83540 ASSAY OF IRON: CPT | Performed by: PHYSICIAN ASSISTANT

## 2025-06-27 PROCEDURE — 36415 COLL VENOUS BLD VENIPUNCTURE: CPT | Performed by: PHYSICIAN ASSISTANT

## 2025-06-27 PROCEDURE — 94726 PLETHYSMOGRAPHY LUNG VOLUMES: CPT | Performed by: PHYSICIAN ASSISTANT

## 2025-06-27 PROCEDURE — 82728 ASSAY OF FERRITIN: CPT | Performed by: PHYSICIAN ASSISTANT

## 2025-06-27 PROCEDURE — 80048 BASIC METABOLIC PNL TOTAL CA: CPT | Performed by: PHYSICIAN ASSISTANT

## 2025-06-27 PROCEDURE — 83880 ASSAY OF NATRIURETIC PEPTIDE: CPT | Performed by: PHYSICIAN ASSISTANT

## 2025-06-27 PROCEDURE — 84466 ASSAY OF TRANSFERRIN: CPT | Performed by: PHYSICIAN ASSISTANT

## 2025-06-27 RX ORDER — DAPAGLIFLOZIN 10 MG/1
10 TABLET, FILM COATED ORAL DAILY
Qty: 90 TABLET | Refills: 3 | Status: SHIPPED | OUTPATIENT
Start: 2025-06-27

## 2025-06-27 RX ORDER — BUMETANIDE 1 MG/1
1 TABLET ORAL 2 TIMES DAILY
Qty: 180 TABLET | Refills: 1 | Status: SHIPPED | OUTPATIENT
Start: 2025-06-27

## 2025-06-27 RX ORDER — SACUBITRIL AND VALSARTAN 97; 103 MG/1; MG/1
1 TABLET, FILM COATED ORAL 2 TIMES DAILY
Qty: 180 TABLET | Refills: 3 | Status: SHIPPED | OUTPATIENT
Start: 2025-06-27

## 2025-06-27 NOTE — PATIENT INSTRUCTIONS
Heart Failure Action Plan  A heart failure action plan helps you know what to do when you have symptoms of heart failure. Your action plan is a color-coded plan that lists the symptoms to watch for and indicates what actions to take.  If you have symptoms in the green zone, you're doing well.  If you have symptoms in the yellow zone, you're having problems.  If you have symptoms in the red zone, you need medical care right away.  Follow the plan that was created by you and your health care provider. Review your plan each time you visit your provider.  Green zone  These signs mean you're doing well and can continue what you're doing:  You don't have new or worsening shortness of breath.  You have very little swelling or no new swelling.  Your weight is stable (no gain or loss).  You have a normal activity level.  You don't have chest pain or any other new symptoms.  Yellow zone  These signs and symptoms mean your condition may be getting worse and you should make some changes:  You have trouble breathing when you're active.  You have swelling in your feet or legs or have discomfort in your belly.  You gain 2-3 lb (0.9-1.4 kg) in 24 hours, or 5 lb (2.3 kg) in a week. This amount may be more or less depending on your condition.  You get tired easily.  You have trouble sleeping.  You have a dry cough.  If you have any of these symptoms:  Contact your provider within the next day.  Your provider may adjust your medicines.  Red zone  These signs and symptoms mean you should get medical help right away:  You have trouble breathing when resting or cannot lie flat and you need to raise your head to help you breathe.  You have a dry cough that's getting worse.  You have swelling or pain in your feet or legs or discomfort in your belly that's getting worse.  You suddenly gain more than 2-3 lb (0.9-1.4 kg) in 24 hours, or more than 5 lb (2.3 kg) in a week. This amount may be more or less depending on your condition.  You have  trouble staying awake or you feel confused.  You don't have an appetite.  You have worsening sadness or depression.  These symptoms may be an emergency. Call 911 right away.  Do not wait to see if the symptoms will go away.  Do not drive yourself to the hospital.  Follow these instructions at home:  Take medicines only as told.  Eat a heart-healthy diet. Work with a dietitian to create an eating plan that's best for you.  Weigh yourself each day.   Call your provider if you gain more than 3 lb in 24 hours, or more than 5 lb in a week.  Health care provider name: [facility] care provider phone number: [phone]

## 2025-06-27 NOTE — PROGRESS NOTES
Heart Failure Clinic  Pharmacist Note     William Villasenor is a 75 y.o. male seen in the Heart Failure Clinic for HFimpEF with an EF of 66-70% as of 7/20/24.      William Villasenor reports a great understanding of medications and has them specifically organized in his box to help with adherence.     Patient reports some swelling and SOB.  His bumetanide dose was decreased to 1mg twice a day during his hospitalization.  His metoprolol, metformin, and Januvia were also decreased. He reports that his home BP are good.     Medication Use:   Hx of med intolerances: None related to HF  Retail Rx Management: Uses our pharmacy for Farxiga and Entresto -ship    Past Medical History:   Diagnosis Date    Arthritis     Back pain     BPH (benign prostatic hyperplasia)     Chronic diastolic (congestive) heart failure     Coronary artery disease     Diabetes mellitus     Diabetic peripheral neuropathy     Elevated cholesterol     Erectile dysfunction     Former smoker     GERD (gastroesophageal reflux disease)     Gout     Hemorrhoids     High cholesterol     High triglycerides     Hypertension     Neuropathy     Obesity     Sleep apnea     wears CPAP     ALLERGIES: Lisinopril and Other  Current Outpatient Medications   Medication Sig Dispense Refill    allopurinol (ZYLOPRIM) 300 MG tablet Take 1 tablet by mouth Daily.      aspirin 81 MG EC tablet Take 1 tablet by mouth Daily.      atorvastatin (LIPITOR) 40 MG tablet Take 1 tablet by mouth Every Night. 90 tablet 3    bumetanide (BUMEX) 2 MG tablet Take 1/2 tablet by mouth 2 (Two) Times a Day. 180 tablet 1    dapagliflozin Propanediol (Farxiga) 10 MG tablet Take 1 tablet by mouth Daily. 30 tablet 5    doxazosin (CARDURA) 2 MG tablet Take 1 tablet by mouth Daily. 90 tablet 3    gabapentin (NEURONTIN) 400 MG capsule Take 1 capsule by mouth 2 (Two) Times a Day.      glipizide (GLUCOTROL) 10 MG tablet Take 1 tablet by mouth 2 (Two) Times a Day Before Meals. 180 tablet 1    isosorbide mononitrate  "(IMDUR) 60 MG 24 hr tablet Take 1 tablet by mouth Daily for 30 days. 30 tablet 0    metFORMIN (GLUCOPHAGE) 1000 MG tablet Take 1/2 tablet by mouth 2 (Two) Times a Day With Meals. 180 tablet 1    metoprolol succinate XL (TOPROL-XL) 50 MG 24 hr tablet Take 1 tablet by mouth Daily for 30 days. 30 tablet 0    SITagliptin (Januvia) 100 MG tablet Take 1/2 tablet by mouth Daily. 90 tablet 1    sacubitril-valsartan (Entresto)  MG tablet Take 1 tablet by mouth 2 Times a Day. 180 tablet 3     No current facility-administered medications for this encounter.     Vaccination History:   Pneumonia: declines  Annual Influenza: declines  COVID 19: declines    Objective  Vitals:    06/27/25 1325   BP: 118/73   BP Location: Left arm   Patient Position: Sitting   Cuff Size: Adult   Pulse: 72   SpO2: 96%   Weight: 98.6 kg (217 lb 6.4 oz)   Height: 175.3 cm (69\")       Wt Readings from Last 3 Encounters:   06/27/25 98.6 kg (217 lb 6.4 oz)   06/25/25 98.8 kg (217 lb 14.4 oz)   06/11/25 98.7 kg (217 lb 9.6 oz)         06/27/25  1325   Weight: 98.6 kg (217 lb 6.4 oz)       Lab Results   Component Value Date    GLUCOSE 171 (H) 06/25/2025    BUN 35.7 (H) 06/25/2025    CREATININE 1.66 (H) 06/25/2025    EGFRIFNONA 58 (L) 11/24/2021    BCR 21.5 06/25/2025    K 4.7 06/25/2025    CO2 21.5 (L) 06/25/2025    CALCIUM 8.6 06/25/2025    ALBUMIN 4.0 06/23/2025    AST 13 06/23/2025    ALT 9 06/23/2025     Lab Results   Component Value Date    WBC 8.83 06/23/2025    HGB 13.0 06/23/2025    HCT 39.2 06/23/2025    MCV 94.5 06/23/2025     06/23/2025     Lab Results   Component Value Date    CKMB 44 (L) 10/07/2022    TROPONINI <0.02 10/07/2022    TROPONINT 24 (H) 06/23/2025     Lab Results   Component Value Date    PROBNP 264.2 02/28/2025     Results for orders placed during the hospital encounter of 07/19/24    Adult Transthoracic Echo Complete W/ Cont if Necessary Per Protocol    Interpretation Summary    Left ventricular systolic function is " normal. Left ventricular ejection fraction appears to be 66 - 70%.    Left ventricular wall thickness is consistent with mild to moderate concentric hypertrophy.    Left ventricular diastolic function is consistent with (grade I) impaired relaxation.         GDMT    Drug Class   Drug   Dose Last Dose Adjustment Additional Titration   Notes   ACEi/ARB/ARNI Entresto  97/103 bid 5/12/22 At goal    Beta Blocker Toprol XL 50mg 4/19/22     MRA Pt non-adherent?    SGLT2i Farxiga 10mg >3m At goal     Imdur 60mg      Bumex 1mg BID      Drug Therapy Problems    Patient Inconvenience with pill splitting.       Recommendations:      Requested that endo send Januvia 50mg so he does not have to split.  Teri sending in 1mg tablets of bumetanide.       Patient was educated on heart failure medications and the importance of medication adherence. All questions were addressed and patient expressed understanding.     Thank you for allowing me to participate in the care of your patient,    Imani Jackson, PharmD  06/27/25  13:47 EDT

## 2025-06-27 NOTE — PROGRESS NOTES
Heart Failure Clinic    Date: 06/27/25     Vitals:    06/27/25 1325   BP: 118/73   Pulse: 72   SpO2: 96%    Weight 217.4    Method of arrival: Ambulatory with cane    Weighing self daily: Yes    Monitoring Heart Failure Zones: Yes    Today's HF Zone: Yellow     Taking medications as prescribed: Has questions regarding medications    Edema Yes    Shortness of Air: Yes    Number of pillows used at night:<2    Educational Materials given:  avs                                                                         ReDS Value: 34  25-35 Optimal Value Status      Beni Moran RN 06/27/25 13:29 EDT

## 2025-06-27 NOTE — PROGRESS NOTES
[facility]  DAISY Garcia Theresa, APRN  [address] [id number]    Thank you for asking me to see William Villasenor for congestive heart failure.    HPI:     This is a 75 y.o. male with known past medical history of:    Chronic combined systolic & diastolic HF with LVEF 40-45%  TTE from 04/2021 with EF 40 +/- 5%  TTE from 04/21/2021 with EF 35 +/-5%  TTE from December 2022 reviewed with ejection fraction improved to 45 to 50% and evidence of grade 1A diastolic dysfunction with mild left atrial enlargement  TTE from 06/2023 with 56-60%.   TTE from July 2024 with EF improved to 66-70% and presence of known grade 1 diastolic dysfunction.   ASCVD s/p CABG 4v  Coronary artery disease status post coronary artery bypass grafting [id number] artery bypass grafting 3/2021: LIMA to LAD, SVG to diagonal, SVG to OM1, SVG to OM 2.  Residual  of RCA  echocardiogram 4/2021: EF 35± percent, LV dyssynchrony  echocardiogram 6/2021: EF 40±5% mild to moderate concentric left ventricular hypertrophy, mild biatrial enlargement no significant valvular disease   left heart catheterization 8/21: Left main ostial distal tapering, circumflex diffusely disease, OM1 occluded, LAD occluded, RCA subtotally occluded, LIMA widely patent, SVG to posterior lateral patent, SVG to OM patent, EF 45±5%, LVEDP 28-30   Nuclear stress test from November 2022 with no scintigriphic evidence of ischemia but with elevated transient dilation ratio of unknown significance.    Nuclear stress test from 07/24/2023 with no known evidence of pharmacologically induced transient ischemic dilation.   DM type 2  Peripheral neuropathy  CKD stage II-III  Gout   BPH   GERD  Obesity    William Villasenor presents for today for HF clinic follow-up.  The patient is typically seen by Kaylee Cooper APRN.     Last known hospitalization and/or ED visit: Last hospitalized from March 29, 2021-April 9, 2021 with acute on chronic  HFpEF and sepsis 2/2 RLE cellulitis from vein harvesting procedures.    Hospitalized from 06/23/25 through 06/25/26 with Anahi on CKD IIIb, prerenal with generalized weakness.  He was admitted with  creatinine elevation after recent increase in his diuretics.  Admission creatinine was 2.45 with improvement later to 1.6.  He was noted to be hypotensive and bradycardic in the ED.   His home Metformin and Januvia were decreased in addition to Imdur & metoprolol doses being decreased.   His home Entresto was continued and Bumex was resumed at lower dose of 1mg BID.      06/27/25 visit data/details regarding:   Dyspnea: Dyspnea on exertion, denies orthopnea  Lower extremity swelling: Denies current swelling of extremities  Abdominal swelling: No significant protuberant  Home weight:  Stable  Home BP: Well controlled BP since leaving hospital  Home heart rate: has been in the 60s-70  Daily activities of living:  Performs independently  Pillows/lying flat: 1 pillow  Zone: Yellow  Mr. Villasenor is chest pain free. He reports he has had dyspnea on exertion since leaving the hospital.  He reports he has been intermittently wearing O2 via nasal cannula.  He reports he has been using this intermittently for the past 2-3 years.    He reports he can not tell if he is swollen in his ankles as he typically does not look at them.  He does not appear edematous to examination.    He reports he has been more fatigued recently and had some constipation as well.       Specialists:   Cardiology: Dr. Kennedy     Review of Systems - Review of Systems   Constitutional: Positive for malaise/fatigue. Negative for decreased appetite and diaphoresis.   HENT:  Negative for congestion, ear discharge and ear pain.    Eyes:  Negative for blurred vision, discharge and double vision.   Cardiovascular:  Positive for dyspnea on exertion. Negative for chest pain, claudication, cyanosis and leg swelling.   Respiratory:  Negative for cough, hemoptysis and  shortness of breath.    Endocrine: Negative for cold intolerance, heat intolerance and polydipsia.   Hematologic/Lymphatic: Negative for adenopathy and bleeding problem.   Skin:  Negative for color change and nail changes.   Musculoskeletal:  Negative for arthritis and back pain.   Gastrointestinal:  Positive for constipation. Negative for bloating, abdominal pain and anorexia.   Genitourinary:  Negative for bladder incontinence and decreased libido.   Neurological:  Negative for aphonia and brief paralysis.   Psychiatric/Behavioral:  Negative for altered mental status, depression and hallucinations.          All other systems were reviewed and were negative.    Patient Active Problem List   Diagnosis    Idiopathic chronic gout of multiple sites without tophus    Type II diabetes mellitus    Essential hypertension    Benign prostatic hyperplasia without lower urinary tract symptoms    Chest pain    Obesity (BMI 30-39.9)    GERD (gastroesophageal reflux disease)    Diabetic neuropathy    Former smoker    History of gout    Coronary artery disease involving native coronary artery of native heart with angina pectoris    CAD (coronary artery disease)    Acute on chronic congestive heart failure    Hyperlipidemia    CKD (chronic kidney disease) stage 2, GFR 60-89 ml/min    Bradycardia    Diabetic polyneuropathy    Heartburn    Low back pain    S/P CABG x 4    Acute systolic heart failure    Ventricular trigeminy    Shortness of breath    Chest pressure    Chronic systolic CHF (congestive heart failure)    Screening for colon cancer    Status post colonoscopy       family history includes Alcohol abuse in his maternal uncle; Depression in his mother; Diabetes in his maternal grandmother and paternal grandmother; Heart disease in his maternal grandmother and mother; Hyperlipidemia in his daughter; Hypertension in his maternal grandmother and mother; No Known Problems in his daughter, daughter, half-brother, half-brother,  and half-sister.     reports that he quit smoking about 51 years ago. His smoking use included cigarettes. He started smoking about 56 years ago. He has a 5 pack-year smoking history. He has been exposed to tobacco smoke. He has never used smokeless tobacco. He reports that he does not drink alcohol and does not use drugs.    Allergies   Allergen Reactions    Lisinopril Cough    Other Other (See Comments)     Causes a lot of gas Rybelsus         Current Outpatient Medications:     allopurinol (ZYLOPRIM) 300 MG tablet, Take 1 tablet by mouth Daily., Disp: , Rfl:     aspirin 81 MG EC tablet, Take 1 tablet by mouth Daily., Disp: , Rfl:     atorvastatin (LIPITOR) 40 MG tablet, Take 1 tablet by mouth Every Night., Disp: 90 tablet, Rfl: 3    dapagliflozin Propanediol (Farxiga) 10 MG tablet, Take 1 tablet by mouth Daily., Disp: 90 tablet, Rfl: 3    doxazosin (CARDURA) 2 MG tablet, Take 1 tablet by mouth Daily., Disp: 90 tablet, Rfl: 3    gabapentin (NEURONTIN) 400 MG capsule, Take 1 capsule by mouth 2 (Two) Times a Day., Disp: , Rfl:     glipizide (GLUCOTROL) 10 MG tablet, Take 1 tablet by mouth 2 (Two) Times a Day Before Meals., Disp: 180 tablet, Rfl: 1    isosorbide mononitrate (IMDUR) 60 MG 24 hr tablet, Take 1 tablet by mouth Daily for 30 days., Disp: 30 tablet, Rfl: 0    metFORMIN (GLUCOPHAGE) 1000 MG tablet, Take 1/2 tablet by mouth 2 (Two) Times a Day With Meals., Disp: 180 tablet, Rfl: 1    metoprolol succinate XL (TOPROL-XL) 50 MG 24 hr tablet, Take 1 tablet by mouth Daily for 30 days., Disp: 30 tablet, Rfl: 0    sacubitril-valsartan (Entresto)  MG tablet, Take 1 tablet by mouth 2 Times a Day., Disp: 180 tablet, Rfl: 3    SITagliptin (Januvia) 100 MG tablet, Take 1/2 tablet by mouth Daily., Disp: 90 tablet, Rfl: 1    bumetanide (BUMEX) 1 MG tablet, Take 1 tablet by mouth 2 (Two) Times a Day., Disp: 180 tablet, Rfl: 1      Physical Exam:  I have reviewed the patient's current vital signs as documented in the  patient's EMR.   Vitals:    06/27/25 1325   BP: 118/73   Pulse: 72   SpO2: 96%         Body mass index is 32.1 kg/m².       06/27/25  1325   Weight: 98.6 kg (217 lb 6.4 oz)          Physical Exam  Vitals and nursing note reviewed.   Constitutional:       General: He is awake.   HENT:      Head: Normocephalic and atraumatic.   Eyes:      General: Lids are normal.      Conjunctiva/sclera:      Right eye: Right conjunctiva is not injected.      Left eye: Left conjunctiva is not injected.   Cardiovascular:      Rate and Rhythm: Normal rate and regular rhythm.      Heart sounds: S1 normal and S2 normal.   Pulmonary:      Effort: No tachypnea or bradypnea.      Breath sounds: No stridor or transmitted upper airway sounds. No wheezing, rhonchi or rales.   Abdominal:      General: Bowel sounds are normal.      Palpations: Abdomen is soft.      Tenderness: There is no abdominal tenderness.   Musculoskeletal:      Right lower leg: No swelling. No edema.      Left lower leg: No swelling. No edema.      Right foot: No swelling.      Left foot: No swelling.   Skin:     General: Skin is warm and moist.   Neurological:      Mental Status: He is alert and oriented to person, place, and time.   Psychiatric:         Attention and Perception: Attention normal.         Mood and Affect: Mood normal.         Behavior: Behavior is cooperative.           JVP: Volume/Pulsation: Normal.        DATA REVIEWED:     EKG. I personally reviewed and interpreted the EKG.      EKG: normal EKG, normal sinus rhythm, unchanged from previous tracings.         ---------------------------------------------------  TTE/SAVANNAH:  Results for orders placed during the hospital encounter of 07/19/24    Adult Transthoracic Echo Complete W/ Cont if Necessary Per Protocol    Interpretation Summary    Left ventricular systolic function is normal. Left ventricular ejection fraction appears to be 66 - 70%.    Left ventricular wall thickness is consistent with mild to  moderate concentric hypertrophy.    Left ventricular diastolic function is consistent with (grade I) impaired relaxation.        -----------------------------------------------------  CXR/Imaging:   Imaging Results (Most Recent)       None            I personally reviewed and interpreted the CXR.      -----------------------------------------------------  CT:   CT Abdomen Pelvis Without Contrast  Result Date: 6/23/2025  1.  No renal or ureteral stones. No hydronephrosis. 2.  Cholelithiasis. 3.  Moderate volume fecal retention in the colon consistent with constipation. 4.  No evidence of bowel obstruction. 5.  Distended urinary bladder without wall thickening. 6. Other incidental/nonacute findings above.  This report was finalized on 6/23/2025 4:48 PM by Dr. Catrachito Majano MD.      XR Chest 1 View  Result Date: 6/23/2025    Stable chest. No acute cardiopulmonary findings.  This report was finalized on 6/23/2025 1:44 PM by Dr. Catrachito Majano MD.      I personally reviewed the images of the CT scan.  My personal interpretation is below.      ----------------------------------------------------    PFTs:      --------------------------------------------------------------------------------------------------    Laboratory evaluations:    Lab Results   Component Value Date    GLUCOSE 220 (H) 06/27/2025    BUN 26.9 (H) 06/27/2025    CREATININE 1.53 (H) 06/27/2025    EGFRIFNONA 58 (L) 11/24/2021    BCR 17.6 06/27/2025    K 4.3 06/27/2025    CO2 21.7 (L) 06/27/2025    CALCIUM 9.1 06/27/2025    ALBUMIN 4.0 06/23/2025    AST 13 06/23/2025    ALT 9 06/23/2025     Lab Results   Component Value Date    WBC 8.83 06/23/2025    HGB 13.0 06/23/2025    HCT 39.2 06/23/2025    MCV 94.5 06/23/2025     06/23/2025     Lab Results   Component Value Date    CHOL 138 11/04/2024    TRIG 231 (H) 11/04/2024    HDL 28 (L) 11/04/2024    LDL 72 11/04/2024     Lab Results   Component Value Date    TSH 2.090 06/23/2025     Lab Results    Component Value Date    HGBA1C 9.4 (A) 06/11/2025     Lab Results   Component Value Date    ALT 9 06/23/2025     Lab Results   Component Value Date    HGBA1C 9.4 (A) 06/11/2025    HGBA1C 8.8 (A) 12/09/2024    HGBA1C 9.50 (H) 01/06/2023     Lab Results   Component Value Date    MICROALBUR 1.5 11/04/2024    CREATININE 1.53 (H) 06/27/2025     Lab Results   Component Value Date    IRON 63 06/27/2025    TIBC 265 (L) 06/27/2025    FERRITIN 143.00 06/27/2025     Lab Results   Component Value Date    INR 0.91 06/23/2025    INR 0.88 10/07/2022    INR 0.97 03/29/2021    PROTIME 12.8 06/23/2025    PROTIME 10.2 (L) 10/07/2022    PROTIME 12.7 03/29/2021        Lab Results   Component Value Date    ABSOLUTELUNG 34 06/27/2025    ABSOLUTELUNG 40 (A) 02/28/2025    ABSOLUTELUNG 38 (A) 02/23/2024           1. Chronic combined systolic and diastolic heart failure    2. Heart failure with improved ejection fraction (HFimpEF)    3. Stage 3 chronic kidney disease, unspecified whether stage 3a or 3b CKD    4. S/P CABG x 4    5. Class 1 obesity    6. Type 2 diabetes mellitus with hyperglycemia, without long-term current use of insulin    7. Type 2 diabetes mellitus with diabetic nephropathy, unspecified whether long term insulin use          ORDERS PLACED TODAY:  Orders Placed This Encounter   Procedures    ReDs Vest    Basic Metabolic Panel    Magnesium    proBNP    Iron Profile w/o Ferritin    Ferritin    Basic Metabolic Panel    Magnesium    proBNP    Iron Profile w/o Ferritin    Ferritin        Diagnoses and all orders for this visit:    1. Chronic combined systolic and diastolic heart failure (Primary)  -     Basic Metabolic Panel; Future  -     Magnesium; Future  -     proBNP; Future  -     Iron Profile w/o Ferritin; Future  -     Ferritin; Future  -     Basic Metabolic Panel; Standing  -     Basic Metabolic Panel  -     Magnesium; Standing  -     Magnesium  -     proBNP; Standing  -     proBNP  -     Iron Profile w/o Ferritin;  Standing  -     Iron Profile w/o Ferritin  -     Ferritin; Standing  -     Ferritin  -     ReDs Vest    2. Heart failure with improved ejection fraction (HFimpEF)    3. Stage 3 chronic kidney disease, unspecified whether stage 3a or 3b CKD    4. S/P CABG x 4    5. Class 1 obesity    6. Type 2 diabetes mellitus with hyperglycemia, without long-term current use of insulin    7. Type 2 diabetes mellitus with diabetic nephropathy, unspecified whether long term insulin use  -     dapagliflozin Propanediol (Farxiga) 10 MG tablet; Take 1 tablet by mouth Daily.  Dispense: 90 tablet; Refill: 3    Other orders  -     bumetanide (BUMEX) 1 MG tablet; Take 1 tablet by mouth 2 (Two) Times a Day.  Dispense: 180 tablet; Refill: 1  -     sacubitril-valsartan (Entresto)  MG tablet; Take 1 tablet by mouth 2 Times a Day.  Dispense: 180 tablet; Refill: 3             MEDS ORDERED TODAY:    New Medications Ordered This Visit   Medications    bumetanide (BUMEX) 1 MG tablet     Sig: Take 1 tablet by mouth 2 (Two) Times a Day.     Dispense:  180 tablet     Refill:  1    sacubitril-valsartan (Entresto)  MG tablet     Sig: Take 1 tablet by mouth 2 Times a Day.     Dispense:  180 tablet     Refill:  3    dapagliflozin Propanediol (Farxiga) 10 MG tablet     Sig: Take 1 tablet by mouth Daily.     Dispense:  90 tablet     Refill:  3        ---------------------------------------------------------------------------------------------------------------------------          ASSESSMENT/PLAN:      Diagnosis Plan   1. Chronic combined systolic and diastolic heart failure  Basic Metabolic Panel    Magnesium    proBNP    Iron Profile w/o Ferritin    Ferritin    Basic Metabolic Panel    Basic Metabolic Panel    Magnesium    Magnesium    proBNP    proBNP    Iron Profile w/o Ferritin    Iron Profile w/o Ferritin    Ferritin    Ferritin    ReDs Vest      2. Heart failure with improved ejection fraction (HFimpEF)        3. Stage 3 chronic kidney  disease, unspecified whether stage 3a or 3b CKD        4. S/P CABG x 4        5. Class 1 obesity        6. Type 2 diabetes mellitus with hyperglycemia, without long-term current use of insulin        7. Type 2 diabetes mellitus with diabetic nephropathy, unspecified whether long term insulin use  dapagliflozin Propanediol (Farxiga) 10 MG tablet          not acutely decompensated chronic diastolic heart failure. CHF. Etiology: Ischemic/CAD (Hx of AMI, CAD, or Ischemic Cardiomyopathy). LVEF now recovered.      NYHA stage Stage C: Structural heart disease is present AND symptoms have occurredFC-Class III: Marked limitation of physical activity. Comfortable at rest. Less than ordinary activity causes fatigue, palpitation, or dyspnea.     Today, Patient appears euvolemic.and with  Moderate perfusion. The patient's hemodynamics are currently acceptable. HR is: normal and is at goal. BP/MAP was reviewed and there is room for medication up-titration.  Clinical trajectory was assessed and hasimproved.     Patient is euvolemic with Reds & proBNP WNL.  Continue current regimen from HF standpoint.      CHF GOAL DIRECTED MEDICAL THERAPY FOR PATIENT ADDRESSED/ADJUSTED:     GDMT    Drug Class   Drug   Dose Last Dose Adjustment Additional Titration   Notes   ACEi/ARB/ARNI Entresto 97-103mg BID      Beta Blocker  Metoprolol Succinate   50mg      MRA Did not tolerate (diarrhea per his account)      SGLT2i Farxiga  10 mg  N/A      -CHF Specific BB:    Continue Toprol XL 50mg qd.      -ACE/ARB/ARNi:   Current dose:  Entresto 97-103mg BID    -MRA:   Patient did not tolerate MRA on prior try    -SGLT2 inhibitor therapy:   Continue Farxiga 10mg qd. He did have prior yeast infection.     -Diuretic regimen:   ReDS Vest reading for 06/27/25 is 34; ReDs Vest reading reviewed with patient.    Continue Bumex 1mg BID.    BMP, Mag, & ProBNP obtained & reviewed with patient.  ProBNP is stable.      -Fluid restriction/Sodium restriction:    Requested 2000 ml restriction  Patient has been asked to weigh daily and was provided with a printed diuretic strategy.  1,500 mg Na restriction was discussed.    -Acute vs. Chronic underlying conditions other than HF addressed during visit:     Type 2 DM, NID:   Continue f/u with Endocrinology with recent decrease in Januvia & Metformin.     CKD 3, a vs b likely 2/2 DM nephropathy:   Creatinine improved on labs.    Continue f/u with PCP.      ASCVD s/p 4v CABG:   Continue statin & ASA.   Continue beta blocker.   Continue f/u with Interventional Cards.          BMI is >= 30 and <35. (Class 1 Obesity). The following options were offered after discussion;: nutrition counseling/recommendations                    This document has been electronically signed by Teri Pedraza PA-C  June 27, 2025 14:33 EDT      Dictated Utilizing Dragon Dictation: Part of this note may be an electronic transcription/translation of spoken language to printed text using the Dragon Dictation System.    Follow-up appointment and medication changes provided in hand delivered After Visit Summary as well as reviewed in the room.      Some documentation in this note has been copied forward from a previous note, as the information is still current and accurate.  Text has been changed to reflect changes.

## 2025-06-30 ENCOUNTER — READMISSION MANAGEMENT (OUTPATIENT)
Dept: CALL CENTER | Facility: HOSPITAL | Age: 75
End: 2025-06-30
Payer: MEDICARE

## 2025-06-30 NOTE — OUTREACH NOTE
Medical Week 1 Survey      Flowsheet Row Responses   [facility] patient discharged from? [facility]   Does the patient have one of the following disease processes/diagnoses(primary or secondary)? Other   Week 1 attempt successful? Yes   Call start time 1940   Call end time 1941   Discharge diagnosis IZA (acute kidney injury)   Person spoke with today (if not patient) and relationship Patient   Meds reviewed with patient/caregiver? Yes   Does the patient have all medications ordered at discharge? Yes   Prescription comments CHANGE how you take:  bumetanide (BUMEX) -- how much to take  isosorbide mononitrate (IMDUR) -- medication  strength, how much to take, when to take this  metFORMIN (GLUCOPHAGE) -- how much to take  metoprolol succinate XL (TOPROL-XL) -- medication  strength, how much to take, when to take this  SITagliptin (Januvia) -- how much to take   Is the patient taking all medications as directed (includes completed medication regime)? Yes   Comments regarding appointments Seen Teri in HF clinic   Does the patient have a primary care provider?  Yes   Comments regarding PCP Sees pcp on 07/07   Has home health visited the patient within 72 hours of discharge? N/A   Psychosocial issues? No   Did the patient receive a copy of their discharge instructions? Yes   Nursing interventions Reviewed instructions with patient   What is the patient's perception of their health status since discharge? Improving   Is the patient/caregiver able to teach back signs and symptoms related to disease process for when to call PCP? Yes   Is the patient/caregiver able to teach back signs and symptoms related to disease process for when to call 911? Yes   Is the patient/caregiver able to teach back the hierarchy of who to call/visit for symptoms/problems? PCP, Specialist, Home health nurse, Urgent Care, ED, 911 Yes   Week 1 call completed? Yes   Graduated Yes   Would this patient benefit from a Referral to [facility] Social Work? No    Is the patient interested in additional calls from an ambulatory ? No   Wrap up additional comments Patient reports doing well no concerns or questions noted.   Call end time 1941            Meena KOWALSKI - Registered Nurse

## 2025-07-09 ENCOUNTER — DOCUMENTATION (OUTPATIENT)
Dept: CARDIAC REHAB | Facility: HOSPITAL | Age: 75
End: 2025-07-09
Payer: MEDICARE

## 2025-07-09 NOTE — PROGRESS NOTES
Thank you for your referral to [facility] cardiac Rehab:       Cardiac Rehab referral received on patient. After reviewing patient information there is no qualifying diagnosis noted at this time .Qualifications for Cardiac Rehab include Coronary Stenting, AMI (NSTEMI Type 1, STEMI), Stable Angina, CABG, Heart Valve Repair/Replacement, Heart Transplant or Stable Chronic Heart Failure (with these specific qualifications, Left Ventricular Ejection Fraction of 35% or less, NYHA class II to IV symptoms despite optimal heart failure therapy for at least 6 weeks and has not had a recent (less than or equal to 6 weeks) or planned (less than or equal to 6 months) major cardiovascular hospitalizations or procedures. Due to patient being in the hospital, does not meet criteria at this time) Please contact us at [phone] with questions.    If the diagnosis is CHF when the patient meets the CHF criteria for Cardiac Rehab with a new order we will gladly schedule them.

## 2025-07-15 ENCOUNTER — OFFICE VISIT (OUTPATIENT)
Dept: CARDIOLOGY | Facility: CLINIC | Age: 75
End: 2025-07-15
Payer: MEDICARE

## 2025-07-15 VITALS
BODY MASS INDEX: 32.29 KG/M2 | WEIGHT: 218 LBS | SYSTOLIC BLOOD PRESSURE: 123 MMHG | DIASTOLIC BLOOD PRESSURE: 72 MMHG | HEIGHT: 69 IN | HEART RATE: 63 BPM | OXYGEN SATURATION: 95 %

## 2025-07-15 DIAGNOSIS — I73.9 CLAUDICATION: ICD-10-CM

## 2025-07-15 DIAGNOSIS — Z95.1 S/P CABG X 4: ICD-10-CM

## 2025-07-15 DIAGNOSIS — R06.02 SHORTNESS OF BREATH: ICD-10-CM

## 2025-07-15 DIAGNOSIS — R07.89 CHEST PAIN, ATYPICAL: ICD-10-CM

## 2025-07-15 DIAGNOSIS — I25.119 CORONARY ARTERY DISEASE INVOLVING NATIVE CORONARY ARTERY OF NATIVE HEART WITH ANGINA PECTORIS: ICD-10-CM

## 2025-07-15 DIAGNOSIS — N17.9 AKI (ACUTE KIDNEY INJURY): ICD-10-CM

## 2025-07-15 DIAGNOSIS — I50.32 HEART FAILURE WITH IMPROVED EJECTION FRACTION (HFIMPEF): Primary | ICD-10-CM

## 2025-07-15 DIAGNOSIS — R42 DIZZY: ICD-10-CM

## 2025-07-15 DIAGNOSIS — I50.32 CHRONIC DIASTOLIC HEART FAILURE: ICD-10-CM

## 2025-07-15 RX ORDER — ISOSORBIDE MONONITRATE 60 MG/1
60 TABLET, EXTENDED RELEASE ORAL DAILY
Qty: 90 TABLET | Refills: 3 | Status: SHIPPED | OUTPATIENT
Start: 2025-07-15

## 2025-07-15 RX ORDER — METOPROLOL SUCCINATE 50 MG/1
50 TABLET, EXTENDED RELEASE ORAL DAILY
Qty: 90 TABLET | Refills: 3 | Status: SHIPPED | OUTPATIENT
Start: 2025-07-15

## 2025-07-15 NOTE — PROGRESS NOTES
Subjective     William Villasenor is a 75 y.o. male who presents to day for [facility] follow up .    CHIEF COMPLIANT  Chief Complaint   Patient presents with    [facility] follow up        Active Problems:  Problem List Items Addressed This Visit          Cardiac and Vasculature    Coronary artery disease involving native coronary artery of native heart with angina pectoris    Overview   Added automatically from request for surgery [id number]         Relevant Medications    metoprolol succinate XL (TOPROL-XL) 50 MG 24 hr tablet    isosorbide mononitrate (IMDUR) 60 MG 24 hr tablet    Other Relevant Orders    Ambulatory Referral to Cardiac Rehab    Stress Test With Myocardial Perfusion One Day    Doppler Ankle Brachial Index Single Level CAR    Holter Monitor - 72 Hour Up To 15 Days    Duplex Carotid Ultrasound CAR    S/P CABG x 4    Relevant Orders    Ambulatory Referral to Cardiac Rehab    Stress Test With Myocardial Perfusion One Day    Doppler Ankle Brachial Index Single Level CAR    Holter Monitor - 72 Hour Up To 15 Days    Duplex Carotid Ultrasound CAR       Pulmonary and Pneumonias    Shortness of breath    Relevant Orders    Ambulatory Referral to Cardiac Rehab    Stress Test With Myocardial Perfusion One Day    Doppler Ankle Brachial Index Single Level CAR    Holter Monitor - 72 Hour Up To 15 Days    Duplex Carotid Ultrasound CAR     Other Visit Diagnoses         Heart failure with improved ejection fraction (HFimpEF)    -  Primary    Relevant Medications    metoprolol succinate XL (TOPROL-XL) 50 MG 24 hr tablet    isosorbide mononitrate (IMDUR) 60 MG 24 hr tablet    Other Relevant Orders    Ambulatory Referral to Cardiac Rehab    Stress Test With Myocardial Perfusion One Day    Doppler Ankle Brachial Index Single Level CAR    Holter Monitor - 72 Hour Up To 15 Days    Duplex Carotid Ultrasound CAR      IZA (acute kidney injury)        Relevant Orders    Ambulatory Referral to Cardiac Rehab    Stress  Test With Myocardial Perfusion One Day    Doppler Ankle Brachial Index Single Level CAR    Holter Monitor - 72 Hour Up To 15 Days    Duplex Carotid Ultrasound CAR      Chest pain, atypical        Relevant Orders    Ambulatory Referral to Cardiac Rehab    Stress Test With Myocardial Perfusion One Day    Doppler Ankle Brachial Index Single Level CAR    Holter Monitor - 72 Hour Up To 15 Days    Duplex Carotid Ultrasound CAR      Chronic diastolic heart failure        Relevant Medications    metoprolol succinate XL (TOPROL-XL) 50 MG 24 hr tablet    isosorbide mononitrate (IMDUR) 60 MG 24 hr tablet    Other Relevant Orders    Ambulatory Referral to Cardiac Rehab    Stress Test With Myocardial Perfusion One Day    Doppler Ankle Brachial Index Single Level CAR    Holter Monitor - 72 Hour Up To 15 Days    Duplex Carotid Ultrasound CAR      Claudication        Relevant Orders    Doppler Ankle Brachial Index Single Level CAR    Duplex Carotid Ultrasound CAR      Dizzy        Relevant Orders    Duplex Carotid Ultrasound CAR              Problem list  1.  Coronary artery disease status post coronary artery bypass grafting x4  1.1 coronary artery bypass grafting 3/21: LIMA to LAD, SVG to diagonal, SVG to OM1, SVG to OM 2. Residual  of RCA  1.2 left heart catheterization 8/21: Left main ostial distal tapering, circumflex diffusely disease, OM1 occluded, LAD occluded, RCA subtotally occluded, LIMA widely patent, SVG to posterior lateral patent, SVG to OM patent, EF 45±5%, LVEDP 28-30  1.3 stress test 7/23: Negative for ischemia, EF 60% septal akinesis  2.  Essential hypertension  2.1 echocardiogram 6/23: EF 56 to 60%, no hemodynamically significant valvular disease or pericardial effusion.  2.2 echo 7/24: EF 66 to 70%, grade 1 diastolic dysfunction, trace MR  3.  Diastolic dysfunction  4.  Chest pain  5.  Shortness of breath  6.  Lower extremity edema  7.  Dizziness/near syncope  7.1 cardiac event monitor 6/23: Several runs of  SVT with the longest being 6 beats with a maximal rate of 146.  No reported symptoms, minimal PAC PVC burden.  7.2 carotid duplex 7/23: Mild to moderate plaque right greater than left no occlusion.    HPI  HPI  Mr. William Villasenor is a 75-year-old male patient being followed up today for heart failure with reduced ejection fraction, coronary artery disease, and chronic arterial hypertension.    Patient was evaluated admitted to the hospital for generalized weakness, fatigability worsening over several days.  He was found to have an acute kidney injury with his creatinine increasing to 2.45 on admission.  On last laboratory workup he was 1.6.  Patient also had bradycardia and hypotension while in the hospital in which his metoprolol and isosorbide was also decreased.  His diuretic therapy was decreased to Bumex 1 mg twice daily.  Patient did follow-up with his PCP and had a repeat BMP in which his creatinine has improved to 1.7 with an EGFR of 41.  Glucose was elevated at 231.  Otherwise unremarkable BMP.  We did discuss this in detail.     Patient does have a history of heart failure with reduced ejection fraction for which she is on Entresto, metoprolol, and Farxiga.  Most recent echocardiogram showed recovered ejection fraction of 66 to 70% with grade 1 diastolic dysfunction.    Patient does have a history of chronic arterial hypertension in which she is being treated with Entresto, metoprolol, and doxazosin. Today his BP is 123/72 with a heart rate of 63.  Patient did have some hypotension and bradycardia while he is in the hospital.  His isosorbide and metoprolol was decreased.  He says his current blood pressure is running between 130-140 systolic.  And his heart rate running between 67 and 74.    Patient does have a history of coronary artery disease which she went under bypass surgery in March 2021 with LIMA to LAD, SVG diagonal, SVG to OM1, SVG to OM 2 with residual  of the right coronary artery.  Patient is  on high intensity statin therapy of atorvastatin and aspirin for antiplatelet therapy.  He is also on antianginal therapy of isosorbide.     Patient does report some atypical chest pain that occurs in his mid sternum to the left side of his chest in which he describes as an ache.  It occurs intermittently at random.  Both with activity and rest.  He says is not overly severe at this time.  Does have some associated shortness of breath.    Patient does report shortness of breath that occurs with activity and at rest.  Minimal activity causes him become dyspneic.  He states that now he can just be sitting there and although once he will lose his breath.  This does seem to have gotten quite a bit worse ever since the stress test.  He does have some level of orthopnea as well.    Patient does have some lower extremity edema from the knees down.  This is about the same as what it has been per his report.  He says the top of his feet will swell in his ankles and up his legs to his knees.  He says his dry weight is about 205 and he is currently up about 6 pounds.  When he is off the diuretics that he was up more about 15 to 20 pounds.  He has been started back at 1 mg twice daily.    Patient does report fatigue where he weak and tired.  Says is pretty much tired all the time does not have any energy.    Patient does report dizziness that occurs intermittently at random.  He says activity does seem to make it worse.  He says if he gets up he is extremely dizzy and has to have something to grab a hold of.    Patient does report that he has had a change in his vision where he is not able to see as well as he did.  Is had an eye exam.  PRIOR MEDS  Current Outpatient Medications on File Prior to Visit   Medication Sig Dispense Refill    allopurinol (ZYLOPRIM) 300 MG tablet Take 1 tablet by mouth Daily.      aspirin 81 MG EC tablet Take 1 tablet by mouth Daily.      atorvastatin (LIPITOR) 40 MG tablet Take 1 tablet by mouth  Every Night. 90 tablet 3    bumetanide (BUMEX) 1 MG tablet Take 1 tablet by mouth 2 (Two) Times a Day. 180 tablet 1    dapagliflozin Propanediol (Farxiga) 10 MG tablet Take 1 tablet by mouth Daily. 90 tablet 3    doxazosin (CARDURA) 2 MG tablet Take 1 tablet by mouth Daily. 90 tablet 3    gabapentin (NEURONTIN) 400 MG capsule Take 1 capsule by mouth 2 (Two) Times a Day.      glipizide (GLUCOTROL) 10 MG tablet Take 1 tablet by mouth 2 (Two) Times a Day Before Meals. 180 tablet 1    metFORMIN (GLUCOPHAGE) 1000 MG tablet Take 1/2 tablet by mouth 2 (Two) Times a Day With Meals. 180 tablet 1    sacubitril-valsartan (Entresto)  MG tablet Take 1 tablet by mouth 2 Times a Day. 180 tablet 3    SITagliptin (Januvia) 50 MG tablet Take 1 tablet by mouth Daily. 90 tablet 1    [DISCONTINUED] isosorbide mononitrate (IMDUR) 60 MG 24 hr tablet Take 1 tablet by mouth Daily for 30 days. 30 tablet 0    [DISCONTINUED] metoprolol succinate XL (TOPROL-XL) 50 MG 24 hr tablet Take 1 tablet by mouth Daily for 30 days. 30 tablet 0     No current facility-administered medications on file prior to visit.       ALLERGIES  Lisinopril and Other    HISTORY  Past Medical History:   Diagnosis Date    Arthritis     Back pain     BPH (benign prostatic hyperplasia)     Chronic diastolic (congestive) heart failure     Coronary artery disease     Diabetes mellitus     Diabetic peripheral neuropathy     Elevated cholesterol     Erectile dysfunction     Former smoker     GERD (gastroesophageal reflux disease)     Gout     Hemorrhoids     High cholesterol     High triglycerides     Hypertension     Neuropathy     Obesity     Sleep apnea     wears CPAP       Social History     Socioeconomic History    Marital status:     Number of children: 3   Tobacco Use    Smoking status: Former     Current packs/day: 0.00     Average packs/day: 1 pack/day for 5.0 years (5.0 ttl pk-yrs)     Types: Cigarettes     Start date: 02/1969     Quit date: 02/1974      "Years since quittin.4     Passive exposure: Past    Smokeless tobacco: Never   Vaping Use    Vaping status: Never Used   Substance and Sexual Activity    Alcohol use: Never     Comment: quit in     Drug use: Never    Sexual activity: Defer       Family History   Problem Relation Age of Onset    Heart disease Mother     Hypertension Mother     Depression Mother     Alcohol abuse Maternal Uncle     Heart disease Maternal Grandmother     Hypertension Maternal Grandmother     Diabetes Maternal Grandmother     Diabetes Paternal Grandmother     No Known Problems Half-Brother     No Known Problems Half-Brother     No Known Problems Half-Sister     No Known Problems Daughter     No Known Problems Daughter     Hyperlipidemia Daughter        Review of Systems   Constitutional:  Positive for fatigue (all the time).   Respiratory:  Positive for apnea (CPAP), chest tightness and shortness of breath (with rest and activity).    Cardiovascular:  Positive for chest pain (occasional achy) and leg swelling (from the knee down). Negative for palpitations.   Gastrointestinal:  Positive for constipation. Negative for diarrhea and nausea.   Neurological:  Positive for dizziness (ocassional random) and weakness. Negative for syncope and light-headedness.   Hematological:  Bruises/bleeds easily.   Psychiatric/Behavioral:  Negative for sleep disturbance.        Objective     VITALS: /72 (BP Location: Right arm, Patient Position: Sitting, Cuff Size: Adult)   Pulse 63   Ht 175.3 cm (69.02\")   Wt 98.9 kg (218 lb)   SpO2 95%   BMI 32.18 kg/m²     LABS:   Lab Results (most recent)       None            IMAGING:   CT Abdomen Pelvis Without Contrast  Result Date: 2025  1.  No renal or ureteral stones. No hydronephrosis. 2.  Cholelithiasis. 3.  Moderate volume fecal retention in the colon consistent with constipation. 4.  No evidence of bowel obstruction. 5.  Distended urinary bladder without wall thickening. 6. Other " incidental/nonacute findings above.  This report was finalized on 6/23/2025 4:48 PM by Dr. Catrachito Majano MD.      XR Chest 1 View  Result Date: 6/23/2025    Stable chest. No acute cardiopulmonary findings.  This report was finalized on 6/23/2025 1:44 PM by Dr. Catrachito Majano MD.        EXAM:  Physical Exam  Vitals and nursing note reviewed.   Constitutional:       Appearance: He is well-developed.   HENT:      Head: Normocephalic.   Neck:      Thyroid: No thyroid mass.      Vascular: No carotid bruit or JVD.      Trachea: Trachea and phonation normal.   Cardiovascular:      Rate and Rhythm: Normal rate and regular rhythm.      Pulses:           Radial pulses are 2+ on the right side and 2+ on the left side.        Dorsalis pedis pulses are 2+ on the right side and 2+ on the left side.      Heart sounds: Normal heart sounds. No murmur heard.     No friction rub. No gallop.   Pulmonary:      Effort: Pulmonary effort is normal. No respiratory distress.      Breath sounds: Normal breath sounds. No wheezing or rales.   Musculoskeletal:         General: No swelling. Normal range of motion.      Cervical back: Neck supple.      Right lower leg: Edema (1+) present.      Left lower leg: Edema (1+) present.   Skin:     General: Skin is warm and dry.      Capillary Refill: Capillary refill takes less than 2 seconds.      Findings: No rash.   Neurological:      Mental Status: He is alert and oriented to person, place, and time.   Psychiatric:         Speech: Speech normal.         Behavior: Behavior normal.         Thought Content: Thought content normal.         Judgment: Judgment normal.         Procedure   Procedures       Assessment & Plan    Diagnosis Plan   1. Heart failure with improved ejection fraction (HFimpEF)  Ambulatory Referral to Cardiac Rehab    Stress Test With Myocardial Perfusion One Day    Doppler Ankle Brachial Index Single Level CAR    Holter Monitor - 72 Hour Up To 15 Days    Duplex Carotid Ultrasound  CAR      2. S/P CABG x 4  Ambulatory Referral to Cardiac Rehab    Stress Test With Myocardial Perfusion One Day    Doppler Ankle Brachial Index Single Level CAR    Holter Monitor - 72 Hour Up To 15 Days    Duplex Carotid Ultrasound CAR      3. Coronary artery disease involving native coronary artery of native heart with angina pectoris  Ambulatory Referral to Cardiac Rehab    Stress Test With Myocardial Perfusion One Day    Doppler Ankle Brachial Index Single Level CAR    Holter Monitor - 72 Hour Up To 15 Days    Duplex Carotid Ultrasound CAR      4. IZA (acute kidney injury)  Ambulatory Referral to Cardiac Rehab    Stress Test With Myocardial Perfusion One Day    Doppler Ankle Brachial Index Single Level CAR    Holter Monitor - 72 Hour Up To 15 Days    Duplex Carotid Ultrasound CAR      5. Chest pain, atypical  Ambulatory Referral to Cardiac Rehab    Stress Test With Myocardial Perfusion One Day    Doppler Ankle Brachial Index Single Level CAR    Holter Monitor - 72 Hour Up To 15 Days    Duplex Carotid Ultrasound CAR      6. Chronic diastolic heart failure  Ambulatory Referral to Cardiac Rehab    Stress Test With Myocardial Perfusion One Day    Doppler Ankle Brachial Index Single Level CAR    Holter Monitor - 72 Hour Up To 15 Days    Duplex Carotid Ultrasound CAR      7. Shortness of breath  Ambulatory Referral to Cardiac Rehab    Stress Test With Myocardial Perfusion One Day    Doppler Ankle Brachial Index Single Level CAR    Holter Monitor - 72 Hour Up To 15 Days    Duplex Carotid Ultrasound CAR      8. Claudication  Doppler Ankle Brachial Index Single Level CAR    Duplex Carotid Ultrasound CAR      9. Dizzy  Duplex Carotid Ultrasound CAR      1.  Patient does have a history of heart failure with recovered ejection fraction.  He will continue the Entresto and the metoprolol and Farxiga for guideline driven medication therapy.  2.  Due to patient's atypical chest pain worsening shortness of breath increasing  fatigue and weakness with a history of coronary artery bypass grafting I would like for him to go under a nuclear stress test for further evaluation of ischemia as a potential cause of his symptoms.  3.  Patient was recently hospitalized for acute kidney injury which was felt to be related to decreased fluid intake as well as diuretic therapy.  We did discuss lifestyle modifications in addition to his lower dose of diuretics that can help reduce his lower extremity edema.  This includes compression socks, and decreasing sodium in his diet.  He is also informed that he does need to drink water but limited to 42 to 64 ounces a day.  4.  Due to patient's claudication-like symptoms where he has pain in his calfs when he ambulates I would like for him to go under ABIs for evaluation of peripheral vascular disease.  5.  On patient's treadmill stress test there was question of atrial fibrillation therefore I would like for him to wear a Holter monitor 14 days to evaluate for atrial fibrillation.  [place]-term monitor would be unlikely to be sufficient in identifying atrial fibrillation.  6.  Due to patient's dizziness and visual changes I would like for him to go under a carotid duplex to rule out carotid artery disease as a potential cause of his symptoms.  7.  Patient's blood pressure is controlled on current blood pressure medication regimen.  No medication changes are warranted at this time.  Patient advised to monitor blood pressure on a daily basis and report any persistent highs or lows.  Set goal blood pressure for patient at 130/80 or below.  8.  Informed of signs and symptoms of ACS and advised to seek emergent treatment for any new worsening symptoms.  Patient also advised sooner follow-up as needed.  Also advised to follow-up with family doctor as needed  This note is dictated utilizing voice recognition software.  Although this record has been proof read, transcriptional errors may still be present. If  questions occur regarding the content of this record please do not hesitate to call our office.  I have reviewed and confirmed the accuracy of the ROS as documented by the MA/LPN/RN ANKUR Cuevas    No follow-ups on file.    Diagnoses and all orders for this visit:    1. Heart failure with improved ejection fraction (HFimpEF) (Primary)  -     Ambulatory Referral to Cardiac Rehab  -     Stress Test With Myocardial Perfusion One Day; Future  -     Doppler Ankle Brachial Index Single Level CAR; Future  -     Holter Monitor - 72 Hour Up To 15 Days; Future  -     Duplex Carotid Ultrasound CAR; Future    2. S/P CABG x 4  -     Ambulatory Referral to Cardiac Rehab  -     Stress Test With Myocardial Perfusion One Day; Future  -     Doppler Ankle Brachial Index Single Level CAR; Future  -     Holter Monitor - 72 Hour Up To 15 Days; Future  -     Duplex Carotid Ultrasound CAR; Future    3. Coronary artery disease involving native coronary artery of native heart with angina pectoris  -     Ambulatory Referral to Cardiac Rehab  -     Stress Test With Myocardial Perfusion One Day; Future  -     Doppler Ankle Brachial Index Single Level CAR; Future  -     Holter Monitor - 72 Hour Up To 15 Days; Future  -     Duplex Carotid Ultrasound CAR; Future    4. IZA (acute kidney injury)  -     Ambulatory Referral to Cardiac Rehab  -     Stress Test With Myocardial Perfusion One Day; Future  -     Doppler Ankle Brachial Index Single Level CAR; Future  -     Holter Monitor - 72 Hour Up To 15 Days; Future  -     Duplex Carotid Ultrasound CAR; Future    5. Chest pain, atypical  -     Ambulatory Referral to Cardiac Rehab  -     Stress Test With Myocardial Perfusion One Day; Future  -     Doppler Ankle Brachial Index Single Level CAR; Future  -     Holter Monitor - 72 Hour Up To 15 Days; Future  -     Duplex Carotid Ultrasound CAR; Future    6. Chronic diastolic heart failure  -     Ambulatory Referral to Cardiac Rehab  -     Stress Test  With Myocardial Perfusion One Day; Future  -     Doppler Ankle Brachial Index Single Level CAR; Future  -     Holter Monitor - 72 Hour Up To 15 Days; Future  -     Duplex Carotid Ultrasound CAR; Future    7. Shortness of breath  -     Ambulatory Referral to Cardiac Rehab  -     Stress Test With Myocardial Perfusion One Day; Future  -     Doppler Ankle Brachial Index Single Level CAR; Future  -     Holter Monitor - 72 Hour Up To 15 Days; Future  -     Duplex Carotid Ultrasound CAR; Future    8. Claudication  -     Doppler Ankle Brachial Index Single Level CAR; Future  -     Duplex Carotid Ultrasound CAR; Future    9. Dizzy  -     Duplex Carotid Ultrasound CAR; Future    Other orders  -     metoprolol succinate XL (TOPROL-XL) 50 MG 24 hr tablet; Take 1 tablet by mouth Daily.  Dispense: 90 tablet; Refill: 3  -     isosorbide mononitrate (IMDUR) 60 MG 24 hr tablet; Take 1 tablet by mouth Daily.  Dispense: 90 tablet; Refill: 3        William Villasenor  reports that he quit smoking about 51 years ago. His smoking use included cigarettes. He started smoking about 56 years ago. He has a 5 pack-year smoking history. He has been exposed to tobacco smoke. He has never used smokeless tobacco. I have educated him on the risk of diseases from using tobacco products.        Advance Care Planning   ACP discussion was held with the patient during this visit. Patient does not have an advance directive, declines further assistance.               MEDS ORDERED DURING VISIT:  New Medications Ordered This Visit   Medications    metoprolol succinate XL (TOPROL-XL) 50 MG 24 hr tablet     Sig: Take 1 tablet by mouth Daily.     Dispense:  90 tablet     Refill:  3    isosorbide mononitrate (IMDUR) 60 MG 24 hr tablet     Sig: Take 1 tablet by mouth Daily.     Dispense:  90 tablet     Refill:  3           This document has been electronically signed by Dany San Jr., APRN  July 15, 2025 15:33 EDT

## 2025-07-22 DIAGNOSIS — Z95.1 S/P CABG X 4: Primary | ICD-10-CM

## 2025-07-22 DIAGNOSIS — R06.02 SHORTNESS OF BREATH: ICD-10-CM

## 2025-07-22 DIAGNOSIS — I25.119 CORONARY ARTERY DISEASE INVOLVING NATIVE CORONARY ARTERY OF NATIVE HEART WITH ANGINA PECTORIS: ICD-10-CM

## 2025-07-22 DIAGNOSIS — I73.9 CLAUDICATION: ICD-10-CM

## 2025-07-22 DIAGNOSIS — I50.32 CHRONIC DIASTOLIC CONGESTIVE HEART FAILURE: ICD-10-CM

## 2025-07-22 DIAGNOSIS — N17.9 AKI (ACUTE KIDNEY INJURY): ICD-10-CM

## 2025-07-22 DIAGNOSIS — R42 DIZZY: ICD-10-CM

## 2025-07-24 ENCOUNTER — TELEPHONE (OUTPATIENT)
Dept: CARDIAC REHAB | Facility: HOSPITAL | Age: 75
End: 2025-07-24
Payer: MEDICARE

## 2025-07-24 NOTE — TELEPHONE ENCOUNTER
Cardiac rehab staff spoke with pt regarding program. Pt is scheduled for his initial intake appt on 8/13/25 @ 2pm.

## 2025-07-29 DIAGNOSIS — Z95.1 S/P CABG (CORONARY ARTERY BYPASS GRAFT): Primary | ICD-10-CM

## 2025-07-31 ENCOUNTER — RESULTS FOLLOW-UP (OUTPATIENT)
Dept: CARDIOLOGY | Facility: CLINIC | Age: 75
End: 2025-07-31
Payer: MEDICARE

## 2025-07-31 ENCOUNTER — HOSPITAL ENCOUNTER (OUTPATIENT)
Dept: NUCLEAR MEDICINE | Facility: HOSPITAL | Age: 75
Discharge: HOME | End: 2025-07-31
Payer: MEDICARE

## 2025-07-31 ENCOUNTER — HOSPITAL ENCOUNTER (OUTPATIENT)
Dept: ULTRASOUND IMAGING | Facility: HOSPITAL | Age: 75
Discharge: HOME | End: 2025-07-31
Payer: MEDICARE

## 2025-07-31 ENCOUNTER — HOSPITAL ENCOUNTER (OUTPATIENT)
Dept: CARDIOLOGY | Facility: HOSPITAL | Age: 75
Discharge: HOME | End: 2025-07-31
Payer: MEDICARE

## 2025-07-31 DIAGNOSIS — R06.02 SHORTNESS OF BREATH: ICD-10-CM

## 2025-07-31 DIAGNOSIS — I50.32 CHRONIC DIASTOLIC HEART FAILURE: ICD-10-CM

## 2025-07-31 DIAGNOSIS — I50.32 CHRONIC DIASTOLIC CONGESTIVE HEART FAILURE: ICD-10-CM

## 2025-07-31 DIAGNOSIS — R42 DIZZY: ICD-10-CM

## 2025-07-31 DIAGNOSIS — R07.89 CHEST PRESSURE: ICD-10-CM

## 2025-07-31 DIAGNOSIS — E11.21 TYPE 2 DIABETES MELLITUS WITH DIABETIC NEPHROPATHY, UNSPECIFIED WHETHER LONG TERM INSULIN USE: Primary | Chronic | ICD-10-CM

## 2025-07-31 DIAGNOSIS — N17.9 AKI (ACUTE KIDNEY INJURY): ICD-10-CM

## 2025-07-31 DIAGNOSIS — I25.119 CORONARY ARTERY DISEASE INVOLVING NATIVE CORONARY ARTERY OF NATIVE HEART WITH ANGINA PECTORIS: ICD-10-CM

## 2025-07-31 DIAGNOSIS — R07.89 CHEST PAIN, ATYPICAL: ICD-10-CM

## 2025-07-31 DIAGNOSIS — R06.02 SOB (SHORTNESS OF BREATH): ICD-10-CM

## 2025-07-31 DIAGNOSIS — Z95.1 S/P CABG X 4: ICD-10-CM

## 2025-07-31 DIAGNOSIS — R00.1 BRADYCARDIA: ICD-10-CM

## 2025-07-31 DIAGNOSIS — I50.32 HEART FAILURE WITH IMPROVED EJECTION FRACTION (HFIMPEF): ICD-10-CM

## 2025-07-31 DIAGNOSIS — I10 ESSENTIAL HYPERTENSION: Chronic | ICD-10-CM

## 2025-07-31 DIAGNOSIS — I73.9 CLAUDICATION: ICD-10-CM

## 2025-07-31 PROCEDURE — 93922 UPR/L XTREMITY ART 2 LEVELS: CPT | Performed by: RADIOLOGY

## 2025-07-31 PROCEDURE — 93922 UPR/L XTREMITY ART 2 LEVELS: CPT

## 2025-07-31 PROCEDURE — 34310000005 TECHNETIUM SESTAMIBI: Performed by: NURSE PRACTITIONER

## 2025-07-31 PROCEDURE — A9500 TC99M SESTAMIBI: HCPCS | Performed by: NURSE PRACTITIONER

## 2025-07-31 PROCEDURE — 78452 HT MUSCLE IMAGE SPECT MULT: CPT

## 2025-07-31 PROCEDURE — 25010000002 REGADENOSON 0.4 MG/5ML SOLUTION: Performed by: NURSE PRACTITIONER

## 2025-07-31 PROCEDURE — 93880 EXTRACRANIAL BILAT STUDY: CPT | Performed by: RADIOLOGY

## 2025-07-31 PROCEDURE — 93880 EXTRACRANIAL BILAT STUDY: CPT

## 2025-07-31 PROCEDURE — 93017 CV STRESS TEST TRACING ONLY: CPT

## 2025-07-31 RX ORDER — REGADENOSON 0.08 MG/ML
0.4 INJECTION, SOLUTION INTRAVENOUS
Status: COMPLETED | OUTPATIENT
Start: 2025-07-31 | End: 2025-07-31

## 2025-07-31 RX ADMIN — TECHNETIUM TC 99M SESTAMIBI 1 DOSE: 1 INJECTION INTRAVENOUS at 09:08

## 2025-07-31 RX ADMIN — TECHNETIUM TC 99M SESTAMIBI 1 DOSE: 1 INJECTION INTRAVENOUS at 10:32

## 2025-07-31 RX ADMIN — REGADENOSON 0.4 MG: 0.08 INJECTION, SOLUTION INTRAVENOUS at 10:32

## 2025-07-31 NOTE — TELEPHONE ENCOUNTER
I called patient and went over results of stress and MAINOR as listed below:  Dany San APRN to Me (Selected Message)      7/31/25  1:12 PM  Note      Patient had elevated systolic velocity ratio on the left.  Would like to get a CTA of the carotids.         US Carotid Bilateral    Elevated systolic velocity indicates an increased speed of blood flow in an artery. It can indicate a suggestion of stenosis.    Dany San APRN to Me (Selected Message)      7/31/25  1:11 PM  Note      Normal ABIs keep follow-up         US Ankle / Brachial Indices Extremity Limited

## 2025-07-31 NOTE — PROGRESS NOTES
Patient had elevated systolic velocity ratio on the left.  Would like to get a CTA of the carotids.

## 2025-08-02 LAB
BH CV NUCLEAR PRIOR STUDY: 3
BH CV REST NUCLEAR ISOTOPE DOSE: 10.3 MCI
BH CV STRESS BP STAGE 1: NORMAL
BH CV STRESS COMMENTS STAGE 1: NORMAL
BH CV STRESS DOSE REGADENOSON STAGE 1: 0.4
BH CV STRESS DURATION MIN STAGE 1: 0
BH CV STRESS DURATION SEC STAGE 1: 10
BH CV STRESS HR STAGE 1: 70
BH CV STRESS NUCLEAR ISOTOPE DOSE: 30.5 MCI
BH CV STRESS PROTOCOL 1: NORMAL
BH CV STRESS RECOVERY BP: NORMAL MMHG
BH CV STRESS RECOVERY HR: 64 BPM
BH CV STRESS STAGE 1: 1
MAXIMAL PREDICTED HEART RATE: 144 BPM
PERCENT MAX PREDICTED HR: 48.61 %
STRESS BASELINE BP: NORMAL MMHG
STRESS BASELINE HR: 61 BPM
STRESS PERCENT HR: 57 %
STRESS POST PEAK BP: NORMAL MMHG
STRESS POST PEAK HR: 70 BPM
STRESS TARGET HR: 122 BPM

## 2025-08-06 ENCOUNTER — RESULTS FOLLOW-UP (OUTPATIENT)
Dept: CARDIOLOGY | Facility: CLINIC | Age: 75
End: 2025-08-06
Payer: MEDICARE

## 2025-08-06 ENCOUNTER — OFFICE VISIT (OUTPATIENT)
Dept: ENDOCRINOLOGY | Facility: CLINIC | Age: 75
End: 2025-08-06
Payer: MEDICARE

## 2025-08-06 VITALS
BODY MASS INDEX: 31.94 KG/M2 | HEART RATE: 67 BPM | SYSTOLIC BLOOD PRESSURE: 104 MMHG | OXYGEN SATURATION: 95 % | DIASTOLIC BLOOD PRESSURE: 58 MMHG | WEIGHT: 216.4 LBS

## 2025-08-06 DIAGNOSIS — E11.65 TYPE 2 DIABETES MELLITUS WITH HYPERGLYCEMIA, WITHOUT LONG-TERM CURRENT USE OF INSULIN: Primary | ICD-10-CM

## 2025-08-06 DIAGNOSIS — N18.31 STAGE 3A CHRONIC KIDNEY DISEASE: ICD-10-CM

## 2025-08-06 LAB
EXPIRATION DATE: ABNORMAL
GLUCOSE BLDC GLUCOMTR-MCNC: 224 MG/DL (ref 70–130)
Lab: ABNORMAL

## 2025-08-06 PROCEDURE — 80053 COMPREHEN METABOLIC PANEL: CPT

## 2025-08-07 LAB
ALBUMIN SERPL-MCNC: 3.9 G/DL (ref 3.5–5.2)
ALBUMIN/GLOB SERPL: 1.3 G/DL
ALP SERPL-CCNC: 67 U/L (ref 39–117)
ALT SERPL W P-5'-P-CCNC: 11 U/L (ref 1–41)
ANION GAP SERPL CALCULATED.3IONS-SCNC: 15 MMOL/L (ref 5–15)
AST SERPL-CCNC: 15 U/L (ref 1–40)
BILIRUB SERPL-MCNC: 0.2 MG/DL (ref 0–1.2)
BUN SERPL-MCNC: 16 MG/DL (ref 8–23)
BUN/CREAT SERPL: 9.9 (ref 7–25)
CALCIUM SPEC-SCNC: 9.2 MG/DL (ref 8.6–10.5)
CHLORIDE SERPL-SCNC: 98 MMOL/L (ref 98–107)
CO2 SERPL-SCNC: 24 MMOL/L (ref 22–29)
CREAT SERPL-MCNC: 1.61 MG/DL (ref 0.76–1.27)
EGFRCR SERPLBLD CKD-EPI 2021: 44 ML/MIN/1.73
GLOBULIN UR ELPH-MCNC: 2.9 GM/DL
GLUCOSE SERPL-MCNC: 179 MG/DL (ref 65–99)
POTASSIUM SERPL-SCNC: 4.7 MMOL/L (ref 3.5–5.2)
PROT SERPL-MCNC: 6.8 G/DL (ref 6–8.5)
SODIUM SERPL-SCNC: 137 MMOL/L (ref 136–145)

## 2025-08-08 ENCOUNTER — DISEASE STATE MANAGEMENT VISIT (OUTPATIENT)
Dept: PHARMACY | Facility: HOSPITAL | Age: 75
End: 2025-08-08
Payer: MEDICARE

## 2025-08-13 ENCOUNTER — DISEASE STATE MANAGEMENT VISIT (OUTPATIENT)
Dept: PHARMACY | Facility: HOSPITAL | Age: 75
End: 2025-08-13
Payer: MEDICARE

## 2025-08-13 ENCOUNTER — TREATMENT (OUTPATIENT)
Dept: CARDIAC REHAB | Facility: HOSPITAL | Age: 75
End: 2025-08-13
Payer: MEDICARE

## 2025-08-13 VITALS
HEIGHT: 70 IN | WEIGHT: 215.2 LBS | RESPIRATION RATE: 14 BRPM | BODY MASS INDEX: 30.81 KG/M2 | DIASTOLIC BLOOD PRESSURE: 54 MMHG | OXYGEN SATURATION: 98 % | HEART RATE: 72 BPM | SYSTOLIC BLOOD PRESSURE: 116 MMHG

## 2025-08-13 DIAGNOSIS — Z95.1 S/P CABG (CORONARY ARTERY BYPASS GRAFT): Primary | ICD-10-CM

## 2025-08-13 PROCEDURE — 93798 PHYS/QHP OP CAR RHAB W/ECG: CPT

## 2025-08-18 ENCOUNTER — TREATMENT (OUTPATIENT)
Dept: CARDIAC REHAB | Facility: HOSPITAL | Age: 75
End: 2025-08-18
Payer: MEDICARE

## 2025-08-18 VITALS — OXYGEN SATURATION: 96 % | DIASTOLIC BLOOD PRESSURE: 54 MMHG | HEART RATE: 91 BPM | SYSTOLIC BLOOD PRESSURE: 110 MMHG

## 2025-08-18 DIAGNOSIS — Z95.1 S/P CABG (CORONARY ARTERY BYPASS GRAFT): Primary | ICD-10-CM

## 2025-08-18 PROCEDURE — 93798 PHYS/QHP OP CAR RHAB W/ECG: CPT

## 2025-08-20 ENCOUNTER — TREATMENT (OUTPATIENT)
Dept: CARDIAC REHAB | Facility: HOSPITAL | Age: 75
End: 2025-08-20
Payer: MEDICARE

## 2025-08-20 VITALS — HEART RATE: 71 BPM | DIASTOLIC BLOOD PRESSURE: 64 MMHG | SYSTOLIC BLOOD PRESSURE: 130 MMHG

## 2025-08-20 DIAGNOSIS — Z95.1 S/P CABG (CORONARY ARTERY BYPASS GRAFT): Primary | ICD-10-CM

## 2025-08-20 PROCEDURE — 93798 PHYS/QHP OP CAR RHAB W/ECG: CPT

## 2025-08-22 ENCOUNTER — TREATMENT (OUTPATIENT)
Dept: CARDIAC REHAB | Facility: HOSPITAL | Age: 75
End: 2025-08-22
Payer: MEDICARE

## 2025-08-22 VITALS — SYSTOLIC BLOOD PRESSURE: 136 MMHG | DIASTOLIC BLOOD PRESSURE: 76 MMHG | HEART RATE: 69 BPM

## 2025-08-22 DIAGNOSIS — Z95.1 S/P CABG (CORONARY ARTERY BYPASS GRAFT): Primary | ICD-10-CM

## 2025-08-22 PROCEDURE — 93798 PHYS/QHP OP CAR RHAB W/ECG: CPT

## 2025-08-25 ENCOUNTER — TREATMENT (OUTPATIENT)
Dept: CARDIAC REHAB | Facility: HOSPITAL | Age: 75
End: 2025-08-25
Payer: MEDICARE

## 2025-08-25 VITALS — DIASTOLIC BLOOD PRESSURE: 66 MMHG | SYSTOLIC BLOOD PRESSURE: 118 MMHG | OXYGEN SATURATION: 98 % | HEART RATE: 50 BPM

## 2025-08-25 DIAGNOSIS — Z95.1 S/P CABG (CORONARY ARTERY BYPASS GRAFT): Primary | ICD-10-CM

## 2025-08-25 PROCEDURE — 93798 PHYS/QHP OP CAR RHAB W/ECG: CPT

## (undated) DEVICE — PK ATS CUST W CARDIOTOMY RESEVOIR

## (undated) DEVICE — GLIDESHEATH SLENDER STAINLESS STEEL KIT: Brand: GLIDESHEATH SLENDER

## (undated) DEVICE — OASIS DRAIN, SINGLE, INLINE & ATS COMPATIBLE: Brand: OASIS

## (undated) DEVICE — SUT SILK 2 SUTUPAK TIE 60IN SA8H 2STRAND

## (undated) DEVICE — Device

## (undated) DEVICE — LEVEL SENSORS PADS ARE USED TO ATTACH THE LEVEL SENSORS TO A HARD SHELL RESERVOIR. INCLUDES COUPLING GEL.: Brand: TERUMO® ADVANCED PERFUSION SYSTEM 1

## (undated) DEVICE — CATH F5 INF JR 4 100CM: Brand: INFINITI

## (undated) DEVICE — CANNULA,OXY,ADULT,SUPER SOFT,W/14'TUB,UC: Brand: MEDLINE INDUSTRIES, INC.

## (undated) DEVICE — TTL1LYR 16FR10ML 100%SIL TMPST TR: Brand: MEDLINE

## (undated) DEVICE — CANN AORT ROOT DLP VNT 14G 7F

## (undated) DEVICE — EZ GLIDE AORTIC CANNULA: Brand: EDWARDS LIFESCIENCES EZ GLIDE AORTIC CANNULA

## (undated) DEVICE — DRSNG SURESITE WNDW 4X4.5

## (undated) DEVICE — ANTIBACTERIAL UNDYED BRAIDED (POLYGLACTIN 910), SYNTHETIC ABSORBABLE SUTURE: Brand: COATED VICRYL

## (undated) DEVICE — SYS VASOVIEW HEMOPRO ENDOSCOPIC HARVST VESL

## (undated) DEVICE — CATH F5 INF PIG145 110CM 6SH: Brand: INFINITI

## (undated) DEVICE — RUNWAY RADL W/TOP PAD

## (undated) DEVICE — PAD ARMBRD SURG CONVOL 7.5X20X2IN

## (undated) DEVICE — SUT SILK 0/0 CT2 18IN C027D

## (undated) DEVICE — ST EXT IV SMARTSITE 2VLV SP M LL 5ML IV1

## (undated) DEVICE — SUT PROLN 3/0 SH D/A 36IN 8522H

## (undated) DEVICE — KT INTRO SHEATH PERC W/FULL DRP 9F

## (undated) DEVICE — CANN VESL DLP 1WY BLNT/TP 3MM

## (undated) DEVICE — PK HEART OPN 10

## (undated) DEVICE — SWAN-GANZ CCOMBO V THERMODILUTION CATHETER: Brand: SWAN-GANZ CCOMBO V

## (undated) DEVICE — 3M™ MEDIPORE™ H SOFT CLOTH SURGICAL TAPE, 2863, 3 IN X 10 YD, 12/CASE: Brand: 3M™ MEDIPORE™

## (undated) DEVICE — TUBING, SUCTION, 1/4" X 10', STRAIGHT: Brand: MEDLINE

## (undated) DEVICE — EP LEFT SUBCLAVIAN SHIELD-YELLOW: Brand: RADPAD

## (undated) DEVICE — SKIN PREP TRAY W/CHG: Brand: MEDLINE INDUSTRIES, INC.

## (undated) DEVICE — TEMP PACING WIRE: Brand: MYO/WIRE

## (undated) DEVICE — NDL PERC 1PRT THNWALL W/BASEPLT 18G 7CM

## (undated) DEVICE — CLTH CLENS READYCLEANSE PERI CARE PK/5

## (undated) DEVICE — CATHETER,URETHRAL,REDRUBBER,STERILE,22FR: Brand: MEDLINE

## (undated) DEVICE — ENDOGATOR AUXILIARY WATER JET CONNECTOR: Brand: ENDOGATOR

## (undated) DEVICE — PAD, DEFIB, ADULT, RADIOTRANS, ZOLL: Brand: MEDLINE

## (undated) DEVICE — TOWEL,OR,DSP,ST,BLUE,STD,4/PK,20PK/CS: Brand: MEDLINE

## (undated) DEVICE — SUCTION CANISTER, 2500CC, RIGID: Brand: DEROYAL

## (undated) DEVICE — ADHS SKIN PREMIERPRO EXOFIN TOPICAL HI/VISC .5ML

## (undated) DEVICE — SUT PROLN 7/0 CV BV1 24IN 8304H BX/36

## (undated) DEVICE — ST INF PRI SMRTSTE 20DRP 2VLV 24ML 117

## (undated) DEVICE — SUT SILK 4/0 TIES 18IN A183H

## (undated) DEVICE — CATH F5 INF JL 3.5 100CM: Brand: INFINITI

## (undated) DEVICE — TBG SXN RIGD MINI/SUCKER 9F 4.75IN

## (undated) DEVICE — FLTR RESERV PERFUS INTERSEPT 02 STRL

## (undated) DEVICE — ADULT DISPOSABLE SINGLE-PATIENT USE PULSE OXIMETER SENSOR: Brand: NONIN

## (undated) DEVICE — PK CATH CARD 70

## (undated) DEVICE — 12 FOOT DISPOSABLE EXTENSION CABLE WITH SAFE CONNECT / SCREW-DOWN

## (undated) DEVICE — GLV SURG BIOGEL LTX PF 7 1/2

## (undated) DEVICE — AVID DUAL STAGE VENOUS DRAINAGE CANNULA: Brand: AVID DUAL STAGE VENOUS DRAINAGE CANNULA

## (undated) DEVICE — AVANTI + 4F STD W/GW: Brand: AVANTI

## (undated) DEVICE — SUT PROLN 4/0 RB1 D/A 36IN 8557H

## (undated) DEVICE — SUT PROLN 6/0 C1 D/A 30IN 8706H

## (undated) DEVICE — CONNECT Y INTERSEPT W/LL 3/8 X 3/8 X 3/8IN

## (undated) DEVICE — PK PERFUS CUST W/CARDIOPLEGIA

## (undated) DEVICE — GLV SURG BIOGEL LTX PF 8

## (undated) DEVICE — BLD SCLPL BEAVR MINI STR 2BVL 180D LF

## (undated) DEVICE — Device: Brand: DEFENDO AIR/WATER/SUCTION AND BIOPSY VALVE

## (undated) DEVICE — SUT PDS 1 CTX 36IN VIO PDP371T

## (undated) DEVICE — ADAPT/Y PERFUS DLP FML/LUER COLR/CODE/CLMP 8.9AND25.4CM

## (undated) DEVICE — DRAPE, RADIAL, STERILE: Brand: MEDLINE

## (undated) DEVICE — SENSR CERBRL O2 PK/2

## (undated) DEVICE — TBG SXN INTRACARD RIDGID FLUT 24F .25X13IN A/

## (undated) DEVICE — SUT PROLN 4/0 SH D/A 36IN 8521H

## (undated) DEVICE — GW INQW FIX/CORE PTFE J/3MM .035 260CM